# Patient Record
Sex: FEMALE | Race: WHITE | NOT HISPANIC OR LATINO | Employment: OTHER | URBAN - METROPOLITAN AREA
[De-identification: names, ages, dates, MRNs, and addresses within clinical notes are randomized per-mention and may not be internally consistent; named-entity substitution may affect disease eponyms.]

---

## 2023-02-07 PROBLEM — K21.9 CHRONIC GERD: Status: ACTIVE | Noted: 2023-02-07

## 2023-02-07 PROBLEM — R42 DIZZINESS: Status: ACTIVE | Noted: 2023-02-07

## 2023-02-07 PROBLEM — C80.1 SMALL CELL CARCINOMA (MULTI): Status: ACTIVE | Noted: 2023-02-07

## 2023-02-07 PROBLEM — E55.9 VITAMIN D DEFICIENCY: Status: ACTIVE | Noted: 2023-02-07

## 2023-02-07 PROBLEM — R73.01 IMPAIRED FASTING BLOOD SUGAR: Status: ACTIVE | Noted: 2023-02-07

## 2023-02-07 PROBLEM — R93.1 AGATSTON CAC SCORE, <100: Status: ACTIVE | Noted: 2023-02-07

## 2023-02-07 PROBLEM — F10.90 PROBLEM DRINKING: Status: ACTIVE | Noted: 2023-02-07

## 2023-02-07 PROBLEM — J44.9 COPD (CHRONIC OBSTRUCTIVE PULMONARY DISEASE) (MULTI): Status: ACTIVE | Noted: 2023-02-07

## 2023-02-07 PROBLEM — J30.2 SEASONAL ALLERGIC RHINITIS: Status: ACTIVE | Noted: 2023-02-07

## 2023-02-07 PROBLEM — U07.1 DISEASE DUE TO SEVERE ACUTE RESPIRATORY SYNDROME CORONAVIRUS 2 (SARS-COV-2): Status: ACTIVE | Noted: 2023-02-07

## 2023-02-07 PROBLEM — I49.3 FREQUENT UNIFOCAL PVCS: Status: ACTIVE | Noted: 2023-02-07

## 2023-02-07 PROBLEM — R94.31 ABNORMAL EKG: Status: ACTIVE | Noted: 2023-02-07

## 2023-02-07 PROBLEM — H69.92 DYSFUNCTION OF LEFT EUSTACHIAN TUBE: Status: ACTIVE | Noted: 2023-02-07

## 2023-02-07 PROBLEM — E78.5 HYPERLIPIDEMIA: Status: ACTIVE | Noted: 2023-02-07

## 2023-02-07 PROBLEM — N76.0 ACUTE VAGINITIS: Status: ACTIVE | Noted: 2023-02-07

## 2023-02-07 RX ORDER — MEMANTINE HYDROCHLORIDE 10 MG/1
0.5 TABLET ORAL DAILY
COMMUNITY
End: 2023-05-12 | Stop reason: ALTCHOICE

## 2023-02-07 RX ORDER — CHOLECALCIFEROL (VITAMIN D3) 25 MCG
TABLET ORAL
COMMUNITY

## 2023-02-07 RX ORDER — OMEPRAZOLE 20 MG/1
1 CAPSULE, DELAYED RELEASE ORAL DAILY
COMMUNITY
Start: 2020-01-16 | End: 2023-05-12 | Stop reason: SDUPTHER

## 2023-02-07 RX ORDER — ALBUTEROL SULFATE 90 UG/1
1-2 AEROSOL, METERED RESPIRATORY (INHALATION) EVERY 4 HOURS PRN
COMMUNITY
Start: 2021-09-21 | End: 2023-10-24 | Stop reason: ALTCHOICE

## 2023-02-21 LAB
ACTIVATED PARTIAL THROMBOPLASTIN TIME IN PPP BY COAGULATION ASSAY: 29 SEC (ref 26–39)
ALANINE AMINOTRANSFERASE (SGPT) (U/L) IN SER/PLAS: 9 U/L (ref 7–45)
ALBUMIN (G/DL) IN SER/PLAS: 3.5 G/DL (ref 3.4–5)
ALKALINE PHOSPHATASE (U/L) IN SER/PLAS: 39 U/L (ref 33–136)
AMYLASE (U/L) IN SER/PLAS: 17 U/L (ref 29–103)
ANION GAP IN SER/PLAS: 15 MMOL/L (ref 10–20)
APPEARANCE, URINE: ABNORMAL
ASPARTATE AMINOTRANSFERASE (SGOT) (U/L) IN SER/PLAS: 10 U/L (ref 9–39)
BASOPHILS (10*3/UL) IN BLOOD BY AUTOMATED COUNT: 0.02 X10E9/L (ref 0–0.1)
BASOPHILS/100 LEUKOCYTES IN BLOOD BY AUTOMATED COUNT: 0.3 % (ref 0–2)
BILIRUBIN DIRECT (MG/DL) IN SER/PLAS: 0.2 MG/DL (ref 0–0.3)
BILIRUBIN TOTAL (MG/DL) IN SER/PLAS: 1.1 MG/DL (ref 0–1.2)
BILIRUBIN, URINE: NEGATIVE
BLOOD, URINE: NEGATIVE
C REACTIVE PROTEIN (MG/L) IN SER/PLAS: 24.47 MG/DL
CALCIUM (MG/DL) IN SER/PLAS: 9 MG/DL (ref 8.6–10.6)
CARBON DIOXIDE, TOTAL (MMOL/L) IN SER/PLAS: 24 MMOL/L (ref 21–32)
CHLORIDE (MMOL/L) IN SER/PLAS: 101 MMOL/L (ref 98–107)
COLOR, URINE: ABNORMAL
CORTISOL (UG/DL) IN SERUM: 22.3 UG/DL (ref 2.5–20)
CREATINE KINASE (U/L) IN SER/PLAS: 38 U/L (ref 0–215)
CREATININE (MG/DL) IN SER/PLAS: 0.76 MG/DL (ref 0.5–1.05)
EOSINOPHILS (10*3/UL) IN BLOOD BY AUTOMATED COUNT: 0.11 X10E9/L (ref 0–0.7)
EOSINOPHILS/100 LEUKOCYTES IN BLOOD BY AUTOMATED COUNT: 1.7 % (ref 0–6)
ERYTHROCYTE DISTRIBUTION WIDTH (RATIO) BY AUTOMATED COUNT: 14.2 % (ref 11.5–14.5)
ERYTHROCYTE MEAN CORPUSCULAR HEMOGLOBIN CONCENTRATION (G/DL) BY AUTOMATED: 33.5 G/DL (ref 32–36)
ERYTHROCYTE MEAN CORPUSCULAR VOLUME (FL) BY AUTOMATED COUNT: 93 FL (ref 80–100)
ERYTHROCYTES (10*6/UL) IN BLOOD BY AUTOMATED COUNT: 3.37 X10E12/L (ref 4–5.2)
FERRITIN (UG/LL) IN SER/PLAS: 818 UG/L (ref 8–150)
FOLLITROPIN (IU/L) IN SER/PLAS: 0.9 IU/L
GFR FEMALE: 86 ML/MIN/1.73M2
GLUCOSE (MG/DL) IN SER/PLAS: 100 MG/DL (ref 74–99)
GLUCOSE, URINE: NEGATIVE MG/DL
HEMATOCRIT (%) IN BLOOD BY AUTOMATED COUNT: 31.3 % (ref 36–46)
HEMOGLOBIN (G/DL) IN BLOOD: 10.5 G/DL (ref 12–16)
HYALINE CASTS, URINE: ABNORMAL /LPF
IMMATURE GRANULOCYTES/100 LEUKOCYTES IN BLOOD BY AUTOMATED COUNT: 0.5 % (ref 0–0.9)
INR IN PPP BY COAGULATION ASSAY: 1.2 (ref 0.9–1.1)
KETONES, URINE: ABNORMAL MG/DL
LACTATE DEHYDROGENASE (U/L) IN SER/PLAS BY LAC->PYR RXN: 120 U/L (ref 84–246)
LEUKOCYTE ESTERASE, URINE: NEGATIVE
LEUKOCYTES (10*3/UL) IN BLOOD BY AUTOMATED COUNT: 6.4 X10E9/L (ref 4.4–11.3)
LIPASE (U/L) IN SER/PLAS: 21 U/L (ref 9–82)
LUTEINIZING HORMONE (IU/ML) IN SER/PLAS: <0.1 IU/L
LYMPHOCYTES (10*3/UL) IN BLOOD BY AUTOMATED COUNT: 0.54 X10E9/L (ref 1.2–4.8)
LYMPHOCYTES/100 LEUKOCYTES IN BLOOD BY AUTOMATED COUNT: 8.4 % (ref 13–44)
MAGNESIUM (MG/DL) IN SER/PLAS: 1.71 MG/DL (ref 1.6–2.4)
MONOCYTES (10*3/UL) IN BLOOD BY AUTOMATED COUNT: 0.72 X10E9/L (ref 0.1–1)
MONOCYTES/100 LEUKOCYTES IN BLOOD BY AUTOMATED COUNT: 11.3 % (ref 2–10)
MUCUS, URINE: ABNORMAL /LPF
NEUTROPHILS (10*3/UL) IN BLOOD BY AUTOMATED COUNT: 4.98 X10E9/L (ref 1.2–7.7)
NEUTROPHILS/100 LEUKOCYTES IN BLOOD BY AUTOMATED COUNT: 77.8 % (ref 40–80)
NITRITE, URINE: NEGATIVE
NRBC (PER 100 WBCS) BY AUTOMATED COUNT: 0 /100 WBC (ref 0–0)
PH, URINE: 5 (ref 5–8)
PHOSPHATE (MG/DL) IN SER/PLAS: 3.1 MG/DL (ref 2.5–4.9)
PLATELETS (10*3/UL) IN BLOOD AUTOMATED COUNT: 221 X10E9/L (ref 150–450)
POTASSIUM (MMOL/L) IN SER/PLAS: 3.4 MMOL/L (ref 3.5–5.3)
PROTEIN TOTAL: 5.8 G/DL (ref 6.4–8.2)
PROTEIN, URINE: ABNORMAL MG/DL
PROTHROMBIN TIME (PT) IN PPP BY COAGULATION ASSAY: 13.7 SEC (ref 9.8–13.4)
RBC, URINE: 3 /HPF (ref 0–5)
SODIUM (MMOL/L) IN SER/PLAS: 137 MMOL/L (ref 136–145)
SPECIFIC GRAVITY, URINE: 1.02 (ref 1–1.03)
SQUAMOUS EPITHELIAL CELLS, URINE: 6 /HPF
THYROTROPIN (MIU/L) IN SER/PLAS BY DETECTION LIMIT <= 0.05 MIU/L: 2.12 MIU/L (ref 0.44–3.98)
THYROXINE (T4) FREE (NG/DL) IN SER/PLAS: 0.98 NG/DL (ref 0.78–1.48)
URATE (MG/DL) IN SER/PLAS: 4.2 MG/DL (ref 2.3–6.7)
UREA NITROGEN (MG/DL) IN SER/PLAS: 21 MG/DL (ref 6–23)
UROBILINOGEN, URINE: 2 MG/DL (ref 0–1.9)
WBC, URINE: 15 /HPF (ref 0–5)

## 2023-02-24 LAB — ADRENOCORTICOTROPIC HORMONE: 5.6 PG/ML (ref 7.2–63.3)

## 2023-02-28 LAB
ACTIVATED PARTIAL THROMBOPLASTIN TIME IN PPP BY COAGULATION ASSAY: 29 SEC (ref 26–39)
ALANINE AMINOTRANSFERASE (SGPT) (U/L) IN SER/PLAS: 14 U/L (ref 7–45)
ALANINE AMINOTRANSFERASE (SGPT) (U/L) IN SER/PLAS: 20 U/L (ref 7–45)
ALBUMIN (G/DL) IN SER/PLAS: 3.3 G/DL (ref 3.4–5)
ALBUMIN (G/DL) IN SER/PLAS: 3.8 G/DL (ref 3.4–5)
ALKALINE PHOSPHATASE (U/L) IN SER/PLAS: 30 U/L (ref 33–136)
ALKALINE PHOSPHATASE (U/L) IN SER/PLAS: 36 U/L (ref 33–136)
AMYLASE (U/L) IN SER/PLAS: 35 U/L (ref 29–103)
ANION GAP IN SER/PLAS: 11 MMOL/L (ref 10–20)
ANION GAP IN SER/PLAS: 14 MMOL/L (ref 10–20)
APPEARANCE, URINE: ABNORMAL
ASPARTATE AMINOTRANSFERASE (SGOT) (U/L) IN SER/PLAS: 11 U/L (ref 9–39)
ASPARTATE AMINOTRANSFERASE (SGOT) (U/L) IN SER/PLAS: 14 U/L (ref 9–39)
BASOPHILS (10*3/UL) IN BLOOD BY AUTOMATED COUNT: 0.03 X10E9/L (ref 0–0.1)
BASOPHILS/100 LEUKOCYTES IN BLOOD BY AUTOMATED COUNT: 0.5 % (ref 0–2)
BILIRUBIN DIRECT (MG/DL) IN SER/PLAS: 0.1 MG/DL (ref 0–0.3)
BILIRUBIN TOTAL (MG/DL) IN SER/PLAS: 0.5 MG/DL (ref 0–1.2)
BILIRUBIN TOTAL (MG/DL) IN SER/PLAS: 0.7 MG/DL (ref 0–1.2)
BILIRUBIN, URINE: NEGATIVE
BLOOD, URINE: NEGATIVE
C REACTIVE PROTEIN (MG/L) IN SER/PLAS: 0.79 MG/DL
C REACTIVE PROTEIN (MG/L) IN SER/PLAS: 1.12 MG/DL
CALCIUM (MG/DL) IN SER/PLAS: 10.5 MG/DL (ref 8.6–10.6)
CALCIUM (MG/DL) IN SER/PLAS: 8.7 MG/DL (ref 8.6–10.6)
CARBON DIOXIDE, TOTAL (MMOL/L) IN SER/PLAS: 24 MMOL/L (ref 21–32)
CARBON DIOXIDE, TOTAL (MMOL/L) IN SER/PLAS: 29 MMOL/L (ref 21–32)
CHLORIDE (MMOL/L) IN SER/PLAS: 100 MMOL/L (ref 98–107)
CHLORIDE (MMOL/L) IN SER/PLAS: 106 MMOL/L (ref 98–107)
COLOR, URINE: YELLOW
CORTISOL (UG/DL) IN SERUM: 1.2 UG/DL (ref 2.5–20)
CREATINE KINASE (U/L) IN SER/PLAS: 39 U/L (ref 0–215)
CREATININE (MG/DL) IN SER/PLAS: 0.71 MG/DL (ref 0.5–1.05)
CREATININE (MG/DL) IN SER/PLAS: 0.9 MG/DL (ref 0.5–1.05)
EOSINOPHILS (10*3/UL) IN BLOOD BY AUTOMATED COUNT: 0.04 X10E9/L (ref 0–0.7)
EOSINOPHILS/100 LEUKOCYTES IN BLOOD BY AUTOMATED COUNT: 0.7 % (ref 0–6)
ERYTHROCYTE DISTRIBUTION WIDTH (RATIO) BY AUTOMATED COUNT: 14.2 % (ref 11.5–14.5)
ERYTHROCYTE MEAN CORPUSCULAR HEMOGLOBIN CONCENTRATION (G/DL) BY AUTOMATED: 32.5 G/DL (ref 32–36)
ERYTHROCYTE MEAN CORPUSCULAR VOLUME (FL) BY AUTOMATED COUNT: 94 FL (ref 80–100)
ERYTHROCYTES (10*6/UL) IN BLOOD BY AUTOMATED COUNT: 3.75 X10E12/L (ref 4–5.2)
FERRITIN (UG/LL) IN SER/PLAS: 436 UG/L (ref 8–150)
FERRITIN (UG/LL) IN SER/PLAS: 545 UG/L (ref 8–150)
FOLLITROPIN (IU/L) IN SER/PLAS: 1 IU/L
GFR FEMALE: 70 ML/MIN/1.73M2
GFR FEMALE: >90 ML/MIN/1.73M2
GLUCOSE (MG/DL) IN SER/PLAS: 105 MG/DL (ref 74–99)
GLUCOSE (MG/DL) IN SER/PLAS: 116 MG/DL (ref 74–99)
GLUCOSE, URINE: NEGATIVE MG/DL
HEMATOCRIT (%) IN BLOOD BY AUTOMATED COUNT: 35.4 % (ref 36–46)
HEMOGLOBIN (G/DL) IN BLOOD: 11.5 G/DL (ref 12–16)
IMMATURE GRANULOCYTES/100 LEUKOCYTES IN BLOOD BY AUTOMATED COUNT: 3.8 % (ref 0–0.9)
INR IN PPP BY COAGULATION ASSAY: 1 (ref 0.9–1.1)
KETONES, URINE: NEGATIVE MG/DL
LACTATE DEHYDROGENASE (U/L) IN SER/PLAS BY LAC->PYR RXN: 194 U/L (ref 84–246)
LEUKOCYTE ESTERASE, URINE: NEGATIVE
LEUKOCYTES (10*3/UL) IN BLOOD BY AUTOMATED COUNT: 5.6 X10E9/L (ref 4.4–11.3)
LIPASE (U/L) IN SER/PLAS: 65 U/L (ref 9–82)
LUTEINIZING HORMONE (IU/ML) IN SER/PLAS: <0.1 IU/L
LYMPHOCYTES (10*3/UL) IN BLOOD BY AUTOMATED COUNT: 1.14 X10E9/L (ref 1.2–4.8)
LYMPHOCYTES/100 LEUKOCYTES IN BLOOD BY AUTOMATED COUNT: 20.5 % (ref 13–44)
MAGNESIUM (MG/DL) IN SER/PLAS: 1.94 MG/DL (ref 1.6–2.4)
MONOCYTES (10*3/UL) IN BLOOD BY AUTOMATED COUNT: 0.61 X10E9/L (ref 0.1–1)
MONOCYTES/100 LEUKOCYTES IN BLOOD BY AUTOMATED COUNT: 11 % (ref 2–10)
NEUTROPHILS (10*3/UL) IN BLOOD BY AUTOMATED COUNT: 3.52 X10E9/L (ref 1.2–7.7)
NEUTROPHILS/100 LEUKOCYTES IN BLOOD BY AUTOMATED COUNT: 63.5 % (ref 40–80)
NITRITE, URINE: NEGATIVE
NRBC (PER 100 WBCS) BY AUTOMATED COUNT: 0 /100 WBC (ref 0–0)
PH, URINE: 7 (ref 5–8)
PHOSPHATE (MG/DL) IN SER/PLAS: 4.4 MG/DL (ref 2.5–4.9)
PLATELETS (10*3/UL) IN BLOOD AUTOMATED COUNT: 400 X10E9/L (ref 150–450)
POTASSIUM (MMOL/L) IN SER/PLAS: 4 MMOL/L (ref 3.5–5.3)
POTASSIUM (MMOL/L) IN SER/PLAS: 4.1 MMOL/L (ref 3.5–5.3)
PROTEIN TOTAL: 5.4 G/DL (ref 6.4–8.2)
PROTEIN TOTAL: 6.5 G/DL (ref 6.4–8.2)
PROTEIN, URINE: NEGATIVE MG/DL
PROTHROMBIN TIME (PT) IN PPP BY COAGULATION ASSAY: 12.1 SEC (ref 9.8–13.4)
SODIUM (MMOL/L) IN SER/PLAS: 137 MMOL/L (ref 136–145)
SODIUM (MMOL/L) IN SER/PLAS: 139 MMOL/L (ref 136–145)
SPECIFIC GRAVITY, URINE: 1.02 (ref 1–1.03)
THYROTROPIN (MIU/L) IN SER/PLAS BY DETECTION LIMIT <= 0.05 MIU/L: 3.06 MIU/L (ref 0.44–3.98)
THYROXINE (T4) FREE (NG/DL) IN SER/PLAS: 1.1 NG/DL (ref 0.78–1.48)
URATE (MG/DL) IN SER/PLAS: 2.9 MG/DL (ref 2.3–6.7)
UREA NITROGEN (MG/DL) IN SER/PLAS: 20 MG/DL (ref 6–23)
UREA NITROGEN (MG/DL) IN SER/PLAS: 24 MG/DL (ref 6–23)
UROBILINOGEN, URINE: 4 MG/DL (ref 0–1.9)

## 2023-03-01 LAB
ACTIVATED PARTIAL THROMBOPLASTIN TIME IN PPP BY COAGULATION ASSAY: 23 SEC (ref 26–39)
ALANINE AMINOTRANSFERASE (SGPT) (U/L) IN SER/PLAS: 18 U/L (ref 7–45)
ALBUMIN (G/DL) IN SER/PLAS: 3.6 G/DL (ref 3.4–5)
ALKALINE PHOSPHATASE (U/L) IN SER/PLAS: 35 U/L (ref 33–136)
ANION GAP IN SER/PLAS: 14 MMOL/L (ref 10–20)
ASPARTATE AMINOTRANSFERASE (SGOT) (U/L) IN SER/PLAS: 15 U/L (ref 9–39)
BASOPHILS (10*3/UL) IN BLOOD BY AUTOMATED COUNT: 0.03 X10E9/L (ref 0–0.1)
BASOPHILS/100 LEUKOCYTES IN BLOOD BY AUTOMATED COUNT: 0.5 % (ref 0–2)
BILIRUBIN TOTAL (MG/DL) IN SER/PLAS: 0.7 MG/DL (ref 0–1.2)
C REACTIVE PROTEIN (MG/L) IN SER/PLAS: 0.69 MG/DL
CALCIUM (MG/DL) IN SER/PLAS: 9.5 MG/DL (ref 8.6–10.6)
CARBON DIOXIDE, TOTAL (MMOL/L) IN SER/PLAS: 27 MMOL/L (ref 21–32)
CHLORIDE (MMOL/L) IN SER/PLAS: 102 MMOL/L (ref 98–107)
CREATININE (MG/DL) IN SER/PLAS: 0.78 MG/DL (ref 0.5–1.05)
EOSINOPHILS (10*3/UL) IN BLOOD BY AUTOMATED COUNT: 0.12 X10E9/L (ref 0–0.7)
EOSINOPHILS/100 LEUKOCYTES IN BLOOD BY AUTOMATED COUNT: 2.2 % (ref 0–6)
ERYTHROCYTE DISTRIBUTION WIDTH (RATIO) BY AUTOMATED COUNT: 14.4 % (ref 11.5–14.5)
ERYTHROCYTE MEAN CORPUSCULAR HEMOGLOBIN CONCENTRATION (G/DL) BY AUTOMATED: 32.7 G/DL (ref 32–36)
ERYTHROCYTE MEAN CORPUSCULAR VOLUME (FL) BY AUTOMATED COUNT: 95 FL (ref 80–100)
ERYTHROCYTES (10*6/UL) IN BLOOD BY AUTOMATED COUNT: 3.64 X10E12/L (ref 4–5.2)
FERRITIN (UG/LL) IN SER/PLAS: 482 UG/L (ref 8–150)
GFR FEMALE: 83 ML/MIN/1.73M2
GLUCOSE (MG/DL) IN SER/PLAS: 93 MG/DL (ref 74–99)
HEMATOCRIT (%) IN BLOOD BY AUTOMATED COUNT: 34.6 % (ref 36–46)
HEMOGLOBIN (G/DL) IN BLOOD: 11.3 G/DL (ref 12–16)
IMMATURE GRANULOCYTES/100 LEUKOCYTES IN BLOOD BY AUTOMATED COUNT: 2.9 % (ref 0–0.9)
INR IN PPP BY COAGULATION ASSAY: 1 (ref 0.9–1.1)
LACTATE DEHYDROGENASE (U/L) IN SER/PLAS BY LAC->PYR RXN: 146 U/L (ref 84–246)
LEUKOCYTES (10*3/UL) IN BLOOD BY AUTOMATED COUNT: 5.6 X10E9/L (ref 4.4–11.3)
LYMPHOCYTES (10*3/UL) IN BLOOD BY AUTOMATED COUNT: 0.9 X10E9/L (ref 1.2–4.8)
LYMPHOCYTES/100 LEUKOCYTES IN BLOOD BY AUTOMATED COUNT: 16.2 % (ref 13–44)
MAGNESIUM (MG/DL) IN SER/PLAS: 1.93 MG/DL (ref 1.6–2.4)
MONOCYTES (10*3/UL) IN BLOOD BY AUTOMATED COUNT: 0.41 X10E9/L (ref 0.1–1)
MONOCYTES/100 LEUKOCYTES IN BLOOD BY AUTOMATED COUNT: 7.4 % (ref 2–10)
NEUTROPHILS (10*3/UL) IN BLOOD BY AUTOMATED COUNT: 3.94 X10E9/L (ref 1.2–7.7)
NEUTROPHILS/100 LEUKOCYTES IN BLOOD BY AUTOMATED COUNT: 70.8 % (ref 40–80)
NRBC (PER 100 WBCS) BY AUTOMATED COUNT: 0 /100 WBC (ref 0–0)
PHOSPHATE (MG/DL) IN SER/PLAS: 3.3 MG/DL (ref 2.5–4.9)
PLATELETS (10*3/UL) IN BLOOD AUTOMATED COUNT: 370 X10E9/L (ref 150–450)
POTASSIUM (MMOL/L) IN SER/PLAS: 3.7 MMOL/L (ref 3.5–5.3)
PROTEIN TOTAL: 6.4 G/DL (ref 6.4–8.2)
PROTHROMBIN TIME (PT) IN PPP BY COAGULATION ASSAY: 12 SEC (ref 9.8–13.4)
SODIUM (MMOL/L) IN SER/PLAS: 139 MMOL/L (ref 136–145)
URATE (MG/DL) IN SER/PLAS: 2.8 MG/DL (ref 2.3–6.7)
UREA NITROGEN (MG/DL) IN SER/PLAS: 18 MG/DL (ref 6–23)

## 2023-03-02 LAB
ADRENOCORTICOTROPIC HORMONE: 10.7 PG/ML (ref 7.2–63.3)
BILIRUBIN DIRECT (MG/DL) IN SER/PLAS: 0.1 MG/DL (ref 0–0.3)
BILIRUBIN DIRECT (MG/DL) IN SER/PLAS: 0.1 MG/DL (ref 0–0.3)

## 2023-03-07 LAB
ACTIVATED PARTIAL THROMBOPLASTIN TIME IN PPP BY COAGULATION ASSAY: 26 SEC (ref 26–39)
ALANINE AMINOTRANSFERASE (SGPT) (U/L) IN SER/PLAS: 15 U/L (ref 7–45)
ALBUMIN (G/DL) IN SER/PLAS: 3.5 G/DL (ref 3.4–5)
ALKALINE PHOSPHATASE (U/L) IN SER/PLAS: 35 U/L (ref 33–136)
ANION GAP IN SER/PLAS: 12 MMOL/L (ref 10–20)
ASPARTATE AMINOTRANSFERASE (SGOT) (U/L) IN SER/PLAS: 13 U/L (ref 9–39)
BASOPHILS (10*3/UL) IN BLOOD BY AUTOMATED COUNT: 0.05 X10E9/L (ref 0–0.1)
BASOPHILS/100 LEUKOCYTES IN BLOOD BY AUTOMATED COUNT: 0.5 % (ref 0–2)
BILIRUBIN TOTAL (MG/DL) IN SER/PLAS: 0.9 MG/DL (ref 0–1.2)
C REACTIVE PROTEIN (MG/L) IN SER/PLAS: 0.93 MG/DL
CALCIUM (MG/DL) IN SER/PLAS: 9.2 MG/DL (ref 8.6–10.6)
CARBON DIOXIDE, TOTAL (MMOL/L) IN SER/PLAS: 27 MMOL/L (ref 21–32)
CHLORIDE (MMOL/L) IN SER/PLAS: 105 MMOL/L (ref 98–107)
CREATININE (MG/DL) IN SER/PLAS: 0.83 MG/DL (ref 0.5–1.05)
EOSINOPHILS (10*3/UL) IN BLOOD BY AUTOMATED COUNT: 0.11 X10E9/L (ref 0–0.7)
EOSINOPHILS/100 LEUKOCYTES IN BLOOD BY AUTOMATED COUNT: 1.2 % (ref 0–6)
ERYTHROCYTE DISTRIBUTION WIDTH (RATIO) BY AUTOMATED COUNT: 14.7 % (ref 11.5–14.5)
ERYTHROCYTE MEAN CORPUSCULAR HEMOGLOBIN CONCENTRATION (G/DL) BY AUTOMATED: 33.5 G/DL (ref 32–36)
ERYTHROCYTE MEAN CORPUSCULAR VOLUME (FL) BY AUTOMATED COUNT: 94 FL (ref 80–100)
ERYTHROCYTES (10*6/UL) IN BLOOD BY AUTOMATED COUNT: 3.38 X10E12/L (ref 4–5.2)
FERRITIN (UG/LL) IN SER/PLAS: 438 UG/L (ref 8–150)
GFR FEMALE: 77 ML/MIN/1.73M2
GLUCOSE (MG/DL) IN SER/PLAS: 100 MG/DL (ref 74–99)
HEMATOCRIT (%) IN BLOOD BY AUTOMATED COUNT: 31.9 % (ref 36–46)
HEMOGLOBIN (G/DL) IN BLOOD: 10.7 G/DL (ref 12–16)
IMMATURE GRANULOCYTES/100 LEUKOCYTES IN BLOOD BY AUTOMATED COUNT: 0.4 % (ref 0–0.9)
INR IN PPP BY COAGULATION ASSAY: 1.1 (ref 0.9–1.1)
LACTATE DEHYDROGENASE (U/L) IN SER/PLAS BY LAC->PYR RXN: 141 U/L (ref 84–246)
LEUKOCYTES (10*3/UL) IN BLOOD BY AUTOMATED COUNT: 9.5 X10E9/L (ref 4.4–11.3)
LYMPHOCYTES (10*3/UL) IN BLOOD BY AUTOMATED COUNT: 1.2 X10E9/L (ref 1.2–4.8)
LYMPHOCYTES/100 LEUKOCYTES IN BLOOD BY AUTOMATED COUNT: 12.7 % (ref 13–44)
MAGNESIUM (MG/DL) IN SER/PLAS: 1.98 MG/DL (ref 1.6–2.4)
MONOCYTES (10*3/UL) IN BLOOD BY AUTOMATED COUNT: 1.08 X10E9/L (ref 0.1–1)
MONOCYTES/100 LEUKOCYTES IN BLOOD BY AUTOMATED COUNT: 11.4 % (ref 2–10)
NEUTROPHILS (10*3/UL) IN BLOOD BY AUTOMATED COUNT: 6.98 X10E9/L (ref 1.2–7.7)
NEUTROPHILS/100 LEUKOCYTES IN BLOOD BY AUTOMATED COUNT: 73.8 % (ref 40–80)
NRBC (PER 100 WBCS) BY AUTOMATED COUNT: 0 /100 WBC (ref 0–0)
PHOSPHATE (MG/DL) IN SER/PLAS: 3.2 MG/DL (ref 2.5–4.9)
PLATELETS (10*3/UL) IN BLOOD AUTOMATED COUNT: 300 X10E9/L (ref 150–450)
POTASSIUM (MMOL/L) IN SER/PLAS: 3.7 MMOL/L (ref 3.5–5.3)
PROTEIN TOTAL: 5.7 G/DL (ref 6.4–8.2)
PROTHROMBIN TIME (PT) IN PPP BY COAGULATION ASSAY: 12.2 SEC (ref 9.8–13.4)
SODIUM (MMOL/L) IN SER/PLAS: 140 MMOL/L (ref 136–145)
URATE (MG/DL) IN SER/PLAS: 2.7 MG/DL (ref 2.3–6.7)
UREA NITROGEN (MG/DL) IN SER/PLAS: 25 MG/DL (ref 6–23)

## 2023-03-09 LAB — BILIRUBIN DIRECT (MG/DL) IN SER/PLAS: 0.2 MG/DL (ref 0–0.3)

## 2023-03-13 PROBLEM — N76.0 ACUTE VAGINITIS: Status: RESOLVED | Noted: 2023-02-07 | Resolved: 2023-03-13

## 2023-03-13 PROBLEM — U07.1 DISEASE DUE TO SEVERE ACUTE RESPIRATORY SYNDROME CORONAVIRUS 2 (SARS-COV-2): Status: RESOLVED | Noted: 2023-02-07 | Resolved: 2023-03-13

## 2023-03-13 PROBLEM — R42 DIZZINESS: Status: RESOLVED | Noted: 2023-02-07 | Resolved: 2023-03-13

## 2023-03-13 PROBLEM — H69.92 DYSFUNCTION OF LEFT EUSTACHIAN TUBE: Status: RESOLVED | Noted: 2023-02-07 | Resolved: 2023-03-13

## 2023-03-13 RX ORDER — CALCIUM PHOS/VIT D3/MAG OXIDE 600 MG-500
1 TABLET ORAL DAILY
COMMUNITY
End: 2023-09-08 | Stop reason: ALTCHOICE

## 2023-03-13 RX ORDER — MULTIVIT-MIN/IRON/FOLIC/HRB186 3.3 MG-25
1 TABLET ORAL DAILY
COMMUNITY
End: 2023-09-08 | Stop reason: ALTCHOICE

## 2023-03-13 RX ORDER — OMEPRAZOLE 20 MG/1
1 TABLET, DELAYED RELEASE ORAL DAILY
COMMUNITY
End: 2023-09-08 | Stop reason: SDUPTHER

## 2023-03-13 RX ORDER — CETIRIZINE HYDROCHLORIDE 10 MG/1
1 TABLET ORAL DAILY
COMMUNITY
End: 2023-05-01 | Stop reason: SDUPTHER

## 2023-03-14 LAB
ACTIVATED PARTIAL THROMBOPLASTIN TIME IN PPP BY COAGULATION ASSAY: 26 SEC (ref 26–39)
ALANINE AMINOTRANSFERASE (SGPT) (U/L) IN SER/PLAS: 10 U/L (ref 7–45)
ALANINE AMINOTRANSFERASE (SGPT) (U/L) IN SER/PLAS: 12 U/L (ref 7–45)
ALBUMIN (G/DL) IN SER/PLAS: 3 G/DL (ref 3.4–5)
ALBUMIN (G/DL) IN SER/PLAS: 3.4 G/DL (ref 3.4–5)
ALKALINE PHOSPHATASE (U/L) IN SER/PLAS: 33 U/L (ref 33–136)
ALKALINE PHOSPHATASE (U/L) IN SER/PLAS: 33 U/L (ref 33–136)
ANION GAP IN SER/PLAS: 10 MMOL/L (ref 10–20)
ANION GAP IN SER/PLAS: 11 MMOL/L (ref 10–20)
ASPARTATE AMINOTRANSFERASE (SGOT) (U/L) IN SER/PLAS: 10 U/L (ref 9–39)
ASPARTATE AMINOTRANSFERASE (SGOT) (U/L) IN SER/PLAS: 16 U/L (ref 9–39)
BASOPHILS (10*3/UL) IN BLOOD BY AUTOMATED COUNT: 0.01 X10E9/L (ref 0–0.1)
BASOPHILS/100 LEUKOCYTES IN BLOOD BY AUTOMATED COUNT: 0.1 % (ref 0–2)
BILIRUBIN DIRECT (MG/DL) IN SER/PLAS: 0.1 MG/DL (ref 0–0.3)
BILIRUBIN TOTAL (MG/DL) IN SER/PLAS: 0.6 MG/DL (ref 0–1.2)
BILIRUBIN TOTAL (MG/DL) IN SER/PLAS: 0.8 MG/DL (ref 0–1.2)
C REACTIVE PROTEIN (MG/L) IN SER/PLAS: 0.28 MG/DL
C REACTIVE PROTEIN (MG/L) IN SER/PLAS: 0.47 MG/DL
CALCIUM (MG/DL) IN SER/PLAS: 8 MG/DL (ref 8.6–10.6)
CALCIUM (MG/DL) IN SER/PLAS: 8.6 MG/DL (ref 8.6–10.6)
CARBON DIOXIDE, TOTAL (MMOL/L) IN SER/PLAS: 25 MMOL/L (ref 21–32)
CARBON DIOXIDE, TOTAL (MMOL/L) IN SER/PLAS: 27 MMOL/L (ref 21–32)
CHLORIDE (MMOL/L) IN SER/PLAS: 105 MMOL/L (ref 98–107)
CHLORIDE (MMOL/L) IN SER/PLAS: 108 MMOL/L (ref 98–107)
CREATINE KINASE (U/L) IN SER/PLAS: 43 U/L (ref 0–215)
CREATININE (MG/DL) IN SER/PLAS: 0.61 MG/DL (ref 0.5–1.05)
CREATININE (MG/DL) IN SER/PLAS: 0.79 MG/DL (ref 0.5–1.05)
EOSINOPHILS (10*3/UL) IN BLOOD BY AUTOMATED COUNT: 0.11 X10E9/L (ref 0–0.7)
EOSINOPHILS/100 LEUKOCYTES IN BLOOD BY AUTOMATED COUNT: 1.4 % (ref 0–6)
ERYTHROCYTE DISTRIBUTION WIDTH (RATIO) BY AUTOMATED COUNT: 15.7 % (ref 11.5–14.5)
ERYTHROCYTE MEAN CORPUSCULAR HEMOGLOBIN CONCENTRATION (G/DL) BY AUTOMATED: 32.2 G/DL (ref 32–36)
ERYTHROCYTE MEAN CORPUSCULAR VOLUME (FL) BY AUTOMATED COUNT: 97 FL (ref 80–100)
ERYTHROCYTES (10*6/UL) IN BLOOD BY AUTOMATED COUNT: 3.11 X10E12/L (ref 4–5.2)
FERRITIN (UG/LL) IN SER/PLAS: 273 UG/L (ref 8–150)
FERRITIN (UG/LL) IN SER/PLAS: 307 UG/L (ref 8–150)
FOLLITROPIN (IU/L) IN SER/PLAS: <0.9 IU/L
GFR FEMALE: 82 ML/MIN/1.73M2
GFR FEMALE: >90 ML/MIN/1.73M2
GLUCOSE (MG/DL) IN SER/PLAS: 103 MG/DL (ref 74–99)
GLUCOSE (MG/DL) IN SER/PLAS: 92 MG/DL (ref 74–99)
HEMATOCRIT (%) IN BLOOD BY AUTOMATED COUNT: 30.1 % (ref 36–46)
HEMOGLOBIN (G/DL) IN BLOOD: 9.7 G/DL (ref 12–16)
IMMATURE GRANULOCYTES/100 LEUKOCYTES IN BLOOD BY AUTOMATED COUNT: 0.8 % (ref 0–0.9)
INR IN PPP BY COAGULATION ASSAY: 1 (ref 0.9–1.1)
LACTATE DEHYDROGENASE (U/L) IN SER/PLAS BY LAC->PYR RXN: 213 U/L (ref 84–246)
LEUKOCYTES (10*3/UL) IN BLOOD BY AUTOMATED COUNT: 7.6 X10E9/L (ref 4.4–11.3)
LUTEINIZING HORMONE (IU/ML) IN SER/PLAS: <0.1 IU/L
LYMPHOCYTES (10*3/UL) IN BLOOD BY AUTOMATED COUNT: 0.88 X10E9/L (ref 1.2–4.8)
LYMPHOCYTES/100 LEUKOCYTES IN BLOOD BY AUTOMATED COUNT: 11.5 % (ref 13–44)
MAGNESIUM (MG/DL) IN SER/PLAS: 1.82 MG/DL (ref 1.6–2.4)
MONOCYTES (10*3/UL) IN BLOOD BY AUTOMATED COUNT: 0.61 X10E9/L (ref 0.1–1)
MONOCYTES/100 LEUKOCYTES IN BLOOD BY AUTOMATED COUNT: 8 % (ref 2–10)
NEUTROPHILS (10*3/UL) IN BLOOD BY AUTOMATED COUNT: 5.96 X10E9/L (ref 1.2–7.7)
NEUTROPHILS/100 LEUKOCYTES IN BLOOD BY AUTOMATED COUNT: 78.2 % (ref 40–80)
NRBC (PER 100 WBCS) BY AUTOMATED COUNT: 0 /100 WBC (ref 0–0)
PHOSPHATE (MG/DL) IN SER/PLAS: 3 MG/DL (ref 2.5–4.9)
PLATELETS (10*3/UL) IN BLOOD AUTOMATED COUNT: 220 X10E9/L (ref 150–450)
POTASSIUM (MMOL/L) IN SER/PLAS: 3.5 MMOL/L (ref 3.5–5.3)
POTASSIUM (MMOL/L) IN SER/PLAS: 3.6 MMOL/L (ref 3.5–5.3)
PROTEIN TOTAL: 4.9 G/DL (ref 6.4–8.2)
PROTEIN TOTAL: 5.5 G/DL (ref 6.4–8.2)
PROTHROMBIN TIME (PT) IN PPP BY COAGULATION ASSAY: 11.3 SEC (ref 9.8–13.4)
SODIUM (MMOL/L) IN SER/PLAS: 139 MMOL/L (ref 136–145)
SODIUM (MMOL/L) IN SER/PLAS: 139 MMOL/L (ref 136–145)
URATE (MG/DL) IN SER/PLAS: 2.5 MG/DL (ref 2.3–6.7)
UREA NITROGEN (MG/DL) IN SER/PLAS: 13 MG/DL (ref 6–23)
UREA NITROGEN (MG/DL) IN SER/PLAS: 20 MG/DL (ref 6–23)

## 2023-03-15 LAB
ALANINE AMINOTRANSFERASE (SGPT) (U/L) IN SER/PLAS: 12 U/L (ref 7–45)
ALBUMIN (G/DL) IN SER/PLAS: 3.2 G/DL (ref 3.4–5)
ALKALINE PHOSPHATASE (U/L) IN SER/PLAS: 31 U/L (ref 33–136)
ANION GAP IN SER/PLAS: 12 MMOL/L (ref 10–20)
ASPARTATE AMINOTRANSFERASE (SGOT) (U/L) IN SER/PLAS: 10 U/L (ref 9–39)
BASOPHILS (10*3/UL) IN BLOOD BY AUTOMATED COUNT: 0.01 X10E9/L (ref 0–0.1)
BASOPHILS/100 LEUKOCYTES IN BLOOD BY AUTOMATED COUNT: 0.1 % (ref 0–2)
BILIRUBIN DIRECT (MG/DL) IN SER/PLAS: 0.1 MG/DL (ref 0–0.3)
BILIRUBIN TOTAL (MG/DL) IN SER/PLAS: 0.7 MG/DL (ref 0–1.2)
C REACTIVE PROTEIN (MG/L) IN SER/PLAS: 0.51 MG/DL
CALCIUM (MG/DL) IN SER/PLAS: 8.6 MG/DL (ref 8.6–10.6)
CARBON DIOXIDE, TOTAL (MMOL/L) IN SER/PLAS: 25 MMOL/L (ref 21–32)
CHLORIDE (MMOL/L) IN SER/PLAS: 103 MMOL/L (ref 98–107)
CREATININE (MG/DL) IN SER/PLAS: 0.83 MG/DL (ref 0.5–1.05)
EOSINOPHILS (10*3/UL) IN BLOOD BY AUTOMATED COUNT: 0.03 X10E9/L (ref 0–0.7)
EOSINOPHILS/100 LEUKOCYTES IN BLOOD BY AUTOMATED COUNT: 0.4 % (ref 0–6)
ERYTHROCYTE DISTRIBUTION WIDTH (RATIO) BY AUTOMATED COUNT: 15.5 % (ref 11.5–14.5)
ERYTHROCYTE MEAN CORPUSCULAR HEMOGLOBIN CONCENTRATION (G/DL) BY AUTOMATED: 33 G/DL (ref 32–36)
ERYTHROCYTE MEAN CORPUSCULAR VOLUME (FL) BY AUTOMATED COUNT: 95 FL (ref 80–100)
ERYTHROCYTES (10*6/UL) IN BLOOD BY AUTOMATED COUNT: 3.26 X10E12/L (ref 4–5.2)
FERRITIN (UG/LL) IN SER/PLAS: 277 UG/L (ref 8–150)
GFR FEMALE: 77 ML/MIN/1.73M2
GLUCOSE (MG/DL) IN SER/PLAS: 125 MG/DL (ref 74–99)
HEMATOCRIT (%) IN BLOOD BY AUTOMATED COUNT: 30.9 % (ref 36–46)
HEMOGLOBIN (G/DL) IN BLOOD: 10.2 G/DL (ref 12–16)
IMMATURE GRANULOCYTES/100 LEUKOCYTES IN BLOOD BY AUTOMATED COUNT: 0.9 % (ref 0–0.9)
LACTATE DEHYDROGENASE (U/L) IN SER/PLAS BY LAC->PYR RXN: 146 U/L (ref 84–246)
LEUKOCYTES (10*3/UL) IN BLOOD BY AUTOMATED COUNT: 6.9 X10E9/L (ref 4.4–11.3)
LYMPHOCYTES (10*3/UL) IN BLOOD BY AUTOMATED COUNT: 0.61 X10E9/L (ref 1.2–4.8)
LYMPHOCYTES/100 LEUKOCYTES IN BLOOD BY AUTOMATED COUNT: 8.9 % (ref 13–44)
MAGNESIUM (MG/DL) IN SER/PLAS: 1.71 MG/DL (ref 1.6–2.4)
MONOCYTES (10*3/UL) IN BLOOD BY AUTOMATED COUNT: 0.33 X10E9/L (ref 0.1–1)
MONOCYTES/100 LEUKOCYTES IN BLOOD BY AUTOMATED COUNT: 4.8 % (ref 2–10)
NEUTROPHILS (10*3/UL) IN BLOOD BY AUTOMATED COUNT: 5.83 X10E9/L (ref 1.2–7.7)
NEUTROPHILS/100 LEUKOCYTES IN BLOOD BY AUTOMATED COUNT: 84.9 % (ref 40–80)
NRBC (PER 100 WBCS) BY AUTOMATED COUNT: 0 /100 WBC (ref 0–0)
PHOSPHATE (MG/DL) IN SER/PLAS: 2.9 MG/DL (ref 2.5–4.9)
PLATELETS (10*3/UL) IN BLOOD AUTOMATED COUNT: 222 X10E9/L (ref 150–450)
POTASSIUM (MMOL/L) IN SER/PLAS: 3.7 MMOL/L (ref 3.5–5.3)
PROTEIN TOTAL: 5.3 G/DL (ref 6.4–8.2)
SODIUM (MMOL/L) IN SER/PLAS: 136 MMOL/L (ref 136–145)
URATE (MG/DL) IN SER/PLAS: 2.1 MG/DL (ref 2.3–6.7)
UREA NITROGEN (MG/DL) IN SER/PLAS: 14 MG/DL (ref 6–23)

## 2023-03-16 LAB
ACTIVATED PARTIAL THROMBOPLASTIN TIME IN PPP BY COAGULATION ASSAY: 28 SEC (ref 26–39)
ALANINE AMINOTRANSFERASE (SGPT) (U/L) IN SER/PLAS: 12 U/L (ref 7–45)
ALBUMIN (G/DL) IN SER/PLAS: 3.5 G/DL (ref 3.4–5)
ALKALINE PHOSPHATASE (U/L) IN SER/PLAS: 35 U/L (ref 33–136)
ANION GAP IN SER/PLAS: 12 MMOL/L (ref 10–20)
ASPARTATE AMINOTRANSFERASE (SGOT) (U/L) IN SER/PLAS: 14 U/L (ref 9–39)
BASOPHILS (10*3/UL) IN BLOOD BY AUTOMATED COUNT: 0.02 X10E9/L (ref 0–0.1)
BASOPHILS/100 LEUKOCYTES IN BLOOD BY AUTOMATED COUNT: 0.3 % (ref 0–2)
BILIRUBIN DIRECT (MG/DL) IN SER/PLAS: 0.1 MG/DL (ref 0–0.3)
BILIRUBIN TOTAL (MG/DL) IN SER/PLAS: 0.7 MG/DL (ref 0–1.2)
C REACTIVE PROTEIN (MG/L) IN SER/PLAS: 0.49 MG/DL
CALCIUM (MG/DL) IN SER/PLAS: 8.9 MG/DL (ref 8.6–10.6)
CARBON DIOXIDE, TOTAL (MMOL/L) IN SER/PLAS: 25 MMOL/L (ref 21–32)
CHLORIDE (MMOL/L) IN SER/PLAS: 103 MMOL/L (ref 98–107)
CREATININE (MG/DL) IN SER/PLAS: 0.69 MG/DL (ref 0.5–1.05)
EOSINOPHILS (10*3/UL) IN BLOOD BY AUTOMATED COUNT: 0.04 X10E9/L (ref 0–0.7)
EOSINOPHILS/100 LEUKOCYTES IN BLOOD BY AUTOMATED COUNT: 0.6 % (ref 0–6)
ERYTHROCYTE DISTRIBUTION WIDTH (RATIO) BY AUTOMATED COUNT: 15.6 % (ref 11.5–14.5)
ERYTHROCYTE MEAN CORPUSCULAR HEMOGLOBIN CONCENTRATION (G/DL) BY AUTOMATED: 32.1 G/DL (ref 32–36)
ERYTHROCYTE MEAN CORPUSCULAR VOLUME (FL) BY AUTOMATED COUNT: 98 FL (ref 80–100)
ERYTHROCYTES (10*6/UL) IN BLOOD BY AUTOMATED COUNT: 3.33 X10E12/L (ref 4–5.2)
FERRITIN (UG/LL) IN SER/PLAS: 289 UG/L (ref 8–150)
GFR FEMALE: >90 ML/MIN/1.73M2
GLUCOSE (MG/DL) IN SER/PLAS: 96 MG/DL (ref 74–99)
HEMATOCRIT (%) IN BLOOD BY AUTOMATED COUNT: 32.7 % (ref 36–46)
HEMOGLOBIN (G/DL) IN BLOOD: 10.5 G/DL (ref 12–16)
IMMATURE GRANULOCYTES/100 LEUKOCYTES IN BLOOD BY AUTOMATED COUNT: 1.1 % (ref 0–0.9)
INR IN PPP BY COAGULATION ASSAY: 1 (ref 0.9–1.1)
LACTATE DEHYDROGENASE (U/L) IN SER/PLAS BY LAC->PYR RXN: 185 U/L (ref 84–246)
LEUKOCYTES (10*3/UL) IN BLOOD BY AUTOMATED COUNT: 6.7 X10E9/L (ref 4.4–11.3)
LYMPHOCYTES (10*3/UL) IN BLOOD BY AUTOMATED COUNT: 0.67 X10E9/L (ref 1.2–4.8)
LYMPHOCYTES/100 LEUKOCYTES IN BLOOD BY AUTOMATED COUNT: 10.1 % (ref 13–44)
MAGNESIUM (MG/DL) IN SER/PLAS: 1.85 MG/DL (ref 1.6–2.4)
MONOCYTES (10*3/UL) IN BLOOD BY AUTOMATED COUNT: 0.24 X10E9/L (ref 0.1–1)
MONOCYTES/100 LEUKOCYTES IN BLOOD BY AUTOMATED COUNT: 3.6 % (ref 2–10)
NEUTROPHILS (10*3/UL) IN BLOOD BY AUTOMATED COUNT: 5.62 X10E9/L (ref 1.2–7.7)
NEUTROPHILS/100 LEUKOCYTES IN BLOOD BY AUTOMATED COUNT: 84.3 % (ref 40–80)
NRBC (PER 100 WBCS) BY AUTOMATED COUNT: 0 /100 WBC (ref 0–0)
PHOSPHATE (MG/DL) IN SER/PLAS: 3.4 MG/DL (ref 2.5–4.9)
PLATELETS (10*3/UL) IN BLOOD AUTOMATED COUNT: 241 X10E9/L (ref 150–450)
POTASSIUM (MMOL/L) IN SER/PLAS: 4.2 MMOL/L (ref 3.5–5.3)
PROTEIN TOTAL: 5.8 G/DL (ref 6.4–8.2)
PROTHROMBIN TIME (PT) IN PPP BY COAGULATION ASSAY: 11.5 SEC (ref 9.8–13.4)
SODIUM (MMOL/L) IN SER/PLAS: 136 MMOL/L (ref 136–145)
URATE (MG/DL) IN SER/PLAS: 2 MG/DL (ref 2.3–6.7)
UREA NITROGEN (MG/DL) IN SER/PLAS: 17 MG/DL (ref 6–23)

## 2023-03-20 ENCOUNTER — APPOINTMENT (OUTPATIENT)
Dept: PRIMARY CARE | Facility: CLINIC | Age: 68
End: 2023-03-20
Payer: MEDICARE

## 2023-03-28 ENCOUNTER — HOSPITAL ENCOUNTER (OUTPATIENT)
Dept: DATA CONVERSION | Facility: HOSPITAL | Age: 68
End: 2023-03-28
Attending: STUDENT IN AN ORGANIZED HEALTH CARE EDUCATION/TRAINING PROGRAM | Admitting: STUDENT IN AN ORGANIZED HEALTH CARE EDUCATION/TRAINING PROGRAM
Payer: MEDICARE

## 2023-03-28 DIAGNOSIS — C34.90 MALIGNANT NEOPLASM OF UNSPECIFIED PART OF UNSPECIFIED BRONCHUS OR LUNG (MULTI): ICD-10-CM

## 2023-03-28 DIAGNOSIS — Z51.11 ENCOUNTER FOR ANTINEOPLASTIC CHEMOTHERAPY: ICD-10-CM

## 2023-03-28 DIAGNOSIS — Z00.6 ENCOUNTER FOR EXAMINATION FOR NORMAL COMPARISON AND CONTROL IN CLINICAL RESEARCH PROGRAM: ICD-10-CM

## 2023-03-28 LAB
ACTIVATED PARTIAL THROMBOPLASTIN TIME IN PPP BY COAGULATION ASSAY: 26 SEC (ref 26–39)
ALANINE AMINOTRANSFERASE (SGPT) (U/L) IN SER/PLAS: 14 U/L (ref 7–45)
ALBUMIN (G/DL) IN SER/PLAS: 3.7 G/DL (ref 3.4–5)
ALKALINE PHOSPHATASE (U/L) IN SER/PLAS: 39 U/L (ref 33–136)
AMYLASE (U/L) IN SER/PLAS: 39 U/L (ref 29–103)
ANION GAP IN SER/PLAS: 15 MMOL/L (ref 10–20)
ASPARTATE AMINOTRANSFERASE (SGOT) (U/L) IN SER/PLAS: 13 U/L (ref 9–39)
BASOPHILS (10*3/UL) IN BLOOD BY AUTOMATED COUNT: 0.03 X10E9/L (ref 0–0.1)
BASOPHILS/100 LEUKOCYTES IN BLOOD BY AUTOMATED COUNT: 0.4 % (ref 0–2)
BILIRUBIN DIRECT (MG/DL) IN SER/PLAS: 0.1 MG/DL (ref 0–0.3)
BILIRUBIN TOTAL (MG/DL) IN SER/PLAS: 0.7 MG/DL (ref 0–1.2)
C REACTIVE PROTEIN (MG/L) IN SER/PLAS: 1.33 MG/DL
CALCIUM (MG/DL) IN SER/PLAS: 9.1 MG/DL (ref 8.6–10.6)
CARBON DIOXIDE, TOTAL (MMOL/L) IN SER/PLAS: 26 MMOL/L (ref 21–32)
CHLORIDE (MMOL/L) IN SER/PLAS: 104 MMOL/L (ref 98–107)
CORTISOL (UG/DL) IN SERUM: 0.7 UG/DL (ref 2.5–20)
CREATINE KINASE (U/L) IN SER/PLAS: 38 U/L (ref 0–215)
CREATININE (MG/DL) IN SER/PLAS: 0.76 MG/DL (ref 0.5–1.05)
EOSINOPHILS (10*3/UL) IN BLOOD BY AUTOMATED COUNT: 0.08 X10E9/L (ref 0–0.7)
EOSINOPHILS/100 LEUKOCYTES IN BLOOD BY AUTOMATED COUNT: 1 % (ref 0–6)
ERYTHROCYTE DISTRIBUTION WIDTH (RATIO) BY AUTOMATED COUNT: 15.8 % (ref 11.5–14.5)
ERYTHROCYTE MEAN CORPUSCULAR HEMOGLOBIN CONCENTRATION (G/DL) BY AUTOMATED: 32.2 G/DL (ref 32–36)
ERYTHROCYTE MEAN CORPUSCULAR VOLUME (FL) BY AUTOMATED COUNT: 99 FL (ref 80–100)
ERYTHROCYTES (10*6/UL) IN BLOOD BY AUTOMATED COUNT: 3.47 X10E12/L (ref 4–5.2)
ESTRADIOL (PG/ML) IN SER/PLAS: <19 PG/ML
FERRITIN (UG/LL) IN SER/PLAS: 339 UG/L (ref 8–150)
FOLLITROPIN (IU/L) IN SER/PLAS: 15.5 IU/L
GFR FEMALE: 86 ML/MIN/1.73M2
GLUCOSE (MG/DL) IN SER/PLAS: 78 MG/DL (ref 74–99)
HEMATOCRIT (%) IN BLOOD BY AUTOMATED COUNT: 34.2 % (ref 36–46)
HEMOGLOBIN (G/DL) IN BLOOD: 11 G/DL (ref 12–16)
IMMATURE GRANULOCYTES/100 LEUKOCYTES IN BLOOD BY AUTOMATED COUNT: 1 % (ref 0–0.9)
INR IN PPP BY COAGULATION ASSAY: 0.9 (ref 0.9–1.1)
LEUKOCYTES (10*3/UL) IN BLOOD BY AUTOMATED COUNT: 8 X10E9/L (ref 4.4–11.3)
LIPASE (U/L) IN SER/PLAS: 50 U/L (ref 9–82)
LUTEINIZING HORMONE (IU/ML) IN SER/PLAS: 0.1 IU/L
LYMPHOCYTES (10*3/UL) IN BLOOD BY AUTOMATED COUNT: 0.96 X10E9/L (ref 1.2–4.8)
LYMPHOCYTES/100 LEUKOCYTES IN BLOOD BY AUTOMATED COUNT: 12 % (ref 13–44)
MAGNESIUM (MG/DL) IN SER/PLAS: 1.75 MG/DL (ref 1.6–2.4)
MONOCYTES (10*3/UL) IN BLOOD BY AUTOMATED COUNT: 0.6 X10E9/L (ref 0.1–1)
MONOCYTES/100 LEUKOCYTES IN BLOOD BY AUTOMATED COUNT: 7.5 % (ref 2–10)
NEUTROPHILS (10*3/UL) IN BLOOD BY AUTOMATED COUNT: 6.28 X10E9/L (ref 1.2–7.7)
NEUTROPHILS/100 LEUKOCYTES IN BLOOD BY AUTOMATED COUNT: 78.1 % (ref 40–80)
NRBC (PER 100 WBCS) BY AUTOMATED COUNT: 0 /100 WBC (ref 0–0)
PHOSPHATE (MG/DL) IN SER/PLAS: 3.6 MG/DL (ref 2.5–4.9)
PLATELETS (10*3/UL) IN BLOOD AUTOMATED COUNT: 270 X10E9/L (ref 150–450)
POTASSIUM (MMOL/L) IN SER/PLAS: 3.5 MMOL/L (ref 3.5–5.3)
PROTEIN TOTAL: 6.1 G/DL (ref 6.4–8.2)
PROTHROMBIN TIME (PT) IN PPP BY COAGULATION ASSAY: 10.5 SEC (ref 9.8–13.4)
SODIUM (MMOL/L) IN SER/PLAS: 141 MMOL/L (ref 136–145)
THYROTROPIN (MIU/L) IN SER/PLAS BY DETECTION LIMIT <= 0.05 MIU/L: 6.13 MIU/L (ref 0.44–3.98)
THYROXINE (T4) FREE (NG/DL) IN SER/PLAS: 0.81 NG/DL (ref 0.78–1.48)
UREA NITROGEN (MG/DL) IN SER/PLAS: 25 MG/DL (ref 6–23)

## 2023-03-29 ENCOUNTER — TELEPHONE (OUTPATIENT)
Dept: PRIMARY CARE | Facility: CLINIC | Age: 68
End: 2023-03-29

## 2023-03-29 DIAGNOSIS — J40 BRONCHITIS: Primary | ICD-10-CM

## 2023-03-29 RX ORDER — AZITHROMYCIN 250 MG/1
TABLET, FILM COATED ORAL
Qty: 1 TABLET | Refills: 0 | Status: SHIPPED | OUTPATIENT
Start: 2023-03-29 | End: 2023-05-12 | Stop reason: ALTCHOICE

## 2023-03-29 NOTE — TELEPHONE ENCOUNTER
Patient called in and stated that she has a productive cough and chest congestion and has been sick for a week now. The patient is asking for a z pack if possible.

## 2023-03-31 LAB — ADRENOCORTICOTROPIC HORMONE: 4.3 PG/ML (ref 7.2–63.3)

## 2023-04-01 NOTE — TELEPHONE ENCOUNTER
Patient called today because one more pill left to take tomorrow. She was given Zpak on 3/29 for productive cough. She is on day 4. She is still coughing up some green stuff. Wanted me to call in 500 mg Zithromax. Explained that would not be herring. She needs to finsh the Zpak tomorrow and see how she is doing. Explained that she could take mucinex to help loosen it up more but that the mucous has to come out as she gets better. She denies shortness of breath and is not feeling ill. Advised her to get seen so someone can listen to her lungs if she is not doing better when finishes the Zpak, or if getting worse.

## 2023-04-11 ENCOUNTER — HOSPITAL ENCOUNTER (OUTPATIENT)
Dept: DATA CONVERSION | Facility: HOSPITAL | Age: 68
End: 2023-04-11
Attending: STUDENT IN AN ORGANIZED HEALTH CARE EDUCATION/TRAINING PROGRAM | Admitting: STUDENT IN AN ORGANIZED HEALTH CARE EDUCATION/TRAINING PROGRAM
Payer: MEDICARE

## 2023-04-11 DIAGNOSIS — Z51.11 ENCOUNTER FOR ANTINEOPLASTIC CHEMOTHERAPY: ICD-10-CM

## 2023-04-11 DIAGNOSIS — Z00.6 ENCOUNTER FOR EXAMINATION FOR NORMAL COMPARISON AND CONTROL IN CLINICAL RESEARCH PROGRAM: ICD-10-CM

## 2023-04-11 DIAGNOSIS — C34.90 MALIGNANT NEOPLASM OF UNSPECIFIED PART OF UNSPECIFIED BRONCHUS OR LUNG (MULTI): ICD-10-CM

## 2023-04-11 LAB
ACTIVATED PARTIAL THROMBOPLASTIN TIME IN PPP BY COAGULATION ASSAY: 27 SEC (ref 26–39)
ALANINE AMINOTRANSFERASE (SGPT) (U/L) IN SER/PLAS: 14 U/L (ref 7–45)
ALBUMIN (G/DL) IN SER/PLAS: 3.6 G/DL (ref 3.4–5)
ALKALINE PHOSPHATASE (U/L) IN SER/PLAS: 36 U/L (ref 33–136)
ANION GAP IN SER/PLAS: 12 MMOL/L (ref 10–20)
ASPARTATE AMINOTRANSFERASE (SGOT) (U/L) IN SER/PLAS: 13 U/L (ref 9–39)
BASOPHILS (10*3/UL) IN BLOOD BY AUTOMATED COUNT: 0.05 X10E9/L (ref 0–0.1)
BASOPHILS/100 LEUKOCYTES IN BLOOD BY AUTOMATED COUNT: 0.6 % (ref 0–2)
BILIRUBIN DIRECT (MG/DL) IN SER/PLAS: 0.1 MG/DL (ref 0–0.3)
BILIRUBIN TOTAL (MG/DL) IN SER/PLAS: 0.5 MG/DL (ref 0–1.2)
C REACTIVE PROTEIN (MG/L) IN SER/PLAS: 0.55 MG/DL
CALCIUM (MG/DL) IN SER/PLAS: 8.5 MG/DL (ref 8.6–10.6)
CARBON DIOXIDE, TOTAL (MMOL/L) IN SER/PLAS: 28 MMOL/L (ref 21–32)
CHLORIDE (MMOL/L) IN SER/PLAS: 105 MMOL/L (ref 98–107)
CREATININE (MG/DL) IN SER/PLAS: 0.78 MG/DL (ref 0.5–1.05)
EOSINOPHILS (10*3/UL) IN BLOOD BY AUTOMATED COUNT: 0.1 X10E9/L (ref 0–0.7)
EOSINOPHILS/100 LEUKOCYTES IN BLOOD BY AUTOMATED COUNT: 1.3 % (ref 0–6)
ERYTHROCYTE DISTRIBUTION WIDTH (RATIO) BY AUTOMATED COUNT: 15 % (ref 11.5–14.5)
ERYTHROCYTE MEAN CORPUSCULAR HEMOGLOBIN CONCENTRATION (G/DL) BY AUTOMATED: 32.4 G/DL (ref 32–36)
ERYTHROCYTE MEAN CORPUSCULAR VOLUME (FL) BY AUTOMATED COUNT: 99 FL (ref 80–100)
ERYTHROCYTES (10*6/UL) IN BLOOD BY AUTOMATED COUNT: 3.55 X10E12/L (ref 4–5.2)
FERRITIN (UG/LL) IN SER/PLAS: 195 UG/L (ref 8–150)
GFR FEMALE: 83 ML/MIN/1.73M2
GLUCOSE (MG/DL) IN SER/PLAS: 69 MG/DL (ref 74–99)
HEMATOCRIT (%) IN BLOOD BY AUTOMATED COUNT: 35.2 % (ref 36–46)
HEMOGLOBIN (G/DL) IN BLOOD: 11.4 G/DL (ref 12–16)
IMMATURE GRANULOCYTES/100 LEUKOCYTES IN BLOOD BY AUTOMATED COUNT: 1.6 % (ref 0–0.9)
INR IN PPP BY COAGULATION ASSAY: 1 (ref 0.9–1.1)
LEUKOCYTES (10*3/UL) IN BLOOD BY AUTOMATED COUNT: 7.7 X10E9/L (ref 4.4–11.3)
LYMPHOCYTES (10*3/UL) IN BLOOD BY AUTOMATED COUNT: 1.96 X10E9/L (ref 1.2–4.8)
LYMPHOCYTES/100 LEUKOCYTES IN BLOOD BY AUTOMATED COUNT: 25.4 % (ref 13–44)
MAGNESIUM (MG/DL) IN SER/PLAS: 1.97 MG/DL (ref 1.6–2.4)
MONOCYTES (10*3/UL) IN BLOOD BY AUTOMATED COUNT: 0.65 X10E9/L (ref 0.1–1)
MONOCYTES/100 LEUKOCYTES IN BLOOD BY AUTOMATED COUNT: 8.4 % (ref 2–10)
NEUTROPHILS (10*3/UL) IN BLOOD BY AUTOMATED COUNT: 4.83 X10E9/L (ref 1.2–7.7)
NEUTROPHILS/100 LEUKOCYTES IN BLOOD BY AUTOMATED COUNT: 62.7 % (ref 40–80)
NRBC (PER 100 WBCS) BY AUTOMATED COUNT: 0 /100 WBC (ref 0–0)
PHOSPHATE (MG/DL) IN SER/PLAS: 2.6 MG/DL (ref 2.5–4.9)
PLATELETS (10*3/UL) IN BLOOD AUTOMATED COUNT: 271 X10E9/L (ref 150–450)
POTASSIUM (MMOL/L) IN SER/PLAS: 3.7 MMOL/L (ref 3.5–5.3)
PROTEIN TOTAL: 5.9 G/DL (ref 6.4–8.2)
PROTHROMBIN TIME (PT) IN PPP BY COAGULATION ASSAY: 11.1 SEC (ref 9.8–13.4)
SODIUM (MMOL/L) IN SER/PLAS: 141 MMOL/L (ref 136–145)
UREA NITROGEN (MG/DL) IN SER/PLAS: 24 MG/DL (ref 6–23)

## 2023-04-22 DIAGNOSIS — E78.2 MIXED HYPERLIPIDEMIA: Primary | ICD-10-CM

## 2023-04-22 DIAGNOSIS — E55.9 VITAMIN D DEFICIENCY: ICD-10-CM

## 2023-04-22 DIAGNOSIS — R73.01 IMPAIRED FASTING BLOOD SUGAR: ICD-10-CM

## 2023-04-24 ENCOUNTER — LAB (OUTPATIENT)
Dept: LAB | Facility: LAB | Age: 68
End: 2023-04-24
Payer: MEDICARE

## 2023-04-24 DIAGNOSIS — R73.01 IMPAIRED FASTING BLOOD SUGAR: ICD-10-CM

## 2023-04-24 DIAGNOSIS — I49.3 FREQUENT UNIFOCAL PVCS: ICD-10-CM

## 2023-04-24 DIAGNOSIS — E55.9 VITAMIN D DEFICIENCY: ICD-10-CM

## 2023-04-24 DIAGNOSIS — E78.2 MIXED HYPERLIPIDEMIA: ICD-10-CM

## 2023-04-24 LAB
ALANINE AMINOTRANSFERASE (SGPT) (U/L) IN SER/PLAS: 11 U/L (ref 7–45)
ALBUMIN (G/DL) IN SER/PLAS: 3.6 G/DL (ref 3.4–5)
ALKALINE PHOSPHATASE (U/L) IN SER/PLAS: 38 U/L (ref 33–136)
ANION GAP IN SER/PLAS: 10 MMOL/L (ref 10–20)
ASPARTATE AMINOTRANSFERASE (SGOT) (U/L) IN SER/PLAS: 13 U/L (ref 9–39)
BILIRUBIN TOTAL (MG/DL) IN SER/PLAS: 0.6 MG/DL (ref 0–1.2)
CALCIUM (MG/DL) IN SER/PLAS: 9 MG/DL (ref 8.6–10.3)
CARBON DIOXIDE, TOTAL (MMOL/L) IN SER/PLAS: 29 MMOL/L (ref 21–32)
CHLORIDE (MMOL/L) IN SER/PLAS: 103 MMOL/L (ref 98–107)
CHOLESTEROL (MG/DL) IN SER/PLAS: 250 MG/DL (ref 0–199)
CHOLESTEROL IN HDL (MG/DL) IN SER/PLAS: 72.3 MG/DL
CHOLESTEROL IN LDL (MG/DL) IN SER/PLAS BY DIRECT ASSAY: 169 MG/DL (ref 0–129)
CHOLESTEROL/HDL RATIO: 3.5
CREATININE (MG/DL) IN SER/PLAS: 0.88 MG/DL (ref 0.5–1.05)
ERYTHROCYTE DISTRIBUTION WIDTH (RATIO) BY AUTOMATED COUNT: 14.4 % (ref 11.5–14.5)
ERYTHROCYTE MEAN CORPUSCULAR HEMOGLOBIN CONCENTRATION (G/DL) BY AUTOMATED: 32.3 G/DL (ref 32–36)
ERYTHROCYTE MEAN CORPUSCULAR VOLUME (FL) BY AUTOMATED COUNT: 100 FL (ref 80–100)
ERYTHROCYTES (10*6/UL) IN BLOOD BY AUTOMATED COUNT: 3.51 X10E12/L (ref 4–5.2)
ESTIMATED AVERAGE GLUCOSE FOR HBA1C: 97 MG/DL
GFR FEMALE: 72 ML/MIN/1.73M2
GLUCOSE (MG/DL) IN SER/PLAS: 79 MG/DL (ref 74–99)
HEMATOCRIT (%) IN BLOOD BY AUTOMATED COUNT: 35 % (ref 36–46)
HEMOGLOBIN (G/DL) IN BLOOD: 11.3 G/DL (ref 12–16)
HEMOGLOBIN A1C/HEMOGLOBIN TOTAL IN BLOOD: 5 %
LDL: 149 MG/DL (ref 0–99)
LEUKOCYTES (10*3/UL) IN BLOOD BY AUTOMATED COUNT: 5.1 X10E9/L (ref 4.4–11.3)
MAGNESIUM (MG/DL) IN SER/PLAS: 1.93 MG/DL (ref 1.6–2.4)
PLATELETS (10*3/UL) IN BLOOD AUTOMATED COUNT: 224 X10E9/L (ref 150–450)
POTASSIUM (MMOL/L) IN SER/PLAS: 3.7 MMOL/L (ref 3.5–5.3)
PROTEIN TOTAL: 5.9 G/DL (ref 6.4–8.2)
SODIUM (MMOL/L) IN SER/PLAS: 138 MMOL/L (ref 136–145)
THYROTROPIN (MIU/L) IN SER/PLAS BY DETECTION LIMIT <= 0.05 MIU/L: 2.98 MIU/L (ref 0.44–3.98)
TRIGLYCERIDE (MG/DL) IN SER/PLAS: 143 MG/DL (ref 0–149)
UREA NITROGEN (MG/DL) IN SER/PLAS: 18 MG/DL (ref 6–23)
VLDL: 29 MG/DL (ref 0–40)

## 2023-04-24 PROCEDURE — 80061 LIPID PANEL: CPT

## 2023-04-24 PROCEDURE — 83036 HEMOGLOBIN GLYCOSYLATED A1C: CPT

## 2023-04-24 PROCEDURE — 85027 COMPLETE CBC AUTOMATED: CPT

## 2023-04-24 PROCEDURE — 83735 ASSAY OF MAGNESIUM: CPT

## 2023-04-24 PROCEDURE — 80053 COMPREHEN METABOLIC PANEL: CPT

## 2023-04-24 PROCEDURE — 83721 ASSAY OF BLOOD LIPOPROTEIN: CPT

## 2023-04-24 PROCEDURE — 84443 ASSAY THYROID STIM HORMONE: CPT

## 2023-04-24 PROCEDURE — 36415 COLL VENOUS BLD VENIPUNCTURE: CPT

## 2023-04-24 PROCEDURE — 82652 VIT D 1 25-DIHYDROXY: CPT

## 2023-04-25 ENCOUNTER — HOSPITAL ENCOUNTER (OUTPATIENT)
Dept: DATA CONVERSION | Facility: HOSPITAL | Age: 68
End: 2023-04-25
Attending: STUDENT IN AN ORGANIZED HEALTH CARE EDUCATION/TRAINING PROGRAM | Admitting: STUDENT IN AN ORGANIZED HEALTH CARE EDUCATION/TRAINING PROGRAM
Payer: MEDICARE

## 2023-04-25 DIAGNOSIS — C34.90 MALIGNANT NEOPLASM OF UNSPECIFIED PART OF UNSPECIFIED BRONCHUS OR LUNG (MULTI): ICD-10-CM

## 2023-04-25 DIAGNOSIS — Z51.11 ENCOUNTER FOR ANTINEOPLASTIC CHEMOTHERAPY: ICD-10-CM

## 2023-04-25 DIAGNOSIS — Z00.6 ENCOUNTER FOR EXAMINATION FOR NORMAL COMPARISON AND CONTROL IN CLINICAL RESEARCH PROGRAM: ICD-10-CM

## 2023-04-25 LAB
ACTIVATED PARTIAL THROMBOPLASTIN TIME IN PPP BY COAGULATION ASSAY: 29 SEC (ref 26–39)
ALANINE AMINOTRANSFERASE (SGPT) (U/L) IN SER/PLAS: 10 U/L (ref 7–45)
ALBUMIN (G/DL) IN SER/PLAS: 3.7 G/DL (ref 3.4–5)
ALKALINE PHOSPHATASE (U/L) IN SER/PLAS: 40 U/L (ref 33–136)
AMYLASE (U/L) IN SER/PLAS: 31 U/L (ref 29–103)
ANION GAP IN SER/PLAS: 13 MMOL/L (ref 10–20)
ASPARTATE AMINOTRANSFERASE (SGOT) (U/L) IN SER/PLAS: 14 U/L (ref 9–39)
BASOPHILS (10*3/UL) IN BLOOD BY AUTOMATED COUNT: 0.04 X10E9/L (ref 0–0.1)
BASOPHILS/100 LEUKOCYTES IN BLOOD BY AUTOMATED COUNT: 0.6 % (ref 0–2)
BILIRUBIN DIRECT (MG/DL) IN SER/PLAS: 0.1 MG/DL (ref 0–0.3)
BILIRUBIN TOTAL (MG/DL) IN SER/PLAS: 0.7 MG/DL (ref 0–1.2)
C REACTIVE PROTEIN (MG/L) IN SER/PLAS: 0.96 MG/DL
CALCIUM (MG/DL) IN SER/PLAS: 9.2 MG/DL (ref 8.6–10.6)
CARBON DIOXIDE, TOTAL (MMOL/L) IN SER/PLAS: 26 MMOL/L (ref 21–32)
CHLORIDE (MMOL/L) IN SER/PLAS: 105 MMOL/L (ref 98–107)
CORTISOL (UG/DL) IN SERUM: 12 UG/DL (ref 2.5–20)
CREATININE (MG/DL) IN SER/PLAS: 0.99 MG/DL (ref 0.5–1.05)
EOSINOPHILS (10*3/UL) IN BLOOD BY AUTOMATED COUNT: 0.16 X10E9/L (ref 0–0.7)
EOSINOPHILS/100 LEUKOCYTES IN BLOOD BY AUTOMATED COUNT: 2.6 % (ref 0–6)
ERYTHROCYTE DISTRIBUTION WIDTH (RATIO) BY AUTOMATED COUNT: 13.8 % (ref 11.5–14.5)
ERYTHROCYTE MEAN CORPUSCULAR HEMOGLOBIN CONCENTRATION (G/DL) BY AUTOMATED: 33.5 G/DL (ref 32–36)
ERYTHROCYTE MEAN CORPUSCULAR VOLUME (FL) BY AUTOMATED COUNT: 96 FL (ref 80–100)
ERYTHROCYTES (10*6/UL) IN BLOOD BY AUTOMATED COUNT: 3.53 X10E12/L (ref 4–5.2)
ESTRADIOL (PG/ML) IN SER/PLAS: <19 PG/ML
FERRITIN (UG/LL) IN SER/PLAS: 230 UG/L (ref 8–150)
FOLLITROPIN (IU/L) IN SER/PLAS: 2.5 IU/L
GFR FEMALE: 62 ML/MIN/1.73M2
GLUCOSE (MG/DL) IN SER/PLAS: 96 MG/DL (ref 74–99)
HEMATOCRIT (%) IN BLOOD BY AUTOMATED COUNT: 34 % (ref 36–46)
HEMOGLOBIN (G/DL) IN BLOOD: 11.4 G/DL (ref 12–16)
IMMATURE GRANULOCYTES/100 LEUKOCYTES IN BLOOD BY AUTOMATED COUNT: 0.6 % (ref 0–0.9)
INR IN PPP BY COAGULATION ASSAY: 0.9 (ref 0.9–1.1)
LEUKOCYTES (10*3/UL) IN BLOOD BY AUTOMATED COUNT: 6.2 X10E9/L (ref 4.4–11.3)
LIPASE (U/L) IN SER/PLAS: 34 U/L (ref 9–82)
LUTEINIZING HORMONE (IU/ML) IN SER/PLAS: <0.1 IU/L
LYMPHOCYTES (10*3/UL) IN BLOOD BY AUTOMATED COUNT: 1.48 X10E9/L (ref 1.2–4.8)
LYMPHOCYTES/100 LEUKOCYTES IN BLOOD BY AUTOMATED COUNT: 23.8 % (ref 13–44)
MAGNESIUM (MG/DL) IN SER/PLAS: 1.92 MG/DL (ref 1.6–2.4)
MONOCYTES (10*3/UL) IN BLOOD BY AUTOMATED COUNT: 0.67 X10E9/L (ref 0.1–1)
MONOCYTES/100 LEUKOCYTES IN BLOOD BY AUTOMATED COUNT: 10.8 % (ref 2–10)
NEUTROPHILS (10*3/UL) IN BLOOD BY AUTOMATED COUNT: 3.84 X10E9/L (ref 1.2–7.7)
NEUTROPHILS/100 LEUKOCYTES IN BLOOD BY AUTOMATED COUNT: 61.6 % (ref 40–80)
NRBC (PER 100 WBCS) BY AUTOMATED COUNT: 0 /100 WBC (ref 0–0)
PHOSPHATE (MG/DL) IN SER/PLAS: 3.8 MG/DL (ref 2.5–4.9)
PLATELETS (10*3/UL) IN BLOOD AUTOMATED COUNT: 230 X10E9/L (ref 150–450)
POTASSIUM (MMOL/L) IN SER/PLAS: 4 MMOL/L (ref 3.5–5.3)
PROTEIN TOTAL: 6 G/DL (ref 6.4–8.2)
PROTHROMBIN TIME (PT) IN PPP BY COAGULATION ASSAY: 10.7 SEC (ref 9.8–13.4)
SODIUM (MMOL/L) IN SER/PLAS: 140 MMOL/L (ref 136–145)
THYROTROPIN (MIU/L) IN SER/PLAS BY DETECTION LIMIT <= 0.05 MIU/L: 4.77 MIU/L (ref 0.44–3.98)
THYROXINE (T4) FREE (NG/DL) IN SER/PLAS: 1.03 NG/DL (ref 0.78–1.48)
UREA NITROGEN (MG/DL) IN SER/PLAS: 22 MG/DL (ref 6–23)

## 2023-04-27 LAB — VITAMIN D 1,25-DIHYDROXY: 44.7 PG/ML (ref 19.9–79.3)

## 2023-04-28 LAB — ADRENOCORTICOTROPIC HORMONE: 46.6 PG/ML (ref 7.2–63.3)

## 2023-05-01 ENCOUNTER — TELEPHONE (OUTPATIENT)
Dept: PRIMARY CARE | Facility: CLINIC | Age: 68
End: 2023-05-01
Payer: MEDICARE

## 2023-05-01 DIAGNOSIS — J30.2 SEASONAL ALLERGIC RHINITIS, UNSPECIFIED TRIGGER: ICD-10-CM

## 2023-05-01 NOTE — TELEPHONE ENCOUNTER
Rx Refill Request Telephone Encounter    Name:  Gladys Branch  :  [id number]  Medication Name:  Zyrtec  10mg  Route : oral  daily  30 +5 refill    Specific Pharmacy location:  [facility] [place]  Date of last appointment:  2022  Date of next appointment:  May 12  Best number to reach patient:  [phone]

## 2023-05-02 RX ORDER — CETIRIZINE HYDROCHLORIDE 10 MG/1
10 TABLET ORAL DAILY
Qty: 30 TABLET | Refills: 5 | Status: SHIPPED | OUTPATIENT
Start: 2023-05-02 | End: 2023-05-12 | Stop reason: ALTCHOICE

## 2023-05-08 NOTE — TELEPHONE ENCOUNTER
Rx Refill Request Telephone Encounter    Name:  Gladys Branch  :  [id number]  Medication Name:  Zyrtec D  ER  5-10 mg  oral  every 12 hours       Specific Pharmacy location:  [facility] [place]  Date of last appointment:  2022  Date of next appointment:  May 12  Best number to reach patient:  [phone]          Note: Regular Zyrtec doesnot work for her. She specified Zyrtec D

## 2023-05-09 ENCOUNTER — HOSPITAL ENCOUNTER (OUTPATIENT)
Dept: DATA CONVERSION | Facility: HOSPITAL | Age: 68
End: 2023-05-09
Attending: STUDENT IN AN ORGANIZED HEALTH CARE EDUCATION/TRAINING PROGRAM | Admitting: STUDENT IN AN ORGANIZED HEALTH CARE EDUCATION/TRAINING PROGRAM
Payer: MEDICARE

## 2023-05-09 DIAGNOSIS — Z51.11 ENCOUNTER FOR ANTINEOPLASTIC CHEMOTHERAPY: ICD-10-CM

## 2023-05-09 DIAGNOSIS — C34.90 MALIGNANT NEOPLASM OF UNSPECIFIED PART OF UNSPECIFIED BRONCHUS OR LUNG (MULTI): ICD-10-CM

## 2023-05-09 DIAGNOSIS — Z00.6 ENCOUNTER FOR EXAMINATION FOR NORMAL COMPARISON AND CONTROL IN CLINICAL RESEARCH PROGRAM: ICD-10-CM

## 2023-05-09 LAB
ACTIVATED PARTIAL THROMBOPLASTIN TIME IN PPP BY COAGULATION ASSAY: 30 SEC (ref 26–39)
ALANINE AMINOTRANSFERASE (SGPT) (U/L) IN SER/PLAS: 8 U/L (ref 7–45)
ALBUMIN (G/DL) IN SER/PLAS: 3.5 G/DL (ref 3.4–5)
ALKALINE PHOSPHATASE (U/L) IN SER/PLAS: 39 U/L (ref 33–136)
ANION GAP IN SER/PLAS: 11 MMOL/L (ref 10–20)
APPEARANCE, URINE: ABNORMAL
ASPARTATE AMINOTRANSFERASE (SGOT) (U/L) IN SER/PLAS: 14 U/L (ref 9–39)
BASOPHILS (10*3/UL) IN BLOOD BY AUTOMATED COUNT: 0.04 X10E9/L (ref 0–0.1)
BASOPHILS/100 LEUKOCYTES IN BLOOD BY AUTOMATED COUNT: 0.6 % (ref 0–2)
BILIRUBIN DIRECT (MG/DL) IN SER/PLAS: 0.1 MG/DL (ref 0–0.3)
BILIRUBIN TOTAL (MG/DL) IN SER/PLAS: 0.5 MG/DL (ref 0–1.2)
BILIRUBIN, URINE: NEGATIVE
BLOOD, URINE: NEGATIVE
BUDDING YEAST, URINE: PRESENT /HPF
C REACTIVE PROTEIN (MG/L) IN SER/PLAS: 0.27 MG/DL
CALCIUM (MG/DL) IN SER/PLAS: 8.7 MG/DL (ref 8.6–10.6)
CALCIUM OXALATE CRYSTALS, URINE: ABNORMAL /HPF
CARBON DIOXIDE, TOTAL (MMOL/L) IN SER/PLAS: 27 MMOL/L (ref 21–32)
CHLORIDE (MMOL/L) IN SER/PLAS: 106 MMOL/L (ref 98–107)
COLOR, URINE: YELLOW
CREATININE (MG/DL) IN SER/PLAS: 0.96 MG/DL (ref 0.5–1.05)
EOSINOPHILS (10*3/UL) IN BLOOD BY AUTOMATED COUNT: 0.14 X10E9/L (ref 0–0.7)
EOSINOPHILS/100 LEUKOCYTES IN BLOOD BY AUTOMATED COUNT: 2.2 % (ref 0–6)
ERYTHROCYTE DISTRIBUTION WIDTH (RATIO) BY AUTOMATED COUNT: 13.6 % (ref 11.5–14.5)
ERYTHROCYTE MEAN CORPUSCULAR HEMOGLOBIN CONCENTRATION (G/DL) BY AUTOMATED: 33.1 G/DL (ref 32–36)
ERYTHROCYTE MEAN CORPUSCULAR VOLUME (FL) BY AUTOMATED COUNT: 97 FL (ref 80–100)
ERYTHROCYTES (10*6/UL) IN BLOOD BY AUTOMATED COUNT: 3.53 X10E12/L (ref 4–5.2)
FERRITIN (UG/LL) IN SER/PLAS: 193 UG/L (ref 8–150)
GFR FEMALE: 65 ML/MIN/1.73M2
GLUCOSE (MG/DL) IN SER/PLAS: 93 MG/DL (ref 74–99)
GLUCOSE, URINE: NEGATIVE MG/DL
HEMATOCRIT (%) IN BLOOD BY AUTOMATED COUNT: 34.4 % (ref 36–46)
HEMOGLOBIN (G/DL) IN BLOOD: 11.4 G/DL (ref 12–16)
IMMATURE GRANULOCYTES/100 LEUKOCYTES IN BLOOD BY AUTOMATED COUNT: 0.5 % (ref 0–0.9)
INR IN PPP BY COAGULATION ASSAY: 1 (ref 0.9–1.1)
KETONES, URINE: ABNORMAL MG/DL
LEUKOCYTE ESTERASE, URINE: NEGATIVE
LEUKOCYTES (10*3/UL) IN BLOOD BY AUTOMATED COUNT: 6.4 X10E9/L (ref 4.4–11.3)
LYMPHOCYTES (10*3/UL) IN BLOOD BY AUTOMATED COUNT: 1.35 X10E9/L (ref 1.2–4.8)
LYMPHOCYTES/100 LEUKOCYTES IN BLOOD BY AUTOMATED COUNT: 21.2 % (ref 13–44)
MAGNESIUM (MG/DL) IN SER/PLAS: 1.96 MG/DL (ref 1.6–2.4)
MONOCYTES (10*3/UL) IN BLOOD BY AUTOMATED COUNT: 0.66 X10E9/L (ref 0.1–1)
MONOCYTES/100 LEUKOCYTES IN BLOOD BY AUTOMATED COUNT: 10.4 % (ref 2–10)
MUCUS, URINE: ABNORMAL /LPF
NEUTROPHILS (10*3/UL) IN BLOOD BY AUTOMATED COUNT: 4.14 X10E9/L (ref 1.2–7.7)
NEUTROPHILS/100 LEUKOCYTES IN BLOOD BY AUTOMATED COUNT: 65.1 % (ref 40–80)
NITRITE, URINE: NEGATIVE
NRBC (PER 100 WBCS) BY AUTOMATED COUNT: 0 /100 WBC (ref 0–0)
PH, URINE: 5 (ref 5–8)
PHOSPHATE (MG/DL) IN SER/PLAS: 3.8 MG/DL (ref 2.5–4.9)
PLATELETS (10*3/UL) IN BLOOD AUTOMATED COUNT: 270 X10E9/L (ref 150–450)
POTASSIUM (MMOL/L) IN SER/PLAS: 3.8 MMOL/L (ref 3.5–5.3)
PROTEIN TOTAL: 5.7 G/DL (ref 6.4–8.2)
PROTEIN, URINE: NEGATIVE MG/DL
PROTHROMBIN TIME (PT) IN PPP BY COAGULATION ASSAY: 11.6 SEC (ref 9.8–13.4)
RBC, URINE: 13 /HPF (ref 0–5)
SODIUM (MMOL/L) IN SER/PLAS: 140 MMOL/L (ref 136–145)
SPECIFIC GRAVITY, URINE: 1.02 (ref 1–1.03)
SQUAMOUS EPITHELIAL CELLS, URINE: 36 /HPF
UREA NITROGEN (MG/DL) IN SER/PLAS: 18 MG/DL (ref 6–23)
UROBILINOGEN, URINE: <2 MG/DL (ref 0–1.9)
WBC, URINE: 6 /HPF (ref 0–5)

## 2023-05-11 PROBLEM — C34.90 LUNG CANCER (MULTI): Status: ACTIVE | Noted: 2023-05-11

## 2023-05-11 RX ORDER — PANTOPRAZOLE SODIUM 40 MG/1
1 TABLET, DELAYED RELEASE ORAL 2 TIMES DAILY
COMMUNITY
Start: 2023-01-09 | End: 2023-05-12 | Stop reason: ALTCHOICE

## 2023-05-11 RX ORDER — ONDANSETRON HYDROCHLORIDE 8 MG/1
4 TABLET, FILM COATED ORAL EVERY 8 HOURS PRN
COMMUNITY
Start: 2023-05-01

## 2023-05-11 NOTE — TELEPHONE ENCOUNTER
Patient specified Zyrtec D  Regular Zyrtec does not work for her. Please cancel Zyrtec order and order Zyrtec REYNALDO.

## 2023-05-12 ENCOUNTER — OFFICE VISIT (OUTPATIENT)
Dept: PRIMARY CARE | Facility: CLINIC | Age: 68
End: 2023-05-12
Payer: MEDICARE

## 2023-05-12 ENCOUNTER — APPOINTMENT (OUTPATIENT)
Dept: PRIMARY CARE | Facility: CLINIC | Age: 68
End: 2023-05-12
Payer: MEDICARE

## 2023-05-12 VITALS
RESPIRATION RATE: 16 BRPM | SYSTOLIC BLOOD PRESSURE: 95 MMHG | HEIGHT: 60 IN | TEMPERATURE: 98 F | DIASTOLIC BLOOD PRESSURE: 65 MMHG | OXYGEN SATURATION: 96 % | WEIGHT: 112.6 LBS | BODY MASS INDEX: 22.1 KG/M2 | HEART RATE: 86 BPM

## 2023-05-12 DIAGNOSIS — R73.01 IMPAIRED FASTING BLOOD SUGAR: ICD-10-CM

## 2023-05-12 DIAGNOSIS — R93.1 AGATSTON CAC SCORE, <100: ICD-10-CM

## 2023-05-12 DIAGNOSIS — Z00.00 ROUTINE GENERAL MEDICAL EXAMINATION AT HEALTH CARE FACILITY: ICD-10-CM

## 2023-05-12 DIAGNOSIS — R79.89 ELEVATED FERRITIN LEVEL: ICD-10-CM

## 2023-05-12 DIAGNOSIS — E78.2 MODERATE MIXED HYPERLIPIDEMIA NOT REQUIRING STATIN THERAPY: ICD-10-CM

## 2023-05-12 DIAGNOSIS — J30.2 SEASONAL ALLERGIC RHINITIS, UNSPECIFIED TRIGGER: Primary | ICD-10-CM

## 2023-05-12 DIAGNOSIS — E55.9 VITAMIN D DEFICIENCY: ICD-10-CM

## 2023-05-12 PROCEDURE — G0439 PPPS, SUBSEQ VISIT: HCPCS | Performed by: NURSE PRACTITIONER

## 2023-05-12 PROCEDURE — 3008F BODY MASS INDEX DOCD: CPT | Performed by: NURSE PRACTITIONER

## 2023-05-12 PROCEDURE — 1159F MED LIST DOCD IN RCRD: CPT | Performed by: NURSE PRACTITIONER

## 2023-05-12 PROCEDURE — 1036F TOBACCO NON-USER: CPT | Performed by: NURSE PRACTITIONER

## 2023-05-12 PROCEDURE — 1170F FXNL STATUS ASSESSED: CPT | Performed by: NURSE PRACTITIONER

## 2023-05-12 RX ORDER — CETIRIZINE HYDROCHLORIDE, PSEUDOEPHEDRINE HYDROCHLORIDE 5; 120 MG/1; MG/1
1 TABLET, FILM COATED, EXTENDED RELEASE ORAL DAILY
Qty: 30 TABLET | Refills: 5 | Status: SHIPPED | OUTPATIENT
Start: 2023-05-12 | End: 2023-09-08 | Stop reason: SDUPTHER

## 2023-05-12 ASSESSMENT — PATIENT HEALTH QUESTIONNAIRE - PHQ9
1. LITTLE INTEREST OR PLEASURE IN DOING THINGS: NOT AT ALL
2. FEELING DOWN, DEPRESSED OR HOPELESS: NOT AT ALL
SUM OF ALL RESPONSES TO PHQ9 QUESTIONS 1 AND 2: 0

## 2023-05-12 ASSESSMENT — ENCOUNTER SYMPTOMS
SORE THROAT: 0
DEPRESSION: 0
CONSTITUTIONAL NEGATIVE: 1
SHORTNESS OF BREATH: 0
NAUSEA: 0
MYALGIAS: 0
COUGH: 0
DIZZINESS: 0
NERVOUS/ANXIOUS: 0
OCCASIONAL FEELINGS OF UNSTEADINESS: 0
PALPITATIONS: 0
SPEECH DIFFICULTY: 0
ARTHRALGIAS: 0
CHILLS: 0
CONFUSION: 0
WEAKNESS: 0
ACTIVITY CHANGE: 0
HEADACHES: 0
FEVER: 0
SLEEP DISTURBANCE: 0
ABDOMINAL PAIN: 0
APNEA: 0
VOMITING: 0
LOSS OF SENSATION IN FEET: 0

## 2023-05-12 ASSESSMENT — ACTIVITIES OF DAILY LIVING (ADL)
DRESSING: INDEPENDENT
GROCERY_SHOPPING: INDEPENDENT
DOING_HOUSEWORK: INDEPENDENT
TAKING_MEDICATION: INDEPENDENT
MANAGING_FINANCES: INDEPENDENT
BATHING: INDEPENDENT

## 2023-05-12 NOTE — PROGRESS NOTES
Subjective   Reason for Visit: Gladys Branch is an 67 y.o. female here for a Medicare Wellness visit.     Past Medical, Surgical, and Family History reviewed and updated in chart.    Reviewed all medications by prescribing practitioner or clinical pharmacist (such as prescriptions, OTCs, herbal therapies and supplements) and documented in the medical record.    HPI    Patient Care Team:  Nova Omer MD as PCP - General  [place] O[place] MD [place] P[place] [facility] [place] [place]     Review of Systems    Objective   Vitals:  BP 95/65   Pulse 86   Temp 36.7 °C (98 °F)   Resp 16   Ht 1.524 m (5')   Wt 51.1 kg (112 lb 9.6 oz)   SpO2 96%   BMI 21.99 kg/m²       Physical Exam    Assessment/Plan   Problem List Items Addressed This Visit        Other    Seasonal allergic rhinitis   Other Visit Diagnoses     Routine general medical examination at health care facility    -  Primary

## 2023-05-12 NOTE — PROGRESS NOTES
Subjective   Reason for Visit: Gladys Branch is an 67 y.o. female here for a Medicare Wellness visit.     Past Medical, Surgical, and Family History reviewed and updated in chart.    Reviewed all medications by prescribing practitioner or clinical pharmacist (such as prescriptions, OTCs, herbal therapies and supplements) and documented in the medical record.    Cough: Dry. Present for 2-3 weeks ;likely seasonal allergies -needs zyrtec refill  Denies fever, congestion , sore throat    Labs reviewed  All labs stable elevated ferritin level correlated with response rate of platinum based chemotherapy, however it is stable   CKD: stable at baseline   Hyperlipidemia : LDL up     .Lab Results       Component                Value               Date                       CHOL                     250 (H)             04/24/2023                 CHOL                     171                 01/17/2023                 CHOL                     200 (H)             03/17/2022            Lab Results       Component                Value               Date                       HDL                      72.3                04/24/2023                 HDL                      61.2                01/17/2023                 HDL                      51.0                03/17/2022            No results found for: LDLCALC  Lab Results       Component                Value               Date                       TRIG                     143                 04/24/2023                 TRIG                     93                  01/17/2023                 TRIG                     193 (H)             03/17/2022            No components found for: CHOLHDL             Patient Care Team:  Nova Omer MD as PCP - General  Nova Omer MD as PCP - [facility] Attributed Provider     Review of Systems   Constitutional: Negative.  Negative for activity change, chills and fever.   HENT:  Negative for congestion, postnasal drip, sneezing  and sore throat.    Respiratory:  Negative for apnea, cough and shortness of breath.    Cardiovascular:  Negative for chest pain and palpitations.   Gastrointestinal:  Negative for abdominal pain, nausea and vomiting.   Musculoskeletal:  Negative for arthralgias and myalgias.   Neurological:  Negative for dizziness, syncope, speech difficulty, weakness and headaches.   Psychiatric/Behavioral:  Negative for confusion and sleep disturbance. The patient is not nervous/anxious.        Objective   Vitals:  BP 95/65   Pulse 86   Temp 36.7 °C (98 °F)   Resp 16   Ht 1.524 m (5')   Wt 51.1 kg (112 lb 9.6 oz)   SpO2 96%   BMI 21.99 kg/m²       Physical Exam  Vitals reviewed.   Constitutional:       Appearance: Normal appearance.   HENT:      Head: Normocephalic.   Eyes:      Conjunctiva/sclera: Conjunctivae normal.   Cardiovascular:      Rate and Rhythm: Normal rate and regular rhythm.      Pulses: Normal pulses.      Heart sounds: Normal heart sounds.   Pulmonary:      Effort: Pulmonary effort is normal.      Breath sounds: Normal breath sounds. No wheezing.   Abdominal:      General: Bowel sounds are normal. There is no distension.   Skin:     General: Skin is warm and dry.   Neurological:      General: No focal deficit present.      Mental Status: She is alert and oriented to person, place, and time.   Psychiatric:         Mood and Affect: Mood normal.         Behavior: Behavior normal.         Assessment/Plan   Problem List Items Addressed This Visit          Other    Seasonal allergic rhinitis

## 2023-05-23 ENCOUNTER — HOSPITAL ENCOUNTER (OUTPATIENT)
Dept: DATA CONVERSION | Facility: HOSPITAL | Age: 68
End: 2023-05-23
Attending: STUDENT IN AN ORGANIZED HEALTH CARE EDUCATION/TRAINING PROGRAM | Admitting: STUDENT IN AN ORGANIZED HEALTH CARE EDUCATION/TRAINING PROGRAM
Payer: MEDICARE

## 2023-05-23 DIAGNOSIS — C34.90 MALIGNANT NEOPLASM OF UNSPECIFIED PART OF UNSPECIFIED BRONCHUS OR LUNG (MULTI): ICD-10-CM

## 2023-05-23 DIAGNOSIS — Z00.6 ENCOUNTER FOR EXAMINATION FOR NORMAL COMPARISON AND CONTROL IN CLINICAL RESEARCH PROGRAM: ICD-10-CM

## 2023-05-23 LAB
ACTIVATED PARTIAL THROMBOPLASTIN TIME IN PPP BY COAGULATION ASSAY: 30 SEC (ref 26–39)
ALANINE AMINOTRANSFERASE (SGPT) (U/L) IN SER/PLAS: 6 U/L (ref 7–45)
ALBUMIN (G/DL) IN SER/PLAS: 3.7 G/DL (ref 3.4–5)
ALKALINE PHOSPHATASE (U/L) IN SER/PLAS: 35 U/L (ref 33–136)
AMYLASE (U/L) IN SER/PLAS: 23 U/L (ref 29–103)
ANION GAP IN SER/PLAS: 10 MMOL/L (ref 10–20)
APPEARANCE, URINE: ABNORMAL
ASPARTATE AMINOTRANSFERASE (SGOT) (U/L) IN SER/PLAS: 13 U/L (ref 9–39)
BASOPHILS (10*3/UL) IN BLOOD BY AUTOMATED COUNT: 0.04 X10E9/L (ref 0–0.1)
BASOPHILS/100 LEUKOCYTES IN BLOOD BY AUTOMATED COUNT: 0.8 % (ref 0–2)
BILIRUBIN DIRECT (MG/DL) IN SER/PLAS: 0.1 MG/DL (ref 0–0.3)
BILIRUBIN TOTAL (MG/DL) IN SER/PLAS: 0.6 MG/DL (ref 0–1.2)
BILIRUBIN, URINE: NEGATIVE
BLOOD, URINE: NEGATIVE
C REACTIVE PROTEIN (MG/L) IN SER/PLAS: <0.1 MG/DL
CALCIUM (MG/DL) IN SER/PLAS: 8.2 MG/DL (ref 8.6–10.6)
CARBON DIOXIDE, TOTAL (MMOL/L) IN SER/PLAS: 28 MMOL/L (ref 21–32)
CHLORIDE (MMOL/L) IN SER/PLAS: 106 MMOL/L (ref 98–107)
COLOR, URINE: YELLOW
CORTISOL (UG/DL) IN SERUM: 5.7 UG/DL (ref 2.5–20)
CREATINE KINASE (U/L) IN SER/PLAS: 45 U/L (ref 0–215)
CREATININE (MG/DL) IN SER/PLAS: 0.82 MG/DL (ref 0.5–1.05)
EOSINOPHILS (10*3/UL) IN BLOOD BY AUTOMATED COUNT: 0.11 X10E9/L (ref 0–0.7)
EOSINOPHILS/100 LEUKOCYTES IN BLOOD BY AUTOMATED COUNT: 2.3 % (ref 0–6)
ERYTHROCYTE DISTRIBUTION WIDTH (RATIO) BY AUTOMATED COUNT: 13.1 % (ref 11.5–14.5)
ERYTHROCYTE MEAN CORPUSCULAR HEMOGLOBIN CONCENTRATION (G/DL) BY AUTOMATED: 32.8 G/DL (ref 32–36)
ERYTHROCYTE MEAN CORPUSCULAR VOLUME (FL) BY AUTOMATED COUNT: 96 FL (ref 80–100)
ERYTHROCYTES (10*6/UL) IN BLOOD BY AUTOMATED COUNT: 3.62 X10E12/L (ref 4–5.2)
ESTRADIOL (PG/ML) IN SER/PLAS: <19 PG/ML
FERRITIN (UG/LL) IN SER/PLAS: 160 UG/L (ref 8–150)
FOLLITROPIN (IU/L) IN SER/PLAS: <0.9 IU/L
GFR FEMALE: 78 ML/MIN/1.73M2
GLUCOSE (MG/DL) IN SER/PLAS: 88 MG/DL (ref 74–99)
GLUCOSE, URINE: NEGATIVE MG/DL
HEMATOCRIT (%) IN BLOOD BY AUTOMATED COUNT: 34.8 % (ref 36–46)
HEMOGLOBIN (G/DL) IN BLOOD: 11.4 G/DL (ref 12–16)
IMMATURE GRANULOCYTES/100 LEUKOCYTES IN BLOOD BY AUTOMATED COUNT: 0.4 % (ref 0–0.9)
INR IN PPP BY COAGULATION ASSAY: 1.1 (ref 0.9–1.1)
KETONES, URINE: ABNORMAL MG/DL
LACTATE DEHYDROGENASE (U/L) IN SER/PLAS BY LAC->PYR RXN: 141 U/L (ref 84–246)
LEUKOCYTE ESTERASE, URINE: NEGATIVE
LEUKOCYTES (10*3/UL) IN BLOOD BY AUTOMATED COUNT: 4.8 X10E9/L (ref 4.4–11.3)
LIPASE (U/L) IN SER/PLAS: 25 U/L (ref 9–82)
LYMPHOCYTES (10*3/UL) IN BLOOD BY AUTOMATED COUNT: 0.9 X10E9/L (ref 1.2–4.8)
LYMPHOCYTES/100 LEUKOCYTES IN BLOOD BY AUTOMATED COUNT: 18.9 % (ref 13–44)
MAGNESIUM (MG/DL) IN SER/PLAS: 1.91 MG/DL (ref 1.6–2.4)
MONOCYTES (10*3/UL) IN BLOOD BY AUTOMATED COUNT: 0.45 X10E9/L (ref 0.1–1)
MONOCYTES/100 LEUKOCYTES IN BLOOD BY AUTOMATED COUNT: 9.5 % (ref 2–10)
NEUTROPHILS (10*3/UL) IN BLOOD BY AUTOMATED COUNT: 3.24 X10E9/L (ref 1.2–7.7)
NEUTROPHILS/100 LEUKOCYTES IN BLOOD BY AUTOMATED COUNT: 68.1 % (ref 40–80)
NITRITE, URINE: NEGATIVE
NRBC (PER 100 WBCS) BY AUTOMATED COUNT: 0 /100 WBC (ref 0–0)
PH, URINE: 5 (ref 5–8)
PHOSPHATE (MG/DL) IN SER/PLAS: 3.9 MG/DL (ref 2.5–4.9)
PLATELETS (10*3/UL) IN BLOOD AUTOMATED COUNT: 213 X10E9/L (ref 150–450)
POTASSIUM (MMOL/L) IN SER/PLAS: 4 MMOL/L (ref 3.5–5.3)
PROTEIN TOTAL: 5.8 G/DL (ref 6.4–8.2)
PROTEIN, URINE: NEGATIVE MG/DL
PROTHROMBIN TIME (PT) IN PPP BY COAGULATION ASSAY: 12.4 SEC (ref 9.8–13.4)
SODIUM (MMOL/L) IN SER/PLAS: 140 MMOL/L (ref 136–145)
SPECIFIC GRAVITY, URINE: 1.02 (ref 1–1.03)
THYROTROPIN (MIU/L) IN SER/PLAS BY DETECTION LIMIT <= 0.05 MIU/L: 4.52 MIU/L (ref 0.44–3.98)
THYROXINE (T4) FREE (NG/DL) IN SER/PLAS: 0.88 NG/DL (ref 0.78–1.48)
UREA NITROGEN (MG/DL) IN SER/PLAS: 14 MG/DL (ref 6–23)
UROBILINOGEN, URINE: 2 MG/DL (ref 0–1.9)

## 2023-05-26 LAB — ADRENOCORTICOTROPIC HORMONE: 7.4 PG/ML (ref 7.2–63.3)

## 2023-06-06 ENCOUNTER — HOSPITAL ENCOUNTER (OUTPATIENT)
Dept: DATA CONVERSION | Facility: HOSPITAL | Age: 68
End: 2023-06-06
Attending: STUDENT IN AN ORGANIZED HEALTH CARE EDUCATION/TRAINING PROGRAM | Admitting: STUDENT IN AN ORGANIZED HEALTH CARE EDUCATION/TRAINING PROGRAM
Payer: MEDICARE

## 2023-06-06 DIAGNOSIS — C34.90 MALIGNANT NEOPLASM OF UNSPECIFIED PART OF UNSPECIFIED BRONCHUS OR LUNG (MULTI): ICD-10-CM

## 2023-06-06 DIAGNOSIS — Z51.11 ENCOUNTER FOR ANTINEOPLASTIC CHEMOTHERAPY: ICD-10-CM

## 2023-06-06 DIAGNOSIS — Z00.6 ENCOUNTER FOR EXAMINATION FOR NORMAL COMPARISON AND CONTROL IN CLINICAL RESEARCH PROGRAM: ICD-10-CM

## 2023-06-06 LAB
ACTIVATED PARTIAL THROMBOPLASTIN TIME IN PPP BY COAGULATION ASSAY: 26 SEC (ref 26–39)
ALANINE AMINOTRANSFERASE (SGPT) (U/L) IN SER/PLAS: 12 U/L (ref 7–45)
ALBUMIN (G/DL) IN SER/PLAS: 3.6 G/DL (ref 3.4–5)
ALKALINE PHOSPHATASE (U/L) IN SER/PLAS: 32 U/L (ref 33–136)
ANION GAP IN SER/PLAS: 10 MMOL/L (ref 10–20)
ASPARTATE AMINOTRANSFERASE (SGOT) (U/L) IN SER/PLAS: 15 U/L (ref 9–39)
BASOPHILS (10*3/UL) IN BLOOD BY AUTOMATED COUNT: 0.03 X10E9/L (ref 0–0.1)
BASOPHILS/100 LEUKOCYTES IN BLOOD BY AUTOMATED COUNT: 0.5 % (ref 0–2)
BILIRUBIN DIRECT (MG/DL) IN SER/PLAS: 0.1 MG/DL (ref 0–0.3)
BILIRUBIN TOTAL (MG/DL) IN SER/PLAS: 0.5 MG/DL (ref 0–1.2)
C REACTIVE PROTEIN (MG/L) IN SER/PLAS: <0.1 MG/DL
CALCIUM (MG/DL) IN SER/PLAS: 8.7 MG/DL (ref 8.6–10.6)
CARBON DIOXIDE, TOTAL (MMOL/L) IN SER/PLAS: 27 MMOL/L (ref 21–32)
CHLORIDE (MMOL/L) IN SER/PLAS: 106 MMOL/L (ref 98–107)
CREATINE KINASE (U/L) IN SER/PLAS: 38 U/L (ref 0–215)
CREATININE (MG/DL) IN SER/PLAS: 0.76 MG/DL (ref 0.5–1.05)
EOSINOPHILS (10*3/UL) IN BLOOD BY AUTOMATED COUNT: 0.12 X10E9/L (ref 0–0.7)
EOSINOPHILS/100 LEUKOCYTES IN BLOOD BY AUTOMATED COUNT: 1.8 % (ref 0–6)
ERYTHROCYTE DISTRIBUTION WIDTH (RATIO) BY AUTOMATED COUNT: 13.3 % (ref 11.5–14.5)
ERYTHROCYTE MEAN CORPUSCULAR HEMOGLOBIN CONCENTRATION (G/DL) BY AUTOMATED: 32.3 G/DL (ref 32–36)
ERYTHROCYTE MEAN CORPUSCULAR VOLUME (FL) BY AUTOMATED COUNT: 96 FL (ref 80–100)
ERYTHROCYTES (10*6/UL) IN BLOOD BY AUTOMATED COUNT: 3.61 X10E12/L (ref 4–5.2)
FERRITIN (UG/LL) IN SER/PLAS: 124 UG/L (ref 8–150)
GFR FEMALE: 86 ML/MIN/1.73M2
GLUCOSE (MG/DL) IN SER/PLAS: 92 MG/DL (ref 74–99)
HEMATOCRIT (%) IN BLOOD BY AUTOMATED COUNT: 34.7 % (ref 36–46)
HEMOGLOBIN (G/DL) IN BLOOD: 11.2 G/DL (ref 12–16)
IMMATURE GRANULOCYTES/100 LEUKOCYTES IN BLOOD BY AUTOMATED COUNT: 0.5 % (ref 0–0.9)
INR IN PPP BY COAGULATION ASSAY: 0.9 (ref 0.9–1.1)
LEUKOCYTES (10*3/UL) IN BLOOD BY AUTOMATED COUNT: 6.6 X10E9/L (ref 4.4–11.3)
LYMPHOCYTES (10*3/UL) IN BLOOD BY AUTOMATED COUNT: 1.86 X10E9/L (ref 1.2–4.8)
LYMPHOCYTES/100 LEUKOCYTES IN BLOOD BY AUTOMATED COUNT: 28.1 % (ref 13–44)
MAGNESIUM (MG/DL) IN SER/PLAS: 2 MG/DL (ref 1.6–2.4)
MONOCYTES (10*3/UL) IN BLOOD BY AUTOMATED COUNT: 0.51 X10E9/L (ref 0.1–1)
MONOCYTES/100 LEUKOCYTES IN BLOOD BY AUTOMATED COUNT: 7.7 % (ref 2–10)
NEUTROPHILS (10*3/UL) IN BLOOD BY AUTOMATED COUNT: 4.07 X10E9/L (ref 1.2–7.7)
NEUTROPHILS/100 LEUKOCYTES IN BLOOD BY AUTOMATED COUNT: 61.4 % (ref 40–80)
NRBC (PER 100 WBCS) BY AUTOMATED COUNT: 0 /100 WBC (ref 0–0)
PHOSPHATE (MG/DL) IN SER/PLAS: 3.2 MG/DL (ref 2.5–4.9)
PLATELETS (10*3/UL) IN BLOOD AUTOMATED COUNT: 213 X10E9/L (ref 150–450)
POTASSIUM (MMOL/L) IN SER/PLAS: 3.5 MMOL/L (ref 3.5–5.3)
PROTEIN TOTAL: 5.8 G/DL (ref 6.4–8.2)
PROTHROMBIN TIME (PT) IN PPP BY COAGULATION ASSAY: 10.9 SEC (ref 9.8–13.4)
SODIUM (MMOL/L) IN SER/PLAS: 139 MMOL/L (ref 136–145)
UREA NITROGEN (MG/DL) IN SER/PLAS: 21 MG/DL (ref 6–23)

## 2023-06-20 ENCOUNTER — HOSPITAL ENCOUNTER (OUTPATIENT)
Dept: DATA CONVERSION | Facility: HOSPITAL | Age: 68
End: 2023-06-20
Attending: STUDENT IN AN ORGANIZED HEALTH CARE EDUCATION/TRAINING PROGRAM | Admitting: STUDENT IN AN ORGANIZED HEALTH CARE EDUCATION/TRAINING PROGRAM
Payer: MEDICARE

## 2023-06-20 DIAGNOSIS — Z00.6 ENCOUNTER FOR EXAMINATION FOR NORMAL COMPARISON AND CONTROL IN CLINICAL RESEARCH PROGRAM: ICD-10-CM

## 2023-06-20 DIAGNOSIS — C34.90 MALIGNANT NEOPLASM OF UNSPECIFIED PART OF UNSPECIFIED BRONCHUS OR LUNG (MULTI): ICD-10-CM

## 2023-06-20 LAB
ACTIVATED PARTIAL THROMBOPLASTIN TIME IN PPP BY COAGULATION ASSAY: 29 SEC (ref 27–38)
ALANINE AMINOTRANSFERASE (SGPT) (U/L) IN SER/PLAS: 10 U/L (ref 7–45)
ALBUMIN (G/DL) IN SER/PLAS: 3.4 G/DL (ref 3.4–5)
ALKALINE PHOSPHATASE (U/L) IN SER/PLAS: 38 U/L (ref 33–136)
AMYLASE (U/L) IN SER/PLAS: 29 U/L (ref 29–103)
ANION GAP IN SER/PLAS: 11 MMOL/L (ref 10–20)
APPEARANCE, URINE: CLEAR
ASPARTATE AMINOTRANSFERASE (SGOT) (U/L) IN SER/PLAS: 13 U/L (ref 9–39)
BASOPHILS (10*3/UL) IN BLOOD BY AUTOMATED COUNT: 0.02 X10E9/L (ref 0–0.1)
BASOPHILS/100 LEUKOCYTES IN BLOOD BY AUTOMATED COUNT: 0.3 % (ref 0–2)
BILIRUBIN DIRECT (MG/DL) IN SER/PLAS: 0.1 MG/DL (ref 0–0.3)
BILIRUBIN TOTAL (MG/DL) IN SER/PLAS: 0.6 MG/DL (ref 0–1.2)
BILIRUBIN, URINE: NEGATIVE
BLOOD, URINE: NEGATIVE
C REACTIVE PROTEIN (MG/L) IN SER/PLAS: 0.24 MG/DL
CALCIUM (MG/DL) IN SER/PLAS: 8.8 MG/DL (ref 8.6–10.6)
CARBON DIOXIDE, TOTAL (MMOL/L) IN SER/PLAS: 26 MMOL/L (ref 21–32)
CHLORIDE (MMOL/L) IN SER/PLAS: 104 MMOL/L (ref 98–107)
COLOR, URINE: YELLOW
CORTISOL (UG/DL) IN SERUM: 0.5 UG/DL (ref 2.5–20)
CREATINE KINASE (U/L) IN SER/PLAS: 35 U/L (ref 0–215)
CREATININE (MG/DL) IN SER/PLAS: 0.82 MG/DL (ref 0.5–1.05)
EOSINOPHILS (10*3/UL) IN BLOOD BY AUTOMATED COUNT: 0.14 X10E9/L (ref 0–0.7)
EOSINOPHILS/100 LEUKOCYTES IN BLOOD BY AUTOMATED COUNT: 2.3 % (ref 0–6)
ERYTHROCYTE DISTRIBUTION WIDTH (RATIO) BY AUTOMATED COUNT: 13.2 % (ref 11.5–14.5)
ERYTHROCYTE MEAN CORPUSCULAR HEMOGLOBIN CONCENTRATION (G/DL) BY AUTOMATED: 34 G/DL (ref 32–36)
ERYTHROCYTE MEAN CORPUSCULAR VOLUME (FL) BY AUTOMATED COUNT: 92 FL (ref 80–100)
ERYTHROCYTES (10*6/UL) IN BLOOD BY AUTOMATED COUNT: 3.57 X10E12/L (ref 4–5.2)
ESTRADIOL (PG/ML) IN SER/PLAS: <19 PG/ML
FERRITIN (UG/LL) IN SER/PLAS: 142 UG/L (ref 8–150)
FOLLITROPIN (IU/L) IN SER/PLAS: <0.9 IU/L
GFR FEMALE: 78 ML/MIN/1.73M2
GLUCOSE (MG/DL) IN SER/PLAS: 87 MG/DL (ref 74–99)
GLUCOSE, URINE: NEGATIVE MG/DL
HEMATOCRIT (%) IN BLOOD BY AUTOMATED COUNT: 32.9 % (ref 36–46)
HEMOGLOBIN (G/DL) IN BLOOD: 11.2 G/DL (ref 12–16)
IMMATURE GRANULOCYTES/100 LEUKOCYTES IN BLOOD BY AUTOMATED COUNT: 0.7 % (ref 0–0.9)
INR IN PPP BY COAGULATION ASSAY: 1 (ref 0.9–1.1)
KETONES, URINE: NEGATIVE MG/DL
LEUKOCYTE ESTERASE, URINE: NEGATIVE
LEUKOCYTES (10*3/UL) IN BLOOD BY AUTOMATED COUNT: 6 X10E9/L (ref 4.4–11.3)
LIPASE (U/L) IN SER/PLAS: 27 U/L (ref 9–82)
LUTEINIZING HORMONE (IU/ML) IN SER/PLAS: <0.1 IU/L
LYMPHOCYTES (10*3/UL) IN BLOOD BY AUTOMATED COUNT: 1.53 X10E9/L (ref 1.2–4.8)
LYMPHOCYTES/100 LEUKOCYTES IN BLOOD BY AUTOMATED COUNT: 25.4 % (ref 13–44)
MAGNESIUM (MG/DL) IN SER/PLAS: 1.89 MG/DL (ref 1.6–2.4)
MONOCYTES (10*3/UL) IN BLOOD BY AUTOMATED COUNT: 0.52 X10E9/L (ref 0.1–1)
MONOCYTES/100 LEUKOCYTES IN BLOOD BY AUTOMATED COUNT: 8.6 % (ref 2–10)
NEUTROPHILS (10*3/UL) IN BLOOD BY AUTOMATED COUNT: 3.77 X10E9/L (ref 1.2–7.7)
NEUTROPHILS/100 LEUKOCYTES IN BLOOD BY AUTOMATED COUNT: 62.7 % (ref 40–80)
NITRITE, URINE: NEGATIVE
NRBC (PER 100 WBCS) BY AUTOMATED COUNT: 0 /100 WBC (ref 0–0)
PH, URINE: 5 (ref 5–8)
PHOSPHATE (MG/DL) IN SER/PLAS: 3.9 MG/DL (ref 2.5–4.9)
PLATELETS (10*3/UL) IN BLOOD AUTOMATED COUNT: 222 X10E9/L (ref 150–450)
POTASSIUM (MMOL/L) IN SER/PLAS: 3.4 MMOL/L (ref 3.5–5.3)
PROTEIN TOTAL: 5.8 G/DL (ref 6.4–8.2)
PROTEIN, URINE: NEGATIVE MG/DL
PROTHROMBIN TIME (PT) IN PPP BY COAGULATION ASSAY: 11.5 SEC (ref 9.8–12.8)
SODIUM (MMOL/L) IN SER/PLAS: 138 MMOL/L (ref 136–145)
SPECIFIC GRAVITY, URINE: 1.01 (ref 1–1.03)
THYROTROPIN (MIU/L) IN SER/PLAS BY DETECTION LIMIT <= 0.05 MIU/L: 1.62 MIU/L (ref 0.44–3.98)
THYROXINE (T4) FREE (NG/DL) IN SER/PLAS: 0.8 NG/DL (ref 0.78–1.48)
UREA NITROGEN (MG/DL) IN SER/PLAS: 21 MG/DL (ref 6–23)
UROBILINOGEN, URINE: <2 MG/DL (ref 0–1.9)

## 2023-06-22 LAB — ADRENOCORTICOTROPIC HORMONE: 6.3 PG/ML (ref 7.2–63.3)

## 2023-07-05 ENCOUNTER — HOSPITAL ENCOUNTER (OUTPATIENT)
Dept: DATA CONVERSION | Facility: HOSPITAL | Age: 68
End: 2023-07-05
Attending: STUDENT IN AN ORGANIZED HEALTH CARE EDUCATION/TRAINING PROGRAM | Admitting: STUDENT IN AN ORGANIZED HEALTH CARE EDUCATION/TRAINING PROGRAM
Payer: MEDICARE

## 2023-07-05 DIAGNOSIS — C34.90 MALIGNANT NEOPLASM OF UNSPECIFIED PART OF UNSPECIFIED BRONCHUS OR LUNG (MULTI): ICD-10-CM

## 2023-07-05 DIAGNOSIS — Z00.6 ENCOUNTER FOR EXAMINATION FOR NORMAL COMPARISON AND CONTROL IN CLINICAL RESEARCH PROGRAM: ICD-10-CM

## 2023-07-05 LAB
ACTIVATED PARTIAL THROMBOPLASTIN TIME IN PPP BY COAGULATION ASSAY: 31 SEC (ref 27–38)
ALANINE AMINOTRANSFERASE (SGPT) (U/L) IN SER/PLAS: 8 U/L (ref 7–45)
ALBUMIN (G/DL) IN SER/PLAS: 3.4 G/DL (ref 3.4–5)
ALKALINE PHOSPHATASE (U/L) IN SER/PLAS: 41 U/L (ref 33–136)
ANION GAP IN SER/PLAS: 12 MMOL/L (ref 10–20)
ASPARTATE AMINOTRANSFERASE (SGOT) (U/L) IN SER/PLAS: 12 U/L (ref 9–39)
BASOPHILS (10*3/UL) IN BLOOD BY AUTOMATED COUNT: 0.03 X10E9/L (ref 0–0.1)
BASOPHILS/100 LEUKOCYTES IN BLOOD BY AUTOMATED COUNT: 0.5 % (ref 0–2)
BILIRUBIN DIRECT (MG/DL) IN SER/PLAS: 0.1 MG/DL (ref 0–0.3)
BILIRUBIN TOTAL (MG/DL) IN SER/PLAS: 0.9 MG/DL (ref 0–1.2)
C REACTIVE PROTEIN (MG/L) IN SER/PLAS: 2.57 MG/DL
CALCIUM (MG/DL) IN SER/PLAS: 8.9 MG/DL (ref 8.6–10.6)
CARBON DIOXIDE, TOTAL (MMOL/L) IN SER/PLAS: 26 MMOL/L (ref 21–32)
CHLORIDE (MMOL/L) IN SER/PLAS: 106 MMOL/L (ref 98–107)
CREATINE KINASE (U/L) IN SER/PLAS: 35 U/L (ref 0–215)
CREATININE (MG/DL) IN SER/PLAS: 0.82 MG/DL (ref 0.5–1.05)
EOSINOPHILS (10*3/UL) IN BLOOD BY AUTOMATED COUNT: 0.13 X10E9/L (ref 0–0.7)
EOSINOPHILS/100 LEUKOCYTES IN BLOOD BY AUTOMATED COUNT: 2 % (ref 0–6)
ERYTHROCYTE DISTRIBUTION WIDTH (RATIO) BY AUTOMATED COUNT: 13.2 % (ref 11.5–14.5)
ERYTHROCYTE MEAN CORPUSCULAR HEMOGLOBIN CONCENTRATION (G/DL) BY AUTOMATED: 32.8 G/DL (ref 32–36)
ERYTHROCYTE MEAN CORPUSCULAR VOLUME (FL) BY AUTOMATED COUNT: 94 FL (ref 80–100)
ERYTHROCYTES (10*6/UL) IN BLOOD BY AUTOMATED COUNT: 3.85 X10E12/L (ref 4–5.2)
FERRITIN (UG/LL) IN SER/PLAS: 191 UG/L (ref 8–150)
GFR FEMALE: 78 ML/MIN/1.73M2
GLUCOSE (MG/DL) IN SER/PLAS: 106 MG/DL (ref 74–99)
HEMATOCRIT (%) IN BLOOD BY AUTOMATED COUNT: 36.3 % (ref 36–46)
HEMOGLOBIN (G/DL) IN BLOOD: 11.9 G/DL (ref 12–16)
IMMATURE GRANULOCYTES/100 LEUKOCYTES IN BLOOD BY AUTOMATED COUNT: 0.5 % (ref 0–0.9)
INR IN PPP BY COAGULATION ASSAY: 1 (ref 0.9–1.1)
LEUKOCYTES (10*3/UL) IN BLOOD BY AUTOMATED COUNT: 6.6 X10E9/L (ref 4.4–11.3)
LYMPHOCYTES (10*3/UL) IN BLOOD BY AUTOMATED COUNT: 1.26 X10E9/L (ref 1.2–4.8)
LYMPHOCYTES/100 LEUKOCYTES IN BLOOD BY AUTOMATED COUNT: 19.1 % (ref 13–44)
MAGNESIUM (MG/DL) IN SER/PLAS: 1.89 MG/DL (ref 1.6–2.4)
MONOCYTES (10*3/UL) IN BLOOD BY AUTOMATED COUNT: 0.74 X10E9/L (ref 0.1–1)
MONOCYTES/100 LEUKOCYTES IN BLOOD BY AUTOMATED COUNT: 11.2 % (ref 2–10)
NEUTROPHILS (10*3/UL) IN BLOOD BY AUTOMATED COUNT: 4.41 X10E9/L (ref 1.2–7.7)
NEUTROPHILS/100 LEUKOCYTES IN BLOOD BY AUTOMATED COUNT: 66.7 % (ref 40–80)
NRBC (PER 100 WBCS) BY AUTOMATED COUNT: 0 /100 WBC (ref 0–0)
PHOSPHATE (MG/DL) IN SER/PLAS: 4 MG/DL (ref 2.5–4.9)
PLATELETS (10*3/UL) IN BLOOD AUTOMATED COUNT: 225 X10E9/L (ref 150–450)
POTASSIUM (MMOL/L) IN SER/PLAS: 3.6 MMOL/L (ref 3.5–5.3)
PROTEIN TOTAL: 5.9 G/DL (ref 6.4–8.2)
PROTHROMBIN TIME (PT) IN PPP BY COAGULATION ASSAY: 11.8 SEC (ref 9.8–12.8)
SODIUM (MMOL/L) IN SER/PLAS: 140 MMOL/L (ref 136–145)
UREA NITROGEN (MG/DL) IN SER/PLAS: 14 MG/DL (ref 6–23)

## 2023-07-18 ENCOUNTER — HOSPITAL ENCOUNTER (OUTPATIENT)
Dept: DATA CONVERSION | Facility: HOSPITAL | Age: 68
End: 2023-07-18
Attending: STUDENT IN AN ORGANIZED HEALTH CARE EDUCATION/TRAINING PROGRAM | Admitting: STUDENT IN AN ORGANIZED HEALTH CARE EDUCATION/TRAINING PROGRAM
Payer: MEDICARE

## 2023-07-18 DIAGNOSIS — C34.90 MALIGNANT NEOPLASM OF UNSPECIFIED PART OF UNSPECIFIED BRONCHUS OR LUNG (MULTI): ICD-10-CM

## 2023-07-18 DIAGNOSIS — Z00.6 ENCOUNTER FOR EXAMINATION FOR NORMAL COMPARISON AND CONTROL IN CLINICAL RESEARCH PROGRAM: ICD-10-CM

## 2023-07-18 LAB
ACTIVATED PARTIAL THROMBOPLASTIN TIME IN PPP BY COAGULATION ASSAY: 30 SEC (ref 27–38)
ALANINE AMINOTRANSFERASE (SGPT) (U/L) IN SER/PLAS: 11 U/L (ref 7–45)
ALBUMIN (G/DL) IN SER/PLAS: 3.6 G/DL (ref 3.4–5)
ALKALINE PHOSPHATASE (U/L) IN SER/PLAS: 35 U/L (ref 33–136)
AMYLASE (U/L) IN SER/PLAS: 34 U/L (ref 29–103)
ANION GAP IN SER/PLAS: 11 MMOL/L (ref 10–20)
ASPARTATE AMINOTRANSFERASE (SGOT) (U/L) IN SER/PLAS: 14 U/L (ref 9–39)
BASOPHILS (10*3/UL) IN BLOOD BY AUTOMATED COUNT: 0.04 X10E9/L (ref 0–0.1)
BASOPHILS/100 LEUKOCYTES IN BLOOD BY AUTOMATED COUNT: 0.4 % (ref 0–2)
BILIRUBIN DIRECT (MG/DL) IN SER/PLAS: 0.1 MG/DL (ref 0–0.3)
BILIRUBIN TOTAL (MG/DL) IN SER/PLAS: 0.6 MG/DL (ref 0–1.2)
C REACTIVE PROTEIN (MG/L) IN SER/PLAS: 0.19 MG/DL
CALCIUM (MG/DL) IN SER/PLAS: 8.8 MG/DL (ref 8.6–10.6)
CARBON DIOXIDE, TOTAL (MMOL/L) IN SER/PLAS: 27 MMOL/L (ref 21–32)
CHLORIDE (MMOL/L) IN SER/PLAS: 104 MMOL/L (ref 98–107)
CORTISOL (UG/DL) IN SERUM: 0.9 UG/DL (ref 2.5–20)
CREATINE KINASE (U/L) IN SER/PLAS: 34 U/L (ref 0–215)
CREATININE (MG/DL) IN SER/PLAS: 0.79 MG/DL (ref 0.5–1.05)
EOSINOPHILS (10*3/UL) IN BLOOD BY AUTOMATED COUNT: 0.09 X10E9/L (ref 0–0.7)
EOSINOPHILS/100 LEUKOCYTES IN BLOOD BY AUTOMATED COUNT: 0.8 % (ref 0–6)
ERYTHROCYTE DISTRIBUTION WIDTH (RATIO) BY AUTOMATED COUNT: 13.3 % (ref 11.5–14.5)
ERYTHROCYTE MEAN CORPUSCULAR HEMOGLOBIN CONCENTRATION (G/DL) BY AUTOMATED: 33.2 G/DL (ref 32–36)
ERYTHROCYTE MEAN CORPUSCULAR VOLUME (FL) BY AUTOMATED COUNT: 95 FL (ref 80–100)
ERYTHROCYTES (10*6/UL) IN BLOOD BY AUTOMATED COUNT: 4.11 X10E12/L (ref 4–5.2)
ESTRADIOL (PG/ML) IN SER/PLAS: <19 PG/ML
FERRITIN (UG/LL) IN SER/PLAS: 146 UG/L (ref 8–150)
FOLLITROPIN (IU/L) IN SER/PLAS: <0.9 IU/L
GFR FEMALE: 82 ML/MIN/1.73M2
GLUCOSE (MG/DL) IN SER/PLAS: 124 MG/DL (ref 74–99)
HEMATOCRIT (%) IN BLOOD BY AUTOMATED COUNT: 38.9 % (ref 36–46)
HEMOGLOBIN (G/DL) IN BLOOD: 12.9 G/DL (ref 12–16)
IMMATURE GRANULOCYTES/100 LEUKOCYTES IN BLOOD BY AUTOMATED COUNT: 0.8 % (ref 0–0.9)
INR IN PPP BY COAGULATION ASSAY: 1 (ref 0.9–1.1)
LEUKOCYTES (10*3/UL) IN BLOOD BY AUTOMATED COUNT: 10.6 X10E9/L (ref 4.4–11.3)
LIPASE (U/L) IN SER/PLAS: 36 U/L (ref 9–82)
LUTEINIZING HORMONE (IU/ML) IN SER/PLAS: <0.1 IU/L
LYMPHOCYTES (10*3/UL) IN BLOOD BY AUTOMATED COUNT: 0.92 X10E9/L (ref 1.2–4.8)
LYMPHOCYTES/100 LEUKOCYTES IN BLOOD BY AUTOMATED COUNT: 8.7 % (ref 13–44)
MAGNESIUM (MG/DL) IN SER/PLAS: 1.96 MG/DL (ref 1.6–2.4)
MONOCYTES (10*3/UL) IN BLOOD BY AUTOMATED COUNT: 0.51 X10E9/L (ref 0.1–1)
MONOCYTES/100 LEUKOCYTES IN BLOOD BY AUTOMATED COUNT: 4.8 % (ref 2–10)
NEUTROPHILS (10*3/UL) IN BLOOD BY AUTOMATED COUNT: 8.94 X10E9/L (ref 1.2–7.7)
NEUTROPHILS/100 LEUKOCYTES IN BLOOD BY AUTOMATED COUNT: 84.5 % (ref 40–80)
NRBC (PER 100 WBCS) BY AUTOMATED COUNT: 0 /100 WBC (ref 0–0)
PHOSPHATE (MG/DL) IN SER/PLAS: 3.5 MG/DL (ref 2.5–4.9)
PLATELETS (10*3/UL) IN BLOOD AUTOMATED COUNT: 286 X10E9/L (ref 150–450)
POTASSIUM (MMOL/L) IN SER/PLAS: 3.9 MMOL/L (ref 3.5–5.3)
PROTEIN TOTAL: 5.9 G/DL (ref 6.4–8.2)
PROTHROMBIN TIME (PT) IN PPP BY COAGULATION ASSAY: 11.4 SEC (ref 9.8–12.8)
SODIUM (MMOL/L) IN SER/PLAS: 138 MMOL/L (ref 136–145)
THYROTROPIN (MIU/L) IN SER/PLAS BY DETECTION LIMIT <= 0.05 MIU/L: 2.23 MIU/L (ref 0.44–3.98)
THYROXINE (T4) FREE (NG/DL) IN SER/PLAS: 0.81 NG/DL (ref 0.78–1.48)
UREA NITROGEN (MG/DL) IN SER/PLAS: 21 MG/DL (ref 6–23)

## 2023-07-20 LAB — ADRENOCORTICOTROPIC HORMONE: 4.4 PG/ML (ref 7.2–63.3)

## 2023-07-31 ENCOUNTER — APPOINTMENT (OUTPATIENT)
Dept: PRIMARY CARE | Facility: CLINIC | Age: 68
End: 2023-07-31
Payer: MEDICARE

## 2023-08-01 ENCOUNTER — HOSPITAL ENCOUNTER (OUTPATIENT)
Dept: DATA CONVERSION | Facility: HOSPITAL | Age: 68
End: 2023-08-01
Attending: STUDENT IN AN ORGANIZED HEALTH CARE EDUCATION/TRAINING PROGRAM | Admitting: STUDENT IN AN ORGANIZED HEALTH CARE EDUCATION/TRAINING PROGRAM
Payer: MEDICARE

## 2023-08-01 DIAGNOSIS — Z51.11 ENCOUNTER FOR ANTINEOPLASTIC CHEMOTHERAPY: ICD-10-CM

## 2023-08-01 DIAGNOSIS — C34.90 MALIGNANT NEOPLASM OF UNSPECIFIED PART OF UNSPECIFIED BRONCHUS OR LUNG (MULTI): ICD-10-CM

## 2023-08-01 DIAGNOSIS — Z00.6 ENCOUNTER FOR EXAMINATION FOR NORMAL COMPARISON AND CONTROL IN CLINICAL RESEARCH PROGRAM: ICD-10-CM

## 2023-08-01 LAB
ACTIVATED PARTIAL THROMBOPLASTIN TIME IN PPP BY COAGULATION ASSAY: 28 SEC (ref 27–38)
ALANINE AMINOTRANSFERASE (SGPT) (U/L) IN SER/PLAS: 10 U/L (ref 7–45)
ALBUMIN (G/DL) IN SER/PLAS: 3.4 G/DL (ref 3.4–5)
ALKALINE PHOSPHATASE (U/L) IN SER/PLAS: 38 U/L (ref 33–136)
ANION GAP IN SER/PLAS: 11 MMOL/L (ref 10–20)
ASPARTATE AMINOTRANSFERASE (SGOT) (U/L) IN SER/PLAS: 18 U/L (ref 9–39)
BASOPHILS (10*3/UL) IN BLOOD BY AUTOMATED COUNT: 0.04 X10E9/L (ref 0–0.1)
BASOPHILS/100 LEUKOCYTES IN BLOOD BY AUTOMATED COUNT: 0.5 % (ref 0–2)
BILIRUBIN DIRECT (MG/DL) IN SER/PLAS: 0.1 MG/DL (ref 0–0.3)
BILIRUBIN TOTAL (MG/DL) IN SER/PLAS: 0.5 MG/DL (ref 0–1.2)
C REACTIVE PROTEIN (MG/L) IN SER/PLAS: 1.54 MG/DL
CALCIUM (MG/DL) IN SER/PLAS: 8.5 MG/DL (ref 8.6–10.6)
CARBON DIOXIDE, TOTAL (MMOL/L) IN SER/PLAS: 27 MMOL/L (ref 21–32)
CHLORIDE (MMOL/L) IN SER/PLAS: 107 MMOL/L (ref 98–107)
CREATINE KINASE (U/L) IN SER/PLAS: 40 U/L (ref 0–215)
CREATININE (MG/DL) IN SER/PLAS: 0.78 MG/DL (ref 0.5–1.05)
EOSINOPHILS (10*3/UL) IN BLOOD BY AUTOMATED COUNT: 0.25 X10E9/L (ref 0–0.7)
EOSINOPHILS/100 LEUKOCYTES IN BLOOD BY AUTOMATED COUNT: 3.3 % (ref 0–6)
ERYTHROCYTE DISTRIBUTION WIDTH (RATIO) BY AUTOMATED COUNT: 13.7 % (ref 11.5–14.5)
ERYTHROCYTE MEAN CORPUSCULAR HEMOGLOBIN CONCENTRATION (G/DL) BY AUTOMATED: 32.2 G/DL (ref 32–36)
ERYTHROCYTE MEAN CORPUSCULAR VOLUME (FL) BY AUTOMATED COUNT: 96 FL (ref 80–100)
ERYTHROCYTES (10*6/UL) IN BLOOD BY AUTOMATED COUNT: 3.45 X10E12/L (ref 4–5.2)
FERRITIN (UG/LL) IN SER/PLAS: 180 UG/L (ref 8–150)
GFR FEMALE: 83 ML/MIN/1.73M2
GLUCOSE (MG/DL) IN SER/PLAS: 78 MG/DL (ref 74–99)
HEMATOCRIT (%) IN BLOOD BY AUTOMATED COUNT: 33.2 % (ref 36–46)
HEMOGLOBIN (G/DL) IN BLOOD: 10.7 G/DL (ref 12–16)
IMMATURE GRANULOCYTES/100 LEUKOCYTES IN BLOOD BY AUTOMATED COUNT: 0.9 % (ref 0–0.9)
INR IN PPP BY COAGULATION ASSAY: 1 (ref 0.9–1.1)
LEUKOCYTES (10*3/UL) IN BLOOD BY AUTOMATED COUNT: 7.6 X10E9/L (ref 4.4–11.3)
LYMPHOCYTES (10*3/UL) IN BLOOD BY AUTOMATED COUNT: 1.14 X10E9/L (ref 1.2–4.8)
LYMPHOCYTES/100 LEUKOCYTES IN BLOOD BY AUTOMATED COUNT: 15 % (ref 13–44)
MAGNESIUM (MG/DL) IN SER/PLAS: 1.99 MG/DL (ref 1.6–2.4)
MONOCYTES (10*3/UL) IN BLOOD BY AUTOMATED COUNT: 0.63 X10E9/L (ref 0.1–1)
MONOCYTES/100 LEUKOCYTES IN BLOOD BY AUTOMATED COUNT: 8.3 % (ref 2–10)
NEUTROPHILS (10*3/UL) IN BLOOD BY AUTOMATED COUNT: 5.46 X10E9/L (ref 1.2–7.7)
NEUTROPHILS/100 LEUKOCYTES IN BLOOD BY AUTOMATED COUNT: 72 % (ref 40–80)
NRBC (PER 100 WBCS) BY AUTOMATED COUNT: 0 /100 WBC (ref 0–0)
PHOSPHATE (MG/DL) IN SER/PLAS: 3.7 MG/DL (ref 2.5–4.9)
PLATELETS (10*3/UL) IN BLOOD AUTOMATED COUNT: 223 X10E9/L (ref 150–450)
POTASSIUM (MMOL/L) IN SER/PLAS: 4.2 MMOL/L (ref 3.5–5.3)
PROTEIN TOTAL: 5.9 G/DL (ref 6.4–8.2)
PROTHROMBIN TIME (PT) IN PPP BY COAGULATION ASSAY: 11.2 SEC (ref 9.8–12.8)
SODIUM (MMOL/L) IN SER/PLAS: 141 MMOL/L (ref 136–145)
UREA NITROGEN (MG/DL) IN SER/PLAS: 15 MG/DL (ref 6–23)

## 2023-08-11 ENCOUNTER — PATIENT OUTREACH (OUTPATIENT)
Dept: CARE COORDINATION | Facility: CLINIC | Age: 68
End: 2023-08-11
Payer: MEDICARE

## 2023-08-11 RX ORDER — AMOXICILLIN AND CLAVULANATE POTASSIUM 500; 125 MG/1; MG/1
500 TABLET, FILM COATED ORAL 3 TIMES DAILY
COMMUNITY
End: 2023-09-05 | Stop reason: ALTCHOICE

## 2023-08-12 NOTE — PROGRESS NOTES
Discharge Facility: [facility]  Discharge Diagnosis: acute diverticulitis, nontraumatic perforation of intestine  Admission Date: 8/7/23  Discharge Date: 8/9/23    PCP Appointment Date: no appts, message to clerical pool  Specialist Appointment Date: Dr. Liu surgery 9/22/23  Hospital Encounter and Summary: not available at this time  See discharge assessment below for further details    Engagement  Call Start Time: 1128 (8/11/2023 11:28 AM)    Medications  Medications reviewed with patient/caregiver?: Yes (8/11/2023 11:28 AM)  Is the patient having any side effects they believe may be caused by any medication additions or changes?: No (8/11/2023 11:28 AM)  Does the patient have all medications ordered at discharge?: Yes (8/11/2023 11:28 AM)  Is the patient taking all medications as directed (includes completed medication regime)?: Yes (8/11/2023 11:28 AM)  Medication Comments: new/changed medications reviewed only. amoxicillin-clavulanate 500 mg-125 mg oral tablet - 1 tab(s) orally 3 times a day (8/11/2023 11:28 AM)    Appointments  Does the patient have a primary care provider?: Yes (8/11/2023 11:28 AM)  Care Management Interventions: Advised patient to make appointment (8/11/2023 11:28 AM)  Care Management Interventions: Advised patient to keep appointment (8/11/2023 11:28 AM)    Self Management  What is the home health agency?: n/a (8/11/2023 11:28 AM)  What Durable Medical Equipment (DME) was ordered?: n/a (8/11/2023 11:28 AM)    Patient Teaching  Care Management Interventions: Reviewed instructions with patient (8/11/2023 11:28 AM)  What is the patient's perception of their health status since discharge?: Improving (8/11/2023 11:28 AM)  Is the patient/caregiver able to teach back the hierarchy of who to call/visit for symptoms/problems? PCP, Specialist, Home Health nurse, Urgent Care, ED, 911: Yes (8/11/2023 11:28 AM)

## 2023-08-15 ENCOUNTER — HOSPITAL ENCOUNTER (OUTPATIENT)
Dept: DATA CONVERSION | Facility: HOSPITAL | Age: 68
End: 2023-08-15
Attending: STUDENT IN AN ORGANIZED HEALTH CARE EDUCATION/TRAINING PROGRAM

## 2023-08-15 DIAGNOSIS — Z00.6 ENCOUNTER FOR EXAMINATION FOR NORMAL COMPARISON AND CONTROL IN CLINICAL RESEARCH PROGRAM: ICD-10-CM

## 2023-08-15 DIAGNOSIS — C34.90 MALIGNANT NEOPLASM OF UNSPECIFIED PART OF UNSPECIFIED BRONCHUS OR LUNG (MULTI): ICD-10-CM

## 2023-08-18 DIAGNOSIS — J44.9 CHRONIC OBSTRUCTIVE PULMONARY DISEASE, UNSPECIFIED COPD TYPE (MULTI): Primary | ICD-10-CM

## 2023-08-29 ENCOUNTER — HOSPITAL ENCOUNTER (OUTPATIENT)
Dept: DATA CONVERSION | Facility: HOSPITAL | Age: 68
End: 2023-08-29
Attending: STUDENT IN AN ORGANIZED HEALTH CARE EDUCATION/TRAINING PROGRAM | Admitting: STUDENT IN AN ORGANIZED HEALTH CARE EDUCATION/TRAINING PROGRAM
Payer: MEDICARE

## 2023-08-29 DIAGNOSIS — C34.90 MALIGNANT NEOPLASM OF UNSPECIFIED PART OF UNSPECIFIED BRONCHUS OR LUNG (MULTI): ICD-10-CM

## 2023-08-29 DIAGNOSIS — Z00.6 ENCOUNTER FOR EXAMINATION FOR NORMAL COMPARISON AND CONTROL IN CLINICAL RESEARCH PROGRAM: ICD-10-CM

## 2023-08-29 LAB
ACTIVATED PARTIAL THROMBOPLASTIN TIME IN PPP BY COAGULATION ASSAY: 31 SEC (ref 27–38)
ALANINE AMINOTRANSFERASE (SGPT) (U/L) IN SER/PLAS: 7 U/L (ref 7–45)
ALBUMIN (G/DL) IN SER/PLAS: 3.7 G/DL (ref 3.4–5)
ALKALINE PHOSPHATASE (U/L) IN SER/PLAS: 44 U/L (ref 33–136)
AMYLASE (U/L) IN SER/PLAS: 32 U/L (ref 29–103)
ANION GAP IN SER/PLAS: 13 MMOL/L (ref 10–20)
APPEARANCE, URINE: NORMAL
ASPARTATE AMINOTRANSFERASE (SGOT) (U/L) IN SER/PLAS: 15 U/L (ref 9–39)
BASOPHILS (10*3/UL) IN BLOOD BY AUTOMATED COUNT: 0.03 X10E9/L (ref 0–0.1)
BASOPHILS/100 LEUKOCYTES IN BLOOD BY AUTOMATED COUNT: 0.5 % (ref 0–2)
BILIRUBIN DIRECT (MG/DL) IN SER/PLAS: 0.1 MG/DL (ref 0–0.3)
BILIRUBIN TOTAL (MG/DL) IN SER/PLAS: 0.6 MG/DL (ref 0–1.2)
BILIRUBIN, URINE: NEGATIVE
BLOOD, URINE: NEGATIVE
C REACTIVE PROTEIN (MG/L) IN SER/PLAS: 0.84 MG/DL
CALCIUM (MG/DL) IN SER/PLAS: 9.1 MG/DL (ref 8.6–10.6)
CARBON DIOXIDE, TOTAL (MMOL/L) IN SER/PLAS: 29 MMOL/L (ref 21–32)
CHLORIDE (MMOL/L) IN SER/PLAS: 104 MMOL/L (ref 98–107)
COLOR, URINE: NORMAL
CORTISOL (UG/DL) IN SERUM: 12.1 UG/DL (ref 2.5–20)
CREATINE KINASE (U/L) IN SER/PLAS: 44 U/L (ref 0–215)
CREATININE (MG/DL) IN SER/PLAS: 0.77 MG/DL (ref 0.5–1.05)
EOSINOPHILS (10*3/UL) IN BLOOD BY AUTOMATED COUNT: 0.18 X10E9/L (ref 0–0.7)
EOSINOPHILS/100 LEUKOCYTES IN BLOOD BY AUTOMATED COUNT: 2.7 % (ref 0–6)
ERYTHROCYTE DISTRIBUTION WIDTH (RATIO) BY AUTOMATED COUNT: 13.3 % (ref 11.5–14.5)
ERYTHROCYTE MEAN CORPUSCULAR HEMOGLOBIN CONCENTRATION (G/DL) BY AUTOMATED: 32.6 G/DL (ref 32–36)
ERYTHROCYTE MEAN CORPUSCULAR VOLUME (FL) BY AUTOMATED COUNT: 94 FL (ref 80–100)
ERYTHROCYTES (10*6/UL) IN BLOOD BY AUTOMATED COUNT: 3.74 X10E12/L (ref 4–5.2)
ESTRADIOL (PG/ML) IN SER/PLAS: <19 PG/ML
FERRITIN (UG/LL) IN SER/PLAS: 183 UG/L (ref 8–150)
FOLLITROPIN (IU/L) IN SER/PLAS: <0.9 IU/L
GFR FEMALE: 84 ML/MIN/1.73M2
GLUCOSE (MG/DL) IN SER/PLAS: 81 MG/DL (ref 74–99)
GLUCOSE, URINE: NEGATIVE MG/DL
HEMATOCRIT (%) IN BLOOD BY AUTOMATED COUNT: 35 % (ref 36–46)
HEMOGLOBIN (G/DL) IN BLOOD: 11.4 G/DL (ref 12–16)
IMMATURE GRANULOCYTES/100 LEUKOCYTES IN BLOOD BY AUTOMATED COUNT: 1.2 % (ref 0–0.9)
INR IN PPP BY COAGULATION ASSAY: 1 (ref 0.9–1.1)
KETONES, URINE: NEGATIVE MG/DL
LACTATE DEHYDROGENASE (U/L) IN SER/PLAS BY LAC->PYR RXN: 186 U/L (ref 84–246)
LEUKOCYTE ESTERASE, URINE: NEGATIVE
LEUKOCYTES (10*3/UL) IN BLOOD BY AUTOMATED COUNT: 6.6 X10E9/L (ref 4.4–11.3)
LIPASE (U/L) IN SER/PLAS: 31 U/L (ref 9–82)
LUTEINIZING HORMONE (IU/ML) IN SER/PLAS: 0.1 IU/L
LYMPHOCYTES (10*3/UL) IN BLOOD BY AUTOMATED COUNT: 1.24 X10E9/L (ref 1.2–4.8)
LYMPHOCYTES/100 LEUKOCYTES IN BLOOD BY AUTOMATED COUNT: 18.7 % (ref 13–44)
MAGNESIUM (MG/DL) IN SER/PLAS: 2.16 MG/DL (ref 1.6–2.4)
MONOCYTES (10*3/UL) IN BLOOD BY AUTOMATED COUNT: 0.49 X10E9/L (ref 0.1–1)
MONOCYTES/100 LEUKOCYTES IN BLOOD BY AUTOMATED COUNT: 7.4 % (ref 2–10)
NEUTROPHILS (10*3/UL) IN BLOOD BY AUTOMATED COUNT: 4.6 X10E9/L (ref 1.2–7.7)
NEUTROPHILS/100 LEUKOCYTES IN BLOOD BY AUTOMATED COUNT: 69.5 % (ref 40–80)
NITRITE, URINE: NEGATIVE
NRBC (PER 100 WBCS) BY AUTOMATED COUNT: 0 /100 WBC (ref 0–0)
PH, URINE: 5 (ref 5–8)
PHOSPHATE (MG/DL) IN SER/PLAS: 4 MG/DL (ref 2.5–4.9)
PLATELETS (10*3/UL) IN BLOOD AUTOMATED COUNT: 249 X10E9/L (ref 150–450)
POTASSIUM (MMOL/L) IN SER/PLAS: 3.8 MMOL/L (ref 3.5–5.3)
PROTEIN TOTAL: 6.2 G/DL (ref 6.4–8.2)
PROTEIN, URINE: NEGATIVE MG/DL
PROTHROMBIN TIME (PT) IN PPP BY COAGULATION ASSAY: 11.8 SEC (ref 9.8–12.8)
SODIUM (MMOL/L) IN SER/PLAS: 142 MMOL/L (ref 136–145)
SPECIFIC GRAVITY, URINE: 1.03 (ref 1–1.03)
THYROTROPIN (MIU/L) IN SER/PLAS BY DETECTION LIMIT <= 0.05 MIU/L: 2.89 MIU/L (ref 0.44–3.98)
THYROXINE (T4) FREE (NG/DL) IN SER/PLAS: 0.89 NG/DL (ref 0.78–1.48)
UREA NITROGEN (MG/DL) IN SER/PLAS: 20 MG/DL (ref 6–23)
UROBILINOGEN, URINE: <2 MG/DL (ref 0–1.9)

## 2023-08-30 PROBLEM — G62.0 PERIPHERAL NEUROPATHY DUE TO AND NOT CONCURRENT WITH CHEMOTHERAPY (MULTI): Status: ACTIVE | Noted: 2023-08-30

## 2023-08-30 PROBLEM — R91.1 LUNG NODULE: Status: ACTIVE | Noted: 2021-12-22

## 2023-08-30 PROBLEM — T45.1X5S PERIPHERAL NEUROPATHY DUE TO AND NOT CONCURRENT WITH CHEMOTHERAPY (MULTI): Status: ACTIVE | Noted: 2023-08-30

## 2023-08-30 LAB — ADRENOCORTICOTROPIC HORMONE: 25.7 PG/ML (ref 7.2–63.3)

## 2023-08-30 RX ORDER — OMEPRAZOLE 20 MG/1
20 CAPSULE, DELAYED RELEASE ORAL DAILY
COMMUNITY
End: 2023-12-06 | Stop reason: SDUPTHER

## 2023-08-30 RX ORDER — PROCHLORPERAZINE MALEATE 10 MG
5 TABLET ORAL EVERY 6 HOURS PRN
COMMUNITY
Start: 2023-07-10

## 2023-08-30 RX ORDER — DRONABINOL 2.5 MG/1
CAPSULE ORAL DAILY PRN
COMMUNITY
Start: 2023-03-08

## 2023-08-31 ENCOUNTER — PATIENT OUTREACH (OUTPATIENT)
Dept: CARE COORDINATION | Facility: CLINIC | Age: 68
End: 2023-08-31
Payer: MEDICARE

## 2023-08-31 NOTE — PROGRESS NOTES
Follow up call after hospitalization. Patient did not see PCP within 14 days of hospital discharge.   At time of outreach call the patient feels as if their condition has remained the same since last visit.  Addressed any questions or concerns.  Patient has completed antibiotics. Patient still has some abdominal pain that she states is constant, denies any improving or aggravating factors, states it is improved from initial hospital stay. Follow up appt with Dr. Liu is 9/22/23. Encouraged to call office if symptoms persist/worsen.

## 2023-09-02 PROBLEM — Z91.89 FRAMINGHAM CARDIAC RISK <10% IN NEXT 10 YEARS: Status: ACTIVE | Noted: 2023-09-02

## 2023-09-02 PROBLEM — K57.32 DIVERTICULITIS, COLON: Status: ACTIVE | Noted: 2023-09-02

## 2023-09-02 PROBLEM — Z86.16 HISTORY OF COVID-19: Status: ACTIVE | Noted: 2023-09-02

## 2023-09-02 RX ORDER — MEMANTINE HYDROCHLORIDE 10 MG/1
5 TABLET ORAL DAILY
COMMUNITY
End: 2023-09-08 | Stop reason: ALTCHOICE

## 2023-09-05 ENCOUNTER — LAB (OUTPATIENT)
Dept: LAB | Facility: LAB | Age: 68
End: 2023-09-05
Payer: MEDICARE

## 2023-09-05 DIAGNOSIS — E78.2 MIXED HYPERLIPIDEMIA: ICD-10-CM

## 2023-09-05 DIAGNOSIS — E55.9 VITAMIN D DEFICIENCY: ICD-10-CM

## 2023-09-05 DIAGNOSIS — R73.01 IMPAIRED FASTING BLOOD SUGAR: ICD-10-CM

## 2023-09-05 PROBLEM — Z86.16 HISTORY OF COVID-19: Status: RESOLVED | Noted: 2023-09-02 | Resolved: 2023-09-05

## 2023-09-05 PROBLEM — D63.8 ANEMIA, CHRONIC DISEASE: Status: ACTIVE | Noted: 2023-09-05

## 2023-09-05 PROBLEM — R91.1 LUNG NODULE: Status: RESOLVED | Noted: 2021-12-22 | Resolved: 2023-09-05

## 2023-09-05 PROBLEM — K57.32 DIVERTICULITIS, COLON: Status: RESOLVED | Noted: 2023-09-02 | Resolved: 2023-09-05

## 2023-09-05 LAB
ALANINE AMINOTRANSFERASE (SGPT) (U/L) IN SER/PLAS: 6 U/L (ref 7–45)
ALBUMIN (G/DL) IN SER/PLAS: 3.6 G/DL (ref 3.4–5)
ALKALINE PHOSPHATASE (U/L) IN SER/PLAS: 32 U/L (ref 33–136)
ANION GAP IN SER/PLAS: 11 MMOL/L (ref 10–20)
ASPARTATE AMINOTRANSFERASE (SGOT) (U/L) IN SER/PLAS: 12 U/L (ref 9–39)
BILIRUBIN TOTAL (MG/DL) IN SER/PLAS: 0.6 MG/DL (ref 0–1.2)
CALCIUM (MG/DL) IN SER/PLAS: 9 MG/DL (ref 8.6–10.3)
CARBON DIOXIDE, TOTAL (MMOL/L) IN SER/PLAS: 27 MMOL/L (ref 21–32)
CHLORIDE (MMOL/L) IN SER/PLAS: 106 MMOL/L (ref 98–107)
CHOLESTEROL (MG/DL) IN SER/PLAS: 237 MG/DL (ref 0–199)
CHOLESTEROL IN HDL (MG/DL) IN SER/PLAS: 52.9 MG/DL
CHOLESTEROL IN LDL (MG/DL) IN SER/PLAS BY DIRECT ASSAY: 157 MG/DL (ref 0–129)
CHOLESTEROL/HDL RATIO: 4.5
CREATININE (MG/DL) IN SER/PLAS: 0.86 MG/DL (ref 0.5–1.05)
ERYTHROCYTE DISTRIBUTION WIDTH (RATIO) BY AUTOMATED COUNT: 13.3 % (ref 11.5–14.5)
ERYTHROCYTE MEAN CORPUSCULAR HEMOGLOBIN CONCENTRATION (G/DL) BY AUTOMATED: 33.5 G/DL (ref 32–36)
ERYTHROCYTE MEAN CORPUSCULAR VOLUME (FL) BY AUTOMATED COUNT: 93 FL (ref 80–100)
ERYTHROCYTES (10*6/UL) IN BLOOD BY AUTOMATED COUNT: 3.72 X10E12/L (ref 4–5.2)
ESTIMATED AVERAGE GLUCOSE FOR HBA1C: 108 MG/DL
GFR FEMALE: 74 ML/MIN/1.73M2
GLUCOSE (MG/DL) IN SER/PLAS: 90 MG/DL (ref 74–99)
HEMATOCRIT (%) IN BLOOD BY AUTOMATED COUNT: 34.6 % (ref 36–46)
HEMOGLOBIN (G/DL) IN BLOOD: 11.6 G/DL (ref 12–16)
HEMOGLOBIN A1C/HEMOGLOBIN TOTAL IN BLOOD: 5.4 %
LDL: 158 MG/DL (ref 0–99)
LEUKOCYTES (10*3/UL) IN BLOOD BY AUTOMATED COUNT: 4.3 X10E9/L (ref 4.4–11.3)
NRBC (PER 100 WBCS) BY AUTOMATED COUNT: 0 /100 WBC (ref 0–0)
PLATELETS (10*3/UL) IN BLOOD AUTOMATED COUNT: 233 X10E9/L (ref 150–450)
POTASSIUM (MMOL/L) IN SER/PLAS: 3.9 MMOL/L (ref 3.5–5.3)
PROTEIN TOTAL: 5.9 G/DL (ref 6.4–8.2)
SODIUM (MMOL/L) IN SER/PLAS: 140 MMOL/L (ref 136–145)
THYROTROPIN (MIU/L) IN SER/PLAS BY DETECTION LIMIT <= 0.05 MIU/L: 2.78 MIU/L (ref 0.44–3.98)
TRIGLYCERIDE (MG/DL) IN SER/PLAS: 129 MG/DL (ref 0–149)
UREA NITROGEN (MG/DL) IN SER/PLAS: 15 MG/DL (ref 6–23)
VLDL: 26 MG/DL (ref 0–40)

## 2023-09-05 PROCEDURE — 36415 COLL VENOUS BLD VENIPUNCTURE: CPT

## 2023-09-05 PROCEDURE — 83036 HEMOGLOBIN GLYCOSYLATED A1C: CPT

## 2023-09-05 PROCEDURE — 80061 LIPID PANEL: CPT

## 2023-09-05 PROCEDURE — 82652 VIT D 1 25-DIHYDROXY: CPT

## 2023-09-05 PROCEDURE — 84443 ASSAY THYROID STIM HORMONE: CPT

## 2023-09-05 PROCEDURE — 80053 COMPREHEN METABOLIC PANEL: CPT

## 2023-09-05 PROCEDURE — 83721 ASSAY OF BLOOD LIPOPROTEIN: CPT

## 2023-09-05 PROCEDURE — 85027 COMPLETE CBC AUTOMATED: CPT

## 2023-09-05 NOTE — ASSESSMENT & PLAN NOTE
Medical Wellness exam done.  Mammogram ordered for 10/23  Shingrix series complete.  Prevnar 13 5/21  Pneumovax 23 7/22  colonoscopy 1/17  DEXA 4/22  Already got flu shot.   Current nonsmoker

## 2023-09-05 NOTE — ASSESSMENT & PLAN NOTE
Diagnosed per bronch 1/22  Doing well per patient  S/P radiation  On antibody treatment - s/p chemo  Has follow up oncology next week

## 2023-09-05 NOTE — PROGRESS NOTES
Subjective :  Chief Complaint: Gladys Branch is an 67 y.o. female here for an annual Medicare Wellness visit.    Recent admission for diverticulitis - resolved with antibiotics.  Would like information on diet for prevention.    On antibody treatment for lung cancer - doing well per patient. Has follow up oncology next week.    Patient otherwise feels well. No other complaints or concerns.    I have reviewed and reconciled the medication list with the patient today.    Current Outpatient Medications:     albuterol 90 mcg/actuation inhaler, Inhale 1-2 puffs every 4 hours if needed for wheezing or shortness of breath., Disp: , Rfl:     cetirizine-pseudoephedrine (ZyrTEC-D) 5-120 mg 12 hr tablet, Take 1 tablet by mouth once daily., Disp: 30 tablet, Rfl: 5    cholecalciferol (Vitamin D-3) 25 MCG (1000 UT) tablet, Take by mouth., Disp: , Rfl:     dexAMETHasone (Decadron) 2 mg tablet, Take 1 tablet (2 mg) by mouth., Disp: , Rfl:     dronabinol (Marinol) 2.5 mg capsule, take 1 capsule by mouth once daily 1 hour before dinner, Disp: , Rfl:     estrogens, conjugated, (Premarin) 0.3 mg tablet, Take 0.5 tablets (0.15 mg) by mouth once daily., Disp: , Rfl:     omeprazole (PriLOSEC) 20 mg DR capsule, Take by mouth., Disp: , Rfl:     ondansetron (Zofran) 8 mg tablet, Take 1 tablet (8 mg) by mouth every 8 hours if needed., Disp: , Rfl:     prochlorperazine (Compazine) 10 mg tablet, Take by mouth every 6 hours if needed., Disp: , Rfl:     vit A/vit C/vit E/zinc/copper (PRESERVISION AREDS ORAL), Take by mouth., Disp: , Rfl:     The patient's relevant past medical, surgical, family and social history was reviewed in [facility].  All pertinent lab work and results for this visit were reviewed with patient.    Recent Results (from the past 336 hour(s))   Comprehensive Metabolic Panel    Collection Time: 08/29/23  9:56 AM   Result Value Ref Range    Glucose 81 74 - 99 mg/dL    Sodium 142 136 - 145 mmol/L    Potassium 3.8 3.5 - 5.3 mmol/L     Chloride 104 98 - 107 mmol/L    Bicarbonate 29 21 - 32 mmol/L    Anion Gap 13 10 - 20 mmol/L    Urea Nitrogen 20 6 - 23 mg/dL    Creatinine 0.77 0.50 - 1.05 mg/dL    GFR Female 84 >90 mL/min/1.73m2    Calcium 9.1 8.6 - 10.6 mg/dL    Albumin 3.7 3.4 - 5.0 g/dL    Alkaline Phosphatase 44 33 - 136 U/L    Total Protein 6.2 (L) 6.4 - 8.2 g/dL    AST 15 9 - 39 U/L    Total Bilirubin 0.6 0.0 - 1.2 mg/dL    ALT (SGPT) 7 7 - 45 U/L   CBC and Auto Differential    Collection Time: 08/29/23  9:56 AM   Result Value Ref Range    WBC 6.6 4.4 - 11.3 x10E9/L    nRBC 0.0 0.0 - 0.0 /100 WBC    RBC 3.74 (L) 4.00 - 5.20 x10E12/L    Hemoglobin 11.4 (L) 12.0 - 16.0 g/dL    Hematocrit 35.0 (L) 36.0 - 46.0 %    MCV 94 80 - 100 fL    MCHC 32.6 32.0 - 36.0 g/dL    Platelets 249 150 - 450 x10E9/L    RDW 13.3 11.5 - 14.5 %    Neutrophils % 69.5 40.0 - 80.0 %    Immature Granulocytes %, Automated 1.2 (H) 0.0 - 0.9 %    Lymphocytes % 18.7 13.0 - 44.0 %    Monocytes % 7.4 2.0 - 10.0 %    Eosinophils % 2.7 0.0 - 6.0 %    Basophils % 0.5 0.0 - 2.0 %    Neutrophils Absolute 4.60 1.20 - 7.70 x10E9/L    Lymphocytes Absolute 1.24 1.20 - 4.80 x10E9/L    Monocytes Absolute 0.49 0.10 - 1.00 x10E9/L    Eosinophils Absolute 0.18 0.00 - 0.70 x10E9/L    Basophils Absolute 0.03 0.00 - 0.10 x10E9/L   Lactate Dehydrogenase    Collection Time: 08/29/23  9:56 AM   Result Value Ref Range     84 - 246 U/L   Creatine Kinase    Collection Time: 08/29/23  9:56 AM   Result Value Ref Range    Total CK 44 0 - 215 U/L   Magnesium    Collection Time: 08/29/23  9:56 AM   Result Value Ref Range    Magnesium 2.16 1.60 - 2.40 mg/dL   Phosphorus    Collection Time: 08/29/23  9:56 AM   Result Value Ref Range    Phosphorus 4.0 2.5 - 4.9 mg/dL   Lipase    Collection Time: 08/29/23  9:56 AM   Result Value Ref Range    Lipase 31 9 - 82 U/L   aPTT    Collection Time: 08/29/23  9:56 AM   Result Value Ref Range    aPTT 31 27 - 38 sec   Amylase    Collection Time: 08/29/23  9:56  AM   Result Value Ref Range    Amylase 32 29 - 103 U/L   Protime-INR    Collection Time: 08/29/23  9:56 AM   Result Value Ref Range    Protime 11.8 9.8 - 12.8 sec    INR 1.0 0.9 - 1.1   Urinalysis with Reflex Microscopic    Collection Time: 08/29/23  9:56 AM   Result Value Ref Range    Color, Urine ROSE MARY STRAW,YELLOW    Appearance, Urine HAZY CLEAR    Specific Gravity, Urine 1.026 1.005 - 1.035    pH, Urine 5.0 5.0 - 8.0    Protein, Urine NEGATIVE NEGATIVE mg/dL    Glucose, Urine NEGATIVE NEGATIVE mg/dL    Blood, Urine NEGATIVE NEGATIVE    Ketones, Urine NEGATIVE NEGATIVE mg/dL    Bilirubin, Urine NEGATIVE NEGATIVE    Urobilinogen, Urine <2.0 0.0 - 1.9 mg/dL    Nitrite, Urine NEGATIVE NEGATIVE    Leukocyte Esterase, Urine NEGATIVE NEGATIVE   Thyroid Stimulating Hormone    Collection Time: 08/29/23  9:56 AM   Result Value Ref Range    TSH 2.89 0.44 - 3.98 mIU/L   Cortisol    Collection Time: 08/29/23  9:56 AM   Result Value Ref Range    Cortisol 12.1 2.5 - 20.0 ug/dL   C-Reactive Protein    Collection Time: 08/29/23  9:56 AM   Result Value Ref Range    CRP 0.84 mg/dL   Thyroxine, Free    Collection Time: 08/29/23  9:56 AM   Result Value Ref Range    Free T4 0.89 0.78 - 1.48 ng/dL   Ferritin    Collection Time: 08/29/23  9:56 AM   Result Value Ref Range    Ferritin 183 (H) 8 - 150 ug/L   Follicle Stimulating Hormone    Collection Time: 08/29/23  9:56 AM   Result Value Ref Range    Follicle Stimulating Hormone <0.9 IU/L   Luteinizing Hormone    Collection Time: 08/29/23  9:56 AM   Result Value Ref Range    Luteinizing Hormone 0.1 IU/L   Estradiol    Collection Time: 08/29/23  9:56 AM   Result Value Ref Range    Estradiol <19 pg/mL   Bilirubin, Direct    Collection Time: 08/29/23  9:56 AM   Result Value Ref Range    Bilirubin, Direct 0.1 0.0 - 0.3 mg/dL   Adrenocorticotropic Hormone (ACTH)    Collection Time: 08/29/23  9:56 AM   Result Value Ref Range    Adrenocorticotropic Hormone (ACTH) 25.7 7.2 - 63.3 pg/mL   TSH  with reflex to Free T4 if abnormal    Collection Time: 09/05/23  7:25 AM   Result Value Ref Range    TSH 2.78 0.44 - 3.98 mIU/L   CBC    Collection Time: 09/05/23  7:25 AM   Result Value Ref Range    WBC 4.3 (L) 4.4 - 11.3 x10E9/L    nRBC 0.0 0.0 - 0.0 /100 WBC    RBC 3.72 (L) 4.00 - 5.20 x10E12/L    Hemoglobin 11.6 (L) 12.0 - 16.0 g/dL    Hematocrit 34.6 (L) 36.0 - 46.0 %    MCV 93 80 - 100 fL    MCHC 33.5 32.0 - 36.0 g/dL    Platelets 233 150 - 450 x10E9/L    RDW 13.3 11.5 - 14.5 %   Lipid Panel    Collection Time: 09/05/23  7:25 AM   Result Value Ref Range    Cholesterol 237 (H) 0 - 199 mg/dL    HDL 52.9 mg/dL    Cholesterol/HDL Ratio 4.5      (H) 0 - 99 mg/dL    VLDL 26 0 - 40 mg/dL    Triglycerides 129 0 - 149 mg/dL   Cholesterol, LDL Direct    Collection Time: 09/05/23  7:25 AM   Result Value Ref Range    LDL Direct 157 (H) 0 - 129 mg/dL   Comprehensive Metabolic Panel    Collection Time: 09/05/23  7:25 AM   Result Value Ref Range    Glucose 90 74 - 99 mg/dL    Sodium 140 136 - 145 mmol/L    Potassium 3.9 3.5 - 5.3 mmol/L    Chloride 106 98 - 107 mmol/L    Bicarbonate 27 21 - 32 mmol/L    Anion Gap 11 10 - 20 mmol/L    Urea Nitrogen 15 6 - 23 mg/dL    Creatinine 0.86 0.50 - 1.05 mg/dL    GFR Female 74 >90 mL/min/1.73m2    Calcium 9.0 8.6 - 10.3 mg/dL    Albumin 3.6 3.4 - 5.0 g/dL    Alkaline Phosphatase 32 (L) 33 - 136 U/L    Total Protein 5.9 (L) 6.4 - 8.2 g/dL    AST 12 9 - 39 U/L    Total Bilirubin 0.6 0.0 - 1.2 mg/dL    ALT (SGPT) 6 (L) 7 - 45 U/L   Hemoglobin A1C    Collection Time: 09/05/23  7:25 AM   Result Value Ref Range    Hemoglobin A1C 5.4 %    Estimated Average Glucose 108 MG/DL   Vitamin D 1,25 Dihydroxy (for eval of hypercalcemia)    Collection Time: 09/05/23  7:25 AM   Result Value Ref Range    Vit D, 1,25-Dihydroxy 36.3 19.9 - 79.3 pg/mL         Review of Systems   A complete review of systems was performed and all systems were normal except what is noted in the HPI.      List of  "current healthcare providers:  Patient Care Team:  Nova Omer MD as PCP - General  Heidy Guevara RN as Care Manager (Case Management)  Francy Archer MD as Consulting Physician (Hematology and Oncology)  CASEY Leo-CNP as Nurse Practitioner (Colon and Rectal Surgery)        Over the past 2 weeks, how often have you been bothered by any of the following problems?  Little interest or pleasure in doing things: Not at all  Feeling down, depressed, or hopeless: Not at all  Patient Health Questionnaire-2 Score: 0             Advance Care Planning:    Living Will: Yes  POA: Yes    Objective :  /70   Pulse 72   Temp 36.4 °C (97.6 °F)   Ht 1.549 m (5' 1\")   Wt 49 kg (108 lb)   SpO2 95%   BMI 20.41 kg/m²    No results found.  Physical Exam  Constitutional:       Appearance: Normal appearance.   HENT:      Head: Normocephalic and atraumatic.   Neck:      Vascular: No carotid bruit.   Cardiovascular:      Rate and Rhythm: Normal rate and regular rhythm. Extrasystoles are present.     Heart sounds: Normal heart sounds.   Pulmonary:      Effort: Pulmonary effort is normal.      Breath sounds: Normal breath sounds. No wheezing, rhonchi or rales.   Abdominal:      General: Abdomen is flat. Bowel sounds are normal.      Palpations: Abdomen is soft.      Tenderness: There is no abdominal tenderness. There is no guarding.   Musculoskeletal:         General: Normal range of motion.      Right lower leg: No edema.      Left lower leg: No edema.   Skin:     General: Skin is dry.   Neurological:      General: No focal deficit present.      Mental Status: She is alert and oriented to person, place, and time.   Psychiatric:         Mood and Affect: Mood normal.         Behavior: Behavior normal.         Thought Content: Thought content normal.         Assessment/Plan :  Problem List Items Addressed This Visit       COPD (chronic obstructive pulmonary disease) (CMS/[facility])     Stable Continue current " medication  Reevaluate in 6 months.           Hyperlipidemia     Work on diet reviewed with patient.   Reevaluate in 6 months.  .         Relevant Orders    TSH with reflex to Free T4 if abnormal (Completed)    Lipid Panel (Completed)    Cholesterol, LDL Direct (Completed)    Comprehensive Metabolic Panel (Completed)    Cholesterol, LDL Direct    Lipid Panel    Impaired fasting blood sugar     Work on diet reviewed with patient.   Reevaluate in 6 months.           Relevant Orders    CBC (Completed)    Hemoglobin A1C (Completed)    Comprehensive Metabolic Panel    Hemoglobin A1C    Seasonal allergic rhinitis - Primary    Relevant Medications    cetirizine-pseudoephedrine (ZyrTEC-D) 5-120 mg 12 hr tablet    Small cell carcinoma (CMS/HCC)     Diagnosed per bronch 1/22  Doing well per patient  S/P radiation  On antibody treatment - s/p chemo  Has follow up oncology next week         Vitamin D deficiency     Well controlled continue current medication. Reevaluate in 6 months.            Relevant Orders    Vitamin D 1,25 Dihydroxy (for eval of hypercalcemia) (Completed)    Vitamin D 1,25 Dihydroxy (for eval of hypercalcemia)    Medicare annual wellness visit, subsequent     Medical Wellness exam done.  Mammogram ordered for 10/23  Shingrix series complete.  Prevnar 13 5/21  Pneumovax 23 7/22  colonoscopy 1/17  DEXA 4/22  Already got flu shot.   Current nonsmoker         Peripheral neuropathy due to and not concurrent with chemotherapy (CMS/HCC)    Anemia, chronic disease     Stable  Monitored by oncology         Relevant Orders    Hemoglobin and hematocrit, blood    Ferritin     Other Visit Diagnoses       Breast screening        Relevant Orders    BI mammo bilateral screening tomosynthesis    Encounter for screening mammogram for malignant neoplasm of breast        Relevant Orders    BI mammo bilateral screening tomosynthesis               The following health maintenance schedule was reviewed with the patient and  provided in printed form in the after visit summary:  Health Maintenance   Topic Date Due    Derm Melanoma Skin Check  Never done    COVID-19 Vaccine (6 - Moderna series) 08/02/2023    Mammogram  10/17/2023    Medicare Annual Wellness Visit (AWV)  05/13/2024    Colorectal Cancer Screening  01/23/2027    Lipid Panel  09/05/2028    DTaP/Tdap/Td Vaccines (2 - Td or Tdap) 02/18/2030    Influenza Vaccine  Completed    Pneumococcal Vaccine: 65+ Years  Completed    Zoster Vaccines  Completed    Hepatitis C Screening  Completed    Bone Density Scan  Completed    HIB Vaccines  Aged Out    Hepatitis B Vaccines  Aged Out    IPV Vaccines  Aged Out    Hepatitis A Vaccines  Aged Out    Meningococcal Vaccine  Aged Out    Rotavirus Vaccines  Aged Out    HPV Vaccines  Aged Out    Irritable Bowel Syndrome  Discontinued           Patient understands and agrees with treatment plan.          Nova Omer MD

## 2023-09-05 NOTE — PATIENT INSTRUCTIONS
Recommend the new RSV vaccine (Arexvy) - you can get this at the pharmacy.    I would like you to follow up in 6 months  Please have all labs that were ordered done at least 1 week prior to your visit.

## 2023-09-07 VITALS
HEART RATE: 51 BPM | RESPIRATION RATE: 18 BRPM | OXYGEN SATURATION: 98 % | TEMPERATURE: 97 F | BODY MASS INDEX: 21.74 KG/M2 | WEIGHT: 111.33 LBS | SYSTOLIC BLOOD PRESSURE: 117 MMHG | DIASTOLIC BLOOD PRESSURE: 79 MMHG

## 2023-09-07 VITALS
OXYGEN SATURATION: 99 % | SYSTOLIC BLOOD PRESSURE: 97 MMHG | DIASTOLIC BLOOD PRESSURE: 73 MMHG | RESPIRATION RATE: 17 BRPM | WEIGHT: 111.55 LBS | TEMPERATURE: 97.3 F | HEART RATE: 71 BPM | BODY MASS INDEX: 21.79 KG/M2

## 2023-09-07 VITALS
TEMPERATURE: 98.2 F | WEIGHT: 113.1 LBS | DIASTOLIC BLOOD PRESSURE: 85 MMHG | RESPIRATION RATE: 18 BRPM | BODY MASS INDEX: 21.63 KG/M2 | OXYGEN SATURATION: 100 % | SYSTOLIC BLOOD PRESSURE: 108 MMHG | HEART RATE: 72 BPM

## 2023-09-07 VITALS — WEIGHT: 113.98 LBS | BODY MASS INDEX: 21.8 KG/M2

## 2023-09-07 VITALS
RESPIRATION RATE: 14 BRPM | TEMPERATURE: 97.3 F | DIASTOLIC BLOOD PRESSURE: 75 MMHG | OXYGEN SATURATION: 97 % | HEART RATE: 69 BPM | WEIGHT: 110.45 LBS | BODY MASS INDEX: 21.57 KG/M2 | SYSTOLIC BLOOD PRESSURE: 109 MMHG

## 2023-09-08 ENCOUNTER — OFFICE VISIT (OUTPATIENT)
Dept: PRIMARY CARE | Facility: CLINIC | Age: 68
End: 2023-09-08
Payer: MEDICARE

## 2023-09-08 VITALS
HEART RATE: 72 BPM | BODY MASS INDEX: 20.39 KG/M2 | HEIGHT: 61 IN | DIASTOLIC BLOOD PRESSURE: 70 MMHG | TEMPERATURE: 97.6 F | WEIGHT: 108 LBS | SYSTOLIC BLOOD PRESSURE: 103 MMHG | OXYGEN SATURATION: 95 %

## 2023-09-08 DIAGNOSIS — J30.2 SEASONAL ALLERGIC RHINITIS, UNSPECIFIED TRIGGER: Primary | ICD-10-CM

## 2023-09-08 DIAGNOSIS — R73.01 IMPAIRED FASTING BLOOD SUGAR: ICD-10-CM

## 2023-09-08 DIAGNOSIS — D63.8 ANEMIA, CHRONIC DISEASE: ICD-10-CM

## 2023-09-08 DIAGNOSIS — Z12.39 BREAST SCREENING: ICD-10-CM

## 2023-09-08 DIAGNOSIS — G62.0 PERIPHERAL NEUROPATHY DUE TO AND NOT CONCURRENT WITH CHEMOTHERAPY (MULTI): ICD-10-CM

## 2023-09-08 DIAGNOSIS — Z12.31 ENCOUNTER FOR SCREENING MAMMOGRAM FOR MALIGNANT NEOPLASM OF BREAST: ICD-10-CM

## 2023-09-08 DIAGNOSIS — Z00.00 MEDICARE ANNUAL WELLNESS VISIT, SUBSEQUENT: ICD-10-CM

## 2023-09-08 DIAGNOSIS — J44.9 CHRONIC OBSTRUCTIVE PULMONARY DISEASE, UNSPECIFIED COPD TYPE (MULTI): ICD-10-CM

## 2023-09-08 DIAGNOSIS — E78.2 MIXED HYPERLIPIDEMIA: ICD-10-CM

## 2023-09-08 DIAGNOSIS — E55.9 VITAMIN D DEFICIENCY: ICD-10-CM

## 2023-09-08 DIAGNOSIS — C80.1 SMALL CELL CARCINOMA (MULTI): ICD-10-CM

## 2023-09-08 DIAGNOSIS — T45.1X5S PERIPHERAL NEUROPATHY DUE TO AND NOT CONCURRENT WITH CHEMOTHERAPY (MULTI): ICD-10-CM

## 2023-09-08 PROBLEM — Z87.19 HISTORY OF DIVERTICULITIS: Status: ACTIVE | Noted: 2023-09-08

## 2023-09-08 LAB — VITAMIN D 1,25-DIHYDROXY: 36.3 PG/ML (ref 19.9–79.3)

## 2023-09-08 PROCEDURE — 1160F RVW MEDS BY RX/DR IN RCRD: CPT | Performed by: FAMILY MEDICINE

## 2023-09-08 PROCEDURE — 1159F MED LIST DOCD IN RCRD: CPT | Performed by: FAMILY MEDICINE

## 2023-09-08 PROCEDURE — 99214 OFFICE O/P EST MOD 30 MIN: CPT | Performed by: FAMILY MEDICINE

## 2023-09-08 PROCEDURE — 3008F BODY MASS INDEX DOCD: CPT | Performed by: FAMILY MEDICINE

## 2023-09-08 PROCEDURE — 1036F TOBACCO NON-USER: CPT | Performed by: FAMILY MEDICINE

## 2023-09-08 PROCEDURE — 1170F FXNL STATUS ASSESSED: CPT | Performed by: FAMILY MEDICINE

## 2023-09-08 PROCEDURE — 1125F AMNT PAIN NOTED PAIN PRSNT: CPT | Performed by: FAMILY MEDICINE

## 2023-09-08 PROCEDURE — G0439 PPPS, SUBSEQ VISIT: HCPCS | Performed by: FAMILY MEDICINE

## 2023-09-08 RX ORDER — CETIRIZINE HYDROCHLORIDE, PSEUDOEPHEDRINE HYDROCHLORIDE 5; 120 MG/1; MG/1
1 TABLET, FILM COATED, EXTENDED RELEASE ORAL DAILY
Qty: 30 TABLET | Refills: 5 | Status: SHIPPED | OUTPATIENT
Start: 2023-09-08 | End: 2023-10-25 | Stop reason: ALTCHOICE

## 2023-09-08 RX ORDER — DEXAMETHASONE 2 MG/1
1 TABLET ORAL DAILY
COMMUNITY
Start: 2023-09-05 | End: 2024-02-13 | Stop reason: ALTCHOICE

## 2023-09-08 ASSESSMENT — ACTIVITIES OF DAILY LIVING (ADL)
BATHING: INDEPENDENT
TAKING_MEDICATION: INDEPENDENT
MANAGING_FINANCES: INDEPENDENT
DOING_HOUSEWORK: INDEPENDENT
GROCERY_SHOPPING: INDEPENDENT
DRESSING: INDEPENDENT

## 2023-09-08 ASSESSMENT — ENCOUNTER SYMPTOMS
OCCASIONAL FEELINGS OF UNSTEADINESS: 0
DEPRESSION: 0
LOSS OF SENSATION IN FEET: 0

## 2023-09-08 ASSESSMENT — PATIENT HEALTH QUESTIONNAIRE - PHQ9
SUM OF ALL RESPONSES TO PHQ9 QUESTIONS 1 AND 2: 0
1. LITTLE INTEREST OR PLEASURE IN DOING THINGS: NOT AT ALL
2. FEELING DOWN, DEPRESSED OR HOPELESS: NOT AT ALL

## 2023-09-12 ENCOUNTER — HOSPITAL ENCOUNTER (OUTPATIENT)
Dept: DATA CONVERSION | Facility: HOSPITAL | Age: 68
End: 2023-09-12
Attending: STUDENT IN AN ORGANIZED HEALTH CARE EDUCATION/TRAINING PROGRAM | Admitting: STUDENT IN AN ORGANIZED HEALTH CARE EDUCATION/TRAINING PROGRAM
Payer: MEDICARE

## 2023-09-12 DIAGNOSIS — C34.90 MALIGNANT NEOPLASM OF UNSPECIFIED PART OF UNSPECIFIED BRONCHUS OR LUNG (MULTI): ICD-10-CM

## 2023-09-12 DIAGNOSIS — Z00.6 ENCOUNTER FOR EXAMINATION FOR NORMAL COMPARISON AND CONTROL IN CLINICAL RESEARCH PROGRAM: ICD-10-CM

## 2023-09-12 DIAGNOSIS — Z51.11 ENCOUNTER FOR ANTINEOPLASTIC CHEMOTHERAPY: ICD-10-CM

## 2023-09-12 LAB
ACTIVATED PARTIAL THROMBOPLASTIN TIME IN PPP BY COAGULATION ASSAY: 28 SEC (ref 27–38)
ALANINE AMINOTRANSFERASE (SGPT) (U/L) IN SER/PLAS: 6 U/L (ref 7–45)
ALBUMIN (G/DL) IN SER/PLAS: 3.6 G/DL (ref 3.4–5)
ALKALINE PHOSPHATASE (U/L) IN SER/PLAS: 36 U/L (ref 33–136)
ANION GAP IN SER/PLAS: 15 MMOL/L (ref 10–20)
ASPARTATE AMINOTRANSFERASE (SGOT) (U/L) IN SER/PLAS: 12 U/L (ref 9–39)
BASOPHILS (10*3/UL) IN BLOOD BY AUTOMATED COUNT: 0.02 X10E9/L (ref 0–0.1)
BASOPHILS/100 LEUKOCYTES IN BLOOD BY AUTOMATED COUNT: 0.3 % (ref 0–2)
BILIRUBIN DIRECT (MG/DL) IN SER/PLAS: 0.1 MG/DL (ref 0–0.3)
BILIRUBIN TOTAL (MG/DL) IN SER/PLAS: 0.7 MG/DL (ref 0–1.2)
C REACTIVE PROTEIN (MG/L) IN SER/PLAS: 0.26 MG/DL
CALCIUM (MG/DL) IN SER/PLAS: 9.1 MG/DL (ref 8.6–10.6)
CARBON DIOXIDE, TOTAL (MMOL/L) IN SER/PLAS: 22 MMOL/L (ref 21–32)
CHLORIDE (MMOL/L) IN SER/PLAS: 106 MMOL/L (ref 98–107)
CREATINE KINASE (U/L) IN SER/PLAS: 47 U/L (ref 0–215)
CREATININE (MG/DL) IN SER/PLAS: 0.72 MG/DL (ref 0.5–1.05)
EOSINOPHILS (10*3/UL) IN BLOOD BY AUTOMATED COUNT: 0.13 X10E9/L (ref 0–0.7)
EOSINOPHILS/100 LEUKOCYTES IN BLOOD BY AUTOMATED COUNT: 1.8 % (ref 0–6)
ERYTHROCYTE DISTRIBUTION WIDTH (RATIO) BY AUTOMATED COUNT: 13.8 % (ref 11.5–14.5)
ERYTHROCYTE MEAN CORPUSCULAR HEMOGLOBIN CONCENTRATION (G/DL) BY AUTOMATED: 33.3 G/DL (ref 32–36)
ERYTHROCYTE MEAN CORPUSCULAR VOLUME (FL) BY AUTOMATED COUNT: 93 FL (ref 80–100)
ERYTHROCYTES (10*6/UL) IN BLOOD BY AUTOMATED COUNT: 3.5 X10E12/L (ref 4–5.2)
FERRITIN (UG/LL) IN SER/PLAS: 124 UG/L (ref 8–150)
GFR FEMALE: >90 ML/MIN/1.73M2
GLUCOSE (MG/DL) IN SER/PLAS: 80 MG/DL (ref 74–99)
HEMATOCRIT (%) IN BLOOD BY AUTOMATED COUNT: 32.7 % (ref 36–46)
HEMOGLOBIN (G/DL) IN BLOOD: 10.9 G/DL (ref 12–16)
IMMATURE GRANULOCYTES/100 LEUKOCYTES IN BLOOD BY AUTOMATED COUNT: 1.1 % (ref 0–0.9)
INR IN PPP BY COAGULATION ASSAY: 1.1 (ref 0.9–1.1)
LEUKOCYTES (10*3/UL) IN BLOOD BY AUTOMATED COUNT: 7.2 X10E9/L (ref 4.4–11.3)
LYMPHOCYTES (10*3/UL) IN BLOOD BY AUTOMATED COUNT: 1.26 X10E9/L (ref 1.2–4.8)
LYMPHOCYTES/100 LEUKOCYTES IN BLOOD BY AUTOMATED COUNT: 17.6 % (ref 13–44)
MAGNESIUM (MG/DL) IN SER/PLAS: 1.86 MG/DL (ref 1.6–2.4)
MONOCYTES (10*3/UL) IN BLOOD BY AUTOMATED COUNT: 0.79 X10E9/L (ref 0.1–1)
MONOCYTES/100 LEUKOCYTES IN BLOOD BY AUTOMATED COUNT: 11 % (ref 2–10)
NEUTROPHILS (10*3/UL) IN BLOOD BY AUTOMATED COUNT: 4.87 X10E9/L (ref 1.2–7.7)
NEUTROPHILS/100 LEUKOCYTES IN BLOOD BY AUTOMATED COUNT: 68.2 % (ref 40–80)
NRBC (PER 100 WBCS) BY AUTOMATED COUNT: 0 /100 WBC (ref 0–0)
PHOSPHATE (MG/DL) IN SER/PLAS: 4 MG/DL (ref 2.5–4.9)
PLATELETS (10*3/UL) IN BLOOD AUTOMATED COUNT: 229 X10E9/L (ref 150–450)
POTASSIUM (MMOL/L) IN SER/PLAS: 4 MMOL/L (ref 3.5–5.3)
PROTEIN TOTAL: 5.9 G/DL (ref 6.4–8.2)
PROTHROMBIN TIME (PT) IN PPP BY COAGULATION ASSAY: 11.9 SEC (ref 9.8–12.8)
SODIUM (MMOL/L) IN SER/PLAS: 139 MMOL/L (ref 136–145)
UREA NITROGEN (MG/DL) IN SER/PLAS: 19 MG/DL (ref 6–23)

## 2023-09-26 ENCOUNTER — HOSPITAL ENCOUNTER (OUTPATIENT)
Dept: DATA CONVERSION | Facility: HOSPITAL | Age: 68
End: 2023-09-26
Attending: STUDENT IN AN ORGANIZED HEALTH CARE EDUCATION/TRAINING PROGRAM | Admitting: STUDENT IN AN ORGANIZED HEALTH CARE EDUCATION/TRAINING PROGRAM
Payer: MEDICARE

## 2023-09-26 DIAGNOSIS — C34.90 MALIGNANT NEOPLASM OF UNSPECIFIED PART OF UNSPECIFIED BRONCHUS OR LUNG (MULTI): ICD-10-CM

## 2023-09-26 DIAGNOSIS — Z00.6 ENCOUNTER FOR EXAMINATION FOR NORMAL COMPARISON AND CONTROL IN CLINICAL RESEARCH PROGRAM: ICD-10-CM

## 2023-09-26 LAB
ACTIVATED PARTIAL THROMBOPLASTIN TIME IN PPP BY COAGULATION ASSAY: 31 SEC (ref 27–38)
ALANINE AMINOTRANSFERASE (SGPT) (U/L) IN SER/PLAS: 10 U/L (ref 7–45)
ALBUMIN (G/DL) IN SER/PLAS: 3.9 G/DL (ref 3.4–5)
ALKALINE PHOSPHATASE (U/L) IN SER/PLAS: 40 U/L (ref 33–136)
AMYLASE (U/L) IN SER/PLAS: 44 U/L (ref 29–103)
ANION GAP IN SER/PLAS: 11 MMOL/L (ref 10–20)
ASPARTATE AMINOTRANSFERASE (SGOT) (U/L) IN SER/PLAS: 14 U/L (ref 9–39)
BASOPHILS (10*3/UL) IN BLOOD BY AUTOMATED COUNT: 0.02 X10E9/L (ref 0–0.1)
BASOPHILS/100 LEUKOCYTES IN BLOOD BY AUTOMATED COUNT: 0.3 % (ref 0–2)
BILIRUBIN DIRECT (MG/DL) IN SER/PLAS: 0.1 MG/DL (ref 0–0.3)
BILIRUBIN TOTAL (MG/DL) IN SER/PLAS: 0.7 MG/DL (ref 0–1.2)
C REACTIVE PROTEIN (MG/L) IN SER/PLAS: <0.1 MG/DL
CALCIUM (MG/DL) IN SER/PLAS: 9.1 MG/DL (ref 8.6–10.6)
CARBON DIOXIDE, TOTAL (MMOL/L) IN SER/PLAS: 28 MMOL/L (ref 21–32)
CHLORIDE (MMOL/L) IN SER/PLAS: 104 MMOL/L (ref 98–107)
CORTISOL (UG/DL) IN SERUM: 8.3 UG/DL (ref 2.5–20)
CREATINE KINASE (U/L) IN SER/PLAS: 36 U/L (ref 0–215)
CREATININE (MG/DL) IN SER/PLAS: 0.82 MG/DL (ref 0.5–1.05)
EOSINOPHILS (10*3/UL) IN BLOOD BY AUTOMATED COUNT: 0.32 X10E9/L (ref 0–0.7)
EOSINOPHILS/100 LEUKOCYTES IN BLOOD BY AUTOMATED COUNT: 5.3 % (ref 0–6)
ERYTHROCYTE DISTRIBUTION WIDTH (RATIO) BY AUTOMATED COUNT: 13.9 % (ref 11.5–14.5)
ERYTHROCYTE MEAN CORPUSCULAR HEMOGLOBIN CONCENTRATION (G/DL) BY AUTOMATED: 32.1 G/DL (ref 32–36)
ERYTHROCYTE MEAN CORPUSCULAR VOLUME (FL) BY AUTOMATED COUNT: 96 FL (ref 80–100)
ERYTHROCYTES (10*6/UL) IN BLOOD BY AUTOMATED COUNT: 3.93 X10E12/L (ref 4–5.2)
ESTRADIOL (PG/ML) IN SER/PLAS: <19 PG/ML
FERRITIN (UG/LL) IN SER/PLAS: 131 UG/L (ref 8–150)
FOLLITROPIN (IU/L) IN SER/PLAS: 1.8 IU/L
GFR FEMALE: 78 ML/MIN/1.73M2
GLUCOSE (MG/DL) IN SER/PLAS: 78 MG/DL (ref 74–99)
HEMATOCRIT (%) IN BLOOD BY AUTOMATED COUNT: 37.7 % (ref 36–46)
HEMOGLOBIN (G/DL) IN BLOOD: 12.1 G/DL (ref 12–16)
IMMATURE GRANULOCYTES/100 LEUKOCYTES IN BLOOD BY AUTOMATED COUNT: 0.8 % (ref 0–0.9)
INR IN PPP BY COAGULATION ASSAY: 1 (ref 0.9–1.1)
LACTATE DEHYDROGENASE (U/L) IN SER/PLAS BY LAC->PYR RXN: 152 U/L (ref 84–246)
LEUKOCYTES (10*3/UL) IN BLOOD BY AUTOMATED COUNT: 6.1 X10E9/L (ref 4.4–11.3)
LIPASE (U/L) IN SER/PLAS: 46 U/L (ref 9–82)
LUTEINIZING HORMONE (IU/ML) IN SER/PLAS: <0.1 IU/L
LYMPHOCYTES (10*3/UL) IN BLOOD BY AUTOMATED COUNT: 1.54 X10E9/L (ref 1.2–4.8)
LYMPHOCYTES/100 LEUKOCYTES IN BLOOD BY AUTOMATED COUNT: 25.3 % (ref 13–44)
MAGNESIUM (MG/DL) IN SER/PLAS: 2.1 MG/DL (ref 1.6–2.4)
MONOCYTES (10*3/UL) IN BLOOD BY AUTOMATED COUNT: 0.55 X10E9/L (ref 0.1–1)
MONOCYTES/100 LEUKOCYTES IN BLOOD BY AUTOMATED COUNT: 9 % (ref 2–10)
NEUTROPHILS (10*3/UL) IN BLOOD BY AUTOMATED COUNT: 3.61 X10E9/L (ref 1.2–7.7)
NEUTROPHILS/100 LEUKOCYTES IN BLOOD BY AUTOMATED COUNT: 59.3 % (ref 40–80)
NRBC (PER 100 WBCS) BY AUTOMATED COUNT: 0 /100 WBC (ref 0–0)
PHOSPHATE (MG/DL) IN SER/PLAS: 4.3 MG/DL (ref 2.5–4.9)
PLATELETS (10*3/UL) IN BLOOD AUTOMATED COUNT: 213 X10E9/L (ref 150–450)
POTASSIUM (MMOL/L) IN SER/PLAS: 4.2 MMOL/L (ref 3.5–5.3)
PROTEIN TOTAL: 6.2 G/DL (ref 6.4–8.2)
PROTHROMBIN TIME (PT) IN PPP BY COAGULATION ASSAY: 11.1 SEC (ref 9.8–12.8)
SODIUM (MMOL/L) IN SER/PLAS: 139 MMOL/L (ref 136–145)
THYROTROPIN (MIU/L) IN SER/PLAS BY DETECTION LIMIT <= 0.05 MIU/L: 6.09 MIU/L (ref 0.44–3.98)
THYROXINE (T4) FREE (NG/DL) IN SER/PLAS: 0.93 NG/DL (ref 0.78–1.48)
TRIIODOTHYRONINE (T3) (NG/DL) IN SER/PLAS: 125 NG/DL (ref 60–200)
URATE (MG/DL) IN SER/PLAS: 3 MG/DL (ref 2.3–6.7)
UREA NITROGEN (MG/DL) IN SER/PLAS: 20 MG/DL (ref 6–23)

## 2023-09-27 VITALS — WEIGHT: 105.82 LBS | HEIGHT: 61 IN | BODY MASS INDEX: 19.98 KG/M2

## 2023-09-27 DIAGNOSIS — C34.90 SMALL CELL LUNG CANCER (MULTI): Primary | ICD-10-CM

## 2023-09-27 RX ORDER — METHYLPREDNISOLONE SODIUM SUCCINATE 40 MG/ML
40 INJECTION INTRAMUSCULAR; INTRAVENOUS AS NEEDED
Status: CANCELLED | OUTPATIENT
Start: 2023-10-10

## 2023-09-27 RX ORDER — HEPARIN SODIUM,PORCINE/PF 10 UNIT/ML
50 SYRINGE (ML) INTRAVENOUS AS NEEDED
Status: CANCELLED | OUTPATIENT
Start: 2023-09-30

## 2023-09-27 RX ORDER — FAMOTIDINE 10 MG/ML
20 INJECTION INTRAVENOUS ONCE AS NEEDED
Status: CANCELLED | OUTPATIENT
Start: 2023-10-10

## 2023-09-27 RX ORDER — EPINEPHRINE 0.3 MG/.3ML
0.3 INJECTION SUBCUTANEOUS EVERY 5 MIN PRN
Status: CANCELLED | OUTPATIENT
Start: 2023-10-10

## 2023-09-27 RX ORDER — ALBUTEROL SULFATE 0.83 MG/ML
3 SOLUTION RESPIRATORY (INHALATION) AS NEEDED
Status: CANCELLED | OUTPATIENT
Start: 2023-10-10

## 2023-09-27 RX ORDER — DIPHENHYDRAMINE HYDROCHLORIDE 50 MG/ML
50 INJECTION INTRAMUSCULAR; INTRAVENOUS AS NEEDED
Status: CANCELLED | OUTPATIENT
Start: 2023-10-10

## 2023-09-27 RX ORDER — HEPARIN 100 UNIT/ML
500 SYRINGE INTRAVENOUS AS NEEDED
Status: CANCELLED | OUTPATIENT
Start: 2023-09-30

## 2023-09-29 VITALS
RESPIRATION RATE: 16 BRPM | DIASTOLIC BLOOD PRESSURE: 69 MMHG | OXYGEN SATURATION: 96 % | WEIGHT: 110.67 LBS | HEART RATE: 78 BPM | TEMPERATURE: 96.6 F | SYSTOLIC BLOOD PRESSURE: 99 MMHG | BODY MASS INDEX: 21.61 KG/M2

## 2023-09-29 VITALS
SYSTOLIC BLOOD PRESSURE: 99 MMHG | RESPIRATION RATE: 16 BRPM | WEIGHT: 107.81 LBS | HEART RATE: 68 BPM | TEMPERATURE: 97.7 F | BODY MASS INDEX: 21.05 KG/M2 | OXYGEN SATURATION: 97 % | DIASTOLIC BLOOD PRESSURE: 62 MMHG

## 2023-09-29 VITALS
RESPIRATION RATE: 16 BRPM | TEMPERATURE: 97.9 F | WEIGHT: 111.11 LBS | OXYGEN SATURATION: 98 % | SYSTOLIC BLOOD PRESSURE: 94 MMHG | HEART RATE: 65 BPM | DIASTOLIC BLOOD PRESSURE: 68 MMHG | BODY MASS INDEX: 21.7 KG/M2

## 2023-09-29 VITALS
DIASTOLIC BLOOD PRESSURE: 70 MMHG | TEMPERATURE: 96.4 F | SYSTOLIC BLOOD PRESSURE: 108 MMHG | HEART RATE: 56 BPM | OXYGEN SATURATION: 97 % | BODY MASS INDEX: 21.61 KG/M2 | WEIGHT: 110.67 LBS | RESPIRATION RATE: 16 BRPM

## 2023-09-29 LAB — ADRENOCORTICOTROPIC HORMONE: 57 PG/ML (ref 7.2–63.3)

## 2023-09-30 NOTE — PROGRESS NOTES
Patient Visit Information:   Visit Type: Follow Up Visit     Cancer History:   Treatment Synopsis:    DIAGNOSIS  SCLC    STAGING  [id number], limited stage  recurrence    CURRENT SITES OF DISEASE  RLL, supraclavicular    MOLECULAR GENOMICS      PRIOR THERAPY  Concurrent chemo rads with Cisplatin/Etoposide every 3 weeks; C1D1 2/15/22, reaction to cisplatin C2D1 and did not receive D2 or D3 etoposide  Carbo/etoposide 4/5/2022 1 cycle c/b rash  PCI 5/21-6/13/22    CURRENT THERAPY  [id number] 1/24/23 - present    CURRENT ONCOLOGICAL PROBLEMS        HISTORY OF PRESENT ILLNESS  Mrs. Branch is a 65 yo with PMH GERD and COPD who originally was round to have a RLL nodule measuring 11 mm in 4/28/2021 found as part of CT calcium score scan. An ensuing CT chest w/o contrast was done on 5/19/21 which revealed subsolid pulmonary nodules  measuring 14 mm in the RLL. She had CT chest w/contrast on 11/29/21 which confirmed stable 14 mm GGO of the RLL and decreased RLL subpleural nodule. She met thoracic surgeon Dr. Farfan, who ordered PET/CT scan done on 12/8/21 which did not reveal any FDG-avid  nodules within the lung parenchyma but R hilar nodes with max SUV 8.0. He referred her for bronch, which was done on 1/21/22 by Dr. Mcdaniels. 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 Brain MRI was negative.    She started concurrent chemoradiation with BID radiation with cis/etop 2/15/22, and unfortunately had a reaction to cisplatin on C2D1 with chest pain and hypotension. She was hospitalized  with sepsis felt to be due to pneumonia. She trialed carboplatin/etoposide on 4/5/22 with a resulting rash and opted to forego further chemotherapy as she had completed radiation. Restaging scan 11/3/22 unfortunately with progression with  new R hilar lymph node, paratracheal nodes, and increase in size of R supraclavicular mass. She enrolled in [id number] and started C1D1 1/24/23. C1 complicated by anorexia and dysgeusia, and  delayed C2 by a week. She has further struggled with fatigue and  confusion, which may be related to mirtazapine. Dose reduced for C2 and mirtazapine stopped with improvement in symptoms.       PAST MEDICAL HISTORY  GERD  COPD    SOCIAL HISTORY  Retired - lighting . Lives at home with  and a kitten.  Tob: quit smoking 1999. 1 ppd x 25 yrs.  EtOH: wine 1 glass/day  Illicits: none    CURRENT MEDS  see below    ALLERGIES  see below    FAMILY HISTORY  paternal aunt - breast cancer dx 80s        History of Present Illness:     ID Statement:    MICHAEL FRANCES is a 67 year old Female      Chief Complaint: presented for [id number] C4D15   Interval History:    Today 5.9.2023. Patient in clinic with  for C4D15 on [id number].   She reports tolerating treatment well and continues to have a have good energy levels. She reports over the past week her appetite has deteriorated a bid but with no weight loss. She reports  symptoms of heartburns which resolve with Tums She reports numbness in both her feet as stable. She has ongoing symptoms of constipation which is well managed. She reports some discomfort during urination. Patient denies any pain, dizziness, lightheadedness,  headaches, rash/itching, chills or fever, SOB, cough, sputum, chest pain or chest tightness, painful inflammation/ulceration oral mucous membranes, nausea/vomiting, diarrhea, hematemesis /hemoptysis, hematuria/rectal bleeding, swelling extremities, weakness/fatigue.  ROS as above. Remainder of 10 point review of systems elicited and otherwise unremarkable.       Review of Systems:   Review of Systems:    System Review-All other systems including Neurologic, Cardiac, Gastrointestinal, Endocrine, Dermatologic, ENT, Respiratory, Infectious, Urologic, Musculoskeletal  have been reviewed and are negative  for complaint outside of events noted below and within the assessment.        ·  System Review All Systems:  Negative           Allergies and Intolerances:       Allergies:   codeine: Drug, Other, Active   sulfa drugs: Drug Category, Rash, Active    Outpatient Medication Profile:  * Patient Currently Takes Medications as of 01-May-2023 15:07 documented in Structured Notes   ondansetron 8 mg oral tablet : 1 tab(s) orally every 8 hours, As Needed , Start Date: 01-May-2023   droNABinol 2.5 mg oral capsule: 1 cap(s) orally once a day one hour before  dinner, Start Date: 07-Mar-2023   dexamethasone 2 mg oral tablet: 1 tab(s) orally once a day with breakfast,  Start Date: 23-Feb-2023   nystatin 100,000 units/mL oral suspension: 4 milliliter(s) orally once  a day (at bedtime) - swish and swallow, do not rinse, Start Date: 27-Jan-2023   cetirizine-pseudoephedrine 5 mg-120 mg oral tablet, extended release:  1 tab(s) orally 2 times a day, As Needed   omeprazole 20 mg oral delayed release tablet: 1 tab(s) orally once a  day   cholecalciferol 100 mcg (4000 intl units) oral tablet: 1 tab(s) orally  once a day   calcium (as carbonate)-vitamin D 600 mg-3.125 mcg (125 intl units) oral tablet : 1 tab(s) orally once a day   ondansetron 8 mg oral tablet: 1 tab(s) orally every 6 hours, As Needed  -for nausea     Therapeutic Multiple Vitamins with Minerals oral capsule: 1 cap(s) orally  once a day   conjugated estrogens 0.3 mg oral tablet: 0.5 tab(s) orally once a day           Medical History:   Encounter for antineoplastic immunotherapy: ICD-10: Z51.12,  Status: Active   Hyperlipidemia: ICD-10: E78.5, Status: Active   GERD (gastroesophageal reflux disease): ICD-10: K21.9, Status:  Active   COPD (chronic obstructive pulmonary disease): ICD-10: J44.9,  Status: Active   Small cell lung cancer: ICD-10: C34.90, Status: Active    Social History:   Social Substance History:  ·  Smoking Status former smoker    ·  Additional History    Retired  1PPD x 25 years quit 1999  ETOH 1 glass wine per day          Performance:   ECOG Performance Status: 1- Restricted  from physically  stenuous work        Physical Exam:     Constitutional: awake/alert/oriented x3, no distress,  alert and cooperative   Eyes: PERRL, EOMI, clear sclera   ENMT: mucous membranes moist   Head/Neck: Neck supple, atraumatic head   Respiratory/Thorax: Patent airways, CTAB, normal  breath sounds with good chest expansion, thorax symmetric   Cardiovascular: Regular, rate and rhythm, no murmurs,  2+ equal pulses of the extremities, normal S 1and S 2   Gastrointestinal: Nondistended, soft, non-tender   Musculoskeletal: ROM intact, no joint swelling, normal  strength   Extremities: normal extremities, no cyanosis edema,  contusions or wounds, no clubbing   Neurological: alert and oriented x3, pleasant, more  alert than previously   Psychological: Appropriate mood and behavior   Skin: no visible rash       Lab Results:    ·  Results        I have reviewed these laboratory results:    Urinalysis + Microscopic Examination  09-May-2023 08:42:00      Result Value    Color, Urine  YELLOW  Reference Range: STRAW,YELLOW    Appearance, Urine  HAZY    Specific Gravity, Urine  1.024    pH, Urine  5.0    Protein, Urine  NEGATIVE    Glucose, Urine  NEGATIVE    Blood, Urine  NEGATIVE    Ketones, Urine  5 (TRACE)   A   Bilirubin, Urine  NEGATIVE    Urobilinogen, Urine  <2.0    Nitrite, Urine  NEGATIVE    Leukocyte Esterase, Urine  NEGATIVE    White Cells  6   A   Red Blood Cells  13   A   Epithelial Cells, Squamous  36    Budding Yeast  PRESENT   A   Mucous  4+    Calcium Oxalate Crystals  3+   A     Complete Blood Count + Differential  Trending View      Result 09-May-2023 07:50:00  25-Apr-2023 08:25:00    White Blood Cell Count 6.4   6.2    Nucleated Erythrocyte Count 0.0   0.0    Red Blood Cell Count 3.53   L   3.53   L    HGB 11.4   L   11.4   L    HCT 34.4   L   34.0   L    MCV 97   96    MCHC 33.1   33.5       230    RDW-CV 13.6   13.8    Neutrophil % 65.1   61.6    Immature Granulocytes % 0.5   0.6     Lymphocyte % 21.2   23.8    Monocyte % 10.4   10.8    Eosinophil % 2.2   2.6    Basophil % 0.6   0.6    Neutrophil Count 4.14   3.84    Lymphocyte Count 1.35   1.48    Monocyte Count 0.66   0.67    Eosinophil Count 0.14   0.16    Basophil Count 0.04   0.04        Comprehensive Metabolic Panel  Trending View      Result 09-May-2023 07:50:00  25-Apr-2023 08:25:00    Glucose, Serum 93   96       140    K 3.8   4.0       105    Bicarbonate, Serum 27   26    Anion Gap, Serum 11   13    BUN 18   22    CREAT 0.96   0.99    GFR Female 65   62    Calcium, Serum 8.7   9.2    ALB 3.5   3.7    ALKP 39   40    T Pro 5.7   L   6.0   L    T Bili 0.5   0.7    Alanine Aminotransferase, Serum 8   10    Aspartate Transaminase, Serum 14   14        PT + INR, Plasma  Trending View      Result 09-May-2023 07:50:00  25-Apr-2023 08:25:00    Prothrombin Time, Plasma 11.6   10.7    International Normalized Ratio, Plasma 1.0   0.9        C Reactive Protein, Serum  Trending View      Result 09-May-2023 07:50:00  25-Apr-2023 08:25:00    C Reactive Protein, Serum 0.27   0.96        Phosphorus, Serum  Trending View      Result 09-May-2023 07:50:00  25-Apr-2023 08:25:00    Phosphorus, Serum 3.8   3.8        Activated Partial Thromboplastin Time  Trending View      Result 09-May-2023 07:50:00  25-Apr-2023 08:25:00    Activated Partial Thromboplastin Time 30   29        Ferritin, Serum  Trending View      Result 09-May-2023 07:50:00  25-Apr-2023 08:25:00    Ferritin, Serum 193   H   230   H        Bilirubin, Serum Direct - Conjugated  Trending View      Result 09-May-2023 07:50:00  25-Apr-2023 08:25:00    Bilirubin, Serum Direct - Conjugated 0.1   0.1        Magnesium, Serum  Trending View      Result 09-May-2023 07:50:00  25-Apr-2023 08:25:00    Magnesium, Serum 1.96   1.92        Estradiol, Serum  25-Apr-2023 08:25:00      Result Value    Estradiol, Serum  <19      Amylase, Serum  25-Apr-2023 08:25:00      Result Value    Amylase,  Serum  31      Luteinizing Hormone, Serum  25-Apr-2023 08:25:00      Result Value    Luteinizing Hormone, Serum  <0.1      Follicle Stimulating Hormone, Serum  25-Apr-2023 08:25:00      Result Value    Follicle Stimulating Hormone, Serum  2.5      Lipase, Serum  25-Apr-2023 08:25:00      Result Value    Lipase, Serum  34      Thyroid Stimulating Hormone, Serum  25-Apr-2023 08:25:00      Result Value    Thyroid Stimulating Hormone, Serum  4.77   H     Cortisol, Unspecified  25-Apr-2023 08:25:00      Result Value    Cortisol, Unspecified  12.0      Adrenocorticotropic Hormone, Plasma  25-Apr-2023 08:25:00      Result Value    Adrenocorticotropic Hormone, Plasma  46.6      Free Thyroxine, Serum  25-Apr-2023 08:25:00      Result Value    Free Thyroxine, Serum  1.03        Assessment and Plan:     Assessment and Plan:   Assessment:    Mrs. Branch is a 65 yo with COPD and GERD who presented with newly diagnosed SCLC completed BID radiation planned concurrently with cis/etoposide but had a reaction  C2D1 to cisplatin. Completed 1 cycle carbo/etop D1 4/5/22 also complicated by facial flushing and erythematous rash and stopped further treatment. Now s/p PCI in 6/2022. Most recent CT concerning for disease progression. Referred for clinical trial AM  1518, C1D1 1/24/23. Treatment has been complicated by orthostatic hypotension, worsening short-term memory, increased lethargy, weight loss, and poor appetite. Delayed C2 by 1 week and dose-reduced. Confusion has resolved during C3 after stopping mirtazapine  and dose reduction.        Plan:    # SCLC  - limited stage, brain MRI negative  - previously discussed concurrent chemoradiation, with cisplatin/etoposide with side effects including but not limited to allergic reaction, fatigue, risk of infection, tinnitus, neuropathy, injury to liver/kidney, risk of anemia/thrombocytopenia and  was consented  - she was also interested in scalp cooling, which unfortunately she is not a  candidate for d/t SCLC  - started concurrent chemoradiation BID with Q3week cis/etop on 2/15 at [place]  -unfortunately had reaction to cisplatin on C2D1 resulting in hospitalization and also felt to be septic due to pneumonia  - discussed that we typically do 3-4 cycles chemotherapy, and alternative regimens include carboplatin/etoposide vs CAV. Given reaction to cisplatin, concern for possible reaction to carboplatin as well, although unknown rates of cross reaction. Alternatively,  they also asked about discontinuation of chemotherapy, since she completed radiation. She ultimately decided to trial carbo/etoposide. She is aware of the heightened risk of allergic reaction, as well as other side effects including but not limited to  fatigue, risk of infection, neuropathy, injury to liver/kidney, risk of anemia/thrombocytopenia. consented 3/28/22  -s/p 1 cycle Carbo/Etop D1 4/5/22, developed facial flushing and erythematous rash on arms and chest s/p treatment, resolved with benadryl  -opted to forego further chemotherapy and will continue on maintenance  - s/p PCI 6/2022  - CT C/A/P and brain MRI 5/2022 and most recently 8/2022 with good response and no disease  -  MRI brain 11/7 without evidence of metastatic disease  - CT C/A/P 11/3 with multiple new enlarged LN concerning for disease progression - discussed with Dr. Valdivia not able to repeat radiation.  - referred to phase 1 clinical trial and consideration for [id number] or [id number]  - 1.5.2023 : Patient signed consent to take part on phase 1 study [id number]. Look clinically good to take part in study. Will start on study ASAP if eligible.   - 1.24.2023: Seen today and ready to start trial on [id number].  - 1.31.2023: Seen today, ready for C1D8 AMGN 1518   - 2.7.2023: Seen today for C1D15 [id number] - continue with trial   - 2.14.2023: Seen in follow-up on [id number] with overall worsening of general health  - 2.21.2023: Seen today for C2D1 AMGN 1518 with continued  weight loss although perhaps starting to plateau, more energy since being off treatment, still poor appetite. will delay by 1 week, started dexamethasone 2 mg daily, and consider dose reduction.  - 2.28.2023: Seen today for C2D1 AMGN 1518 after delaying for 1 week. Energy level and appetite are improved, although still losing a little weight. Confusion is worse today, possibly due to mirtazapine vs study-related. Will dose reduce today.  - 3.7.2023: Seen today C2D8 AMGN 1518 after dose-reduction last week. Confusion is mildly improved, energy level and appetite are stable. Will add marinol for appetite.   - 3.14.2023: Seen today C2D15 AMGN 1518. Overall improved: confusion continues to improve, energy level and appetite are improved. Weight is improved. Will continue dexamethasone and marinol.  - 3.15.2023: C2D16 No CRS symptoms observed after last treatment Overall Symptoms are improving and she is tolerating treatment.  -3.16.2023: C2D17 No CRS symptoms observed after last treatment Overall Symptoms are improving and she is tolerating treatment.  - 3.28.2023 : Most recent imaging suggest patient benefiting from current treatment. Ongoing symptom  possible common cold/season allergies. Since the patient looks clinically good we will proceed with C3D1 today. Educated patient to call the office ASAP  if symptoms worsen.   - 4.11.2023: C3D15 Feeling well and overall improved with fatigue, confusion, anorexia. Proceed at current dosing and weaning steroids as below  -4.25.2023: C4D1 Pt is feeling well, no complaints of fatigue, confusion, memory loss. Has gained weight. Energy levels are good. Proceed with the current dosing. Pt is no longer on steroids.   -5.9.2023: C4D15. Pt is tolerating treatment well with no new complains. Continues to have good energy level endorses poor appetite with out any weight loss. Suggested to start taking the dronabinol.  Will proceed with treatment today.    # Confusion - resolved  -  significantly worsened after taking double dose of mirtazapine accidentally, although has been generally down-trending since starting study (which is also when she started taking mirtazapine) and now resolved  - brain MRI without progression of cancer  - will continue off mirtazapine    # Unintentional weight loss -Improving  # Anorexia- Improving  # Dysgeusia- improving  - all improved on steroids. unclear if this is from cancer or side effect of study drug  - stopped mirtazapine due to concerns for confusion   - start weaning dexamethasone 2 mg daily: trial 1 mg for a week then off, knows to contact us if any issues  - never started marinol, will not start    # Fatigue - Resolved  - back to normal pretty much, weaning steroids as above    # frequent PVCs - asymptomatic  - saw onco-cardiology and completed 24-hr Holter monitor  - recommended decreasing caffeine and sudafed intake  - continue to monitor    #hyponatremia - improving  - improved recently, will monitor    # Forgetfulness - improved - however noted decline on AMGN study- unclear etiology.    - started after PCI, on memantine daily and has since stopped  - offered neurocognitive evaluation previously and she will think about this and readdress next visit  -  notes some decline in last 3 weeks- especially short term memory loss.    - Improved    # Neuropathy - resolved  - mild peripheral neuropathy with numbness of the toes - likely due to cisplatin  - will continue to monitor closely  - not endorsing any neuropathy at this visit     # Rash - resolved  Waxing and waning rash throughout her body. ?improving. Unclear etiology, patient and  feel timing coincided with starting BMX.   - she will hold off on further BMX  - thought to be due to sulfa allergy, possibly due to platinum agents   - continue to monitor    #odynophagia - resolved  -rad onc aware  -prescribed BMX solution  -will continue to monitor    #constipation-improving  -occasional.  "Prescribed Senna S  -will monitor        [place] [facility] Referal:   Ref to [facility]: virtual exercise            Note Recipients: Nova Omer MD - [phone]  Francy Archer MD   Select \"Yes\" when ready to send to Provider(s) Listed Above:  Note sent to providers named above        Electronic Signatures:  Keanu Lewis ([facility]-CNP)  (Signed 10-May-2023 07:25)   Authored: Patient Visit Information, Cancer History,  History of Present Illness, Review of Systems, Allergies and Outpatient Medication Profile, Problem List, Social History, Performance Assessments, Vitals and Measurements, Physical Exam, Results, Assessment and Plan, To Send Document via Auto Fax      Last Updated: 10-May-2023 07:25 by Keanu Lewis (APRN-CNP)   "

## 2023-09-30 NOTE — PROGRESS NOTES
Patient Visit Information:   Visit Type: Follow Up Visit     Cancer History:   Treatment Synopsis:    DIAGNOSIS  SCLC    STAGING  [id number], limited stage  recurrence    CURRENT SITES OF DISEASE  RLL, supraclavicular    MOLECULAR GENOMICS      PRIOR THERAPY  Concurrent chemo rads with Cisplatin/Etoposide every 3 weeks; C1D1 2/15/22, reaction to cisplatin C2D1 and did not receive D2 or D3 etoposide  Carbo/etoposide 4/5/2022 1 cycle c/b rash  PCI 5/21-6/13/22    CURRENT THERAPY  [id number] 1/24/23 - present    CURRENT ONCOLOGICAL PROBLEMS      HISTORY OF PRESENT ILLNESS  Mrs. Branch is a 65 yo with PMH GERD and COPD who originally was round to have a RLL nodule measuring 11 mm in 4/28/2021 found as part of CT calcium score scan. An ensuing CT chest w/o contrast was done on 5/19/21 which revealed subsolid pulmonary nodules  measuring 14 mm in the RLL. She had CT chest w/contrast on 11/29/21 which confirmed stable 14 mm GGO of the RLL and decreased RLL subpleural nodule. She met thoracic surgeon Dr. Farfan, who ordered PET/CT scan done on 12/8/21 which did not reveal any FDG-avid  nodules within the lung parenchyma but R hilar nodes with max SUV 8.0. He referred her for bronch, which was done on 1/21/22 by Dr. Mcdaniels. 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 Brain MRI was negative.    She started concurrent chemoradiation with BID radiation with cis/etop 2/15/22, and unfortunately had a reaction to cisplatin on C2D1 with chest pain and hypotension. She was hospitalized  with sepsis felt to be due to pneumonia. She trialed carboplatin/etoposide on 4/5/22 with a resulting rash and opted to forego further chemotherapy as she had completed radiation. Restaging scan 11/3/22 unfortunately with progression with  new R hilar lymph node, paratracheal nodes, and increase in size of R supraclavicular mass. She enrolled in [id number] and started C1D1 1/24/23. C1 complicated by anorexia and dysgeusia, and delayed  C2 by a week. She has further struggled with fatigue and  confusion, which may be related to mirtazapine. Dose reduced for C2 and mirtazapine stopped with improvement in symptoms.       PAST MEDICAL HISTORY  GERD  COPD    SOCIAL HISTORY  Retired - lighting . Lives at home with  and a kitten.  Tob: quit smoking 1999. 1 ppd x 25 yrs.  EtOH: wine 1 glass/day  Illicits: none    CURRENT MEDS  see below    ALLERGIES  see below    FAMILY HISTORY  paternal aunt - breast cancer dx 80s      History of Present Illness:     ID Statement:    MICHAEL FRANCES is a 67 year old Female      Chief Complaint: Phase 1 study: [id number]   Interval History:    Today 6.6.2023. Patient in clinic with  for C6D15 on [id number].     Patient reports she continues to tolerate treatment well and has decent energy levels. She reports having a poor appetite and getting full  faster. No recent change in weight. She has a history of allergies and mentioned she recently had a sinus headache with itching throat  and dry cough. She continues to have symptoms of heart burns and constipation which is well managed.  reports she is unsteady on her feet on few occasion. Patient denies any pain, lightheadedness, rash/itching, chills or fever, SOB, sputum, chest  pain or chest tightness, painful inflammation/ulceration oral mucous membranes, nausea/vomiting, diarrhea, hematemesis /hemoptysis, hematuria/rectal bleeding, urinary symptoms, swelling extremities, weakness/fatigue, or peripheral neuropathy. ROS as  above. Remainder of 10 point review of systems elicited and otherwise unremarkable.      Review of Systems:   Review of Systems:    System Review-All other systems including Neurologic, Cardiac, Gastrointestinal, Endocrine, Dermatologic, ENT, Respiratory, Infectious, Urologic, Musculoskeletal  have been reviewed and are negative  for complaint outside of events noted below and within the assessment.        Allergies and  Intolerances:       Allergies:   codeine: Drug, Other, Active   sulfa drugs: Drug Category, Rash, Active    Outpatient Medication Profile:   * Patient Currently Takes Medications as of 05-Jul-2023 11:43 documented in Structured Notes   dexAMETHasone 2 mg oral tablet : Last Dose Taken:  , 1 tab(s) orally once a day before breakfast , Start Date: 07-Jun-2023   ondansetron 8 mg oral tablet: Last Dose Taken:  , 1 tab(s) orally every  8 hours, As Needed , Start Date: 01-May-2023   droNABinol 2.5 mg oral capsule: Last Dose Taken:  , 1 cap(s) orally once  a day one hour before dinner, Start Date: 07-Mar-2023   cetirizine-pseudoephedrine 5 mg-120 mg oral tablet, extended release:  Last Dose Taken:  , 1 tab(s) orally 2 times a day, As Needed   omeprazole 20 mg oral delayed release tablet: Last Dose Taken:  , 1 tab(s)  orally once a day   cholecalciferol 100 mcg (4000 intl units) oral tablet: Last Dose Taken:   , 1 tab(s) orally once a day   calcium (as carbonate)-vitamin D 600 mg-3.125 mcg (125 intl units) oral tablet : Last Dose Taken:  , 1 tab(s) orally once a day   Therapeutic Multiple Vitamins with Minerals oral capsule: Last Dose Taken:   , 1 cap(s) orally once a day   conjugated estrogens 0.3 mg oral tablet: Last Dose Taken:  , 0.5 tab(s)  orally once a day           Medical History:   Encounter for antineoplastic immunotherapy: ICD-10: Z51.12,  Status: Active   Hyperlipidemia: ICD-10: E78.5, Status: Active   GERD (gastroesophageal reflux disease): ICD-10: K21.9, Status:  Active   COPD (chronic obstructive pulmonary disease): ICD-10: J44.9,  Status: Active   Small cell lung cancer: ICD-10: C34.90, Status: Active    Social History:   Social Substance History:  ·  Smoking Status former smoker   ·  Additional History    Retired  1PPD x 25 years quit 1999  ETOH 1 glass wine per day        Performance:   ECOG Performance Status: 1- Restricted from physically  stenuous work        Physical Exam:     Constitutional:  awake/alert/oriented x3, no distress,  alert and cooperative   Eyes: PERRL, EOMI, clear sclera   ENMT: mucous membranes moist   Head/Neck: Neck supple, atraumatic head   Respiratory/Thorax: Patent airways, CTAB, normal  breath sounds with good chest expansion, thorax symmetric   Cardiovascular: Regular, rate and rhythm, no murmurs,  2+ equal pulses of the extremities, normal S 1and S 2   Gastrointestinal: Nondistended, soft, non-tender   Musculoskeletal: ROM intact, no joint swelling, normal  strength   Extremities: normal extremities, no cyanosis edema,  contusions or wounds, no clubbing   Neurological: alert and oriented x3, pleasant, more  alert than previously   Psychological: Appropriate mood and behavior   Skin: no visible rash       Lab Results:    ·  Results        I have reviewed these laboratory results:    Comprehensive Metabolic Panel  Trending View      Result 05-Jul-2023 08:13:00  20-Jun-2023 08:37:00    Glucose, Serum 106   H   87       138    K 3.6   3.4   L       104    Bicarbonate, Serum 26   26    Anion Gap, Serum 12   11    BUN 14   21    CREAT 0.82   0.82    GFR Female 78   78    Calcium, Serum 8.9   8.8    ALB 3.4   3.4    ALKP 41   38    T Pro 5.9   L   5.8   L    T Bili 0.9   0.6    Alanine Aminotransferase, Serum 8   10    Aspartate Transaminase, Serum 12   13        Complete Blood Count + Differential  Trending View      Result 05-Jul-2023 08:13:00  20-Jun-2023 08:43:00    White Blood Cell Count 6.6   6.0    Nucleated Erythrocyte Count 0.0   0.0    Red Blood Cell Count 3.85   L   3.57   L    HGB 11.9   L   11.2   L    HCT 36.3   32.9   L    MCV 94   92    MCHC 32.8   34.0       222    RDW-CV 13.2   13.2    Neutrophil % 66.7   62.7    Immature Granulocytes % 0.5   0.7    Lymphocyte % 19.1   25.4    Monocyte % 11.2   8.6    Eosinophil % 2.0   2.3    Basophil % 0.5   0.3    Neutrophil Count 4.41   3.77    Lymphocyte Count 1.26   1.53    Monocyte Count 0.74   0.52     Eosinophil Count 0.13   0.14    Basophil Count 0.03   0.02        Coagulation Screen  05-Jul-2023 08:13:00      Result Value    Prothrombin Time, Plasma  11.8    International Normalized Ratio, Plasma  1.0    Activated Partial Thromboplastin Time  31      Magnesium, Serum  Trending View      Result 05-Jul-2023 08:13:00  20-Jun-2023 08:37:00    Magnesium, Serum 1.89   1.89        Ferritin, Serum  Trending View      Result 05-Jul-2023 08:13:00  20-Jun-2023 08:37:00    Ferritin, Serum 191   H   142        Phosphorus, Serum  Trending View      Result 05-Jul-2023 08:13:00  20-Jun-2023 08:37:00    Phosphorus, Serum 4.0   3.9        Bilirubin, Serum Direct - Conjugated  Trending View      Result 05-Jul-2023 08:13:00  20-Jun-2023 08:37:00    Bilirubin, Serum Direct - Conjugated 0.1   0.1        Creatine Kinase, Level  Trending View      Result 05-Jul-2023 08:13:00  20-Jun-2023 08:37:00    Creatine Kinase, Level 35   35        C Reactive Protein, Serum  Trending View      Result 05-Jul-2023 08:13:00  20-Jun-2023 08:37:00    C Reactive Protein, Serum 2.57   A   0.24        PT + INR, Plasma  20-Jun-2023 08:37:00      Result Value    Prothrombin Time, Plasma  11.5    International Normalized Ratio, Plasma  1.0      Urinalysis  20-Jun-2023 08:37:00      Result Value    Color, Urine  YELLOW  Reference Range: STRAW,YELLOW    Appearance, Urine  CLEAR    Specific Gravity, Urine  1.015    pH, Urine  5.0    Protein, Urine  NEGATIVE    Glucose, Urine  NEGATIVE    Blood, Urine  NEGATIVE    Ketones, Urine  NEGATIVE    Bilirubin, Urine  NEGATIVE    Urobilinogen, Urine  <2.0    Nitrite, Urine  NEGATIVE    Leukocyte Esterase, Urine  NEGATIVE      Cortisol, Unspecified  20-Jun-2023 08:37:00      Result Value    Cortisol, Unspecified  0.5   L     Follicle Stimulating Hormone, Serum  20-Jun-2023 08:37:00      Result Value    Follicle Stimulating Hormone, Serum  <0.9      Adrenocorticotropic Hormone, Plasma  20-Jun-2023 08:37:00      Result  Value    Adrenocorticotropic Hormone, Plasma  6.3   L     Activated Partial Thromboplastin Time  20-Jun-2023 08:37:00      Result Value    Activated Partial Thromboplastin Time  29      Free Thyroxine, Serum  20-Jun-2023 08:37:00      Result Value    Free Thyroxine, Serum  0.80      Amylase, Serum  20-Jun-2023 08:37:00      Result Value    Amylase, Serum  29      Thyroid Stimulating Hormone, Serum  20-Jun-2023 08:37:00      Result Value    Thyroid Stimulating Hormone, Serum  1.62      Estradiol, Serum  20-Jun-2023 08:37:00      Result Value    Estradiol, Serum  <19      Lipase, Serum  20-Jun-2023 08:37:00      Result Value    Lipase, Serum  27      Luteinizing Hormone, Serum  20-Jun-2023 08:37:00      Result Value    Luteinizing Hormone, Serum  <0.1        Assessment and Plan:     Assessment and Plan:   Assessment:    Mrs. Branch is a 65 yo with COPD and GERD who presented with newly diagnosed SCLC completed BID radiation planned concurrently with cis/etoposide but had a reaction  C2D1 to cisplatin. Completed 1 cycle carbo/etop D1 4/5/22 also complicated by facial flushing and erythematous rash and stopped further treatment. Now s/p PCI in 6/2022. Most recent CT concerning for disease progression. Referred for clinical trial AM  1518, C1D1 1/24/23. Treatment has been complicated by orthostatic hypotension, worsening short-term memory, increased lethargy, weight loss, and poor appetite. Delayed C2 by 1 week and dose-reduced. Confusion has resolved during C3 after stopping mirtazapine  and dose reduction.      Plan:    # SCLC  - limited stage, brain MRI negative  - previously discussed concurrent chemoradiation, with cisplatin/etoposide with side effects including but not limited to allergic reaction, fatigue, risk of infection, tinnitus, neuropathy, injury to liver/kidney, risk of anemia/thrombocytopenia and  was consented  - she was also interested in scalp cooling, which unfortunately she is not a candidate for  d/t SCLC  - started concurrent chemoradiation BID with Q3week cis/etop on 2/15 at [facility]  -unfortunately had reaction to cisplatin on C2D1 resulting in hospitalization and also felt to be septic due to pneumonia  - discussed that we typically do 3-4 cycles chemotherapy, and alternative regimens include carboplatin/etoposide vs CAV. Given reaction to cisplatin, concern for possible reaction to carboplatin as well, although unknown rates of cross reaction. Alternatively,  they also asked about discontinuation of chemotherapy, since she completed radiation. She ultimately decided to trial carbo/etoposide. She is aware of the heightened risk of allergic reaction, as well as other side effects including but not limited to  fatigue, risk of infection, neuropathy, injury to liver/kidney, risk of anemia/thrombocytopenia. consented 3/28/22  -s/p 1 cycle Carbo/Etop D1 4/5/22, developed facial flushing and erythematous rash on arms and chest s/p treatment, resolved with benadryl  -opted to forego further chemotherapy and will continue on maintenance  - s/p PCI 6/2022  - CT C/A/P and brain MRI 5/2022 and most recently 8/2022 with good response and no disease  -  MRI brain 11/7 without evidence of metastatic disease  - CT C/A/P 11/3 with multiple new enlarged LN concerning for disease progression - discussed with Dr. Valdivia not able to repeat radiation.  - referred to phase 1 clinical trial and consideration for [id number] or [id number]  - 1.5.2023 : Patient signed consent to take part on phase 1 study [id number]. Look clinically good to take part in study. Will start on study ASAP if eligible.   - 1.24.2023: Seen today and ready to start trial on [id number].  - 1.31.2023: Seen today, ready for C1D8 AMGN 1518   - 2.7.2023: Seen today for C1D15 [id number] - continue with trial   - 2.14.2023: Seen in follow-up on [id number] with overall worsening of general health  - 2.21.2023: Seen today for C2D1 AMGN 1518 with continued weight loss  although perhaps starting to plateau, more energy since being off treatment, still poor appetite. will delay by 1 week, started dexamethasone 2 mg daily, and consider dose reduction.  - 2.28.2023: Seen today for C2D1 AMGN 1518 after delaying for 1 week. Energy level and appetite are improved, although still losing a little weight. Confusion is worse today, possibly due to mirtazapine vs study-related. Will dose reduce today.  - 3.7.2023: Seen today C2D8 AMGN 1518 after dose-reduction last week. Confusion is mildly improved, energy level and appetite are stable. Will add marinol for appetite.   - 3.14.2023: Seen today C2D15 AMGN 1518. Overall improved: confusion continues to improve, energy level and appetite are improved. Weight is improved. Will continue dexamethasone and marinol.  - 3.15.2023: C2D16 No CRS symptoms observed after last treatment Overall Symptoms are improving and she is tolerating treatment.  -3.16.2023: C2D17 No CRS symptoms observed after last treatment Overall Symptoms are improving and she is tolerating treatment.  - 3.28.2023 : Most recent imaging suggest patient benefiting from current treatment. Ongoing symptom  possible common cold/season allergies. Since the patient looks clinically good we will proceed with C3D1 today. Educated patient to call the office ASAP  if symptoms worsen.   - 4.11.2023: C3D15 Feeling well and overall improved with fatigue, confusion, anorexia. Proceed at current dosing and weaning steroids as below  -4.25.2023: C4D1 Pt is feeling well, no complaints of fatigue, confusion, memory loss. Has gained weight. Energy levels are good. Proceed with the current dosing. Pt is no longer on steroids.   -5.9.2023: C4D15. Pt is tolerating treatment well with no new complains. Continues to have good energy level endorses poor appetite with out any weight loss. Suggested to start taking the dronabinol.  Will proceed with treatment today.  -5.23.2023: C5D1. Pt is tolerating  treatment well. Energy levels are good, is having some weight loss and constipation. Started back on dex for appetite. Pt will let us know if she remains constipated over the next several days.  Personally reviewed scans  with stable disease. Will proceed with treatment today.   -6.6.2023: C5D15. Continues to tolerate treatment well. Since we restarted her on Dexamethasone she reports her energy levels and appetite has improved. Will continue on low dose Dex. With no new symptoms, we will proceed with treatment today.   -6.20.2023: C6D1. Doing well on dexamethasone 1 mg daily. Will proceed with treatment.  -7.5.2023: C6D15. We will proceed with treatment since the patient is tolerating treatment with no significant AE's and also give 1/2 liter of IV fluids.     # Confusion - resolved  - significantly worsened after taking double dose of mirtazapine accidentally, although has been generally down-trending since starting study (which is also when she started taking mirtazapine) and now resolved  - brain MRI without progression of cancer  - will continue off mirtazapine    # Unintentional weight loss - stable  # Anorexia- Improving  # Dysgeusia- improving  - all improved on steroids. unclear if this is from cancer or side effect of study drug  - stopped mirtazapine due to concerns for confusion   - weaned off dexamethasone and started marinol, but kept forgetting to take it  - dexamethasone trial started again on 5.23.2023. Will continue for now on 1 mg daily.    # Fatigue - Resolved  - back to normal pretty much, weaning steroids as above    # frequent PVCs - asymptomatic  - saw onco-cardiology and completed 24-hr Holter monitor  - recommended decreasing caffeine and sudafed intake  - continue to monitor    #hyponatremia - improving  - improved recently, will monitor    # Forgetfulness - improved - however noted decline on AMGN study- unclear etiology.    - started after PCI, on memantine daily and has since stopped  -  "offered neurocognitive evaluation previously and she will think about this and readdress next visit  -  notes some decline in last 3 weeks- especially short term memory loss.    - Improved    # Neuropathy - resolved  - mild peripheral neuropathy with numbness of the toes - likely due to cisplatin  - will continue to monitor closely  - not endorsing any neuropathy at this visit     # Rash - resolved  Waxing and waning rash throughout her body. ?improving. Unclear etiology, patient and  feel timing coincided with starting BMX.   - she will hold off on further BMX  - thought to be due to sulfa allergy, possibly due to platinum agents   - continue to monitor    #odynophagia - resolved  -rad onc aware  -prescribed BMX solution  -will continue to monitor    #constipation-improving  -occasional. Prescribed Senna S  -will monitor      [facility] Referal:   Ref to [facility]: virtual exercise           Note Recipients: Nova Omer MD - [id number]  ArcherFrancy bronsno MD   Select \"Yes\" when ready to send to Provider(s) Listed Above:  Note sent to providers named above        Electronic Signatures:  Keanu Lewis (APRN-CNP)  (Signed 05-Jul-2023 11:50)   Authored: Patient Visit Information, Cancer History,  History of Present Illness, Review of Systems, Allergies and Outpatient Medication Profile, Problem List, Social History, Performance Assessments, Vitals and Measurements, Physical Exam, Results, Assessment and Plan, To Send Document via Auto Fax      Last Updated: 05-Jul-2023 11:50 by Keanu Lewis (APRN-CNP)   "

## 2023-09-30 NOTE — PROGRESS NOTES
Patient Visit Information:   Visit Type: Follow Up Visit     Cancer History:   Treatment Synopsis:    DIAGNOSIS  SCLC    STAGING  [id number], limited stage  recurrence    CURRENT SITES OF DISEASE  RLL, supraclavicular    MOLECULAR GENOMICS      PRIOR THERAPY  Concurrent chemo rads with Cisplatin/Etoposide every 3 weeks; C1D1 2/15/22, reaction to cisplatin C2D1 and did not receive D2 or D3 etoposide  Carbo/etoposide 4/5/2022 1 cycle c/b rash  PCI 5/21-6/13/22    CURRENT THERAPY  [id number] 1/24/23 - present    CURRENT ONCOLOGICAL PROBLEMS        HISTORY OF PRESENT ILLNESS  Mrs. Branch is a 65 yo with PMH GERD and COPD who originally was round to have a RLL nodule measuring 11 mm in 4/28/2021 found as part of CT calcium score scan. An ensuing CT chest w/o contrast was done on 5/19/21 which revealed subsolid pulmonary nodules  measuring 14 mm in the RLL. She had CT chest w/contrast on 11/29/21 which confirmed stable 14 mm GGO of the RLL and decreased RLL subpleural nodule. She met thoracic surgeon Dr. Farfan, who ordered PET/CT scan done on 12/8/21 which did not reveal any FDG-avid  nodules within the lung parenchyma but R hilar nodes with max SUV 8.0. He referred her for bronch, which was done on 1/21/22 by Dr. Mcdaniels. 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 Brain MRI was negative.    She started concurrent chemoradiation with BID radiation with cis/etop 2/15/22, and unfortunately had a reaction to cisplatin on C2D1 with chest pain and hypotension. She was hospitalized  with sepsis felt to be due to pneumonia. She trialed carboplatin/etoposide on 4/5/22 with a resulting rash and opted to forego further chemotherapy as she had completed radiation. Restaging scan 11/3/22 unfortunately with progression with  new R hilar lymph node, paratracheal nodes, and increase in size of R supraclavicular mass. She enrolled in [id number] and started C1D1 1/24/23. C1 complicated by anorexia and dysgeusia, and  delayed C2 by a week. She has further struggled with fatigue and  confusion, which may be related to mirtazapine.      PAST MEDICAL HISTORY  GERD  COPD    SOCIAL HISTORY  Retired - lighting . Lives at home with  and a kitten.  Tob: quit smoking 1999. 1 ppd x 25 yrs.  EtOH: wine 1 glass/day  Illicits: none    CURRENT MEDS  see below    ALLERGIES  see below    FAMILY HISTORY  paternal aunt - breast cancer dx 80s      History of Present Illness:     ID Statement:    MICHAEL FRANCES is a 67 year old Female      Chief Complaint: C3D1 [id number]   Interval History:    Today 3.28.2023. Patient in clinic with daughter for C3D1 [id number].   Patient reports she continues to tolerate treatment well. She reports she feels much better. Her appetite and energy levels have improved. She reports over the past few days she has a nasal  drainage with a cough yellowish color sputum. Denies any fever/chills/SOB. She also has a history of season allergies. Patient denies any pain, dizziness, lightheadedness, headaches, rash/itching, chills or fever, SOB, chest pain or chest tightness, painful  inflammation/ulceration oral mucous membranes, nausea/vomiting, diarrhea/constipation, hematemesis /hemoptysis, hematuria/rectal bleeding, urinary symptoms, swelling extremities, weakness/fatigue, or peripheral neuropathy. ROS as above. Remainder of  10 point review of systems elicited and otherwise unremarkable.      Review of Systems:   Review of Systems:    System Review-All other systems including Neurologic, Cardiac, Gastrointestinal, Endocrine, Dermatologic, ENT, Respiratory, Infectious, Urologic, Musculoskeletal  have been reviewed and are negative  for complaint outside of events noted below and within the assessment.        Allergies and Intolerances:       Allergies:   codeine: Drug, Other, Active   sulfa drugs: Drug Category, Rash, Active    Outpatient Medication Profile:  * Patient Currently Takes Medications as of  31-Mar-2023 11:40 documented in Structured Notes   ondansetron 8 mg oral tablet : Last Dose Taken:  , 1 tab(s) orally every 8 hours, As Needed , Start Date: 21-Mar-2023   droNABinol 2.5 mg oral capsule: Last Dose Taken:  , 1 cap(s) orally once  a day one hour before dinner, Start Date: 07-Mar-2023   dexamethasone 2 mg oral tablet: Last Dose Taken:  , 1 tab(s) orally once  a day with breakfast, Start Date: 23-Feb-2023   nystatin 100,000 units/mL oral suspension: Last Dose Taken:  , 4 milliliter(s)  orally once a day (at bedtime) - swish and swallow, do not rinse, Start Date: 27-Jan-2023   cetirizine-pseudoephedrine 5 mg-120 mg oral tablet, extended release:  Last Dose Taken:  , 1 tab(s) orally 2 times a day, As Needed   omeprazole 20 mg oral delayed release tablet: Last Dose Taken:  , 1 tab(s)  orally once a day   cholecalciferol 100 mcg (4000 intl units) oral tablet: Last Dose Taken:   , 1 tab(s) orally once a day   calcium (as carbonate)-vitamin D 600 mg-3.125 mcg (125 intl units) oral tablet : Last Dose Taken:  , 1 tab(s) orally once a day   ondansetron 8 mg oral tablet: Last Dose Taken:  , 1 tab(s) orally every  6 hours, As Needed -for nausea     Therapeutic Multiple Vitamins with Minerals oral capsule: Last Dose Taken:   , 1 cap(s) orally once a day   conjugated estrogens 0.3 mg oral tablet: Last Dose Taken:  , 0.5 tab(s)  orally once a day           Medical History:   Hyperlipidemia: ICD-10: E78.5, Status: Active   GERD (gastroesophageal reflux disease): ICD-10: K21.9, Status:  Active   COPD (chronic obstructive pulmonary disease): ICD-10: J44.9,  Status: Active   Small cell lung cancer: ICD-10: C34.90, Status: Active    Family History: No Family History items are recorded  in the problem list.      Social History:   Social Substance History:  ·  Smoking Status former smoker   ·  Additional History    Retired  1PPD x 25 years quit 1999  ETOH 1 glass wine per day        Performance:   ECOG Performance Status: 1-  Restricted from physically  stenuous work       Physical Exam:     Constitutional: awake/alert/oriented x3, no distress   Eyes: PERRL, clear sclera   ENMT: mucous membranes moist, no apparent injury,  no lesions seen   Head/Neck: Neck supple, no apparent injury   Respiratory/Thorax: Patent airways, nonlabored, CTAB   Cardiovascular: RRR, no m/r/g   Gastrointestinal: Nondistended, soft, somewhat tender  to palpation, quiet bowel sounds   Musculoskeletal: ROM intact, no joint swelling, normal  strength   Extremities: normal extremities, no cyanosis edema,  contusions or wounds, no clubbing   Neurological: alert and oriented x3, pleasant, more  alert than previously   Psychological: Appropriate mood and behavior   Skin: no visible rash       Lab Results:    ·  Results        I have reviewed these laboratory results:    Complete Blood Count + Differential  Trending View      Result 28-Mar-2023 08:36:00  16-Mar-2023 14:10:00    White Blood Cell Count 8.0   6.7    Nucleated Erythrocyte Count 0.0   0.0    Red Blood Cell Count 3.47   L   3.33   L    HGB 11.0   L   10.5   L    HCT 34.2   L   32.7   L    MCV 99   98    MCHC 32.2   32.1       241    RDW-CV 15.8   H   15.6   H    Neutrophil % 78.1   84.3    Immature Granulocytes % 1.0   H   1.1   H    Lymphocyte % 12.0   10.1    Monocyte % 7.5   3.6    Eosinophil % 1.0   0.6    Basophil % 0.4   0.3    Neutrophil Count 6.28   5.62    Lymphocyte Count 0.96   L   0.67   L    Monocyte Count 0.60   0.24    Eosinophil Count 0.08   0.04    Basophil Count 0.03   0.02        PT + INR, Plasma  Trending View      Result 28-Mar-2023 08:36:00  16-Mar-2023 14:10:00    Prothrombin Time, Plasma 10.5   11.5    International Normalized Ratio, Plasma 0.9   1.0        Comprehensive Metabolic Panel  Trending View      Result 28-Mar-2023 08:36:00  16-Mar-2023 14:10:00    Glucose, Serum 78   96       136    K 3.5   4.2       103    Bicarbonate, Serum 26   25    Anion Gap, Serum 15    12    BUN 25   H   17    CREAT 0.76   0.69    GFR Female 86   >90    Calcium, Serum 9.1   8.9    ALB 3.7   3.5    ALKP 39   35    T Pro 6.1   L   5.8   L    T Bili 0.7   0.7    Alanine Aminotransferase, Serum 14   12    Aspartate Transaminase, Serum 13   14        Thyroid Stimulating Hormone, Serum  28-Mar-2023 08:36:00      Result Value    Thyroid Stimulating Hormone, Serum  6.13   H     Adrenocorticotropic Hormone, Plasma  28-Mar-2023 08:36:00      Result Value    Adrenocorticotropic Hormone, Plasma  4.3   L     Luteinizing Hormone, Serum  28-Mar-2023 08:36:00      Result Value    Luteinizing Hormone, Serum  0.1      Bilirubin, Serum Direct - Conjugated  Trending View      Result 28-Mar-2023 08:36:00  16-Mar-2023 14:10:00    Bilirubin, Serum Direct - Conjugated 0.1   0.1        Lipase, Serum  28-Mar-2023 08:36:00      Result Value    Lipase, Serum  50      Creatine Kinase, Level  28-Mar-2023 08:36:00      Result Value    Creatine Kinase, Level  38      Phosphorus, Serum  Trending View      Result 28-Mar-2023 08:36:00  16-Mar-2023 14:10:00    Phosphorus, Serum 3.6   3.4        Amylase, Serum  28-Mar-2023 08:36:00      Result Value    Amylase, Serum  39      Ferritin, Serum  Trending View      Result 28-Mar-2023 08:36:00  16-Mar-2023 14:10:00    Ferritin, Serum 339   H   289   H        Follicle Stimulating Hormone, Serum  28-Mar-2023 08:36:00      Result Value    Follicle Stimulating Hormone, Serum  15.5      Cortisol, Unspecified  28-Mar-2023 08:36:00      Result Value    Cortisol, Unspecified  0.7   L     Estradiol, Serum  28-Mar-2023 08:36:00      Result Value    Estradiol, Serum  <19      Free Thyroxine, Serum  28-Mar-2023 08:36:00      Result Value    Free Thyroxine, Serum  0.81      Activated Partial Thromboplastin Time  Trending View      Result 28-Mar-2023 08:36:00  16-Mar-2023 14:10:00    Activated Partial Thromboplastin Time 26   28        C Reactive Protein, Serum  Trending View      Result 28-Mar-2023  08:36:00  16-Mar-2023 14:10:00    C Reactive Protein, Serum 1.33   A   0.49        Magnesium, Serum  Trending View      Result 28-Mar-2023 08:36:00  16-Mar-2023 14:10:00    Magnesium, Serum 1.75   1.85        Uric Acid, Serum  16-Mar-2023 14:10:00      Result Value    Uric Acid, Serum  2.0   L     Lactate Dehydrogenase, Serum  16-Mar-2023 14:10:00      Result Value    LDH  185        Radiology Result:    ·  Results        Impression:    Stable ground-glass nodules in the right lung measuring up to 1.2 cm  in the right lower lobe. Interval development of a few nodules in the  right lung, measuring 4 mm or less in size, which maybe infectious  or inflammatory nature but will require follow-up.  Interval resolution of the previously described right supraclavicular  lymphadenopathy.     CT Chest Abdomen Pelvis with IV Contrast [Mar 22 2023  7:55PM]      Impression:    1. No evidence of acute infarct, intracranial mass effect or midline  shift. No abnormal parenchymal enhancement suggestive of brain  metastasis.  2. Minimal scattered punctate T2/FLAIR hyperintense foci in the  supratentorial white matter likely represent chronic small-vessel  ischemic changes.  3. Diffuse parenchymal volume loss.      MRI Brain w/wo Contrast [Mar  1 2023 11:19AM]      Assessment and Plan:     Assessment and Plan:   Assessment:    Mrs. Branch is a 65 yo with COPD and GERD who presented with newly diagnosed SCLC completed BID radiation planned concurrently with cis/etoposide but had a reaction  C2D1 to cisplatin. Completed 1 cycle carbo/etop D1 4/5/22 also complicated by facial flushing and erythematous rash and stopped further treatment. Now s/p PCI in 6/2022. Most recent CT concerning for disease progression. Referred for clinical trial AM  1518, C1D1 1/24/23. Treatment has been complicated by orthostatic hypotension, worsening short-term memory, increased lethargy, weight loss, and poor appetite. Delayed C2 by 1 week and  dose-reduced.       Plan:    # SCLC  - limited stage, brain MRI negative  - previously discussed concurrent chemoradiation, with cisplatin/etoposide with side effects including but not limited to allergic reaction, fatigue, risk of infection, tinnitus, neuropathy, injury to liver/kidney, risk of anemia/thrombocytopenia and  was consented  - she was also interested in scalp cooling, which unfortunately she is not a candidate for d/t SCLC  - started concurrent chemoradiation BID with Q3week cis/etop on 2/15 at [place]  -unfortunately had reaction to cisplatin on C2D1 resulting in hospitalization and also felt to be septic due to pneumonia  - discussed that we typically do 3-4 cycles chemotherapy, and alternative regimens include carboplatin/etoposide vs CAV. Given reaction to cisplatin, concern for possible reaction to carboplatin as well, although unknown rates of cross reaction. Alternatively,  they also asked about discontinuation of chemotherapy, since she completed radiation. She ultimately decided to trial carbo/etoposide. She is aware of the heightened risk of allergic reaction, as well as other side effects including but not limited to  fatigue, risk of infection, neuropathy, injury to liver/kidney, risk of anemia/thrombocytopenia. consented 3/28/22  -s/p 1 cycle Carbo/Etop D1 4/5/22, developed facial flushing and erythematous rash on arms and chest s/p treatment, resolved with benadryl  -opted to forego further chemotherapy and will continue on maintenance  - s/p PCI 6/2022  - CT C/A/P and brain MRI 5/2022 and most recently 8/2022 with good response and no disease  -  MRI brain 11/7 without evidence of metastatic disease  - CT C/A/P 11/3 with multiple new enlarged LN concerning for disease progression - discussed with Dr. Valdivia not able to repeat radiation.  - referred to phase 1 clinical trial and consideration for [id number] or [id number]  - 1.5.2023 : Patient signed consent to take part on phase 1 study  [id number]. Look clinically good to take part in study. Will start on study ASAP if eligible.   - 1.24.2023: Seen today and ready to start trial on [id number].  - 1.31.2023: Seen today, ready for C1D8 AMGN 1518   - 2.7.2023: Seen today for C1D15 [id number] - continue with trial   - 2.14.2023: Seen in follow-up on [id number] with overall worsening of general health  - 2.21.2023: Seen today for C2D1 AMGN 1518 with continued weight loss although perhaps starting to plateau, more energy since being off treatment, still poor appetite. will delay by 1 week, started dexamethasone 2 mg daily, and consider dose reduction.  - 2.28.2023: Seen today for C2D1 AMGN 1518 after delaying for 1 week. Energy level and appetite are improved, although still losing a little weight. Confusion is worse today, possibly due to mirtazapine vs study-related. Will dose reduce today.  - 3.7.2023: Seen today C2D8 AMGN 1518 after dose-reduction last week. Confusion is mildly improved, energy level and appetite are stable. Will add marinol for appetite.   - 3.14.2023: Seen today C2D15 AMGN 1518. Overall improved: confusion continues to improve, energy level and appetite are improved. Weight is improved. Will continue dexamethasone and marinol.  - 3.15.2023: C2D16 No CRS symptoms observed after last treatment Overall Symptoms are improving and she is tolerating treatment.  -3.16.2023: C2D17 No CRS symptoms observed after last treatment Overall Symptoms are improving and she is tolerating treatment.  - 3.28.2023 : Most recent imaging suggest patient benefiting from current treatment. Ongoing symptom  possible common cold/season allergies. Since the patient looks clinically good we will proceed with C3D1 today. Educated patient to call the office ASAP  if his symptoms worsen.     # Confusion - improved  - G1 confusion today - significantly worsened after taking double dose of mirtazapine accidentally, although has been generally down-trending since starting  study (which is also when she started taking mirtazapine) and now mildly improved  - brain MRI without progression of cancer  - will continue off mirtazapine    # Unintentional weight loss  # Anorexia  # Dysgeusia  - unclear if this is from cancer or side effect of study drug  - stopped mirtazapine due to concerns for confusion   - continue dexamethasone 2 mg daily  - continue marinol, once daily to start  - Weight, Anorexia & Dysgeusia : Improving    # Fatigue  - previous G3 fatigue improved to G2 with adding dexamethasone 2 mg daily - will continue  - Improving    # frequent PVCs - asymptomatic  - saw onco-cardiology and completed 24-hr Holter monitor  - recommended decreasing caffeine and sudafed intake  - continue to monitor    #hyponatremia - improving  - improved recently, will monitor    # Forgetfulness - improved - however noted decline on AMGN study- unclear etiology.    - started after PCI, on memantine daily and has since stopped  - offered neurocognitive evaluation previusly and she will think about this and readdress next visit  -  notes some decline in last 3 weeks- especially short term memory loss.    -Imporved    # Neuropathy - resolved  - mild peripheral neuropathy with numbness of the toes - likely due to cisplatin  - will continue to monitor closely  - not endorsing any neuropathy at this visit     # Rash - resolved  Waxing and waning rash throughout her body. ?improving. Unclear etiology, patient and  feel timing coincided with starting BMX.   - she will hold off on further BMX  - thought to be due to sulfa allergy, possibly due to platinum agents   - continue to monitor    #odynophagia - resolved  -rad onc aware  -prescribed BMX solution  -will continue to monitor    #constipation  -occasional. Prescribed Senna S  -will monitor      [place] [facility] Referal:   Ref to [place]: virtual exercise           Note Recipients: Nova Omer MD - [id number]  Francy Archer MD  "  Select \"Yes\" when ready to send to Provider(s) Listed Above:  Note sent to providers named above        Electronic Signatures:  Keanu Lewis ([facility]-CNP)  (Signed 31-Mar-2023 12:28)   Authored: Patient Visit Information, Cancer History,  History of Present Illness, Review of Systems, Allergies and Outpatient Medication Profile, Problem List, Social History, Performance Assessments, Vitals and Measurements, Physical Exam, Results, Assessment and Plan, To Send Document via Auto Fax      Last Updated: 31-Mar-2023 12:28 by Keanu Lewis (APRN-CNP)   "

## 2023-09-30 NOTE — PROGRESS NOTES
Patient Visit Information:   Visit Type: Follow Up Visit     Cancer History:   Treatment Synopsis:    DIAGNOSIS  SCLC    STAGING  [id number], limited stage  recurrence    CURRENT SITES OF DISEASE  RLL, supraclavicular    MOLECULAR GENOMICS      PRIOR THERAPY  Concurrent chemo rads with Cisplatin/Etoposide every 3 weeks; C1D1 2/15/22, reaction to cisplatin C2D1 and did not receive D2 or D3 etoposide  Carbo/etoposide 4/5/2022 1 cycle c/b rash  PCI 5/21-6/13/22    CURRENT THERAPY  [id number] 1/24/23 - present    CURRENT ONCOLOGICAL PROBLEMS      HISTORY OF PRESENT ILLNESS  Mrs. Branch is a 67 yo with PMH GERD and COPD who originally was round to have a RLL nodule measuring 11 mm in 4/28/2021 found as part of CT calcium score scan. An ensuing CT chest w/o contrast was done on 5/19/21 which revealed subsolid pulmonary nodules  measuring 14 mm in the RLL. She had CT chest w/contrast on 11/29/21 which confirmed stable 14 mm GGO of the RLL and decreased RLL subpleural nodule. She met thoracic surgeon Dr. Farfan, who ordered PET/CT scan done on 12/8/21 which did not reveal any FDG-avid  nodules within the lung parenchyma but R hilar nodes with max SUV 8.0. He referred her for bronch, which was done on 1/21/22 by Dr. Mcdaniels. 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 Brain MRI was negative.    She started concurrent chemoradiation with BID radiation with cis/etop 2/15/22, and unfortunately had a reaction to cisplatin on C2D1 with chest pain and hypotension. She was hospitalized  with sepsis felt to be due to pneumonia. She trialed carboplatin/etoposide on 4/5/22 with a resulting rash and opted to forego further chemotherapy as she had completed radiation. Restaging scan 11/3/22 unfortunately with progression with  new R hilar lymph node, paratracheal nodes, and increase in size of R supraclavicular mass. She enrolled in [id number] and started C1D1 1/24/23. C1 complicated by anorexia and dysgeusia, and delayed  C2 by a week. She has further struggled with fatigue and  confusion, which may be related to mirtazapine. Dose reduced for C2 and mirtazapine stopped with improvement in symptoms.       PAST MEDICAL HISTORY  GERD  COPD    SOCIAL HISTORY  Retired - lighting . Lives at home with  and a kitten.  Tob: quit smoking 1999. 1 ppd x 25 yrs.  EtOH: wine 1 glass/day  Illicits: none    CURRENT MEDS  see below    ALLERGIES  see below    FAMILY HISTORY  paternal aunt - breast cancer dx 80s      History of Present Illness:     ID Statement:    MICHAEL FRANCES is a 67 year old Female      Chief Complaint: Phase 1 study: [id number]   Interval History:    Today 8.1.2023. Patient in clinic with  for C7D15 on [id number].   Patient reports she continues to tolerate treatment well. She denies any new symptoms. She reports she has a good appetite  and actually gained some weight. She does mentioned some numbness in her little toe on the left foot which she reports started prior to  her starting on study. She reports her symptoms of acid reflux and constipation are well controlled. She has tooth partials and just got an implants placed with a plan to place partials later. She reports she is lightheaded on occasion with sudden change  in position. She reports being pretty active at home and is also actively participating in yoga. Patient denies any pain, dizziness, lightheadedness,  headaches, rash/itching, chills or fever, SOB, cough, sputum, chest pain or chest tightness, painful inflammation/ulceration oral mucous membranes, nausea/vomiting, diarrhea, hematemesis /hemoptysis, hematuria/rectal bleeding, urinary symptoms, swelling  extremities, weakness/fatigue, or peripheral neuropathy. ROS as above. Remainder of 10 point review of systems elicited and otherwise unremarkable.        Review of Systems:   Review of Systems:    System Review-All other systems including Neurologic, Cardiac, Gastrointestinal,  Endocrine, Dermatologic, ENT, Respiratory, Infectious, Urologic, Musculoskeletal  have been reviewed and are negative  for complaint outside of events noted below and within the assessment.        Allergies and Intolerances:       Allergies:   codeine: Drug, Other, Active   sulfa drugs: Drug Category, Rash, Active    Outpatient Medication Profile:  * Patient Currently Takes Medications as of 10-Jul-2023 08:04 documented in Structured Notes   prochlorperazine 10 mg oral tablet : 1 tab(s) orally every 6 hours , Start Date: 10-Jul-2023   dexAMETHasone 2 mg oral tablet: 1 tab(s) orally once a day before breakfast  , Start Date: 07-Jun-2023   ondansetron 8 mg oral tablet: 1 tab(s) orally every 8 hours, As Needed  , Start Date: 01-May-2023   droNABinol 2.5 mg oral capsule: 1 cap(s) orally once a day one hour before  dinner, Start Date: 07-Mar-2023   cetirizine-pseudoephedrine 5 mg-120 mg oral tablet, extended release:  1 tab(s) orally 2 times a day, As Needed   omeprazole 20 mg oral delayed release tablet: 1 tab(s) orally once a  day   cholecalciferol 100 mcg (4000 intl units) oral tablet: 1 tab(s) orally  once a day   calcium (as carbonate)-vitamin D 600 mg-3.125 mcg (125 intl units) oral tablet : 1 tab(s) orally once a day   Therapeutic Multiple Vitamins with Minerals oral capsule: 1 cap(s) orally  once a day   conjugated estrogens 0.3 mg oral tablet: 0.5 tab(s) orally once a day           Medical History:   Encounter for antineoplastic immunotherapy: ICD-10: Z51.12,  Status: Active   Hyperlipidemia: ICD-10: E78.5, Status: Active   GERD (gastroesophageal reflux disease): ICD-10: K21.9, Status:  Active   COPD (chronic obstructive pulmonary disease): ICD-10: J44.9,  Status: Active   Small cell lung cancer: ICD-10: C34.90, Status: Active    Family History: No Family History items are recorded  in the problem list.      Social History:   Social Substance History:  ·  Smoking Status former smoker   ·  Additional History     Retired  1PPD x 25 years quit 1999  ETOH 1 glass wine per day        Performance:   ECOG Performance Status: 1- Restricted from physically  stenuous work       Physical Exam:     Constitutional: awake/alert/oriented x3, no distress,  alert and cooperative   Eyes: PERRL, EOMI, clear sclera   ENMT: mucous membranes moist   Head/Neck: Neck supple, atraumatic head   Respiratory/Thorax: Patent airways, CTAB, normal  breath sounds with good chest expansion, thorax symmetric   Cardiovascular: Regular, rate and rhythm, no murmurs,  2+ equal pulses of the extremities, normal S 1and S 2   Gastrointestinal: Nondistended, soft, non-tender   Musculoskeletal: ROM intact, no joint swelling, normal  strength   Extremities: normal extremities, no cyanosis edema,  contusions or wounds, no clubbing   Neurological: alert and oriented x3, pleasant, more  alert than previously   Psychological: Appropriate mood and behavior   Skin: no visible rash       Lab Results:    ·  Results        I have reviewed these laboratory results:    Comprehensive Metabolic Panel  Trending View      Result 01-Aug-2023 07:55:00  18-Jul-2023 08:06:00    Glucose, Serum 78   124   H       138    K 4.2   3.9       104    Bicarbonate, Serum 27   27    Anion Gap, Serum 11   11    BUN 15   21    CREAT 0.78   0.79    GFR Female 83   82    Calcium, Serum 8.5   L   8.8    ALB 3.4   3.6    ALKP 38   35    T Pro 5.9   L   5.9   L    T Bili 0.5   0.6    Alanine Aminotransferase, Serum 10   11    Aspartate Transaminase, Serum 18   14        Complete Blood Count + Differential  Trending View      Result 01-Aug-2023 07:55:00  18-Jul-2023 08:06:00    White Blood Cell Count 7.6   10.6    Nucleated Erythrocyte Count 0.0   0.0    Red Blood Cell Count 3.45   L   4.11    HGB 10.7   L   12.9    HCT 33.2   L   38.9    MCV 96   95    MCHC 32.2   33.2       286    RDW-CV 13.7   13.3    Neutrophil % 72.0   84.5    Immature Granulocytes % 0.9   0.8    Lymphocyte %  15.0   8.7    Monocyte % 8.3   4.8    Eosinophil % 3.3   0.8    Basophil % 0.5   0.4    Neutrophil Count 5.46   8.94   H    Lymphocyte Count 1.14   L   0.92   L    Monocyte Count 0.63   0.51    Eosinophil Count 0.25   0.09    Basophil Count 0.04   0.04        PT + INR, Plasma  Trending View      Result 01-Aug-2023 07:55:00  18-Jul-2023 08:06:00    Prothrombin Time, Plasma 11.2   11.4    International Normalized Ratio, Plasma 1.0   1.0        Creatine Kinase, Level  Trending View      Result 01-Aug-2023 07:55:00  18-Jul-2023 08:06:00    Creatine Kinase, Level 40   34        Phosphorus, Serum  Trending View      Result 01-Aug-2023 07:55:00  18-Jul-2023 08:06:00    Phosphorus, Serum 3.7   3.5        Activated Partial Thromboplastin Time  Trending View      Result 01-Aug-2023 07:55:00  18-Jul-2023 08:06:00    Activated Partial Thromboplastin Time 28   30        Bilirubin, Serum Direct - Conjugated  Trending View      Result 01-Aug-2023 07:55:00  18-Jul-2023 08:06:00    Bilirubin, Serum Direct - Conjugated 0.1   0.1        Magnesium, Serum  Trending View      Result 01-Aug-2023 07:55:00  18-Jul-2023 08:06:00    Magnesium, Serum 1.99   1.96        C Reactive Protein, Serum  Trending View      Result 01-Aug-2023 07:55:00  18-Jul-2023 08:06:00    C Reactive Protein, Serum 1.54   A   0.19        Ferritin, Serum  Trending View      Result 01-Aug-2023 07:55:00  18-Jul-2023 08:06:00    Ferritin, Serum 180   H   146        Thyroid Stimulating Hormone, Serum  18-Jul-2023 08:06:00      Result Value    Thyroid Stimulating Hormone, Serum  2.23      Amylase, Serum  18-Jul-2023 08:06:00      Result Value    Amylase, Serum  34      Follicle Stimulating Hormone, Serum  18-Jul-2023 08:06:00      Result Value    Follicle Stimulating Hormone, Serum  <0.9      Lipase, Serum  18-Jul-2023 08:06:00      Result Value    Lipase, Serum  36      Cortisol, Unspecified  18-Jul-2023 08:06:00      Result Value    Cortisol, Unspecified  0.9   L      Estradiol, Serum  18-Jul-2023 08:06:00      Result Value    Estradiol, Serum  <19      Free Thyroxine, Serum  18-Jul-2023 08:06:00      Result Value    Free Thyroxine, Serum  0.81      Luteinizing Hormone, Serum  18-Jul-2023 08:06:00      Result Value    Luteinizing Hormone, Serum  <0.1      Adrenocorticotropic Hormone, Plasma  18-Jul-2023 08:06:00      Result Value    Adrenocorticotropic Hormone, Plasma  4.4   L       Radiology Result:    ·  Results        Impression:    1. Stable 1.3 cm ground-glass nodule within the right lower lobe and  5 mm subpleural ground-glass nodule within the right lobe.  2. Interval development of a new nonspecific elongated 3 mm solid  noncalcified nodule within the right upper lobe, recommend attention  on follow-up imaging.  3. No chest or abdominopelvic lymphadenopathy.  4. Moderate-to-severe pulmonary emphysema, similar to prior.      CT Chest Abdomen Pelvis with IV Contrast [Jul 14 2023 11:39PM]      Assessment and Plan:     Assessment and Plan:   Assessment:    Mrs. Branch is a 65 yo with COPD and GERD who presented with newly diagnosed SCLC completed BID radiation planned concurrently with cis/etoposide but had a reaction  C2D1 to cisplatin. Completed 1 cycle carbo/etop D1 4/5/22 also complicated by facial flushing and erythematous rash and stopped further treatment. Now s/p PCI in 6/2022. Most recent CT concerning for disease progression. Referred for clinical trial [facility]  1518, C1D1 1/24/23. Treatment has been complicated by orthostatic hypotension, worsening short-term memory, increased lethargy, weight loss, and poor appetite. Delayed C2 by 1 week and dose-reduced. Confusion has resolved during C3 after stopping mirtazapine  and dose reduction.       Plan:    # SCLC  - limited stage, brain MRI negative  - previously discussed concurrent chemoradiation, with cisplatin/etoposide with side effects including but not limited to allergic reaction, fatigue, risk of infection,  tinnitus, neuropathy, injury to liver/kidney, risk of anemia/thrombocytopenia and  was consented  - she was also interested in scalp cooling, which unfortunately she is not a candidate for d/t SCLC  - started concurrent chemoradiation BID with Q3week cis/etop on 2/15 at [facility]  -unfortunately had reaction to cisplatin on C2D1 resulting in hospitalization and also felt to be septic due to pneumonia  - discussed that we typically do 3-4 cycles chemotherapy, and alternative regimens include carboplatin/etoposide vs CAV. Given reaction to cisplatin, concern for possible reaction to carboplatin as well, although unknown rates of cross reaction. Alternatively,  they also asked about discontinuation of chemotherapy, since she completed radiation. She ultimately decided to trial carbo/etoposide. She is aware of the heightened risk of allergic reaction, as well as other side effects including but not limited to  fatigue, risk of infection, neuropathy, injury to liver/kidney, risk of anemia/thrombocytopenia. consented 3/28/22  -s/p 1 cycle Carbo/Etop D1 4/5/22, developed facial flushing and erythematous rash on arms and chest s/p treatment, resolved with benadryl  -opted to forego further chemotherapy and will continue on maintenance  - s/p PCI 6/2022  - CT C/A/P and brain MRI 5/2022 and most recently 8/2022 with good response and no disease  -  MRI brain 11/7 without evidence of metastatic disease  - CT C/A/P 11/3 with multiple new enlarged LN concerning for disease progression - discussed with Dr. Valdivia not able to repeat radiation.  - referred to phase 1 clinical trial and consideration for [id number] or [id number]  - 1.5.2023 : Patient signed consent to take part on phase 1 study [id number]. Look clinically good to take part in study. Will start on study ASAP if eligible.   - 1.24.2023: Seen today and ready to start trial on [id number].  - 1.31.2023: Seen today, ready for C1D8 AMGN 1518   - 2.7.2023: Seen today for C1D15  [id number] - continue with trial   - 2.14.2023: Seen in follow-up on [id number] with overall worsening of general health  - 2.21.2023: Seen today for C2D1 [facility] 1518 with continued weight loss although perhaps starting to plateau, more energy since being off treatment, still poor appetite. will delay by 1 week, started dexamethasone 2 mg daily, and consider dose reduction.  - 2.28.2023: Seen today for C2D1 [facility] 1518 after delaying for 1 week. Energy level and appetite are improved, although still losing a little weight. Confusion is worse today, possibly due to mirtazapine vs study-related. Will dose reduce today.  - 3.7.2023: Seen today C2D8 [facility] 1518 after dose-reduction last week. Confusion is mildly improved, energy level and appetite are stable. Will add marinol for appetite.   - 3.14.2023: Seen today C2D15 AMGN 1518. Overall improved: confusion continues to improve, energy level and appetite are improved. Weight is improved. Will continue dexamethasone and marinol.  - 3.15.2023: C2D16 No CRS symptoms observed after last treatment Overall Symptoms are improving and she is tolerating treatment.  -3.16.2023: C2D17 No CRS symptoms observed after last treatment Overall Symptoms are improving and she is tolerating treatment.  - 3.28.2023 : Most recent imaging suggest patient benefiting from current treatment. Ongoing symptom  possible common cold/season allergies. Since the patient looks clinically good we will proceed with C3D1 today. Educated patient to call the office ASAP  if symptoms worsen.   - 4.11.2023: C3D15 Feeling well and overall improved with fatigue, confusion, anorexia. Proceed at current dosing and weaning steroids as below  -4.25.2023: C4D1 Pt is feeling well, no complaints of fatigue, confusion, memory loss. Has gained weight. Energy levels are good. Proceed with the current dosing. Pt is no longer on steroids.   -5.9.2023: C4D15. Pt is tolerating treatment well with no new complains. Continues to have  good energy level endorses poor appetite with out any weight loss. Suggested to start taking the dronabinol.  Will proceed with treatment today.  -5.23.2023: C5D1. Pt is tolerating treatment well. Energy levels are good, is having some weight loss and constipation. Started back on dex for appetite. Pt will let us know if she remains constipated over the next several days.  Personally reviewed scans  with stable disease. Will proceed with treatment today.   -6.6.2023: C5D15. Continues to tolerate treatment well. Since we restarted her on Dexamethasone she reports her energy levels and appetite has improved. Will continue on low dose Dex. With no new symptoms, we will proceed with treatment today.   -6.20.2023: C6D1. Doing well on dexamethasone 1 mg daily. Will proceed with treatment.  -7.5.2023: C6D15. We will proceed with treatment since the patient is tolerating treatment with no significant AE's and also give 1/2 liter of IV fluids.   - 7.18.2023: C7D1. Doing well, will proceed with treatment. She is out of dexamethasone, and will monitor off steroids.   -8.1.2023: C7D15. Continues to tolerate treatment. Will proceed with C7D15 today. RTC per protocol.    # Confusion - resolved  - significantly worsened after taking double dose of mirtazapine accidentally, although has been generally down-trending since starting study (which is also when she started taking mirtazapine) and now resolved  - brain MRI without progression of cancer  - will continue off mirtazapine    # Unintentional weight loss - stable  # Anorexia- Improving  # Dysgeusia- improving  - all improved on steroids. unclear if this is from cancer or side effect of study drug  - stopped mirtazapine due to concerns for confusion   - weaned off dexamethasone and started marinol, but kept forgetting to take it  - dexamethasone trial started again on 5.23.2023. Continued on 1 mg daily - will trial off after 7.18.23.     # Fatigue - Resolved  - back to normal  "pretty much, weaning steroids as above    # frequent PVCs - asymptomatic  - saw onco-cardiology and completed 24-hr Holter monitor  - recommended decreasing caffeine and sudafed intake  - continue to monitor    #hyponatremia - improving  - improved recently, will monitor    # Forgetfulness - improved - however noted decline on AMGN study- unclear etiology.    - started after PCI, on memantine daily and has since stopped  - offered neurocognitive evaluation previously and she will think about this and readdress next visit  -  notes some decline in last 3 weeks- especially short term memory loss.    - Improved    # Neuropathy - resolved  - mild peripheral neuropathy with numbness of the toes - likely due to cisplatin  - will continue to monitor closely  - not endorsing any neuropathy at this visit     # Rash - resolved  Waxing and waning rash throughout her body. ?improving. Unclear etiology, patient and  feel timing coincided with starting BMX.   - she will hold off on further BMX  - thought to be due to sulfa allergy, possibly due to platinum agents   - continue to monitor    #odynophagia - resolved  -rad onc aware  -prescribed BMX solution  -will continue to monitor    #constipation-improving  -occasional. Prescribed Senna S  -will monitor      [place] Referal:   Ref to [facility]: virtual exercise           Note Recipients: Nova Omer MD - [id number]  Francy Archer MD   Select \"Yes\" when ready to send to Provider(s) Listed Above:  Note sent to providers named above        Electronic Signatures:  Keanu Lewis (APRN-CNP)  (Signed 04-Aug-2023 14:12)   Authored: Patient Visit Information, Cancer History,  History of Present Illness, Review of Systems, Allergies and Outpatient Medication Profile, Problem List, Social History, Performance Assessments, Vitals and Measurements, Physical Exam, Results, Assessment and Plan, To Send Document via Auto Fax      Last Updated: 04-Aug-2023 " 14:12 by Keanu Lewis (APRN-CNP)

## 2023-09-30 NOTE — PROGRESS NOTES
Patient Visit Information:   Visit Type: Follow Up Visit     Cancer History:   Treatment Synopsis:    DIAGNOSIS  SCLC    STAGING  [id number], limited stage  recurrence    CURRENT SITES OF DISEASE  RLL, supraclavicular    MOLECULAR GENOMICS      PRIOR THERAPY  Concurrent chemo rads with Cisplatin/Etoposide every 3 weeks; C1D1 2/15/22, reaction to cisplatin C2D1 and did not receive D2 or D3 etoposide  Carbo/etoposide 4/5/2022 1 cycle c/b rash  PCI 5/21-6/13/22    CURRENT THERAPY  [id number] 1/24/23 - present    CURRENT ONCOLOGICAL PROBLEMS      HISTORY OF PRESENT ILLNESS  Mrs. Branch is a 65 yo with PMH GERD and COPD who originally was round to have a RLL nodule measuring 11 mm in 4/28/2021 found as part of CT calcium score scan. An ensuing CT chest w/o contrast was done on 5/19/21 which revealed subsolid pulmonary nodules  measuring 14 mm in the RLL. She had CT chest w/contrast on 11/29/21 which confirmed stable 14 mm GGO of the RLL and decreased RLL subpleural nodule. She met thoracic surgeon Dr. Farfan, who ordered PET/CT scan done on 12/8/21 which did not reveal any FDG-avid  nodules within the lung parenchyma but R hilar nodes with max SUV 8.0. He referred her for bronch, which was done on 1/21/22 by Dr. Mcdaniels. 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 Brain MRI was negative.    She started concurrent chemoradiation with BID radiation with cis/etop 2/15/22, and unfortunately had a reaction to cisplatin on C2D1 with chest pain and hypotension. She was hospitalized  with sepsis felt to be due to pneumonia. She trialed carboplatin/etoposide on 4/5/22 with a resulting rash and opted to forego further chemotherapy as she had completed radiation. Restaging scan 11/3/22 unfortunately with progression with  new R hilar lymph node, paratracheal nodes, and increase in size of R supraclavicular mass. She enrolled in [id number] and started C1D1 1/24/23. C1 complicated by anorexia and dysgeusia, and delayed  C2 by a week. She has further struggled with fatigue and  confusion, which may be related to mirtazapine. Dose reduced for C2 and mirtazapine stopped with improvement in symptoms.       PAST MEDICAL HISTORY  GERD  COPD    SOCIAL HISTORY  Retired - lighting . Lives at home with  and a kitten.  Tob: quit smoking 1999. 1 ppd x 25 yrs.  EtOH: wine 1 glass/day  Illicits: none    CURRENT MEDS  see below    ALLERGIES  see below    FAMILY HISTORY  paternal aunt - breast cancer dx 80s        History of Present Illness:     ID Statement:    MICHAEL FRANCES is a 67 year old Female      Interval History:    Today 6.6.2023. Patient in clinic with  for C5D15 on [id number].     Patient reports she continues to tolerate treatment well. She reports since her past treatment her appetite and energy levels have improved. Her  mentioned she is not confused any more. She has symptoms of heart burns and constipation which is  well managed. She also reports symptoms of numbness in her toes. Patient denies any pain, dizziness, lightheadedness, headaches, rash/itching,  chills or fever, SOB, cough, sputum, chest pain or chest tightness, painful inflammation/ulceration oral mucous membranes, nausea/vomiting, diarrhea, hematemesis /hemoptysis, hematuria/rectal bleeding, urinary symptoms, swelling extremities, weakness/fatigue.  ROS as above. Remainder of 10 point review of systems elicited and otherwise unremarkable.       Review of Systems:   Review of Systems:    System Review-All other systems including Neurologic, Cardiac, Gastrointestinal, Endocrine, Dermatologic, ENT, Respiratory, Infectious, Urologic, Musculoskeletal  have been reviewed and are negative  for complaint outside of events noted below and within the assessment.          Allergies and Intolerances:       Allergies:   codeine: Drug, Other, Active   sulfa drugs: Drug Category, Rash, Active    Outpatient Medication Profile:   * Patient  Currently Takes Medications as of 07-Jun-2023 13:24 documented in Structured Notes   ondansetron 8 mg oral tablet : Last Dose Taken:  , 1 tab(s) orally every 8 hours, As Needed , Start Date: 01-May-2023   droNABinol 2.5 mg oral capsule: Last Dose Taken:  , 1 cap(s) orally once  a day one hour before dinner, Start Date: 07-Mar-2023   cetirizine-pseudoephedrine 5 mg-120 mg oral tablet, extended release:  Last Dose Taken:  , 1 tab(s) orally 2 times a day, As Needed   omeprazole 20 mg oral delayed release tablet: Last Dose Taken:  , 1 tab(s)  orally once a day   cholecalciferol 100 mcg (4000 intl units) oral tablet: Last Dose Taken:   , 1 tab(s) orally once a day   calcium (as carbonate)-vitamin D 600 mg-3.125 mcg (125 intl units) oral tablet : Last Dose Taken:  , 1 tab(s) orally once a day   Therapeutic Multiple Vitamins with Minerals oral capsule: Last Dose Taken:   , 1 cap(s) orally once a day   conjugated estrogens 0.3 mg oral tablet: Last Dose Taken:  , 0.5 tab(s)  orally once a day           Medical History:   Encounter for antineoplastic immunotherapy: ICD-10: Z51.12,  Status: Active   Hyperlipidemia: ICD-10: E78.5, Status: Active   GERD (gastroesophageal reflux disease): ICD-10: K21.9, Status:  Active   COPD (chronic obstructive pulmonary disease): ICD-10: J44.9,  Status: Active   Small cell lung cancer: ICD-10: C34.90, Status: Active    Social History:   Social Substance History:  ·  Smoking Status former smoker    ·  Additional History    Retired  1PPD x 25 years quit 1999  ETOH 1 glass wine per day          Performance:   ECOG Performance Status: 1- Restricted from physically  stenuous work        Physical Exam:     Constitutional: awake/alert/oriented x3, no distress,  alert and cooperative   Eyes: PERRL, EOMI, clear sclera   ENMT: mucous membranes moist   Head/Neck: Neck supple, atraumatic head   Respiratory/Thorax: Patent airways, CTAB, normal  breath sounds with good chest expansion, thorax symmetric    Cardiovascular: Regular, rate and rhythm, no murmurs,  2+ equal pulses of the extremities, normal S 1and S 2   Gastrointestinal: Nondistended, soft, non-tender   Musculoskeletal: ROM intact, no joint swelling, normal  strength   Extremities: normal extremities, no cyanosis edema,  contusions or wounds, no clubbing   Neurological: alert and oriented x3, pleasant, more  alert than previously   Psychological: Appropriate mood and behavior   Skin: no visible rash       Lab Results:    ·  Results        I have reviewed these laboratory results:    Complete Blood Count + Differential  Trending View      Result 06-Jun-2023 08:09:00  23-May-2023 08:45:00    White Blood Cell Count 6.6   4.8    Nucleated Erythrocyte Count 0.0   0.0    Red Blood Cell Count 3.61   L   3.62   L    HGB 11.2   L   11.4   L    HCT 34.7   L   34.8   L    MCV 96   96    MCHC 32.3   32.8       213    RDW-CV 13.3   13.1    Neutrophil % 61.4   68.1    Immature Granulocytes % 0.5   0.4    Lymphocyte % 28.1   18.9    Monocyte % 7.7   9.5    Eosinophil % 1.8   2.3    Basophil % 0.5   0.8    Neutrophil Count 4.07   3.24    Lymphocyte Count 1.86   0.90   L    Monocyte Count 0.51   0.45    Eosinophil Count 0.12   0.11    Basophil Count 0.03   0.04        PT + INR, Plasma  Trending View      Result 06-Jun-2023 08:09:00  23-May-2023 08:45:00    Prothrombin Time, Plasma 10.9   12.4    International Normalized Ratio, Plasma 0.9   1.1        Comprehensive Metabolic Panel  Trending View      Result 06-Jun-2023 08:09:00  23-May-2023 08:45:00    Glucose, Serum 92   88       140    K 3.5   4.0       106    Bicarbonate, Serum 27   28    Anion Gap, Serum 10   10    BUN 21   14    CREAT 0.76   0.82    GFR Female 86   78    Calcium, Serum 8.7   8.2   L    ALB 3.6   3.7    ALKP 32   L   35    T Pro 5.8   L   5.8   L    T Bili 0.5   0.6    Alanine Aminotransferase, Serum 12   6   L    Aspartate Transaminase, Serum 15   13        Creatine Kinase, Level   Trending View      Result 06-Jun-2023 08:09:00  23-May-2023 08:45:00    Creatine Kinase, Level 38   45        Activated Partial Thromboplastin Time  Trending View      Result 06-Jun-2023 08:09:00  23-May-2023 08:45:00    Activated Partial Thromboplastin Time 26   30        Ferritin, Serum  Trending View      Result 06-Jun-2023 08:09:00  23-May-2023 08:45:00    Ferritin, Serum 124   160   H        Bilirubin, Serum Direct - Conjugated  Trending View      Result 06-Jun-2023 08:09:00  23-May-2023 08:45:00    Bilirubin, Serum Direct - Conjugated 0.1   0.1        Phosphorus, Serum  Trending View      Result 06-Jun-2023 08:09:00  23-May-2023 08:45:00    Phosphorus, Serum 3.2   3.9        Magnesium, Serum  Trending View      Result 06-Jun-2023 08:09:00  23-May-2023 08:45:00    Magnesium, Serum 2.00   1.91        C Reactive Protein, Serum  Trending View      Result 06-Jun-2023 08:09:00  23-May-2023 08:45:00    C Reactive Protein, Serum <0.10   <0.10        Urinalysis  23-May-2023 09:40:00      Result Value    Color, Urine  YELLOW  Reference Range: STRAW,YELLOW    Appearance, Urine  HAZY    Specific Gravity, Urine  1.023    pH, Urine  5.0    Protein, Urine  NEGATIVE    Glucose, Urine  NEGATIVE    Blood, Urine  NEGATIVE    Ketones, Urine  20 (1+)   A   Bilirubin, Urine  NEGATIVE    Urobilinogen, Urine  2.0   H   Nitrite, Urine  NEGATIVE    Leukocyte Esterase, Urine  NEGATIVE      Follicle Stimulating Hormone, Serum  23-May-2023 08:45:00      Result Value    Follicle Stimulating Hormone, Serum  <0.9      Adrenocorticotropic Hormone, Plasma  23-May-2023 08:45:00      Result Value    Adrenocorticotropic Hormone, Plasma  7.4      Amylase, Serum  23-May-2023 08:45:00      Result Value    Amylase, Serum  23   L     Cortisol, Unspecified  23-May-2023 08:45:00      Result Value    Cortisol, Unspecified  5.7      Free Thyroxine, Serum  23-May-2023 08:45:00      Result Value    Free Thyroxine, Serum  0.88      Thyroid Stimulating  Hormone, Serum  23-May-2023 08:45:00      Result Value    Thyroid Stimulating Hormone, Serum  4.52   H     Lipase, Serum  23-May-2023 08:45:00      Result Value    Lipase, Serum  25      Estradiol, Serum  23-May-2023 08:45:00      Result Value    Estradiol, Serum  <19      Lactate Dehydrogenase, Serum  23-May-2023 08:45:00      Result Value    LDH  141        Assessment and Plan:     Assessment and Plan:   Assessment:    Mrs. Branch is a 67 yo with COPD and GERD who presented with newly diagnosed SCLC completed BID radiation planned concurrently with cis/etoposide but had a reaction  C2D1 to cisplatin. Completed 1 cycle carbo/etop D1 4/5/22 also complicated by facial flushing and erythematous rash and stopped further treatment. Now s/p PCI in 6/2022. Most recent CT concerning for disease progression. Referred for clinical trial [facility]  1518, C1D1 1/24/23. Treatment has been complicated by orthostatic hypotension, worsening short-term memory, increased lethargy, weight loss, and poor appetite. Delayed C2 by 1 week and dose-reduced. Confusion has resolved during C3 after stopping mirtazapine  and dose reduction.        Plan:    # SCLC  - limited stage, brain MRI negative  - previously discussed concurrent chemoradiation, with cisplatin/etoposide with side effects including but not limited to allergic reaction, fatigue, risk of infection, tinnitus, neuropathy, injury to liver/kidney, risk of anemia/thrombocytopenia and  was consented  - she was also interested in scalp cooling, which unfortunately she is not a candidate for d/t SCLC  - started concurrent chemoradiation BID with Q3week cis/etop on 2/15 at [facility]  -unfortunately had reaction to cisplatin on C2D1 resulting in hospitalization and also felt to be septic due to pneumonia  - discussed that we typically do 3-4 cycles chemotherapy, and alternative regimens include carboplatin/etoposide vs CAV. Given reaction to cisplatin, concern for possible reaction to  carboplatin as well, although unknown rates of cross reaction. Alternatively,  they also asked about discontinuation of chemotherapy, since she completed radiation. She ultimately decided to trial carbo/etoposide. She is aware of the heightened risk of allergic reaction, as well as other side effects including but not limited to  fatigue, risk of infection, neuropathy, injury to liver/kidney, risk of anemia/thrombocytopenia. consented 3/28/22  -s/p 1 cycle Carbo/Etop D1 4/5/22, developed facial flushing and erythematous rash on arms and chest s/p treatment, resolved with benadryl  -opted to forego further chemotherapy and will continue on maintenance  - s/p PCI 6/2022  - CT C/A/P and brain MRI 5/2022 and most recently 8/2022 with good response and no disease  -  MRI brain 11/7 without evidence of metastatic disease  - CT C/A/P 11/3 with multiple new enlarged LN concerning for disease progression - discussed with Dr. Valdivia not able to repeat radiation.  - referred to phase 1 clinical trial and consideration for [id number] or [id number]  - 1.5.2023 : Patient signed consent to take part on phase 1 study [id number]. Look clinically good to take part in study. Will start on study ASAP if eligible.   - 1.24.2023: Seen today and ready to start trial on [id number].  - 1.31.2023: Seen today, ready for C1D8 AMGN 1518   - 2.7.2023: Seen today for C1D15 [id number] - continue with trial   - 2.14.2023: Seen in follow-up on [id number] with overall worsening of general health  - 2.21.2023: Seen today for C2D1 AMGN 1518 with continued weight loss although perhaps starting to plateau, more energy since being off treatment, still poor appetite. will delay by 1 week, started dexamethasone 2 mg daily, and consider dose reduction.  - 2.28.2023: Seen today for C2D1 AMGN 1518 after delaying for 1 week. Energy level and appetite are improved, although still losing a little weight. Confusion is worse today, possibly due to mirtazapine vs study-related.  Will dose reduce today.  - 3.7.2023: Seen today C2D8 AMGN 1518 after dose-reduction last week. Confusion is mildly improved, energy level and appetite are stable. Will add marinol for appetite.   - 3.14.2023: Seen today C2D15 AMGN 1518. Overall improved: confusion continues to improve, energy level and appetite are improved. Weight is improved. Will continue dexamethasone and marinol.  - 3.15.2023: C2D16 No CRS symptoms observed after last treatment Overall Symptoms are improving and she is tolerating treatment.  -3.16.2023: C2D17 No CRS symptoms observed after last treatment Overall Symptoms are improving and she is tolerating treatment.  - 3.28.2023 : Most recent imaging suggest patient benefiting from current treatment. Ongoing symptom  possible common cold/season allergies. Since the patient looks clinically good we will proceed with C3D1 today. Educated patient to call the office ASAP  if symptoms worsen.   - 4.11.2023: C3D15 Feeling well and overall improved with fatigue, confusion, anorexia. Proceed at current dosing and weaning steroids as below  -4.25.2023: C4D1 Pt is feeling well, no complaints of fatigue, confusion, memory loss. Has gained weight. Energy levels are good. Proceed with the current dosing. Pt is no longer on steroids.   -5.9.2023: C4D15. Pt is tolerating treatment well with no new complains. Continues to have good energy level endorses poor appetite with out any weight loss. Suggested to start taking the dronabinol.  Will proceed with treatment today.  -5.23.2023: C5D1. Pt is tolerating treatment well. Energy levels are good, is having some weight loss and constipation. Started back on dex for appetite. Pt will let us know if she remains constipated over the next several days.  Personally reviewed scans  with stable disease. Will proceed with treatment today.   -6.6.2023: C5D15. Continues to tolerate treatment well. Since we restarted her on Dexamethasone she reports her energy levels and  appetite has improved. Will continue on low dose Dex. With no new symptoms, we will proceed with treatment today.     # Confusion - resolved  - significantly worsened after taking double dose of mirtazapine accidentally, although has been generally down-trending since starting study (which is also when she started taking mirtazapine) and now resolved  - brain MRI without progression of cancer  - will continue off mirtazapine    # Unintentional weight loss - recurred  # Anorexia- Improving  # Dysgeusia- improving  - all improved on steroids. unclear if this is from cancer or side effect of study drug  - stopped mirtazapine due to concerns for confusion   - weaned off dexamethasone and started marinol, but kept forgetting to take it  - dexamethasone trial started again on 5.23.2023. Will continue and evaluate at C6.     # Fatigue - Resolved  - back to normal pretty much, weaning steroids as above    # frequent PVCs - asymptomatic  - saw onco-cardiology and completed 24-hr Holter monitor  - recommended decreasing caffeine and sudafed intake  - continue to monitor    #hyponatremia - improving  - improved recently, will monitor    # Forgetfulness - improved - however noted decline on AMGN study- unclear etiology.    - started after PCI, on memantine daily and has since stopped  - offered neurocognitive evaluation previously and she will think about this and readdress next visit  -  notes some decline in last 3 weeks- especially short term memory loss.    - Improved    # Neuropathy - resolved  - mild peripheral neuropathy with numbness of the toes - likely due to cisplatin  - will continue to monitor closely  - not endorsing any neuropathy at this visit     # Rash - resolved  Waxing and waning rash throughout her body. ?improving. Unclear etiology, patient and  feel timing coincided with starting BMX.   - she will hold off on further BMX  - thought to be due to sulfa allergy, possibly due to platinum  "agents   - continue to monitor    #odynophagia - resolved  -rad onc aware  -prescribed BMX solution  -will continue to monitor    #constipation-improving  -occasional. Prescribed Senna S  -will monitor        [place] Referal:   Ref to [place]: virtual exercise            Note Recipients: Nova Omer MD - [phone]  [facility] MD   Select \"Yes\" when ready to send to Provider(s) Listed Above:  Note sent to providers named above        Electronic Signatures:  Keanu Lewis (APRN-CNP)  (Signed 08-Jun-2023 12:04)   Authored: Patient Visit Information, Cancer History,  History of Present Illness, Review of Systems, Allergies and Outpatient Medication Profile, Problem List, Social History, Performance Assessments, Vitals and Measurements, Physical Exam, Results, Assessment and Plan, To Send Document via Auto Fax      Last Updated: 08-Jun-2023 12:04 by Keanu Lewis (APRN-CNP)   "

## 2023-09-30 NOTE — PROGRESS NOTES
Patient Visit Information:   Visit Type: Follow Up Visit     Cancer History:   Treatment Synopsis:    DIAGNOSIS  SCLC    STAGING  [id number], limited stage  recurrence    CURRENT SITES OF DISEASE  RLL, supraclavicular    MOLECULAR GENOMICS      PRIOR THERAPY  Concurrent chemo rads with Cisplatin/Etoposide every 3 weeks; C1D1 2/15/22, reaction to cisplatin C2D1 and did not receive D2 or D3 etoposide  Carbo/etoposide 4/5/2022 1 cycle c/b rash  PCI 5/21-6/13/22    CURRENT THERAPY  [id number] 1/24/23 - present    CURRENT ONCOLOGICAL PROBLEMS      HISTORY OF PRESENT ILLNESS  Mrs. Branch is a 67 yo with PMH GERD and COPD who originally was round to have a RLL nodule measuring 11 mm in 4/28/2021 found as part of CT calcium score scan. An ensuing CT chest w/o contrast was done on 5/19/21 which revealed subsolid pulmonary nodules  measuring 14 mm in the RLL. She had CT chest w/contrast on 11/29/21 which confirmed stable 14 mm GGO of the RLL and decreased RLL subpleural nodule. She met thoracic surgeon Dr. Farfan, who ordered PET/CT scan done on 12/8/21 which did not reveal any FDG-avid  nodules within the lung parenchyma but R hilar nodes with max SUV 8.0. He referred her for bronch, which was done on 1/21/22 by Dr. Mcdaniels. 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 Brain MRI was negative.    She started concurrent chemoradiation with BID radiation with cis/etop 2/15/22, and unfortunately had a reaction to cisplatin on C2D1 with chest pain and hypotension. She was hospitalized  with sepsis felt to be due to pneumonia. She trialed carboplatin/etoposide on 4/5/22 with a resulting rash and opted to forego further chemotherapy as she had completed radiation. Restaging scan 11/3/22 unfortunately with progression with  new R hilar lymph node, paratracheal nodes, and increase in size of R supraclavicular mass. She enrolled in [id number] and started C1D1 1/24/23. C1 complicated by anorexia and dysgeusia, and  delayed C2 by a week. She has further struggled with fatigue and  confusion, which may be related to mirtazapine. Dose reduced for C2 and mirtazapine stopped with improvement in symptoms.       PAST MEDICAL HISTORY  GERD  COPD    SOCIAL HISTORY  Retired - lighting . Lives at home with  and a kitten.  Tob: quit smoking 1999. 1 ppd x 25 yrs.  EtOH: wine 1 glass/day  Illicits: none    CURRENT MEDS  see below    ALLERGIES  see below    FAMILY HISTORY  paternal aunt - breast cancer dx 80s      History of Present Illness:     ID Statement:    MICHAEL FRANCES is a 67 year old Female      Chief Complaint: C8D15 [id number]   Interval History:      Today 9.12.2023. Patient in clinic with her  for C8D15 for [id number].      Patient reports she continues to feel good. She reports she is currently on a regular diet. She reports being active at home completing house chores.   also does mentions she is active at home. She reports she started practicing yoga again at home. She reports on some occasion she does experience some lightheadedness. She does continues to have intermittent episodes of epigastric pain. She does experience  SOB with moderate exertion. She reports she continues to have symptoms of constipation which is well controlled. Patient denies any headaches, rash/itching, chills or fever, cough, sputum, chest pain or chest tightness, painful inflammation/ulceration oral  mucous membranes, nausea/vomiting, diarrhea, hematemesis /hemoptysis, hematuria/rectal bleeding, urinary symptoms, swelling extremities, weakness/fatigue, or peripheral neuropathy. ROS as above. Remainder of 10 point review of systems elicited and otherwise  unremarkable.     Review of Systems:   Review of Systems:    System Review-All other systems including Neurologic, Cardiac, Gastrointestinal, Endocrine, Dermatologic, ENT, Respiratory, Infectious, Urologic, Musculoskeletal  have been reviewed and are negative  for  complaint outside of events noted below and within the assessment.        Allergies and Intolerances:       Allergies:   codeine: Drug, Other, Active   sulfa drugs: Drug Category, Rash, Active    Outpatient Medication Profile:   * Patient Currently Takes Medications as of 14-Sep-2023 10:52 documented in Structured Notes   ondansetron 8 mg oral tablet : Last Dose Taken:  , 1 tab(s) orally every 8 hours, As Needed -for nausea and vomiting , Start Date: 05-Sep-2023   prochlorperazine 10 mg oral tablet: Last Dose Taken:  , 1 tab(s) orally  every 6 hours, As Needed -for nausea and vomiting , Start Date: 05-Sep-2023   dexAMETHasone 2 mg oral tablet: Last Dose Taken:  , 0.5  tab orally once  a day before breakfast , Start Date: 05-Sep-2023   amoxicillin-clavulanate 500 mg-125 mg oral tablet: Last Dose Taken:   , 1 tab(s) orally 3 times a day , Start Date: 09-Aug-2023   prochlorperazine 10 mg oral tablet: Last Dose Taken:  , 1 tab(s) orally  every 6 hours, As Needed, Start Date: 10-Jul-2023   dexAMETHasone 2 mg oral tablet: Last Dose Taken:  , 0.5 tab(s) orally  once a day before breakfast, Start Date: 07-Jun-2023   ondansetron 8 mg oral tablet: Last Dose Taken:  , 1 tab(s) orally every  8 hours, As Needed, Start Date: 01-May-2023   conjugated estrogens 0.3 mg oral tablet: Last Dose Taken:  , 0.5 tab(s)  orally once a day   cetirizine-pseudoephedrine 5 mg-120 mg oral tablet, extended release:  Last Dose Taken:  , 1 tab(s) orally once a day   omeprazole 20 mg oral delayed release capsule: Last Dose Taken:  , 1  cap(s) orally once a day           Medical History:   Encounter for antineoplastic immunotherapy: ICD-10: Z51.12,  Status: Active   Hyperlipidemia: ICD-10: E78.5, Status: Active   GERD (gastroesophageal reflux disease): ICD-10: K21.9, Status:  Active   COPD (chronic obstructive pulmonary disease): ICD-10: J44.9,  Status: Active   Small cell lung cancer: ICD-10: C34.90, Status: Active    Social History:   Social  Substance History:  ·  Smoking Status former smoker   ·  Additional History    Retired  1PPD x 25 years quit 1999  ETOH 1 glass wine per day        Performance:   ECOG Performance Status: 1- Restricted from physically  stenuous work        Physical Exam:     Constitutional: awake/alert/oriented x3, no distress,  alert and cooperative   Eyes: PERRL, EOMI, clear sclera   ENMT: mucous membranes moist   Head/Neck: Neck supple, atraumatic head   Respiratory/Thorax: Patent airways, CTAB, normal  breath sounds with good chest expansion, thorax symmetric   Cardiovascular: Regular, rate and rhythm, no murmurs,  2+ equal pulses of the extremities, normal S 1and S 2   Gastrointestinal: Nondistended, soft.    LLQ tenderness   Musculoskeletal: ROM intact, no joint swelling, normal  strength   Extremities: normal extremities, no cyanosis edema,  contusions or wounds, no clubbing   Neurological: alert and oriented x3, pleasant   Psychological: Appropriate mood and behavior   Skin: no visible rash       Lab Results:    ·  Results        I have reviewed these laboratory results:    PT + INR, Plasma  Trending View      Result 12-Sep-2023 08:26:00  29-Aug-2023 09:56:00    Prothrombin Time, Plasma 11.9   11.8    International Normalized Ratio, Plasma 1.1   1.0        Complete Blood Count + Differential  Trending View      Result 12-Sep-2023 08:26:00  29-Aug-2023 09:56:00    White Blood Cell Count 7.2   6.6    Nucleated Erythrocyte Count 0.0   0.0    Red Blood Cell Count 3.50   L   3.74   L    HGB 10.9   L   11.4   L    HCT 32.7   L   35.0   L    MCV 93   94    MCHC 33.3   32.6       249    RDW-CV 13.8   13.3    Neutrophil % 68.2   69.5    Immature Granulocytes % 1.1   H   1.2   H    Lymphocyte % 17.6   18.7    Monocyte % 11.0   7.4    Eosinophil % 1.8   2.7    Basophil % 0.3   0.5    Neutrophil Count 4.87   4.60    Lymphocyte Count 1.26   1.24    Monocyte Count 0.79   0.49    Eosinophil Count 0.13   0.18    Basophil Count 0.02    0.03        Comprehensive Metabolic Panel  Trending View      Result 12-Sep-2023 08:26:00  29-Aug-2023 09:56:00    Glucose, Serum 80   81       142    K 4.0   3.8       104    Bicarbonate, Serum 22   29    Anion Gap, Serum 15   13    BUN 19   20    CREAT 0.72   0.77    GFR Female >90   84    Calcium, Serum 9.1   9.1    ALB 3.6   3.7    ALKP 36   44    T Pro 5.9   L   6.2   L    T Bili 0.7   0.6    Alanine Aminotransferase, Serum 6   L   7    Aspartate Transaminase, Serum 12   15        Bilirubin, Serum Direct - Conjugated  Trending View      Result 12-Sep-2023 08:26:00  29-Aug-2023 09:56:00    Bilirubin, Serum Direct - Conjugated 0.1   0.1        Ferritin, Serum  Trending View      Result 12-Sep-2023 08:26:00  29-Aug-2023 09:56:00    Ferritin, Serum 124   183   H        Phosphorus, Serum  Trending View      Result 12-Sep-2023 08:26:00  29-Aug-2023 09:56:00    Phosphorus, Serum 4.0   4.0        Creatine Kinase, Level  Trending View      Result 12-Sep-2023 08:26:00  29-Aug-2023 09:56:00    Creatine Kinase, Level 47   44        Magnesium, Serum  Trending View      Result 12-Sep-2023 08:26:00  29-Aug-2023 09:56:00    Magnesium, Serum 1.86   2.16        C Reactive Protein, Serum  Trending View      Result 12-Sep-2023 08:26:00  29-Aug-2023 09:56:00    C Reactive Protein, Serum 0.26   0.84        Activated Partial Thromboplastin Time  Trending View      Result 12-Sep-2023 08:26:00  29-Aug-2023 09:56:00    Activated Partial Thromboplastin Time 28   31        Urinalysis  29-Aug-2023 09:56:00      Result Value    Color, Urine  ROSE MARY  Reference Range: STRAW,YELLOW    Appearance, Urine  HAZY    Specific Gravity, Urine  1.026    pH, Urine  5.0    Protein, Urine  NEGATIVE    Glucose, Urine  NEGATIVE    Blood, Urine  NEGATIVE    Ketones, Urine  NEGATIVE    Bilirubin, Urine  NEGATIVE    Urobilinogen, Urine  <2.0    Nitrite, Urine  NEGATIVE    Leukocyte Esterase, Urine  NEGATIVE      Estradiol, Serum  29-Aug-2023  09:56:00      Result Value    Estradiol, Serum  <19      Lactate Dehydrogenase, Serum  29-Aug-2023 09:56:00      Result Value    LDH  186      Cortisol, Unspecified  29-Aug-2023 09:56:00      Result Value    Cortisol, Unspecified  12.1      Thyroid Stimulating Hormone, Serum  29-Aug-2023 09:56:00      Result Value    Thyroid Stimulating Hormone, Serum  2.89      Amylase, Serum  29-Aug-2023 09:56:00      Result Value    Amylase, Serum  32      Follicle Stimulating Hormone, Serum  29-Aug-2023 09:56:00      Result Value    Follicle Stimulating Hormone, Serum  <0.9      Lipase, Serum  29-Aug-2023 09:56:00      Result Value    Lipase, Serum  31      Free Thyroxine, Serum  29-Aug-2023 09:56:00      Result Value    Free Thyroxine, Serum  0.89      Adrenocorticotropic Hormone, Plasma  29-Aug-2023 09:56:00      Result Value    Adrenocorticotropic Hormone, Plasma  25.7      Luteinizing Hormone, Serum  29-Aug-2023 09:56:00      Result Value    Luteinizing Hormone, Serum  0.1        Radiology Result:    ·  Results    CT Chest Abdomen Pelvis with IV Contrast [Sep 6 2023 11:05AM]  FINAL REPORT    Interpreted by: ALBIN RICE PHILLIP, MD and FLORENCE RUBIN DO   09/06/23 11:03   Facility: [facility]     MRN: [id number]   Patient Name: MICHAEL FRANCES     STUDY:   CT CHEST ABDOMEN PELVIS W IV CONTRAST;  9/5/2023 9:56 am     INDICATION:   SCLC restaging C34.90: Small cell lung cancer. Per EMR: History of   small cell lung cancer of the right lower lobe diagnosed in 2021   status post chemoradiation. Patient had disease progression in  a new   right hilar lymph node,paratracheal nodes and right supraclavicular   node seen on CT from November 2022. Patient is currently on a   clinical trial status post 8 cycles.     COMPARISON:   CT abdomen/pelvis dated 08/07/2023.  CT chest/abdomen/pelvis dated   07/13/2023 and 05/16/2023.     ACCESSION NUMBER(S):   [id number]     ORDERING CLINICIAN:   ESEQUIEL REYES     TECHNIQUE:    CT of the chest, abdomen, and pelvis was performed. Contiguous axial    images were obtained at 3 mm slice thickness through the chest,   abdomen and pelvis. Coronal and sagittal reconstructions at 3 mm   slice thickness were performed.   65 ml of contrast Omnipaque 350 were administered intravenously   without  immediate complication.     FINDINGS:   CHEST:     LUNG/PLEURA/LARGE AIRWAYS:   Stable size of a 1.3 x 0.9 cm right lower lobe ground-glass nodule   (series 3, image 144). Unchanged 0.6 cm ground-glass nodule within   the  right lower lobe more superiorly (image 98). The previously   visualized new linear solid nodule within the right upper lobe seen   onexam dated 07/13/2020 is not visualized on current exam and likely   reflected focal atelectasis. No new  or enlarging suspicious pulmonary   nodules.     No focal consolidation, pleural effusion, or pneumothorax. Background   of mild upper lobe predominant centrilobular and paraseptal   emphysematous changes again noted.     VESSELS:    Aorta and pulmonary arteries are normal caliber. Mild   atherosclerotic changes of the aorta are identified. No coronary   artery calcifications are present.     HEART:   The heart is normal in size. There is no pericardial effusion.      MEDIASTINUM AND MERISSA:   No mediastinal, hilar or axillary lymphadenopathy is present. The   esophagus is normal.     CHEST WALL AND LOWER NECK:   The soft tissues of the chest wall demonstrate no gross abnormality.   The visualized  thyroid gland appears within normal limits.     ABDOMEN:     LIVER:   The liver is normal in size and attenuation. Unchanged appearance of   subcentimeter hypodense lesions within the hepatic parenchyma which   are too small  to characterize but favored to represent simple cysts.   No new suspicious hepatic lesions.     BILE DUCTS:   The intrahepatic and extrahepatic ducts are not dilated.     GALLBLADDER:   The gallbladder is nondistended and without   evidence of radiopaque   stones.     PANCREAS:   The pancreas appears unremarkable without evidence of ductal   dilatation or masses.     SPLEEN:   The spleen is normal in size without focal lesions.     ADRENAL GLANDS:    Bilateral adrenal glands appear normal.     KIDNEYS AND URETERS:   The kidneys are normal in size and enhance symmetrically. Unchanged   appearance of subcentimeter hypodense lesions within the bilateral   renal parenchyma which are  too small to characterize but   statistically favored to represent simple cysts. No   hydroureteronephrosis or nephroureterolithiasis is identified.     PELVIS:     BLADDER:   The urinary bladder is decompressed, limited for  evaluation.     REPRODUCTIVE ORGANS:   The uterus is surgically absent. No pelvic mass.     BOWEL:   The stomach is unremarkable. Unchanged appearance of a duodenal   diverticulum. Interval improvement of previously visualized  segmental   colitis associated with diverticulosis affecting the sigmoid colon   with minimal residual pericolonic fat stranding and hyperemia of the   sigmoid colon on current exam. The remainder of the small and large   bowel are normal  in caliber without evidence of inflammatory wall   thickening. Normal appendix.     VESSELS:   There is no aneurysmal dilatation of the abdominal aorta. The IVC   appears normal. Mild atherosclerotic calcifications of the abdominal    aorta and its branching vessels are again noted.     PERITONEUM/RETROPERITONEUM/LYMPH NODES:   There is no free or loculated fluid collection, no free   intraperitoneal air. The retroperitoneum appears normal. No   abdominopelvic lymphadenopathy  by CT size criteria.     BONE AND SOFT TISSUE:   No acute osseous abnormality or suspicious osseous lesions. Stable   small fat containing umbilical hernia. The abdominal wall soft   tissues are otherwise unremarkable in appearance.      IMPRESSION:   Small cell lung cancer, restaging scan:   1. Stable size of 1.3  cm and 0.6 cm right lower lobe pulmonary   nodules as described above. No new or enlarging suspiciouspulmonary   nodules.   2. No new sites of metastatic  disease within the chest, abdomen or   pelvis.   3. Interval improvement of previously visualized segmental colitis   associated with diverticulosis affecting the sigmoid colon with   minimal residual pericolonic fat stranding and hyperemia  of the   sigmoid colon on current exam. No intra-abdominal fluid collections   or pneumoperitoneum. Findings compatible with a resolving   inflammatory process without evidence of new or worsening   complications.   4.Remaining chronic  and incidental findings are unchanged as   described above.     I personally reviewed the images/study and I agree with the resident   findings as stated. This study was interpreted at [facility],  [place], [place].     Transcribed By: Interface, User   Electronically Signed By: ALBIN RICE PHILLIP 09/06/23 11:03       Assessment and Plan:     Assessment and Plan:   Assessment:    Mrs. Branch is a 67 yo with COPD and GERD who presented with newly diagnosed SCLC completed BID radiation planned concurrently with cis/etoposide but had a reaction  C2D1 to cisplatin. Completed 1 cycle carbo/etop D1 4/5/22 also complicated by facial flushing and erythematous rash and stopped further treatment. Now s/p PCI in 6/2022. Most recent CT concerning for disease progression. Referred for clinical trial AM  1518, C1D1 1/24/23. Treatment has been complicated by orthostatic hypotension, worsening short-term memory, increased lethargy, weight loss, and poor appetite. Delayed C2 by 1 week and dose-reduced. Confusion has resolved during C3 after stopping mirtazapine  and dose reduction.       Plan:    # SCLC  - limited stage, brain MRI negative  - previously discussed concurrent chemoradiation, with cisplatin/etoposide with side effects including but not limited to  allergic reaction, fatigue, risk of infection, tinnitus, neuropathy, injury to liver/kidney, risk of anemia/thrombocytopenia and  was consented  - she was also interested in scalp cooling, which unfortunately she is not a candidate for d/t SCLC  - started concurrent chemoradiation BID with Q3week cis/etop on 2/15 at [facility]  -unfortunately had reaction to cisplatin on C2D1 resulting in hospitalization and also felt to be septic due to pneumonia  - discussed that we typically do 3-4 cycles chemotherapy, and alternative regimens include carboplatin/etoposide vs CAV. Given reaction to cisplatin, concern for possible reaction to carboplatin as well, although unknown rates of cross reaction. Alternatively,  they also asked about discontinuation of chemotherapy, since she completed radiation. She ultimately decided to trial carbo/etoposide. She is aware of the heightened risk of allergic reaction, as well as other side effects including but not limited to  fatigue, risk of infection, neuropathy, injury to liver/kidney, risk of anemia/thrombocytopenia. consented 3/28/22  -s/p 1 cycle Carbo/Etop D1 4/5/22, developed facial flushing and erythematous rash on arms and chest s/p treatment, resolved with benadryl  -opted to forego further chemotherapy and will continue on maintenance  - s/p PCI 6/2022  - CT C/A/P and brain MRI 5/2022 and most recently 8/2022 with good response and no disease  -  MRI brain 11/7 without evidence of metastatic disease  - CT C/A/P 11/3 with multiple new enlarged LN concerning for disease progression - discussed with Dr. Valdivia not able to repeat radiation.  - referred to phase 1 clinical trial and consideration for [id number] or [id number]  - 1.5.2023 : Patient signed consent to take part on phase 1 study [id number]. Look clinically good to take part in study. Will start on study ASAP if eligible.   - 1.24.2023: Seen today and ready to start trial on [id number].  - 1.31.2023: Seen today, ready for C1D8 AMGN  1518   - 2.7.2023: Seen today for C1D15 [id number] - continue with trial   - 2.14.2023: Seen in follow-up on [id number] with overall worsening of general health  - 2.21.2023: Seen today for C2D1 AMGN 1518 with continued weight loss although perhaps starting to plateau, more energy since being off treatment, still poor appetite. will delay by 1 week, started dexamethasone 2 mg daily, and consider dose reduction.  - 2.28.2023: Seen today for C2D1 AMGN 1518 after delaying for 1 week. Energy level and appetite are improved, although still losing a little weight. Confusion is worse today, possibly due to mirtazapine vs study-related. Will dose reduce today.  - 3.7.2023: Seen today C2D8 AMGN 1518 after dose-reduction last week. Confusion is mildly improved, energy level and appetite are stable. Will add marinol for appetite.   - 3.14.2023: Seen today C2D15 AMGN 1518. Overall improved: confusion continues to improve, energy level and appetite are improved. Weight is improved. Will continue dexamethasone and marinol.  - 3.15.2023: C2D16 No CRS symptoms observed after last treatment Overall Symptoms are improving and she is tolerating treatment.  -3.16.2023: C2D17 No CRS symptoms observed after last treatment Overall Symptoms are improving and she is tolerating treatment.  - 3.28.2023 : Most recent imaging suggest patient benefiting from current treatment. Ongoing symptom  possible common cold/season allergies. Since the patient looks clinically good we will proceed with C3D1 today. Educated patient to call the office ASAP  if symptoms worsen.   - 4.11.2023: C3D15 Feeling well and overall improved with fatigue, confusion, anorexia. Proceed at current dosing and weaning steroids as below  -4.25.2023: C4D1 Pt is feeling well, no complaints of fatigue, confusion, memory loss. Has gained weight. Energy levels are good. Proceed with the current dosing. Pt is no longer on steroids.   -5.9.2023: C4D15. Pt is tolerating treatment well  with no new complains. Continues to have good energy level endorses poor appetite with out any weight loss. Suggested to start taking the dronabinol.  Will proceed with treatment today.  -5.23.2023: C5D1. Pt is tolerating treatment well. Energy levels are good, is having some weight loss and constipation. Started back on dex for appetite. Pt will let us know if she remains constipated over the next several days.  Personally reviewed scans  with stable disease. Will proceed with treatment today.   -6.6.2023: C5D15. Continues to tolerate treatment well. Since we restarted her on Dexamethasone she reports her energy levels and appetite has improved. Will continue on low dose Dex. With no new symptoms, we will proceed with treatment today.   -6.20.2023: C6D1. Doing well on dexamethasone 1 mg daily. Will proceed with treatment.  -7.5.2023: C6D15. We will proceed with treatment since the patient is tolerating treatment with no significant AE's and also give 1/2 liter of IV fluids.   - 7.18.2023: C7D1. Doing well, will proceed with treatment. She is out of dexamethasone, and will monitor off steroids.   -8.1.2023: C7D15. Continues to tolerate treatment. Will proceed with C7D15 today. RTC per protocol.  - 8.29.2023: C8D1. C8 delayed due to hospitalization for diverticulitis. Has completed antibiotics and feeling well for treatment.  - 9.12.2023: C8D15. Most recent imgaing suggest the patient continues to benefit from current treatment. The imaging also suggest improvement of previously visualized segmental colitis associated with diverticulosis affecting the sigmoid colon with minimal  residual pericolonic fat standing and hyperemia of the sigmoid colon. Imaging also suggest resolving inflammatory process without evidence of new or worsening complications. We will proceed with C8D15 today since the patient is also clinically feeling  good. RTC per protocol.    # Confusion - resolved  - significantly worsened after taking  double dose of mirtazapine accidentally, although has been generally down-trending since starting study (which is also when she started taking mirtazapine) and now resolved  - brain MRI without progression of cancer  - will continue off mirtazapine    # Unintentional weight loss - stable  # Anorexia- Improving  # Dysgeusia- improving  - all improved on steroids. unclear if this is from cancer or side effect of study drug  - stopped mirtazapine due to concerns for confusion   - weaned off dexamethasone and started marinol, but kept forgetting to take it  - dexamethasone trial started again on 5.23.2023. Continued on 1 mg daily - will trial off after 7.18.23.     # Fatigue - Resolved  - back to normal pretty much, weaning steroids as above    # frequent PVCs - asymptomatic  - saw onco-cardiology and completed 24-hr Holter monitor  - recommended decreasing caffeine and sudafed intake  - continue to monitor    #hyponatremia - improving  - improved recently, will monitor    # Forgetfulness - improved - however noted decline on AMGN study- unclear etiology.    - started after PCI, on memantine daily and has since stopped  - offered neurocognitive evaluation previously and she will think about this and readdress next visit  -  notes some decline in last 3 weeks- especially short term memory loss.    - Improved    # Neuropathy - resolved  - mild peripheral neuropathy with numbness of the toes - likely due to cisplatin  - will continue to monitor closely  - not endorsing any neuropathy at this visit     # Rash - resolved  Waxing and waning rash throughout her body. ?improving. Unclear etiology, patient and  feel timing coincided with starting BMX.   - she will hold off on further BMX  - thought to be due to sulfa allergy, possibly due to platinum agents   - continue to monitor    #odynophagia - resolved  -rad onc aware  -prescribed BMX solution  -will continue to monitor    #constipation-improving  -occasional.  "Prescribed Senna S  -will monitor      [place] [facility]:   Ref to [place]: virtual exercise           Note Recipients: Nova Omer MD - [phone]  Francy Archer MD   Select \"Yes\" when ready to send to Provider(s) Listed Above:  Note sent to providers named above        Electronic Signatures:  Keanu Lewis ([facility]-CNP)  (Signed 14-Sep-2023 11:03)   Authored: Patient Visit Information, Cancer History,  History of Present Illness, Review of Systems, Allergies and Outpatient Medication Profile, Problem List, Social History, Performance Assessments, Vitals and Measurements, Physical Exam, Results, Assessment and Plan, To Send Document via Auto Fax      Last Updated: 14-Sep-2023 11:03 by Keanu Lewis ([facility]-CNP)   "

## 2023-10-01 ENCOUNTER — TELEPHONE (OUTPATIENT)
Dept: HEMATOLOGY/ONCOLOGY | Facility: HOSPITAL | Age: 68
End: 2023-10-01
Payer: MEDICARE

## 2023-10-04 ENCOUNTER — PATIENT OUTREACH (OUTPATIENT)
Dept: CARE COORDINATION | Facility: CLINIC | Age: 68
End: 2023-10-04
Payer: MEDICARE

## 2023-10-04 DIAGNOSIS — Z00.6 CLINICAL TRIAL EXAM: ICD-10-CM

## 2023-10-04 DIAGNOSIS — C34.90 SCLC (SMALL CELL LUNG CARCINOMA) (MULTI): ICD-10-CM

## 2023-10-04 DIAGNOSIS — C34.90: Primary | ICD-10-CM

## 2023-10-04 NOTE — PROGRESS NOTES
Unable to reach patient for one month post discharge follow up call.   LVM with call back number for patient to call if needed.

## 2023-10-10 ENCOUNTER — EDUCATION (OUTPATIENT)
Dept: HEMATOLOGY/ONCOLOGY | Facility: HOSPITAL | Age: 68
End: 2023-10-10
Payer: MEDICARE

## 2023-10-10 ENCOUNTER — DOCUMENTATION (OUTPATIENT)
Dept: HEMATOLOGY/ONCOLOGY | Facility: CLINIC | Age: 68
End: 2023-10-10
Payer: MEDICARE

## 2023-10-10 ENCOUNTER — HOSPITAL ENCOUNTER (OUTPATIENT)
Dept: RESEARCH | Facility: HOSPITAL | Age: 68
Discharge: HOME | End: 2023-10-10
Payer: MEDICARE

## 2023-10-10 VITALS
OXYGEN SATURATION: 97 % | RESPIRATION RATE: 16 BRPM | SYSTOLIC BLOOD PRESSURE: 104 MMHG | HEART RATE: 81 BPM | BODY MASS INDEX: 20.75 KG/M2 | DIASTOLIC BLOOD PRESSURE: 70 MMHG | WEIGHT: 108.47 LBS | TEMPERATURE: 98.6 F

## 2023-10-10 DIAGNOSIS — C34.90 SMALL CELL LUNG CANCER (MULTI): ICD-10-CM

## 2023-10-10 LAB
ALBUMIN SERPL BCP-MCNC: 3.5 G/DL (ref 3.4–5)
ALP SERPL-CCNC: 39 U/L (ref 33–136)
ALT SERPL W P-5'-P-CCNC: 10 U/L (ref 7–45)
ANION GAP SERPL CALC-SCNC: 13 MMOL/L (ref 10–20)
APTT PPP: 29 SECONDS (ref 27–38)
AST SERPL W P-5'-P-CCNC: 15 U/L (ref 9–39)
BASOPHILS # BLD AUTO: 0.02 X10*3/UL (ref 0–0.1)
BASOPHILS NFR BLD AUTO: 0.3 %
BILIRUB DIRECT SERPL-MCNC: 0.1 MG/DL (ref 0–0.3)
BILIRUB SERPL-MCNC: 0.7 MG/DL (ref 0–1.2)
BUN SERPL-MCNC: 22 MG/DL (ref 6–23)
CALCIUM SERPL-MCNC: 9 MG/DL (ref 8.6–10.6)
CHLORIDE SERPL-SCNC: 105 MMOL/L (ref 98–107)
CK SERPL-CCNC: 31 U/L (ref 0–215)
CO2 SERPL-SCNC: 27 MMOL/L (ref 21–32)
CREAT SERPL-MCNC: 0.78 MG/DL (ref 0.5–1.05)
CRP SERPL-MCNC: 1.93 MG/DL
EOSINOPHIL # BLD AUTO: 0.11 X10*3/UL (ref 0–0.7)
EOSINOPHIL NFR BLD AUTO: 1.4 %
ERYTHROCYTE [DISTWIDTH] IN BLOOD BY AUTOMATED COUNT: 14.1 % (ref 11.5–14.5)
FERRITIN SERPL-MCNC: 128 NG/ML (ref 8–150)
GFR SERPL CREATININE-BSD FRML MDRD: 83 ML/MIN/1.73M*2
GLUCOSE SERPL-MCNC: 91 MG/DL (ref 74–99)
HCT VFR BLD AUTO: 33.5 % (ref 36–46)
HGB BLD-MCNC: 10.6 G/DL (ref 12–16)
IMM GRANULOCYTES # BLD AUTO: 0.03 X10*3/UL (ref 0–0.7)
IMM GRANULOCYTES NFR BLD AUTO: 0.4 % (ref 0–0.9)
INR PPP: 1 (ref 0.9–1.1)
LYMPHOCYTES # BLD AUTO: 1.41 X10*3/UL (ref 1.2–4.8)
LYMPHOCYTES NFR BLD AUTO: 18.1 %
MAGNESIUM SERPL-MCNC: 2.06 MG/DL (ref 1.6–2.4)
MCH RBC QN AUTO: 30.2 PG (ref 26–34)
MCHC RBC AUTO-ENTMCNC: 31.6 G/DL (ref 32–36)
MCV RBC AUTO: 95 FL (ref 80–100)
MONOCYTES # BLD AUTO: 0.68 X10*3/UL (ref 0.1–1)
MONOCYTES NFR BLD AUTO: 8.7 %
NEUTROPHILS # BLD AUTO: 5.53 X10*3/UL (ref 1.2–7.7)
NEUTROPHILS NFR BLD AUTO: 71.1 %
NRBC BLD-RTO: 0 /100 WBCS (ref 0–0)
PHOSPHATE SERPL-MCNC: 4.9 MG/DL (ref 2.5–4.9)
PLATELET # BLD AUTO: 206 X10*3/UL (ref 150–450)
PMV BLD AUTO: 9.8 FL (ref 7.5–11.5)
POTASSIUM SERPL-SCNC: 3.7 MMOL/L (ref 3.5–5.3)
PROT SERPL-MCNC: 5.6 G/DL (ref 6.4–8.2)
PROTHROMBIN TIME: 11.5 SECONDS (ref 9.8–12.8)
RBC # BLD AUTO: 3.51 X10*6/UL (ref 4–5.2)
SODIUM SERPL-SCNC: 141 MMOL/L (ref 136–145)
TSH SERPL-ACNC: 3.39 MIU/L (ref 0.44–3.98)
WBC # BLD AUTO: 7.8 X10*3/UL (ref 4.4–11.3)

## 2023-10-10 PROCEDURE — 85025 COMPLETE CBC W/AUTO DIFF WBC: CPT

## 2023-10-10 PROCEDURE — 80053 COMPREHEN METABOLIC PANEL: CPT

## 2023-10-10 PROCEDURE — 2500000005 HC RX 250 GENERAL PHARMACY W/O HCPCS: Performed by: STUDENT IN AN ORGANIZED HEALTH CARE EDUCATION/TRAINING PROGRAM

## 2023-10-10 PROCEDURE — 85610 PROTHROMBIN TIME: CPT

## 2023-10-10 PROCEDURE — 96413 CHEMO IV INFUSION 1 HR: CPT

## 2023-10-10 PROCEDURE — 36415 COLL VENOUS BLD VENIPUNCTURE: CPT

## 2023-10-10 PROCEDURE — 84100 ASSAY OF PHOSPHORUS: CPT

## 2023-10-10 PROCEDURE — 82248 BILIRUBIN DIRECT: CPT

## 2023-10-10 PROCEDURE — 85730 THROMBOPLASTIN TIME PARTIAL: CPT

## 2023-10-10 PROCEDURE — 82550 ASSAY OF CK (CPK): CPT

## 2023-10-10 PROCEDURE — 84443 ASSAY THYROID STIM HORMONE: CPT

## 2023-10-10 PROCEDURE — 83735 ASSAY OF MAGNESIUM: CPT

## 2023-10-10 PROCEDURE — 82728 ASSAY OF FERRITIN: CPT

## 2023-10-10 PROCEDURE — 99214 OFFICE O/P EST MOD 30 MIN: CPT

## 2023-10-10 PROCEDURE — 86140 C-REACTIVE PROTEIN: CPT

## 2023-10-10 RX ADMIN — Medication 3 MG: at 13:23

## 2023-10-10 NOTE — RESEARCH NOTES
Naming Convention:  [facility]<[id number]>    DCRU NURSING VISIT NOTE  Study Name: ALEXANDRU 1518  IRB#: [id number]  DCRU#: [id number]  Protocol Version Dated: 03.22.23  PI: Roshan Kumar MD.    Time point: Cycle 5 and beyond - Day 15    Encounter Date: 10/10/2023  Encounter Time:  9:00 AM EDT  Encounter Department: [facility] HEMATOLOGY AND ONCOLOGY     #1     Phone Pager   Carmen Rios -[phone]    #2 Phone Pager        Other Phone Pager          Study Regimen and Dosing   Refer to [place] Treatment Plan for orders  Part A1 Dose Exploration & A2 Dose Expansion - Small Cell Lung Cancer Relapsed or Refractory patients who progressed or recurred following platinum-based chemotherapy will receive AMG-757 to evaluate safety, tolerability and determine the maximum tolerated dose (MTD) or recommended phase 2 dose (RP2D).  Cycle = 28 days  TARLATAMAB () administered IV Day 1 and Day 15.     Dietary Guidelines   Regular diet:     Admission and Prior to Starting Study Activities   Notify  when patient arrives to unit.  Complete DCRU/[facility] intake form in EMR.  Obtain vital signs including Pulse Oximetry after seated or semi-fowlers x 5 mins. Record on flow sheet & in EMR.  Obtain weight in kilograms - with shoes off & heavy items removed.  Insert one peripheral IV line for sample collection procedures (flush line with normal saline following each blood draw). Access mediport (if available) otherwise insert second peripheral line in opposite arm for Investigative drug administration (if peripheral line, flush line with normal saline before & after infusion)  Physical Exam.     Criteria to Treat   DCRU RN reviewed and meets eligibility to proceed with treatment plan   Time team notified: [id number]   DCRU RN notifies study team to review eligibility and approval before dosing procedures  Time team approves: [id number]      Subjective    Gladys Branch is a 67 y.o. female and is here for a Research clinical visit.    Visit Provider: [facility] ROOM 9 [facility] [place] [facility] 6509-1     Allergies:   Allergies   Allergen Reactions    Cisplatin Other     CHEMO INDUCED (Moderate); Dyspepsia (Moderate); Headaches (Moderate); Hypotension (Moderate); Numbness (Moderate); Resp Distress (Moderate)    Codeine Nausea Only and Other     nausea    Sulfa (Sulfonamide Antibiotics) Rash       Objective     Vital Signs:    Vitals:    10/10/23 0858 10/10/23 1318 10/10/23 1423   BP: 111/67 102/71 104/70   Pulse: 72 68 81   Resp: 16 16 16   Temp: 36.3 °C (97.3 °F) 36.5 °C (97.7 °F) 37 °C (98.6 °F)   TempSrc: Oral Oral Oral   SpO2: 96% 99% 97%   Weight: 49.2 kg (108 lb 7.5 oz)         Physical Exam     ASSESSMENT and PLAN:  Problem List Items Addressed This Visit          Hematology and Neoplasia    Small cell lung cancer (CMS/HCC)    Relevant Medications    Study [id number] Tarlatamab (AMG-757) 3 mg in sodium chloride 0.9% 250 mL IV (Completed)    Other Relevant Orders    Infusion Appointment Request (Completed)    CBC and Auto Differential (Completed)    Comprehensive metabolic panel (Completed)    APTT (Completed)    Bilirubin, Direct (Completed)    CK (Completed)    C-Reactive Protein (Completed)    Ferritin (Completed)    Magnesium (Completed)    Phosphorus (Completed)    Protime-INR (Completed)        Medications as of the completion of today's visit:  Current Outpatient Medications   Medication Sig Dispense Refill    albuterol 90 mcg/actuation inhaler Inhale 1-2 puffs every 4 hours if needed for wheezing or shortness of breath.      cetirizine-pseudoephedrine (ZyrTEC-D) 5-120 mg 12 hr tablet Take 1 tablet by mouth once daily. 30 tablet 5    cholecalciferol (Vitamin D-3) 25 MCG (1000 UT) tablet Take by mouth.      dexAMETHasone (Decadron) 2 mg tablet Take 1 tablet (2 mg) by mouth.      dronabinol (Marinol) 2.5 mg capsule take 1 capsule by mouth once daily 1 hour before dinner       estrogens, conjugated, (Premarin) 0.3 mg tablet Take 0.5 tablets (0.15 mg) by mouth once daily.      omeprazole (PriLOSEC) 20 mg DR capsule Take by mouth.      ondansetron (Zofran) 8 mg tablet Take 1 tablet (8 mg) by mouth every 8 hours if needed.      prochlorperazine (Compazine) 10 mg tablet Take by mouth every 6 hours if needed.      vit A/vit C/vit E/zinc/copper (PRESERVISION AREDS ORAL) Take by mouth.       No current facility-administered medications for this encounter.       Administrations This Visit       Study [id number] Tarlatamab (AMG-757) 3 mg in sodium chloride 0.9% 250 mL IV       Admin Date  10/10/2023 Action  New Bag Dose  3 mg Route  intravenous Administered By  Betsy Hadley RN                    Orders placed during today's visit:  Orders Placed This Encounter   Procedures    CBC and Auto Differential     Standing Status:   Standing     Number of Occurrences:   1     Order Specific Question:   Release result to MyChart     Answer:   Immediate    Comprehensive metabolic panel     Standing Status:   Standing     Number of Occurrences:   1     Order Specific Question:   Release result to MyChart     Answer:   Immediate    APTT     Standing Status:   Standing     Number of Occurrences:   1     Order Specific Question:   Release result to MyChart     Answer:   Immediate [1]    Bilirubin, Direct     Standing Status:   Standing     Number of Occurrences:   1     Order Specific Question:   Release result to MyChart     Answer:   Immediate [1]    CK     Standing Status:   Standing     Number of Occurrences:   1     Order Specific Question:   Release result to MyChart     Answer:   Immediate [1]    C-Reactive Protein     Standing Status:   Standing     Number of Occurrences:   1     Order Specific Question:   Release result to MyChart     Answer:   Immediate [1]    Ferritin     Standing Status:   Standing     Number of Occurrences:   1     Order Specific Question:   Release result to MyChart      Answer:   Immediate [1]    Magnesium     Standing Status:   Standing     Number of Occurrences:   1     Order Specific Question:   Release result to MyChart     Answer:   Immediate [1]    Phosphorus     Standing Status:   Standing     Number of Occurrences:   1     Order Specific Question:   Release result to MyChart     Answer:   Immediate [1]    Protime-INR     Standing Status:   Standing     Number of Occurrences:   1     Order Specific Question:   Release result to MyChart     Answer:   Immediate [1]    Adult diet     Order Specific Question:   Diet type     Answer:   Regular        No study found     Study Specific Instructions and Documentation   LABS  PREMEDS  VITALS  STUDY DRUG  VITALS  DISCHARGE     PRE-DOSE Safety Labs   Refer to [place] Treatment Plan for orders - 0922     Premedication   Refer to [place] Treatment Plan for orders  Within 1 hour prior to TARLATAMAB - N/a     Day 15 Pre-dose Vital Signs    Temp, HR, Resp, BP, Pulse Oximetry: Seated or Semi-Fowlers x 5 minutes before obtaining  Position and temperature location: should be the same that is used throughout the study                                     Completed at 1318       Research Drug Administration Tarlatamab ()   Refer to [place] Treatment Plan for orders   Administer dose over 60 minutes (+/- 10 mins) followed by a 3 - 5 minute Normal saline flush. (This will be the end time after the flush). Document start time & end of study medication; document start time and end time of the flush.  Participant to remain on Unit for 2 hour post dose observation.   Observation started: 1423  Observation Ended: 1623     Infusion-Related Reactions    [facility] [place]  Grading and Management of Cytokine Release Syndrome   CRS Grade Description of Severity Minimum Expected Intervention Instructions for Dose Modifications of TARLATAMAB         1 Symptoms are not life threatening and require symptomatic treatment only, eg, fever, nausea, fatigue, headache,  myalgias, malaise Administer symptomatic treatment (eg, paracetamol/ acetaminophen for fever). Monitor for CRS symptoms including vital signs and pulse oximetry at least Q2 hours for 12 hours or until resolution,  Whichever is earlier. N/A                             2 Symptoms require and respond to moderate intervention  Oxygen requirement <40%, OR  Hypotension responsive to fluids or low dose of one vasopressor, OR  Grade 2 organ toxicity or grade 3 transaminitis per CTCAE criteria Administer:  Symptomatic treatment (eg, paracetamol/ acetaminophen for fever)  Supplemental oxygen when oxygen saturation is <90% on room air  Intravenous fluids or low dose vasopressor for hypotension when systolic blood pressure is  <85 mmHg. Persistent tachycardia (eg >120 bpm) may also indicate the need for intervention for hypotension.  Monitor for CRS symptoms including vital signs and pulse oximetry at least Q2 hours for 12 hours or until resolution to CRS grade <= 1, whichever is Earlier.  For subjects with extensive co-morbidities or poor performance status, manage  per grade 3 CRS guidance below If CRS occurs during TARLATAMAB treatment, immediately interrupt the infusion and delay the next TARLATAMAB dose until the event resolves to CRS  grade <= 1 for no less than 72 hours.    Permanently discontinue TARLATAMAB if there is no improvement to CRS  <= grade 1 within 7 days                     3 Symptoms require and respond to aggressive intervention  Oxygen requirement >=40%, OR    Hypotension requiring high dose or multiple vasopressors, OR  Grade 3 organ toxicity or grade 4 transaminitis per CTCAE criteria Admit to intensive care unit for close clinical and vital sign monitoring per institutional Guidelines.  Administer dexamethasone (or equivalent) IV at a dose maximum of 3 doses of 8 mg (24 mg/day). The dose should Then be reduced step-wise.  Investigators should consider use of tocilizumab 8 mg/kg over 1 hour (not to  exceed 800mg). Repeat tocilizumab every 8 hours as needed if not responsive to IV fluids or  Increasing supplemental oxygen. Maximum of 3 doses in a 24 hour period. Maximum total of 4 doses If CRS occurs during TARLATAMAB treatment, immediately interrupt TARLATAMAB and delay the next dose until event resolves to CRS grade <= 1 for no less than 72 hours.  Permanently discontinue TARLATAMAB if there is no improvement to CRS  <= grade 2 within 5 days or CRS <= grade 1 within 7 days.  Permanently discontinue TARLATAMAB if CRS grade 3 occurs at the initial run in dose (i.e., at MTD1) (Applicable only after  MTD1 has been defined).                       4 Life-threatening symptoms  Requirement for ventilator support OR  Grade 4 organ toxicity  (excluding transaminitis) per CTCAE criteria Admit to intensive care unit for close clinical and vital sign monitoring per institutional Guidelines.  Administer dexamethasone (or equivalent) IV at a dose maximum of 3 doses of 8 mg (24 mg/day). Further steroid use should be discussed with [email or web address] medical monitor.  Investigators should consider use of tocilizumab 8 mg/kg IV Over 1 hour (not to exceed 800mg). Repeat tocilizumab every 8 hours as needed if not responsive to IV fluids or increasing supplemental Oxygen. Maximum of 3 doses  In a 24 hour period. Maximum Total of 4 doses. If CRS occurs during TARLATAMAB treatment, Immediately stop the infusion.  Permanently discontinue TARLATAMAB therapy.        Post-Dose Vital Signs   Temp, HR, Resp, BP, Pulse Oximetry: Seated or Semi-Fowlers x 5 minutes before obtaining  Position and temperature location: should be the same that is used throughout the study  End of Infusion    Vitals:    10/10/23 1423   BP: 104/70   Pulse: 81   Resp: 16   Temp: 37 °C (98.6 °F)   SpO2: 97%         Day 15 Discharge Instructions   Patient may be discharged after 2 hour observation.     Betsy Hadley RN  10/10/23

## 2023-10-10 NOTE — PROGRESS NOTES
Patient ID: Gladys Branch is a 67 y.o. female.    DIAGNOSIS    Small Cell Lung Cancer    By immunostaining, the tumor cells are positive for CAM 5.2, INSM1, and TTF-1, and negative for p40 and LCA. The proliferation index by Ki-67 immunostaining is approximately 90%.  Synaptophysin, Chromogranin and INSM-1 are positive.  AE1/AE3 is negative. NEOGENOMICS, RB protein expression is lost in the tumor cells    STAGING    [id number], limited stage  Recurrence    CURRENT SITES OF DISEASE    RLL, supraclavicular    MOLECULAR GENOMICS      PRIOR THERAPY    Concurrent chemoradiation with Cisplatin/Etoposide every 3 weeks; C1D1 2/15/22, reaction to cisplatin C2D1 and did not receive D2 or D3 etoposide  Carbo/etoposide 4/5/2022 1 cycle c/b rash  PCI 5/21-6/13/22    CURRENT THERAPY    Phase 1 study [id number]  with study drug Taralatamab 1/24/23 - present.    CURRENT ONCOLOGICAL PROBLEMS      HISTORY OF PRESENT ILLNESS    Mrs. Branch is a 67 yo with PMH GERD and COPD who originally was round to have a RLL nodule measuring 11 mm in 4/28/2021 found as part of CT calcium score scan. An ensuing CT chest w/o contrast was done on 5/19/21 which revealed subsolid pulmonary nodules measuring 14 mm in the RLL. She had CT chest w/contrast on 11/29/21 which confirmed stable 14 mm GGO of the RLL and decreased RLL subpleural nodule. She met thoracic surgeon Dr. Farfan, who ordered PET/CT scan done on 12/8/21 which did not reveal any FDG-avid nodules within the lung parenchyma but R hilar nodes with max SUV 8.0. He referred her for bronch, which was done on 1/21/22 by Dr. Mcdaniels. 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 Brain MRI was negative.    She started concurrent chemoradiation with BID radiation with cis/etop 2/15/22, and unfortunately had a reaction to cisplatin on C2D1 with chest pain and hypotension. She was hospitalized with sepsis felt to be due to pneumonia. She trialed carboplatin/etoposide on 4/5/22 with a  resulting rash and opted to forego further chemotherapy as she had completed radiation. Restaging scan 11/3/22 unfortunately with progression with new R hilar lymph node, paratracheal nodes, and increase in size of R supraclavicular mass. She enrolled in [id number] and started C1D1 1/24/23. C1 complicated by anorexia and dysgeusia, and delayed C2 by a week. She has further struggled with fatigue and confusion, which may be related to mirtazapine. Dose reduced for C2 and mirtazapine stopped with improvement in symptoms.     PAST MEDICAL HISTORY    GERD  COPD    SOCIAL HISTORY    Retired - lighting . Lives at home with  and a kitten.  Tobacco: quit smoking 1999. 1 ppd x 25 yrs.  EtOH: wine 1 glass/day  Illicits: none    CURRENT MEDS    Please see med list    ALLERGIES    Codeine, Sulfa drugs, Cisplatin    FAMILY HISTORY    Paternal aunt - breast cancer dx 80s    Subjective      Today 10.10.2023. Patient in clinic with her  for C9D15 for [id number].      Patient reports she continues to tolerate treatment without any major issue. She reports this past week she received her COVID booster shot. She reports with the exception of soreness at the site of injection which has resolved she did not have any other symptoms. She reports her taste alteration has improved. She reports having a good appetite and actually gained some weight. She continues to experience symptoms of SOB with moderate exertion, constipation and intermittent episodes of epigastric pain (acid reflux) which are stable and well managed. She reports the past couple of week she has been actively working out (yoga, stationary bike, walking and lifting light weights). Today she denies any headaches, rash/itching, chills or fever, cough, sputum, chest pain or chest tightness, painful inflammation/ulceration oral mucous membranes, nausea/vomiting, diarrhea, hematemesis /hemoptysis, hematuria/rectal bleeding, urinary symptoms, swelling  extremities, weakness/fatigue, or peripheral neuropathy. ROS as above. Remainder of 10 point review of systems elicited and otherwise unremarkable.      Objective    BSA: 1.45 meters squared  /67 (BP Location: Right arm, Patient Position: Sitting)   Pulse 72   Temp 36.3 °C (97.3 °F) (Oral)   Resp 16   Wt 49.2 kg (108 lb 7.5 oz)   SpO2 96%   BMI 20.75 kg/m²      Physical Exam  Constitutional:       General: She is awake.      Appearance: Normal appearance. She is normal weight.   HENT:      Head: Normocephalic.      Mouth/Throat:      Mouth: Mucous membranes are moist.   Eyes:      Extraocular Movements: Extraocular movements intact.      Conjunctiva/sclera: Conjunctivae normal.      Pupils: Pupils are equal, round, and reactive to light.   Cardiovascular:      Rate and Rhythm: Normal rate and regular rhythm.      Heart sounds: Normal heart sounds, S1 normal and S2 normal.   Pulmonary:      Effort: Pulmonary effort is normal.      Breath sounds: Normal breath sounds.   Abdominal:      General: Abdomen is flat. Bowel sounds are normal.      Palpations: Abdomen is soft.   Musculoskeletal:      Cervical back: Normal range of motion and neck supple.   Skin:     Comments: No skin rash observed   Neurological:      Mental Status: She is alert.      Cranial Nerves: Cranial nerves 2-12 are intact.      Sensory: Sensation is intact.      Motor: Motor function is intact.      Coordination: Coordination is intact.   Psychiatric:         Attention and Perception: Attention normal.         Mood and Affect: Mood normal.         Speech: Speech normal.         Behavior: Behavior normal. Behavior is cooperative.         Cognition and Memory: Cognition normal.     Labs     Latest Reference Range & Units 09/12/23 08:26 09/26/23 08:06 10/10/23 09:22   GLUCOSE 74 - 99 mg/dL 80 78 91   SODIUM 136 - 145 mmol/L 139 139 141   POTASSIUM 3.5 - 5.3 mmol/L 4.0 4.2 3.7   CHLORIDE 98 - 107 mmol/L 106 104 105   Bicarbonate 21 - 32  mmol/L 22 28 27   Anion Gap 10 - 20 mmol/L 15 11 13   Blood Urea Nitrogen 6 - 23 mg/dL 19 20 22   Creatinine 0.50 - 1.05 mg/dL 0.72 0.82 0.78   EGFR >60 mL/min/1.73m*2   83   Calcium 8.6 - 10.6 mg/dL 9.1 9.1 9.0   PHOSPHORUS 2.5 - 4.9 mg/dL 4.0 4.3 4.9   Albumin 3.4 - 5.0 g/dL 3.6 3.9 3.5   Alkaline Phosphatase 33 - 136 U/L 36 40 39   ALT 7 - 45 U/L 6 (L) 10 10   AST 9 - 39 U/L 12 14 15   Bilirubin Total 0.0 - 1.2 mg/dL 0.7 0.7 0.7   Bilirubin, Direct 0.0 - 0.3 mg/dL 0.1 0.1 0.1   Creatine Kinase 0 - 215 U/L 47 36 31   AMYLASE 29 - 103 U/L  44    FERRITIN 8 - 150 ng/mL 124 131 128   Total Protein 6.4 - 8.2 g/dL 5.9 (L) 6.2 (L) 5.6 (L)   MAGNESIUM 1.60 - 2.40 mg/dL 1.86 2.10 2.06      Latest Reference Range & Units 09/12/23 08:26 09/26/23 08:06 10/10/23 09:22   LDH 84 - 246 U/L  152    C-Reactive Protein <1.00 mg/dL 0.26 <0.10 1.93 (H)   LIPASE 9 - 82 U/L  46    URIC ACID 2.3 - 6.7 mg/dL  3.0    CORTISOL 2.5 - 20.0 ug/dL  8.3    FOLLICLE STIMULATING HORMONE IU/L  1.8    Thyroxine, Free 0.78 - 1.48 ng/dL  0.93    Triiodothyronine 60 - 200 ng/dL  125    LH IU/L  <0.1    Thyroid Stimulating Hormone 0.44 - 3.98 mIU/L  6.09 (H)    Estradiol pg/mL  <19    GLUCOSE 74 - 99 mg/dL 80 78 91   Adrenocorticotropic Hormone (ACTH) 7.2 - 63.3 pg/mL  57.0       Latest Reference Range & Units 09/12/23 08:26 09/26/23 08:06 10/10/23 09:22   INR 0.9 - 1.1  1.1 1.0 1.0   Protime 9.8 - 12.8 seconds 11.9 11.1 11.5   aPTT 27 - 38 seconds 28 31 29      Latest Reference Range & Units 09/12/23 08:26 09/26/23 08:06 10/10/23 09:22   WBC 4.4 - 11.3 x10*3/uL 7.2 6.1 7.8   nRBC 0.0 - 0.0 /100 WBCs 0.0 0.0 0.0   RBC 4.00 - 5.20 x10*6/uL 3.50 (L) 3.93 (L) 3.51 (L)   HEMOGLOBIN 12.0 - 16.0 g/dL 10.9 (L) 12.1 10.6 (L)   HEMATOCRIT 36.0 - 46.0 % 32.7 (L) 37.7 33.5 (L)   MCV 80 - 100 fL 93 96 95   MCH 26.0 - 34.0 pg   30.2   MCHC 32.0 - 36.0 g/dL 33.3 32.1 31.6 (L)   RED CELL DISTRIBUTION WIDTH 11.5 - 14.5 % 13.8 13.9 14.1   Platelets 150 - 450 x10*3/uL  229 213 206   MEAN PLATELET VOLUME 7.5 - 11.5 fL   9.8   Neutrophils % 40.0 - 80.0 % 68.2 59.3 71.1   Immature Granulocytes %, Automated 0.0 - 0.9 % 1.1 (H) 0.8 0.4   Lymphocytes % 13.0 - 44.0 % 17.6 25.3 18.1   Monocytes % 2.0 - 10.0 % 11.0 9.0 8.7   Eosinophils % 0.0 - 6.0 % 1.8 5.3 1.4   Basophils % 0.0 - 2.0 % 0.3 0.3 0.3   Neutrophils Absolute 1.20 - 7.70 x10*3/uL 4.87 3.61 5.53   Immature Granulocytes Absolute, Automated 0.00 - 0.70 x10*3/uL   0.03   Lymphocytes Absolute 1.20 - 4.80 x10*3/uL 1.26 1.54 1.41   Monocytes Absolute 0.10 - 1.00 x10*3/uL 0.79 0.55 0.68   Eosinophils Absolute 0.00 - 0.70 x10*3/uL 0.13 0.32 0.11   Basophils Absolute 0.00 - 0.10 x10*3/uL 0.02 0.02 0.02     (L): Data is abnormally low  (H): Data is abnormally high    CT CHEST ABDOMEN PELVIS W IV CONTRAST;  IMPRESSION:    Small cell lung cancer, restaging scan:  1. Stable size of 1.3 cm and 0.6 cm right lower lobe pulmonary  nodules as described above. No new or enlarging suspicious pulmonary  nodules.  2. No new sites of metastatic disease within the chest, abdomen or  pelvis.  3. Interval improvement of previously visualized segmental colitis  associated with diverticulosis affecting the sigmoid colon with  minimal residual pericolonic fat stranding and hyperemia of the  sigmoid colon on current exam. No intra-abdominal fluid collections  or pneumoperitoneum. Findings compatible with a resolving  inflammatory process without evidence of new or worsening  complications.  4. Remaining chronic and incidental findings are unchanged as  described above.      Performance Status:    ECOG 1: Restricted in physically strenuous activity but ambulatory and able to carry out work of a light or sedentary nature, e.g., light house work, office work.     Assessment/Plan      Mrs. Branch is a 65 yo with COPD and GERD who presented with newly diagnosed SCLC completed BID radiation planned concurrently with cis/etoposide but had a reaction C2D1 to  cisplatin. Completed 1 cycle carbo/etop D1 4/5/22 also complicated by facial flushing and erythematous rash and stopped further treatment. Now s/p PCI in 6/2022. Most recent CT concerning for disease progression. Referred for clinical trial AMGN 1518, C1D1 1/24/23. Treatment has been complicated by orthostatic hypotension, worsening short-term memory, increased lethargy, weight loss, and poor appetite. Delayed C2 by 1 week and dose-reduced. Confusion has resolved during C3 after stopping mirtazapine and dose reduction.     # SCLC  - limited stage, brain MRI negative  - previously discussed concurrent chemoradiation, with cisplatin/etoposide with side effects including but not limited to allergic reaction, fatigue, risk of infection, tinnitus, neuropathy, injury to liver/kidney, risk of anemia/thrombocytopenia and was consented  - she was also interested in scalp cooling, which unfortunately she is not a candidate for d/t SCLC  - started concurrent chemoradiation BID with Q3week cis/etop on 2/15 at [facility]  -unfortunately had reaction to cisplatin on C2D1 resulting in hospitalization and also felt to be septic due to pneumonia  - discussed that we typically do 3-4 cycles chemotherapy, and alternative regimens include carboplatin/etoposide vs CAV. Given reaction to cisplatin, concern for possible reaction to carboplatin as well, although unknown rates of cross reaction. Alternatively, they also asked about discontinuation of chemotherapy, since she completed radiation. She ultimately decided to trial carbo/etoposide. She is aware of the heightened risk of allergic reaction, as well as other side effects including but not limited to fatigue, risk of infection, neuropathy, injury to liver/kidney, risk of anemia/thrombocytopenia. consented 3/28/22  -s/p 1 cycle Carbo/Etop D1 4/5/22, developed facial flushing and erythematous rash on arms and chest s/p treatment, resolved with benadryl  -opted to forego further  chemotherapy and will continue on maintenance  - s/p PCI 6/2022  - CT C/A/P and brain MRI 5/2022 and most recently 8/2022 with good response and no disease  -  MRI brain 11/7 without evidence of metastatic disease  - CT C/A/P 11/3 with multiple new enlarged LN concerning for disease progression - discussed with Dr. Valdivia not able to repeat radiation.  - referred to phase 1 clinical trial and consideration for [id number] or [id number]  - 1.5.2023 : Patient signed consent to take part on phase 1 study [id number]. Look clinically good to take part in study. Will start on study ASAP if eligible.   - 1.24.2023: Seen today and ready to start trial on [id number].  - 1.31.2023: Seen today, ready for C1D8 AMGN 1518   - 2.7.2023: Seen today for C1D15 [id number] - continue with trial   - 2.14.2023: Seen in follow-up on [id number] with overall worsening of general health  - 2.21.2023: Seen today for C2D1 AMGN 1518 with continued weight loss although perhaps starting to plateau, more energy since being off treatment, still poor appetite. will delay by 1 week, started dexamethasone 2 mg daily, and consider dose reduction.  - 2.28.2023: Seen today for C2D1 AMGN 1518 after delaying for 1 week. Energy level and appetite are improved, although still losing a little weight. Confusion is worse today, possibly due to mirtazapine vs study-related. Will dose reduce today.  - 3.7.2023: Seen today C2D8 AMGN 1518 after dose-reduction last week. Confusion is mildly improved, energy level and appetite are stable. Will add marinol for appetite.   - 3.14.2023: Seen today C2D15 AMGN 1518. Overall improved: confusion continues to improve, energy level and appetite are improved. Weight is improved. Will continue dexamethasone and marinol.  - 3.15.2023: C2D16 No CRS symptoms observed after last treatment Overall Symptoms are improving and she is tolerating treatment.  -3.16.2023: C2D17 No CRS symptoms observed after last treatment Overall Symptoms are improving  and she is tolerating treatment.  - 3.28.2023 : Most recent imaging suggest patient benefiting from current treatment. Ongoing symptom  possible common cold/season allergies. Since the patient looks clinically good we will proceed with C3D1 today. Educated patient to call the office ASAP if symptoms worsen.   - 4.11.2023: C3D15 Feeling well and overall improved with fatigue, confusion, anorexia. Proceed at current dosing and weaning steroids as below  -4.25.2023: C4D1 Pt is feeling well, no complaints of fatigue, confusion, memory loss. Has gained weight. Energy levels are good. Proceed with the current dosing. Pt is no longer on steroids.   -5.9.2023: C4D15. Pt is tolerating treatment well with no new complains. Continues to have good energy level endorses poor appetite with out any weight loss. Suggested to start taking the dronabinol.  Will proceed with treatment today.  -5.23.2023: C5D1. Pt is tolerating treatment well. Energy levels are good, is having some weight loss and constipation. Started back on dex for appetite. Pt will let us know if she remains constipated over the next several days.  Personally reviewed scans with stable disease. Will proceed with treatment today.   -6.6.2023: C5D15. Continues to tolerate treatment well. Since we restarted her on Dexamethasone she reports her energy levels and appetite has improved. Will continue on low dose Dex. With no new symptoms, we will proceed with treatment today.   -6.20.2023: C6D1. Doing well on dexamethasone 1 mg daily. Will proceed with treatment.  -7.5.2023: C6D15. We will proceed with treatment since the patient is tolerating treatment with no significant AE's and also give 1/2 liter of IV fluids.   - 7.18.2023: C7D1. Doing well, will proceed with treatment. She is out of dexamethasone, and will monitor off steroids.   -8.1.2023: C7D15. Continues to tolerate treatment. Will proceed with C7D15 today. RTC per protocol.  - 8.29.2023: C8D1. C8 delayed due  to hospitalization for diverticulitis. Has completed antibiotics and feeling well for treatment.  - 9.12.2023: C8D15. Most recent imgaing suggest the patient continues to benefit from current treatment. The imaging also suggest improvement of previously visualized segmental colitis associated with diverticulosis affecting the sigmoid colon with minimal residual pericolonic fat standing and hyperemia of the sigmoid colon. Imaging also suggest resolving inflammatory process without evidence of new or worsening complications. We will proceed with C8D15 today since the patient is also clinically feeling good. RTC per protocol.  -9.26.2023: C9D1. Patient looks clinically good. With no JACQUELYN's we will proceed with treatment C9D1 today.    # Confusion - resolved  - significantly worsened after taking double dose of mirtazapine accidentally, although has been generally down-trending since starting study (which is also when she started taking mirtazapine) and now resolved  - brain MRI without progression of cancer  - will continue off mirtazapine    # Unintentional weight loss - stable  # Anorexia- Improving  # Dysgeusia- improving  - all improved on steroids. unclear if this is from cancer or side effect of study drug  - stopped mirtazapine due to concerns for confusion   - weaned off dexamethasone and started marinol, but kept forgetting to take it  - dexamethasone trial started again on 5.23.2023. Continued on 1 mg daily - will trial off after 7.18.23.     # Fatigue - Resolved  - back to normal pretty much, weaning steroids as above    # frequent PVCs - asymptomatic  - saw onco-cardiology and completed 24-hr Holter monitor  - recommended decreasing caffeine and sudafed intake  - continue to monitor    #hyponatremia - improving  - improved recently, will monitor    # Forgetfulness - improved - however noted decline on AMGN study- unclear etiology.    - started after PCI, on memantine daily and has since stopped  - offered  neurocognitive evaluation previously and she will think about this and readdress next visit  -  notes some decline in last 3 weeks- especially short term memory loss.    - Improved    # Neuropathy - resolved  - mild peripheral neuropathy with numbness of the toes - likely due to cisplatin  - will continue to monitor closely  - not endorsing any neuropathy at this visit     # Rash - resolved  Waxing and waning rash throughout her body. ?improving. Unclear etiology, patient and  feel timing coincided with starting BMX.   - she will hold off on further BMX  - thought to be due to sulfa allergy, possibly due to platinum agents   - continue to monitor    #odynophagia - resolved  -rad onc aware  -prescribed BMX solution  -will continue to monitor    #constipation-improving  -occasional. Prescribed Senna S  -will monitor       [facility] ROOM [address] [place] [id number]

## 2023-10-10 NOTE — SIGNIFICANT EVENT
10/10/23 1535   Prechemo Checklist   Has the patient been in the hospital, ED, or urgent care since last date of service Yes   Chemo/Immuno Consent Signed Yes   Protocol/Indications Verified Yes   Confirmed to previous date/time of medication Yes   Compared to previous dose Yes   All medications are dated accurately Yes   Pregnancy Test Negative Not applicable   Parameters Met Yes   BSA/Weight-Height Verified Yes   Dose Calculations Verified Yes

## 2023-10-10 NOTE — RESEARCH NOTES
Research Note Treatment Day    Gladys Branch is here today for treatment on [id number]. Today is C9D15. Procedures completed per protocol. AE's and con-meds reviewed with patient. Patient is aware of treatment plan.    [x]   Received treatment as planned   OR  []    Treatment delayed; patient calendar updated as required   Treatment delayed because:    []   AE    []   Physician Discretion    []   Clinical Deterioration or Progression     []   Other    Education Documentation  Monitoring Weight, taught by Ashwin Epps RN at 10/10/2023  4:19 PM.  Learner: Patient  Readiness: Eager  Method: Explanation  Response: Verbalizes Understanding    Nutrition/Diet, taught by Ashwin Epps RN at 10/10/2023  4:19 PM.  Learner: Patient  Readiness: Eager  Method: Explanation  Response: Verbalizes Understanding    Nutrition, taught by Ashwin Epps RN at 10/10/2023  4:19 PM.  Learner: Patient  Readiness: Eager  Method: Explanation  Response: Verbalizes Understanding    Diagnostic Studies, taught by Ashwin Epps RN at 10/10/2023  4:19 PM.  Learner: Patient  Readiness: Eager  Method: Explanation  Response: Verbalizes Understanding    Comprehensive Metabolic Panel (CMP), taught by Ashwin Epps RN at 10/10/2023  4:19 PM.  Learner: Patient  Readiness: Eager  Method: Explanation  Response: Verbalizes Understanding    Complete Blood Count with Differential (CBC w/ Diff), taught by Ashwin Epps RN at 10/10/2023  4:19 PM.  Learner: Patient  Readiness: Eager  Method: Explanation  Response: Verbalizes Understanding    Education Comments  No comments found.

## 2023-10-18 ENCOUNTER — HOSPITAL ENCOUNTER (OUTPATIENT)
Dept: RADIOLOGY | Facility: HOSPITAL | Age: 68
Discharge: HOME | End: 2023-10-18
Payer: MEDICARE

## 2023-10-18 ENCOUNTER — APPOINTMENT (OUTPATIENT)
Dept: RADIOLOGY | Facility: HOSPITAL | Age: 68
End: 2023-10-18
Payer: MEDICARE

## 2023-10-18 VITALS — BODY MASS INDEX: 19.83 KG/M2 | WEIGHT: 105 LBS | HEIGHT: 61 IN

## 2023-10-18 DIAGNOSIS — Z12.31 ENCOUNTER FOR SCREENING MAMMOGRAM FOR MALIGNANT NEOPLASM OF BREAST: ICD-10-CM

## 2023-10-18 DIAGNOSIS — Z12.39 ENCOUNTER FOR OTHER SCREENING FOR MALIGNANT NEOPLASM OF BREAST: ICD-10-CM

## 2023-10-18 DIAGNOSIS — C34.90: ICD-10-CM

## 2023-10-18 DIAGNOSIS — Z00.6 CLINICAL TRIAL EXAM: ICD-10-CM

## 2023-10-18 PROCEDURE — 77063 BREAST TOMOSYNTHESIS BI: CPT | Mod: 50

## 2023-10-18 PROCEDURE — 77067 SCR MAMMO BI INCL CAD: CPT

## 2023-10-18 PROCEDURE — 77063 BREAST TOMOSYNTHESIS BI: CPT | Mod: BILATERAL PROCEDURE | Performed by: RADIOLOGY

## 2023-10-18 PROCEDURE — 77067 SCR MAMMO BI INCL CAD: CPT | Mod: BILATERAL PROCEDURE | Performed by: RADIOLOGY

## 2023-10-18 PROCEDURE — 74177 CT ABD & PELVIS W/CONTRAST: CPT | Performed by: RADIOLOGY

## 2023-10-18 PROCEDURE — 2550000001 HC RX 255 CONTRASTS: Performed by: INTERNAL MEDICINE

## 2023-10-18 PROCEDURE — 71260 CT THORAX DX C+: CPT | Performed by: RADIOLOGY

## 2023-10-18 PROCEDURE — 71260 CT THORAX DX C+: CPT | Mod: ME

## 2023-10-18 RX ADMIN — IOHEXOL 75 ML: 350 INJECTION, SOLUTION INTRAVENOUS at 12:17

## 2023-10-19 ENCOUNTER — APPOINTMENT (OUTPATIENT)
Dept: RADIOLOGY | Facility: CLINIC | Age: 68
End: 2023-10-19
Payer: MEDICARE

## 2023-10-23 DIAGNOSIS — C34.90 SMALL CELL LUNG CANCER (MULTI): Primary | ICD-10-CM

## 2023-10-23 RX ORDER — ALBUTEROL SULFATE 0.83 MG/ML
3 SOLUTION RESPIRATORY (INHALATION) AS NEEDED
Status: CANCELLED | OUTPATIENT
Start: 2023-11-07

## 2023-10-23 RX ORDER — EPINEPHRINE 0.3 MG/.3ML
0.3 INJECTION SUBCUTANEOUS EVERY 5 MIN PRN
Status: CANCELLED | OUTPATIENT
Start: 2023-11-07

## 2023-10-23 RX ORDER — FAMOTIDINE 10 MG/ML
20 INJECTION INTRAVENOUS ONCE AS NEEDED
Status: CANCELLED | OUTPATIENT
Start: 2023-11-07

## 2023-10-23 RX ORDER — DIPHENHYDRAMINE HYDROCHLORIDE 50 MG/ML
50 INJECTION INTRAMUSCULAR; INTRAVENOUS AS NEEDED
Status: CANCELLED | OUTPATIENT
Start: 2023-10-24

## 2023-10-23 RX ORDER — ALBUTEROL SULFATE 0.83 MG/ML
3 SOLUTION RESPIRATORY (INHALATION) AS NEEDED
Status: CANCELLED | OUTPATIENT
Start: 2023-10-24

## 2023-10-23 RX ORDER — EPINEPHRINE 0.3 MG/.3ML
0.3 INJECTION SUBCUTANEOUS EVERY 5 MIN PRN
Status: CANCELLED | OUTPATIENT
Start: 2023-10-24

## 2023-10-23 RX ORDER — DIPHENHYDRAMINE HYDROCHLORIDE 50 MG/ML
50 INJECTION INTRAMUSCULAR; INTRAVENOUS AS NEEDED
Status: CANCELLED | OUTPATIENT
Start: 2023-11-07

## 2023-10-23 RX ORDER — FAMOTIDINE 10 MG/ML
20 INJECTION INTRAVENOUS ONCE AS NEEDED
Status: CANCELLED | OUTPATIENT
Start: 2023-10-24

## 2023-10-24 ENCOUNTER — APPOINTMENT (OUTPATIENT)
Dept: HEMATOLOGY/ONCOLOGY | Facility: HOSPITAL | Age: 68
End: 2023-10-24
Payer: MEDICARE

## 2023-10-24 ENCOUNTER — HOSPITAL ENCOUNTER (OUTPATIENT)
Dept: RESEARCH | Facility: HOSPITAL | Age: 68
Discharge: HOME | End: 2023-10-24
Payer: MEDICARE

## 2023-10-24 ENCOUNTER — DOCUMENTATION (OUTPATIENT)
Dept: HEMATOLOGY/ONCOLOGY | Facility: HOSPITAL | Age: 68
End: 2023-10-24

## 2023-10-24 ENCOUNTER — OFFICE VISIT (OUTPATIENT)
Dept: HEMATOLOGY/ONCOLOGY | Facility: HOSPITAL | Age: 68
End: 2023-10-24
Payer: MEDICARE

## 2023-10-24 VITALS
SYSTOLIC BLOOD PRESSURE: 119 MMHG | DIASTOLIC BLOOD PRESSURE: 74 MMHG | BODY MASS INDEX: 20.31 KG/M2 | RESPIRATION RATE: 16 BRPM | HEART RATE: 64 BPM | WEIGHT: 107.5 LBS | TEMPERATURE: 96.1 F | OXYGEN SATURATION: 94 %

## 2023-10-24 VITALS
RESPIRATION RATE: 16 BRPM | TEMPERATURE: 97.7 F | DIASTOLIC BLOOD PRESSURE: 74 MMHG | BODY MASS INDEX: 20.33 KG/M2 | HEART RATE: 73 BPM | SYSTOLIC BLOOD PRESSURE: 112 MMHG | OXYGEN SATURATION: 98 % | WEIGHT: 107.58 LBS

## 2023-10-24 DIAGNOSIS — C34.90 SMALL CELL LUNG CANCER (MULTI): ICD-10-CM

## 2023-10-24 LAB
ALBUMIN SERPL BCP-MCNC: 3.6 G/DL (ref 3.4–5)
ALP SERPL-CCNC: 46 U/L (ref 33–136)
ALT SERPL W P-5'-P-CCNC: 8 U/L (ref 7–45)
AMYLASE SERPL-CCNC: 31 U/L (ref 29–103)
ANION GAP SERPL CALC-SCNC: 14 MMOL/L (ref 10–20)
APPEARANCE UR: CLEAR
APTT PPP: 31 SECONDS (ref 27–38)
AST SERPL W P-5'-P-CCNC: 11 U/L (ref 9–39)
BASOPHILS # BLD AUTO: 0.04 X10*3/UL (ref 0–0.1)
BASOPHILS NFR BLD AUTO: 0.6 %
BILIRUB DIRECT SERPL-MCNC: 0.1 MG/DL (ref 0–0.3)
BILIRUB SERPL-MCNC: 0.5 MG/DL (ref 0–1.2)
BILIRUB UR STRIP.AUTO-MCNC: NEGATIVE MG/DL
BUN SERPL-MCNC: 18 MG/DL (ref 6–23)
CALCIUM SERPL-MCNC: 9.1 MG/DL (ref 8.6–10.6)
CHLORIDE SERPL-SCNC: 106 MMOL/L (ref 98–107)
CK SERPL-CCNC: 39 U/L (ref 0–215)
CO2 SERPL-SCNC: 24 MMOL/L (ref 21–32)
COLOR UR: NORMAL
CORTIS SERPL-MCNC: 0.8 UG/DL (ref 2.5–20)
CREAT SERPL-MCNC: 0.85 MG/DL (ref 0.5–1.05)
CRP SERPL-MCNC: 0.97 MG/DL
EOSINOPHIL # BLD AUTO: 0.08 X10*3/UL (ref 0–0.7)
EOSINOPHIL NFR BLD AUTO: 1.1 %
ERYTHROCYTE [DISTWIDTH] IN BLOOD BY AUTOMATED COUNT: 13.2 % (ref 11.5–14.5)
ESTRADIOL SERPL-MCNC: <19 PG/ML
FERRITIN SERPL-MCNC: 130 NG/ML (ref 8–150)
FSH SERPL-ACNC: <0.9 IU/L
GFR SERPL CREATININE-BSD FRML MDRD: 75 ML/MIN/1.73M*2
GLUCOSE SERPL-MCNC: 104 MG/DL (ref 74–99)
GLUCOSE UR STRIP.AUTO-MCNC: NEGATIVE MG/DL
HCT VFR BLD AUTO: 33.4 % (ref 36–46)
HGB BLD-MCNC: 10.8 G/DL (ref 12–16)
IMM GRANULOCYTES # BLD AUTO: 0.06 X10*3/UL (ref 0–0.7)
IMM GRANULOCYTES NFR BLD AUTO: 0.8 % (ref 0–0.9)
INR PPP: 1 (ref 0.9–1.1)
KETONES UR STRIP.AUTO-MCNC: NEGATIVE MG/DL
LEUKOCYTE ESTERASE UR QL STRIP.AUTO: NEGATIVE
LH SERPL-ACNC: <0.1 IU/L
LIPASE SERPL-CCNC: 24 U/L (ref 9–82)
LYMPHOCYTES # BLD AUTO: 1.15 X10*3/UL (ref 1.2–4.8)
LYMPHOCYTES NFR BLD AUTO: 16 %
MAGNESIUM SERPL-MCNC: 2.06 MG/DL (ref 1.6–2.4)
MCH RBC QN AUTO: 31 PG (ref 26–34)
MCHC RBC AUTO-ENTMCNC: 32.3 G/DL (ref 32–36)
MCV RBC AUTO: 96 FL (ref 80–100)
MONOCYTES # BLD AUTO: 0.63 X10*3/UL (ref 0.1–1)
MONOCYTES NFR BLD AUTO: 8.7 %
NEUTROPHILS # BLD AUTO: 5.25 X10*3/UL (ref 1.2–7.7)
NEUTROPHILS NFR BLD AUTO: 72.8 %
NITRITE UR QL STRIP.AUTO: NEGATIVE
NRBC BLD-RTO: 0 /100 WBCS (ref 0–0)
PH UR STRIP.AUTO: 6 [PH]
PHOSPHATE SERPL-MCNC: 3.6 MG/DL (ref 2.5–4.9)
PLATELET # BLD AUTO: 253 X10*3/UL (ref 150–450)
PMV BLD AUTO: 9.2 FL (ref 7.5–11.5)
POTASSIUM SERPL-SCNC: 3.7 MMOL/L (ref 3.5–5.3)
PROT SERPL-MCNC: 6 G/DL (ref 6.4–8.2)
PROT UR STRIP.AUTO-MCNC: NEGATIVE MG/DL
PROTHROMBIN TIME: 11.6 SECONDS (ref 9.8–12.8)
RBC # BLD AUTO: 3.48 X10*6/UL (ref 4–5.2)
RBC # UR STRIP.AUTO: NEGATIVE /UL
SODIUM SERPL-SCNC: 140 MMOL/L (ref 136–145)
SP GR UR STRIP.AUTO: 1.01
T4 FREE SERPL-MCNC: 0.8 NG/DL (ref 0.78–1.48)
TSH SERPL-ACNC: 4.01 MIU/L (ref 0.44–3.98)
UROBILINOGEN UR STRIP.AUTO-MCNC: <2 MG/DL
WBC # BLD AUTO: 7.2 X10*3/UL (ref 4.4–11.3)

## 2023-10-24 PROCEDURE — 1036F TOBACCO NON-USER: CPT | Performed by: STUDENT IN AN ORGANIZED HEALTH CARE EDUCATION/TRAINING PROGRAM

## 2023-10-24 PROCEDURE — 84100 ASSAY OF PHOSPHORUS: CPT | Mod: CMCLAB | Performed by: STUDENT IN AN ORGANIZED HEALTH CARE EDUCATION/TRAINING PROGRAM

## 2023-10-24 PROCEDURE — 99214 OFFICE O/P EST MOD 30 MIN: CPT | Mod: 25 | Performed by: STUDENT IN AN ORGANIZED HEALTH CARE EDUCATION/TRAINING PROGRAM

## 2023-10-24 PROCEDURE — 82248 BILIRUBIN DIRECT: CPT | Performed by: STUDENT IN AN ORGANIZED HEALTH CARE EDUCATION/TRAINING PROGRAM

## 2023-10-24 PROCEDURE — 80053 COMPREHEN METABOLIC PANEL: CPT | Mod: CMCLAB | Performed by: STUDENT IN AN ORGANIZED HEALTH CARE EDUCATION/TRAINING PROGRAM

## 2023-10-24 PROCEDURE — 83690 ASSAY OF LIPASE: CPT | Performed by: STUDENT IN AN ORGANIZED HEALTH CARE EDUCATION/TRAINING PROGRAM

## 2023-10-24 PROCEDURE — 85025 COMPLETE CBC W/AUTO DIFF WBC: CPT | Performed by: STUDENT IN AN ORGANIZED HEALTH CARE EDUCATION/TRAINING PROGRAM

## 2023-10-24 PROCEDURE — 96413 CHEMO IV INFUSION 1 HR: CPT

## 2023-10-24 PROCEDURE — 82150 ASSAY OF AMYLASE: CPT | Mod: CMCLAB | Performed by: STUDENT IN AN ORGANIZED HEALTH CARE EDUCATION/TRAINING PROGRAM

## 2023-10-24 PROCEDURE — 85730 THROMBOPLASTIN TIME PARTIAL: CPT | Performed by: STUDENT IN AN ORGANIZED HEALTH CARE EDUCATION/TRAINING PROGRAM

## 2023-10-24 PROCEDURE — 82533 TOTAL CORTISOL: CPT | Mod: CMCLAB | Performed by: STUDENT IN AN ORGANIZED HEALTH CARE EDUCATION/TRAINING PROGRAM

## 2023-10-24 PROCEDURE — 82670 ASSAY OF TOTAL ESTRADIOL: CPT | Mod: CMCLAB | Performed by: STUDENT IN AN ORGANIZED HEALTH CARE EDUCATION/TRAINING PROGRAM

## 2023-10-24 PROCEDURE — 2500000005 HC RX 250 GENERAL PHARMACY W/O HCPCS: Performed by: STUDENT IN AN ORGANIZED HEALTH CARE EDUCATION/TRAINING PROGRAM

## 2023-10-24 PROCEDURE — 84443 ASSAY THYROID STIM HORMONE: CPT | Mod: CMCLAB | Performed by: STUDENT IN AN ORGANIZED HEALTH CARE EDUCATION/TRAINING PROGRAM

## 2023-10-24 PROCEDURE — 3008F BODY MASS INDEX DOCD: CPT | Performed by: STUDENT IN AN ORGANIZED HEALTH CARE EDUCATION/TRAINING PROGRAM

## 2023-10-24 PROCEDURE — 99214 OFFICE O/P EST MOD 30 MIN: CPT | Performed by: STUDENT IN AN ORGANIZED HEALTH CARE EDUCATION/TRAINING PROGRAM

## 2023-10-24 PROCEDURE — 83735 ASSAY OF MAGNESIUM: CPT | Mod: CMCLAB | Performed by: STUDENT IN AN ORGANIZED HEALTH CARE EDUCATION/TRAINING PROGRAM

## 2023-10-24 PROCEDURE — 1160F RVW MEDS BY RX/DR IN RCRD: CPT | Performed by: STUDENT IN AN ORGANIZED HEALTH CARE EDUCATION/TRAINING PROGRAM

## 2023-10-24 PROCEDURE — 36415 COLL VENOUS BLD VENIPUNCTURE: CPT | Performed by: STUDENT IN AN ORGANIZED HEALTH CARE EDUCATION/TRAINING PROGRAM

## 2023-10-24 PROCEDURE — 85610 PROTHROMBIN TIME: CPT | Performed by: STUDENT IN AN ORGANIZED HEALTH CARE EDUCATION/TRAINING PROGRAM

## 2023-10-24 PROCEDURE — 83001 ASSAY OF GONADOTROPIN (FSH): CPT | Mod: CMCLAB | Performed by: STUDENT IN AN ORGANIZED HEALTH CARE EDUCATION/TRAINING PROGRAM

## 2023-10-24 PROCEDURE — 82550 ASSAY OF CK (CPK): CPT | Performed by: STUDENT IN AN ORGANIZED HEALTH CARE EDUCATION/TRAINING PROGRAM

## 2023-10-24 PROCEDURE — 1159F MED LIST DOCD IN RCRD: CPT | Performed by: STUDENT IN AN ORGANIZED HEALTH CARE EDUCATION/TRAINING PROGRAM

## 2023-10-24 PROCEDURE — 82024 ASSAY OF ACTH: CPT | Mod: CMCLAB | Performed by: STUDENT IN AN ORGANIZED HEALTH CARE EDUCATION/TRAINING PROGRAM

## 2023-10-24 PROCEDURE — 81003 URINALYSIS AUTO W/O SCOPE: CPT | Performed by: STUDENT IN AN ORGANIZED HEALTH CARE EDUCATION/TRAINING PROGRAM

## 2023-10-24 PROCEDURE — 86140 C-REACTIVE PROTEIN: CPT | Mod: CMCLAB | Performed by: STUDENT IN AN ORGANIZED HEALTH CARE EDUCATION/TRAINING PROGRAM

## 2023-10-24 PROCEDURE — 1126F AMNT PAIN NOTED NONE PRSNT: CPT | Performed by: STUDENT IN AN ORGANIZED HEALTH CARE EDUCATION/TRAINING PROGRAM

## 2023-10-24 PROCEDURE — 82728 ASSAY OF FERRITIN: CPT | Mod: CMCLAB | Performed by: STUDENT IN AN ORGANIZED HEALTH CARE EDUCATION/TRAINING PROGRAM

## 2023-10-24 PROCEDURE — 84439 ASSAY OF FREE THYROXINE: CPT | Performed by: STUDENT IN AN ORGANIZED HEALTH CARE EDUCATION/TRAINING PROGRAM

## 2023-10-24 RX ORDER — HEPARIN 100 UNIT/ML
500 SYRINGE INTRAVENOUS AS NEEDED
Status: CANCELLED | OUTPATIENT
Start: 2023-10-24

## 2023-10-24 RX ORDER — HEPARIN SODIUM,PORCINE/PF 10 UNIT/ML
50 SYRINGE (ML) INTRAVENOUS AS NEEDED
Status: CANCELLED | OUTPATIENT
Start: 2023-10-24

## 2023-10-24 RX ADMIN — Medication 3 MG: at 11:45

## 2023-10-24 ASSESSMENT — PAIN SCALES - GENERAL: PAINLEVEL: 0-NO PAIN

## 2023-10-24 NOTE — RESEARCH NOTES
Naming Convention:  [facility]<[id number]>    DCRU NURSING VISIT NOTE  Study Name: ALEXANDRU 1518  IRB#: [id number]  [id number]  Protocol Version Dated: 03.22.23  PI: Roshan Kumar MD.    Time point: Cycle 5 and beyond - Day 1    Encounter Date: 10/24/2023  Encounter Time:  9:00 AM EDT  Encounter Department: [facility] HEMATOLOGY AND ONCOLOGY     #1     Phone Pager   Carmen Rios [phone]    #2 Phone Pager        Other Phone Pager          Study Regimen and Dosing   Refer to [facility] Treatment Plan for orders  Part A1 Dose Exploration & A2 Dose Expansion - Small Cell Lung Cancer Relapsed or Refractory patients who progressed or recurred following platinum-based chemotherapy will receive [id number] to evaluate safety, tolerability and determine the maximum tolerated dose (MTD) or recommended phase 2 dose (RP2D).  Cycle = 28 days  TARLATAMAB () administered IV Day 1 and Day 15.     Dietary Guidelines   Regular diet:     Admission and Prior to Starting Study Activities   Notify  when patient arrives to unit.  Complete DCRU/[facility] intake form in EMR.  Obtain vital signs including Pulse Oximetry after seated or semi-fowlers x 5 mins. Record on flow sheet & in EMR.  Obtain weight in kilograms - with shoes off & heavy items removed.  Insert one peripheral IV line for sample collection procedures (flush line with normal saline following each blood draw). Access mediport (if available) otherwise insert second peripheral line in opposite arm for Investigative drug administration (if peripheral line, flush line with normal saline before & after infusion)  Physical Exam.     Criteria to Treat   DCRU RN reviewed and meets eligibility to proceed with treatment plan   Time team notified: 0948 am  DCRU RN notifies study team to review eligibility and approval before dosing procedures  Time team approves: 0948 am     Subjective    Gladys Branch is a 67 y.o. female and is here for a Research clinical visit.    Visit Provider: [id number], [facility] ROOM 3 [facility]     Allergies:   Allergies   Allergen Reactions    Cisplatin Other     CHEMO INDUCED (Moderate); Dyspepsia (Moderate); Headaches (Moderate); Hypotension (Moderate); Numbness (Moderate); Resp Distress (Moderate)    Codeine Nausea Only and Other     nausea    Sulfa (Sulfonamide Antibiotics) Rash       Objective     Vital Signs:    There were no vitals filed for this visit.    Physical Exam     ASSESSMENT and PLAN:  Problem List Items Addressed This Visit    None       Medications as of the completion of today's visit:  Current Outpatient Medications   Medication Sig Dispense Refill    albuterol 90 mcg/actuation inhaler Inhale 1-2 puffs every 4 hours if needed for wheezing or shortness of breath.      cetirizine-pseudoephedrine (ZyrTEC-D) 5-120 mg 12 hr tablet Take 1 tablet by mouth once daily. 30 tablet 5    cholecalciferol (Vitamin D-3) 25 MCG (1000 UT) tablet Take by mouth.      dexAMETHasone (Decadron) 2 mg tablet Take 1 tablet (2 mg) by mouth.      dronabinol (Marinol) 2.5 mg capsule take 1 capsule by mouth once daily 1 hour before dinner      estrogens, conjugated, (Premarin) 0.3 mg tablet Take 0.5 tablets (0.15 mg) by mouth once daily.      omeprazole (PriLOSEC) 20 mg DR capsule Take by mouth.      ondansetron (Zofran) 8 mg tablet Take 1 tablet (8 mg) by mouth every 8 hours if needed.      prochlorperazine (Compazine) 10 mg tablet Take by mouth every 6 hours if needed.      vit A/vit C/vit E/zinc/copper (PRESERVISION AREDS ORAL) Take by mouth.       No current facility-administered medications for this encounter.           Orders placed during today's visit:  No orders of the defined types were placed in this encounter.       No study found     Study Specific Instructions and Documentation   LABS  PREMEDS  VITALS  CORREATIVES  STUDY DRUG  VITALS  DISCHARGE     PRE-DOSE Safety Labs    Refer to [facility] Treatment Plan for orders     Premedication   Refer to [facility] Treatment Plan for orders  Within 1 hour prior to TARLATAMAB      Pre-dose Vital Signs   Temp, HR, Resp, BP, Pulse Oximetry: Seated or Semi-Fowlers x 5 minutes before obtaining  Position and temperature location: should be the same that is used throughout the study     Pre-dose Research Correlatives  [facility]   Refer to [facility] Treatment Plan for orders   Time point Specimen Test Volume Tube Handling Draw Time   Pre-dose (within 15 mins of  dosing) Anti- Antibody 2.5 mL SST Ambient, Invert 5X 1140    TARLATAMAB PK  (through cycle 12) 2.5 mL SST Ambient, Invert 5X 1140    Serum Markers 2.5 mL SST Ambient, Invert 5X 1140        Research Drug Administration Tarlatamab ()   Refer to [facility] Treatment Plan for orders   Administer dose over 60 minutes (+/- 10 mins) followed by a 3 - 5 minute Normal saline flush. (This will be the end time after the flush). Document start time & end of study medication; document start time and end time of the flush.  Participant to remain on Unit for 2 hour post dose observation.      Infusion-Related Reactions    [facility] SOC  Grading and Management of Cytokine Release Syndrome   CRS Grade Description of Severity Minimum Expected Intervention Instructions for Dose Modifications of TARLATAMAB         1 Symptoms are not life threatening and require symptomatic treatment only, eg, fever, nausea, fatigue, headache, myalgias, malaise Administer symptomatic treatment (eg, paracetamol/ acetaminophen for fever). Monitor for CRS symptoms including vital signs and pulse oximetry at least Q2 hours for 12 hours or until resolution,  Whichever is earlier. N/A                             2 Symptoms require and respond to moderate intervention  Oxygen requirement <40%, OR  Hypotension responsive to fluids or low dose of one vasopressor, OR  Grade 2 organ toxicity or grade 3 transaminitis  per CTCAE criteria Administer:  Symptomatic treatment (eg, paracetamol/ acetaminophen for fever)  Supplemental oxygen when oxygen saturation is <90% on room air  Intravenous fluids or low dose vasopressor for hypotension when systolic blood pressure is  <85 mmHg. Persistent tachycardia (eg >120 bpm) may also indicate the need for intervention for hypotension.  Monitor for CRS symptoms including vital signs and pulse oximetry at least Q2 hours for 12 hours or until resolution to CRS grade <= 1, whichever is Earlier.  For subjects with extensive co-morbidities or poor performance status, manage  per grade 3 CRS guidance below If CRS occurs during TARLATAMAB treatment, immediately interrupt the infusion and delay the next TARLATAMAB dose until the event resolves to CRS  grade <= 1 for no less than 72 hours.    Permanently discontinue TARLATAMAB if there is no improvement to CRS  <= grade 1 within 7 days                     3 Symptoms require and respond to aggressive intervention  Oxygen requirement >=40%, OR    Hypotension requiring high dose or multiple vasopressors, OR  Grade 3 organ toxicity or grade 4 transaminitis per CTCAE criteria Admit to intensive care unit for close clinical and vital sign monitoring per institutional Guidelines.  Administer dexamethasone (or equivalent) IV at a dose maximum of 3 doses of 8 mg (24 mg/day). The dose should Then be reduced step-wise.  Investigators should consider use of tocilizumab 8 mg/kg over 1 hour (not to exceed 800mg). Repeat tocilizumab every 8 hours as needed if not responsive to IV fluids or  Increasing supplemental oxygen. Maximum of 3 doses in a 24 hour period. Maximum total of 4 doses If CRS occurs during TARLATAMAB treatment, immediately interrupt TARLATAMAB and delay the next dose until event resolves to CRS grade <= 1 for no less than 72 hours.  Permanently discontinue TARLATAMAB if there is no improvement to CRS  <= grade 2 within 5 days or CRS <= grade 1  within 7 days.  Permanently discontinue TARLATAMAB if CRS grade 3 occurs at the initial run in dose (i.e., at MTD1) (Applicable only after  MTD1 has been defined).                       4 Life-threatening symptoms  Requirement for ventilator support OR  Grade 4 organ toxicity  (excluding transaminitis) per CTCAE criteria Admit to intensive care unit for close clinical and vital sign monitoring per institutional Guidelines.  Administer dexamethasone (or equivalent) IV at a dose maximum of 3 doses of 8 mg (24 mg/day). Further steroid use should be discussed with [employer] medical monitor.  Investigators should consider use of tocilizumab 8 mg/kg IV Over 1 hour (not to exceed 800mg). Repeat tocilizumab every 8 hours as needed if not responsive to IV fluids or increasing supplemental Oxygen. Maximum of 3 doses  In a 24 hour period. Maximum Total of 4 doses. If CRS occurs during TARLATAMAB treatment, Immediately stop the infusion.  Permanently discontinue TARLATAMAB therapy.        Day 1 Post-Dose Vital Signs   Temp, HR, Resp, BP, Pulse Oximetry: Seated or Semi-Fowlers x 5 minutes before obtaining  Position and temperature location: should be the same that is used throughout the study  End of Infusion     Discharge Instructions   Patient may be discharged to home after 2 hour observation.  Remind patient to return: Day 15 for treatment & labs     SELVIN FERNANDES RN  10/24/23

## 2023-10-24 NOTE — RESEARCH NOTES
Research Note Treatment Day    Gladys Branch is here today for treatment on [id number]. Today is C10D1. Procedures completed per protocol. AE's and con-meds reviewed with patient. Patient is aware of treatment plan.    [x]   Received treatment as planned   OR  []    Treatment delayed; patient calendar updated as required   Treatment delayed because:    []   AE    []   Physician Discretion    []   Clinical Deterioration or Progression     []   Other    Pt seen by Dr Archer. Pleased with the results of her CT scan. Continuing treatment as planned.  Education Documentation  Changes in Appetite, taught by Vladimir Fernandez RN at 10/24/2023 12:10 PM.  Learner: Significant Other, Patient  Readiness: Eager  Method: Explanation, Demonstration  Response: Verbalizes Understanding    Memory Problems, taught by Vladimir Fernandez RN at 10/24/2023 12:10 PM.  Learner: Significant Other, Patient  Readiness: Eager  Method: Explanation, Demonstration  Response: Verbalizes Understanding    Fatigue, taught by Vladimir Fernandez RN at 10/24/2023 12:10 PM.  Learner: Significant Other, Patient  Readiness: Eager  Method: Explanation, Demonstration  Response: Verbalizes Understanding    Treatment Plan and Schedule, taught by Vladimir Fernandez RN at 10/24/2023 12:10 PM.  Learner: Significant Other, Patient  Readiness: Eager  Method: Explanation, Demonstration  Response: Verbalizes Understanding    Supportive Medications, taught by Vladimir Fernandez RN at 10/24/2023 12:10 PM.  Learner: Significant Other, Patient  Readiness: Eager  Method: Explanation, Demonstration  Response: Verbalizes Understanding    Monitoring Weight, taught by Vladimir Fernandez RN at 10/24/2023 12:10 PM.  Learner: Significant Other, Patient  Readiness: Eager  Method: Explanation, Demonstration  Response: Verbalizes Understanding    Nutrition/Diet, taught by Vladimir Fernandez RN at 10/24/2023 12:10 PM.  Learner: Significant Other, Patient  Readiness: Eager  Method: Explanation,  Demonstration  Response: Verbalizes Understanding    Diagnostic Studies, taught by Vladimir Fernandez RN at 10/24/2023 12:10 PM.  Learner: Significant Other, Patient  Readiness: Eager  Method: Explanation, Demonstration  Response: Verbalizes Understanding    Education Comments  No comments found.

## 2023-10-24 NOTE — PROGRESS NOTES
Patient ID: Gladys Branch is a 67 y.o. female.    DIAGNOSIS    Small Cell Lung Cancer    By immunostaining, the tumor cells are positive for CAM 5.2, INSM1, and TTF-1, and negative for p40 and LCA. The proliferation index by Ki-67 immunostaining is approximately 90%.  Synaptophysin, Chromogranin and INSM-1 are positive.  AE1/AE3 is negative. NEOGENOMICS, RB protein expression is lost in the tumor cells    STAGING    [id number], limited stage  Recurrence    CURRENT SITES OF DISEASE    RLL, supraclavicular    MOLECULAR GENOMICS      PRIOR THERAPY    Concurrent chemoradiation with Cisplatin/Etoposide every 3 weeks; C1D1 2/15/22, reaction to cisplatin C2D1 and did not receive D2 or D3 etoposide  Carbo/etoposide 4/5/2022 1 cycle c/b rash  PCI 5/21-6/13/22    CURRENT THERAPY    Phase 1 study [id number]  with study drug Taralatamab 1/24/23 - present.    CURRENT ONCOLOGICAL PROBLEMS      HISTORY OF PRESENT ILLNESS    Mrs. Branch is a 65 yo with PMH GERD and COPD who originally was round to have a RLL nodule measuring 11 mm in 4/28/2021 found as part of CT calcium score scan. An ensuing CT chest w/o contrast was done on 5/19/21 which revealed subsolid pulmonary nodules measuring 14 mm in the RLL. She had CT chest w/contrast on 11/29/21 which confirmed stable 14 mm GGO of the RLL and decreased RLL subpleural nodule. She met thoracic surgeon Dr. Farfan, who ordered PET/CT scan done on 12/8/21 which did not reveal any FDG-avid nodules within the lung parenchyma but R hilar nodes with max SUV 8.0. He referred her for bronch, which was done on 1/21/22 by Dr. Mcdaniels. 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 Brain MRI was negative.    She started concurrent chemoradiation with BID radiation with cis/etop 2/15/22, and unfortunately had a reaction to cisplatin on C2D1 with chest pain and hypotension. She was hospitalized with sepsis felt to be due to pneumonia. She trialed carboplatin/etoposide on 4/5/22 with a  "resulting rash and opted to forego further chemotherapy as she had completed radiation. Restaging scan 11/3/22 unfortunately with progression with new R hilar lymph node, paratracheal nodes, and increase in size of R supraclavicular mass. She enrolled in [id number] and started C1D1 1/24/23. C1 complicated by anorexia and dysgeusia, and delayed C2 by a week. She has further struggled with fatigue and confusion, which may be related to mirtazapine. Dose reduced for C2 and mirtazapine stopped with improvement in symptoms.     PAST MEDICAL HISTORY    GERD  COPD    SOCIAL HISTORY    Retired - lighting . Lives at home with  and a kitten.  Tobacco: quit smoking 1999. 1 ppd x 25 yrs.  EtOH: wine 1 glass/day  Illicits: none    CURRENT MEDS    Please see med list    ALLERGIES    Codeine, Sulfa drugs, Cisplatin    FAMILY HISTORY    Paternal aunt - breast cancer dx 80s    Subjective      Today 10.24.2023. Patient in clinic with her  for C10D1 for [id number].      She reports overall feeling well, has about a day or two of fatigue and \"brain fog\" for the day after treatment but afterwards recovers and able to be fully active without limitations. She does continue to have a dry cough for which she uses cetirizine with improvement, also has reflux which is stable on a PPI, no fevers or chills, no chest pain or SOB at rest, no n/v/d/c, no rashes or edema, no neuropathy . Did have previous cycle delayed d/t diverticulitis, now improved and recovered without further abd pain or changes to bowels. Remainder of 10 point review of systems elicited and otherwise unremarkable.      Objective    BSA: 1.45 meters squared  /74 (BP Location: Left arm, Patient Position: Sitting)   Pulse 64   Temp 35.6 °C (96.1 °F) (Core)   Resp 16   Wt 48.8 kg (107 lb 8 oz)   LMP  (LMP Unknown)   SpO2 94%   BMI 20.31 kg/m²      Physical Exam  Constitutional:       General: She is awake.      Appearance: Normal appearance. " She is normal weight.   HENT:      Head: Normocephalic.      Mouth/Throat:      Mouth: Mucous membranes are moist.   Eyes:      Extraocular Movements: Extraocular movements intact.      Conjunctiva/sclera: Conjunctivae normal.      Pupils: Pupils are equal, round, and reactive to light.   Cardiovascular:      Rate and Rhythm: Normal rate and regular rhythm.      Heart sounds: Normal heart sounds, S1 normal and S2 normal.   Pulmonary:      Effort: Pulmonary effort is normal.      Breath sounds: Normal breath sounds.   Abdominal:      General: Abdomen is flat. Bowel sounds are normal.      Palpations: Abdomen is soft.   Musculoskeletal:      Cervical back: Normal range of motion and neck supple.   Skin:     Comments: No skin rash observed   Neurological:      Mental Status: She is alert.      Cranial Nerves: Cranial nerves 2-12 are intact.      Sensory: Sensation is intact.      Motor: Motor function is intact.      Coordination: Coordination is intact.   Psychiatric:         Attention and Perception: Attention normal.         Mood and Affect: Mood normal.         Speech: Speech normal.         Behavior: Behavior normal. Behavior is cooperative.         Cognition and Memory: Cognition normal.     Labs      Latest Reference Range & Units 10/24/23 08:09   GLUCOSE 74 - 99 mg/dL 104 (H)   SODIUM 136 - 145 mmol/L 140   POTASSIUM 3.5 - 5.3 mmol/L 3.7   CHLORIDE 98 - 107 mmol/L 106   Bicarbonate 21 - 32 mmol/L 24   Anion Gap 10 - 20 mmol/L 14   Blood Urea Nitrogen 6 - 23 mg/dL 18   Creatinine 0.50 - 1.05 mg/dL 0.85   EGFR >60 mL/min/1.73m*2 75   Calcium 8.6 - 10.6 mg/dL 9.1   PHOSPHORUS 2.5 - 4.9 mg/dL 3.6   Albumin 3.4 - 5.0 g/dL 3.6   Alkaline Phosphatase 33 - 136 U/L 46   ALT 7 - 45 U/L 8   AST 9 - 39 U/L 11   Bilirubin Total 0.0 - 1.2 mg/dL 0.5   Bilirubin, Direct 0.0 - 0.3 mg/dL 0.1   Creatine Kinase 0 - 215 U/L 39   AMYLASE 29 - 103 U/L 31   FERRITIN 8 - 150 ng/mL 130   Total Protein 6.4 - 8.2 g/dL 6.0 (L)   MAGNESIUM  1.60 - 2.40 mg/dL 2.06   C-Reactive Protein <1.00 mg/dL 0.97   LIPASE 9 - 82 U/L 24   CORTISOL 2.5 - 20.0 ug/dL 0.8 (L)   FOLLICLE STIMULATING HORMONE IU/L <0.9   Thyroxine, Free 0.78 - 1.48 ng/dL 0.80   LH IU/L <0.1   Thyroid Stimulating Hormone 0.44 - 3.98 mIU/L 4.01 (H)   Estradiol pg/mL <19   INR 0.9 - 1.1  1.0   Protime 9.8 - 12.8 seconds 11.6   aPTT 27 - 38 seconds 31   WBC 4.4 - 11.3 x10*3/uL 7.2   nRBC 0.0 - 0.0 /100 WBCs 0.0   RBC 4.00 - 5.20 x10*6/uL 3.48 (L)   HEMOGLOBIN 12.0 - 16.0 g/dL 10.8 (L)   HEMATOCRIT 36.0 - 46.0 % 33.4 (L)   MCV 80 - 100 fL 96   MCH 26.0 - 34.0 pg 31.0   MCHC 32.0 - 36.0 g/dL 32.3   RED CELL DISTRIBUTION WIDTH 11.5 - 14.5 % 13.2   Platelets 150 - 450 x10*3/uL 253   MEAN PLATELET VOLUME 7.5 - 11.5 fL 9.2   Neutrophils % 40.0 - 80.0 % 72.8   Immature Granulocytes %, Automated 0.0 - 0.9 % 0.8   Lymphocytes % 13.0 - 44.0 % 16.0   Monocytes % 2.0 - 10.0 % 8.7   Eosinophils % 0.0 - 6.0 % 1.1   Basophils % 0.0 - 2.0 % 0.6   Neutrophils Absolute 1.20 - 7.70 x10*3/uL 5.25   Immature Granulocytes Absolute, Automated 0.00 - 0.70 x10*3/uL 0.06   Lymphocytes Absolute 1.20 - 4.80 x10*3/uL 1.15 (L)   Monocytes Absolute 0.10 - 1.00 x10*3/uL 0.63   Eosinophils Absolute 0.00 - 0.70 x10*3/uL 0.08   Basophils Absolute 0.00 - 0.10 x10*3/uL 0.04   (H): Data is abnormally high  (L): Data is abnormally low     Latest Reference Range & Units 10/24/23 08:59   Color, Urine Straw, Yellow  Straw   Appearance, Urine Clear  Clear   Specific Gravity, Urine 1.005 - 1.035  1.009   pH, Urine 5.0, 5.5, 6.0, 6.5, 7.0, 7.5, 8.0  6.0   Protein, Urine NEGATIVE mg/dL NEGATIVE   Glucose, Urine NEGATIVE mg/dL NEGATIVE   Blood, Urine NEGATIVE  NEGATIVE   Ketones, Urine NEGATIVE mg/dL NEGATIVE   Bilirubin, Urine NEGATIVE  NEGATIVE   Urobilinogen, Urine <2.0 mg/dL <2.0   Nitrite, Urine NEGATIVE  NEGATIVE   Leukocyte Esterase, Urine NEGATIVE  NEGATIVE       CT CHEST ABDOMEN PELVIS W IV CONTRAST 10/5/23  1. Robust  diverticulosis. Minimal inflammatory change seen in the  sigmoid probably some residual inflammation from diverticulitis.  Correlation with any known colonoscopy results. No abscess.      2. Moderate vascular calcification.      3. Emphysema. Stable right lower lobe ground-glass nodule with a  smaller adjacent ground-glass nodule. Anterior right upper lobe  parenchymal changes presumably related to treatment related change.  Pneumonitis including sequela of radiation can have this appearance.  Attention on follow-up.      Performance Status:    ECOG 1: Restricted in physically strenuous activity but ambulatory and able to carry out work of a light or sedentary nature, e.g., light house work, office work.     Assessment/Plan      Mrs. Branch is a 68 yo with COPD and GERD who presented with newly diagnosed SCLC completed BID radiation planned concurrently with cis/etoposide but had a reaction C2D1 to cisplatin. Completed 1 cycle carbo/etop D1 4/5/22 also complicated by facial flushing and erythematous rash and stopped further treatment. Now s/p PCI in 6/2022. Most recent CT concerning for disease progression. Referred for clinical trial AMGN [id number], C1D1 1/24/23. Treatment has been complicated by orthostatic hypotension, worsening short-term memory, increased lethargy, weight loss, and poor appetite. Delayed C2 by 1 week and dose-reduced. Confusion has resolved during C3 after stopping mirtazapine and dose reduction.     # SCLC  - limited stage, brain MRI negative  - previously discussed concurrent chemoradiation, with cisplatin/etoposide with side effects including but not limited to allergic reaction, fatigue, risk of infection, tinnitus, neuropathy, injury to liver/kidney, risk of anemia/thrombocytopenia and was consented  - she was also interested in scalp cooling, which unfortunately she is not a candidate for d/t SCLC  - started concurrent chemoradiation BID with Q3week cis/etop on 2/15 at [facility]  [place]  -unfortunately had reaction to cisplatin on C2D1 resulting in hospitalization and also felt to be septic due to pneumonia  - discussed that we typically do 3-4 cycles chemotherapy, and alternative regimens include carboplatin/etoposide vs CAV. Given reaction to cisplatin, concern for possible reaction to carboplatin as well, although unknown rates of cross reaction. Alternatively, they also asked about discontinuation of chemotherapy, since she completed radiation. She ultimately decided to trial carbo/etoposide. She is aware of the heightened risk of allergic reaction, as well as other side effects including but not limited to fatigue, risk of infection, neuropathy, injury to liver/kidney, risk of anemia/thrombocytopenia. consented 3/28/22  -s/p 1 cycle Carbo/Etop D1 4/5/22, developed facial flushing and erythematous rash on arms and chest s/p treatment, resolved with benadryl  -opted to forego further chemotherapy and will continue on maintenance  - s/p PCI 6/2022  - CT C/A/P and brain MRI 5/2022 and most recently 8/2022 with good response and no disease  -  MRI brain 11/7 without evidence of metastatic disease  - CT C/A/P 11/3 with multiple new enlarged LN concerning for disease progression - discussed with Dr. Valdivia not able to repeat radiation.  - referred to phase 1 clinical trial and consideration for [id number] or [id number]  - 1.5.2023 : Patient signed consent to take part on phase 1 study [id number]. Look clinically good to take part in study. Will start on study ASAP if eligible.   - 1.24.2023: Seen today and ready to start trial on [id number].  - 1.31.2023: Seen today, ready for C1D8 AMGN 1518   - 2.7.2023: Seen today for C1D15 [id number] - continue with trial   - 2.14.2023: Seen in follow-up on [id number] with overall worsening of general health  - 2.21.2023: Seen today for C2D1 AMGN 1518 with continued weight loss although perhaps starting to plateau, more energy since being off treatment, still poor  appetite. will delay by 1 week, started dexamethasone 2 mg daily, and consider dose reduction.  - 2.28.2023: Seen today for C2D1 AMGN 1518 after delaying for 1 week. Energy level and appetite are improved, although still losing a little weight. Confusion is worse today, possibly due to mirtazapine vs study-related. Will dose reduce today.  - 3.7.2023: Seen today C2D8 AMGN 1518 after dose-reduction last week. Confusion is mildly improved, energy level and appetite are stable. Will add marinol for appetite.   - 3.14.2023: Seen today C2D15 AMGN 1518. Overall improved: confusion continues to improve, energy level and appetite are improved. Weight is improved. Will continue dexamethasone and marinol.  - 3.15.2023: C2D16 No CRS symptoms observed after last treatment Overall Symptoms are improving and she is tolerating treatment.  -3.16.2023: C2D17 No CRS symptoms observed after last treatment Overall Symptoms are improving and she is tolerating treatment.  - 3.28.2023 : Most recent imaging suggest patient benefiting from current treatment. Ongoing symptom  possible common cold/season allergies. Since the patient looks clinically good we will proceed with C3D1 today. Educated patient to call the office ASAP if symptoms worsen.   - 4.11.2023: C3D15 Feeling well and overall improved with fatigue, confusion, anorexia. Proceed at current dosing and weaning steroids as below  -4.25.2023: C4D1 Pt is feeling well, no complaints of fatigue, confusion, memory loss. Has gained weight. Energy levels are good. Proceed with the current dosing. Pt is no longer on steroids.   -5.9.2023: C4D15. Pt is tolerating treatment well with no new complains. Continues to have good energy level endorses poor appetite with out any weight loss. Suggested to start taking the dronabinol.  Will proceed with treatment today.  -5.23.2023: C5D1. Pt is tolerating treatment well. Energy levels are good, is having some weight loss and constipation. Started  back on dex for appetite. Pt will let us know if she remains constipated over the next several days.  Personally reviewed scans with stable disease. Will proceed with treatment today.   -6.6.2023: C5D15. Continues to tolerate treatment well. Since we restarted her on Dexamethasone she reports her energy levels and appetite has improved. Will continue on low dose Dex. With no new symptoms, we will proceed with treatment today.   -6.20.2023: C6D1. Doing well on dexamethasone 1 mg daily. Will proceed with treatment.  -7.5.2023: C6D15. We will proceed with treatment since the patient is tolerating treatment with no significant AE's and also give 1/2 liter of IV fluids.   - 7.18.2023: C7D1. Doing well, will proceed with treatment. She is out of dexamethasone, and will monitor off steroids.   -8.1.2023: C7D15. Continues to tolerate treatment. Will proceed with C7D15 today. RTC per protocol.  - 8.29.2023: C8D1. C8 delayed due to hospitalization for diverticulitis. Has completed antibiotics and feeling well for treatment.  - 9.12.2023: C8D15. Most recent imgaing suggest the patient continues to benefit from current treatment. The imaging also suggest improvement of previously visualized segmental colitis associated with diverticulosis affecting the sigmoid colon with minimal residual pericolonic fat standing and hyperemia of the sigmoid colon. Imaging also suggest resolving inflammatory process without evidence of new or worsening complications. We will proceed with C8D15 today since the patient is also clinically feeling good. RTC per protocol.  -9.26.2023: C9D1. Patient looks clinically good. With no JACQUELYN's we will proceed with treatment C9D1 today.  -10.24.23: C10D1. Pt feels well, no acute events, no TRAEs, continue treatment today as scheduled.    # Confusion - resolved  - significantly worsened after taking double dose of mirtazapine accidentally, although has been generally down-trending since starting study (which  is also when she started taking mirtazapine) and now resolved  - brain MRI without progression of cancer  - will continue off mirtazapine    # Unintentional weight loss - stable  # Anorexia- Improving  # Dysgeusia- improving  - all improved on steroids. unclear if this is from cancer or side effect of study drug  - stopped mirtazapine due to concerns for confusion   - weaned off dexamethasone and started marinol, but kept forgetting to take it  - dexamethasone trial started again on 5.23.2023. Continued on 1 mg daily - will trial off after 7.18.23.     # Fatigue - Resolved  - back to normal pretty much, weaning steroids as above    # frequent PVCs - asymptomatic  - saw onco-cardiology and completed 24-hr Holter monitor  - recommended decreasing caffeine and sudafed intake  - continue to monitor    #hyponatremia - improving  - improved recently, will monitor    # Forgetfulness - improved - however noted decline on AMGN study- unclear etiology.    - started after PCI, on memantine daily and has since stopped  - offered neurocognitive evaluation previously and she will think about this and readdress next visit  -  notes some decline in last 3 weeks- especially short term memory loss.    - Improved    # Neuropathy - resolved  - mild peripheral neuropathy with numbness of the toes - likely due to cisplatin  - will continue to monitor closely  - not endorsing any neuropathy at this visit     # Rash - resolved  Waxing and waning rash throughout her body. ?improving. Unclear etiology, patient and  feel timing coincided with starting BMX.   - she will hold off on further BMX  - thought to be due to sulfa allergy, possibly due to platinum agents   - continue to monitor    #odynophagia - resolved  -rad onc aware  -prescribed BMX solution  -will continue to monitor    #constipation-improving  -occasional. Prescribed Senna S  -will monitor    Pt seen and discussed with Dr. Francy Archer.    Vladimir Hector,  DO  Hematology- Oncology Fellow, PGY-6

## 2023-10-25 ENCOUNTER — DOCUMENTATION (OUTPATIENT)
Dept: HEMATOLOGY/ONCOLOGY | Facility: HOSPITAL | Age: 68
End: 2023-10-25
Payer: MEDICARE

## 2023-10-25 DIAGNOSIS — Z00.6 EXAMINATION OF PARTICIPANT IN CLINICAL TRIAL: ICD-10-CM

## 2023-10-25 DIAGNOSIS — C34.90 SMALL CELL LUNG CANCER (MULTI): ICD-10-CM

## 2023-10-25 DIAGNOSIS — R05.3 CHRONIC COUGH: Primary | ICD-10-CM

## 2023-10-25 DIAGNOSIS — C34.90 SMALL CELL LUNG CANCER (MULTI): Primary | ICD-10-CM

## 2023-10-25 DIAGNOSIS — C34.90 MALIGNANT NEOPLASM OF LUNG, UNSPECIFIED LATERALITY, UNSPECIFIED PART OF LUNG (MULTI): Primary | ICD-10-CM

## 2023-10-25 RX ORDER — DEXTROMETHORPHAN POLISTIREX 30 MG/5ML
30 SUSPENSION ORAL DAILY PRN
COMMUNITY
Start: 2023-10-26

## 2023-10-25 RX ORDER — CETIRIZINE HYDROCHLORIDE 10 MG/1
10 TABLET ORAL DAILY
Qty: 30 TABLET | Refills: 11 | Status: SHIPPED | OUTPATIENT
Start: 2023-10-25 | End: 2023-10-26 | Stop reason: SDUPTHER

## 2023-10-26 DIAGNOSIS — R05.3 CHRONIC COUGH: ICD-10-CM

## 2023-10-26 LAB — ACTH PLAS-MCNC: 4.7 PG/ML (ref 7.2–63.3)

## 2023-10-26 RX ORDER — CETIRIZINE HYDROCHLORIDE 10 MG/1
10 TABLET ORAL DAILY
Qty: 30 TABLET | Refills: 11 | Status: SHIPPED | OUTPATIENT
Start: 2023-10-26 | End: 2024-04-04 | Stop reason: ALTCHOICE

## 2023-11-01 LAB
HOLD SPECIMEN: NORMAL
HOLD SPECIMEN: NORMAL

## 2023-11-07 ENCOUNTER — EDUCATION (OUTPATIENT)
Dept: HEMATOLOGY/ONCOLOGY | Facility: HOSPITAL | Age: 68
End: 2023-11-07

## 2023-11-07 ENCOUNTER — PATIENT OUTREACH (OUTPATIENT)
Dept: CARE COORDINATION | Facility: CLINIC | Age: 68
End: 2023-11-07

## 2023-11-07 ENCOUNTER — OFFICE VISIT (OUTPATIENT)
Dept: HEMATOLOGY/ONCOLOGY | Facility: HOSPITAL | Age: 68
End: 2023-11-07
Payer: MEDICARE

## 2023-11-07 ENCOUNTER — HOSPITAL ENCOUNTER (OUTPATIENT)
Dept: RESEARCH | Facility: HOSPITAL | Age: 68
Discharge: HOME | End: 2023-11-07
Payer: MEDICARE

## 2023-11-07 VITALS
SYSTOLIC BLOOD PRESSURE: 115 MMHG | OXYGEN SATURATION: 97 % | DIASTOLIC BLOOD PRESSURE: 74 MMHG | TEMPERATURE: 98.2 F | RESPIRATION RATE: 18 BRPM | HEART RATE: 79 BPM

## 2023-11-07 DIAGNOSIS — Z00.6 CLINICAL TRIAL PARTICIPANT: ICD-10-CM

## 2023-11-07 DIAGNOSIS — C34.90 SMALL CELL LUNG CANCER (MULTI): ICD-10-CM

## 2023-11-07 DIAGNOSIS — R73.01 IMPAIRED FASTING BLOOD SUGAR: ICD-10-CM

## 2023-11-07 DIAGNOSIS — C34.90 SMALL CELL LUNG CANCER (MULTI): Primary | ICD-10-CM

## 2023-11-07 LAB
ALBUMIN SERPL BCP-MCNC: 3.6 G/DL (ref 3.4–5)
ALP SERPL-CCNC: 44 U/L (ref 33–136)
ALT SERPL W P-5'-P-CCNC: 11 U/L (ref 7–45)
ANION GAP SERPL CALC-SCNC: 11 MMOL/L (ref 10–20)
APTT PPP: 29 SECONDS (ref 27–38)
AST SERPL W P-5'-P-CCNC: 13 U/L (ref 9–39)
BASOPHILS # BLD AUTO: 0.03 X10*3/UL (ref 0–0.1)
BASOPHILS NFR BLD AUTO: 0.4 %
BILIRUB DIRECT SERPL-MCNC: 0.1 MG/DL (ref 0–0.3)
BILIRUB SERPL-MCNC: 0.5 MG/DL (ref 0–1.2)
BUN SERPL-MCNC: 30 MG/DL (ref 6–23)
CALCIUM SERPL-MCNC: 8.9 MG/DL (ref 8.6–10.6)
CHLORIDE SERPL-SCNC: 105 MMOL/L (ref 98–107)
CK SERPL-CCNC: 36 U/L (ref 0–215)
CO2 SERPL-SCNC: 27 MMOL/L (ref 21–32)
CREAT SERPL-MCNC: 0.71 MG/DL (ref 0.5–1.05)
CRP SERPL-MCNC: 0.83 MG/DL
EOSINOPHIL # BLD AUTO: 0.15 X10*3/UL (ref 0–0.7)
EOSINOPHIL NFR BLD AUTO: 1.8 %
ERYTHROCYTE [DISTWIDTH] IN BLOOD BY AUTOMATED COUNT: 14.2 % (ref 11.5–14.5)
FERRITIN SERPL-MCNC: 81 NG/ML (ref 8–150)
GFR SERPL CREATININE-BSD FRML MDRD: >90 ML/MIN/1.73M*2
GLUCOSE SERPL-MCNC: 99 MG/DL (ref 74–99)
HCT VFR BLD AUTO: 36.5 % (ref 36–46)
HGB BLD-MCNC: 11.8 G/DL (ref 12–16)
HOLD SPECIMEN: NORMAL
IMM GRANULOCYTES # BLD AUTO: 0.05 X10*3/UL (ref 0–0.7)
IMM GRANULOCYTES NFR BLD AUTO: 0.6 % (ref 0–0.9)
INR PPP: 1 (ref 0.9–1.1)
LYMPHOCYTES # BLD AUTO: 1.56 X10*3/UL (ref 1.2–4.8)
LYMPHOCYTES NFR BLD AUTO: 18.9 %
MAGNESIUM SERPL-MCNC: 1.91 MG/DL (ref 1.6–2.4)
MCH RBC QN AUTO: 31.6 PG (ref 26–34)
MCHC RBC AUTO-ENTMCNC: 32.3 G/DL (ref 32–36)
MCV RBC AUTO: 98 FL (ref 80–100)
MONOCYTES # BLD AUTO: 0.63 X10*3/UL (ref 0.1–1)
MONOCYTES NFR BLD AUTO: 7.6 %
NEUTROPHILS # BLD AUTO: 5.83 X10*3/UL (ref 1.2–7.7)
NEUTROPHILS NFR BLD AUTO: 70.7 %
NRBC BLD-RTO: 0 /100 WBCS (ref 0–0)
PHOSPHATE SERPL-MCNC: 3.2 MG/DL (ref 2.5–4.9)
PLATELET # BLD AUTO: 238 X10*3/UL (ref 150–450)
POTASSIUM SERPL-SCNC: 4 MMOL/L (ref 3.5–5.3)
PROT SERPL-MCNC: 6.2 G/DL (ref 6.4–8.2)
PROTHROMBIN TIME: 11 SECONDS (ref 9.8–12.8)
RBC # BLD AUTO: 3.74 X10*6/UL (ref 4–5.2)
SODIUM SERPL-SCNC: 139 MMOL/L (ref 136–145)
WBC # BLD AUTO: 8.3 X10*3/UL (ref 4.4–11.3)

## 2023-11-07 PROCEDURE — 36415 COLL VENOUS BLD VENIPUNCTURE: CPT | Performed by: STUDENT IN AN ORGANIZED HEALTH CARE EDUCATION/TRAINING PROGRAM

## 2023-11-07 PROCEDURE — 82728 ASSAY OF FERRITIN: CPT | Performed by: STUDENT IN AN ORGANIZED HEALTH CARE EDUCATION/TRAINING PROGRAM

## 2023-11-07 PROCEDURE — 83036 HEMOGLOBIN GLYCOSYLATED A1C: CPT | Performed by: FAMILY MEDICINE

## 2023-11-07 PROCEDURE — 2500000005 HC RX 250 GENERAL PHARMACY W/O HCPCS: Performed by: STUDENT IN AN ORGANIZED HEALTH CARE EDUCATION/TRAINING PROGRAM

## 2023-11-07 PROCEDURE — 96413 CHEMO IV INFUSION 1 HR: CPT

## 2023-11-07 PROCEDURE — 99214 OFFICE O/P EST MOD 30 MIN: CPT | Mod: 25 | Performed by: STUDENT IN AN ORGANIZED HEALTH CARE EDUCATION/TRAINING PROGRAM

## 2023-11-07 PROCEDURE — 1126F AMNT PAIN NOTED NONE PRSNT: CPT | Performed by: STUDENT IN AN ORGANIZED HEALTH CARE EDUCATION/TRAINING PROGRAM

## 2023-11-07 PROCEDURE — 84075 ASSAY ALKALINE PHOSPHATASE: CPT | Performed by: STUDENT IN AN ORGANIZED HEALTH CARE EDUCATION/TRAINING PROGRAM

## 2023-11-07 PROCEDURE — 85025 COMPLETE CBC W/AUTO DIFF WBC: CPT | Performed by: STUDENT IN AN ORGANIZED HEALTH CARE EDUCATION/TRAINING PROGRAM

## 2023-11-07 PROCEDURE — 85610 PROTHROMBIN TIME: CPT | Performed by: STUDENT IN AN ORGANIZED HEALTH CARE EDUCATION/TRAINING PROGRAM

## 2023-11-07 PROCEDURE — 82248 BILIRUBIN DIRECT: CPT | Performed by: STUDENT IN AN ORGANIZED HEALTH CARE EDUCATION/TRAINING PROGRAM

## 2023-11-07 PROCEDURE — 86140 C-REACTIVE PROTEIN: CPT | Performed by: STUDENT IN AN ORGANIZED HEALTH CARE EDUCATION/TRAINING PROGRAM

## 2023-11-07 PROCEDURE — 82550 ASSAY OF CK (CPK): CPT | Performed by: STUDENT IN AN ORGANIZED HEALTH CARE EDUCATION/TRAINING PROGRAM

## 2023-11-07 PROCEDURE — 1159F MED LIST DOCD IN RCRD: CPT | Performed by: STUDENT IN AN ORGANIZED HEALTH CARE EDUCATION/TRAINING PROGRAM

## 2023-11-07 PROCEDURE — 85730 THROMBOPLASTIN TIME PARTIAL: CPT | Performed by: STUDENT IN AN ORGANIZED HEALTH CARE EDUCATION/TRAINING PROGRAM

## 2023-11-07 PROCEDURE — 3008F BODY MASS INDEX DOCD: CPT | Performed by: STUDENT IN AN ORGANIZED HEALTH CARE EDUCATION/TRAINING PROGRAM

## 2023-11-07 PROCEDURE — 1160F RVW MEDS BY RX/DR IN RCRD: CPT | Performed by: STUDENT IN AN ORGANIZED HEALTH CARE EDUCATION/TRAINING PROGRAM

## 2023-11-07 PROCEDURE — 1036F TOBACCO NON-USER: CPT | Performed by: STUDENT IN AN ORGANIZED HEALTH CARE EDUCATION/TRAINING PROGRAM

## 2023-11-07 PROCEDURE — 83735 ASSAY OF MAGNESIUM: CPT | Performed by: STUDENT IN AN ORGANIZED HEALTH CARE EDUCATION/TRAINING PROGRAM

## 2023-11-07 PROCEDURE — 99214 OFFICE O/P EST MOD 30 MIN: CPT | Performed by: STUDENT IN AN ORGANIZED HEALTH CARE EDUCATION/TRAINING PROGRAM

## 2023-11-07 PROCEDURE — 84100 ASSAY OF PHOSPHORUS: CPT | Performed by: STUDENT IN AN ORGANIZED HEALTH CARE EDUCATION/TRAINING PROGRAM

## 2023-11-07 RX ORDER — HEPARIN 100 UNIT/ML
500 SYRINGE INTRAVENOUS AS NEEDED
Status: CANCELLED | OUTPATIENT
Start: 2023-11-07

## 2023-11-07 RX ORDER — HEPARIN 100 UNIT/ML
500 SYRINGE INTRAVENOUS AS NEEDED
Status: DISCONTINUED | OUTPATIENT
Start: 2023-11-07 | End: 2023-11-08 | Stop reason: HOSPADM

## 2023-11-07 RX ORDER — HEPARIN SODIUM,PORCINE/PF 10 UNIT/ML
50 SYRINGE (ML) INTRAVENOUS AS NEEDED
Status: CANCELLED | OUTPATIENT
Start: 2023-11-07

## 2023-11-07 RX ORDER — HEPARIN SODIUM,PORCINE/PF 10 UNIT/ML
50 SYRINGE (ML) INTRAVENOUS AS NEEDED
Status: DISCONTINUED | OUTPATIENT
Start: 2023-11-07 | End: 2023-11-08 | Stop reason: HOSPADM

## 2023-11-07 RX ADMIN — Medication 3 MG: at 09:47

## 2023-11-07 NOTE — PROGRESS NOTES
Patient ID: Gladys Branch is a 67 y.o. female.    DIAGNOSIS    Small Cell Lung Cancer    By immunostaining, the tumor cells are positive for CAM 5.2, INSM1, and TTF-1, and negative for p40 and LCA. The proliferation index by Ki-67 immunostaining is approximately 90%.  Synaptophysin, Chromogranin and INSM-1 are positive.  AE1/AE3 is negative. NEOGENOMICS, RB protein expression is lost in the tumor cells    STAGING    [id number], limited stage  Recurrence    CURRENT SITES OF DISEASE    RLL, supraclavicular    MOLECULAR GENOMICS      PRIOR THERAPY    Concurrent chemoradiation with Cisplatin/Etoposide every 3 weeks; C1D1 2/15/22, reaction to cisplatin C2D1 and did not receive D2 or D3 etoposide  Carbo/etoposide 4/5/2022 1 cycle c/b rash  PCI 5/21-6/13/22    CURRENT THERAPY    Phase 1 study [id number]  with study drug Taralatamab 1/24/23 - present.    CURRENT ONCOLOGICAL PROBLEMS      HISTORY OF PRESENT ILLNESS    Mrs. Branch is a 67 yo with PMH GERD and COPD who originally was round to have a RLL nodule measuring 11 mm in 4/28/2021 found as part of CT calcium score scan. An ensuing CT chest w/o contrast was done on 5/19/21 which revealed subsolid pulmonary nodules measuring 14 mm in the RLL. She had CT chest w/contrast on 11/29/21 which confirmed stable 14 mm GGO of the RLL and decreased RLL subpleural nodule. She met thoracic surgeon Dr. Farfan, who ordered PET/CT scan done on 12/8/21 which did not reveal any FDG-avid nodules within the lung parenchyma but R hilar nodes with max SUV 8.0. He referred her for bronch, which was done on 1/21/22 by Dr. Mcdaniels. 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 Brain MRI was negative.    She started concurrent chemoradiation with BID radiation with cis/etop 2/15/22, and unfortunately had a reaction to cisplatin on C2D1 with chest pain and hypotension. She was hospitalized with sepsis felt to be due to pneumonia. She trialed carboplatin/etoposide on 4/5/22 with a  resulting rash and opted to forego further chemotherapy as she had completed radiation. Restaging scan 11/3/22 unfortunately with progression with new R hilar lymph node, paratracheal nodes, and increase in size of R supraclavicular mass. She enrolled in [id number] and started C1D1 1/24/23. C1 complicated by anorexia and dysgeusia, and delayed C2 by a week. She has further struggled with fatigue and confusion, which may be related to mirtazapine. Dose reduced for C2 and mirtazapine stopped with improvement in symptoms.     PAST MEDICAL HISTORY    GERD  COPD    SOCIAL HISTORY    Retired - lighting . Lives at home with  and a kitten.  Tobacco: quit smoking 1999. 1 ppd x 25 yrs.  EtOH: wine 1 glass/day  Illicits: none    CURRENT MEDS    Please see med list    ALLERGIES    Codeine, Sulfa drugs, Cisplatin    FAMILY HISTORY    Paternal aunt - breast cancer dx 80s    Subjective      Today 11.7.2023. Patient in clinic with her  for C10D15 for [id number].      She is here with her . She has been feeling well overall. She is gaining a little weight. Her appetite is getting better. Energy continues to be good. Good bowel movements. No concerns or complaints.     Objective    BSA: There is no height or weight on file to calculate BSA.  LMP  (LMP Unknown)      Physical Exam  Constitutional:       General: She is awake.      Appearance: Normal appearance. She is normal weight.   HENT:      Head: Normocephalic.      Mouth/Throat:      Mouth: Mucous membranes are moist.   Eyes:      Extraocular Movements: Extraocular movements intact.      Conjunctiva/sclera: Conjunctivae normal.      Pupils: Pupils are equal, round, and reactive to light.   Cardiovascular:      Rate and Rhythm: Normal rate and regular rhythm.      Heart sounds: Normal heart sounds, S1 normal and S2 normal.   Pulmonary:      Effort: Pulmonary effort is normal.      Breath sounds: Normal breath sounds.   Abdominal:      General:  Abdomen is flat. Bowel sounds are normal.      Palpations: Abdomen is soft.   Musculoskeletal:      Cervical back: Normal range of motion and neck supple.   Skin:     Comments: No skin rash observed   Neurological:      Mental Status: She is alert.      Cranial Nerves: Cranial nerves 2-12 are intact.      Sensory: Sensation is intact.      Motor: Motor function is intact.      Coordination: Coordination is intact.   Psychiatric:         Attention and Perception: Attention normal.         Mood and Affect: Mood normal.         Speech: Speech normal.         Behavior: Behavior normal. Behavior is cooperative.         Cognition and Memory: Cognition normal.   Labs     Lab Results   Component Value Date    WBC 8.3 11/07/2023    HGB 11.8 (L) 11/07/2023    MCV 98 11/07/2023     11/07/2023   ]  Lab Results   Component Value Date    NEUTROABS 5.83 11/07/2023     Lab Results   Component Value Date    GLUCOSE 99 11/07/2023    CALCIUM 8.9 11/07/2023     11/07/2023    K 4.0 11/07/2023    CO2 27 11/07/2023     11/07/2023    BUN 30 (H) 11/07/2023    CREATININE 0.71 11/07/2023    MG 1.91 11/07/2023     Lab Results   Component Value Date    ALT 11 11/07/2023    AST 13 11/07/2023    ALKPHOS 44 11/07/2023    BILITOT 0.5 11/07/2023    BILIDIR 0.1 11/07/2023     Lab Results   Component Value Date    ACTH 4.7 (L) 10/24/2023    CORTISOL 0.8 (L) 10/24/2023    TSH 4.01 (H) 10/24/2023    FREET4 0.80 10/24/2023     Lab Results   Component Value Date     09/26/2023         Performance Status:    ECOG 1: Restricted in physically strenuous activity but ambulatory and able to carry out work of a light or sedentary nature, e.g., light house work, office work.     Assessment/Plan      Mrs. Branch is a 66 yo with COPD and GERD who presented with newly diagnosed SCLC completed BID radiation planned concurrently with cis/etoposide but had a reaction C2D1 to cisplatin. Completed 1 cycle carbo/etop D1 4/5/22 also complicated  by facial flushing and erythematous rash and stopped further treatment. Now s/p PCI in 6/2022. Most recent CT concerning for disease progression. Referred for clinical trial AMGN 1518, C1D1 1/24/23. Treatment has been complicated by orthostatic hypotension, worsening short-term memory, increased lethargy, weight loss, and poor appetite. Delayed C2 by 1 week and dose-reduced. Confusion has resolved during C3 after stopping mirtazapine and dose reduction.     # SCLC  - limited stage, brain MRI negative  - previously discussed concurrent chemoradiation, with cisplatin/etoposide with side effects including but not limited to allergic reaction, fatigue, risk of infection, tinnitus, neuropathy, injury to liver/kidney, risk of anemia/thrombocytopenia and was consented  - she was also interested in scalp cooling, which unfortunately she is not a candidate for d/t SCLC  - started concurrent chemoradiation BID with Q3week cis/etop on 2/15 at [facility]  -unfortunately had reaction to cisplatin on C2D1 resulting in hospitalization and also felt to be septic due to pneumonia  - discussed that we typically do 3-4 cycles chemotherapy, and alternative regimens include carboplatin/etoposide vs CAV. Given reaction to cisplatin, concern for possible reaction to carboplatin as well, although unknown rates of cross reaction. Alternatively, they also asked about discontinuation of chemotherapy, since she completed radiation. She ultimately decided to trial carbo/etoposide. She is aware of the heightened risk of allergic reaction, as well as other side effects including but not limited to fatigue, risk of infection, neuropathy, injury to liver/kidney, risk of anemia/thrombocytopenia. consented 3/28/22  -s/p 1 cycle Carbo/Etop D1 4/5/22, developed facial flushing and erythematous rash on arms and chest s/p treatment, resolved with benadryl  -opted to forego further chemotherapy and will continue on maintenance  - s/p PCI 6/2022  - CT C/A/P  and brain MRI 5/2022 and most recently 8/2022 with good response and no disease  -  MRI brain 11/7 without evidence of metastatic disease  - CT C/A/P 11/3 with multiple new enlarged LN concerning for disease progression - discussed with Dr. Valdivia not able to repeat radiation.  - referred to phase 1 clinical trial and consideration for [id number] or [id number]  - 1.5.2023 : Patient signed consent to take part on phase 1 study [id number]. Look clinically good to take part in study. Will start on study ASAP if eligible.   - 1.24.2023: Seen today and ready to start trial on [id number].  - 1.31.2023: Seen today, ready for C1D8 AMGN 1518   - 2.7.2023: Seen today for C1D15 [id number] - continue with trial   - 2.14.2023: Seen in follow-up on [id number] with overall worsening of general health  - 2.21.2023: Seen today for C2D1 AMGN 1518 with continued weight loss although perhaps starting to plateau, more energy since being off treatment, still poor appetite. will delay by 1 week, started dexamethasone 2 mg daily, and consider dose reduction.  - 2.28.2023: Seen today for C2D1 AMGN 1518 after delaying for 1 week. Energy level and appetite are improved, although still losing a little weight. Confusion is worse today, possibly due to mirtazapine vs study-related. Will dose reduce today.  - 3.7.2023: Seen today C2D8 AMGN 1518 after dose-reduction last week. Confusion is mildly improved, energy level and appetite are stable. Will add marinol for appetite.   - 3.14.2023: Seen today C2D15 AMGN 1518. Overall improved: confusion continues to improve, energy level and appetite are improved. Weight is improved. Will continue dexamethasone and marinol.  - 3.15.2023: C2D16 No CRS symptoms observed after last treatment Overall Symptoms are improving and she is tolerating treatment.  -3.16.2023: C2D17 No CRS symptoms observed after last treatment Overall Symptoms are improving and she is tolerating treatment.  - 3.28.2023 : Most recent imaging suggest  patient benefiting from current treatment. Ongoing symptom  possible common cold/season allergies. Since the patient looks clinically good we will proceed with C3D1 today. Educated patient to call the office ASAP if symptoms worsen.   - 4.11.2023: C3D15 Feeling well and overall improved with fatigue, confusion, anorexia. Proceed at current dosing and weaning steroids as below  -4.25.2023: C4D1 Pt is feeling well, no complaints of fatigue, confusion, memory loss. Has gained weight. Energy levels are good. Proceed with the current dosing. Pt is no longer on steroids.   -5.9.2023: C4D15. Pt is tolerating treatment well with no new complains. Continues to have good energy level endorses poor appetite with out any weight loss. Suggested to start taking the dronabinol.  Will proceed with treatment today.  -5.23.2023: C5D1. Pt is tolerating treatment well. Energy levels are good, is having some weight loss and constipation. Started back on dex for appetite. Pt will let us know if she remains constipated over the next several days.  Personally reviewed scans with stable disease. Will proceed with treatment today.   -6.6.2023: C5D15. Continues to tolerate treatment well. Since we restarted her on Dexamethasone she reports her energy levels and appetite has improved. Will continue on low dose Dex. With no new symptoms, we will proceed with treatment today.   -6.20.2023: C6D1. Doing well on dexamethasone 1 mg daily. Will proceed with treatment.  -7.5.2023: C6D15. We will proceed with treatment since the patient is tolerating treatment with no significant AE's and also give 1/2 liter of IV fluids.   - 7.18.2023: C7D1. Doing well, will proceed with treatment. She is out of dexamethasone, and will monitor off steroids.   -8.1.2023: C7D15. Continues to tolerate treatment. Will proceed with C7D15 today. RTC per protocol.  - 8.29.2023: C8D1. C8 delayed due to hospitalization for diverticulitis. Has completed antibiotics and feeling  well for treatment.  - 9.12.2023: C8D15. Most recent imgaing suggest the patient continues to benefit from current treatment. The imaging also suggest improvement of previously visualized segmental colitis associated with diverticulosis affecting the sigmoid colon with minimal residual pericolonic fat standing and hyperemia of the sigmoid colon. Imaging also suggest resolving inflammatory process without evidence of new or worsening complications. We will proceed with C8D15 today since the patient is also clinically feeling good. RTC per protocol.  -9.26.2023: C9D1. Patient looks clinically good. With no JACQUELYN's we will proceed with treatment C9D1 today.  -10.24.23: C10D1. Pt feels well, no acute events, no TRAEs, continue treatment today as scheduled.  -11.7.23: C10D15. Pt feels well, no TRAEs, continue treatment as scheduled.    # Confusion - resolved  - significantly worsened after taking double dose of mirtazapine accidentally, although has been generally down-trending since starting study (which is also when she started taking mirtazapine) and now resolved  - brain MRI without progression of cancer  - will continue off mirtazapine    # Unintentional weight loss - stable  # Anorexia- Improving  # Dysgeusia- improving  - all improved on steroids. unclear if this is from cancer or side effect of study drug  - stopped mirtazapine due to concerns for confusion   - weaned off dexamethasone and started marinol, but kept forgetting to take it  - dexamethasone trial started again on 5.23.2023. Continued on 1 mg daily - will trial off after 7.18.23.     # Fatigue - Resolved  - back to normal pretty much, weaning steroids as above    # frequent PVCs - asymptomatic  - saw onco-cardiology and completed 24-hr Holter monitor  - recommended decreasing caffeine and sudafed intake  - continue to monitor    #hyponatremia - improving  - improved recently, will monitor    # Forgetfulness - improved - however noted decline on AMGN  study- unclear etiology.    - started after PCI, on memantine daily and has since stopped  - offered neurocognitive evaluation previously and she will think about this and readdress next visit  -  notes some decline in last 3 weeks- especially short term memory loss.    - Improved    # Neuropathy - resolved  - mild peripheral neuropathy with numbness of the toes - likely due to cisplatin  - will continue to monitor closely  - not endorsing any neuropathy at this visit     # Rash - resolved  Waxing and waning rash throughout her body. ?improving. Unclear etiology, patient and  feel timing coincided with starting BMX.   - she will hold off on further BMX  - thought to be due to sulfa allergy, possibly due to platinum agents   - continue to monitor    #odynophagia - resolved  -rad onc aware  -prescribed BMX solution  -will continue to monitor    #constipation-improving  -occasional. Prescribed Senna S  -will monitor    Francy Archer MD

## 2023-11-07 NOTE — PROGRESS NOTES
Unable to reach patient for 90 day post discharge follow up call.   LVM with call back number for patient to call if needed.  Patient has met target of no readmission for (90) days post (hospital) discharge and is graduated from Transitional Care Management program at this time.

## 2023-11-07 NOTE — SIGNIFICANT EVENT
11/07/23 0930   Prechemo Checklist   Has the patient been in the hospital, ED, or urgent care since last date of service No   Chemo/Immuno Consent Signed Yes   Protocol/Indications Verified Yes   Confirmed to previous date/time of medication Yes   Compared to previous dose Yes   All medications are dated accurately Yes   Pregnancy Test Negative Not applicable   Parameters Met Yes   BSA/Weight-Height Verified   (N/A assigned dose)   Dose Calculations Verified   (N/A assigned dose)

## 2023-11-07 NOTE — PROGRESS NOTES
Research Note Treatment Day    Gladys Branch is here today for treatment on [id number]. Today is C10D15. Procedures completed per protocol. AE's and con-meds reviewed with patient. Patient is aware of treatment plan.    [x]   Received treatment as planned   OR  []    Treatment delayed; patient calendar updated as required   Treatment delayed because:    []   AE    []   Physician Discretion    []   Clinical Deterioration or Progression     []   Other  Pt doing well, no AE's or change in meds. Pt was reconsented per protocol. Pt was discharged in good condition.    Education Documentation  Fatigue, taught by Vladimir Fernandez RN at 11/7/2023  1:55 PM.  Learner: Significant Other, Patient  Readiness: Eager  Method: Explanation  Response: Verbalizes Understanding    General Medication Information, taught by Vladimir Fernandez RN at 11/7/2023  1:55 PM.  Learner: Significant Other, Patient  Readiness: Eager  Method: Explanation  Response: Verbalizes Understanding    Treatment Plan and Schedule, taught by Vladimir Fernandez RN at 11/7/2023  1:55 PM.  Learner: Significant Other, Patient  Readiness: Eager  Method: Explanation  Response: Verbalizes Understanding    Supportive Medications, taught by Vladimir Fernandez RN at 11/7/2023  1:55 PM.  Learner: Significant Other, Patient  Readiness: Eager  Method: Explanation  Response: Verbalizes Understanding    Nutrition, taught by Vladimir Fernandez RN at 11/7/2023  1:55 PM.  Learner: Significant Other, Patient  Readiness: Eager  Method: Explanation  Response: Verbalizes Understanding    Education Comments  No comments found.

## 2023-11-07 NOTE — RESEARCH NOTES
[facility]<[id number]><[id number]><[id number]>    DCRU NURSING VISIT NOTE  Study Name: ALEXANDRU 1518  IRB#: [id number]  DCRU#: [id number]  Protocol Version Dated: 03.22.23  PI: Roshan Kumar MD.    Time point: Cycle 5 and beyond - Day 15  (CYCLE 10 )    Encounter Date: 11/07/2023  Encounter Time:  9:00 AM EST  Encounter Department: [facility] HEMATOLOGY AND ONCOLOGY     #1     Phone Pager   Carmen Rios [phone]    #2 Phone Pager        Other Phone Pager          Study Regimen and Dosing   Refer to [place] Treatment Plan for orders  Part A1 Dose Exploration & A2 Dose Expansion - Small Cell Lung Cancer Relapsed or Refractory patients who progressed or recurred following platinum-based chemotherapy will receive AMG-757 to evaluate safety, tolerability and determine the maximum tolerated dose (MTD) or recommended phase 2 dose (RP2D).  Cycle = 28 days  TARLATAMAB () administered IV Day 1 and Day 15.     Dietary Guidelines   Regular diet:     Admission and Prior to Starting Study Activities   Notify  when patient arrives to unit.  Complete DCRU/Reading intake form in EMR.  Obtain vital signs including Pulse Oximetry after seated or semi-fowlers x 5 mins. Record on flow sheet & in EMR.  Obtain weight in kilograms - with shoes off & heavy items removed.  Insert one peripheral IV line for sample collection procedures (flush line with normal saline following each blood draw). Access mediport (if available) otherwise insert second peripheral line in opposite arm for Investigative drug administration (if peripheral line, flush line with normal saline before & after infusion)  Physical Exam.     Criteria to Treat   DCRU RN reviewed and meets eligibility to proceed with treatment plan   Time team notified: 0841 am  DCRU RN notifies study team to review eligibility and approval before dosing procedures  Time team approves: 0846 am     Maddison CH  Sarina is a 67 y.o. female and is here for a Research clinical visit.    Visit Provider: [place], [facility] ROOM 1 [facility]     Allergies:   Allergies   Allergen Reactions    Cisplatin Other     CHEMO INDUCED (Moderate); Dyspepsia (Moderate); Headaches (Moderate); Hypotension (Moderate); Numbness (Moderate); Resp Distress (Moderate)    Codeine Nausea Only and Other     nausea    Sulfa (Sulfonamide Antibiotics) Rash       Objective     Vital Signs:    Vitals:    11/07/23 0714 11/07/23 0931 11/07/23 1051   BP: 99/69 101/64 115/74   Pulse: 63 74 79   Resp: 16 18 18   Temp: 36.8 °C (98.2 °F) 36.9 °C (98.4 °F) 36.8 °C (98.2 °F)   TempSrc: Temporal Temporal Temporal   SpO2: 97% 96% 97%       Physical Exam     ASSESSMENT and PLAN:  Problem List Items Addressed This Visit          Hematology and Neoplasia    Small cell lung cancer (CMS/HCC)    Relevant Medications    heparin flush 10 unit/mL syringe 50 Units    heparin flush 100 unit/mL injection 500 Units    alteplase (Cathflo Activase) injection 2 mg    Study [id number] Tarlatamab (AMG-757) 3 mg in sodium chloride 0.9% 250 mL IV (Completed)    Other Relevant Orders    Infusion Appointment Request (Completed)    CBC and Auto Differential (Completed)    Comprehensive metabolic panel (Completed)    Treatment Conditions (Completed)    Provider Communication - [id number] (Completed)    Research Triplicate ECG (Completed)    Research Triplicate ECG (Completed)    Protime-INR (Completed)    Phosphorus (Completed)    Magnesium (Completed)    Ferritin (Completed)    C-Reactive Protein (Completed)    CK (Completed)    Bilirubin, Direct (Completed)    APTT (Completed)    Nursing Communication - Vascular Access (Completed)    Venous Access, CVAD    CENTRAL VENOUS LINE DRESSING CHANGE - ADULT    Insert peripheral IV    Convert IV to saline lock    Research collection: 2.5mL Red SST - Research Collect     Other Visit Diagnoses       Clinical trial participant        Relevant Orders    Research  collection: 2.5mL Red [facility] - Research Collect             Medications as of the completion of today's visit:  Current Outpatient Medications   Medication Sig Dispense Refill    cetirizine (ZyrTEC) 10 mg tablet Take 1 tablet (10 mg) by mouth once daily. 30 tablet 11    cholecalciferol (Vitamin D-3) 25 MCG (1000 UT) tablet Take by mouth.      dexAMETHasone (Decadron) 2 mg tablet Take 0.5 tablets (1 mg) by mouth once daily.      dextromethorphan (Delsym) 30 mg/5 mL liquid Take 5 mL (30 mg) by mouth 2 times a day.      dronabinol (Marinol) 2.5 mg capsule once daily as needed. One hour before dinner      estrogens, conjugated, (Premarin) 0.3 mg tablet Take 0.5 tablets (0.15 mg) by mouth once daily.      omeprazole (PriLOSEC) 20 mg DR capsule Take 1 capsule (20 mg) by mouth once daily.      ondansetron (Zofran) 8 mg tablet Take 0.5 tablets (4 mg) by mouth every 8 hours if needed.      prochlorperazine (Compazine) 10 mg tablet Take 0.5 tablets (5 mg) by mouth every 6 hours if needed.      vit A/vit C/vit E/zinc/copper (PRESERVISION AREDS ORAL) Take by mouth once daily as needed.       Current Facility-Administered Medications   Medication Dose Route Frequency Provider Last Rate Last Admin    alteplase (Cathflo Activase) injection 2 mg  2 mg intra-catheter PRN RUPINDER Stone        heparin flush 10 unit/mL syringe 50 Units  50 Units intra-catheter PRN RUPINDER Stone        heparin flush 100 unit/mL injection 500 Units  500 Units intra-catheter PRN RUPINDER Stone           Administrations This Visit       Study [id number] Tarlatamab (AMG-757) 3 mg in sodium chloride 0.9% 250 mL IV       Admin Date  11/07/2023 Action  New Bag Dose  3 mg Route  intravenous Administered By  Tom Don, RN                    Orders placed during today's visit:  Orders Placed This Encounter   Procedures    CBC and Auto Differential     Standing Status:   Standing     Number of Occurrences:   1     Order  Specific Question:   Release result to MyChart     Answer:   Immediate    Comprehensive metabolic panel     Standing Status:   Standing     Number of Occurrences:   1     Order Specific Question:   Release result to MyChart     Answer:   Immediate    Protime-INR     Standing Status:   Standing     Number of Occurrences:   1     Order Specific Question:   Release result to MyChart     Answer:   Immediate [1]    Phosphorus     Standing Status:   Standing     Number of Occurrences:   1     Order Specific Question:   Release result to MyChart     Answer:   Immediate [1]    Magnesium     Standing Status:   Standing     Number of Occurrences:   1     Order Specific Question:   Release result to MyChart     Answer:   Immediate [1]    Ferritin     Standing Status:   Standing     Number of Occurrences:   1     Order Specific Question:   Release result to MyChart     Answer:   Immediate [1]    C-Reactive Protein     Standing Status:   Standing     Number of Occurrences:   1     Order Specific Question:   Release result to MyChart     Answer:   Immediate [1]    CK     Standing Status:   Standing     Number of Occurrences:   1     Order Specific Question:   Release result to MyChart     Answer:   Immediate [1]    Bilirubin, Direct     Standing Status:   Standing     Number of Occurrences:   1     Order Specific Question:   Release result to MyChart     Answer:   Immediate [1]    APTT     Standing Status:   Standing     Number of Occurrences:   1     Order Specific Question:   Release result to MyChart     Answer:   Immediate [1]    Research collection: 2.5mL Red SST - Research Collect     Standing Status:   Standing     Number of Occurrences:   1     Order Specific Question:   Test     Answer:   Anti-Tarlatamab Antibody     Order Specific Question:   Temperature     Answer:   Ambient     Order Specific Question:   Invert     Answer:   5x    Adult diet     Order Specific Question:   Diet type     Answer:   Regular    Treatment  Conditions     Hold treatment and notify provider if:   Adverse Event GREATER THAN OR EQUAL TO Grade 3     Standing Status:   Standing     Number of Occurrences:   1    Provider Communication - [id number]      Dose Level 4              Tarlatamab 0.1 mg   Dose Level 5              Tarlatamab 0.3 mg   Dose Level 6              Tarlatamab 1 mg   Dose Level 7              Tarlatamab 3 mg   Dose Level 8              Tarlatamab 10 mg   Dose Level 9              Tarlatamab 30 mg   Dose Level 10              Tarlatamab 100 mg     Standing Status:   Standing     Number of Occurrences:   1    Research Triplicate ECG     Refer to study DNA13rase for instructions and documentation of the research triplicate ECG.     Standing Status:   Standing     Number of Occurrences:   1    Research Triplicate ECG     Refer to study SmartPhWePlanne for instructions and documentation of the research triplicate ECG.     Standing Status:   Standing     Number of Occurrences:   1    Nursing Communication - Vascular Access     Refer to the vascular access device resource page and the following policies for additional information on line insertion, maintenance and removal.    CP-148 - Central Vascular Access Device Insertion, Maintenance, and Removal, Adult  NP-28 - Midline Cathter Maintenance & Removal, Adult  NP-34 - High-flow Catheters, (e.g. Hemodialysis, Apheresis, and Continuous Renal Replacement Therapy [CRRT]), Care and Utilization, Adult  NP-39 - Peripheral Intravenous Catheter (PIV) Insertion, Maintenance, Blood Collection & Removal - Adult     Standing Status:   Standing     Number of Occurrences:   1    CENTRAL VENOUS LINE DRESSING CHANGE - ADULT     Standing Status:   Standing     Number of Occurrences:   1    Venous Access, CVAD     Standing Status:   Standing     Number of Occurrences:   1    Insert peripheral IV     Obtain venous access for treatment via PIV if no CVAD access or if unable to obtain blood return from CVAD.     Standing  Status:   Standing     Number of Occurrences:   1    Convert IV to saline lock     Only if venous access is required over consecutive days. Peripheral catheter can remain in place until completion of last consecutive day infusion, unless IV-related complications are encountered.     Standing Status:   Standing     Number of Occurrences:   1        No study found     Study Specific Instructions and Documentation   LABS  PREMEDS  VITALS  STUDY DRUG  VITALS  DISCHARGE     PRE-DOSE Safety Labs (drawn at 0730)   Refer to Midnight Treatment Plan for orders     Premedication (N/A)   Refer to Midnight Treatment Plan for orders  Within 1 hour prior to TARLATAMAB      Day 15 Pre-dose Vital Signs    Temp, HR, Resp, BP, Pulse Oximetry: Seated or Semi-Fowlers x 5 minutes before obtaining  Position and temperature location: should be the same that is used throughout the study   @ 0931: see vitals above     Pre-Dose Research Correlatives   Tarlatamab () PK:  Drawn @ 0945     Research Drug Administration Tarlatamab ()   Refer to Midnight Treatment Plan for orders   Administer dose over 60 minutes (+/- 10 mins) followed by a 3 - 5 minute Normal saline flush. (This will be the end time after the flush). Document start time & end of study medication; document start time and end time of the flush.  Participant to remain on Unit for 2 hour post dose observation.    Start of dose: 0947  End of dose: 1047  Flush start: 1047   Flush end (End of Infusion): 1051  Infusion-Related Reactions    [facility] SOC  Grading and Management of Cytokine Release Syndrome   CRS Grade Description of Severity Minimum Expected Intervention Instructions for Dose Modifications of TARLATAMAB         1 Symptoms are not life threatening and require symptomatic treatment only, eg, fever, nausea, fatigue, headache, myalgias, malaise Administer symptomatic treatment (eg, paracetamol/ acetaminophen for fever). Monitor for CRS symptoms including vital signs and pulse  oximetry at least Q2 hours for 12 hours or until resolution,  Whichever is earlier. N/A                             2 Symptoms require and respond to moderate intervention  Oxygen requirement <40%, OR  Hypotension responsive to fluids or low dose of one vasopressor, OR  Grade 2 organ toxicity or grade 3 transaminitis per CTCAE criteria Administer:  Symptomatic treatment (eg, paracetamol/ acetaminophen for fever)  Supplemental oxygen when oxygen saturation is <90% on room air  Intravenous fluids or low dose vasopressor for hypotension when systolic blood pressure is  <85 mmHg. Persistent tachycardia (eg >120 bpm) may also indicate the need for intervention for hypotension.  Monitor for CRS symptoms including vital signs and pulse oximetry at least Q2 hours for 12 hours or until resolution to CRS grade <= 1, whichever is Earlier.  For subjects with extensive co-morbidities or poor performance status, manage  per grade 3 CRS guidance below If CRS occurs during TARLATAMAB treatment, immediately interrupt the infusion and delay the next TARLATAMAB dose until the event resolves to CRS  grade <= 1 for no less than 72 hours.    Permanently discontinue TARLATAMAB if there is no improvement to CRS  <= grade 1 within 7 days                     3 Symptoms require and respond to aggressive intervention  Oxygen requirement >=40%, OR    Hypotension requiring high dose or multiple vasopressors, OR  Grade 3 organ toxicity or grade 4 transaminitis per CTCAE criteria Admit to intensive care unit for close clinical and vital sign monitoring per institutional Guidelines.  Administer dexamethasone (or equivalent) IV at a dose maximum of 3 doses of 8 mg (24 mg/day). The dose should Then be reduced step-wise.  Investigators should consider use of tocilizumab 8 mg/kg over 1 hour (not to exceed 800mg). Repeat tocilizumab every 8 hours as needed if not responsive to IV fluids or  Increasing supplemental oxygen. Maximum of 3 doses in a 24 hour  period. Maximum total of 4 doses If CRS occurs during TARLATAMAB treatment, immediately interrupt TARLATAMAB and delay the next dose until event resolves to CRS grade <= 1 for no less than 72 hours.  Permanently discontinue TARLATAMAB if there is no improvement to CRS  <= grade 2 within 5 days or CRS <= grade 1 within 7 days.  Permanently discontinue TARLATAMAB if CRS grade 3 occurs at the initial run in dose (i.e., at MTD1) (Applicable only after  MTD1 has been defined).                       4 Life-threatening symptoms  Requirement for ventilator support OR  Grade 4 organ toxicity  (excluding transaminitis) per CTCAE criteria Admit to intensive care unit for close clinical and vital sign monitoring per institutional Guidelines.  Administer dexamethasone (or equivalent) IV at a dose maximum of 3 doses of 8 mg (24 mg/day). Further steroid use should be discussed with [employer] medical monitor.  Investigators should consider use of tocilizumab 8 mg/kg IV Over 1 hour (not to exceed 800mg). Repeat tocilizumab every 8 hours as needed if not responsive to IV fluids or increasing supplemental Oxygen. Maximum of 3 doses  In a 24 hour period. Maximum Total of 4 doses. If CRS occurs during TARLATAMAB treatment, Immediately stop the infusion.  Permanently discontinue TARLATAMAB therapy.        Post-Dose Vital Signs   Temp, HR, Resp, BP, Pulse Oximetry: Seated or Semi-Fowlers x 5 minutes before obtaining  Position and temperature location: should be the same that is used throughout the study  End of Infusion  @ 1051: see vitals above      Day 15 Discharge Instructions   Patient may be discharged after 2 hour observation.     Tom Don RN  11/07/23

## 2023-11-09 DIAGNOSIS — E78.2 MIXED HYPERLIPIDEMIA: ICD-10-CM

## 2023-11-09 DIAGNOSIS — R73.01 IMPAIRED FASTING BLOOD SUGAR: ICD-10-CM

## 2023-11-09 DIAGNOSIS — E55.9 VITAMIN D DEFICIENCY: ICD-10-CM

## 2023-11-09 DIAGNOSIS — D63.8 ANEMIA, CHRONIC DISEASE: Primary | ICD-10-CM

## 2023-11-09 LAB
EST. AVERAGE GLUCOSE BLD GHB EST-MCNC: 103 MG/DL
HBA1C MFR BLD: 5.2 %

## 2023-11-13 ENCOUNTER — OFFICE VISIT (OUTPATIENT)
Dept: PRIMARY CARE | Facility: CLINIC | Age: 68
End: 2023-11-13
Payer: MEDICARE

## 2023-11-13 VITALS
HEART RATE: 75 BPM | SYSTOLIC BLOOD PRESSURE: 99 MMHG | OXYGEN SATURATION: 95 % | TEMPERATURE: 97.3 F | WEIGHT: 110.4 LBS | BODY MASS INDEX: 20.84 KG/M2 | DIASTOLIC BLOOD PRESSURE: 67 MMHG | HEIGHT: 61 IN

## 2023-11-13 DIAGNOSIS — J44.9 CHRONIC OBSTRUCTIVE PULMONARY DISEASE, UNSPECIFIED COPD TYPE (MULTI): ICD-10-CM

## 2023-11-13 DIAGNOSIS — R73.01 IMPAIRED FASTING BLOOD SUGAR: Primary | ICD-10-CM

## 2023-11-13 DIAGNOSIS — Z87.19 HISTORY OF DIVERTICULITIS: ICD-10-CM

## 2023-11-13 DIAGNOSIS — T45.1X5S PERIPHERAL NEUROPATHY DUE TO AND NOT CONCURRENT WITH CHEMOTHERAPY (MULTI): ICD-10-CM

## 2023-11-13 DIAGNOSIS — C34.90 SMALL CELL LUNG CANCER (MULTI): ICD-10-CM

## 2023-11-13 DIAGNOSIS — E55.9 VITAMIN D DEFICIENCY: ICD-10-CM

## 2023-11-13 DIAGNOSIS — E78.2 MIXED HYPERLIPIDEMIA: ICD-10-CM

## 2023-11-13 DIAGNOSIS — K59.09 CHRONIC CONSTIPATION: ICD-10-CM

## 2023-11-13 DIAGNOSIS — D63.8 ANEMIA, CHRONIC DISEASE: ICD-10-CM

## 2023-11-13 DIAGNOSIS — G62.0 PERIPHERAL NEUROPATHY DUE TO AND NOT CONCURRENT WITH CHEMOTHERAPY (MULTI): ICD-10-CM

## 2023-11-13 PROCEDURE — 1126F AMNT PAIN NOTED NONE PRSNT: CPT | Performed by: FAMILY MEDICINE

## 2023-11-13 PROCEDURE — 99214 OFFICE O/P EST MOD 30 MIN: CPT | Performed by: FAMILY MEDICINE

## 2023-11-13 PROCEDURE — 1160F RVW MEDS BY RX/DR IN RCRD: CPT | Performed by: FAMILY MEDICINE

## 2023-11-13 PROCEDURE — 3008F BODY MASS INDEX DOCD: CPT | Performed by: FAMILY MEDICINE

## 2023-11-13 PROCEDURE — 1036F TOBACCO NON-USER: CPT | Performed by: FAMILY MEDICINE

## 2023-11-13 PROCEDURE — 1159F MED LIST DOCD IN RCRD: CPT | Performed by: FAMILY MEDICINE

## 2023-11-13 ASSESSMENT — PATIENT HEALTH QUESTIONNAIRE - PHQ9
SUM OF ALL RESPONSES TO PHQ9 QUESTIONS 1 AND 2: 0
2. FEELING DOWN, DEPRESSED OR HOPELESS: NOT AT ALL
1. LITTLE INTEREST OR PLEASURE IN DOING THINGS: NOT AT ALL

## 2023-11-13 NOTE — PATIENT INSTRUCTIONS
I recommend RSV vaccine at the pharmacy.    I would like you to follow up in 6 months  Please have all labs that were ordered done at least 1 week prior to your visit.

## 2023-11-13 NOTE — PROGRESS NOTES
Subjective   Patient ID: Gladys Branch is a 67 y.o. female who presents for Follow-up.    HPI  Patient presents today for follow up labs and chronic conditions.  Doing well on chemotherapy - per patient some decrease tumor size last scan. Has follow up oncology a week from tomorrow.  Has history of diverticulitis, now having chronic constipation. Would like referral to GI to evaluate, asking about possible colonoscopy.   Patient otherwise feels well. No other complaints or concerns.    The patient's relevant past medical, surgical, family, and social history was reviewed in [facility].  All pertinent lab work and results for this visit were reviewed with patient.    Hospital Outpatient Visit on 11/07/2023   Component Date Value Ref Range Status    WBC 11/07/2023 8.3  4.4 - 11.3 x10*3/uL Final    nRBC 11/07/2023 0.0  0.0 - 0.0 /100 WBCs Final    RBC 11/07/2023 3.74 (L)  4.00 - 5.20 x10*6/uL Final    Hemoglobin 11/07/2023 11.8 (L)  12.0 - 16.0 g/dL Final    Hematocrit 11/07/2023 36.5  36.0 - 46.0 % Final    MCV 11/07/2023 98  80 - 100 fL Final    MCH 11/07/2023 31.6  26.0 - 34.0 pg Final    MCHC 11/07/2023 32.3  32.0 - 36.0 g/dL Final    RDW 11/07/2023 14.2  11.5 - 14.5 % Final    Platelets 11/07/2023 238  150 - 450 x10*3/uL Final    Neutrophils % 11/07/2023 70.7  40.0 - 80.0 % Final    Immature Granulocytes %, Automated 11/07/2023 0.6  0.0 - 0.9 % Final    Immature Granulocyte Count (IG) includes promyelocytes, myelocytes and metamyelocytes but does not include bands. Percent differential counts (%) should be interpreted in the context of the absolute cell counts (cells/UL).    Lymphocytes % 11/07/2023 18.9  13.0 - 44.0 % Final    Monocytes % 11/07/2023 7.6  2.0 - 10.0 % Final    Eosinophils % 11/07/2023 1.8  0.0 - 6.0 % Final    Basophils % 11/07/2023 0.4  0.0 - 2.0 % Final    Neutrophils Absolute 11/07/2023 5.83  1.20 - 7.70 x10*3/uL Final    Percent differential counts (%) should be interpreted in the context of the  absolute cell counts (cells/uL).    Immature Granulocytes Absolute, Au* 11/07/2023 0.05  0.00 - 0.70 x10*3/uL Final    Lymphocytes Absolute 11/07/2023 1.56  1.20 - 4.80 x10*3/uL Final    Monocytes Absolute 11/07/2023 0.63  0.10 - 1.00 x10*3/uL Final    Eosinophils Absolute 11/07/2023 0.15  0.00 - 0.70 x10*3/uL Final    Basophils Absolute 11/07/2023 0.03  0.00 - 0.10 x10*3/uL Final    Glucose 11/07/2023 99  74 - 99 mg/dL Final    Sodium 11/07/2023 139  136 - 145 mmol/L Final    Potassium 11/07/2023 4.0  3.5 - 5.3 mmol/L Final    Chloride 11/07/2023 105  98 - 107 mmol/L Final    Bicarbonate 11/07/2023 27  21 - 32 mmol/L Final    Anion Gap 11/07/2023 11  10 - 20 mmol/L Final    Urea Nitrogen 11/07/2023 30 (H)  6 - 23 mg/dL Final    Creatinine 11/07/2023 0.71  0.50 - 1.05 mg/dL Final    eGFR 11/07/2023 >90  >60 mL/min/1.73m*2 Final    Calculations of estimated GFR are performed using the 2021 CKD-EPI Study Refit equation without the race variable for the IDMS-Traceable creatinine methods.  https://jasn.asnjournals.org/content/early/2021/09/22/ASN.3182709752    Calcium 11/07/2023 8.9  8.6 - 10.6 mg/dL Final    Albumin 11/07/2023 3.6  3.4 - 5.0 g/dL Final    Alkaline Phosphatase 11/07/2023 44  33 - 136 U/L Final    Total Protein 11/07/2023 6.2 (L)  6.4 - 8.2 g/dL Final    AST 11/07/2023 13  9 - 39 U/L Final    Bilirubin, Total 11/07/2023 0.5  0.0 - 1.2 mg/dL Final    ALT 11/07/2023 11  7 - 45 U/L Final    Patients treated with Sulfasalazine may generate falsely decreased results for ALT.    Protime 11/07/2023 11.0  9.8 - 12.8 seconds Final    INR 11/07/2023 1.0  0.9 - 1.1 Final    Phosphorus 11/07/2023 3.2  2.5 - 4.9 mg/dL Final    The performance characteristics of phosphorus testing in heparinized plasma have been validated by the individual  laboratory site where testing is performed. Testing on heparinized plasma is not approved by the FDA; however, such approval is not necessary.    Magnesium 11/07/2023 1.91  1.60  - 2.40 mg/dL Final    Ferritin 11/07/2023 81  8 - 150 ng/mL Final    C-Reactive Protein 11/07/2023 0.83  <1.00 mg/dL Final    Creatine Kinase 11/07/2023 36  0 - 215 U/L Final    Bilirubin, Direct 11/07/2023 0.1  0.0 - 0.3 mg/dL Final    aPTT 11/07/2023 29  27 - 38 seconds Final    Extra Tube 11/07/2023 Hold for add-ons.   Final    Auto resulted.    Hemoglobin A1C 11/07/2023 5.2  see below % Final    Estimated Average Glucose 11/07/2023 103  Not Established mg/dL Final   Hospital Outpatient Visit on 10/24/2023   Component Date Value Ref Range Status    WBC 10/24/2023 7.2  4.4 - 11.3 x10*3/uL Final    nRBC 10/24/2023 0.0  0.0 - 0.0 /100 WBCs Final    RBC 10/24/2023 3.48 (L)  4.00 - 5.20 x10*6/uL Final    Hemoglobin 10/24/2023 10.8 (L)  12.0 - 16.0 g/dL Final    Hematocrit 10/24/2023 33.4 (L)  36.0 - 46.0 % Final    MCV 10/24/2023 96  80 - 100 fL Final    MCH 10/24/2023 31.0  26.0 - 34.0 pg Final    MCHC 10/24/2023 32.3  32.0 - 36.0 g/dL Final    RDW 10/24/2023 13.2  11.5 - 14.5 % Final    Platelets 10/24/2023 253  150 - 450 x10*3/uL Final    MPV 10/24/2023 9.2  7.5 - 11.5 fL Final    Neutrophils % 10/24/2023 72.8  40.0 - 80.0 % Final    Immature Granulocytes %, Automated 10/24/2023 0.8  0.0 - 0.9 % Final    Immature Granulocyte Count (IG) includes promyelocytes, myelocytes and metamyelocytes but does not include bands. Percent differential counts (%) should be interpreted in the context of the absolute cell counts (cells/UL).    Lymphocytes % 10/24/2023 16.0  13.0 - 44.0 % Final    Monocytes % 10/24/2023 8.7  2.0 - 10.0 % Final    Eosinophils % 10/24/2023 1.1  0.0 - 6.0 % Final    Basophils % 10/24/2023 0.6  0.0 - 2.0 % Final    Neutrophils Absolute 10/24/2023 5.25  1.20 - 7.70 x10*3/uL Final    Percent differential counts (%) should be interpreted in the context of the absolute cell counts (cells/uL).    Immature Granulocytes Absolute, Au* 10/24/2023 0.06  0.00 - 0.70 x10*3/uL Final    Lymphocytes Absolute 10/24/2023  1.15 (L)  1.20 - 4.80 x10*3/uL Final    Monocytes Absolute 10/24/2023 0.63  0.10 - 1.00 x10*3/uL Final    Eosinophils Absolute 10/24/2023 0.08  0.00 - 0.70 x10*3/uL Final    Basophils Absolute 10/24/2023 0.04  0.00 - 0.10 x10*3/uL Final    Glucose 10/24/2023 104 (H)  74 - 99 mg/dL Final    Sodium 10/24/2023 140  136 - 145 mmol/L Final    Potassium 10/24/2023 3.7  3.5 - 5.3 mmol/L Final    Chloride 10/24/2023 106  98 - 107 mmol/L Final    Bicarbonate 10/24/2023 24  21 - 32 mmol/L Final    Anion Gap 10/24/2023 14  10 - 20 mmol/L Final    Urea Nitrogen 10/24/2023 18  6 - 23 mg/dL Final    Creatinine 10/24/2023 0.85  0.50 - 1.05 mg/dL Final    eGFR 10/24/2023 75  >60 mL/min/1.73m*2 Final    Calculations of estimated GFR are performed using the 2021 CKD-EPI Study Refit equation without the race variable for the IDMS-Traceable creatinine methods.  https://jasn.asnjournals.org/content/early/2021/09/22/ASN.1465510680    Calcium 10/24/2023 9.1  8.6 - 10.6 mg/dL Final    Albumin 10/24/2023 3.6  3.4 - 5.0 g/dL Final    Alkaline Phosphatase 10/24/2023 46  33 - 136 U/L Final    Total Protein 10/24/2023 6.0 (L)  6.4 - 8.2 g/dL Final    AST 10/24/2023 11  9 - 39 U/L Final    Bilirubin, Total 10/24/2023 0.5  0.0 - 1.2 mg/dL Final    ALT 10/24/2023 8  7 - 45 U/L Final    Patients treated with Sulfasalazine may generate falsely decreased results for ALT.    Creatine Kinase 10/24/2023 39  0 - 215 U/L Final    Bilirubin, Direct 10/24/2023 0.1  0.0 - 0.3 mg/dL Final    Magnesium 10/24/2023 2.06  1.60 - 2.40 mg/dL Final    Phosphorus 10/24/2023 3.6  2.5 - 4.9 mg/dL Final    The performance characteristics of phosphorus testing in heparinized plasma have been validated by the individual  laboratory site where testing is performed. Testing on heparinized plasma is not approved by the FDA; however, such approval is not necessary.    Lipase 10/24/2023 24  9 - 82 U/L Final    Amylase 10/24/2023 31  29 - 103 U/L Final    C-Reactive Protein  10/24/2023 0.97  <1.00 mg/dL Final    Ferritin 10/24/2023 130  8 - 150 ng/mL Final    aPTT 10/24/2023 31  27 - 38 seconds Final    Protime 10/24/2023 11.6  9.8 - 12.8 seconds Final    INR 10/24/2023 1.0  0.9 - 1.1 Final    Cortisol 10/24/2023 0.8 (L)  2.5 - 20.0 ug/dL Final    Adrenocorticotropic Hormone (ACTH) 10/24/2023 4.7 (L)  7.2 - 63.3 pg/mL Final    INTERPRETIVE INFORMATION: Adrenocorticotropic Hormone    Reference interval based on samples collected between 7 a.m. and   10 a.m.  No reference intervals established for p.m. collections.    Pediatric reference values are the same as adults (Acta Paediatr   Scand 1981;70:341-345).  This assay measures intact ACTH 1-39;   some types of synthetic ACTH and ACTH fragments are not detected   by this assay.  Performed By: [facility]  [address] [place] [address]  : Darian Marrero MD, PhD  CLIA Number: [id number]    Thyroid Stimulating Hormone 10/24/2023 4.01 (H)  0.44 - 3.98 mIU/L Final    Thyroxine, Free 10/24/2023 0.80  0.78 - 1.48 ng/dL Final    Color, Urine 10/24/2023 Straw  Straw, Yellow Final    Appearance, Urine 10/24/2023 Clear  Clear Final    Specific Gravity, Urine 10/24/2023 1.009  1.005 - 1.035 Final    pH, Urine 10/24/2023 6.0  5.0, 5.5, 6.0, 6.5, 7.0, 7.5, 8.0 Final    Protein, Urine 10/24/2023 NEGATIVE  NEGATIVE mg/dL Final    Glucose, Urine 10/24/2023 NEGATIVE  NEGATIVE mg/dL Final    Blood, Urine 10/24/2023 NEGATIVE  NEGATIVE Final    Ketones, Urine 10/24/2023 NEGATIVE  NEGATIVE mg/dL Final    Bilirubin, Urine 10/24/2023 NEGATIVE  NEGATIVE Final    Urobilinogen, Urine 10/24/2023 <2.0  <2.0 mg/dL Final    Nitrite, Urine 10/24/2023 NEGATIVE  NEGATIVE Final    Leukocyte Esterase, Urine 10/24/2023 NEGATIVE  NEGATIVE Final    Follicle Stimulating Hormone 10/24/2023 <0.9  IU/L Final    FSH Ref Values  Follicular   2.0-12.0  IU/L  Mid-Cycle        12.0-25.0  IU/L  Luteal Phase      2.0-12.0  IU/L  Menopause        30.0-150.0  IU/L  Pre-puberty     50% Adult IU/L  Adult Male        2.0-10.0  IU/L   Infants          0.0-1.0  IU/L    Luteinizing Hormone 10/24/2023 <0.1  IU/L Final    LH Reference Values  Follicular Phase          1.9-12.5 IU/L  Mid-Cycle                 8.7-76.3 IU/L  Luteal Phase              0.5-16.9 IU/L  Post Menopause            5.0-55.2 IU/L  Children                    0- 6.0 IU/L  Adult Male 18-70 years    1.5- 9.3 IU/L  Adult Male >70 years      3.1-34.6 IU/L    Estradiol 10/24/2023 <19  pg/mL Final    Extra Tube 10/24/2023 Hold for add-ons.   Final    Auto resulted.    Extra Tube 10/24/2023 Hold for add-ons.   Final    Auto resulted.   Hospital Outpatient Visit on 10/10/2023   Component Date Value Ref Range Status    WBC 10/10/2023 7.8  4.4 - 11.3 x10*3/uL Final    nRBC 10/10/2023 0.0  0.0 - 0.0 /100 WBCs Final    RBC 10/10/2023 3.51 (L)  4.00 - 5.20 x10*6/uL Final    Hemoglobin 10/10/2023 10.6 (L)  12.0 - 16.0 g/dL Final    Hematocrit 10/10/2023 33.5 (L)  36.0 - 46.0 % Final    MCV 10/10/2023 95  80 - 100 fL Final    MCH 10/10/2023 30.2  26.0 - 34.0 pg Final    MCHC 10/10/2023 31.6 (L)  32.0 - 36.0 g/dL Final    RDW 10/10/2023 14.1  11.5 - 14.5 % Final    Platelets 10/10/2023 206  150 - 450 x10*3/uL Final    MPV 10/10/2023 9.8  7.5 - 11.5 fL Final    Neutrophils % 10/10/2023 71.1  40.0 - 80.0 % Final    Immature Granulocytes %, Automated 10/10/2023 0.4  0.0 - 0.9 % Final    Immature Granulocyte Count (IG) includes promyelocytes, myelocytes and metamyelocytes but does not include bands. Percent differential counts (%) should be interpreted in the context of the absolute cell counts (cells/UL).    Lymphocytes % 10/10/2023 18.1  13.0 - 44.0 % Final    Monocytes % 10/10/2023 8.7  2.0 - 10.0 % Final    Eosinophils % 10/10/2023 1.4  0.0 - 6.0 % Final    Basophils % 10/10/2023 0.3  0.0 - 2.0 % Final    Neutrophils Absolute 10/10/2023 5.53  1.20 - 7.70 x10*3/uL Final    Percent differential counts (%) should  be interpreted in the context of the absolute cell counts (cells/uL).    Immature Granulocytes Absolute, Au* 10/10/2023 0.03  0.00 - 0.70 x10*3/uL Final    Lymphocytes Absolute 10/10/2023 1.41  1.20 - 4.80 x10*3/uL Final    Monocytes Absolute 10/10/2023 0.68  0.10 - 1.00 x10*3/uL Final    Eosinophils Absolute 10/10/2023 0.11  0.00 - 0.70 x10*3/uL Final    Basophils Absolute 10/10/2023 0.02  0.00 - 0.10 x10*3/uL Final    Glucose 10/10/2023 91  74 - 99 mg/dL Final    Sodium 10/10/2023 141  136 - 145 mmol/L Final    Potassium 10/10/2023 3.7  3.5 - 5.3 mmol/L Final    Chloride 10/10/2023 105  98 - 107 mmol/L Final    Bicarbonate 10/10/2023 27  21 - 32 mmol/L Final    Anion Gap 10/10/2023 13  10 - 20 mmol/L Final    Urea Nitrogen 10/10/2023 22  6 - 23 mg/dL Final    Creatinine 10/10/2023 0.78  0.50 - 1.05 mg/dL Final    eGFR 10/10/2023 83  >60 mL/min/1.73m*2 Final    Calculations of estimated GFR are performed using the 2021 CKD-EPI Study Refit equation without the race variable for the IDMS-Traceable creatinine methods.  https://jasn.asnjournals.org/content/early/2021/09/22/ASN.5181580152    Calcium 10/10/2023 9.0  8.6 - 10.6 mg/dL Final    Albumin 10/10/2023 3.5  3.4 - 5.0 g/dL Final    Alkaline Phosphatase 10/10/2023 39  33 - 136 U/L Final    Total Protein 10/10/2023 5.6 (L)  6.4 - 8.2 g/dL Final    AST 10/10/2023 15  9 - 39 U/L Final    Bilirubin, Total 10/10/2023 0.7  0.0 - 1.2 mg/dL Final    ALT 10/10/2023 10  7 - 45 U/L Final    Patients treated with Sulfasalazine may generate falsely decreased results for ALT.    aPTT 10/10/2023 29  27 - 38 seconds Final    Bilirubin, Direct 10/10/2023 0.1  0.0 - 0.3 mg/dL Final    Creatine Kinase 10/10/2023 31  0 - 215 U/L Final    C-Reactive Protein 10/10/2023 1.93 (H)  <1.00 mg/dL Final    Ferritin 10/10/2023 128  8 - 150 ng/mL Final    Magnesium 10/10/2023 2.06  1.60 - 2.40 mg/dL Final    Phosphorus 10/10/2023 4.9  2.5 - 4.9 mg/dL Final    The performance characteristics of  "phosphorus testing in heparinized plasma have been validated by the individual  laboratory site where testing is performed. Testing on heparinized plasma is not approved by the FDA; however, such approval is not necessary.    Protime 10/10/2023 11.5  9.8 - 12.8 seconds Final    INR 10/10/2023 1.0  0.9 - 1.1 Final    Thyroid Stimulating Hormone 10/10/2023 3.39  0.44 - 3.98 mIU/L Final           Review of Systems   A complete review of systems was performed and all systems were normal except what is noted in the HPI.        Objective   BP 99/67   Pulse 75   Temp 36.3 °C (97.3 °F)   Ht 1.549 m (5' 1\")   Wt 50.1 kg (110 lb 6.4 oz)   LMP  (LMP Unknown)   SpO2 95%   BMI 20.86 kg/m²    Physical Exam  Constitutional:       Appearance: Normal appearance.   HENT:      Head: Normocephalic and atraumatic.   Neck:      Vascular: No carotid bruit.   Cardiovascular:      Rate and Rhythm: Normal rate and regular rhythm.      Heart sounds: Normal heart sounds.   Pulmonary:      Effort: Pulmonary effort is normal.      Breath sounds: Normal breath sounds. No wheezing, rhonchi or rales.   Abdominal:      General: Abdomen is flat. Bowel sounds are normal.      Palpations: Abdomen is soft.      Tenderness: There is no abdominal tenderness. There is no guarding.   Musculoskeletal:         General: Normal range of motion.      Right lower leg: No edema.      Left lower leg: No edema.   Skin:     General: Skin is dry.   Neurological:      General: No focal deficit present.      Mental Status: She is alert and oriented to person, place, and time.   Psychiatric:         Mood and Affect: Mood normal.         Behavior: Behavior normal.         Thought Content: Thought content normal.         Health Maintenance Due   Topic Date Due    Vitamin B-12  Never done    TB Test  Never done    Derm Melanoma Skin Check  Never done        Assessment/Plan   Problem List Items Addressed This Visit       COPD (chronic obstructive pulmonary disease) " (CMS/HCC)     Stable Continue current medication  Reevaluate in 6 months.           Hyperlipidemia     Labs pending  Work on diet reviewed with patient.   Reevaluate in 6 months.  .         Relevant Orders    TSH with reflex to Free T4 if abnormal    Lipid Panel    Cholesterol, LDL Direct    Comprehensive Metabolic Panel    Impaired fasting blood sugar - Primary     well controlled   Work on diet reviewed with patient.   Reevaluate in 6 months.           Relevant Orders    TSH with reflex to Free T4 if abnormal    Comprehensive Metabolic Panel    Hemoglobin A1C    Small cell lung cancer (CMS/HCC)     Diagnosed per bronch 1/22  S/P radiation  On chemo per oncology - doing well per patient  Has follow up oncology later this month         Vitamin D deficiency     Level pending         Relevant Orders    Vitamin D 25-Hydroxy,Total (for eval of Vitamin D levels)    Peripheral neuropathy due to and not concurrent with chemotherapy (CMS/HCC)     Mild, stable   Recheck 6 months          Anemia, chronic disease     Stable  Monitored by oncology         Relevant Orders    CBC    Vitamin B12    Hemoglobin and Hematocrit, Blood    History of diverticulitis    Relevant Orders    Referral to Gastroenterology    Chronic constipation    Relevant Orders    Referral to Gastroenterology         Patient understands and agrees with care plan.           Nova Omer MD

## 2023-11-13 NOTE — ASSESSMENT & PLAN NOTE
Diagnosed per bronch 1/22  S/P radiation  On chemo per oncology - doing well per patient  Has follow up oncology later this month

## 2023-11-16 ENCOUNTER — TELEPHONE (OUTPATIENT)
Dept: PRIMARY CARE | Facility: CLINIC | Age: 68
End: 2023-11-16
Payer: MEDICARE

## 2023-11-17 ENCOUNTER — TELEPHONE (OUTPATIENT)
Dept: PRIMARY CARE | Facility: CLINIC | Age: 68
End: 2023-11-17
Payer: MEDICARE

## 2023-11-17 DIAGNOSIS — J30.2 SEASONAL ALLERGIC RHINITIS, UNSPECIFIED TRIGGER: ICD-10-CM

## 2023-11-17 RX ORDER — CETIRIZINE HYDROCHLORIDE, PSEUDOEPHEDRINE HYDROCHLORIDE 5; 120 MG/1; MG/1
1 TABLET, FILM COATED, EXTENDED RELEASE ORAL 2 TIMES DAILY
Qty: 60 TABLET | Refills: 11 | Status: SHIPPED | OUTPATIENT
Start: 2023-11-17 | End: 2024-11-16

## 2023-11-17 NOTE — TELEPHONE ENCOUNTER
Patient was prescribed Zyrtec on Oct 26. She states it in not helping the runny nose and is requesting that Zyrtec D be ordered instead.    Wants this sent ot [facility] in [place]. Pharmacy in entered as preferred pharmacy      This request was a voice message.

## 2023-11-20 RX ORDER — FAMOTIDINE 10 MG/ML
20 INJECTION INTRAVENOUS ONCE AS NEEDED
Status: CANCELLED | OUTPATIENT
Start: 2023-11-21

## 2023-11-20 RX ORDER — FAMOTIDINE 10 MG/ML
20 INJECTION INTRAVENOUS ONCE AS NEEDED
Status: CANCELLED | OUTPATIENT
Start: 2023-12-05

## 2023-11-20 RX ORDER — EPINEPHRINE 0.3 MG/.3ML
0.3 INJECTION SUBCUTANEOUS EVERY 5 MIN PRN
Status: CANCELLED | OUTPATIENT
Start: 2023-12-05

## 2023-11-20 RX ORDER — EPINEPHRINE 0.3 MG/.3ML
0.3 INJECTION SUBCUTANEOUS EVERY 5 MIN PRN
Status: CANCELLED | OUTPATIENT
Start: 2023-11-21

## 2023-11-20 RX ORDER — ALBUTEROL SULFATE 0.83 MG/ML
3 SOLUTION RESPIRATORY (INHALATION) AS NEEDED
Status: CANCELLED | OUTPATIENT
Start: 2023-12-05

## 2023-11-20 RX ORDER — DIPHENHYDRAMINE HYDROCHLORIDE 50 MG/ML
50 INJECTION INTRAMUSCULAR; INTRAVENOUS AS NEEDED
Status: CANCELLED | OUTPATIENT
Start: 2023-12-05

## 2023-11-20 RX ORDER — ALBUTEROL SULFATE 0.83 MG/ML
3 SOLUTION RESPIRATORY (INHALATION) AS NEEDED
Status: CANCELLED | OUTPATIENT
Start: 2023-11-21

## 2023-11-20 RX ORDER — DIPHENHYDRAMINE HYDROCHLORIDE 50 MG/ML
50 INJECTION INTRAMUSCULAR; INTRAVENOUS AS NEEDED
Status: CANCELLED | OUTPATIENT
Start: 2023-11-21

## 2023-11-21 ENCOUNTER — EDUCATION (OUTPATIENT)
Dept: HEMATOLOGY/ONCOLOGY | Facility: HOSPITAL | Age: 68
End: 2023-11-21
Payer: MEDICARE

## 2023-11-21 ENCOUNTER — OFFICE VISIT (OUTPATIENT)
Dept: HEMATOLOGY/ONCOLOGY | Facility: HOSPITAL | Age: 68
End: 2023-11-21
Payer: MEDICARE

## 2023-11-21 ENCOUNTER — HOSPITAL ENCOUNTER (OUTPATIENT)
Dept: RESEARCH | Facility: HOSPITAL | Age: 68
Discharge: HOME | End: 2023-11-21
Payer: MEDICARE

## 2023-11-21 VITALS
OXYGEN SATURATION: 99 % | TEMPERATURE: 98.4 F | HEART RATE: 81 BPM | SYSTOLIC BLOOD PRESSURE: 100 MMHG | DIASTOLIC BLOOD PRESSURE: 69 MMHG | BODY MASS INDEX: 20.49 KG/M2 | RESPIRATION RATE: 16 BRPM | WEIGHT: 108.47 LBS

## 2023-11-21 DIAGNOSIS — C34.90 SMALL CELL LUNG CANCER (MULTI): Primary | ICD-10-CM

## 2023-11-21 DIAGNOSIS — C34.90 SMALL CELL LUNG CANCER (MULTI): ICD-10-CM

## 2023-11-21 DIAGNOSIS — R73.01 IMPAIRED FASTING BLOOD SUGAR: ICD-10-CM

## 2023-11-21 LAB
ALBUMIN SERPL BCP-MCNC: 3.5 G/DL (ref 3.4–5)
ALP SERPL-CCNC: 41 U/L (ref 33–136)
ALT SERPL W P-5'-P-CCNC: 10 U/L (ref 7–45)
AMYLASE SERPL-CCNC: 31 U/L (ref 29–103)
ANION GAP SERPL CALC-SCNC: 13 MMOL/L (ref 10–20)
APTT PPP: 31 SECONDS (ref 27–38)
AST SERPL W P-5'-P-CCNC: 13 U/L (ref 9–39)
BASOPHILS # BLD AUTO: 0.03 X10*3/UL (ref 0–0.1)
BASOPHILS NFR BLD AUTO: 0.4 %
BILIRUB DIRECT SERPL-MCNC: 0.1 MG/DL (ref 0–0.3)
BILIRUB SERPL-MCNC: 0.5 MG/DL (ref 0–1.2)
BUN SERPL-MCNC: 20 MG/DL (ref 6–23)
CALCIUM SERPL-MCNC: 8.6 MG/DL (ref 8.6–10.6)
CHLORIDE SERPL-SCNC: 107 MMOL/L (ref 98–107)
CK SERPL-CCNC: 41 U/L (ref 0–215)
CO2 SERPL-SCNC: 25 MMOL/L (ref 21–32)
CORTIS SERPL-MCNC: 8.1 UG/DL (ref 2.5–20)
CREAT SERPL-MCNC: 0.68 MG/DL (ref 0.5–1.05)
CRP SERPL-MCNC: 1.09 MG/DL
EOSINOPHIL # BLD AUTO: 0.18 X10*3/UL (ref 0–0.7)
EOSINOPHIL NFR BLD AUTO: 2.5 %
ERYTHROCYTE [DISTWIDTH] IN BLOOD BY AUTOMATED COUNT: 13.9 % (ref 11.5–14.5)
EST. AVERAGE GLUCOSE BLD GHB EST-MCNC: 108 MG/DL
ESTRADIOL SERPL-MCNC: <19 PG/ML
FERRITIN SERPL-MCNC: 116 NG/ML (ref 8–150)
FSH SERPL-ACNC: 5 IU/L
GFR SERPL CREATININE-BSD FRML MDRD: >90 ML/MIN/1.73M*2
GLUCOSE SERPL-MCNC: 103 MG/DL (ref 74–99)
HBA1C MFR BLD: 5.4 %
HCT VFR BLD AUTO: 37.3 % (ref 36–46)
HGB BLD-MCNC: 12.3 G/DL (ref 12–16)
HOLD SPECIMEN: NORMAL
IMM GRANULOCYTES # BLD AUTO: 0.03 X10*3/UL (ref 0–0.7)
IMM GRANULOCYTES NFR BLD AUTO: 0.4 % (ref 0–0.9)
INR PPP: 1 (ref 0.9–1.1)
LH SERPL-ACNC: 0.1 IU/L
LIPASE SERPL-CCNC: 36 U/L (ref 9–82)
LYMPHOCYTES # BLD AUTO: 1.34 X10*3/UL (ref 1.2–4.8)
LYMPHOCYTES NFR BLD AUTO: 18.3 %
MAGNESIUM SERPL-MCNC: 1.98 MG/DL (ref 1.6–2.4)
MCH RBC QN AUTO: 31.4 PG (ref 26–34)
MCHC RBC AUTO-ENTMCNC: 33 G/DL (ref 32–36)
MCV RBC AUTO: 95 FL (ref 80–100)
MONOCYTES # BLD AUTO: 0.54 X10*3/UL (ref 0.1–1)
MONOCYTES NFR BLD AUTO: 7.4 %
NEUTROPHILS # BLD AUTO: 5.21 X10*3/UL (ref 1.2–7.7)
NEUTROPHILS NFR BLD AUTO: 71 %
NRBC BLD-RTO: 0 /100 WBCS (ref 0–0)
PHOSPHATE SERPL-MCNC: 4 MG/DL (ref 2.5–4.9)
PLATELET # BLD AUTO: 221 X10*3/UL (ref 150–450)
POTASSIUM SERPL-SCNC: 3.8 MMOL/L (ref 3.5–5.3)
PROT SERPL-MCNC: 5.7 G/DL (ref 6.4–8.2)
PROTHROMBIN TIME: 11.6 SECONDS (ref 9.8–12.8)
RBC # BLD AUTO: 3.92 X10*6/UL (ref 4–5.2)
SODIUM SERPL-SCNC: 141 MMOL/L (ref 136–145)
T4 FREE SERPL-MCNC: 0.85 NG/DL (ref 0.78–1.48)
TSH SERPL-ACNC: 3.93 MIU/L (ref 0.44–3.98)
WBC # BLD AUTO: 7.3 X10*3/UL (ref 4.4–11.3)

## 2023-11-21 PROCEDURE — 96367 TX/PROPH/DG ADDL SEQ IV INF: CPT

## 2023-11-21 PROCEDURE — 82670 ASSAY OF TOTAL ESTRADIOL: CPT | Performed by: STUDENT IN AN ORGANIZED HEALTH CARE EDUCATION/TRAINING PROGRAM

## 2023-11-21 PROCEDURE — 82533 TOTAL CORTISOL: CPT | Performed by: STUDENT IN AN ORGANIZED HEALTH CARE EDUCATION/TRAINING PROGRAM

## 2023-11-21 PROCEDURE — 85025 COMPLETE CBC W/AUTO DIFF WBC: CPT | Performed by: STUDENT IN AN ORGANIZED HEALTH CARE EDUCATION/TRAINING PROGRAM

## 2023-11-21 PROCEDURE — 1159F MED LIST DOCD IN RCRD: CPT | Performed by: STUDENT IN AN ORGANIZED HEALTH CARE EDUCATION/TRAINING PROGRAM

## 2023-11-21 PROCEDURE — 83002 ASSAY OF GONADOTROPIN (LH): CPT | Performed by: STUDENT IN AN ORGANIZED HEALTH CARE EDUCATION/TRAINING PROGRAM

## 2023-11-21 PROCEDURE — 83735 ASSAY OF MAGNESIUM: CPT | Performed by: STUDENT IN AN ORGANIZED HEALTH CARE EDUCATION/TRAINING PROGRAM

## 2023-11-21 PROCEDURE — 1160F RVW MEDS BY RX/DR IN RCRD: CPT | Performed by: STUDENT IN AN ORGANIZED HEALTH CARE EDUCATION/TRAINING PROGRAM

## 2023-11-21 PROCEDURE — 85610 PROTHROMBIN TIME: CPT | Performed by: STUDENT IN AN ORGANIZED HEALTH CARE EDUCATION/TRAINING PROGRAM

## 2023-11-21 PROCEDURE — 36415 COLL VENOUS BLD VENIPUNCTURE: CPT | Performed by: STUDENT IN AN ORGANIZED HEALTH CARE EDUCATION/TRAINING PROGRAM

## 2023-11-21 PROCEDURE — 82550 ASSAY OF CK (CPK): CPT | Performed by: STUDENT IN AN ORGANIZED HEALTH CARE EDUCATION/TRAINING PROGRAM

## 2023-11-21 PROCEDURE — 82728 ASSAY OF FERRITIN: CPT | Performed by: STUDENT IN AN ORGANIZED HEALTH CARE EDUCATION/TRAINING PROGRAM

## 2023-11-21 PROCEDURE — 99214 OFFICE O/P EST MOD 30 MIN: CPT | Performed by: STUDENT IN AN ORGANIZED HEALTH CARE EDUCATION/TRAINING PROGRAM

## 2023-11-21 PROCEDURE — 84443 ASSAY THYROID STIM HORMONE: CPT | Performed by: STUDENT IN AN ORGANIZED HEALTH CARE EDUCATION/TRAINING PROGRAM

## 2023-11-21 PROCEDURE — 3008F BODY MASS INDEX DOCD: CPT | Performed by: STUDENT IN AN ORGANIZED HEALTH CARE EDUCATION/TRAINING PROGRAM

## 2023-11-21 PROCEDURE — 82024 ASSAY OF ACTH: CPT | Performed by: STUDENT IN AN ORGANIZED HEALTH CARE EDUCATION/TRAINING PROGRAM

## 2023-11-21 PROCEDURE — 80053 COMPREHEN METABOLIC PANEL: CPT | Performed by: STUDENT IN AN ORGANIZED HEALTH CARE EDUCATION/TRAINING PROGRAM

## 2023-11-21 PROCEDURE — 84100 ASSAY OF PHOSPHORUS: CPT | Performed by: STUDENT IN AN ORGANIZED HEALTH CARE EDUCATION/TRAINING PROGRAM

## 2023-11-21 PROCEDURE — 82248 BILIRUBIN DIRECT: CPT | Performed by: STUDENT IN AN ORGANIZED HEALTH CARE EDUCATION/TRAINING PROGRAM

## 2023-11-21 PROCEDURE — 84439 ASSAY OF FREE THYROXINE: CPT | Performed by: STUDENT IN AN ORGANIZED HEALTH CARE EDUCATION/TRAINING PROGRAM

## 2023-11-21 PROCEDURE — 86140 C-REACTIVE PROTEIN: CPT | Performed by: STUDENT IN AN ORGANIZED HEALTH CARE EDUCATION/TRAINING PROGRAM

## 2023-11-21 PROCEDURE — 1036F TOBACCO NON-USER: CPT | Performed by: STUDENT IN AN ORGANIZED HEALTH CARE EDUCATION/TRAINING PROGRAM

## 2023-11-21 PROCEDURE — 83690 ASSAY OF LIPASE: CPT | Performed by: STUDENT IN AN ORGANIZED HEALTH CARE EDUCATION/TRAINING PROGRAM

## 2023-11-21 PROCEDURE — 85730 THROMBOPLASTIN TIME PARTIAL: CPT | Performed by: STUDENT IN AN ORGANIZED HEALTH CARE EDUCATION/TRAINING PROGRAM

## 2023-11-21 PROCEDURE — 1126F AMNT PAIN NOTED NONE PRSNT: CPT | Performed by: STUDENT IN AN ORGANIZED HEALTH CARE EDUCATION/TRAINING PROGRAM

## 2023-11-21 PROCEDURE — 2500000005 HC RX 250 GENERAL PHARMACY W/O HCPCS: Performed by: STUDENT IN AN ORGANIZED HEALTH CARE EDUCATION/TRAINING PROGRAM

## 2023-11-21 PROCEDURE — 82150 ASSAY OF AMYLASE: CPT | Performed by: STUDENT IN AN ORGANIZED HEALTH CARE EDUCATION/TRAINING PROGRAM

## 2023-11-21 PROCEDURE — 96413 CHEMO IV INFUSION 1 HR: CPT

## 2023-11-21 PROCEDURE — 83036 HEMOGLOBIN GLYCOSYLATED A1C: CPT | Performed by: FAMILY MEDICINE

## 2023-11-21 RX ADMIN — Medication 3 MG: at 11:10

## 2023-11-21 NOTE — RESEARCH NOTES
Research Note Treatment Day    Gladys Branch is here today for treatment on [id number]. Today is C11D1. Procedures completed per protocol. AE's and con-meds reviewed with patient. Patient is aware of treatment plan.    [x]   Received treatment as planned   OR  []    Treatment delayed; patient calendar updated as required   Treatment delayed because:    []   AE    []   Physician Discretion    []   Clinical Deterioration or Progression     []   Other  Pt seen by Dr Archer and discussed ongoing burping/hiccuping after eating and ongoing light, intermittent dry cough. Meds prescribed so far have helped little. Pt is scheduling a colonoscopy with no link to the clinical trial, and all parties agreed that performing an endoscopy may be helpful for these symptoms and can be performed at the same time. Pt has a history of radiation to this area.    Education Documentation  General Medication Information, taught by Vladimir Fernandez RN at 11/21/2023  1:21 PM.  Learner: Significant Other, Patient  Readiness: Eager  Method: Explanation  Response: Verbalizes Understanding    Treatment Plan and Schedule, taught by Vladimir Fernandez RN at 11/21/2023  1:21 PM.  Learner: Significant Other, Patient  Readiness: Eager  Method: Explanation  Response: Verbalizes Understanding    Supportive Medications, taught by Vladimir Fernandez RN at 11/21/2023  1:21 PM.  Learner: Significant Other, Patient  Readiness: Eager  Method: Explanation  Response: Verbalizes Understanding    Education Comments  No comments found.

## 2023-11-21 NOTE — RESEARCH NOTES
[facility], [place]<[id number]><[id number]><[id number]>    DCRU NURSING VISIT NOTE  Study Name: ALEXANDRU Foote8  IRB#: [id number]  [id number]  Protocol Version Dated: 03.22.23  PI: Roshan Kumar MD.    Time point: Cycle 5 and beyond - Day 1  CYCLE 11     Encounter Date: 11/21/2023  Encounter Time:  8:30 AM EST  Encounter Department: [facility] HEMATOLOGY AND ONCOLOGY     #1     Phone Pager   Carmen Rios [phone]    #2 Phone Pager        Other Phone Pager          Study Regimen and Dosing   Refer to [place] Treatment Plan for orders  Part A1 Dose Exploration & A2 Dose Expansion - Small Cell Lung Cancer Relapsed or Refractory patients who progressed or recurred following platinum-based chemotherapy will receive [id number] to evaluate safety, tolerability and determine the maximum tolerated dose (MTD) or recommended phase 2 dose (RP2D).  Cycle = 28 days  TARLATAMAB () administered IV Day 1 and Day 15.     Dietary Guidelines   Regular diet:     Admission and Prior to Starting Study Activities   Notify  when patient arrives to unit.  Complete DCRU/[facility] intake form in EMR.  Obtain vital signs including Pulse Oximetry after seated or semi-fowlers x 5 mins. Record on flow sheet & in EMR.  Obtain weight in kilograms - with shoes off & heavy items removed.  Insert one peripheral IV line for sample collection procedures (flush line with normal saline following each blood draw). Access mediport (if available) otherwise insert second peripheral line in opposite arm for Investigative drug administration (if peripheral line, flush line with normal saline before & after infusion)  Physical Exam.     Criteria to Treat   DCRU RN reviewed and meets eligibility to proceed with treatment plan   Time team notified: [id number]   DCRU RN notifies study team to review eligibility and approval before dosing procedures  Time team approves: 0911     Subjective   Gladys Branch is  a 67 y.o. female and is here for a Research clinical visit.    Visit Provider: Laurie, [facility] ROOM 7 [facility]     Allergies:   Allergies   Allergen Reactions    Cisplatin Other     CHEMO INDUCED (Moderate); Dyspepsia (Moderate); Headaches (Moderate); Hypotension (Moderate); Numbness (Moderate); Resp Distress (Moderate)    Codeine Nausea Only and Other     nausea    Sulfa (Sulfonamide Antibiotics) Rash       Objective     Vital Signs:    There were no vitals filed for this visit.    Physical Exam     ASSESSMENT and PLAN:  Problem List Items Addressed This Visit    None       Medications as of the completion of today's visit:  Current Outpatient Medications   Medication Sig Dispense Refill    cetirizine (ZyrTEC) 10 mg tablet Take 1 tablet (10 mg) by mouth once daily. 30 tablet 11    cetirizine-pseudoephedrine (ZyrTEC-D) 5-120 mg 12 hr tablet Take 1 tablet by mouth 2 times a day. 60 tablet 11    cholecalciferol (Vitamin D-3) 25 MCG (1000 UT) tablet Take by mouth.      dexAMETHasone (Decadron) 2 mg tablet Take 0.5 tablets (1 mg) by mouth once daily.      dextromethorphan (Delsym) 30 mg/5 mL liquid Take 5 mL (30 mg) by mouth 2 times a day.      dronabinol (Marinol) 2.5 mg capsule once daily as needed. One hour before dinner      estrogens, conjugated, (Premarin) 0.3 mg tablet Take 0.5 tablets (0.15 mg) by mouth once daily.      omeprazole (PriLOSEC) 20 mg DR capsule Take 1 capsule (20 mg) by mouth once daily.      ondansetron (Zofran) 8 mg tablet Take 0.5 tablets (4 mg) by mouth every 8 hours if needed.      prochlorperazine (Compazine) 10 mg tablet Take 0.5 tablets (5 mg) by mouth every 6 hours if needed.      vit A/vit C/vit E/zinc/copper (PRESERVISION AREDS ORAL) Take by mouth once daily as needed.       No current facility-administered medications for this encounter.           Orders placed during today's visit:  No orders of the defined types were placed in this encounter.       No study found     Study Specific  Instructions and Documentation   LABS  PREMEDS  VITALS  CORREATIVES  STUDY DRUG  VITALS  DISCHARGE     PRE-DOSE Safety Labs   Refer to [place] Treatment Plan for orders     Premedication   Refer to [place] Treatment Plan for orders  Within 1 hour prior to TARLATAMAB      Pre-dose Vital Signs   Temp, HR, Resp, BP, Pulse Oximetry: Seated or Semi-Fowlers x 5 minutes before obtaining  Position and temperature location: should be the same that is used throughout the study    Vitals:    11/21/23 1054   BP: 109/72   Pulse: 78   Resp: 18   Temp: 36.9 °C (98.4 °F)   SpO2: 98%        Pre-dose Research Correlatives  Deliver to DCRU/[facility] lab for processing   Refer to [place] Treatment Plan for orders   Time point Specimen Test Volume Tube Handling Draw Time   Pre-dose (within 15 mins of  dosing) Anti- Antibody 2.5 mL SST Ambient, Invert 5X 1059    TARLATAMAB PK  (through cycle 12) 2.5 mL SST Ambient, Invert 5X 1059    Serum Markers 2.5 mL SST Ambient, Invert 5X 1059        Research Drug Administration Tarlatamab ()   Refer to [place] Treatment Plan for orders   Administer dose over 60 minutes (+/- 10 mins) followed by a 3 - 5 minute Normal saline flush. (This will be the end time after the flush). Document start time & end of study medication; document start time and end time of the flush.  Participant to remain on Unit for 2 hour post dose observation.      Infusion-Related Reactions    [facility] SOC  Grading and Management of Cytokine Release Syndrome   CRS Grade Description of Severity Minimum Expected Intervention Instructions for Dose Modifications of TARLATAMAB         1 Symptoms are not life threatening and require symptomatic treatment only, eg, fever, nausea, fatigue, headache, myalgias, malaise Administer symptomatic treatment (eg, paracetamol/ acetaminophen for fever). Monitor for CRS symptoms including vital signs and pulse oximetry at least Q2 hours for 12 hours or until resolution,  Whichever is earlier. N/A                              2 Symptoms require and respond to moderate intervention  Oxygen requirement <40%, OR  Hypotension responsive to fluids or low dose of one vasopressor, OR  Grade 2 organ toxicity or grade 3 transaminitis per CTCAE criteria Administer:  Symptomatic treatment (eg, paracetamol/ acetaminophen for fever)  Supplemental oxygen when oxygen saturation is <90% on room air  Intravenous fluids or low dose vasopressor for hypotension when systolic blood pressure is  <85 mmHg. Persistent tachycardia (eg >120 bpm) may also indicate the need for intervention for hypotension.  Monitor for CRS symptoms including vital signs and pulse oximetry at least Q2 hours for 12 hours or until resolution to CRS grade <= 1, whichever is Earlier.  For subjects with extensive co-morbidities or poor performance status, manage  per grade 3 CRS guidance below If CRS occurs during TARLATAMAB treatment, immediately interrupt the infusion and delay the next TARLATAMAB dose until the event resolves to CRS  grade <= 1 for no less than 72 hours.    Permanently discontinue TARLATAMAB if there is no improvement to CRS  <= grade 1 within 7 days                     3 Symptoms require and respond to aggressive intervention  Oxygen requirement >=40%, OR    Hypotension requiring high dose or multiple vasopressors, OR  Grade 3 organ toxicity or grade 4 transaminitis per CTCAE criteria Admit to intensive care unit for close clinical and vital sign monitoring per institutional Guidelines.  Administer dexamethasone (or equivalent) IV at a dose maximum of 3 doses of 8 mg (24 mg/day). The dose should Then be reduced step-wise.  Investigators should consider use of tocilizumab 8 mg/kg over 1 hour (not to exceed 800mg). Repeat tocilizumab every 8 hours as needed if not responsive to IV fluids or  Increasing supplemental oxygen. Maximum of 3 doses in a 24 hour period. Maximum total of 4 doses If CRS occurs during TARLATAMAB treatment, immediately  interrupt TARLATAMAB and delay the next dose until event resolves to CRS grade <= 1 for no less than 72 hours.  Permanently discontinue TARLATAMAB if there is no improvement to CRS  <= grade 2 within 5 days or CRS <= grade 1 within 7 days.  Permanently discontinue TARLATAMAB if CRS grade 3 occurs at the initial run in dose (i.e., at MTD1) (Applicable only after  MTD1 has been defined).                       4 Life-threatening symptoms  Requirement for ventilator support OR  Grade 4 organ toxicity  (excluding transaminitis) per CTCAE criteria Admit to intensive care unit for close clinical and vital sign monitoring per institutional Guidelines.  Administer dexamethasone (or equivalent) IV at a dose maximum of 3 doses of 8 mg (24 mg/day). Further steroid use should be discussed with [employer] medical monitor.  Investigators should consider use of tocilizumab 8 mg/kg IV Over 1 hour (not to exceed 800mg). Repeat tocilizumab every 8 hours as needed if not responsive to IV fluids or increasing supplemental Oxygen. Maximum of 3 doses  In a 24 hour period. Maximum Total of 4 doses. If CRS occurs during TARLATAMAB treatment, Immediately stop the infusion.  Permanently discontinue TARLATAMAB therapy.        Day 1 Post-Dose Vital Signs   Temp, HR, Resp, BP, Pulse Oximetry: Seated or Semi-Fowlers x 5 minutes before obtaining  Position and temperature location: should be the same that is used throughout the study  End of Infusion    Vitals:    11/21/23 1215   BP: 100/69   Pulse: 81   Resp: 16   Temp: 36.9 °C (98.4 °F)   SpO2: 99%        Discharge Instructions   Patient may be discharged to home after 2 hour observation.  Remind patient to return: Day 15 for treatment & labs     Davey Melchor RN  11/21/23

## 2023-11-21 NOTE — PROGRESS NOTES
Patient ID: Gladys Branch is a 67 y.o. female.    DIAGNOSIS    Small Cell Lung Cancer    By immunostaining, the tumor cells are positive for CAM 5.2, INSM1, and TTF-1, and negative for p40 and LCA. The proliferation index by Ki-67 immunostaining is approximately 90%.  Synaptophysin, Chromogranin and INSM-1 are positive.  AE1/AE3 is negative. NEOGENOMICS, RB protein expression is lost in the tumor cells    STAGING    [id number], limited stage  Recurrence    CURRENT SITES OF DISEASE    RLL, supraclavicular    MOLECULAR GENOMICS      PRIOR THERAPY    Concurrent chemoradiation with Cisplatin/Etoposide every 3 weeks; C1D1 2/15/22, reaction to cisplatin C2D1 and did not receive D2 or D3 etoposide  Carbo/etoposide 4/5/2022 1 cycle c/b rash  PCI 5/21-6/13/22    CURRENT THERAPY    Phase 1 study [id number]  with study drug Taralatamab 1/24/23 - present.    CURRENT ONCOLOGICAL PROBLEMS      HISTORY OF PRESENT ILLNESS    Mrs. Branch is a 67 yo with PMH GERD and COPD who originally was round to have a RLL nodule measuring 11 mm in 4/28/2021 found as part of CT calcium score scan. An ensuing CT chest w/o contrast was done on 5/19/21 which revealed subsolid pulmonary nodules measuring 14 mm in the RLL. She had CT chest w/contrast on 11/29/21 which confirmed stable 14 mm GGO of the RLL and decreased RLL subpleural nodule. She met thoracic surgeon Dr. Farfan, who ordered PET/CT scan done on 12/8/21 which did not reveal any FDG-avid nodules within the lung parenchyma but R hilar nodes with max SUV 8.0. He referred her for bronch, which was done on 1/21/22 by Dr. Mcdaniels. 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 Brain MRI was negative.    She started concurrent chemoradiation with BID radiation with cis/etop 2/15/22, and unfortunately had a reaction to cisplatin on C2D1 with chest pain and hypotension. She was hospitalized with sepsis felt to be due to pneumonia. She trialed carboplatin/etoposide on 4/5/22 with a  resulting rash and opted to forego further chemotherapy as she had completed radiation. Restaging scan 11/3/22 unfortunately with progression with new R hilar lymph node, paratracheal nodes, and increase in size of R supraclavicular mass. She enrolled in [id number] and started C1D1 1/24/23. C1 complicated by anorexia and dysgeusia, and delayed C2 by a week. She has further struggled with fatigue and confusion, which may be related to mirtazapine. Dose reduced for C2 and mirtazapine stopped with improvement in symptoms.     PAST MEDICAL HISTORY    GERD  COPD    SOCIAL HISTORY    Retired - lighting . Lives at home with  and a kitten.  Tobacco: quit smoking 1999. 1 ppd x 25 yrs.  EtOH: wine 1 glass/day  Illicits: none    CURRENT MEDS    Please see med list    ALLERGIES    Codeine, Sulfa drugs, Cisplatin    FAMILY HISTORY    Paternal aunt - breast cancer dx 80s    Subjective      Today 11.21.2023. Patient in clinic with her  for C11D1 for [id number].      She is here with her . She continues to feel well overall. She has the same dry intermittent cough and hiccups. She is gaining a little bit of weight and eating better. No diarrhea/constipation but sometimes uses the miralax.     Objective    BSA: There is no height or weight on file to calculate BSA.  LMP  (LMP Unknown)      Physical Exam  Constitutional:       General: She is awake.      Appearance: Normal appearance. She is normal weight.   HENT:      Head: Normocephalic.      Mouth/Throat:      Mouth: Mucous membranes are moist.   Eyes:      Extraocular Movements: Extraocular movements intact.      Conjunctiva/sclera: Conjunctivae normal.      Pupils: Pupils are equal, round, and reactive to light.   Cardiovascular:      Rate and Rhythm: Normal rate and regular rhythm.      Heart sounds: Normal heart sounds, S1 normal and S2 normal.   Pulmonary:      Effort: Pulmonary effort is normal.      Breath sounds: Normal breath sounds.    Abdominal:      General: Abdomen is flat. Bowel sounds are normal.      Palpations: Abdomen is soft.   Musculoskeletal:      Cervical back: Normal range of motion and neck supple.   Skin:     Comments: No skin rash observed   Neurological:      Mental Status: She is alert.      Cranial Nerves: Cranial nerves 2-12 are intact.      Sensory: Sensation is intact.      Motor: Motor function is intact.      Coordination: Coordination is intact.   Psychiatric:         Attention and Perception: Attention normal.         Mood and Affect: Mood normal.         Speech: Speech normal.         Behavior: Behavior normal. Behavior is cooperative.         Cognition and Memory: Cognition normal.     Labs     Lab Results   Component Value Date    WBC 7.3 11/21/2023    HGB 12.3 11/21/2023    MCV 95 11/21/2023     11/21/2023   ]  Lab Results   Component Value Date    NEUTROABS 5.21 11/21/2023     Lab Results   Component Value Date    GLUCOSE 103 (H) 11/21/2023    CALCIUM 8.6 11/21/2023     11/21/2023    K 3.8 11/21/2023    CO2 25 11/21/2023     11/21/2023    BUN 20 11/21/2023    CREATININE 0.68 11/21/2023    MG 1.98 11/21/2023     Lab Results   Component Value Date    ALT 10 11/21/2023    AST 13 11/21/2023    ALKPHOS 41 11/21/2023    BILITOT 0.5 11/21/2023    BILIDIR 0.1 11/21/2023     Lab Results   Component Value Date    ACTH 4.7 (L) 10/24/2023    CORTISOL 8.1 11/21/2023    TSH 3.93 11/21/2023    FREET4 0.85 11/21/2023     Lab Results   Component Value Date     09/26/2023         Performance Status:    ECOG 1: Restricted in physically strenuous activity but ambulatory and able to carry out work of a light or sedentary nature, e.g., light house work, office work.     Assessment/Plan      Mrs. Branch is a 68 yo with COPD and GERD who presented with newly diagnosed SCLC completed BID radiation planned concurrently with cis/etoposide but had a reaction C2D1 to cisplatin. Completed 1 cycle carbo/etop D1 4/5/22  also complicated by facial flushing and erythematous rash and stopped further treatment. Now s/p PCI in 6/2022. Most recent CT concerning for disease progression. Referred for clinical trial AMGN 1518, C1D1 1/24/23. Treatment has been complicated by orthostatic hypotension, worsening short-term memory, increased lethargy, weight loss, and poor appetite. Delayed C2 by 1 week and dose-reduced. Confusion has resolved during C3 after stopping mirtazapine and dose reduction.     # SCLC  - limited stage, brain MRI negative  - previously discussed concurrent chemoradiation, with cisplatin/etoposide with side effects including but not limited to allergic reaction, fatigue, risk of infection, tinnitus, neuropathy, injury to liver/kidney, risk of anemia/thrombocytopenia and was consented  - she was also interested in scalp cooling, which unfortunately she is not a candidate for d/t SCLC  - started concurrent chemoradiation BID with Q3week cis/etop on 2/15 at [facility]  -unfortunately had reaction to cisplatin on C2D1 resulting in hospitalization and also felt to be septic due to pneumonia  - discussed that we typically do 3-4 cycles chemotherapy, and alternative regimens include carboplatin/etoposide vs CAV. Given reaction to cisplatin, concern for possible reaction to carboplatin as well, although unknown rates of cross reaction. Alternatively, they also asked about discontinuation of chemotherapy, since she completed radiation. She ultimately decided to trial carbo/etoposide. She is aware of the heightened risk of allergic reaction, as well as other side effects including but not limited to fatigue, risk of infection, neuropathy, injury to liver/kidney, risk of anemia/thrombocytopenia. consented 3/28/22  -s/p 1 cycle Carbo/Etop D1 4/5/22, developed facial flushing and erythematous rash on arms and chest s/p treatment, resolved with benadryl  -opted to forego further chemotherapy and will continue on maintenance  - s/p PCI  6/2022  - CT C/A/P and brain MRI 5/2022 and most recently 8/2022 with good response and no disease  -  MRI brain 11/7 without evidence of metastatic disease  - CT C/A/P 11/3 with multiple new enlarged LN concerning for disease progression - discussed with Dr. Valdivia not able to repeat radiation.  - referred to phase 1 clinical trial and consideration for [id number] or [id number]  - 1.5.2023 : Patient signed consent to take part on phase 1 study [id number]. Look clinically good to take part in study. Will start on study ASAP if eligible.   - 1.24.2023: Seen today and ready to start trial on [id number].  - 1.31.2023: Seen today, ready for C1D8 AMGN 1518   - 2.7.2023: Seen today for C1D15 [id number] - continue with trial   - 2.14.2023: Seen in follow-up on [id number] with overall worsening of general health  - 2.21.2023: Seen today for C2D1 AMGN 1518 with continued weight loss although perhaps starting to plateau, more energy since being off treatment, still poor appetite. will delay by 1 week, started dexamethasone 2 mg daily, and consider dose reduction.  - 2.28.2023: Seen today for C2D1 AMGN 1518 after delaying for 1 week. Energy level and appetite are improved, although still losing a little weight. Confusion is worse today, possibly due to mirtazapine vs study-related. Will dose reduce today.  - 3.7.2023: Seen today C2D8 AMGN 1518 after dose-reduction last week. Confusion is mildly improved, energy level and appetite are stable. Will add marinol for appetite.   - 3.14.2023: Seen today C2D15 AMGN 1518. Overall improved: confusion continues to improve, energy level and appetite are improved. Weight is improved. Will continue dexamethasone and marinol.  - 3.15.2023: C2D16 No CRS symptoms observed after last treatment Overall Symptoms are improving and she is tolerating treatment.  -3.16.2023: C2D17 No CRS symptoms observed after last treatment Overall Symptoms are improving and she is tolerating treatment.  - 3.28.2023 : Most  recent imaging suggest patient benefiting from current treatment. Ongoing symptom  possible common cold/season allergies. Since the patient looks clinically good we will proceed with C3D1 today. Educated patient to call the office ASAP if symptoms worsen.   - 4.11.2023: C3D15 Feeling well and overall improved with fatigue, confusion, anorexia. Proceed at current dosing and weaning steroids as below  -4.25.2023: C4D1 Pt is feeling well, no complaints of fatigue, confusion, memory loss. Has gained weight. Energy levels are good. Proceed with the current dosing. Pt is no longer on steroids.   -5.9.2023: C4D15. Pt is tolerating treatment well with no new complains. Continues to have good energy level endorses poor appetite with out any weight loss. Suggested to start taking the dronabinol.  Will proceed with treatment today.  -5.23.2023: C5D1. Pt is tolerating treatment well. Energy levels are good, is having some weight loss and constipation. Started back on dex for appetite. Pt will let us know if she remains constipated over the next several days.  Personally reviewed scans with stable disease. Will proceed with treatment today.   -6.6.2023: C5D15. Continues to tolerate treatment well. Since we restarted her on Dexamethasone she reports her energy levels and appetite has improved. Will continue on low dose Dex. With no new symptoms, we will proceed with treatment today.   -6.20.2023: C6D1. Doing well on dexamethasone 1 mg daily. Will proceed with treatment.  -7.5.2023: C6D15. We will proceed with treatment since the patient is tolerating treatment with no significant AE's and also give 1/2 liter of IV fluids.   - 7.18.2023: C7D1. Doing well, will proceed with treatment. She is out of dexamethasone, and will monitor off steroids.   -8.1.2023: C7D15. Continues to tolerate treatment. Will proceed with C7D15 today. RTC per protocol.  - 8.29.2023: C8D1. C8 delayed due to hospitalization for diverticulitis. Has completed  antibiotics and feeling well for treatment.  - 9.12.2023: C8D15. Most recent imgaing suggest the patient continues to benefit from current treatment. The imaging also suggest improvement of previously visualized segmental colitis associated with diverticulosis affecting the sigmoid colon with minimal residual pericolonic fat standing and hyperemia of the sigmoid colon. Imaging also suggest resolving inflammatory process without evidence of new or worsening complications. We will proceed with C8D15 today since the patient is also clinically feeling good. RTC per protocol.  -9.26.2023: C9D1. Patient looks clinically good. With no JACQUELYN's we will proceed with treatment C9D1 today.  -10.24.23: C10D1. Pt feels well, no acute events, no TRAEs, continue treatment today as scheduled.  -11.7.23: C10D15. Pt feels well, no TRAEs, continue treatment as scheduled.  - 11.21.23: C11D1. Continues to feel well, will continue treatment today.    # Confusion - resolved  - significantly worsened after taking double dose of mirtazapine accidentally, although has been generally down-trending since starting study (which is also when she started taking mirtazapine) and now resolved  - brain MRI without progression of cancer  - will continue off mirtazapine    # Unintentional weight loss - stable  # Anorexia- Improving  # Dysgeusia- improving  - all improved on steroids. unclear if this is from cancer or side effect of study drug  - stopped mirtazapine due to concerns for confusion   - weaned off dexamethasone and started marinol, but kept forgetting to take it  - dexamethasone trial started again on 5.23.2023. Continued on 1 mg daily - will trial off after 7.18.23.     # Fatigue - Resolved  - back to normal pretty much, weaning steroids as above    # frequent PVCs - asymptomatic  - saw onco-cardiology and completed 24-hr Holter monitor  - recommended decreasing caffeine and sudafed intake  - continue to monitor    #hyponatremia -  improving  - improved recently, will monitor    # Forgetfulness - improved - however noted decline on AMGN study- unclear etiology.    - started after PCI, on memantine daily and has since stopped  - offered neurocognitive evaluation previously and she will think about this and readdress next visit  -  notes some decline in last 3 weeks- especially short term memory loss.    - Improved    # Neuropathy - resolved  - mild peripheral neuropathy with numbness of the toes - likely due to cisplatin  - will continue to monitor closely  - not endorsing any neuropathy at this visit     # Rash - resolved  Waxing and waning rash throughout her body. ?improving. Unclear etiology, patient and  feel timing coincided with starting BMX.   - she will hold off on further BMX  - thought to be due to sulfa allergy, possibly due to platinum agents   - continue to monitor    #odynophagia - resolved  -rad onc aware  -prescribed BMX solution  -will continue to monitor    #constipation-improving  -occasional. Prescribed Senna S  -will monitor    Francy Archer MD

## 2023-11-23 LAB — ACTH PLAS-MCNC: 34.4 PG/ML (ref 7.2–63.3)

## 2023-11-24 NOTE — ADDENDUM NOTE
Encounter addended by: Betsy Hadley RN on: 11/24/2023 9:46 AM   Actions taken: Charge Capture section accepted

## 2023-11-29 ENCOUNTER — TELEPHONE (OUTPATIENT)
Dept: PRIMARY CARE | Facility: CLINIC | Age: 68
End: 2023-11-29
Payer: MEDICARE

## 2023-11-29 NOTE — TELEPHONE ENCOUNTER
Patient would like to know the name of the physician that did her last Colonoscopy.  I did try looking through her chart but I did not find anything. Can someone assist me?

## 2023-12-05 ENCOUNTER — HOSPITAL ENCOUNTER (OUTPATIENT)
Dept: RESEARCH | Facility: HOSPITAL | Age: 68
Discharge: HOME | End: 2023-12-05
Payer: MEDICARE

## 2023-12-05 ENCOUNTER — EDUCATION (OUTPATIENT)
Dept: HEMATOLOGY/ONCOLOGY | Facility: HOSPITAL | Age: 68
End: 2023-12-05

## 2023-12-05 VITALS
SYSTOLIC BLOOD PRESSURE: 115 MMHG | TEMPERATURE: 98.1 F | BODY MASS INDEX: 20.79 KG/M2 | RESPIRATION RATE: 16 BRPM | OXYGEN SATURATION: 97 % | WEIGHT: 110.01 LBS | HEART RATE: 59 BPM | DIASTOLIC BLOOD PRESSURE: 75 MMHG

## 2023-12-05 DIAGNOSIS — C34.90 SMALL CELL LUNG CANCER (MULTI): ICD-10-CM

## 2023-12-05 LAB
ALBUMIN SERPL BCP-MCNC: 3.5 G/DL (ref 3.4–5)
ALP SERPL-CCNC: 44 U/L (ref 33–136)
ALT SERPL W P-5'-P-CCNC: 14 U/L (ref 7–45)
ANION GAP SERPL CALC-SCNC: 13 MMOL/L (ref 10–20)
APTT PPP: 30 SECONDS (ref 27–38)
AST SERPL W P-5'-P-CCNC: 17 U/L (ref 9–39)
BASOPHILS # BLD AUTO: 0.03 X10*3/UL (ref 0–0.1)
BASOPHILS NFR BLD AUTO: 0.4 %
BILIRUB DIRECT SERPL-MCNC: 0.1 MG/DL (ref 0–0.3)
BILIRUB SERPL-MCNC: 0.5 MG/DL (ref 0–1.2)
BUN SERPL-MCNC: 19 MG/DL (ref 6–23)
CALCIUM SERPL-MCNC: 9 MG/DL (ref 8.6–10.6)
CHLORIDE SERPL-SCNC: 107 MMOL/L (ref 98–107)
CK SERPL-CCNC: 57 U/L (ref 0–215)
CO2 SERPL-SCNC: 24 MMOL/L (ref 21–32)
CREAT SERPL-MCNC: 0.82 MG/DL (ref 0.5–1.05)
CRP SERPL-MCNC: 0.91 MG/DL
EOSINOPHIL # BLD AUTO: 0.08 X10*3/UL (ref 0–0.7)
EOSINOPHIL NFR BLD AUTO: 1 %
ERYTHROCYTE [DISTWIDTH] IN BLOOD BY AUTOMATED COUNT: 13.5 % (ref 11.5–14.5)
FERRITIN SERPL-MCNC: 98 NG/ML (ref 8–150)
GFR SERPL CREATININE-BSD FRML MDRD: 79 ML/MIN/1.73M*2
GLUCOSE SERPL-MCNC: 139 MG/DL (ref 74–99)
HCT VFR BLD AUTO: 34.2 % (ref 36–46)
HGB BLD-MCNC: 11.6 G/DL (ref 12–16)
IMM GRANULOCYTES # BLD AUTO: 0.07 X10*3/UL (ref 0–0.7)
IMM GRANULOCYTES NFR BLD AUTO: 0.9 % (ref 0–0.9)
INR PPP: 1 (ref 0.9–1.1)
LYMPHOCYTES # BLD AUTO: 1.52 X10*3/UL (ref 1.2–4.8)
LYMPHOCYTES NFR BLD AUTO: 19.3 %
MAGNESIUM SERPL-MCNC: 1.98 MG/DL (ref 1.6–2.4)
MCH RBC QN AUTO: 31.6 PG (ref 26–34)
MCHC RBC AUTO-ENTMCNC: 33.9 G/DL (ref 32–36)
MCV RBC AUTO: 93 FL (ref 80–100)
MONOCYTES # BLD AUTO: 0.66 X10*3/UL (ref 0.1–1)
MONOCYTES NFR BLD AUTO: 8.4 %
NEUTROPHILS # BLD AUTO: 5.51 X10*3/UL (ref 1.2–7.7)
NEUTROPHILS NFR BLD AUTO: 70 %
NRBC BLD-RTO: 0 /100 WBCS (ref 0–0)
PHOSPHATE SERPL-MCNC: 3.9 MG/DL (ref 2.5–4.9)
PLATELET # BLD AUTO: 236 X10*3/UL (ref 150–450)
POTASSIUM SERPL-SCNC: 3.8 MMOL/L (ref 3.5–5.3)
PROT SERPL-MCNC: 6.2 G/DL (ref 6.4–8.2)
PROTHROMBIN TIME: 11.7 SECONDS (ref 9.8–12.8)
RBC # BLD AUTO: 3.67 X10*6/UL (ref 4–5.2)
SODIUM SERPL-SCNC: 140 MMOL/L (ref 136–145)
WBC # BLD AUTO: 7.9 X10*3/UL (ref 4.4–11.3)

## 2023-12-05 PROCEDURE — 85610 PROTHROMBIN TIME: CPT | Performed by: STUDENT IN AN ORGANIZED HEALTH CARE EDUCATION/TRAINING PROGRAM

## 2023-12-05 PROCEDURE — 82248 BILIRUBIN DIRECT: CPT | Performed by: STUDENT IN AN ORGANIZED HEALTH CARE EDUCATION/TRAINING PROGRAM

## 2023-12-05 PROCEDURE — 82728 ASSAY OF FERRITIN: CPT | Performed by: STUDENT IN AN ORGANIZED HEALTH CARE EDUCATION/TRAINING PROGRAM

## 2023-12-05 PROCEDURE — 36415 COLL VENOUS BLD VENIPUNCTURE: CPT | Performed by: STUDENT IN AN ORGANIZED HEALTH CARE EDUCATION/TRAINING PROGRAM

## 2023-12-05 PROCEDURE — 2500000005 HC RX 250 GENERAL PHARMACY W/O HCPCS: Performed by: STUDENT IN AN ORGANIZED HEALTH CARE EDUCATION/TRAINING PROGRAM

## 2023-12-05 PROCEDURE — 84100 ASSAY OF PHOSPHORUS: CPT | Performed by: STUDENT IN AN ORGANIZED HEALTH CARE EDUCATION/TRAINING PROGRAM

## 2023-12-05 PROCEDURE — 96413 CHEMO IV INFUSION 1 HR: CPT

## 2023-12-05 PROCEDURE — 82550 ASSAY OF CK (CPK): CPT | Performed by: STUDENT IN AN ORGANIZED HEALTH CARE EDUCATION/TRAINING PROGRAM

## 2023-12-05 PROCEDURE — 80053 COMPREHEN METABOLIC PANEL: CPT | Performed by: STUDENT IN AN ORGANIZED HEALTH CARE EDUCATION/TRAINING PROGRAM

## 2023-12-05 PROCEDURE — 85730 THROMBOPLASTIN TIME PARTIAL: CPT | Performed by: STUDENT IN AN ORGANIZED HEALTH CARE EDUCATION/TRAINING PROGRAM

## 2023-12-05 PROCEDURE — 86140 C-REACTIVE PROTEIN: CPT | Performed by: STUDENT IN AN ORGANIZED HEALTH CARE EDUCATION/TRAINING PROGRAM

## 2023-12-05 PROCEDURE — 83735 ASSAY OF MAGNESIUM: CPT | Performed by: STUDENT IN AN ORGANIZED HEALTH CARE EDUCATION/TRAINING PROGRAM

## 2023-12-05 PROCEDURE — 99213 OFFICE O/P EST LOW 20 MIN: CPT

## 2023-12-05 PROCEDURE — 85025 COMPLETE CBC W/AUTO DIFF WBC: CPT | Performed by: STUDENT IN AN ORGANIZED HEALTH CARE EDUCATION/TRAINING PROGRAM

## 2023-12-05 RX ADMIN — Medication 3 MG: at 11:15

## 2023-12-05 NOTE — RESEARCH NOTES
Research Note Treatment Day    Gladys Branch is here today for treatment on [id number]. Today is C11D15. Procedures completed per protocol. AE's and con-meds reviewed with patient. Patient is aware of treatment plan.    [x]   Received treatment as planned   OR  []    Treatment delayed; patient calendar updated as required   Treatment delayed because:    []   AE    []   Physician Discretion    []   Clinical Deterioration or Progression     []   Other    Education Documentation  Constipation, taught by Vladimir Fernandez RN at 12/5/2023  2:33 PM.  Learner: Significant Other, Patient  Readiness: Eager  Method: Explanation  Response: Verbalizes Understanding    Memory Problems, taught by Vladimir Fernandez RN at 12/5/2023  2:33 PM.  Learner: Significant Other, Patient  Readiness: Eager  Method: Explanation  Response: Verbalizes Understanding    General Medication Information, taught by Vladimir Fernandez RN at 12/5/2023  2:33 PM.  Learner: Significant Other, Patient  Readiness: Eager  Method: Explanation  Response: Verbalizes Understanding    Treatment Plan and Schedule, taught by Vladimir Fernandez RN at 12/5/2023  2:33 PM.  Learner: Significant Other, Patient  Readiness: Eager  Method: Explanation  Response: Verbalizes Understanding    Supportive Medications, taught by Vladimir Fernandez RN at 12/5/2023  2:33 PM.  Learner: Significant Other, Patient  Readiness: Eager  Method: Explanation  Response: Verbalizes Understanding    Education Comments  No comments found.

## 2023-12-05 NOTE — PROGRESS NOTES
Patient ID: Gladys Branch is a 67 y.o. female.    DIAGNOSIS    Small Cell Lung Cancer    By immunostaining, the tumor cells are positive for CAM 5.2, INSM1, and TTF-1, and negative for p40 and LCA. The proliferation index by Ki-67 immunostaining is approximately 90%.  Synaptophysin, Chromogranin and INSM-1 are positive.  AE1/AE3 is negative. NEOGENOMICS, RB protein expression is lost in the tumor cells    STAGING    [id number], limited stage  Recurrence    CURRENT SITES OF DISEASE    RLL, supraclavicular    MOLECULAR GENOMICS      PRIOR THERAPY    Concurrent chemoradiation with Cisplatin/Etoposide every 3 weeks; C1D1 2/15/22, reaction to cisplatin C2D1 and did not receive D2 or D3 etoposide  Carbo/etoposide 4/5/2022 1 cycle c/b rash  PCI 5/21-6/13/22    CURRENT THERAPY    Phase 1 study [id number]  with study drug Taralatamab 1/24/23 - present.    CURRENT ONCOLOGICAL PROBLEMS      HISTORY OF PRESENT ILLNESS    Mrs. Branch is a 67 yo with PMH GERD and COPD who originally was round to have a RLL nodule measuring 11 mm in 4/28/2021 found as part of CT calcium score scan. An ensuing CT chest w/o contrast was done on 5/19/21 which revealed subsolid pulmonary nodules measuring 14 mm in the RLL. She had CT chest w/contrast on 11/29/21 which confirmed stable 14 mm GGO of the RLL and decreased RLL subpleural nodule. She met thoracic surgeon Dr. Farfan, who ordered PET/CT scan done on 12/8/21 which did not reveal any FDG-avid nodules within the lung parenchyma but R hilar nodes with max SUV 8.0. He referred her for bronch, which was done on 1/21/22 by Dr. Mcdaniels. 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 Brain MRI was negative.    She started concurrent chemoradiation with BID radiation with cis/etop 2/15/22, and unfortunately had a reaction to cisplatin on C2D1 with chest pain and hypotension. She was hospitalized with sepsis felt to be due to pneumonia. She trialed carboplatin/etoposide on 4/5/22 with a  resulting rash and opted to forego further chemotherapy as she had completed radiation. Restaging scan 11/3/22 unfortunately with progression with new R hilar lymph node, paratracheal nodes, and increase in size of R supraclavicular mass. She enrolled in [id number] and started C1D1 1/24/23. C1 complicated by anorexia and dysgeusia, and delayed C2 by a week. She has further struggled with fatigue and confusion, which may be related to mirtazapine. Dose reduced for C2 and mirtazapine stopped with improvement in symptoms.     PAST MEDICAL HISTORY    GERD  COPD    SOCIAL HISTORY    Retired - lighting . Lives at home with  and a kitten.  Tobacco: quit smoking 1999. 1 ppd x 25 yrs.  EtOH: wine 1 glass/day  Illicits: none    CURRENT MEDS    Please see med list    ALLERGIES    Codeine, Sulfa drugs, Cisplatin    FAMILY HISTORY    Paternal aunt - breast cancer dx 80s    Subjective    Today 11.21.2023. Patient in clinic with her  for C11D15 for [id number].    She reports she continues to feel good and is tolerating treatment well without any new adverse events. She continues to experience symptoms of intermittent cough and headache. She reports experiencing constipation which resolves with miralax. She continues to also have problems with taste, smell and acid reflux.  She reports she continues to have problems with memory on occasion but it is stable and not worsen. She denies any pain, dizziness, lightheadedness,  rash/itching, chills or fever, SOB, sputum, chest pain or chest tightness, painful inflammation/ulceration oral mucous membranes, nausea/vomiting, diarrhea, hematemesis /hemoptysis, hematuria/rectal bleeding, urinary symptoms, swelling extremities, weakness/fatigue, or peripheral neuropathy. ROS as above. Remainder of 10 point review of systems elicited and otherwise unremarkable.     Objective    BSA: 1.47 meters squared  /74 (BP Location: Right arm, Patient Position: Sitting)    "Pulse 63   Temp 36.8 °C (98.2 °F) (Oral)   Resp 16   Wt 49.9 kg (110 lb 0.2 oz)   LMP  (LMP Unknown)   SpO2 95%   BMI 20.79 kg/m²         11/13/2023     2:05 PM 11/21/2023     7:20 AM 11/21/2023     7:28 AM 11/21/2023    10:54 AM 11/21/2023    12:15 PM 12/5/2023     7:22 AM 12/5/2023     7:25 AM   Vitals   Systolic 99  104 109 100  107   Diastolic 67  83 72 69  74   Heart Rate 75  74 78 81  63   Temp 36.3 °C (97.3 °F)  35.9 °C (96.6 °F) 36.9 °C (98.4 °F) 36.9 °C (98.4 °F)  36.8 °C (98.2 °F)   Resp   18 18 16  16   Height (in) 1.549 m (5' 1\")         Weight (lb) 110.4 108.47    110.01    BMI 20.86 kg/m2 20.49 kg/m2    20.79 kg/m2    BSA (m2) 1.47 m2 1.46 m2    1.47 m2    Visit Report Report Report Report Report Report         Daily Weight  12/05/23 : 49.9 kg (110 lb 0.2 oz)  11/21/23 : 49.2 kg (108 lb 7.5 oz)  11/13/23 : 50.1 kg (110 lb 6.4 oz)  10/24/23 : 48.8 kg (107 lb 9.4 oz)  10/24/23 : 48.8 kg (107 lb 8 oz)      Physical Exam  Constitutional:       General: She is awake.      Appearance: Normal appearance. She is normal weight.   HENT:      Head: Normocephalic.      Mouth/Throat:      Mouth: Mucous membranes are moist.   Eyes:      Extraocular Movements: Extraocular movements intact.      Conjunctiva/sclera: Conjunctivae normal.      Pupils: Pupils are equal, round, and reactive to light.   Cardiovascular:      Rate and Rhythm: Normal rate and regular rhythm.      Heart sounds: Normal heart sounds, S1 normal and S2 normal.   Pulmonary:      Effort: Pulmonary effort is normal.      Breath sounds: Normal breath sounds.   Abdominal:      General: Abdomen is flat. Bowel sounds are normal.      Palpations: Abdomen is soft.   Musculoskeletal:      Cervical back: Normal range of motion and neck supple.   Skin:     Comments: No skin rash observed   Neurological:      Mental Status: She is alert.      Cranial Nerves: Cranial nerves 2-12 are intact.      Sensory: Sensation is intact.      Motor: Motor function is " intact.      Coordination: Coordination is intact.   Psychiatric:         Attention and Perception: Attention normal.         Mood and Affect: Mood normal.         Speech: Speech normal.         Behavior: Behavior normal. Behavior is cooperative.         Cognition and Memory: Cognition normal.     Labs    Hospital Outpatient Visit on 12/05/2023   Component Date Value Ref Range Status    WBC 12/05/2023 7.9  4.4 - 11.3 x10*3/uL Final    nRBC 12/05/2023 0.0  0.0 - 0.0 /100 WBCs Final    RBC 12/05/2023 3.67 (L)  4.00 - 5.20 x10*6/uL Final    Hemoglobin 12/05/2023 11.6 (L)  12.0 - 16.0 g/dL Final    Hematocrit 12/05/2023 34.2 (L)  36.0 - 46.0 % Final    MCV 12/05/2023 93  80 - 100 fL Final    MCH 12/05/2023 31.6  26.0 - 34.0 pg Final    MCHC 12/05/2023 33.9  32.0 - 36.0 g/dL Final    RDW 12/05/2023 13.5  11.5 - 14.5 % Final    Platelets 12/05/2023 236  150 - 450 x10*3/uL Final    Neutrophils % 12/05/2023 70.0  40.0 - 80.0 % Final    Immature Granulocytes %, Automated 12/05/2023 0.9  0.0 - 0.9 % Final    Lymphocytes % 12/05/2023 19.3  13.0 - 44.0 % Final    Monocytes % 12/05/2023 8.4  2.0 - 10.0 % Final    Eosinophils % 12/05/2023 1.0  0.0 - 6.0 % Final    Basophils % 12/05/2023 0.4  0.0 - 2.0 % Final    Neutrophils Absolute 12/05/2023 5.51  1.20 - 7.70 x10*3/uL Final    Immature Granulocytes Absolute, Au* 12/05/2023 0.07  0.00 - 0.70 x10*3/uL Final    Lymphocytes Absolute 12/05/2023 1.52  1.20 - 4.80 x10*3/uL Final    Monocytes Absolute 12/05/2023 0.66  0.10 - 1.00 x10*3/uL Final    Eosinophils Absolute 12/05/2023 0.08  0.00 - 0.70 x10*3/uL Final    Basophils Absolute 12/05/2023 0.03  0.00 - 0.10 x10*3/uL Final    Glucose 12/05/2023 139 (H)  74 - 99 mg/dL Final    Sodium 12/05/2023 140  136 - 145 mmol/L Final    Potassium 12/05/2023 3.8  3.5 - 5.3 mmol/L Final    Chloride 12/05/2023 107  98 - 107 mmol/L Final    Bicarbonate 12/05/2023 24  21 - 32 mmol/L Final    Anion Gap 12/05/2023 13  10 - 20 mmol/L Final    Urea  Nitrogen 12/05/2023 19  6 - 23 mg/dL Final    Creatinine 12/05/2023 0.82  0.50 - 1.05 mg/dL Final    eGFR 12/05/2023 79  >60 mL/min/1.73m*2 Final    Calcium 12/05/2023 9.0  8.6 - 10.6 mg/dL Final    Albumin 12/05/2023 3.5  3.4 - 5.0 g/dL Final    Alkaline Phosphatase 12/05/2023 44  33 - 136 U/L Final    Total Protein 12/05/2023 6.2 (L)  6.4 - 8.2 g/dL Final    AST 12/05/2023 17  9 - 39 U/L Final    Bilirubin, Total 12/05/2023 0.5  0.0 - 1.2 mg/dL Final    ALT 12/05/2023 14  7 - 45 U/L Final    aPTT 12/05/2023 30  27 - 38 seconds Final    Bilirubin, Direct 12/05/2023 0.1  0.0 - 0.3 mg/dL Final    Creatine Kinase 12/05/2023 57  0 - 215 U/L Final    C-Reactive Protein 12/05/2023 0.91  <1.00 mg/dL Final    Ferritin 12/05/2023 98  8 - 150 ng/mL Final    Magnesium 12/05/2023 1.98  1.60 - 2.40 mg/dL Final    Phosphorus 12/05/2023 3.9  2.5 - 4.9 mg/dL Final    Protime 12/05/2023 11.7  9.8 - 12.8 seconds Final    INR 12/05/2023 1.0  0.9 - 1.1 Final   Hospital Outpatient Visit on 11/21/2023   Component Date Value Ref Range Status    WBC 11/21/2023 7.3  4.4 - 11.3 x10*3/uL Final    nRBC 11/21/2023 0.0  0.0 - 0.0 /100 WBCs Final    RBC 11/21/2023 3.92 (L)  4.00 - 5.20 x10*6/uL Final    Hemoglobin 11/21/2023 12.3  12.0 - 16.0 g/dL Final    Hematocrit 11/21/2023 37.3  36.0 - 46.0 % Final    MCV 11/21/2023 95  80 - 100 fL Final    MCH 11/21/2023 31.4  26.0 - 34.0 pg Final    MCHC 11/21/2023 33.0  32.0 - 36.0 g/dL Final    RDW 11/21/2023 13.9  11.5 - 14.5 % Final    Platelets 11/21/2023 221  150 - 450 x10*3/uL Final    Neutrophils % 11/21/2023 71.0  40.0 - 80.0 % Final    Immature Granulocytes %, Automated 11/21/2023 0.4  0.0 - 0.9 % Final    Lymphocytes % 11/21/2023 18.3  13.0 - 44.0 % Final    Monocytes % 11/21/2023 7.4  2.0 - 10.0 % Final    Eosinophils % 11/21/2023 2.5  0.0 - 6.0 % Final    Basophils % 11/21/2023 0.4  0.0 - 2.0 % Final    Neutrophils Absolute 11/21/2023 5.21  1.20 - 7.70 x10*3/uL Final    Immature Granulocytes  Absolute, Au* 11/21/2023 0.03  0.00 - 0.70 x10*3/uL Final    Lymphocytes Absolute 11/21/2023 1.34  1.20 - 4.80 x10*3/uL Final    Monocytes Absolute 11/21/2023 0.54  0.10 - 1.00 x10*3/uL Final    Eosinophils Absolute 11/21/2023 0.18  0.00 - 0.70 x10*3/uL Final    Basophils Absolute 11/21/2023 0.03  0.00 - 0.10 x10*3/uL Final    Glucose 11/21/2023 103 (H)  74 - 99 mg/dL Final    Sodium 11/21/2023 141  136 - 145 mmol/L Final    Potassium 11/21/2023 3.8  3.5 - 5.3 mmol/L Final    Chloride 11/21/2023 107  98 - 107 mmol/L Final    Bicarbonate 11/21/2023 25  21 - 32 mmol/L Final    Anion Gap 11/21/2023 13  10 - 20 mmol/L Final    Urea Nitrogen 11/21/2023 20  6 - 23 mg/dL Final    Creatinine 11/21/2023 0.68  0.50 - 1.05 mg/dL Final    eGFR 11/21/2023 >90  >60 mL/min/1.73m*2 Final    Calcium 11/21/2023 8.6  8.6 - 10.6 mg/dL Final    Albumin 11/21/2023 3.5  3.4 - 5.0 g/dL Final    Alkaline Phosphatase 11/21/2023 41  33 - 136 U/L Final    Total Protein 11/21/2023 5.7 (L)  6.4 - 8.2 g/dL Final    AST 11/21/2023 13  9 - 39 U/L Final    Bilirubin, Total 11/21/2023 0.5  0.0 - 1.2 mg/dL Final    ALT 11/21/2023 10  7 - 45 U/L Final    Adrenocorticotropic Hormone (ACTH) 11/21/2023 34.4  7.2 - 63.3 pg/mL Final    Amylase 11/21/2023 31  29 - 103 U/L Final    aPTT 11/21/2023 31  27 - 38 seconds Final    Bilirubin, Direct 11/21/2023 0.1  0.0 - 0.3 mg/dL Final    Creatine Kinase 11/21/2023 41  0 - 215 U/L Final    Cortisol 11/21/2023 8.1  2.5 - 20.0 ug/dL Final    C-Reactive Protein 11/21/2023 1.09 (H)  <1.00 mg/dL Final    Estradiol 11/21/2023 <19  pg/mL Final    Ferritin 11/21/2023 116  8 - 150 ng/mL Final    Follicle Stimulating Hormone 11/21/2023 5.0  IU/L Final    Luteinizing Hormone 11/21/2023 0.1  IU/L Final    Lipase 11/21/2023 36  9 - 82 U/L Final    Magnesium 11/21/2023 1.98  1.60 - 2.40 mg/dL Final    Phosphorus 11/21/2023 4.0  2.5 - 4.9 mg/dL Final    Protime 11/21/2023 11.6  9.8 - 12.8 seconds Final    INR 11/21/2023 1.0  0.9  - 1.1 Final    Thyroid Stimulating Hormone 11/21/2023 3.93  0.44 - 3.98 mIU/L Final    Thyroxine, Free 11/21/2023 0.85  0.78 - 1.48 ng/dL Final    Hemoglobin A1C 11/21/2023 5.4  see below % Final    Estimated Average Glucose 11/21/2023 108  Not Established mg/dL Final    Extra Tube 11/21/2023 Hold for add-ons.   Final    Extra Tube 11/21/2023 Hold for add-ons.   Final    Extra Tube 11/21/2023 Hold for add-ons.   Final   Hospital Outpatient Visit on 11/07/2023   Component Date Value Ref Range Status    WBC 11/07/2023 8.3  4.4 - 11.3 x10*3/uL Final    nRBC 11/07/2023 0.0  0.0 - 0.0 /100 WBCs Final    RBC 11/07/2023 3.74 (L)  4.00 - 5.20 x10*6/uL Final    Hemoglobin 11/07/2023 11.8 (L)  12.0 - 16.0 g/dL Final    Hematocrit 11/07/2023 36.5  36.0 - 46.0 % Final    MCV 11/07/2023 98  80 - 100 fL Final    MCH 11/07/2023 31.6  26.0 - 34.0 pg Final    MCHC 11/07/2023 32.3  32.0 - 36.0 g/dL Final    RDW 11/07/2023 14.2  11.5 - 14.5 % Final    Platelets 11/07/2023 238  150 - 450 x10*3/uL Final    Neutrophils % 11/07/2023 70.7  40.0 - 80.0 % Final    Immature Granulocytes %, Automated 11/07/2023 0.6  0.0 - 0.9 % Final    Lymphocytes % 11/07/2023 18.9  13.0 - 44.0 % Final    Monocytes % 11/07/2023 7.6  2.0 - 10.0 % Final    Eosinophils % 11/07/2023 1.8  0.0 - 6.0 % Final    Basophils % 11/07/2023 0.4  0.0 - 2.0 % Final    Neutrophils Absolute 11/07/2023 5.83  1.20 - 7.70 x10*3/uL Final    Immature Granulocytes Absolute, Au* 11/07/2023 0.05  0.00 - 0.70 x10*3/uL Final    Lymphocytes Absolute 11/07/2023 1.56  1.20 - 4.80 x10*3/uL Final    Monocytes Absolute 11/07/2023 0.63  0.10 - 1.00 x10*3/uL Final    Eosinophils Absolute 11/07/2023 0.15  0.00 - 0.70 x10*3/uL Final    Basophils Absolute 11/07/2023 0.03  0.00 - 0.10 x10*3/uL Final    Glucose 11/07/2023 99  74 - 99 mg/dL Final    Sodium 11/07/2023 139  136 - 145 mmol/L Final    Potassium 11/07/2023 4.0  3.5 - 5.3 mmol/L Final    Chloride 11/07/2023 105  98 - 107 mmol/L Final     Bicarbonate 11/07/2023 27  21 - 32 mmol/L Final    Anion Gap 11/07/2023 11  10 - 20 mmol/L Final    Urea Nitrogen 11/07/2023 30 (H)  6 - 23 mg/dL Final    Creatinine 11/07/2023 0.71  0.50 - 1.05 mg/dL Final    eGFR 11/07/2023 >90  >60 mL/min/1.73m*2 Final    Calcium 11/07/2023 8.9  8.6 - 10.6 mg/dL Final    Albumin 11/07/2023 3.6  3.4 - 5.0 g/dL Final    Alkaline Phosphatase 11/07/2023 44  33 - 136 U/L Final    Total Protein 11/07/2023 6.2 (L)  6.4 - 8.2 g/dL Final    AST 11/07/2023 13  9 - 39 U/L Final    Bilirubin, Total 11/07/2023 0.5  0.0 - 1.2 mg/dL Final    ALT 11/07/2023 11  7 - 45 U/L Final    Protime 11/07/2023 11.0  9.8 - 12.8 seconds Final    INR 11/07/2023 1.0  0.9 - 1.1 Final    Phosphorus 11/07/2023 3.2  2.5 - 4.9 mg/dL Final    Magnesium 11/07/2023 1.91  1.60 - 2.40 mg/dL Final    Ferritin 11/07/2023 81  8 - 150 ng/mL Final    C-Reactive Protein 11/07/2023 0.83  <1.00 mg/dL Final    Creatine Kinase 11/07/2023 36  0 - 215 U/L Final    Bilirubin, Direct 11/07/2023 0.1  0.0 - 0.3 mg/dL Final    aPTT 11/07/2023 29  27 - 38 seconds Final    Extra Tube 11/07/2023 Hold for add-ons.   Final    Hemoglobin A1C 11/07/2023 5.2  see below % Final    Estimated Average Glucose 11/07/2023 103  Not Established mg/dL Final      Performance Status:    ECOG 1: Restricted in physically strenuous activity but ambulatory and able to carry out work of a light or sedentary nature, e.g., light house work, office work.     Assessment/Plan      Mrs. Branch is a 66 yo with COPD and GERD who presented with newly diagnosed SCLC completed BID radiation planned concurrently with cis/etoposide but had a reaction C2D1 to cisplatin. Completed 1 cycle carbo/etop D1 4/5/22 also complicated by facial flushing and erythematous rash and stopped further treatment. Now s/p PCI in 6/2022. Most recent CT concerning for disease progression. Referred for clinical trial AM 1518, C1D1 1/24/23. Treatment has been complicated by orthostatic  hypotension, worsening short-term memory, increased lethargy, weight loss, and poor appetite. Delayed C2 by 1 week and dose-reduced. Confusion has resolved during C3 after stopping mirtazapine and dose reduction.     # SCLC  - limited stage, brain MRI negative  - previously discussed concurrent chemoradiation, with cisplatin/etoposide with side effects including but not limited to allergic reaction, fatigue, risk of infection, tinnitus, neuropathy, injury to liver/kidney, risk of anemia/thrombocytopenia and was consented  - she was also interested in scalp cooling, which unfortunately she is not a candidate for d/t SCLC  - started concurrent chemoradiation BID with Q3week cis/etop on 2/15 at [place] [facility]  -unfortunately had reaction to cisplatin on C2D1 resulting in hospitalization and also felt to be septic due to pneumonia  - discussed that we typically do 3-4 cycles chemotherapy, and alternative regimens include carboplatin/etoposide vs CAV. Given reaction to cisplatin, concern for possible reaction to carboplatin as well, although unknown rates of cross reaction. Alternatively, they also asked about discontinuation of chemotherapy, since she completed radiation. She ultimately decided to trial carbo/etoposide. She is aware of the heightened risk of allergic reaction, as well as other side effects including but not limited to fatigue, risk of infection, neuropathy, injury to liver/kidney, risk of anemia/thrombocytopenia. consented 3/28/22  -s/p 1 cycle Carbo/Etop D1 4/5/22, developed facial flushing and erythematous rash on arms and chest s/p treatment, resolved with benadryl  -opted to forego further chemotherapy and will continue on maintenance  - s/p PCI 6/2022  - CT C/A/P and brain MRI 5/2022 and most recently 8/2022 with good response and no disease  -  MRI brain 11/7 without evidence of metastatic disease  - CT C/A/P 11/3 with multiple new enlarged LN concerning for disease progression - discussed with   Skip not able to repeat radiation.  - referred to phase 1 clinical trial and consideration for [id number] or [id number]  - 1.5.2023 : Patient signed consent to take part on phase 1 study [id number]. Look clinically good to take part in study. Will start on study ASAP if eligible.   - 1.24.2023: Seen today and ready to start trial on [id number].  - 1.31.2023: Seen today, ready for C1D8 AMGN 1518   - 2.7.2023: Seen today for C1D15 [id number] - continue with trial   - 2.14.2023: Seen in follow-up on [id number] with overall worsening of general health  - 2.21.2023: Seen today for C2D1 AMGN 1518 with continued weight loss although perhaps starting to plateau, more energy since being off treatment, still poor appetite. will delay by 1 week, started dexamethasone 2 mg daily, and consider dose reduction.  - 2.28.2023: Seen today for C2D1 AMGN 1518 after delaying for 1 week. Energy level and appetite are improved, although still losing a little weight. Confusion is worse today, possibly due to mirtazapine vs study-related. Will dose reduce today.  - 3.7.2023: Seen today C2D8 AMGN 1518 after dose-reduction last week. Confusion is mildly improved, energy level and appetite are stable. Will add marinol for appetite.   - 3.14.2023: Seen today C2D15 AMGN 1518. Overall improved: confusion continues to improve, energy level and appetite are improved. Weight is improved. Will continue dexamethasone and marinol.  - 3.15.2023: C2D16 No CRS symptoms observed after last treatment Overall Symptoms are improving and she is tolerating treatment.  -3.16.2023: C2D17 No CRS symptoms observed after last treatment Overall Symptoms are improving and she is tolerating treatment.  - 3.28.2023 : Most recent imaging suggest patient benefiting from current treatment. Ongoing symptom  possible common cold/season allergies. Since the patient looks clinically good we will proceed with C3D1 today. Educated patient to call the office ASAP if symptoms worsen.   -  4.11.2023: C3D15 Feeling well and overall improved with fatigue, confusion, anorexia. Proceed at current dosing and weaning steroids as below  -4.25.2023: C4D1 Pt is feeling well, no complaints of fatigue, confusion, memory loss. Has gained weight. Energy levels are good. Proceed with the current dosing. Pt is no longer on steroids.   -5.9.2023: C4D15. Pt is tolerating treatment well with no new complains. Continues to have good energy level endorses poor appetite with out any weight loss. Suggested to start taking the dronabinol.  Will proceed with treatment today.  -5.23.2023: C5D1. Pt is tolerating treatment well. Energy levels are good, is having some weight loss and constipation. Started back on dex for appetite. Pt will let us know if she remains constipated over the next several days.  Personally reviewed scans with stable disease. Will proceed with treatment today.   -6.6.2023: C5D15. Continues to tolerate treatment well. Since we restarted her on Dexamethasone she reports her energy levels and appetite has improved. Will continue on low dose Dex. With no new symptoms, we will proceed with treatment today.   -6.20.2023: C6D1. Doing well on dexamethasone 1 mg daily. Will proceed with treatment.  -7.5.2023: C6D15. We will proceed with treatment since the patient is tolerating treatment with no significant AE's and also give 1/2 liter of IV fluids.   - 7.18.2023: C7D1. Doing well, will proceed with treatment. She is out of dexamethasone, and will monitor off steroids.   -8.1.2023: C7D15. Continues to tolerate treatment. Will proceed with C7D15 today. RTC per protocol.  - 8.29.2023: C8D1. C8 delayed due to hospitalization for diverticulitis. Has completed antibiotics and feeling well for treatment.  - 9.12.2023: C8D15. Most recent imgaing suggest the patient continues to benefit from current treatment. The imaging also suggest improvement of previously visualized segmental colitis associated with diverticulosis  affecting the sigmoid colon with minimal residual pericolonic fat standing and hyperemia of the sigmoid colon. Imaging also suggest resolving inflammatory process without evidence of new or worsening complications. We will proceed with C8D15 today since the patient is also clinically feeling good. RTC per protocol.  -9.26.2023: C9D1. Patient looks clinically good. With no JACQUELYN's we will proceed with treatment C9D1 today.  -10.24.23: C10D1. Pt feels well, no acute events, no TRAEs, continue treatment today as scheduled.  -11.7.23: C10D15. Pt feels well, no TRAEs, continue treatment as scheduled.  - 11.21.23: C11D1. Continues to feel well, will continue treatment today.  - 12.05.23: C11D15. Continues to feel good and tolerating treatment without any new JACQUELYN's. We will proceed with treatment today. Has a follow up scan next week.     # Confusion - resolved  - significantly worsened after taking double dose of mirtazapine accidentally, although has been generally down-trending since starting study (which is also when she started taking mirtazapine) and now resolved  - brain MRI without progression of cancer  - will continue off mirtazapine    # Unintentional weight loss - stable  # Anorexia- Improving  # Dysgeusia- improving  - all improved on steroids. unclear if this is from cancer or side effect of study drug  - stopped mirtazapine due to concerns for confusion   - weaned off dexamethasone and started marinol, but kept forgetting to take it  - dexamethasone trial started again on 5.23.2023. Continued on 1 mg daily - will trial off after 7.18.23.     # Fatigue - Resolved  - back to normal pretty much, weaning steroids as above    # frequent PVCs - asymptomatic  - saw onco-cardiology and completed 24-hr Holter monitor  - recommended decreasing caffeine and sudafed intake  - continue to monitor    #hyponatremia - improving  - improved recently, will monitor    # Forgetfulness - improved - however noted decline on AMGN  study- unclear etiology.    - started after PCI, on memantine daily and has since stopped  - offered neurocognitive evaluation previously and she will think about this and readdress next visit  -  notes some decline in last 3 weeks- especially short term memory loss.    - Improved    # Neuropathy - resolved  - mild peripheral neuropathy with numbness of the toes - likely due to cisplatin  - will continue to monitor closely  - not endorsing any neuropathy at this visit     # Rash - resolved  Waxing and waning rash throughout her body. ?improving. Unclear etiology, patient and  feel timing coincided with starting BMX.   - she will hold off on further BMX  - thought to be due to sulfa allergy, possibly due to platinum agents   - continue to monitor    #odynophagia - resolved  -rad onc aware  -prescribed BMX solution  -will continue to monitor    #constipation-improving  -occasional. Prescribed Senna S  -will monitor    CASEY Stone-CNP

## 2023-12-05 NOTE — RESEARCH NOTES
RSH><[id number]><C5+D15><PARTSA1&A2>    DCRU NURSING VISIT NOTE  Study Name: ALEXANDRU Foote8  IRB#: [id number]  [id number]  Protocol Version Dated: 03.22.23  PI: Roshan Kumar MD.    Time point: Cycle 5 and beyond - Day 15  CYCLE 11     Encounter Date: 12/05/2023  Encounter Time:  8:30 AM EST  Encounter Department: [facility] HEMATOLOGY AND ONCOLOGY     #1     Phone Pager   Carmen Rios -[phone]    #2 Phone Pager        Other Phone Pager          Study Regimen and Dosing   Refer to [place] Treatment Plan for orders  Part A1 Dose Exploration & A2 Dose Expansion - Small Cell Lung Cancer Relapsed or Refractory patients who progressed or recurred following platinum-based chemotherapy will receive [id number] to evaluate safety, tolerability and determine the maximum tolerated dose (MTD) or recommended phase 2 dose (RP2D).  Cycle = 28 days  TARLATAMAB () administered IV Day 1 and Day 15.     Dietary Guidelines   Regular diet:     Admission and Prior to Starting Study Activities   Notify  when patient arrives to unit.  Complete DCRU/Clements intake form in EMR.  Obtain vital signs including Pulse Oximetry after seated or semi-fowlers x 5 mins. Record on flow sheet & in EMR.  Obtain weight in kilograms - with shoes off & heavy items removed.  Insert one peripheral IV line for sample collection procedures (flush line with normal saline following each blood draw). Access mediport (if available) otherwise insert second peripheral line in opposite arm for Investigative drug administration (if peripheral line, flush line with normal saline before & after infusion)  Physical Exam.     Criteria to Treat   DCRU RN reviewed and meets eligibility to proceed with treatment plan   Time team notified: [id number]   DCRU RN notifies study team to review eligibility and approval before dosing procedures  Time team approves: [id number]     Subjective   Gladys Branch  is a 67 y.o. female and is here for a Research clinical visit.    Visit Provider: Laurie, [facility] ROOM 7 [facility]     Allergies:   Allergies   Allergen Reactions   • Cisplatin Other     CHEMO INDUCED (Moderate); Dyspepsia (Moderate); Headaches (Moderate); Hypotension (Moderate); Numbness (Moderate); Resp Distress (Moderate)   • Codeine Nausea Only and Other     nausea   • Sulfa (Sulfonamide Antibiotics) Rash       Objective     Vital Signs:    Vitals:    12/05/23 0722 12/05/23 0725   BP:  107/74   Pulse:  63   Resp:  16   Temp:  36.8 °C (98.2 °F)   TempSrc:  Oral   SpO2:  95%   Weight: 49.9 kg (110 lb 0.2 oz)        Physical Exam     ASSESSMENT and PLAN:  Problem List Items Addressed This Visit       Small cell lung cancer (CMS/HCC)    Relevant Orders    Infusion Appointment Request (Completed)    CBC and Auto Differential (Completed)    Comprehensive metabolic panel    APTT (Completed)    Bilirubin, Direct    CK    C-Reactive Protein    Ferritin    Magnesium    Phosphorus    Protime-INR (Completed)        Medications as of the completion of today's visit:  Current Outpatient Medications   Medication Sig Dispense Refill   • cetirizine (ZyrTEC) 10 mg tablet Take 1 tablet (10 mg) by mouth once daily. (Patient taking differently: Take 1 tablet (10 mg) by mouth once daily. Alternating with Zyrtec D) 30 tablet 11   • cetirizine-pseudoephedrine (ZyrTEC-D) 5-120 mg 12 hr tablet Take 1 tablet by mouth 2 times a day. 60 tablet 11   • cholecalciferol (Vitamin D-3) 25 MCG (1000 UT) tablet Take by mouth.     • dexAMETHasone (Decadron) 2 mg tablet Take 0.5 tablets (1 mg) by mouth once daily.     • dextromethorphan (Delsym) 30 mg/5 mL liquid Take 5 mL (30 mg) by mouth once daily as needed.     • dronabinol (Marinol) 2.5 mg capsule once daily as needed. One hour before dinner     • estrogens, conjugated, (Premarin) 0.3 mg tablet Take 0.5 tablets (0.15 mg) by mouth once daily.     • omeprazole (PriLOSEC) 20 mg DR capsule Take 1 capsule  (20 mg) by mouth once daily.     • ondansetron (Zofran) 8 mg tablet Take 0.5 tablets (4 mg) by mouth every 8 hours if needed.     • prochlorperazine (Compazine) 10 mg tablet Take 0.5 tablets (5 mg) by mouth every 6 hours if needed.     • vit A/vit C/vit E/zinc/copper (PRESERVISION AREDS ORAL) Take by mouth once daily as needed.       No current facility-administered medications for this encounter.           Orders placed during today's visit:  Orders Placed This Encounter   Procedures   • CBC and Auto Differential     Standing Status:   Standing     Number of Occurrences:   1     Order Specific Question:   Release result to MyChart     Answer:   Immediate   • Comprehensive metabolic panel     Standing Status:   Standing     Number of Occurrences:   1     Order Specific Question:   Release result to MyChart     Answer:   Immediate   • APTT     Standing Status:   Standing     Number of Occurrences:   1     Order Specific Question:   Release result to MyChart     Answer:   Immediate [1]   • Bilirubin, Direct     Standing Status:   Standing     Number of Occurrences:   1     Order Specific Question:   Release result to MyChart     Answer:   Immediate [1]   • CK     Standing Status:   Standing     Number of Occurrences:   1     Order Specific Question:   Release result to MyChart     Answer:   Immediate [1]   • C-Reactive Protein     Standing Status:   Standing     Number of Occurrences:   1     Order Specific Question:   Release result to MyChart     Answer:   Immediate [1]   • Ferritin     Standing Status:   Standing     Number of Occurrences:   1     Order Specific Question:   Release result to MyChart     Answer:   Immediate [1]   • Magnesium     Standing Status:   Standing     Number of Occurrences:   1     Order Specific Question:   Release result to MyChart     Answer:   Immediate [1]   • Phosphorus     Standing Status:   Standing     Number of Occurrences:   1     Order Specific Question:   Release result to  IROCKEhart     Answer:   Immediate [1]   • Protime-INR     Standing Status:   Standing     Number of Occurrences:   1     Order Specific Question:   Release result to IROCKEharLOVEThESIGN     Answer:   Immediate [1]        No study found     Study Specific Instructions and Documentation   LABS  PREMEDS  VITALS  STUDY DRUG  VITALS  DISCHARGE     PRE-DOSE Safety Labs   Refer to [facility] Treatment Plan for orders     Day 15 Pre-dose Vital Signs    Temp, HR, Resp, BP, Pulse Oximetry: Seated or Semi-Fowlers x 5 minutes before obtaining  Position and temperature location: should be the same that is used throughout the study-record position    Vitals:    12/05/23 1106   BP: 106/69   Pulse: 61   Resp: 18   Temp: 36.7 °C (98.1 °F)   SpO2: 95%        Research Drug Administration Tarlatamab ()   Refer to [place] Treatment Plan for orders   Administer dose over 60 minutes (+/- 10 mins) followed by a 3 - 5 minute Normal saline flush. (This will be the end time after the flush). Document start time & end of study medication; document start time and end time of the flush.  Participant to remain on Unit for 2 hour post dose observation.      Infusion-Related Reactions    [facility]  Grading and Management of Cytokine Release Syndrome   CRS Grade Description of Severity Minimum Expected Intervention Instructions for Dose Modifications of TARLATAMAB         1 Symptoms are not life threatening and require symptomatic treatment only, eg, fever, nausea, fatigue, headache, myalgias, malaise Administer symptomatic treatment (eg, paracetamol/ acetaminophen for fever). Monitor for CRS symptoms including vital signs and pulse oximetry at least Q2 hours for 12 hours or until resolution,  Whichever is earlier. N/A                             2 Symptoms require and respond to moderate intervention  Oxygen requirement <40%, OR  Hypotension responsive to fluids or low dose of one vasopressor, OR  Grade 2 organ toxicity or grade 3 transaminitis per CTCAE criteria  Administer:  Symptomatic treatment (eg, paracetamol/ acetaminophen for fever)  Supplemental oxygen when oxygen saturation is <90% on room air  Intravenous fluids or low dose vasopressor for hypotension when systolic blood pressure is  <85 mmHg. Persistent tachycardia (eg >120 bpm) may also indicate the need for intervention for hypotension.  Monitor for CRS symptoms including vital signs and pulse oximetry at least Q2 hours for 12 hours or until resolution to CRS grade <= 1, whichever is Earlier.  For subjects with extensive co-morbidities or poor performance status, manage  per grade 3 CRS guidance below If CRS occurs during TARLATAMAB treatment, immediately interrupt the infusion and delay the next TARLATAMAB dose until the event resolves to CRS  grade <= 1 for no less than 72 hours.    Permanently discontinue TARLATAMAB if there is no improvement to CRS  <= grade 1 within 7 days                     3 Symptoms require and respond to aggressive intervention  Oxygen requirement >=40%, OR    Hypotension requiring high dose or multiple vasopressors, OR  Grade 3 organ toxicity or grade 4 transaminitis per CTCAE criteria Admit to intensive care unit for close clinical and vital sign monitoring per institutional Guidelines.  Administer dexamethasone (or equivalent) IV at a dose maximum of 3 doses of 8 mg (24 mg/day). The dose should Then be reduced step-wise.  Investigators should consider use of tocilizumab 8 mg/kg over 1 hour (not to exceed 800mg). Repeat tocilizumab every 8 hours as needed if not responsive to IV fluids or  Increasing supplemental oxygen. Maximum of 3 doses in a 24 hour period. Maximum total of 4 doses If CRS occurs during TARLATAMAB treatment, immediately interrupt TARLATAMAB and delay the next dose until event resolves to CRS grade <= 1 for no less than 72 hours.  Permanently discontinue TARLATAMAB if there is no improvement to CRS  <= grade 2 within 5 days or CRS <= grade 1 within 7  days.  Permanently discontinue TARLATAMAB if CRS grade 3 occurs at the initial run in dose (i.e., at MTD1) (Applicable only after  MTD1 has been defined).                       4 Life-threatening symptoms  Requirement for ventilator support OR  Grade 4 organ toxicity  (excluding transaminitis) per CTCAE criteria Admit to intensive care unit for close clinical and vital sign monitoring per institutional Guidelines.  Administer dexamethasone (or equivalent) IV at a dose maximum of 3 doses of 8 mg (24 mg/day). Further steroid use should be discussed with [employer] medical monitor.  Investigators should consider use of tocilizumab 8 mg/kg IV Over 1 hour (not to exceed 800mg). Repeat tocilizumab every 8 hours as needed if not responsive to IV fluids or increasing supplemental Oxygen. Maximum of 3 doses  In a 24 hour period. Maximum Total of 4 doses. If CRS occurs during TARLATAMAB treatment, Immediately stop the infusion.  Permanently discontinue TARLATAMAB therapy.        Post-Dose Vital Signs   Temp, HR, Resp, BP, Pulse Oximetry: Seated or Semi-Fowlers x 5 minutes before obtaining  Position and temperature location: should be the same that is used throughout the study  End of Infusion    Vitals:    12/05/23 1219   BP: 115/75   Pulse: 59   Resp: 16   Temp: 36.7 °C (98.1 °F)   SpO2: 97%        Day 15 Discharge Instructions   Patient may be discharged after 2 hour observation.     Davey Melchor RN  12/05/23

## 2023-12-06 ENCOUNTER — TELEPHONE (OUTPATIENT)
Dept: PRIMARY CARE | Facility: CLINIC | Age: 68
End: 2023-12-06

## 2023-12-06 ENCOUNTER — APPOINTMENT (OUTPATIENT)
Dept: GASTROENTEROLOGY | Facility: CLINIC | Age: 68
End: 2023-12-06
Payer: MEDICARE

## 2023-12-06 DIAGNOSIS — K21.9 CHRONIC GERD: ICD-10-CM

## 2023-12-06 RX ORDER — OMEPRAZOLE 20 MG/1
20 CAPSULE, DELAYED RELEASE ORAL DAILY
Qty: 30 CAPSULE | Refills: 0 | Status: SHIPPED | OUTPATIENT
Start: 2023-12-06 | End: 2023-12-20 | Stop reason: DRUGHIGH

## 2023-12-06 NOTE — TELEPHONE ENCOUNTER
Rx Refill Request Telephone Encounter    Name:  Gladys Branch  :  [id number]  Medication Name:      omeprazole (PriLOSEC) 20 mg DR capsule [[id number]]  1 cap mg DR capsule taken once daily                  Specific Pharmacy location:  [facility]   Date of last appointment:  2023  Date of next appointment:  2024  Best number to reach patient:  [phone]

## 2023-12-06 NOTE — ADDENDUM NOTE
Encounter addended by: Betsy Hadley RN on: 12/6/2023 9:06 AM   Actions taken: Charge Capture section accepted

## 2023-12-13 ENCOUNTER — HOSPITAL ENCOUNTER (OUTPATIENT)
Dept: RADIOLOGY | Facility: HOSPITAL | Age: 68
Discharge: HOME | End: 2023-12-13
Payer: MEDICARE

## 2023-12-13 DIAGNOSIS — C34.90 SMALL CELL LUNG CANCER (MULTI): ICD-10-CM

## 2023-12-13 PROCEDURE — 74177 CT ABD & PELVIS W/CONTRAST: CPT

## 2023-12-13 PROCEDURE — 2550000001 HC RX 255 CONTRASTS: Performed by: STUDENT IN AN ORGANIZED HEALTH CARE EDUCATION/TRAINING PROGRAM

## 2023-12-13 PROCEDURE — 74177 CT ABD & PELVIS W/CONTRAST: CPT | Performed by: RADIOLOGY

## 2023-12-13 PROCEDURE — 71260 CT THORAX DX C+: CPT | Performed by: RADIOLOGY

## 2023-12-13 RX ADMIN — IOHEXOL 75 ML: 350 INJECTION, SOLUTION INTRAVENOUS at 10:13

## 2023-12-15 DIAGNOSIS — C34.90 SMALL CELL LUNG CANCER (MULTI): Primary | ICD-10-CM

## 2023-12-15 RX ORDER — DIPHENHYDRAMINE HYDROCHLORIDE 50 MG/ML
50 INJECTION INTRAMUSCULAR; INTRAVENOUS AS NEEDED
Status: CANCELLED | OUTPATIENT
Start: 2024-01-02

## 2023-12-15 RX ORDER — ALBUTEROL SULFATE 0.83 MG/ML
3 SOLUTION RESPIRATORY (INHALATION) AS NEEDED
Status: CANCELLED | OUTPATIENT
Start: 2023-12-19

## 2023-12-15 RX ORDER — EPINEPHRINE 0.3 MG/.3ML
0.3 INJECTION SUBCUTANEOUS EVERY 5 MIN PRN
Status: CANCELLED | OUTPATIENT
Start: 2024-01-02

## 2023-12-15 RX ORDER — FAMOTIDINE 10 MG/ML
20 INJECTION INTRAVENOUS ONCE AS NEEDED
Status: CANCELLED | OUTPATIENT
Start: 2024-01-02

## 2023-12-15 RX ORDER — DIPHENHYDRAMINE HYDROCHLORIDE 50 MG/ML
50 INJECTION INTRAMUSCULAR; INTRAVENOUS AS NEEDED
Status: CANCELLED | OUTPATIENT
Start: 2023-12-19

## 2023-12-15 RX ORDER — EPINEPHRINE 0.3 MG/.3ML
0.3 INJECTION SUBCUTANEOUS EVERY 5 MIN PRN
Status: CANCELLED | OUTPATIENT
Start: 2023-12-19

## 2023-12-15 RX ORDER — ALBUTEROL SULFATE 0.83 MG/ML
3 SOLUTION RESPIRATORY (INHALATION) AS NEEDED
Status: CANCELLED | OUTPATIENT
Start: 2024-01-02

## 2023-12-15 RX ORDER — FAMOTIDINE 10 MG/ML
20 INJECTION INTRAVENOUS ONCE AS NEEDED
Status: CANCELLED | OUTPATIENT
Start: 2023-12-19

## 2023-12-19 ENCOUNTER — EDUCATION (OUTPATIENT)
Dept: HEMATOLOGY/ONCOLOGY | Facility: HOSPITAL | Age: 68
End: 2023-12-19
Payer: MEDICARE

## 2023-12-19 ENCOUNTER — OFFICE VISIT (OUTPATIENT)
Dept: HEMATOLOGY/ONCOLOGY | Facility: HOSPITAL | Age: 68
End: 2023-12-19
Payer: MEDICARE

## 2023-12-19 ENCOUNTER — HOSPITAL ENCOUNTER (OUTPATIENT)
Dept: RESEARCH | Facility: HOSPITAL | Age: 68
Discharge: HOME | End: 2023-12-19
Payer: MEDICARE

## 2023-12-19 VITALS
RESPIRATION RATE: 16 BRPM | HEART RATE: 75 BPM | WEIGHT: 110.45 LBS | TEMPERATURE: 98.1 F | SYSTOLIC BLOOD PRESSURE: 101 MMHG | DIASTOLIC BLOOD PRESSURE: 73 MMHG | BODY MASS INDEX: 20.87 KG/M2 | OXYGEN SATURATION: 95 %

## 2023-12-19 DIAGNOSIS — C34.90 SMALL CELL LUNG CANCER (MULTI): ICD-10-CM

## 2023-12-19 DIAGNOSIS — Z00.6 EXAMINATION OF PARTICIPANT IN CLINICAL TRIAL: ICD-10-CM

## 2023-12-19 DIAGNOSIS — C34.90 SMALL CELL LUNG CANCER (MULTI): Primary | ICD-10-CM

## 2023-12-19 DIAGNOSIS — E78.2 MIXED HYPERLIPIDEMIA: ICD-10-CM

## 2023-12-19 DIAGNOSIS — D63.8 ANEMIA, CHRONIC DISEASE: ICD-10-CM

## 2023-12-19 DIAGNOSIS — E55.9 VITAMIN D DEFICIENCY: ICD-10-CM

## 2023-12-19 LAB
ALBUMIN SERPL BCP-MCNC: 3.7 G/DL (ref 3.4–5)
ALP SERPL-CCNC: 49 U/L (ref 33–136)
ALT SERPL W P-5'-P-CCNC: 9 U/L (ref 7–45)
AMYLASE SERPL-CCNC: 30 U/L (ref 29–103)
ANION GAP SERPL CALC-SCNC: 14 MMOL/L (ref 10–20)
APPEARANCE UR: ABNORMAL
APTT PPP: 30 SECONDS (ref 27–38)
AST SERPL W P-5'-P-CCNC: 11 U/L (ref 9–39)
BASOPHILS # BLD AUTO: 0.04 X10*3/UL (ref 0–0.1)
BASOPHILS NFR BLD AUTO: 0.4 %
BILIRUB DIRECT SERPL-MCNC: 0.1 MG/DL (ref 0–0.3)
BILIRUB SERPL-MCNC: 0.7 MG/DL (ref 0–1.2)
BILIRUB UR STRIP.AUTO-MCNC: NEGATIVE MG/DL
BUN SERPL-MCNC: 22 MG/DL (ref 6–23)
CALCIUM SERPL-MCNC: 9.1 MG/DL (ref 8.6–10.6)
CAOX CRY #/AREA UR COMP ASSIST: NORMAL /HPF
CHLORIDE SERPL-SCNC: 106 MMOL/L (ref 98–107)
CK SERPL-CCNC: 27 U/L (ref 0–215)
CO2 SERPL-SCNC: 24 MMOL/L (ref 21–32)
COLOR UR: ABNORMAL
CORTIS SERPL-MCNC: 0.6 UG/DL (ref 2.5–20)
CREAT SERPL-MCNC: 0.77 MG/DL (ref 0.5–1.05)
CRP SERPL-MCNC: 8.22 MG/DL
EOSINOPHIL # BLD AUTO: 0.13 X10*3/UL (ref 0–0.7)
EOSINOPHIL NFR BLD AUTO: 1.2 %
ERYTHROCYTE [DISTWIDTH] IN BLOOD BY AUTOMATED COUNT: 13.6 % (ref 11.5–14.5)
ESTRADIOL SERPL-MCNC: <19 PG/ML
FERRITIN SERPL-MCNC: 163 NG/ML (ref 8–150)
FSH SERPL-ACNC: 1 IU/L
GFR SERPL CREATININE-BSD FRML MDRD: 84 ML/MIN/1.73M*2
GLUCOSE SERPL-MCNC: 112 MG/DL (ref 74–99)
GLUCOSE UR STRIP.AUTO-MCNC: NEGATIVE MG/DL
HCT VFR BLD AUTO: 35.8 % (ref 36–46)
HGB BLD-MCNC: 12.1 G/DL (ref 12–16)
HOLD SPECIMEN: NORMAL
IMM GRANULOCYTES # BLD AUTO: 0.09 X10*3/UL (ref 0–0.7)
IMM GRANULOCYTES NFR BLD AUTO: 0.8 % (ref 0–0.9)
INR PPP: 1.1 (ref 0.9–1.1)
KETONES UR STRIP.AUTO-MCNC: ABNORMAL MG/DL
LEUKOCYTE ESTERASE UR QL STRIP.AUTO: NEGATIVE
LH SERPL-ACNC: <0.1 IU/L
LIPASE SERPL-CCNC: 18 U/L (ref 9–82)
LYMPHOCYTES # BLD AUTO: 1.55 X10*3/UL (ref 1.2–4.8)
LYMPHOCYTES NFR BLD AUTO: 13.9 %
MAGNESIUM SERPL-MCNC: 1.99 MG/DL (ref 1.6–2.4)
MCH RBC QN AUTO: 31.6 PG (ref 26–34)
MCHC RBC AUTO-ENTMCNC: 33.8 G/DL (ref 32–36)
MCV RBC AUTO: 94 FL (ref 80–100)
MONOCYTES # BLD AUTO: 0.9 X10*3/UL (ref 0.1–1)
MONOCYTES NFR BLD AUTO: 8.1 %
MUCOUS THREADS #/AREA URNS AUTO: NORMAL /LPF
NEUTROPHILS # BLD AUTO: 8.45 X10*3/UL (ref 1.2–7.7)
NEUTROPHILS NFR BLD AUTO: 75.6 %
NITRITE UR QL STRIP.AUTO: NEGATIVE
NRBC BLD-RTO: 0 /100 WBCS (ref 0–0)
PH UR STRIP.AUTO: 5 [PH]
PHOSPHATE SERPL-MCNC: 3.8 MG/DL (ref 2.5–4.9)
PLATELET # BLD AUTO: 230 X10*3/UL (ref 150–450)
POTASSIUM SERPL-SCNC: 3.7 MMOL/L (ref 3.5–5.3)
PROT SERPL-MCNC: 6.3 G/DL (ref 6.4–8.2)
PROT UR STRIP.AUTO-MCNC: ABNORMAL MG/DL
PROTHROMBIN TIME: 11.9 SECONDS (ref 9.8–12.8)
RBC # BLD AUTO: 3.83 X10*6/UL (ref 4–5.2)
RBC # UR STRIP.AUTO: NEGATIVE /UL
RBC #/AREA URNS AUTO: NORMAL /HPF
SODIUM SERPL-SCNC: 140 MMOL/L (ref 136–145)
SP GR UR STRIP.AUTO: 1.03
SQUAMOUS #/AREA URNS AUTO: NORMAL /HPF
T4 FREE SERPL-MCNC: 0.76 NG/DL (ref 0.78–1.48)
TSH SERPL-ACNC: 2.65 MIU/L (ref 0.44–3.98)
UROBILINOGEN UR STRIP.AUTO-MCNC: 4 MG/DL
WBC # BLD AUTO: 11.2 X10*3/UL (ref 4.4–11.3)
WBC #/AREA URNS AUTO: NORMAL /HPF

## 2023-12-19 PROCEDURE — 82607 VITAMIN B-12: CPT | Performed by: FAMILY MEDICINE

## 2023-12-19 PROCEDURE — 83690 ASSAY OF LIPASE: CPT | Performed by: STUDENT IN AN ORGANIZED HEALTH CARE EDUCATION/TRAINING PROGRAM

## 2023-12-19 PROCEDURE — 3008F BODY MASS INDEX DOCD: CPT | Performed by: STUDENT IN AN ORGANIZED HEALTH CARE EDUCATION/TRAINING PROGRAM

## 2023-12-19 PROCEDURE — 81001 URINALYSIS AUTO W/SCOPE: CPT | Performed by: STUDENT IN AN ORGANIZED HEALTH CARE EDUCATION/TRAINING PROGRAM

## 2023-12-19 PROCEDURE — 82550 ASSAY OF CK (CPK): CPT | Performed by: STUDENT IN AN ORGANIZED HEALTH CARE EDUCATION/TRAINING PROGRAM

## 2023-12-19 PROCEDURE — 82248 BILIRUBIN DIRECT: CPT | Performed by: STUDENT IN AN ORGANIZED HEALTH CARE EDUCATION/TRAINING PROGRAM

## 2023-12-19 PROCEDURE — 86140 C-REACTIVE PROTEIN: CPT | Performed by: STUDENT IN AN ORGANIZED HEALTH CARE EDUCATION/TRAINING PROGRAM

## 2023-12-19 PROCEDURE — 85730 THROMBOPLASTIN TIME PARTIAL: CPT | Performed by: STUDENT IN AN ORGANIZED HEALTH CARE EDUCATION/TRAINING PROGRAM

## 2023-12-19 PROCEDURE — 1036F TOBACCO NON-USER: CPT | Performed by: STUDENT IN AN ORGANIZED HEALTH CARE EDUCATION/TRAINING PROGRAM

## 2023-12-19 PROCEDURE — 36415 COLL VENOUS BLD VENIPUNCTURE: CPT | Performed by: STUDENT IN AN ORGANIZED HEALTH CARE EDUCATION/TRAINING PROGRAM

## 2023-12-19 PROCEDURE — 1160F RVW MEDS BY RX/DR IN RCRD: CPT | Performed by: STUDENT IN AN ORGANIZED HEALTH CARE EDUCATION/TRAINING PROGRAM

## 2023-12-19 PROCEDURE — 80053 COMPREHEN METABOLIC PANEL: CPT | Performed by: STUDENT IN AN ORGANIZED HEALTH CARE EDUCATION/TRAINING PROGRAM

## 2023-12-19 PROCEDURE — 99214 OFFICE O/P EST MOD 30 MIN: CPT | Mod: 25 | Performed by: STUDENT IN AN ORGANIZED HEALTH CARE EDUCATION/TRAINING PROGRAM

## 2023-12-19 PROCEDURE — 82024 ASSAY OF ACTH: CPT | Performed by: STUDENT IN AN ORGANIZED HEALTH CARE EDUCATION/TRAINING PROGRAM

## 2023-12-19 PROCEDURE — 82533 TOTAL CORTISOL: CPT | Performed by: STUDENT IN AN ORGANIZED HEALTH CARE EDUCATION/TRAINING PROGRAM

## 2023-12-19 PROCEDURE — 85025 COMPLETE CBC W/AUTO DIFF WBC: CPT | Performed by: STUDENT IN AN ORGANIZED HEALTH CARE EDUCATION/TRAINING PROGRAM

## 2023-12-19 PROCEDURE — 96413 CHEMO IV INFUSION 1 HR: CPT

## 2023-12-19 PROCEDURE — 85610 PROTHROMBIN TIME: CPT | Performed by: STUDENT IN AN ORGANIZED HEALTH CARE EDUCATION/TRAINING PROGRAM

## 2023-12-19 PROCEDURE — 84100 ASSAY OF PHOSPHORUS: CPT | Performed by: STUDENT IN AN ORGANIZED HEALTH CARE EDUCATION/TRAINING PROGRAM

## 2023-12-19 PROCEDURE — 82150 ASSAY OF AMYLASE: CPT | Performed by: STUDENT IN AN ORGANIZED HEALTH CARE EDUCATION/TRAINING PROGRAM

## 2023-12-19 PROCEDURE — 1126F AMNT PAIN NOTED NONE PRSNT: CPT | Performed by: STUDENT IN AN ORGANIZED HEALTH CARE EDUCATION/TRAINING PROGRAM

## 2023-12-19 PROCEDURE — 83001 ASSAY OF GONADOTROPIN (FSH): CPT | Performed by: STUDENT IN AN ORGANIZED HEALTH CARE EDUCATION/TRAINING PROGRAM

## 2023-12-19 PROCEDURE — 84439 ASSAY OF FREE THYROXINE: CPT | Performed by: STUDENT IN AN ORGANIZED HEALTH CARE EDUCATION/TRAINING PROGRAM

## 2023-12-19 PROCEDURE — 1159F MED LIST DOCD IN RCRD: CPT | Performed by: STUDENT IN AN ORGANIZED HEALTH CARE EDUCATION/TRAINING PROGRAM

## 2023-12-19 PROCEDURE — 83718 ASSAY OF LIPOPROTEIN: CPT | Performed by: FAMILY MEDICINE

## 2023-12-19 PROCEDURE — 99214 OFFICE O/P EST MOD 30 MIN: CPT | Performed by: STUDENT IN AN ORGANIZED HEALTH CARE EDUCATION/TRAINING PROGRAM

## 2023-12-19 PROCEDURE — 82728 ASSAY OF FERRITIN: CPT | Performed by: STUDENT IN AN ORGANIZED HEALTH CARE EDUCATION/TRAINING PROGRAM

## 2023-12-19 PROCEDURE — 83735 ASSAY OF MAGNESIUM: CPT | Performed by: STUDENT IN AN ORGANIZED HEALTH CARE EDUCATION/TRAINING PROGRAM

## 2023-12-19 PROCEDURE — 2500000005 HC RX 250 GENERAL PHARMACY W/O HCPCS: Performed by: STUDENT IN AN ORGANIZED HEALTH CARE EDUCATION/TRAINING PROGRAM

## 2023-12-19 PROCEDURE — 82670 ASSAY OF TOTAL ESTRADIOL: CPT | Performed by: STUDENT IN AN ORGANIZED HEALTH CARE EDUCATION/TRAINING PROGRAM

## 2023-12-19 PROCEDURE — 82306 VITAMIN D 25 HYDROXY: CPT | Performed by: FAMILY MEDICINE

## 2023-12-19 PROCEDURE — 84443 ASSAY THYROID STIM HORMONE: CPT | Performed by: STUDENT IN AN ORGANIZED HEALTH CARE EDUCATION/TRAINING PROGRAM

## 2023-12-19 RX ORDER — DIPHENHYDRAMINE HYDROCHLORIDE 50 MG/ML
50 INJECTION INTRAMUSCULAR; INTRAVENOUS AS NEEDED
Status: DISCONTINUED | OUTPATIENT
Start: 2023-12-19 | End: 2023-12-20 | Stop reason: HOSPADM

## 2023-12-19 RX ORDER — HEPARIN 100 UNIT/ML
500 SYRINGE INTRAVENOUS AS NEEDED
Status: CANCELLED | OUTPATIENT
Start: 2023-12-19

## 2023-12-19 RX ORDER — ALBUTEROL SULFATE 0.83 MG/ML
3 SOLUTION RESPIRATORY (INHALATION) AS NEEDED
Status: DISCONTINUED | OUTPATIENT
Start: 2023-12-19 | End: 2023-12-20 | Stop reason: HOSPADM

## 2023-12-19 RX ORDER — HEPARIN SODIUM,PORCINE/PF 10 UNIT/ML
50 SYRINGE (ML) INTRAVENOUS AS NEEDED
Status: CANCELLED | OUTPATIENT
Start: 2023-12-19

## 2023-12-19 RX ORDER — HEPARIN 100 UNIT/ML
500 SYRINGE INTRAVENOUS AS NEEDED
Status: DISCONTINUED | OUTPATIENT
Start: 2023-12-19 | End: 2023-12-20 | Stop reason: HOSPADM

## 2023-12-19 RX ORDER — HEPARIN SODIUM,PORCINE/PF 10 UNIT/ML
50 SYRINGE (ML) INTRAVENOUS AS NEEDED
Status: DISCONTINUED | OUTPATIENT
Start: 2023-12-19 | End: 2023-12-20 | Stop reason: HOSPADM

## 2023-12-19 RX ORDER — FAMOTIDINE 10 MG/ML
20 INJECTION INTRAVENOUS ONCE AS NEEDED
Status: DISCONTINUED | OUTPATIENT
Start: 2023-12-19 | End: 2023-12-20 | Stop reason: HOSPADM

## 2023-12-19 RX ORDER — EPINEPHRINE 1 MG/ML
0.3 INJECTION INTRAMUSCULAR; INTRAVENOUS; SUBCUTANEOUS EVERY 5 MIN PRN
Status: DISCONTINUED | OUTPATIENT
Start: 2023-12-19 | End: 2023-12-20 | Stop reason: HOSPADM

## 2023-12-19 RX ADMIN — Medication 3 MG: at 09:55

## 2023-12-19 NOTE — RESEARCH NOTES
Research Note Treatment Day    Gladys Branch is here today for treatment on [id number]. Today is C12D1. Procedures completed per protocol. AE's and con-meds reviewed with patient. Patient is aware of treatment plan.    [x]   Received treatment as planned   OR  []    Treatment delayed; patient calendar updated as required   Treatment delayed because:    []   AE    []   Physician Discretion    []   Clinical Deterioration or Progression     []   Other    Education Documentation  Changes in Appetite, taught by Vladimir Fernandez RN at 12/19/2023 10:44 AM.  Learner: Significant Other, Patient  Readiness: Eager  Method: Explanation  Response: Verbalizes Understanding    General Medication Information, taught by Vladimir Fernandez RN at 12/19/2023 10:44 AM.  Learner: Significant Other, Patient  Readiness: Eager  Method: Explanation  Response: Verbalizes Understanding    Treatment Plan and Schedule, taught by Vladimir Fernandez RN at 12/19/2023 10:44 AM.  Learner: Significant Other, Patient  Readiness: Eager  Method: Explanation  Response: Verbalizes Understanding    Nutrition, taught by Vladimir Fernandez RN at 12/19/2023 10:44 AM.  Learner: Significant Other, Patient  Readiness: Eager  Method: Explanation  Response: Verbalizes Understanding    Diagnostic Studies, taught by Vladimir Fernandez RN at 12/19/2023 10:44 AM.  Learner: Significant Other, Patient  Readiness: Eager  Method: Explanation  Response: Verbalizes Understanding    Education Comments  No comments found.

## 2023-12-19 NOTE — SIGNIFICANT EVENT
12/19/23 0940   Prechemo Checklist   Has the patient been in the hospital, ED, or urgent care since last date of service Yes   Chemo/Immuno Consent Signed Yes   Confirmed to previous date/time of medication Yes   Compared to previous dose Yes   All medications are dated accurately Yes   Pregnancy Test Negative Not applicable   Parameters Met Yes   BSA/Weight-Height Verified Yes   Dose Calculations Verified Yes

## 2023-12-19 NOTE — RESEARCH NOTES
RSH><[id number]><C5+D1><PARTSA1&A2>    DCRU NURSING VISIT NOTE  Study Name: ALEXANDRU Foote8  IRB#: [id number]  DCRU#: [id number]  Protocol Version Dated: 03.22.23  PI: Roshan Kumar MD.    Time point: Cycle 5 and beyond - Day 1  CYCLE 12     Encounter Date: 12/19/2023  Encounter Time:  8:00 AM EST  Encounter Department: [facility]     #1     Phone Pager   Carmen Rios -[phone]    #2 Phone Pager        Other Phone Pager          Study Regimen and Dosing   Refer to [facility] Treatment Plan for orders  Part A1 Dose Exploration & A2 Dose Expansion - Small Cell Lung Cancer Relapsed or Refractory patients who progressed or recurred following platinum-based chemotherapy will receive AMG-757 to evaluate safety, tolerability and determine the maximum tolerated dose (MTD) or recommended phase 2 dose (RP2D).  Cycle = 28 days  TARLATAMAB () administered IV Day 1 and Day 15.     Dietary Guidelines   Regular diet:     Admission and Prior to Starting Study Activities   Notify  when patient arrives to unit.  Complete DCRU/[facility] intake form in EMR.  Obtain vital signs including Pulse Oximetry after seated or semi-fowlers x 5 mins. Record on flow sheet & in EMR. (Done @ 0728)  Obtain weight in kilograms - with shoes off & heavy items removed. (Done @ 0728)  Insert one peripheral IV line for sample collection procedures (flush line with normal saline following each blood draw). Access mediport (if available) otherwise insert second peripheral line in opposite arm for Investigative drug administration (if peripheral line, flush line with normal saline before & after infusion)  Physical Exam.     Criteria to Treat   DCRU RN reviewed and meets eligibility to proceed with treatment plan   Time team notified: 0858 am  DCRU RN notifies study team to review eligibility and approval before dosing procedures  Time team approves: 0858 am      Subjective   Gladys Branch is a 68 y.o. female and is here for a Research clinical visit.    Visit Provider: Sudha, [facility] ROOM 9 [facility]     Allergies:   Allergies   Allergen Reactions    Cisplatin Other     CHEMO INDUCED (Moderate); Dyspepsia (Moderate); Headaches (Moderate); Hypotension (Moderate); Numbness (Moderate); Resp Distress (Moderate)    Codeine Nausea Only and Other     nausea    Sulfa (Sulfonamide Antibiotics) Rash       Objective     Vital Signs:    Vitals:    12/19/23 0728 12/19/23 0949 12/19/23 1059   BP: 110/71 109/72 101/73   Pulse: 71 71 75   Resp: 18 16 16   Temp: 36.9 °C (98.5 °F) 36.6 °C (97.9 °F) 36.7 °C (98.1 °F)   TempSrc: Oral Oral Oral   SpO2: 95% 96% 95%   Weight: 50.1 kg (110 lb 7.2 oz)         Physical Exam     ASSESSMENT and PLAN:  Problem List Items Addressed This Visit          Hematology and Neoplasia    Small cell lung cancer (CMS/HCC)    Relevant Medications    heparin flush 10 unit/mL syringe 50 Units    heparin flush 100 unit/mL syringe 500 Units    alteplase (Cathflo Activase) injection 2 mg    Study [id number] Tarlatamab (AMG-757) 3 mg in sodium chloride 0.9% 250 mL IV (Completed)    sodium chloride 0.9 % bolus 500 mL    dextrose 5 % in water (D5W) bolus    diphenhydrAMINE (BENADryl) injection 50 mg    methylPREDNISolone sod succinate (PF) (SOLU-Medrol) 40 mg/mL injection 40 mg    famotidine PF (Pepcid) injection 20 mg    EPINEPHrine (Adrenalin) injection 0.3 mg    albuterol 2.5 mg /3 mL (0.083 %) nebulizer solution 3 mL    Other Relevant Orders    Nursing Communication - Vascular Access (Completed)    Venous Access, CVAD    CENTRAL VENOUS LINE DRESSING CHANGE - ADULT    Insert peripheral IV    Convert IV to saline lock    Treatment Conditions (Completed)    Provider Communication - [id number] (Completed)    CBC and Auto Differential (Completed)    Comprehensive metabolic panel (Completed)    Acth    Amylase (Completed)    APTT (Completed)    Bilirubin, Direct (Completed)     CK (Completed)    Cortisol (Completed)    C-Reactive Protein (Completed)    Estradiol (Completed)    Ferritin (Completed)    FSH & LH (Completed)    Lipase (Completed)    Magnesium (Completed)    Phosphorus (Completed)    Protime-INR (Completed)    TSH (Completed)    T4, free (Completed)    Urinalysis with Reflex Microscopic (Completed)    Microscopic Only, Urine (Completed)    Research Communication (Completed)    Research Triplicate ECG (Completed)    Research Triplicate ECG (Completed)    Nursing Communication - Hypersensitivity Management, Moderate (Completed)    Nursing Communication - Hypersensitivity Management, Severe (Completed)    Nursing Communication - Respiratory Management (Completed)    Pulse oximetry, continuous (Completed)    Research collection: 2.5mL Red SST - Research Collect (Completed)    Research collection: 2.5mL Red SST - Research Collect (Completed)    Research collection: 2.5mL Red SST - Research Collect (Completed)        Medications as of the completion of today's visit:  Current Outpatient Medications   Medication Sig Dispense Refill    cetirizine (ZyrTEC) 10 mg tablet Take 1 tablet (10 mg) by mouth once daily. (Patient taking differently: Take 1 tablet (10 mg) by mouth once daily. Alternating with Zyrtec D) 30 tablet 11    cetirizine-pseudoephedrine (ZyrTEC-D) 5-120 mg 12 hr tablet Take 1 tablet by mouth 2 times a day. 60 tablet 11    cholecalciferol (Vitamin D-3) 25 MCG (1000 UT) tablet Take by mouth.      dexAMETHasone (Decadron) 2 mg tablet Take 0.5 tablets (1 mg) by mouth once daily.      dextromethorphan (Delsym) 30 mg/5 mL liquid Take 5 mL (30 mg) by mouth once daily as needed.      dronabinol (Marinol) 2.5 mg capsule once daily as needed. One hour before dinner      estrogens, conjugated, (Premarin) 0.3 mg tablet Take 0.5 tablets (0.15 mg) by mouth once daily.      omeprazole (PriLOSEC) 20 mg DR capsule Take 1 capsule (20 mg) by mouth once daily. 30 capsule 0    ondansetron  (Zofran) 8 mg tablet Take 0.5 tablets (4 mg) by mouth every 8 hours if needed.      prochlorperazine (Compazine) 10 mg tablet Take 0.5 tablets (5 mg) by mouth every 6 hours if needed.      vit A/vit C/vit E/zinc/copper (PRESERVISION AREDS ORAL) Take by mouth once daily as needed.       Current Facility-Administered Medications   Medication Dose Route Frequency Provider Last Rate Last Admin    albuterol 2.5 mg /3 mL (0.083 %) nebulizer solution 3 mL  3 mL nebulization PRN Francy Archer MD        alteplase (Cathflo Activase) injection 2 mg  2 mg intra-catheter PRN RUPINDER Stone        dextrose 5 % in water (D5W) bolus  500 mL intravenous PRN Francy Archer MD        diphenhydrAMINE (BENADryl) injection 50 mg  50 mg intravenous PRN Francy Archer MD        EPINEPHrine (Adrenalin) injection 0.3 mg  0.3 mg intramuscular q5 min PRN Francy Archer MD        famotidine PF (Pepcid) injection 20 mg  20 mg intravenous Once PRN Francy Archer MD        heparin flush 10 unit/mL syringe 50 Units  50 Units intra-catheter PRN RUPINDER Stone        heparin flush 100 unit/mL syringe 500 Units  500 Units intra-catheter PRN RUPINDER Stone        methylPREDNISolone sod succinate (PF) (SOLU-Medrol) 40 mg/mL injection 40 mg  40 mg intravenous PRN Francy Archer MD        sodium chloride 0.9 % bolus 500 mL  500 mL intravenous PRN Francy Archer MD           Administrations This Visit       Study [id number] Tarlatamab (AMG-757) 3 mg in sodium chloride 0.9% 250 mL IV       Admin Date  12/19/2023 Action  New Bag Dose  3 mg Route  intravenous Administered By  Catie Hansen RN                    Orders placed during today's visit:  Orders Placed This Encounter   Procedures    CBC and Auto Differential     Standing Status:   Standing     Number of Occurrences:   1     Order Specific Question:   Release result to [facility]     Answer:   Immediate [1]    Comprehensive metabolic panel     Standing Status:    Standing     Number of Occurrences:   1     Order Specific Question:   Release result to MyChart     Answer:   Immediate [1]    Acth     Standing Status:   Standing     Number of Occurrences:   1     Order Specific Question:   Release result to MyChart     Answer:   Immediate [1]    Amylase     Standing Status:   Standing     Number of Occurrences:   1     Order Specific Question:   Release result to MyChart     Answer:   Immediate [1]    APTT     Standing Status:   Standing     Number of Occurrences:   1     Order Specific Question:   Release result to MyChart     Answer:   Immediate [1]    Bilirubin, Direct     Standing Status:   Standing     Number of Occurrences:   1     Order Specific Question:   Release result to MyChart     Answer:   Immediate [1]    CK     Standing Status:   Standing     Number of Occurrences:   1     Order Specific Question:   Release result to MyChart     Answer:   Immediate [1]    Cortisol     Standing Status:   Standing     Number of Occurrences:   1     Order Specific Question:   Release result to MyChart     Answer:   Immediate [1]    C-Reactive Protein     Standing Status:   Standing     Number of Occurrences:   1     Order Specific Question:   Release result to MyChart     Answer:   Immediate [1]    Estradiol     Standing Status:   Standing     Number of Occurrences:   1     Order Specific Question:   Release result to MyChart     Answer:   Immediate [1]    Ferritin     Standing Status:   Standing     Number of Occurrences:   1     Order Specific Question:   Release result to MyChart     Answer:   Immediate [1]    FSH & LH     Standing Status:   Standing     Number of Occurrences:   1     Order Specific Question:   Release result to MyChart     Answer:   Immediate [1]    Lipase     Standing Status:   Standing     Number of Occurrences:   1     Order Specific Question:   Release result to MyChart     Answer:   Immediate [1]    Magnesium     Standing Status:   Standing     Number of  Occurrences:   1     Order Specific Question:   Release result to MyChart     Answer:   Immediate [1]    Phosphorus     Standing Status:   Standing     Number of Occurrences:   1     Order Specific Question:   Release result to MyChart     Answer:   Immediate [1]    Protime-INR     Standing Status:   Standing     Number of Occurrences:   1     Order Specific Question:   Release result to MyChart     Answer:   Immediate [1]    TSH     Standing Status:   Standing     Number of Occurrences:   1     Order Specific Question:   Release result to MyChart     Answer:   Immediate [1]    T4, free     Standing Status:   Standing     Number of Occurrences:   1     Order Specific Question:   Release result to MyChart     Answer:   Immediate [1]    Urinalysis with Reflex Microscopic     Standing Status:   Standing     Number of Occurrences:   1     Order Specific Question:   Release result to MyChart     Answer:   Immediate [1]    Microscopic Only, Urine     Standing Status:   Standing     Number of Occurrences:   1     Order Specific Question:   Release result to MyChart     Answer:   Immediate [1]    Research collection: 2.5mL Red [employer] - Research Collect     Standing Status:   Standing     Number of Occurrences:   1     Order Specific Question:   Test     Answer:   Anti-Tarlatamab Antibody     Order Specific Question:   Timepoint     Answer:   Pre-Dose     Order Specific Question:   Pre-Dose     Answer:   Other     Comments:   Within 15 minutes     Order Specific Question:   Temperature     Answer:   Ambient     Order Specific Question:   Invert     Answer:   5x     Order Specific Question:   Optional?     Answer:   No    Research collection: 2.5mL Red SST - Research Collect     Standing Status:   Standing     Number of Occurrences:   1     Order Specific Question:   Test     Answer:   Tarlatamab PK     Order Specific Question:   Timepoint     Answer:   Pre-Dose     Order Specific Question:   Pre-Dose     Answer:   Other      Comments:   Within 15 minutes     Order Specific Question:   Temperature     Answer:   Ambient     Order Specific Question:   Invert     Answer:   5x     Order Specific Question:   Optional?     Answer:   No    Research collection: 2.5mL Red SST - Research Collect     Standing Status:   Standing     Number of Occurrences:   1     Order Specific Question:   Test     Answer:   Serum Markers     Order Specific Question:   Timepoint     Answer:   Pre-Dose     Order Specific Question:   Pre-Dose     Answer:   Other     Comments:   Within 15 minutes     Order Specific Question:   Temperature     Answer:   Ambient     Order Specific Question:   Invert     Answer:   5x     Order Specific Question:   Optional?     Answer:   No    Adult diet     Order Specific Question:   Diet type     Answer:   Regular    Nursing Communication - Vascular Access     Refer to the vascular access device resource page and the following policies for additional information on line insertion, maintenance and removal.    CP-148 - Central Vascular Access Device Insertion, Maintenance, and Removal, Adult  NP-28 - Midline Cathter Maintenance & Removal, Adult  NP-34 - High-flow Catheters, (e.g. Hemodialysis, Apheresis, and Continuous Renal Replacement Therapy [CRRT]), Care and Utilization, Adult  NP-39 - Peripheral Intravenous Catheter (PIV) Insertion, Maintenance, Blood Collection & Removal - Adult     Standing Status:   Standing     Number of Occurrences:   1    CENTRAL VENOUS LINE DRESSING CHANGE - ADULT     Standing Status:   Standing     Number of Occurrences:   1    Treatment Conditions     Hold treatment and notify provider if:   Adverse Event GREATER THAN OR EQUAL TO Grade 3     Standing Status:   Standing     Number of Occurrences:   1    Provider Communication - [id number]      Dose Level 4              Tarlatamab 0.1 mg   Dose Level 5              Tarlatamab 0.3 mg   Dose Level 6              Tarlatamab 1 mg   Dose Level 7               Tarlatamab 3 mg   Dose Level 8              Tarlatamab 10 mg   Dose Level 9              Tarlatamab 30 mg   Dose Level 10              Tarlatamab 100 mg     Standing Status:   Standing     Number of Occurrences:   1    Research Communication     Cohort: 8  Dose Level:  10 mg/IV     Standing Status:   Standing     Number of Occurrences:   1    Research Triplicate ECG     Refer to study SmartPhrase for instructions and documentation of the research triplicate ECG.     Standing Status:   Standing     Number of Occurrences:   1    Research Triplicate ECG     Refer to study SmartPhrase for instructions and documentation of the research triplicate ECG.     Standing Status:   Standing     Number of Occurrences:   1    Nursing Communication - Hypersensitivity Management, Moderate     Flushing, rash, pruritus, dyspnea, chest discomfort, back pain, angioedema, SBP LESS THAN 90 mmHg, and/or change in mental status above or below patient's baseline. In the event of moderate or severe hypersensitivity reaction to any medication:   Stop infusion.  Assess vital signs, pulse oximetry, nursing assessment, and patient complaints.  Administer medications per Hypersensitivity Reaction Medication Protocol.   Notify physician.     Standing Status:   Standing     Number of Occurrences:   1    Nursing Communication - Hypersensitivity Management, Severe     Worsening of moderate reaction symptoms, SBP LESS THAN 80 mmHg, and/or respiratory distress (respirations GREATER THAN 40 breaths per minute, wheezing, life-threatening symptoms). In the event of moderate or severe hypersensitivity reaction to any medication:   Stop infusion.  Assess vital signs, pulse oximetry, nursing assessment, and patient complaints.  Administer medications per Hypersensitivity Reaction Medication Protocol.   Notify physician.     Standing Status:   Standing     Number of Occurrences:   1    Nursing Communication - Respiratory Management     Oxygen via nasal cannula  at 4 L per minute for respiratory rate GREATER THAN OR EQUAL TO to 28 breaths/minute and/or wheezing. Pulse Oximetry continuous monitoring.     Standing Status:   Standing     Number of Occurrences:   1    Pulse oximetry, continuous     Continuous monitoring to maintain SPO2 GREATER THAN 90%     Standing Status:   Standing     Number of Occurrences:   1    Venous Access, CVAD     Standing Status:   Standing     Number of Occurrences:   1    Insert peripheral IV     Obtain venous access for treatment via PIV if no CVAD access or if unable to obtain blood return from CVAD.     Standing Status:   Standing     Number of Occurrences:   1    Convert IV to saline lock     Only if venous access is required over consecutive days. Peripheral catheter can remain in place until completion of last consecutive day infusion, unless IV-related complications are encountered.     Standing Status:   Standing     Number of Occurrences:   1        No study found     Study Specific Instructions and Documentation   LABS  PREMEDS  VITALS  CORREATIVES  STUDY DRUG  VITALS  DISCHARGE     PRE-DOSE Safety Labs   Refer to [facility] Treatment Plan for orders (drawn @ 0745)     Pre-dose Vital Signs   Temp, HR, Resp, BP, Pulse Oximetry: Seated or Semi-Fowlers x 5 minutes before obtaining  Position and temperature location: should be the same that is used throughout the study-record position (done @ 0949)     Pre-dose Research Correlatives  Deliver to DCRU/[facility] lab for processing   Access Type: 22 G PIV Location: rt. AC   Refer to [facility] Treatment Plan for orders   Time point Specimen Test Volume Tube Handling Draw Time   Pre-dose (within 15 mins of  dosing) Anti- Antibody 2.5 mL SST Ambient, Invert 5X 0950    TARLATAMAB PK  (through cycle 12) 2.5 mL SST Ambient, Invert 5X 0950    Serum Markers 2.5 mL SST Ambient, Invert 5X 0950        Research Drug Administration Tarlatamab ()   Refer to [facility] Treatment Plan for orders   Administer dose  over 60 minutes (+/- 10 mins) followed by a 3 - 5 minute Normal saline flush. (This will be the end time after the flush). Document start time & end of study medication; document start time and end time of the flush.  Participant to remain on Unit for 2 hour post dose observation.      Infusion-Related Reactions    [facility]  Grading and Management of Cytokine Release Syndrome   CRS Grade Description of Severity Minimum Expected Intervention Instructions for Dose Modifications of TARLATAMAB         1 Symptoms are not life threatening and require symptomatic treatment only, eg, fever, nausea, fatigue, headache, myalgias, malaise Administer symptomatic treatment (eg, paracetamol/ acetaminophen for fever). Monitor for CRS symptoms including vital signs and pulse oximetry at least Q2 hours for 12 hours or until resolution,  Whichever is earlier. N/A                             2 Symptoms require and respond to moderate intervention  Oxygen requirement <40%, OR  Hypotension responsive to fluids or low dose of one vasopressor, OR  Grade 2 organ toxicity or grade 3 transaminitis per CTCAE criteria Administer:  Symptomatic treatment (eg, paracetamol/ acetaminophen for fever)  Supplemental oxygen when oxygen saturation is <90% on room air  Intravenous fluids or low dose vasopressor for hypotension when systolic blood pressure is  <85 mmHg. Persistent tachycardia (eg >120 bpm) may also indicate the need for intervention for hypotension.  Monitor for CRS symptoms including vital signs and pulse oximetry at least Q2 hours for 12 hours or until resolution to CRS grade <= 1, whichever is Earlier.  For subjects with extensive co-morbidities or poor performance status, manage  per grade 3 CRS guidance below If CRS occurs during TARLATAMAB treatment, immediately interrupt the infusion and delay the next TARLATAMAB dose until the event resolves to CRS  grade <= 1 for no less than 72 hours.    Permanently discontinue TARLATAMAB if there  is no improvement to CRS  <= grade 1 within 7 days                     3 Symptoms require and respond to aggressive intervention  Oxygen requirement >=40%, OR    Hypotension requiring high dose or multiple vasopressors, OR  Grade 3 organ toxicity or grade 4 transaminitis per CTCAE criteria Admit to intensive care unit for close clinical and vital sign monitoring per institutional Guidelines.  Administer dexamethasone (or equivalent) IV at a dose maximum of 3 doses of 8 mg (24 mg/day). The dose should Then be reduced step-wise.  Investigators should consider use of tocilizumab 8 mg/kg over 1 hour (not to exceed 800mg). Repeat tocilizumab every 8 hours as needed if not responsive to IV fluids or  Increasing supplemental oxygen. Maximum of 3 doses in a 24 hour period. Maximum total of 4 doses If CRS occurs during TARLATAMAB treatment, immediately interrupt TARLATAMAB and delay the next dose until event resolves to CRS grade <= 1 for no less than 72 hours.  Permanently discontinue TARLATAMAB if there is no improvement to CRS  <= grade 2 within 5 days or CRS <= grade 1 within 7 days.  Permanently discontinue TARLATAMAB if CRS grade 3 occurs at the initial run in dose (i.e., at MTD1) (Applicable only after  MTD1 has been defined).                       4 Life-threatening symptoms  Requirement for ventilator support OR  Grade 4 organ toxicity  (excluding transaminitis) per CTCAE criteria Admit to intensive care unit for close clinical and vital sign monitoring per institutional Guidelines.  Administer dexamethasone (or equivalent) IV at a dose maximum of 3 doses of 8 mg (24 mg/day). Further steroid use should be discussed with [employer] medical monitor.  Investigators should consider use of tocilizumab 8 mg/kg IV Over 1 hour (not to exceed 800mg). Repeat tocilizumab every 8 hours as needed if not responsive to IV fluids or increasing supplemental Oxygen. Maximum of 3 doses  In a 24 hour period. Maximum Total of 4 doses. If  CRS occurs during TARLATAMAB treatment, Immediately stop the infusion.  Permanently discontinue TARLATAMAB therapy.        Day 1 Post-Dose Vital Signs   Temp, HR, Resp, BP, Pulse Oximetry: Seated or Semi-Fowlers x 5 minutes before obtaining  Position and temperature location: should be the same that is used throughout the study  End of Infusion (done @ 1059)     Discharge Instructions   Patient may be discharged to home after 2 hour observation.  Remind patient to return: Day 15 for treatment & labs     Catie Hansen RN  12/19/23

## 2023-12-19 NOTE — PROGRESS NOTES
Patient ID: Gladys Branch is a 68 y.o. female.    DIAGNOSIS    Small Cell Lung Cancer    By immunostaining, the tumor cells are positive for CAM 5.2, INSM1, and TTF-1, and negative for p40 and LCA. The proliferation index by Ki-67 immunostaining is approximately 90%.  Synaptophysin, Chromogranin and INSM-1 are positive.  AE1/AE3 is negative. NEOGENOMICS, RB protein expression is lost in the tumor cells    STAGING    [id number], limited stage  Recurrence    CURRENT SITES OF DISEASE    RLL, supraclavicular    MOLECULAR GENOMICS      PRIOR THERAPY    Concurrent chemoradiation with Cisplatin/Etoposide every 3 weeks; C1D1 2/15/22, reaction to cisplatin C2D1 and did not receive D2 or D3 etoposide  Carbo/etoposide 4/5/2022 1 cycle c/b rash  PCI 5/21-6/13/22    CURRENT THERAPY    Phase 1 study [id number]  with study drug Taralatamab 1/24/23 - present.    CURRENT ONCOLOGICAL PROBLEMS      HISTORY OF PRESENT ILLNESS    Mrs. Branch is a 65 yo with PMH GERD and COPD who originally was round to have a RLL nodule measuring 11 mm in 4/28/2021 found as part of CT calcium score scan. An ensuing CT chest w/o contrast was done on 5/19/21 which revealed subsolid pulmonary nodules measuring 14 mm in the RLL. She had CT chest w/contrast on 11/29/21 which confirmed stable 14 mm GGO of the RLL and decreased RLL subpleural nodule. She met thoracic surgeon Dr. Farfan, who ordered PET/CT scan done on 12/8/21 which did not reveal any FDG-avid nodules within the lung parenchyma but R hilar nodes with max SUV 8.0. He referred her for bronch, which was done on 1/21/22 by Dr. Mcdaniels. 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 Brain MRI was negative.    She started concurrent chemoradiation with BID radiation with cis/etop 2/15/22, and unfortunately had a reaction to cisplatin on C2D1 with chest pain and hypotension. She was hospitalized with sepsis felt to be due to pneumonia. She trialed carboplatin/etoposide on 4/5/22 with a  resulting rash and opted to forego further chemotherapy as she had completed radiation. Restaging scan 11/3/22 unfortunately with progression with new R hilar lymph node, paratracheal nodes, and increase in size of R supraclavicular mass. She enrolled in [id number] and started C1D1 1/24/23. C1 complicated by anorexia and dysgeusia, and delayed C2 by a week. She has further struggled with fatigue and confusion, which may be related to mirtazapine. Dose reduced for C2 and mirtazapine stopped with improvement in symptoms.     PAST MEDICAL HISTORY    GERD  COPD    SOCIAL HISTORY    Retired - lighting . Lives at home with  and a kitten.  Tobacco: quit smoking 1999. 1 ppd x 25 yrs.  EtOH: wine 1 glass/day  Illicits: none    CURRENT MEDS    Please see med list    ALLERGIES    Codeine, Sulfa drugs, Cisplatin    FAMILY HISTORY    Paternal aunt - breast cancer dx 80s    Subjective      Today 12.19.2023. Patient in clinic with her  for C12D1 for [id number].      She is here with her . She is eating better and she's getting her taste back. She hasn't gotten her sense of smell back though. She meets GI tomorrow. Her energy level is great. Her breathing is ok, but still with some of the hiccuping.   Objective    BSA: There is no height or weight on file to calculate BSA.  LMP  (LMP Unknown)      Physical Exam  Constitutional:       General: She is awake.      Appearance: Normal appearance. She is normal weight.   HENT:      Head: Normocephalic.      Mouth/Throat:      Mouth: Mucous membranes are moist.   Eyes:      Extraocular Movements: Extraocular movements intact.      Conjunctiva/sclera: Conjunctivae normal.      Pupils: Pupils are equal, round, and reactive to light.   Cardiovascular:      Rate and Rhythm: Normal rate and regular rhythm.      Heart sounds: Normal heart sounds, S1 normal and S2 normal.   Pulmonary:      Effort: Pulmonary effort is normal.      Breath sounds: Normal breath  sounds.   Abdominal:      General: Abdomen is flat. Bowel sounds are normal.      Palpations: Abdomen is soft.   Musculoskeletal:      Cervical back: Normal range of motion and neck supple.   Skin:     Comments: No skin rash observed   Neurological:      Mental Status: She is alert.      Cranial Nerves: Cranial nerves 2-12 are intact.      Sensory: Sensation is intact.      Motor: Motor function is intact.      Coordination: Coordination is intact.   Psychiatric:         Attention and Perception: Attention normal.         Mood and Affect: Mood normal.         Speech: Speech normal.         Behavior: Behavior normal. Behavior is cooperative.         Cognition and Memory: Cognition normal.   Labs     Lab Results   Component Value Date    WBC 11.2 12/19/2023    HGB 12.1 12/19/2023    MCV 94 12/19/2023     12/19/2023   ]  Lab Results   Component Value Date    NEUTROABS 8.45 (H) 12/19/2023     Lab Results   Component Value Date    GLUCOSE 112 (H) 12/19/2023    CALCIUM 9.1 12/19/2023     12/19/2023    K 3.7 12/19/2023    CO2 24 12/19/2023     12/19/2023    BUN 22 12/19/2023    CREATININE 0.77 12/19/2023    MG 1.99 12/19/2023     Lab Results   Component Value Date    ALT 9 12/19/2023    AST 11 12/19/2023    ALKPHOS 49 12/19/2023    BILITOT 0.7 12/19/2023    BILIDIR 0.1 12/19/2023     Lab Results   Component Value Date    ACTH 34.4 11/21/2023    CORTISOL 0.6 (L) 12/19/2023    TSH 2.65 12/19/2023    FREET4 0.76 (L) 12/19/2023     Lab Results   Component Value Date     09/26/2023         Performance Status:    ECOG 1: Restricted in physically strenuous activity but ambulatory and able to carry out work of a light or sedentary nature, e.g., light house work, office work.     CT chest abdomen pelvis w IV contrast    Result Date: 12/14/2023  Impression: CHEST: Stable findings including stable irregular areas of linear and nodular thickening in the right midlung anteriorly.   Stable ground-glass area of  nodularity in the right lower lobe.   ABDOMEN-PELVIS: Diverticulosis without definite diverticulitis. No definite metastatic disease to the abdomen or the pelvis.   MACRO: None   Signed by: Micaela Luna 12/14/2023 9:50 AM Dictation workstation:   [id number]       Assessment/Plan      Mrs. Branch is a 66 yo with COPD and GERD who presented with newly diagnosed SCLC completed BID radiation planned concurrently with cis/etoposide but had a reaction C2D1 to cisplatin. Completed 1 cycle carbo/etop D1 4/5/22 also complicated by facial flushing and erythematous rash and stopped further treatment. Now s/p PCI in 6/2022. Most recent CT concerning for disease progression. Referred for clinical trial AMGN 1518, C1D1 1/24/23. Treatment has been complicated by orthostatic hypotension, worsening short-term memory, increased lethargy, weight loss, and poor appetite. Delayed C2 by 1 week and dose-reduced. Confusion has resolved during C3 after stopping mirtazapine and dose reduction.     # SCLC  - limited stage, brain MRI negative  - previously discussed concurrent chemoradiation, with cisplatin/etoposide with side effects including but not limited to allergic reaction, fatigue, risk of infection, tinnitus, neuropathy, injury to liver/kidney, risk of anemia/thrombocytopenia and was consented  - she was also interested in scalp cooling, which unfortunately she is not a candidate for d/t SCLC  - started concurrent chemoradiation BID with Q3week cis/etop on 2/15 at [place]  -unfortunately had reaction to cisplatin on C2D1 resulting in hospitalization and also felt to be septic due to pneumonia  - discussed that we typically do 3-4 cycles chemotherapy, and alternative regimens include carboplatin/etoposide vs CAV. Given reaction to cisplatin, concern for possible reaction to carboplatin as well, although unknown rates of cross reaction. Alternatively, they also asked about discontinuation of chemotherapy, since she completed  radiation. She ultimately decided to trial carbo/etoposide. She is aware of the heightened risk of allergic reaction, as well as other side effects including but not limited to fatigue, risk of infection, neuropathy, injury to liver/kidney, risk of anemia/thrombocytopenia. consented 3/28/22  -s/p 1 cycle Carbo/Etop D1 4/5/22, developed facial flushing and erythematous rash on arms and chest s/p treatment, resolved with benadryl  -opted to forego further chemotherapy and will continue on maintenance  - s/p PCI 6/2022  - CT C/A/P and brain MRI 5/2022 and most recently 8/2022 with good response and no disease  -  MRI brain 11/7 without evidence of metastatic disease  - CT C/A/P 11/3 with multiple new enlarged LN concerning for disease progression - discussed with Dr. Valdivia not able to repeat radiation.  - referred to phase 1 clinical trial and consideration for [id number] or [id number]  - 1.5.2023 : Patient signed consent to take part on phase 1 study [id number]. Look clinically good to take part in study. Will start on study ASAP if eligible.   - 1.24.2023: Seen today and ready to start trial on [id number].  - 1.31.2023: Seen today, ready for C1D8 AMGN 1518   - 2.7.2023: Seen today for C1D15 [id number] - continue with trial   - 2.14.2023: Seen in follow-up on [id number] with overall worsening of general health  - 2.21.2023: Seen today for C2D1 AMGN 1518 with continued weight loss although perhaps starting to plateau, more energy since being off treatment, still poor appetite. will delay by 1 week, started dexamethasone 2 mg daily, and consider dose reduction.  - 2.28.2023: Seen today for C2D1 AMGN 1518 after delaying for 1 week. Energy level and appetite are improved, although still losing a little weight. Confusion is worse today, possibly due to mirtazapine vs study-related. Will dose reduce today.  - 3.7.2023: Seen today C2D8 AMGN 1518 after dose-reduction last week. Confusion is mildly improved, energy level and appetite are  stable. Will add marinol for appetite.   - 3.14.2023: Seen today C2D15 AMGN 1518. Overall improved: confusion continues to improve, energy level and appetite are improved. Weight is improved. Will continue dexamethasone and marinol.  - 3.15.2023: C2D16 No CRS symptoms observed after last treatment Overall Symptoms are improving and she is tolerating treatment.  -3.16.2023: C2D17 No CRS symptoms observed after last treatment Overall Symptoms are improving and she is tolerating treatment.  - 3.28.2023 : Most recent imaging suggest patient benefiting from current treatment. Ongoing symptom  possible common cold/season allergies. Since the patient looks clinically good we will proceed with C3D1 today. Educated patient to call the office ASAP if symptoms worsen.   - 4.11.2023: C3D15 Feeling well and overall improved with fatigue, confusion, anorexia. Proceed at current dosing and weaning steroids as below  -4.25.2023: C4D1 Pt is feeling well, no complaints of fatigue, confusion, memory loss. Has gained weight. Energy levels are good. Proceed with the current dosing. Pt is no longer on steroids.   -5.9.2023: C4D15. Pt is tolerating treatment well with no new complains. Continues to have good energy level endorses poor appetite with out any weight loss. Suggested to start taking the dronabinol.  Will proceed with treatment today.  -5.23.2023: C5D1. Pt is tolerating treatment well. Energy levels are good, is having some weight loss and constipation. Started back on dex for appetite. Pt will let us know if she remains constipated over the next several days.  Personally reviewed scans with stable disease. Will proceed with treatment today.   -6.6.2023: C5D15. Continues to tolerate treatment well. Since we restarted her on Dexamethasone she reports her energy levels and appetite has improved. Will continue on low dose Dex. With no new symptoms, we will proceed with treatment today.   -6.20.2023: C6D1. Doing well on  dexamethasone 1 mg daily. Will proceed with treatment.  -7.5.2023: C6D15. We will proceed with treatment since the patient is tolerating treatment with no significant AE's and also give 1/2 liter of IV fluids.   - 7.18.2023: C7D1. Doing well, will proceed with treatment. She is out of dexamethasone, and will monitor off steroids.   -8.1.2023: C7D15. Continues to tolerate treatment. Will proceed with C7D15 today. RTC per protocol.  - 8.29.2023: C8D1. C8 delayed due to hospitalization for diverticulitis. Has completed antibiotics and feeling well for treatment.  - 9.12.2023: C8D15. Most recent imgaing suggest the patient continues to benefit from current treatment. The imaging also suggest improvement of previously visualized segmental colitis associated with diverticulosis affecting the sigmoid colon with minimal residual pericolonic fat standing and hyperemia of the sigmoid colon. Imaging also suggest resolving inflammatory process without evidence of new or worsening complications. We will proceed with C8D15 today since the patient is also clinically feeling good. RTC per protocol.  -9.26.2023: C9D1. Patient looks clinically good. With no JACQUELYN's we will proceed with treatment C9D1 today.  -10.24.23: C10D1. Pt feels well, no acute events, no TRAEs, continue treatment today as scheduled.  -11.7.23: C10D15. Pt feels well, no TRAEs, continue treatment as scheduled.  - 11.21.23: C11D1. Continues to feel well, will continue treatment today.  - 12.19.23: C12D1. Personally reviewed most recent scan without evidence of progression. Continues to feel well and will continue with treatment today.    # Confusion - resolved  - significantly worsened after taking double dose of mirtazapine accidentally, although has been generally down-trending since starting study (which is also when she started taking mirtazapine) and now resolved  - brain MRI without progression of cancer  - will continue off mirtazapine    # Unintentional  weight loss - stable  # Anorexia- Improving  # Dysgeusia- improving  - all improved on steroids. unclear if this is from cancer or side effect of study drug  - stopped mirtazapine due to concerns for confusion   - weaned off dexamethasone and started marinol, but kept forgetting to take it  - dexamethasone trial started again on 5.23.2023. Continued on 1 mg daily - will trial off after 7.18.23.     # Fatigue - Resolved  - back to normal pretty much, weaning steroids as above    # frequent PVCs - asymptomatic  - saw onco-cardiology and completed 24-hr Holter monitor  - recommended decreasing caffeine and sudafed intake  - continue to monitor    #hyponatremia - improving  - improved recently, will monitor    # Forgetfulness - improved - however noted decline on AMGN study- unclear etiology.    - started after PCI, on memantine daily and has since stopped  - offered neurocognitive evaluation previously and she will think about this and readdress next visit  -  notes some decline in last 3 weeks- especially short term memory loss.    - Improved    # Neuropathy - resolved  - mild peripheral neuropathy with numbness of the toes - likely due to cisplatin  - will continue to monitor closely  - not endorsing any neuropathy at this visit     # Rash - resolved  Waxing and waning rash throughout her body. ?improving. Unclear etiology, patient and  feel timing coincided with starting BMX.   - she will hold off on further BMX  - thought to be due to sulfa allergy, possibly due to platinum agents   - continue to monitor    #odynophagia - resolved  -rad onc aware  -prescribed BMX solution  -will continue to monitor    #constipation-improving  -occasional. Prescribed Senna S  -will monitor    Francy Archer MD

## 2023-12-20 ENCOUNTER — OFFICE VISIT (OUTPATIENT)
Dept: GASTROENTEROLOGY | Facility: CLINIC | Age: 68
End: 2023-12-20
Payer: MEDICARE

## 2023-12-20 VITALS
DIASTOLIC BLOOD PRESSURE: 79 MMHG | BODY MASS INDEX: 20.96 KG/M2 | WEIGHT: 111 LBS | HEIGHT: 61 IN | HEART RATE: 74 BPM | SYSTOLIC BLOOD PRESSURE: 121 MMHG

## 2023-12-20 DIAGNOSIS — Z12.11 COLON CANCER SCREENING: ICD-10-CM

## 2023-12-20 DIAGNOSIS — K59.09 CHRONIC CONSTIPATION: ICD-10-CM

## 2023-12-20 DIAGNOSIS — R63.4 WEIGHT LOSS: ICD-10-CM

## 2023-12-20 DIAGNOSIS — K21.9 GASTROESOPHAGEAL REFLUX DISEASE WITHOUT ESOPHAGITIS: Primary | ICD-10-CM

## 2023-12-20 DIAGNOSIS — R06.6 HICCUPS: ICD-10-CM

## 2023-12-20 DIAGNOSIS — Z87.19 HISTORY OF DIVERTICULITIS: ICD-10-CM

## 2023-12-20 LAB
25(OH)D3 SERPL-MCNC: 27 NG/ML (ref 30–100)
CHOLEST SERPL-MCNC: 244 MG/DL (ref 0–199)
CHOLESTEROL/HDL RATIO: 3.1
HDLC SERPL-MCNC: 78.3 MG/DL
LDLC SERPL CALC-MCNC: 137 MG/DL
NON HDL CHOLESTEROL: 166 MG/DL (ref 0–149)
TRIGL SERPL-MCNC: 143 MG/DL (ref 0–149)
VIT B12 SERPL-MCNC: 359 PG/ML (ref 211–911)
VLDL: 29 MG/DL (ref 0–40)

## 2023-12-20 PROCEDURE — 99214 OFFICE O/P EST MOD 30 MIN: CPT | Performed by: NURSE PRACTITIONER

## 2023-12-20 PROCEDURE — 1036F TOBACCO NON-USER: CPT | Performed by: NURSE PRACTITIONER

## 2023-12-20 PROCEDURE — 1159F MED LIST DOCD IN RCRD: CPT | Performed by: NURSE PRACTITIONER

## 2023-12-20 PROCEDURE — 3008F BODY MASS INDEX DOCD: CPT | Performed by: NURSE PRACTITIONER

## 2023-12-20 PROCEDURE — 1160F RVW MEDS BY RX/DR IN RCRD: CPT | Performed by: NURSE PRACTITIONER

## 2023-12-20 PROCEDURE — 1126F AMNT PAIN NOTED NONE PRSNT: CPT | Performed by: NURSE PRACTITIONER

## 2023-12-20 RX ORDER — POLYETHYLENE GLYCOL 3350 17 G/17G
17 POWDER, FOR SOLUTION ORAL 2 TIMES DAILY
Qty: 60 PACKET | Refills: 2 | Status: SHIPPED | OUTPATIENT
Start: 2023-12-20 | End: 2024-12-19

## 2023-12-20 RX ORDER — PSYLLIUM HUSK 3.4 G/5.8G
1 POWDER ORAL DAILY
Qty: 425 G | Refills: 11 | Status: SHIPPED | OUTPATIENT
Start: 2023-12-20

## 2023-12-20 RX ORDER — POLYETHYLENE GLYCOL 3350, SODIUM SULFATE ANHYDROUS, SODIUM BICARBONATE, SODIUM CHLORIDE, POTASSIUM CHLORIDE 236; 22.74; 6.74; 5.86; 2.97 G/4L; G/4L; G/4L; G/4L; G/4L
4000 POWDER, FOR SOLUTION ORAL ONCE
Qty: 4000 ML | Refills: 0 | Status: SHIPPED | OUTPATIENT
Start: 2023-12-20 | End: 2023-12-20

## 2023-12-20 RX ORDER — OMEPRAZOLE 40 MG/1
40 CAPSULE, DELAYED RELEASE ORAL DAILY
Qty: 90 EACH | Refills: 3 | Status: SHIPPED | OUTPATIENT
Start: 2023-12-20 | End: 2024-12-19

## 2023-12-20 NOTE — PROGRESS NOTES
"Assessment/Plan   Gladys Branch is a 68 y.o. female with medical history significant for GERD, COPD and small cell lung CA (right lower lung and supraclavicular) currently on phase 1 study of [id number] with study drug Taralatamab since January 24, 2023.  Prior to that, patient was on chemotherapy every 3 weeks.      She reports poor appetite, weight loss (reports 25 pound weight loss since cancer diagnosis) and dysgeusia all improved on steroids. Unclear if symptoms are from cancer or side effect of study drug or steroids. Constipation improved with senna. Initially but still reporting infrequent bowel movements. BM hard and dry, 3x week.  Reports hiccups, burping and indigestion daily, not on PPI.  Odynophagia improved with BMX solution.     Constipation, chronic worse since chemo started  Weight loss 25 pounds  Decreased appetite  Hiccups, burping, indigestion   Odynophagia - resolved with BMX  Long term use of steroids  Small cell lung Ca s/p chemo and radiation and currently on trial drug \"helps immune sytem fight cancer\"    Recommend bowel clean out with MiraLAx  Then start bowel regimen with Fiber supplement 1-2 times daily and high fiber diet  MiraLax 1-2 doses daily, titrate or ramp up as needed  Colonoscopy to be done at the hospital   Prep per the endoscopist preference  Labs reviewed   Patient aware she will need a  the day of the procedure.  Start Omeprazole 40 mg PO once daily  Antireflux lifestyle modifications  Follow up with GI 2 weeks after colonoscopy to review results  Consider EGD if upper GI symptoms do not improve with PPI.       Chantell Puente, APRN-CNP       Subjective     History of Present Illness:   Gladys Branch is a 68 y.o. female with medical history significant for GERD, COPD and small cell lung CA (right lower lung and supraclavicular) currently on phase 1 study of [id number] with study drug Taralatamab since January 24, 2023.  Prior to that, patient was on chemotherapy " every 3 weeks.  She reports poor appetite, weight loss (reports 25 pound weight loss since cancer diagnosis) and dysgeusia all improved on steroids. Unclear if symptoms are from cancer or side effect of study drug. Constipation improved with senna. Odynophagia improved with BMX solution.     Reports infrequent bowel movements, BM 3x weeks. Dry hard stool. Senna helped at first, still with constipation. Denies rectal bleeding or abdominal pain. Also reports Hiccups, burping and indigestion. Taking Decadron daily, antihistamine daily.     Review of Systems  ROS Negative unless otherwise stated above.    Endoscopy History   Colonoscopy January, 2017 noted diverticulosis in sigmoid colon, otherwise normal.    Past Medical History   has a past medical history of Disease due to severe acute respiratory syndrome coronavirus 2 (SARS-CoV-2) (02/07/2023), Other specified abnormal findings of blood chemistry (07/26/2022), Other specified abnormal findings of blood chemistry (07/26/2022), Other specified abnormal findings of blood chemistry (07/26/2022), Other specified abnormal findings of blood chemistry (07/26/2022), Other specified abnormal findings of blood chemistry (07/26/2022), Other specified abnormal findings of blood chemistry (07/26/2022), Other specified abnormal findings of blood chemistry (09/15/2020), and Personal history of other diseases of the respiratory system.     Social History   reports that she quit smoking about 24 years ago. Her smoking use included cigarettes. She has a 25.00 pack-year smoking history. She has never used smokeless tobacco. She reports current alcohol use of about 1.0 - 2.0 standard drink of alcohol per week. She reports that she does not use drugs. Wine, one glass a day.    Family History  family history includes Alcohol abuse in her father; Breast cancer in her father's sister; Dementia in her mother; Depression in her mother; varicose veins of lower extremity in her mother.      Allergies  Allergies   Allergen Reactions    Cisplatin Other     CHEMO INDUCED (Moderate); Dyspepsia (Moderate); Headaches (Moderate); Hypotension (Moderate); Numbness (Moderate); Resp Distress (Moderate)    Codeine Nausea Only and Other     nausea    Sulfa (Sulfonamide Antibiotics) Rash       Medications  Current Outpatient Medications   Medication Instructions    cetirizine (ZYRTEC) 10 mg, oral, Daily    cetirizine-pseudoephedrine (ZyrTEC-D) 5-120 mg 12 hr tablet 1 tablet, oral, 2 times daily    cholecalciferol (Vitamin D-3) 25 MCG (1000 UT) tablet oral    dexAMETHasone (DECADRON) 1 mg, oral, Daily    dextromethorphan (DELSYM) 30 mg, oral, Daily PRN    dronabinol (Marinol) 2.5 mg capsule once daily as needed. One hour before dinner    estrogens, conjugated, (Premarin) 0.3 mg tablet 0.5 tablets, oral, Daily    omeprazole (PRILOSEC) 40 mg, oral, Daily, Do not crush or chew.    ondansetron (ZOFRAN) 4 mg, oral, Every 8 hours PRN    polyethylene glycol (GLYCOLAX, MIRALAX) 17 g, oral, 2 times daily    prochlorperazine (COMPAZINE) 5 mg, oral, Every 6 hours PRN    psyllium husk, aspartame, (Metamucil Sugar-Free, aspart,) 3.4 gram/5.8 gram powder 1 Dose, oral, Daily    vit A/vit C/vit E/zinc/copper (PRESERVISION AREDS ORAL) oral, Daily PRN        Objective   Visit Vitals  /79   Pulse 74      General: A&Ox3, NAD.  HEENT: AT/NC.   CV: RRR. No murmur.  Resp: CTA bilaterally. No wheezing, rhonchi or rales.   GI: Soft, NT/ND. BSx4.  Extrem: No edema. Pulses intact.  Skin: No Jaundice.   Neuro: No focal deficits.   Psych: Normal mood and affect.     Lab Results   Component Value Date    WBC 11.2 12/19/2023    WBC 7.9 12/05/2023    WBC 7.3 11/21/2023    HGB 12.1 12/19/2023    HGB 11.6 (L) 12/05/2023    HGB 12.3 11/21/2023    HCT 35.8 (L) 12/19/2023    HCT 34.2 (L) 12/05/2023    HCT 37.3 11/21/2023     12/19/2023     12/05/2023     11/21/2023     Lab Results   Component Value Date      12/19/2023    K 3.7 12/19/2023     12/19/2023    CO2 24 12/19/2023    BUN 22 12/19/2023    CREATININE 0.77 12/19/2023    GLUCOSE 112 (H) 12/19/2023    CALCIUM 9.1 12/19/2023       Lab Results   Component Value Date    ALKPHOS 49 12/19/2023    ALKPHOS 44 12/05/2023    ALKPHOS 41 11/21/2023    BILITOT 0.7 12/19/2023    BILITOT 0.5 12/05/2023    BILITOT 0.5 11/21/2023    BILIDIR 0.1 12/19/2023    BILIDIR 0.1 12/05/2023    BILIDIR 0.1 11/21/2023    PROT 6.3 (L) 12/19/2023    PROT 6.2 (L) 12/05/2023    PROT 5.7 (L) 11/21/2023    ALT 9 12/19/2023    ALT 14 12/05/2023    ALT 10 11/21/2023    AST 11 12/19/2023    AST 17 12/05/2023    AST 13 11/21/2023      Lab Results   Component Value Date    INR 1.1 12/19/2023           Results from last 7 days   Lab Units 12/19/23  0745   FERRITIN ng/mL 163*        === 03/08/22 ===    CT CHEST PULMONARY EMBOLISM W IV CONTRAST    - Impression -  There is component of artifact through the branch segmental vessels to  the upper lobes but there is no convincing evidence of pulmonary  embolism.  No evidence of pulmonary arterial hypertension or right  ventricular strain.    No aortic aneurysm or dissection.    Improvement in azygous adenopathy consistent with response to therapy.  Decrease in size of segmental bronchus based nodule in the right  upper lobe and decrease in size of a subpleural right lower lobe  nodule.  Decrease in size of additional right lower lobe nodule.  Tiny  right apical nodule seen previously are no longer demonstrated.  Findings indicate response to therapy.    Possible mild fluid overload/congestive heart failure.  Bibasilar  subsegmental atelectasis and/or pneumonia.  Subsegmental atelectasis  in the right middle lobe and lingula.    Small hiatal hernia.  Consider reflux esophagitis.      Signed by Moira Sprague MD

## 2024-01-02 ENCOUNTER — HOSPITAL ENCOUNTER (OUTPATIENT)
Dept: RESEARCH | Facility: HOSPITAL | Age: 69
Discharge: HOME | End: 2024-01-02
Payer: MEDICARE

## 2024-01-02 ENCOUNTER — EDUCATION (OUTPATIENT)
Dept: RESEARCH | Facility: HOSPITAL | Age: 69
End: 2024-01-02
Payer: MEDICARE

## 2024-01-02 VITALS
SYSTOLIC BLOOD PRESSURE: 116 MMHG | RESPIRATION RATE: 18 BRPM | HEART RATE: 77 BPM | WEIGHT: 111.55 LBS | TEMPERATURE: 98.1 F | BODY MASS INDEX: 21.08 KG/M2 | OXYGEN SATURATION: 95 % | DIASTOLIC BLOOD PRESSURE: 78 MMHG

## 2024-01-02 DIAGNOSIS — C34.90 SMALL CELL LUNG CANCER (MULTI): ICD-10-CM

## 2024-01-02 LAB
ALBUMIN SERPL BCP-MCNC: 3.6 G/DL (ref 3.4–5)
ALP SERPL-CCNC: 49 U/L (ref 33–136)
ALT SERPL W P-5'-P-CCNC: 10 U/L (ref 7–45)
ANION GAP SERPL CALC-SCNC: 12 MMOL/L (ref 10–20)
APTT PPP: 31 SECONDS (ref 27–38)
AST SERPL W P-5'-P-CCNC: 14 U/L (ref 9–39)
BASOPHILS # BLD AUTO: 0.03 X10*3/UL (ref 0–0.1)
BASOPHILS NFR BLD AUTO: 0.4 %
BILIRUB DIRECT SERPL-MCNC: 0.1 MG/DL (ref 0–0.3)
BILIRUB SERPL-MCNC: 0.7 MG/DL (ref 0–1.2)
BUN SERPL-MCNC: 22 MG/DL (ref 6–23)
CALCIUM SERPL-MCNC: 8.9 MG/DL (ref 8.6–10.6)
CHLORIDE SERPL-SCNC: 106 MMOL/L (ref 98–107)
CK SERPL-CCNC: 38 U/L (ref 0–215)
CO2 SERPL-SCNC: 26 MMOL/L (ref 21–32)
CREAT SERPL-MCNC: 0.78 MG/DL (ref 0.5–1.05)
CRP SERPL-MCNC: 0.97 MG/DL
EOSINOPHIL # BLD AUTO: 0.2 X10*3/UL (ref 0–0.7)
EOSINOPHIL NFR BLD AUTO: 2.4 %
ERYTHROCYTE [DISTWIDTH] IN BLOOD BY AUTOMATED COUNT: 13.3 % (ref 11.5–14.5)
FERRITIN SERPL-MCNC: 122 NG/ML (ref 8–150)
GFR SERPL CREATININE-BSD FRML MDRD: 83 ML/MIN/1.73M*2
GLUCOSE SERPL-MCNC: 83 MG/DL (ref 74–99)
HCT VFR BLD AUTO: 35.4 % (ref 36–46)
HGB BLD-MCNC: 11.7 G/DL (ref 12–16)
IMM GRANULOCYTES # BLD AUTO: 0.05 X10*3/UL (ref 0–0.7)
IMM GRANULOCYTES NFR BLD AUTO: 0.6 % (ref 0–0.9)
INR PPP: 1 (ref 0.9–1.1)
LYMPHOCYTES # BLD AUTO: 1.29 X10*3/UL (ref 1.2–4.8)
LYMPHOCYTES NFR BLD AUTO: 15.3 %
MAGNESIUM SERPL-MCNC: 1.84 MG/DL (ref 1.6–2.4)
MCH RBC QN AUTO: 30.9 PG (ref 26–34)
MCHC RBC AUTO-ENTMCNC: 33.1 G/DL (ref 32–36)
MCV RBC AUTO: 93 FL (ref 80–100)
MONOCYTES # BLD AUTO: 0.77 X10*3/UL (ref 0.1–1)
MONOCYTES NFR BLD AUTO: 9.1 %
NEUTROPHILS # BLD AUTO: 6.1 X10*3/UL (ref 1.2–7.7)
NEUTROPHILS NFR BLD AUTO: 72.2 %
NRBC BLD-RTO: 0 /100 WBCS (ref 0–0)
PHOSPHATE SERPL-MCNC: 3.7 MG/DL (ref 2.5–4.9)
PLATELET # BLD AUTO: 276 X10*3/UL (ref 150–450)
POTASSIUM SERPL-SCNC: 4.1 MMOL/L (ref 3.5–5.3)
PROT SERPL-MCNC: 6 G/DL (ref 6.4–8.2)
PROTHROMBIN TIME: 11.4 SECONDS (ref 9.8–12.8)
RBC # BLD AUTO: 3.79 X10*6/UL (ref 4–5.2)
SODIUM SERPL-SCNC: 140 MMOL/L (ref 136–145)
T4 FREE SERPL-MCNC: 0.99 NG/DL (ref 0.78–1.48)
TSH SERPL-ACNC: 7.08 MIU/L (ref 0.44–3.98)
WBC # BLD AUTO: 8.4 X10*3/UL (ref 4.4–11.3)

## 2024-01-02 PROCEDURE — 2500000005 HC RX 250 GENERAL PHARMACY W/O HCPCS: Performed by: STUDENT IN AN ORGANIZED HEALTH CARE EDUCATION/TRAINING PROGRAM

## 2024-01-02 PROCEDURE — 86140 C-REACTIVE PROTEIN: CPT | Performed by: STUDENT IN AN ORGANIZED HEALTH CARE EDUCATION/TRAINING PROGRAM

## 2024-01-02 PROCEDURE — 82550 ASSAY OF CK (CPK): CPT | Performed by: STUDENT IN AN ORGANIZED HEALTH CARE EDUCATION/TRAINING PROGRAM

## 2024-01-02 PROCEDURE — 80053 COMPREHEN METABOLIC PANEL: CPT | Performed by: STUDENT IN AN ORGANIZED HEALTH CARE EDUCATION/TRAINING PROGRAM

## 2024-01-02 PROCEDURE — 84439 ASSAY OF FREE THYROXINE: CPT

## 2024-01-02 PROCEDURE — 85610 PROTHROMBIN TIME: CPT | Performed by: STUDENT IN AN ORGANIZED HEALTH CARE EDUCATION/TRAINING PROGRAM

## 2024-01-02 PROCEDURE — 96413 CHEMO IV INFUSION 1 HR: CPT

## 2024-01-02 PROCEDURE — 84443 ASSAY THYROID STIM HORMONE: CPT

## 2024-01-02 PROCEDURE — 82248 BILIRUBIN DIRECT: CPT | Performed by: STUDENT IN AN ORGANIZED HEALTH CARE EDUCATION/TRAINING PROGRAM

## 2024-01-02 PROCEDURE — 36415 COLL VENOUS BLD VENIPUNCTURE: CPT | Performed by: STUDENT IN AN ORGANIZED HEALTH CARE EDUCATION/TRAINING PROGRAM

## 2024-01-02 PROCEDURE — 85730 THROMBOPLASTIN TIME PARTIAL: CPT | Performed by: STUDENT IN AN ORGANIZED HEALTH CARE EDUCATION/TRAINING PROGRAM

## 2024-01-02 PROCEDURE — 99213 OFFICE O/P EST LOW 20 MIN: CPT

## 2024-01-02 PROCEDURE — 85025 COMPLETE CBC W/AUTO DIFF WBC: CPT | Performed by: STUDENT IN AN ORGANIZED HEALTH CARE EDUCATION/TRAINING PROGRAM

## 2024-01-02 PROCEDURE — 83735 ASSAY OF MAGNESIUM: CPT | Performed by: STUDENT IN AN ORGANIZED HEALTH CARE EDUCATION/TRAINING PROGRAM

## 2024-01-02 PROCEDURE — 82728 ASSAY OF FERRITIN: CPT | Performed by: STUDENT IN AN ORGANIZED HEALTH CARE EDUCATION/TRAINING PROGRAM

## 2024-01-02 PROCEDURE — 84100 ASSAY OF PHOSPHORUS: CPT | Performed by: STUDENT IN AN ORGANIZED HEALTH CARE EDUCATION/TRAINING PROGRAM

## 2024-01-02 RX ORDER — DIPHENHYDRAMINE HYDROCHLORIDE 50 MG/ML
50 INJECTION INTRAMUSCULAR; INTRAVENOUS AS NEEDED
Status: DISCONTINUED | OUTPATIENT
Start: 2024-01-02 | End: 2024-01-03 | Stop reason: HOSPADM

## 2024-01-02 RX ORDER — HEPARIN 100 UNIT/ML
500 SYRINGE INTRAVENOUS AS NEEDED
Status: CANCELLED | OUTPATIENT
Start: 2024-01-02

## 2024-01-02 RX ORDER — ALBUTEROL SULFATE 0.83 MG/ML
3 SOLUTION RESPIRATORY (INHALATION) AS NEEDED
Status: DISCONTINUED | OUTPATIENT
Start: 2024-01-02 | End: 2024-01-03 | Stop reason: HOSPADM

## 2024-01-02 RX ORDER — HEPARIN SODIUM,PORCINE/PF 10 UNIT/ML
50 SYRINGE (ML) INTRAVENOUS AS NEEDED
Status: DISCONTINUED | OUTPATIENT
Start: 2024-01-02 | End: 2024-01-03 | Stop reason: HOSPADM

## 2024-01-02 RX ORDER — HEPARIN SODIUM,PORCINE/PF 10 UNIT/ML
50 SYRINGE (ML) INTRAVENOUS AS NEEDED
Status: CANCELLED | OUTPATIENT
Start: 2024-01-02

## 2024-01-02 RX ORDER — FAMOTIDINE 10 MG/ML
20 INJECTION INTRAVENOUS ONCE AS NEEDED
Status: DISCONTINUED | OUTPATIENT
Start: 2024-01-02 | End: 2024-01-03 | Stop reason: HOSPADM

## 2024-01-02 RX ORDER — HEPARIN 100 UNIT/ML
500 SYRINGE INTRAVENOUS AS NEEDED
Status: DISCONTINUED | OUTPATIENT
Start: 2024-01-02 | End: 2024-01-03 | Stop reason: HOSPADM

## 2024-01-02 RX ORDER — EPINEPHRINE 1 MG/ML
0.3 INJECTION INTRAMUSCULAR; INTRAVENOUS; SUBCUTANEOUS EVERY 5 MIN PRN
Status: DISCONTINUED | OUTPATIENT
Start: 2024-01-02 | End: 2024-01-03 | Stop reason: HOSPADM

## 2024-01-02 RX ADMIN — Medication 3 MG: at 10:31

## 2024-01-02 NOTE — PROGRESS NOTES
Research Note Treatment Day    Gladys Branch is here today for treatment on [id number]. Today is C3D8. Procedures completed per protocol. AE's and con-meds reviewed with patient. Patient is aware of treatment plan.    [x]   Received treatment as planned   OR  []    Treatment delayed; patient calendar updated as required   Treatment delayed because:    []   AE    []   Physician Discretion    []   Clinical Deterioration or Progression     []   Other    Education Documentation  Changes in Appetite, taught by Ashwin Epps RN at 1/2/2024 10:41 AM.  Learner: Patient  Readiness: Eager  Method: Explanation  Response: Verbalizes Understanding    General Medication Information, taught by Ashwin Epps RN at 1/2/2024 10:41 AM.  Learner: Patient  Readiness: Eager  Method: Explanation  Response: Verbalizes Understanding    Treatment Plan and Schedule, taught by Ashwin Epps RN at 1/2/2024 10:41 AM.  Learner: Patient  Readiness: Eager  Method: Explanation  Response: Verbalizes Understanding    Diagnostic Studies, taught by Ashwin Epps RN at 1/2/2024 10:41 AM.  Learner: Patient  Readiness: Eager  Method: Explanation  Response: Verbalizes Understanding    Education Comments  No comments found.

## 2024-01-02 NOTE — PROGRESS NOTES
Patient ID: Gladys Branch is a 68 y.o. female.    DIAGNOSIS    Small Cell Lung Cancer    By immunostaining, the tumor cells are positive for CAM 5.2, INSM1, and TTF-1, and negative for p40 and LCA. The proliferation index by Ki-67 immunostaining is approximately 90%.  Synaptophysin, Chromogranin and INSM-1 are positive.  AE1/AE3 is negative. NEOGENOMICS, RB protein expression is lost in the tumor cells    STAGING    [id number], limited stage  Recurrence    CURRENT SITES OF DISEASE    RLL, supraclavicular    MOLECULAR GENOMICS      PRIOR THERAPY    Concurrent chemoradiation with Cisplatin/Etoposide every 3 weeks; C1D1 2/15/22, reaction to cisplatin C2D1 and did not receive D2 or D3 etoposide  Carbo/etoposide 4/5/2022 1 cycle c/b rash  PCI 5/21-6/13/22    CURRENT THERAPY    Phase 1 study [id number]  with study drug Taralatamab 1/24/23 - present.    CURRENT ONCOLOGICAL PROBLEMS      HISTORY OF PRESENT ILLNESS    Mrs. Branch is a 65 yo with PMH GERD and COPD who originally was round to have a RLL nodule measuring 11 mm in 4/28/2021 found as part of CT calcium score scan. An ensuing CT chest w/o contrast was done on 5/19/21 which revealed subsolid pulmonary nodules measuring 14 mm in the RLL. She had CT chest w/contrast on 11/29/21 which confirmed stable 14 mm GGO of the RLL and decreased RLL subpleural nodule. She met thoracic surgeon Dr. Farfan, who ordered PET/CT scan done on 12/8/21 which did not reveal any FDG-avid nodules within the lung parenchyma but R hilar nodes with max SUV 8.0. He referred her for bronch, which was done on 1/21/22 by Dr. Mcdaniels. 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 Brain MRI was negative.    She started concurrent chemoradiation with BID radiation with cis/etop 2/15/22, and unfortunately had a reaction to cisplatin on C2D1 with chest pain and hypotension. She was hospitalized with sepsis felt to be due to pneumonia. She trialed carboplatin/etoposide on 4/5/22 with a  resulting rash and opted to forego further chemotherapy as she had completed radiation. Restaging scan 11/3/22 unfortunately with progression with new R hilar lymph node, paratracheal nodes, and increase in size of R supraclavicular mass. She enrolled in [id number] and started C1D1 1/24/23. C1 complicated by anorexia and dysgeusia, and delayed C2 by a week. She has further struggled with fatigue and confusion, which may be related to mirtazapine. Dose reduced for C2 and mirtazapine stopped with improvement in symptoms.     PAST MEDICAL HISTORY    GERD  COPD    SOCIAL HISTORY    Retired - lighting . Lives at home with  and a kitten.  Tobacco: quit smoking 1999. 1 ppd x 25 yrs.  EtOH: wine 1 glass/day  Illicits: none    CURRENT MEDS    Please see med list    ALLERGIES    Codeine, Sulfa drugs, Cisplatin    FAMILY HISTORY    Paternal aunt - breast cancer dx 80s    Subjective    Today 1.2.2024. Patient in clinic with her  for C12D15 for [id number].    Patient reports she continues to tolerate treatment well. She reports she has symptoms of left arm soreness. She continues to have symptoms of acid reflux , bloating and hiccups. She also reports symptoms of constipation. She reports she did meet with gastroenterology. She reports having plans for a endoscopy and colonoscopy. She reports having a decent appetite. She continues to have symptoms with taste and smell . She reports she continues to be active and does not need any help with her ADL's.  She continues to have mild symptoms of memory issue which is stable and not worsen. Patient denies any pain, dizziness, lightheadedness, headaches, rash/itching, chills or fever, SOB, cough, sputum, chest pain or chest tightness, painful inflammation/ulceration oral mucous membranes, nausea/vomiting, diarrhea, hematemesis /hemoptysis, hematuria/rectal bleeding, urinary symptoms, swelling extremities, weakness/fatigue, or peripheral neuropathy. ROS as  "above. Remainder of 10 point review of systems elicited and otherwise unremarkable.     Objective          12/19/2023     7:28 AM 12/19/2023     9:49 AM 12/19/2023    10:59 AM 12/20/2023    12:56 PM 1/2/2024     7:35 AM 1/2/2024    10:24 AM 1/2/2024    11:36 AM   Vitals   Systolic 110 109 101 121 105 115 116   Diastolic 71 72 73 79 66 82 78   Heart Rate 71 71 75 74 80 66 77   Temp 36.9 °C (98.5 °F) 36.6 °C (97.9 °F) 36.7 °C (98.1 °F)  36.6 °C (97.8 °F) 36.8 °C (98.2 °F) 36.7 °C (98.1 °F)   Resp 18 16 16  18 18 18   Height (in)    1.549 m (5' 1\")      Weight (lb) 110.45   111 111.55     BMI 20.87 kg/m2   20.97 kg/m2 21.08 kg/m2     BSA (m2) 1.47 m2   1.47 m2 1.48 m2     Visit Report Report Report Report Report              Physical Exam  Constitutional:       General: She is awake.      Appearance: Normal appearance. She is normal weight.   HENT:      Head: Normocephalic.      Mouth/Throat:      Mouth: Mucous membranes are moist.   Eyes:      Extraocular Movements: Extraocular movements intact.      Conjunctiva/sclera: Conjunctivae normal.      Pupils: Pupils are equal, round, and reactive to light.   Cardiovascular:      Rate and Rhythm: Normal rate and regular rhythm.      Heart sounds: Normal heart sounds, S1 normal and S2 normal.   Pulmonary:      Effort: Pulmonary effort is normal.      Breath sounds: Normal breath sounds.   Abdominal:      General: Abdomen is flat. Bowel sounds are normal.      Palpations: Abdomen is soft.   Musculoskeletal:      Cervical back: Normal range of motion and neck supple.   Skin:     Comments: No skin rash observed   Neurological:      Mental Status: She is alert.      Cranial Nerves: Cranial nerves 2-12 are intact.      Sensory: Sensation is intact.      Motor: Motor function is intact.      Coordination: Coordination is intact.   Psychiatric:         Attention and Perception: Attention normal.         Mood and Affect: Mood normal.         Speech: Speech normal.         Behavior: " Behavior normal. Behavior is cooperative.         Cognition and Memory: Cognition normal.     Labs    Hospital Outpatient Visit on 01/02/2024   Component Date Value Ref Range Status    WBC 01/02/2024 8.4  4.4 - 11.3 x10*3/uL Final    nRBC 01/02/2024 0.0  0.0 - 0.0 /100 WBCs Final    RBC 01/02/2024 3.79 (L)  4.00 - 5.20 x10*6/uL Final    Hemoglobin 01/02/2024 11.7 (L)  12.0 - 16.0 g/dL Final    Hematocrit 01/02/2024 35.4 (L)  36.0 - 46.0 % Final    MCV 01/02/2024 93  80 - 100 fL Final    MCH 01/02/2024 30.9  26.0 - 34.0 pg Final    MCHC 01/02/2024 33.1  32.0 - 36.0 g/dL Final    RDW 01/02/2024 13.3  11.5 - 14.5 % Final    Platelets 01/02/2024 276  150 - 450 x10*3/uL Final    Neutrophils % 01/02/2024 72.2  40.0 - 80.0 % Final    Immature Granulocytes %, Automated 01/02/2024 0.6  0.0 - 0.9 % Final    Lymphocytes % 01/02/2024 15.3  13.0 - 44.0 % Final    Monocytes % 01/02/2024 9.1  2.0 - 10.0 % Final    Eosinophils % 01/02/2024 2.4  0.0 - 6.0 % Final    Basophils % 01/02/2024 0.4  0.0 - 2.0 % Final    Neutrophils Absolute 01/02/2024 6.10  1.20 - 7.70 x10*3/uL Final    Immature Granulocytes Absolute, Au* 01/02/2024 0.05  0.00 - 0.70 x10*3/uL Final    Lymphocytes Absolute 01/02/2024 1.29  1.20 - 4.80 x10*3/uL Final    Monocytes Absolute 01/02/2024 0.77  0.10 - 1.00 x10*3/uL Final    Eosinophils Absolute 01/02/2024 0.20  0.00 - 0.70 x10*3/uL Final    Basophils Absolute 01/02/2024 0.03  0.00 - 0.10 x10*3/uL Final    Glucose 01/02/2024 83  74 - 99 mg/dL Final    Sodium 01/02/2024 140  136 - 145 mmol/L Final    Potassium 01/02/2024 4.1  3.5 - 5.3 mmol/L Final    Chloride 01/02/2024 106  98 - 107 mmol/L Final    Bicarbonate 01/02/2024 26  21 - 32 mmol/L Final    Anion Gap 01/02/2024 12  10 - 20 mmol/L Final    Urea Nitrogen 01/02/2024 22  6 - 23 mg/dL Final    Creatinine 01/02/2024 0.78  0.50 - 1.05 mg/dL Final    eGFR 01/02/2024 83  >60 mL/min/1.73m*2 Final    Calcium 01/02/2024 8.9  8.6 - 10.6 mg/dL Final    Albumin  01/02/2024 3.6  3.4 - 5.0 g/dL Final    Alkaline Phosphatase 01/02/2024 49  33 - 136 U/L Final    Total Protein 01/02/2024 6.0 (L)  6.4 - 8.2 g/dL Final    AST 01/02/2024 14  9 - 39 U/L Final    Bilirubin, Total 01/02/2024 0.7  0.0 - 1.2 mg/dL Final    ALT 01/02/2024 10  7 - 45 U/L Final    aPTT 01/02/2024 31  27 - 38 seconds Final    Bilirubin, Direct 01/02/2024 0.1  0.0 - 0.3 mg/dL Final    Creatine Kinase 01/02/2024 38  0 - 215 U/L Final    C-Reactive Protein 01/02/2024 0.97  <1.00 mg/dL Final    Ferritin 01/02/2024 122  8 - 150 ng/mL Final    Magnesium 01/02/2024 1.84  1.60 - 2.40 mg/dL Final    Phosphorus 01/02/2024 3.7  2.5 - 4.9 mg/dL Final    Protime 01/02/2024 11.4  9.8 - 12.8 seconds Final    INR 01/02/2024 1.0  0.9 - 1.1 Final   Hospital Outpatient Visit on 12/19/2023   Component Date Value Ref Range Status    WBC 12/19/2023 11.2  4.4 - 11.3 x10*3/uL Final    nRBC 12/19/2023 0.0  0.0 - 0.0 /100 WBCs Final    RBC 12/19/2023 3.83 (L)  4.00 - 5.20 x10*6/uL Final    Hemoglobin 12/19/2023 12.1  12.0 - 16.0 g/dL Final    Hematocrit 12/19/2023 35.8 (L)  36.0 - 46.0 % Final    MCV 12/19/2023 94  80 - 100 fL Final    MCH 12/19/2023 31.6  26.0 - 34.0 pg Final    MCHC 12/19/2023 33.8  32.0 - 36.0 g/dL Final    RDW 12/19/2023 13.6  11.5 - 14.5 % Final    Platelets 12/19/2023 230  150 - 450 x10*3/uL Final    Neutrophils % 12/19/2023 75.6  40.0 - 80.0 % Final    Immature Granulocytes %, Automated 12/19/2023 0.8  0.0 - 0.9 % Final    Lymphocytes % 12/19/2023 13.9  13.0 - 44.0 % Final    Monocytes % 12/19/2023 8.1  2.0 - 10.0 % Final    Eosinophils % 12/19/2023 1.2  0.0 - 6.0 % Final    Basophils % 12/19/2023 0.4  0.0 - 2.0 % Final    Neutrophils Absolute 12/19/2023 8.45 (H)  1.20 - 7.70 x10*3/uL Final    Immature Granulocytes Absolute, Au* 12/19/2023 0.09  0.00 - 0.70 x10*3/uL Final    Lymphocytes Absolute 12/19/2023 1.55  1.20 - 4.80 x10*3/uL Final    Monocytes Absolute 12/19/2023 0.90  0.10 - 1.00 x10*3/uL Final     Eosinophils Absolute 12/19/2023 0.13  0.00 - 0.70 x10*3/uL Final    Basophils Absolute 12/19/2023 0.04  0.00 - 0.10 x10*3/uL Final    Glucose 12/19/2023 112 (H)  74 - 99 mg/dL Final    Sodium 12/19/2023 140  136 - 145 mmol/L Final    Potassium 12/19/2023 3.7  3.5 - 5.3 mmol/L Final    Chloride 12/19/2023 106  98 - 107 mmol/L Final    Bicarbonate 12/19/2023 24  21 - 32 mmol/L Final    Anion Gap 12/19/2023 14  10 - 20 mmol/L Final    Urea Nitrogen 12/19/2023 22  6 - 23 mg/dL Final    Creatinine 12/19/2023 0.77  0.50 - 1.05 mg/dL Final    eGFR 12/19/2023 84  >60 mL/min/1.73m*2 Final    Calcium 12/19/2023 9.1  8.6 - 10.6 mg/dL Final    Albumin 12/19/2023 3.7  3.4 - 5.0 g/dL Final    Alkaline Phosphatase 12/19/2023 49  33 - 136 U/L Final    Total Protein 12/19/2023 6.3 (L)  6.4 - 8.2 g/dL Final    AST 12/19/2023 11  9 - 39 U/L Final    Bilirubin, Total 12/19/2023 0.7  0.0 - 1.2 mg/dL Final    ALT 12/19/2023 9  7 - 45 U/L Final    Amylase 12/19/2023 30  29 - 103 U/L Final    aPTT 12/19/2023 30  27 - 38 seconds Final    Bilirubin, Direct 12/19/2023 0.1  0.0 - 0.3 mg/dL Final    Creatine Kinase 12/19/2023 27  0 - 215 U/L Final    Cortisol 12/19/2023 0.6 (L)  2.5 - 20.0 ug/dL Final    C-Reactive Protein 12/19/2023 8.22 (H)  <1.00 mg/dL Final    Estradiol 12/19/2023 <19  pg/mL Final    Ferritin 12/19/2023 163 (H)  8 - 150 ng/mL Final    Follicle Stimulating Hormone 12/19/2023 1.0  IU/L Final    Luteinizing Hormone 12/19/2023 <0.1  IU/L Final    Lipase 12/19/2023 18  9 - 82 U/L Final    Magnesium 12/19/2023 1.99  1.60 - 2.40 mg/dL Final    Phosphorus 12/19/2023 3.8  2.5 - 4.9 mg/dL Final    Protime 12/19/2023 11.9  9.8 - 12.8 seconds Final    INR 12/19/2023 1.1  0.9 - 1.1 Final    Thyroid Stimulating Hormone 12/19/2023 2.65  0.44 - 3.98 mIU/L Final    Thyroxine, Free 12/19/2023 0.76 (L)  0.78 - 1.48 ng/dL Final    Color, Urine 12/19/2023 Scarlett (N)  Straw, Yellow Final    Appearance, Urine 12/19/2023 Hazy (N)  Clear Final     Specific Gravity, Urine 12/19/2023 1.030  1.005 - 1.035 Final    pH, Urine 12/19/2023 5.0  5.0, 5.5, 6.0, 6.5, 7.0, 7.5, 8.0 Final    Protein, Urine 12/19/2023 30 (1+) (N)  NEGATIVE mg/dL Final    Glucose, Urine 12/19/2023 NEGATIVE  NEGATIVE mg/dL Final    Blood, Urine 12/19/2023 NEGATIVE  NEGATIVE Final    Ketones, Urine 12/19/2023 5 (TRACE) (A)  NEGATIVE mg/dL Final    Bilirubin, Urine 12/19/2023 NEGATIVE  NEGATIVE Final    Urobilinogen, Urine 12/19/2023 4.0 (N)  <2.0 mg/dL Final    Nitrite, Urine 12/19/2023 NEGATIVE  NEGATIVE Final    Leukocyte Esterase, Urine 12/19/2023 NEGATIVE  NEGATIVE Final    WBC, Urine 12/19/2023 1-5  1-5, NONE /HPF Final    RBC, Urine 12/19/2023 3-5  NONE, 1-2, 3-5 /HPF Final    Squamous Epithelial Cells, Urine 12/19/2023 26-50 (1+)  Reference range not established. /HPF Final    Mucus, Urine 12/19/2023 4+  Reference range not established. /LPF Final    Calcium Oxalate Crystals, Urine 12/19/2023 1+  NONE, 1+ /HPF Final    Extra Tube 12/19/2023 Hold for add-ons.   Final    Extra Tube 12/19/2023 Hold for add-ons.   Final    Extra Tube 12/19/2023 Hold for add-ons.   Final    Vitamin D, 25-Hydroxy, Total 12/19/2023 27 (L)  30 - 100 ng/mL Final    Vitamin B12 12/19/2023 359  211 - 911 pg/mL Final    Cholesterol 12/19/2023 244 (H)  0 - 199 mg/dL Final    HDL-Cholesterol 12/19/2023 78.3  mg/dL Final    Cholesterol/HDL Ratio 12/19/2023 3.1   Final    LDL Calculated 12/19/2023 137 (H)  <=99 mg/dL Final    VLDL 12/19/2023 29  0 - 40 mg/dL Final    Triglycerides 12/19/2023 143  0 - 149 mg/dL Final    Non HDL Cholesterol 12/19/2023 166 (H)  0 - 149 mg/dL Final   Hospital Outpatient Visit on 12/05/2023   Component Date Value Ref Range Status    WBC 12/05/2023 7.9  4.4 - 11.3 x10*3/uL Final    nRBC 12/05/2023 0.0  0.0 - 0.0 /100 WBCs Final    RBC 12/05/2023 3.67 (L)  4.00 - 5.20 x10*6/uL Final    Hemoglobin 12/05/2023 11.6 (L)  12.0 - 16.0 g/dL Final    Hematocrit 12/05/2023 34.2 (L)  36.0 - 46.0 %  Final    MCV 12/05/2023 93  80 - 100 fL Final    MCH 12/05/2023 31.6  26.0 - 34.0 pg Final    MCHC 12/05/2023 33.9  32.0 - 36.0 g/dL Final    RDW 12/05/2023 13.5  11.5 - 14.5 % Final    Platelets 12/05/2023 236  150 - 450 x10*3/uL Final    Neutrophils % 12/05/2023 70.0  40.0 - 80.0 % Final    Immature Granulocytes %, Automated 12/05/2023 0.9  0.0 - 0.9 % Final    Lymphocytes % 12/05/2023 19.3  13.0 - 44.0 % Final    Monocytes % 12/05/2023 8.4  2.0 - 10.0 % Final    Eosinophils % 12/05/2023 1.0  0.0 - 6.0 % Final    Basophils % 12/05/2023 0.4  0.0 - 2.0 % Final    Neutrophils Absolute 12/05/2023 5.51  1.20 - 7.70 x10*3/uL Final    Immature Granulocytes Absolute, Au* 12/05/2023 0.07  0.00 - 0.70 x10*3/uL Final    Lymphocytes Absolute 12/05/2023 1.52  1.20 - 4.80 x10*3/uL Final    Monocytes Absolute 12/05/2023 0.66  0.10 - 1.00 x10*3/uL Final    Eosinophils Absolute 12/05/2023 0.08  0.00 - 0.70 x10*3/uL Final    Basophils Absolute 12/05/2023 0.03  0.00 - 0.10 x10*3/uL Final    Glucose 12/05/2023 139 (H)  74 - 99 mg/dL Final    Sodium 12/05/2023 140  136 - 145 mmol/L Final    Potassium 12/05/2023 3.8  3.5 - 5.3 mmol/L Final    Chloride 12/05/2023 107  98 - 107 mmol/L Final    Bicarbonate 12/05/2023 24  21 - 32 mmol/L Final    Anion Gap 12/05/2023 13  10 - 20 mmol/L Final    Urea Nitrogen 12/05/2023 19  6 - 23 mg/dL Final    Creatinine 12/05/2023 0.82  0.50 - 1.05 mg/dL Final    eGFR 12/05/2023 79  >60 mL/min/1.73m*2 Final    Calcium 12/05/2023 9.0  8.6 - 10.6 mg/dL Final    Albumin 12/05/2023 3.5  3.4 - 5.0 g/dL Final    Alkaline Phosphatase 12/05/2023 44  33 - 136 U/L Final    Total Protein 12/05/2023 6.2 (L)  6.4 - 8.2 g/dL Final    AST 12/05/2023 17  9 - 39 U/L Final    Bilirubin, Total 12/05/2023 0.5  0.0 - 1.2 mg/dL Final    ALT 12/05/2023 14  7 - 45 U/L Final    aPTT 12/05/2023 30  27 - 38 seconds Final    Bilirubin, Direct 12/05/2023 0.1  0.0 - 0.3 mg/dL Final    Creatine Kinase 12/05/2023 57  0 - 215 U/L Final     C-Reactive Protein 12/05/2023 0.91  <1.00 mg/dL Final    Ferritin 12/05/2023 98  8 - 150 ng/mL Final    Magnesium 12/05/2023 1.98  1.60 - 2.40 mg/dL Final    Phosphorus 12/05/2023 3.9  2.5 - 4.9 mg/dL Final    Protime 12/05/2023 11.7  9.8 - 12.8 seconds Final    INR 12/05/2023 1.0  0.9 - 1.1 Final            Performance Status:    ECOG 1: Restricted in physically strenuous activity but ambulatory and able to carry out work of a light or sedentary nature, e.g., light house work, office work.     CT chest abdomen pelvis w IV contrast    Result Date: 12/14/2023  Impression: CHEST: Stable findings including stable irregular areas of linear and nodular thickening in the right midlung anteriorly.   Stable ground-glass area of nodularity in the right lower lobe.   ABDOMEN-PELVIS: Diverticulosis without definite diverticulitis. No definite metastatic disease to the abdomen or the pelvis.   MACRO: None   Signed by: Micaela Luna 12/14/2023 9:50 AM Dictation workstation:   [id number]       Assessment/Plan      Mrs. Branch is a 68 yo with COPD and GERD who presented with newly diagnosed SCLC completed BID radiation planned concurrently with cis/etoposide but had a reaction C2D1 to cisplatin. Completed 1 cycle carbo/etop D1 4/5/22 also complicated by facial flushing and erythematous rash and stopped further treatment. Now s/p PCI in 6/2022. Most recent CT concerning for disease progression. Referred for clinical trial AMGN [id number], C1D1 1/24/23. Treatment has been complicated by orthostatic hypotension, worsening short-term memory, increased lethargy, weight loss, and poor appetite. Delayed C2 by 1 week and dose-reduced. Confusion has resolved during C3 after stopping mirtazapine and dose reduction.     # SCLC  - limited stage, brain MRI negative  - previously discussed concurrent chemoradiation, with cisplatin/etoposide with side effects including but not limited to allergic reaction, fatigue, risk of infection, tinnitus,  neuropathy, injury to liver/kidney, risk of anemia/thrombocytopenia and was consented  - she was also interested in scalp cooling, which unfortunately she is not a candidate for d/t SCLC  - started concurrent chemoradiation BID with Q3week cis/etop on 2/15 at [facility]  -unfortunately had reaction to cisplatin on C2D1 resulting in hospitalization and also felt to be septic due to pneumonia  - discussed that we typically do 3-4 cycles chemotherapy, and alternative regimens include carboplatin/etoposide vs CAV. Given reaction to cisplatin, concern for possible reaction to carboplatin as well, although unknown rates of cross reaction. Alternatively, they also asked about discontinuation of chemotherapy, since she completed radiation. She ultimately decided to trial carbo/etoposide. She is aware of the heightened risk of allergic reaction, as well as other side effects including but not limited to fatigue, risk of infection, neuropathy, injury to liver/kidney, risk of anemia/thrombocytopenia. consented 3/28/22  -s/p 1 cycle Carbo/Etop D1 4/5/22, developed facial flushing and erythematous rash on arms and chest s/p treatment, resolved with benadryl  -opted to forego further chemotherapy and will continue on maintenance  - s/p PCI 6/2022  - CT C/A/P and brain MRI 5/2022 and most recently 8/2022 with good response and no disease  -  MRI brain 11/7 without evidence of metastatic disease  - CT C/A/P 11/3 with multiple new enlarged LN concerning for disease progression - discussed with Dr. Valdivia not able to repeat radiation.  - referred to phase 1 clinical trial and consideration for [id number] or [id number]  - 1.5.2023 : Patient signed consent to take part on phase 1 study [id number]. Look clinically good to take part in study. Will start on study ASAP if eligible.   - 1.24.2023: Seen today and ready to start trial on [id number].  - 1.31.2023: Seen today, ready for C1D8 AMGN 1518   - 2.7.2023: Seen today for C1D15 [id number] - continue  with trial   - 2.14.2023: Seen in follow-up on [id number] with overall worsening of general health  - 2.21.2023: Seen today for C2D1 AMGN 1518 with continued weight loss although perhaps starting to plateau, more energy since being off treatment, still poor appetite. will delay by 1 week, started dexamethasone 2 mg daily, and consider dose reduction.  - 2.28.2023: Seen today for C2D1 AMGN 1518 after delaying for 1 week. Energy level and appetite are improved, although still losing a little weight. Confusion is worse today, possibly due to mirtazapine vs study-related. Will dose reduce today.  - 3.7.2023: Seen today C2D8 AMGN 1518 after dose-reduction last week. Confusion is mildly improved, energy level and appetite are stable. Will add marinol for appetite.   - 3.14.2023: Seen today C2D15 AMGN 1518. Overall improved: confusion continues to improve, energy level and appetite are improved. Weight is improved. Will continue dexamethasone and marinol.  - 3.15.2023: C2D16 No CRS symptoms observed after last treatment Overall Symptoms are improving and she is tolerating treatment.  -3.16.2023: C2D17 No CRS symptoms observed after last treatment Overall Symptoms are improving and she is tolerating treatment.  - 3.28.2023 : Most recent imaging suggest patient benefiting from current treatment. Ongoing symptom  possible common cold/season allergies. Since the patient looks clinically good we will proceed with C3D1 today. Educated patient to call the office ASAP if symptoms worsen.   - 4.11.2023: C3D15 Feeling well and overall improved with fatigue, confusion, anorexia. Proceed at current dosing and weaning steroids as below  -4.25.2023: C4D1 Pt is feeling well, no complaints of fatigue, confusion, memory loss. Has gained weight. Energy levels are good. Proceed with the current dosing. Pt is no longer on steroids.   -5.9.2023: C4D15. Pt is tolerating treatment well with no new complains. Continues to have good energy level  endorses poor appetite with out any weight loss. Suggested to start taking the dronabinol.  Will proceed with treatment today.  -5.23.2023: C5D1. Pt is tolerating treatment well. Energy levels are good, is having some weight loss and constipation. Started back on dex for appetite. Pt will let us know if she remains constipated over the next several days.  Personally reviewed scans with stable disease. Will proceed with treatment today.   -6.6.2023: C5D15. Continues to tolerate treatment well. Since we restarted her on Dexamethasone she reports her energy levels and appetite has improved. Will continue on low dose Dex. With no new symptoms, we will proceed with treatment today.   -6.20.2023: C6D1. Doing well on dexamethasone 1 mg daily. Will proceed with treatment.  -7.5.2023: C6D15. We will proceed with treatment since the patient is tolerating treatment with no significant AE's and also give 1/2 liter of IV fluids.   - 7.18.2023: C7D1. Doing well, will proceed with treatment. She is out of dexamethasone, and will monitor off steroids.   -8.1.2023: C7D15. Continues to tolerate treatment. Will proceed with C7D15 today. RTC per protocol.  - 8.29.2023: C8D1. C8 delayed due to hospitalization for diverticulitis. Has completed antibiotics and feeling well for treatment.  - 9.12.2023: C8D15. Most recent imgaing suggest the patient continues to benefit from current treatment. The imaging also suggest improvement of previously visualized segmental colitis associated with diverticulosis affecting the sigmoid colon with minimal residual pericolonic fat standing and hyperemia of the sigmoid colon. Imaging also suggest resolving inflammatory process without evidence of new or worsening complications. We will proceed with C8D15 today since the patient is also clinically feeling good. RTC per protocol.  -9.26.2023: C9D1. Patient looks clinically good. With no JACQUELYN's we will proceed with treatment C9D1 today.  -10.24.23: C10D1. Pt  feels well, no acute events, no TRAEs, continue treatment today as scheduled.  -11.7.23: C10D15. Pt feels well, no TRAEs, continue treatment as scheduled.  - 11.21.23: C11D1. Continues to feel well, will continue treatment today.  - 12.19.23: C12D1. Personally reviewed most recent scan without evidence of progression. Continues to feel well and will continue with treatment today.  - 01.02.24: C12D15. Continues to tolerate treatment well. No new JACQUELYN's will proceed with treatment. RTC per protocol.    # Confusion - resolved  - significantly worsened after taking double dose of mirtazapine accidentally, although has been generally down-trending since starting study (which is also when she started taking mirtazapine) and now resolved  - brain MRI without progression of cancer  - will continue off mirtazapine    # Unintentional weight loss - stable  # Anorexia- Improving  # Dysgeusia- improving  - all improved on steroids. unclear if this is from cancer or side effect of study drug  - stopped mirtazapine due to concerns for confusion   - weaned off dexamethasone and started marinol, but kept forgetting to take it  - dexamethasone trial started again on 5.23.2023. Continued on 1 mg daily - will trial off after 7.18.23.     # Fatigue - Resolved  - back to normal pretty much, weaning steroids as above    # frequent PVCs - asymptomatic  - saw onco-cardiology and completed 24-hr Holter monitor  - recommended decreasing caffeine and sudafed intake  - continue to monitor    #hyponatremia - improving  - improved recently, will monitor    # Forgetfulness - improved - however noted decline on AMGN study- unclear etiology.    - started after PCI, on memantine daily and has since stopped  - offered neurocognitive evaluation previously and she will think about this and readdress next visit  -  notes some decline in last 3 weeks- especially short term memory loss.    - Improved    # Neuropathy - resolved  - mild peripheral  neuropathy with numbness of the toes - likely due to cisplatin  - will continue to monitor closely  - not endorsing any neuropathy at this visit     # Rash - resolved  Waxing and waning rash throughout her body. ?improving. Unclear etiology, patient and  feel timing coincided with starting BMX.   - she will hold off on further BMX  - thought to be due to sulfa allergy, possibly due to platinum agents   - continue to monitor    #odynophagia - resolved  -rad onc aware  -prescribed BMX solution  -will continue to monitor    #constipation-improving  -occasional. Prescribed Senna S  -will monitor    CASEY Stone-CNP

## 2024-01-02 NOTE — RESEARCH NOTES
RSH><[id number]><C5+D15><[id number]>    DCRU NURSING VISIT NOTE  Study Name: ALEXANDRU Foote8  IRB#: [id number]  [id number]  Protocol Version Dated: 03.22.23  PI: Roshan Kumar MD.    Time point: Cycle 5 and beyond - Day 15  CYCLE 12     Encounter Date: 01/02/2024  Encounter Time:  8:30 AM EST  Encounter Department: [facility] HEMATOLOGY AND ONCOLOGY     #1     Phone Pager   Carmen Rios [phone]    #2 Phone Pager        Other Phone Pager          Study Regimen and Dosing   Refer to [facility] Treatment Plan for orders  Part A1 Dose Exploration & A2 Dose Expansion - Small Cell Lung Cancer Relapsed or Refractory patients who progressed or recurred following platinum-based chemotherapy will receive AMG-757 to evaluate safety, tolerability and determine the maximum tolerated dose (MTD) or recommended phase 2 dose (RP2D).  Cycle = 28 days  TARLATAMAB () administered IV Day 1 and Day 15.     Dietary Guidelines   Regular diet:     Admission and Prior to Starting Study Activities   Notify  when patient arrives to unit.  Complete DCRU/Mojica intake form in EMR.  Obtain vital signs including Pulse Oximetry after seated or semi-fowlers x 5 mins. Record on flow sheet & in EMR. (Done @ 0735)  Obtain weight in kilograms - with shoes off & heavy items removed. (Done @ 0735)  Insert one peripheral IV line for sample collection procedures (flush line with normal saline following each blood draw). Access mediport (if available) otherwise insert second peripheral line in opposite arm for Investigative drug administration (if peripheral line, flush line with normal saline before & after infusion)  Physical Exam.     Criteria to Treat   DCRU RN reviewed and meets eligibility to proceed with treatment plan   Time team notified: 0923 am  DCRU RN notifies study team to review eligibility and approval before dosing procedures  Time team approves: 0923 am      Subjective   Gladys Branch is a 68 y.o. female and is here for a Research clinical visit.    Visit Provider: Pam-1, [facility] ROOM 11 [facility]     Allergies:   Allergies   Allergen Reactions    Cisplatin Other     CHEMO INDUCED (Moderate); Dyspepsia (Moderate); Headaches (Moderate); Hypotension (Moderate); Numbness (Moderate); Resp Distress (Moderate)    Codeine Nausea Only and Other     nausea    Sulfa (Sulfonamide Antibiotics) Rash       Objective     Vital Signs:    Vitals:    01/02/24 0735 01/02/24 1024 01/02/24 1136   BP: 105/66 115/82 116/78   Pulse: 80 66 77   Resp: 18 18 18   Temp: 36.6 °C (97.8 °F) 36.8 °C (98.2 °F) 36.7 °C (98.1 °F)   TempSrc: Oral Oral Oral   SpO2: 95% 95% 95%   Weight: 50.6 kg (111 lb 8.8 oz)         Physical Exam     ASSESSMENT and PLAN:  Problem List Items Addressed This Visit          Hematology and Neoplasia    Small cell lung cancer (CMS/HCC)    Relevant Medications    Study [id number] Tarlatamab (AMG-757) 3 mg in sodium chloride 0.9% 250 mL IV (Completed)    sodium chloride 0.9 % bolus 500 mL    dextrose 5 % in water (D5W) bolus    diphenhydrAMINE (BENADryl) injection 50 mg    methylPREDNISolone sod succinate (PF) (SOLU-Medrol) 40 mg/mL injection 40 mg    famotidine PF (Pepcid) injection 20 mg    EPINEPHrine (Adrenalin) injection 0.3 mg    albuterol 2.5 mg /3 mL (0.083 %) nebulizer solution 3 mL    heparin flush 10 unit/mL syringe 50 Units    heparin flush 100 unit/mL syringe 500 Units    alteplase (Cathflo Activase) injection 2 mg    Other Relevant Orders    CBC and Auto Differential (Completed)    Comprehensive metabolic panel (Completed)    APTT (Completed)    Bilirubin, Direct (Completed)    CK (Completed)    C-Reactive Protein (Completed)    Ferritin (Completed)    Magnesium (Completed)    Phosphorus (Completed)    Protime-INR (Completed)    Treatment Conditions (Completed)    Provider Communication - [id number] (Completed)    Research Triplicate ECG (Completed)    Research  Triplicate ECG (Completed)    Nursing Communication - Hypersensitivity Management, Moderate (Completed)    Nursing Communication - Hypersensitivity Management, Severe (Completed)    Nursing Communication - Respiratory Management (Completed)    Pulse oximetry, continuous (Completed)    Nursing Communication - Vascular Access (Completed)    Venous Access, CVAD    CENTRAL VENOUS LINE DRESSING CHANGE - ADULT    Insert peripheral IV    Convert IV to saline lock        Medications as of the completion of today's visit:  Current Outpatient Medications   Medication Sig Dispense Refill    cetirizine (ZyrTEC) 10 mg tablet Take 1 tablet (10 mg) by mouth once daily. (Patient taking differently: Take 1 tablet (10 mg) by mouth once daily. Alternating with Zyrtec D) 30 tablet 11    cetirizine-pseudoephedrine (ZyrTEC-D) 5-120 mg 12 hr tablet Take 1 tablet by mouth 2 times a day. 60 tablet 11    cholecalciferol (Vitamin D-3) 25 MCG (1000 UT) tablet Take by mouth.      dexAMETHasone (Decadron) 2 mg tablet Take 0.5 tablets (1 mg) by mouth once daily.      dextromethorphan (Delsym) 30 mg/5 mL liquid Take 5 mL (30 mg) by mouth once daily as needed.      dronabinol (Marinol) 2.5 mg capsule once daily as needed. One hour before dinner      estrogens, conjugated, (Premarin) 0.3 mg tablet Take 0.5 tablets (0.15 mg) by mouth once daily.      omeprazole (PriLOSEC) 40 mg DR capsule Take 1 capsule (40 mg) by mouth once daily. Do not crush or chew. 90 each 3    ondansetron (Zofran) 8 mg tablet Take 0.5 tablets (4 mg) by mouth every 8 hours if needed.      polyethylene glycol (Glycolax, Miralax) 17 gram packet Take 17 g by mouth 2 times a day. 60 packet 2    prochlorperazine (Compazine) 10 mg tablet Take 0.5 tablets (5 mg) by mouth every 6 hours if needed.      psyllium husk, aspartame, (Metamucil Sugar-Free, aspart,) 3.4 gram/5.8 gram powder Take 1 Dose by mouth once daily. 425 g 11    vit A/vit C/vit E/zinc/copper (PRESERVISION AREDS ORAL) Take  by mouth once daily as needed.       Current Facility-Administered Medications   Medication Dose Route Frequency Provider Last Rate Last Admin    albuterol 2.5 mg /3 mL (0.083 %) nebulizer solution 3 mL  3 mL nebulization PRN Francy Archer MD        alteplase (Cathflo Activase) injection 2 mg  2 mg intra-catheter PRN CASEY Stone-CNP        dextrose 5 % in water (D5W) bolus  500 mL intravenous PRN Francy Archer MD        diphenhydrAMINE (BENADryl) injection 50 mg  50 mg intravenous PRN Francy Archer MD        EPINEPHrine (Adrenalin) injection 0.3 mg  0.3 mg intramuscular q5 min PRN Francy Archer MD        famotidine PF (Pepcid) injection 20 mg  20 mg intravenous Once PRN Frnacy Archer MD        heparin flush 10 unit/mL syringe 50 Units  50 Units intra-catheter PRN CASEY Stone-CNP        heparin flush 100 unit/mL syringe 500 Units  500 Units intra-catheter PRN CASEY Stone-CNP        methylPREDNISolone sod succinate (PF) (SOLU-Medrol) 40 mg/mL injection 40 mg  40 mg intravenous PRN Francy Archer MD        sodium chloride 0.9 % bolus 500 mL  500 mL intravenous PRN Francy Archer MD           Administrations This Visit       Study [id number] Tarlatamab (AMG-757) 3 mg in sodium chloride 0.9% 250 mL IV       Admin Date  01/02/2024 Action  New Bag Dose  3 mg Route  intravenous Administered By  Catie Hansen RN                    Orders placed during today's visit:  Orders Placed This Encounter   Procedures    CBC and Auto Differential     Standing Status:   Standing     Number of Occurrences:   1     Order Specific Question:   Release result to MyChart     Answer:   Immediate    Comprehensive metabolic panel     Standing Status:   Standing     Number of Occurrences:   1     Order Specific Question:   Release result to MyChart     Answer:   Immediate    APTT     Standing Status:   Standing     Number of Occurrences:   1     Order Specific Question:   Release result to MyChart      Answer:   Immediate [1]    Bilirubin, Direct     Standing Status:   Standing     Number of Occurrences:   1     Order Specific Question:   Release result to MyChart     Answer:   Immediate [1]    CK     Standing Status:   Standing     Number of Occurrences:   1     Order Specific Question:   Release result to MyChart     Answer:   Immediate [1]    C-Reactive Protein     Standing Status:   Standing     Number of Occurrences:   1     Order Specific Question:   Release result to MyChart     Answer:   Immediate [1]    Ferritin     Standing Status:   Standing     Number of Occurrences:   1     Order Specific Question:   Release result to MyChart     Answer:   Immediate [1]    Magnesium     Standing Status:   Standing     Number of Occurrences:   1     Order Specific Question:   Release result to MyChart     Answer:   Immediate [1]    Phosphorus     Standing Status:   Standing     Number of Occurrences:   1     Order Specific Question:   Release result to MyChart     Answer:   Immediate [1]    Protime-INR     Standing Status:   Standing     Number of Occurrences:   1     Order Specific Question:   Release result to MyChart     Answer:   Immediate [1]    Adult diet     Order Specific Question:   Diet type     Answer:   Regular    Treatment Conditions     Hold treatment and notify provider if:   Adverse Event GREATER THAN OR EQUAL TO Grade 3     Standing Status:   Standing     Number of Occurrences:   1    Provider Communication - [id number]      Dose Level 4              Tarlatamab 0.1 mg   Dose Level 5              Tarlatamab 0.3 mg   Dose Level 6              Tarlatamab 1 mg   Dose Level 7              Tarlatamab 3 mg   Dose Level 8              Tarlatamab 10 mg   Dose Level 9              Tarlatamab 30 mg   Dose Level 10              Tarlatamab 100 mg     Standing Status:   Standing     Number of Occurrences:   1    Research Triplicate ECG     Refer to study SmartPhrase for instructions and documentation of the research  triplicate ECG.     Standing Status:   Standing     Number of Occurrences:   1    Research Triplicate ECG     Refer to study Lucio for instructions and documentation of the research triplicate ECG.     Standing Status:   Standing     Number of Occurrences:   1    Nursing Communication - Hypersensitivity Management, Moderate     Flushing, rash, pruritus, dyspnea, chest discomfort, back pain, angioedema, SBP LESS THAN 90 mmHg, and/or change in mental status above or below patient's baseline. In the event of moderate or severe hypersensitivity reaction to any medication:   Stop infusion.  Assess vital signs, pulse oximetry, nursing assessment, and patient complaints.  Administer medications per Hypersensitivity Reaction Medication Protocol.   Notify physician.     Standing Status:   Standing     Number of Occurrences:   1    Nursing Communication - Hypersensitivity Management, Severe     Worsening of moderate reaction symptoms, SBP LESS THAN 80 mmHg, and/or respiratory distress (respirations GREATER THAN 40 breaths per minute, wheezing, life-threatening symptoms). In the event of moderate or severe hypersensitivity reaction to any medication:   Stop infusion.  Assess vital signs, pulse oximetry, nursing assessment, and patient complaints.  Administer medications per Hypersensitivity Reaction Medication Protocol.   Notify physician.     Standing Status:   Standing     Number of Occurrences:   1    Nursing Communication - Respiratory Management     Oxygen via nasal cannula at 4 L per minute for respiratory rate GREATER THAN OR EQUAL TO to 28 breaths/minute and/or wheezing. Pulse Oximetry continuous monitoring.     Standing Status:   Standing     Number of Occurrences:   1    Nursing Communication - Vascular Access     Refer to the vascular access device resource page and the following policies for additional information on line insertion, maintenance and removal.    CP-148 - Central Vascular Access Device  Insertion, Maintenance, and Removal, Adult  NP-28 - Midline Cathter Maintenance & Removal, Adult  NP-34 - High-flow Catheters, (e.g. Hemodialysis, Apheresis, and Continuous Renal Replacement Therapy [CRRT]), Care and Utilization, Adult  NP-39 - Peripheral Intravenous Catheter (PIV) Insertion, Maintenance, Blood Collection & Removal - Adult     Standing Status:   Standing     Number of Occurrences:   1    CENTRAL VENOUS LINE DRESSING CHANGE - ADULT     Standing Status:   Standing     Number of Occurrences:   1    Pulse oximetry, continuous     Continuous monitoring to maintain SPO2 GREATER THAN 90%     Standing Status:   Standing     Number of Occurrences:   1    Venous Access, CVAD     Standing Status:   Standing     Number of Occurrences:   1    Insert peripheral IV     Obtain venous access for treatment via PIV if no CVAD access or if unable to obtain blood return from CVAD.     Standing Status:   Standing     Number of Occurrences:   1    Convert IV to saline lock     Only if venous access is required over consecutive days. Peripheral catheter can remain in place until completion of last consecutive day infusion, unless IV-related complications are encountered.     Standing Status:   Standing     Number of Occurrences:   1        No study found     Study Specific Instructions and Documentation   LABS  PREMEDS  VITALS  STUDY DRUG  VITALS  DISCHARGE     PRE-DOSE Safety Labs   Refer to [facility] Treatment Plan for orders (Done @ 8528)     Day 15 Pre-dose Vital Signs    Temp, HR, Resp, BP, Pulse Oximetry: Seated or Semi-Fowlers x 5 minutes before obtaining  Position and temperature location: should be the same that is used throughout the study-record position (Done @ 1024)     EKGs listed in treatment plan, per Supriya MCFARLANE no EKGs need to be done per protocol    3.5ml PK tube to be drawn per Supriya MCFARLANE, PK drawn @ 1027    Research Drug Administration Tarlatamab ()   Refer to [facility] Treatment Plan for orders    Administer dose over 60 minutes (+/- 10 mins) followed by a 3 - 5 minute Normal saline flush. (This will be the end time after the flush). Document start time & end of study medication; document start time and end time of the flush.  Participant to remain on Unit for 2 hour post dose observation.      Infusion-Related Reactions     SOC  Grading and Management of Cytokine Release Syndrome   CRS Grade Description of Severity Minimum Expected Intervention Instructions for Dose Modifications of TARLATAMAB         1 Symptoms are not life threatening and require symptomatic treatment only, eg, fever, nausea, fatigue, headache, myalgias, malaise Administer symptomatic treatment (eg, paracetamol/ acetaminophen for fever). Monitor for CRS symptoms including vital signs and pulse oximetry at least Q2 hours for 12 hours or until resolution,  Whichever is earlier. N/A                             2 Symptoms require and respond to moderate intervention  Oxygen requirement <40%, OR  Hypotension responsive to fluids or low dose of one vasopressor, OR  Grade 2 organ toxicity or grade 3 transaminitis per CTCAE criteria Administer:  Symptomatic treatment (eg, paracetamol/ acetaminophen for fever)  Supplemental oxygen when oxygen saturation is <90% on room air  Intravenous fluids or low dose vasopressor for hypotension when systolic blood pressure is  <85 mmHg. Persistent tachycardia (eg >120 bpm) may also indicate the need for intervention for hypotension.  Monitor for CRS symptoms including vital signs and pulse oximetry at least Q2 hours for 12 hours or until resolution to CRS grade <= 1, whichever is Earlier.  For subjects with extensive co-morbidities or poor performance status, manage  per grade 3 CRS guidance below If CRS occurs during TARLATAMAB treatment, immediately interrupt the infusion and delay the next TARLATAMAB dose until the event resolves to CRS  grade <= 1 for no less than 72 hours.    Permanently discontinue  TARLATAMAB if there is no improvement to CRS  <= grade 1 within 7 days                     3 Symptoms require and respond to aggressive intervention  Oxygen requirement >=40%, OR    Hypotension requiring high dose or multiple vasopressors, OR  Grade 3 organ toxicity or grade 4 transaminitis per CTCAE criteria Admit to intensive care unit for close clinical and vital sign monitoring per institutional Guidelines.  Administer dexamethasone (or equivalent) IV at a dose maximum of 3 doses of 8 mg (24 mg/day). The dose should Then be reduced step-wise.  Investigators should consider use of tocilizumab 8 mg/kg over 1 hour (not to exceed 800mg). Repeat tocilizumab every 8 hours as needed if not responsive to IV fluids or  Increasing supplemental oxygen. Maximum of 3 doses in a 24 hour period. Maximum total of 4 doses If CRS occurs during TARLATAMAB treatment, immediately interrupt TARLATAMAB and delay the next dose until event resolves to CRS grade <= 1 for no less than 72 hours.  Permanently discontinue TARLATAMAB if there is no improvement to CRS  <= grade 2 within 5 days or CRS <= grade 1 within 7 days.  Permanently discontinue TARLATAMAB if CRS grade 3 occurs at the initial run in dose (i.e., at MTD1) (Applicable only after  MTD1 has been defined).                       4 Life-threatening symptoms  Requirement for ventilator support OR  Grade 4 organ toxicity  (excluding transaminitis) per CTCAE criteria Admit to intensive care unit for close clinical and vital sign monitoring per institutional Guidelines.  Administer dexamethasone (or equivalent) IV at a dose maximum of 3 doses of 8 mg (24 mg/day). Further steroid use should be discussed with [employer] medical monitor.  Investigators should consider use of tocilizumab 8 mg/kg IV Over 1 hour (not to exceed 800mg). Repeat tocilizumab every 8 hours as needed if not responsive to IV fluids or increasing supplemental Oxygen. Maximum of 3 doses  In a 24 hour period. Maximum  Total of 4 doses. If CRS occurs during TARLATAMAB treatment, Immediately stop the infusion.  Permanently discontinue TARLATAMAB therapy.        Post-Dose Vital Signs   Temp, HR, Resp, BP, Pulse Oximetry: Seated or Semi-Fowlers x 5 minutes before obtaining  Position and temperature location: should be the same that is used throughout the study  End of Infusion (Done @ 1136)     Day 15 Discharge Instructions   Patient may be discharged after 2 hour observation. (Ended @ 1336)     Catie Hansen RN  01/02/24

## 2024-01-03 ENCOUNTER — TELEPHONE (OUTPATIENT)
Dept: GASTROENTEROLOGY | Facility: CLINIC | Age: 69
End: 2024-01-03
Payer: MEDICARE

## 2024-01-03 NOTE — TELEPHONE ENCOUNTER
----- Message from RUPINDER Mcclellan sent at 12/29/2023 10:34 AM EST -----  Regarding: RE: follow up  ----- Message -----  From: Mounika Pemberton MA  Sent: 12/29/2023  10:30 AM EST  To: RUPINDER Mcclellan  Subject: RE: follow up                                    I called the patient to see how she was doing with her Upper GI symptoms, and to schedule a colonoscopy and possible Egd. Patient states that she is seeing Dr. Ashwin Geller on 1/29/24 in [place]. (She has seen him in the past for GI issues)  Told patient if she changes her mind and wishes to see one of our providers, to please call me back.     ----- Message -----  From: RUPINDER Mcclellan  Sent: 12/28/2023   5:34 PM EST  To: Mounika Pemberton MA  Subject: RE: follow up                                    According to my note, I wanted patient to have colonoscopy.  The order was placed December 20, 2023.  These call the patient and schedule colonoscopy.  If her upper GI symptoms or not better after starting PPI, we should add on a EGD as she reported significant weight loss.  I know she has lung cancer but may need GI workup for weight loss along with GI symptoms if she has questions, schedule a follow-up visit virtually can discuss with her further.  Thank you  ----- Message -----  From: Mounika Pemberton MA  Sent: 12/27/2023   8:48 AM EST  To: RUPINDER Mcclellan  Subject: RE: follow up                                    Hi, I do not have this patient scheduled for a colonoscopy. She is scheduled for a 4 week follow up with you.   ----- Message -----  From: RUPINDER Mcclellan  Sent: 12/26/2023   8:48 PM EST  To: Mounika Pemberton MA  Subject: follow up                                        Please call patient and see if her upper gi symptoms better since starting PPI, if not we should add on EGD with colonscopy

## 2024-01-03 NOTE — ADDENDUM NOTE
Encounter addended by: CASEY Stone-CNP on: 1/2/2024 8:32 PM   Actions taken: Order list changed, Diagnosis association updated, Clinical Note Signed

## 2024-01-11 ENCOUNTER — HOSPITAL ENCOUNTER (OUTPATIENT)
Dept: RADIOLOGY | Facility: HOSPITAL | Age: 69
Discharge: HOME | End: 2024-01-11
Payer: MEDICARE

## 2024-01-11 DIAGNOSIS — R10.84 GENERALIZED ABDOMINAL PAIN: ICD-10-CM

## 2024-01-11 DIAGNOSIS — K57.92 DIVERTICULITIS: ICD-10-CM

## 2024-01-11 PROCEDURE — 2550000001 HC RX 255 CONTRASTS: Performed by: SURGERY

## 2024-01-11 PROCEDURE — 74177 CT ABD & PELVIS W/CONTRAST: CPT | Performed by: STUDENT IN AN ORGANIZED HEALTH CARE EDUCATION/TRAINING PROGRAM

## 2024-01-11 PROCEDURE — 74177 CT ABD & PELVIS W/CONTRAST: CPT

## 2024-01-11 RX ADMIN — IOHEXOL 75 ML: 350 INJECTION, SOLUTION INTRAVENOUS at 09:22

## 2024-01-15 DIAGNOSIS — C34.90 SMALL CELL LUNG CANCER (MULTI): Primary | ICD-10-CM

## 2024-01-15 RX ORDER — ALBUTEROL SULFATE 0.83 MG/ML
3 SOLUTION RESPIRATORY (INHALATION) AS NEEDED
Status: CANCELLED | OUTPATIENT
Start: 2024-01-16

## 2024-01-15 RX ORDER — EPINEPHRINE 1 MG/ML
0.3 INJECTION INTRAMUSCULAR; INTRAVENOUS; SUBCUTANEOUS EVERY 5 MIN PRN
Status: CANCELLED | OUTPATIENT
Start: 2024-01-16

## 2024-01-15 RX ORDER — FAMOTIDINE 10 MG/ML
20 INJECTION INTRAVENOUS ONCE AS NEEDED
Status: CANCELLED | OUTPATIENT
Start: 2024-01-16

## 2024-01-15 RX ORDER — DIPHENHYDRAMINE HYDROCHLORIDE 50 MG/ML
50 INJECTION INTRAMUSCULAR; INTRAVENOUS AS NEEDED
Status: CANCELLED | OUTPATIENT
Start: 2024-01-16

## 2024-01-16 ENCOUNTER — EDUCATION (OUTPATIENT)
Dept: HEMATOLOGY/ONCOLOGY | Facility: HOSPITAL | Age: 69
End: 2024-01-16
Payer: MEDICARE

## 2024-01-16 ENCOUNTER — HOSPITAL ENCOUNTER (OUTPATIENT)
Dept: RESEARCH | Facility: HOSPITAL | Age: 69
Discharge: HOME | End: 2024-01-16
Payer: MEDICARE

## 2024-01-16 VITALS
OXYGEN SATURATION: 98 % | WEIGHT: 117.06 LBS | HEART RATE: 68 BPM | BODY MASS INDEX: 22.12 KG/M2 | SYSTOLIC BLOOD PRESSURE: 105 MMHG | DIASTOLIC BLOOD PRESSURE: 74 MMHG | RESPIRATION RATE: 16 BRPM | TEMPERATURE: 97 F

## 2024-01-16 DIAGNOSIS — C34.90 SMALL CELL LUNG CANCER (MULTI): ICD-10-CM

## 2024-01-16 LAB
ALBUMIN SERPL BCP-MCNC: 3.6 G/DL (ref 3.4–5)
ALP SERPL-CCNC: 43 U/L (ref 33–136)
ALT SERPL W P-5'-P-CCNC: 10 U/L (ref 7–45)
AMYLASE SERPL-CCNC: 26 U/L (ref 29–103)
ANION GAP SERPL CALC-SCNC: 12 MMOL/L (ref 10–20)
APPEARANCE UR: CLEAR
APTT PPP: 30 SECONDS (ref 27–38)
AST SERPL W P-5'-P-CCNC: 12 U/L (ref 9–39)
BASOPHILS # BLD AUTO: 0.05 X10*3/UL (ref 0–0.1)
BASOPHILS NFR BLD AUTO: 0.9 %
BILIRUB DIRECT SERPL-MCNC: 0.1 MG/DL (ref 0–0.3)
BILIRUB SERPL-MCNC: 0.5 MG/DL (ref 0–1.2)
BILIRUB UR STRIP.AUTO-MCNC: NEGATIVE MG/DL
BUN SERPL-MCNC: 21 MG/DL (ref 6–23)
CALCIUM SERPL-MCNC: 8.9 MG/DL (ref 8.6–10.6)
CHLORIDE SERPL-SCNC: 107 MMOL/L (ref 98–107)
CK SERPL-CCNC: 37 U/L (ref 0–215)
CO2 SERPL-SCNC: 26 MMOL/L (ref 21–32)
COLOR UR: YELLOW
CORTIS SERPL-MCNC: 7.6 UG/DL (ref 2.5–20)
CREAT SERPL-MCNC: 0.86 MG/DL (ref 0.5–1.05)
CRP SERPL-MCNC: <0.1 MG/DL
EGFRCR SERPLBLD CKD-EPI 2021: 74 ML/MIN/1.73M*2
EOSINOPHIL # BLD AUTO: 0.21 X10*3/UL (ref 0–0.7)
EOSINOPHIL NFR BLD AUTO: 3.7 %
ERYTHROCYTE [DISTWIDTH] IN BLOOD BY AUTOMATED COUNT: 13.1 % (ref 11.5–14.5)
ESTRADIOL SERPL-MCNC: <19 PG/ML
FERRITIN SERPL-MCNC: 110 NG/ML (ref 8–150)
FSH SERPL-ACNC: 44 IU/L
GLUCOSE SERPL-MCNC: 92 MG/DL (ref 74–99)
GLUCOSE UR STRIP.AUTO-MCNC: NEGATIVE MG/DL
HCT VFR BLD AUTO: 35.3 % (ref 36–46)
HGB BLD-MCNC: 11.7 G/DL (ref 12–16)
HOLD SPECIMEN: NORMAL
IMM GRANULOCYTES # BLD AUTO: 0.04 X10*3/UL (ref 0–0.7)
IMM GRANULOCYTES NFR BLD AUTO: 0.7 % (ref 0–0.9)
INR PPP: 1 (ref 0.9–1.1)
KETONES UR STRIP.AUTO-MCNC: ABNORMAL MG/DL
LEUKOCYTE ESTERASE UR QL STRIP.AUTO: NEGATIVE
LH SERPL-ACNC: 24.8 IU/L
LIPASE SERPL-CCNC: 34 U/L (ref 9–82)
LYMPHOCYTES # BLD AUTO: 1.44 X10*3/UL (ref 1.2–4.8)
LYMPHOCYTES NFR BLD AUTO: 25.6 %
MAGNESIUM SERPL-MCNC: 1.95 MG/DL (ref 1.6–2.4)
MCH RBC QN AUTO: 31.2 PG (ref 26–34)
MCHC RBC AUTO-ENTMCNC: 33.1 G/DL (ref 32–36)
MCV RBC AUTO: 94 FL (ref 80–100)
MONOCYTES # BLD AUTO: 0.6 X10*3/UL (ref 0.1–1)
MONOCYTES NFR BLD AUTO: 10.7 %
NEUTROPHILS # BLD AUTO: 3.29 X10*3/UL (ref 1.2–7.7)
NEUTROPHILS NFR BLD AUTO: 58.4 %
NITRITE UR QL STRIP.AUTO: NEGATIVE
NRBC BLD-RTO: 0 /100 WBCS (ref 0–0)
PH UR STRIP.AUTO: 5 [PH]
PHOSPHATE SERPL-MCNC: 4.9 MG/DL (ref 2.5–4.9)
PLATELET # BLD AUTO: 247 X10*3/UL (ref 150–450)
POTASSIUM SERPL-SCNC: 3.8 MMOL/L (ref 3.5–5.3)
PROT SERPL-MCNC: 5.8 G/DL (ref 6.4–8.2)
PROT UR STRIP.AUTO-MCNC: NEGATIVE MG/DL
PROTHROMBIN TIME: 11.4 SECONDS (ref 9.8–12.8)
RBC # BLD AUTO: 3.75 X10*6/UL (ref 4–5.2)
RBC # UR STRIP.AUTO: NEGATIVE /UL
SODIUM SERPL-SCNC: 141 MMOL/L (ref 136–145)
SP GR UR STRIP.AUTO: 1.02
T4 FREE SERPL-MCNC: 0.91 NG/DL (ref 0.78–1.48)
TSH SERPL-ACNC: 11.9 MIU/L (ref 0.44–3.98)
UROBILINOGEN UR STRIP.AUTO-MCNC: <2 MG/DL
WBC # BLD AUTO: 5.6 X10*3/UL (ref 4.4–11.3)

## 2024-01-16 PROCEDURE — 81003 URINALYSIS AUTO W/O SCOPE: CPT

## 2024-01-16 PROCEDURE — 82248 BILIRUBIN DIRECT: CPT

## 2024-01-16 PROCEDURE — 85610 PROTHROMBIN TIME: CPT

## 2024-01-16 PROCEDURE — 2500000005 HC RX 250 GENERAL PHARMACY W/O HCPCS

## 2024-01-16 PROCEDURE — 83735 ASSAY OF MAGNESIUM: CPT

## 2024-01-16 PROCEDURE — 96413 CHEMO IV INFUSION 1 HR: CPT

## 2024-01-16 PROCEDURE — 82024 ASSAY OF ACTH: CPT

## 2024-01-16 PROCEDURE — 82728 ASSAY OF FERRITIN: CPT

## 2024-01-16 PROCEDURE — 85730 THROMBOPLASTIN TIME PARTIAL: CPT

## 2024-01-16 PROCEDURE — 84439 ASSAY OF FREE THYROXINE: CPT

## 2024-01-16 PROCEDURE — 82670 ASSAY OF TOTAL ESTRADIOL: CPT

## 2024-01-16 PROCEDURE — 85025 COMPLETE CBC W/AUTO DIFF WBC: CPT

## 2024-01-16 PROCEDURE — 82550 ASSAY OF CK (CPK): CPT

## 2024-01-16 PROCEDURE — 82150 ASSAY OF AMYLASE: CPT

## 2024-01-16 PROCEDURE — 83690 ASSAY OF LIPASE: CPT

## 2024-01-16 PROCEDURE — 84443 ASSAY THYROID STIM HORMONE: CPT

## 2024-01-16 PROCEDURE — 86140 C-REACTIVE PROTEIN: CPT

## 2024-01-16 PROCEDURE — 84100 ASSAY OF PHOSPHORUS: CPT

## 2024-01-16 PROCEDURE — 82533 TOTAL CORTISOL: CPT

## 2024-01-16 PROCEDURE — 80053 COMPREHEN METABOLIC PANEL: CPT

## 2024-01-16 PROCEDURE — 99214 OFFICE O/P EST MOD 30 MIN: CPT

## 2024-01-16 PROCEDURE — 83001 ASSAY OF GONADOTROPIN (FSH): CPT

## 2024-01-16 RX ORDER — HEPARIN 100 UNIT/ML
500 SYRINGE INTRAVENOUS AS NEEDED
Status: CANCELLED | OUTPATIENT
Start: 2024-01-16

## 2024-01-16 RX ORDER — HEPARIN SODIUM,PORCINE/PF 10 UNIT/ML
50 SYRINGE (ML) INTRAVENOUS AS NEEDED
Status: CANCELLED | OUTPATIENT
Start: 2024-01-16

## 2024-01-16 RX ADMIN — Medication 3 MG: at 10:56

## 2024-01-16 ASSESSMENT — PAIN SCALES - GENERAL: PAINLEVEL: 0-NO PAIN

## 2024-01-16 NOTE — RESEARCH NOTES
Research Note Treatment Day    Gladys Branch is here today for treatment on [id number]. Today is C13D1. Procedures completed per protocol. AE's and con-meds reviewed with patient. Patient is aware of treatment plan.    [x]   Received treatment as planned   OR  []    Treatment delayed; patient calendar updated as required   Treatment delayed because:    []   AE    []   Physician Discretion    []   Clinical Deterioration or Progression     []   Other    Education Documentation  General Medication Information, taught by Vladimir Fernandez RN at 1/16/2024 10:28 AM.  Learner: Significant Other, Patient  Readiness: Eager  Method: Explanation, Handout  Response: Verbalizes Understanding    Treatment Plan and Schedule, taught by Vladimir Fenrandez RN at 1/16/2024 10:28 AM.  Learner: Significant Other, Patient  Readiness: Eager  Method: Explanation, Handout  Response: Verbalizes Understanding    Supportive Medications, taught by Vladimir Fernandez RN at 1/16/2024 10:28 AM.  Learner: Significant Other, Patient  Readiness: Eager  Method: Explanation, Handout  Response: Verbalizes Understanding    Diagnostic Studies, taught by Vladimir Fernandez RN at 1/16/2024 10:28 AM.  Learner: Significant Other, Patient  Readiness: Eager  Method: Explanation, Handout  Response: Verbalizes Understanding    Education Comments  No comments found.

## 2024-01-16 NOTE — PROGRESS NOTES
Patient ID: Gladys Branch is a 68 y.o. female.    DIAGNOSIS    Small Cell Lung Cancer    By immunostaining, the tumor cells are positive for CAM 5.2, INSM1, and TTF-1, and negative for p40 and LCA. The proliferation index by Ki-67 immunostaining is approximately 90%.  Synaptophysin, Chromogranin and INSM-1 are positive.  AE1/AE3 is negative. NEOGENOMICS, RB protein expression is lost in the tumor cells    STAGING    [id number], limited stage  Recurrence    CURRENT SITES OF DISEASE    RLL, supraclavicular    MOLECULAR GENOMICS      PRIOR THERAPY    Concurrent chemoradiation with Cisplatin/Etoposide every 3 weeks; C1D1 2/15/22, reaction to cisplatin C2D1 and did not receive D2 or D3 etoposide  Carbo/etoposide 4/5/2022 1 cycle c/b rash  PCI 5/21-6/13/22    CURRENT THERAPY    Phase 1 study [id number]  with study drug Taralatamab 1/24/23 - present.    CURRENT ONCOLOGICAL PROBLEMS      HISTORY OF PRESENT ILLNESS    Mrs. Branch is a 65 yo with PMH GERD and COPD who originally was round to have a RLL nodule measuring 11 mm in 4/28/2021 found as part of CT calcium score scan. An ensuing CT chest w/o contrast was done on 5/19/21 which revealed subsolid pulmonary nodules measuring 14 mm in the RLL. She had CT chest w/contrast on 11/29/21 which confirmed stable 14 mm GGO of the RLL and decreased RLL subpleural nodule. She met thoracic surgeon Dr. Farfan, who ordered PET/CT scan done on 12/8/21 which did not reveal any FDG-avid nodules within the lung parenchyma but R hilar nodes with max SUV 8.0. He referred her for bronch, which was done on 1/21/22 by Dr. Mcdaniels. 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 Brain MRI was negative.    She started concurrent chemoradiation with BID radiation with cis/etop 2/15/22, and unfortunately had a reaction to cisplatin on C2D1 with chest pain and hypotension. She was hospitalized with sepsis felt to be due to pneumonia. She trialed carboplatin/etoposide on 4/5/22 with a  resulting rash and opted to forego further chemotherapy as she had completed radiation. Restaging scan 11/3/22 unfortunately with progression with new R hilar lymph node, paratracheal nodes, and increase in size of R supraclavicular mass. She enrolled in [id number] and started C1D1 1/24/23. C1 complicated by anorexia and dysgeusia, and delayed C2 by a week. She has further struggled with fatigue and confusion, which may be related to mirtazapine. Dose reduced for C2 and mirtazapine stopped with improvement in symptoms.     PAST MEDICAL HISTORY    GERD  COPD    SOCIAL HISTORY    Retired - lighting . Lives at home with  and a kitten.  Tobacco: quit smoking 1999. 1 ppd x 25 yrs.  EtOH: wine 1 glass/day  Illicits: none    CURRENT MEDS    Please see med list    ALLERGIES    Codeine, Sulfa drugs, Cisplatin    FAMILY HISTORY    Paternal aunt - breast cancer dx 80s    Subjective    Today 1.16.2024. Patient in clinic with her  for C13D1 for [id number].    Patient reports she continues to tolerate treatment well. She reports this past week she had urinary symptoms of burning sensation, pain and unable to complete her bladder completely. She went to the urgent care and was started on antibiotics. She reports since she has been on the antibiotics her urinary symptoms have resolved. She continues to have her baseline symptoms of acid reflux, intermittent constipation and mild numbness in her feet. She reports she did meet with gastroenterology. She reports having plans for a endoscopy and colonoscopy. However her most recent imaging suggest unresolved diverticulitis. GI mentioned they will postponed the colonoscopy under her diverticulitis resolves and given she is on antibiotics for her UTI it could also help with her diverticulitis. She reports she continues to have good appetite. She reports having intermittent episodes of abdominal pain. She reports she continues to be active and does not need any  "help with her ADL's.  She continues to have mild symptoms of memory issue which is stable and not worsen. Patient denies any pain, dizziness, lightheadedness, headaches, rash/itching, chills or fever, SOB, cough, sputum, chest pain or chest tightness, painful inflammation/ulceration oral mucous membranes, nausea/vomiting, diarrhea, hematemesis /hemoptysis, hematuria/rectal bleeding, urinary symptoms, swelling extremities, weakness/fatigue. ROS as above. Remainder of 10 point review of systems elicited and otherwise unremarkable.     Objective          12/19/2023     9:49 AM 12/19/2023    10:59 AM 12/20/2023    12:56 PM 1/2/2024     7:35 AM 1/2/2024    10:24 AM 1/2/2024    11:36 AM 1/16/2024     7:22 AM   Vitals   Systolic 109 101 121 105 115 116 113   Diastolic 72 73 79 66 82 78 79   Heart Rate 71 75 74 80 66 77 82   Temp 36.6 °C (97.9 °F) 36.7 °C (98.1 °F)  36.6 °C (97.8 °F) 36.8 °C (98.2 °F) 36.7 °C (98.1 °F) 36.2 °C (97.2 °F)   Resp 16 16  18 18 18 16   Height (in)   1.549 m (5' 1\")       Weight (lb)   111 111.55   117.06   BMI   20.97 kg/m2 21.08 kg/m2   22.12 kg/m2   BSA (m2)   1.47 m2 1.48 m2   1.51 m2   Visit Report Report Report Report           Daily Weight  01/16/24 : 53.1 kg (117 lb 1 oz)  01/02/24 : 50.6 kg (111 lb 8.8 oz)  12/20/23 : 50.3 kg (111 lb)  12/19/23 : 50.1 kg (110 lb 7.2 oz)  12/05/23 : 49.9 kg (110 lb 0.2 oz)         Physical Exam  Constitutional:       General: She is awake.      Appearance: Normal appearance. She is normal weight.   HENT:      Head: Normocephalic.      Mouth/Throat:      Mouth: Mucous membranes are moist.   Eyes:      Extraocular Movements: Extraocular movements intact.      Conjunctiva/sclera: Conjunctivae normal.      Pupils: Pupils are equal, round, and reactive to light.   Cardiovascular:      Rate and Rhythm: Normal rate and regular rhythm.      Heart sounds: Normal heart sounds, S1 normal and S2 normal.   Pulmonary:      Effort: Pulmonary effort is normal.      " Breath sounds: Normal breath sounds.   Abdominal:      General: Abdomen is flat. Bowel sounds are normal.      Palpations: Abdomen is soft.   Musculoskeletal:      Cervical back: Normal range of motion and neck supple.   Skin:     Comments: No skin rash observed   Neurological:      Mental Status: She is alert.      Cranial Nerves: Cranial nerves 2-12 are intact.      Sensory: Sensation is intact.      Motor: Motor function is intact.      Coordination: Coordination is intact.   Psychiatric:         Attention and Perception: Attention normal.         Mood and Affect: Mood normal.         Speech: Speech normal.         Behavior: Behavior normal. Behavior is cooperative.         Cognition and Memory: Cognition normal.     Labs    Hospital Outpatient Visit on 01/16/2024   Component Date Value Ref Range Status    WBC 01/16/2024 5.6  4.4 - 11.3 x10*3/uL Final    nRBC 01/16/2024 0.0  0.0 - 0.0 /100 WBCs Final    RBC 01/16/2024 3.75 (L)  4.00 - 5.20 x10*6/uL Final    Hemoglobin 01/16/2024 11.7 (L)  12.0 - 16.0 g/dL Final    Hematocrit 01/16/2024 35.3 (L)  36.0 - 46.0 % Final    MCV 01/16/2024 94  80 - 100 fL Final    MCH 01/16/2024 31.2  26.0 - 34.0 pg Final    MCHC 01/16/2024 33.1  32.0 - 36.0 g/dL Final    RDW 01/16/2024 13.1  11.5 - 14.5 % Final    Platelets 01/16/2024 247  150 - 450 x10*3/uL Final    Neutrophils % 01/16/2024 58.4  40.0 - 80.0 % Final    Immature Granulocytes %, Automated 01/16/2024 0.7  0.0 - 0.9 % Final    Lymphocytes % 01/16/2024 25.6  13.0 - 44.0 % Final    Monocytes % 01/16/2024 10.7  2.0 - 10.0 % Final    Eosinophils % 01/16/2024 3.7  0.0 - 6.0 % Final    Basophils % 01/16/2024 0.9  0.0 - 2.0 % Final    Neutrophils Absolute 01/16/2024 3.29  1.20 - 7.70 x10*3/uL Final    Immature Granulocytes Absolute, Au* 01/16/2024 0.04  0.00 - 0.70 x10*3/uL Final    Lymphocytes Absolute 01/16/2024 1.44  1.20 - 4.80 x10*3/uL Final    Monocytes Absolute 01/16/2024 0.60  0.10 - 1.00 x10*3/uL Final    Eosinophils  Absolute 01/16/2024 0.21  0.00 - 0.70 x10*3/uL Final    Basophils Absolute 01/16/2024 0.05  0.00 - 0.10 x10*3/uL Final    Glucose 01/16/2024 92  74 - 99 mg/dL Final    Sodium 01/16/2024 141  136 - 145 mmol/L Final    Potassium 01/16/2024 3.8  3.5 - 5.3 mmol/L Final    Chloride 01/16/2024 107  98 - 107 mmol/L Final    Bicarbonate 01/16/2024 26  21 - 32 mmol/L Final    Anion Gap 01/16/2024 12  10 - 20 mmol/L Final    Urea Nitrogen 01/16/2024 21  6 - 23 mg/dL Final    Creatinine 01/16/2024 0.86  0.50 - 1.05 mg/dL Final    eGFR 01/16/2024 74  >60 mL/min/1.73m*2 Final    Calcium 01/16/2024 8.9  8.6 - 10.6 mg/dL Final    Albumin 01/16/2024 3.6  3.4 - 5.0 g/dL Final    Alkaline Phosphatase 01/16/2024 43  33 - 136 U/L Final    Total Protein 01/16/2024 5.8 (L)  6.4 - 8.2 g/dL Final    AST 01/16/2024 12  9 - 39 U/L Final    Bilirubin, Total 01/16/2024 0.5  0.0 - 1.2 mg/dL Final    ALT 01/16/2024 10  7 - 45 U/L Final    Amylase 01/16/2024 26 (L)  29 - 103 U/L Final    aPTT 01/16/2024 30  27 - 38 seconds Final    Bilirubin, Direct 01/16/2024 0.1  0.0 - 0.3 mg/dL Final    Creatine Kinase 01/16/2024 37  0 - 215 U/L Final    Cortisol 01/16/2024 7.6  2.5 - 20.0 ug/dL Final    C-Reactive Protein 01/16/2024 <0.10  <1.00 mg/dL Final    Estradiol 01/16/2024 <19  pg/mL Final    Ferritin 01/16/2024 110  8 - 150 ng/mL Final    Follicle Stimulating Hormone 01/16/2024 44.0  IU/L Final    Luteinizing Hormone 01/16/2024 24.8  IU/L Final    Lipase 01/16/2024 34  9 - 82 U/L Final    Magnesium 01/16/2024 1.95  1.60 - 2.40 mg/dL Final    Phosphorus 01/16/2024 4.9  2.5 - 4.9 mg/dL Final    Protime 01/16/2024 11.4  9.8 - 12.8 seconds Final    INR 01/16/2024 1.0  0.9 - 1.1 Final    Thyroid Stimulating Hormone 01/16/2024 11.90 (H)  0.44 - 3.98 mIU/L Final    Thyroxine, Free 01/16/2024 0.91  0.78 - 1.48 ng/dL Final    Color, Urine 01/16/2024 Yellow  Straw, Yellow Final    Appearance, Urine 01/16/2024 Clear  Clear Final    Specific Gravity, Urine  01/16/2024 1.021  1.005 - 1.035 Final    pH, Urine 01/16/2024 5.0  5.0, 5.5, 6.0, 6.5, 7.0, 7.5, 8.0 Final    Protein, Urine 01/16/2024 NEGATIVE  NEGATIVE mg/dL Final    Glucose, Urine 01/16/2024 NEGATIVE  NEGATIVE mg/dL Final    Blood, Urine 01/16/2024 NEGATIVE  NEGATIVE Final    Ketones, Urine 01/16/2024 5 (TRACE) (A)  NEGATIVE mg/dL Final    Bilirubin, Urine 01/16/2024 NEGATIVE  NEGATIVE Final    Urobilinogen, Urine 01/16/2024 <2.0  <2.0 mg/dL Final    Nitrite, Urine 01/16/2024 NEGATIVE  NEGATIVE Final    Leukocyte Esterase, Urine 01/16/2024 NEGATIVE  NEGATIVE Final   Hospital Outpatient Visit on 01/02/2024   Component Date Value Ref Range Status    WBC 01/02/2024 8.4  4.4 - 11.3 x10*3/uL Final    nRBC 01/02/2024 0.0  0.0 - 0.0 /100 WBCs Final    RBC 01/02/2024 3.79 (L)  4.00 - 5.20 x10*6/uL Final    Hemoglobin 01/02/2024 11.7 (L)  12.0 - 16.0 g/dL Final    Hematocrit 01/02/2024 35.4 (L)  36.0 - 46.0 % Final    MCV 01/02/2024 93  80 - 100 fL Final    MCH 01/02/2024 30.9  26.0 - 34.0 pg Final    MCHC 01/02/2024 33.1  32.0 - 36.0 g/dL Final    RDW 01/02/2024 13.3  11.5 - 14.5 % Final    Platelets 01/02/2024 276  150 - 450 x10*3/uL Final    Neutrophils % 01/02/2024 72.2  40.0 - 80.0 % Final    Immature Granulocytes %, Automated 01/02/2024 0.6  0.0 - 0.9 % Final    Lymphocytes % 01/02/2024 15.3  13.0 - 44.0 % Final    Monocytes % 01/02/2024 9.1  2.0 - 10.0 % Final    Eosinophils % 01/02/2024 2.4  0.0 - 6.0 % Final    Basophils % 01/02/2024 0.4  0.0 - 2.0 % Final    Neutrophils Absolute 01/02/2024 6.10  1.20 - 7.70 x10*3/uL Final    Immature Granulocytes Absolute, Au* 01/02/2024 0.05  0.00 - 0.70 x10*3/uL Final    Lymphocytes Absolute 01/02/2024 1.29  1.20 - 4.80 x10*3/uL Final    Monocytes Absolute 01/02/2024 0.77  0.10 - 1.00 x10*3/uL Final    Eosinophils Absolute 01/02/2024 0.20  0.00 - 0.70 x10*3/uL Final    Basophils Absolute 01/02/2024 0.03  0.00 - 0.10 x10*3/uL Final    Glucose 01/02/2024 83  74 - 99 mg/dL  Final    Sodium 01/02/2024 140  136 - 145 mmol/L Final    Potassium 01/02/2024 4.1  3.5 - 5.3 mmol/L Final    Chloride 01/02/2024 106  98 - 107 mmol/L Final    Bicarbonate 01/02/2024 26  21 - 32 mmol/L Final    Anion Gap 01/02/2024 12  10 - 20 mmol/L Final    Urea Nitrogen 01/02/2024 22  6 - 23 mg/dL Final    Creatinine 01/02/2024 0.78  0.50 - 1.05 mg/dL Final    eGFR 01/02/2024 83  >60 mL/min/1.73m*2 Final    Calcium 01/02/2024 8.9  8.6 - 10.6 mg/dL Final    Albumin 01/02/2024 3.6  3.4 - 5.0 g/dL Final    Alkaline Phosphatase 01/02/2024 49  33 - 136 U/L Final    Total Protein 01/02/2024 6.0 (L)  6.4 - 8.2 g/dL Final    AST 01/02/2024 14  9 - 39 U/L Final    Bilirubin, Total 01/02/2024 0.7  0.0 - 1.2 mg/dL Final    ALT 01/02/2024 10  7 - 45 U/L Final    aPTT 01/02/2024 31  27 - 38 seconds Final    Bilirubin, Direct 01/02/2024 0.1  0.0 - 0.3 mg/dL Final    Creatine Kinase 01/02/2024 38  0 - 215 U/L Final    C-Reactive Protein 01/02/2024 0.97  <1.00 mg/dL Final    Ferritin 01/02/2024 122  8 - 150 ng/mL Final    Magnesium 01/02/2024 1.84  1.60 - 2.40 mg/dL Final    Phosphorus 01/02/2024 3.7  2.5 - 4.9 mg/dL Final    Protime 01/02/2024 11.4  9.8 - 12.8 seconds Final    INR 01/02/2024 1.0  0.9 - 1.1 Final    Thyroxine, Free 01/02/2024 0.99  0.78 - 1.48 ng/dL Final    Thyroid Stimulating Hormone 01/02/2024 7.08 (H)  0.44 - 3.98 mIU/L Final   Hospital Outpatient Visit on 12/19/2023   Component Date Value Ref Range Status    WBC 12/19/2023 11.2  4.4 - 11.3 x10*3/uL Final    nRBC 12/19/2023 0.0  0.0 - 0.0 /100 WBCs Final    RBC 12/19/2023 3.83 (L)  4.00 - 5.20 x10*6/uL Final    Hemoglobin 12/19/2023 12.1  12.0 - 16.0 g/dL Final    Hematocrit 12/19/2023 35.8 (L)  36.0 - 46.0 % Final    MCV 12/19/2023 94  80 - 100 fL Final    MCH 12/19/2023 31.6  26.0 - 34.0 pg Final    MCHC 12/19/2023 33.8  32.0 - 36.0 g/dL Final    RDW 12/19/2023 13.6  11.5 - 14.5 % Final    Platelets 12/19/2023 230  150 - 450 x10*3/uL Final    Neutrophils %  12/19/2023 75.6  40.0 - 80.0 % Final    Immature Granulocytes %, Automated 12/19/2023 0.8  0.0 - 0.9 % Final    Lymphocytes % 12/19/2023 13.9  13.0 - 44.0 % Final    Monocytes % 12/19/2023 8.1  2.0 - 10.0 % Final    Eosinophils % 12/19/2023 1.2  0.0 - 6.0 % Final    Basophils % 12/19/2023 0.4  0.0 - 2.0 % Final    Neutrophils Absolute 12/19/2023 8.45 (H)  1.20 - 7.70 x10*3/uL Final    Immature Granulocytes Absolute, Au* 12/19/2023 0.09  0.00 - 0.70 x10*3/uL Final    Lymphocytes Absolute 12/19/2023 1.55  1.20 - 4.80 x10*3/uL Final    Monocytes Absolute 12/19/2023 0.90  0.10 - 1.00 x10*3/uL Final    Eosinophils Absolute 12/19/2023 0.13  0.00 - 0.70 x10*3/uL Final    Basophils Absolute 12/19/2023 0.04  0.00 - 0.10 x10*3/uL Final    Glucose 12/19/2023 112 (H)  74 - 99 mg/dL Final    Sodium 12/19/2023 140  136 - 145 mmol/L Final    Potassium 12/19/2023 3.7  3.5 - 5.3 mmol/L Final    Chloride 12/19/2023 106  98 - 107 mmol/L Final    Bicarbonate 12/19/2023 24  21 - 32 mmol/L Final    Anion Gap 12/19/2023 14  10 - 20 mmol/L Final    Urea Nitrogen 12/19/2023 22  6 - 23 mg/dL Final    Creatinine 12/19/2023 0.77  0.50 - 1.05 mg/dL Final    eGFR 12/19/2023 84  >60 mL/min/1.73m*2 Final    Calcium 12/19/2023 9.1  8.6 - 10.6 mg/dL Final    Albumin 12/19/2023 3.7  3.4 - 5.0 g/dL Final    Alkaline Phosphatase 12/19/2023 49  33 - 136 U/L Final    Total Protein 12/19/2023 6.3 (L)  6.4 - 8.2 g/dL Final    AST 12/19/2023 11  9 - 39 U/L Final    Bilirubin, Total 12/19/2023 0.7  0.0 - 1.2 mg/dL Final    ALT 12/19/2023 9  7 - 45 U/L Final    Amylase 12/19/2023 30  29 - 103 U/L Final    aPTT 12/19/2023 30  27 - 38 seconds Final    Bilirubin, Direct 12/19/2023 0.1  0.0 - 0.3 mg/dL Final    Creatine Kinase 12/19/2023 27  0 - 215 U/L Final    Cortisol 12/19/2023 0.6 (L)  2.5 - 20.0 ug/dL Final    C-Reactive Protein 12/19/2023 8.22 (H)  <1.00 mg/dL Final    Estradiol 12/19/2023 <19  pg/mL Final    Ferritin 12/19/2023 163 (H)  8 - 150 ng/mL  Final    Follicle Stimulating Hormone 12/19/2023 1.0  IU/L Final    Luteinizing Hormone 12/19/2023 <0.1  IU/L Final    Lipase 12/19/2023 18  9 - 82 U/L Final    Magnesium 12/19/2023 1.99  1.60 - 2.40 mg/dL Final    Phosphorus 12/19/2023 3.8  2.5 - 4.9 mg/dL Final    Protime 12/19/2023 11.9  9.8 - 12.8 seconds Final    INR 12/19/2023 1.1  0.9 - 1.1 Final    Thyroid Stimulating Hormone 12/19/2023 2.65  0.44 - 3.98 mIU/L Final    Thyroxine, Free 12/19/2023 0.76 (L)  0.78 - 1.48 ng/dL Final    Color, Urine 12/19/2023 Scarlett (N)  Straw, Yellow Final    Appearance, Urine 12/19/2023 Hazy (N)  Clear Final    Specific Gravity, Urine 12/19/2023 1.030  1.005 - 1.035 Final    pH, Urine 12/19/2023 5.0  5.0, 5.5, 6.0, 6.5, 7.0, 7.5, 8.0 Final    Protein, Urine 12/19/2023 30 (1+) (N)  NEGATIVE mg/dL Final    Glucose, Urine 12/19/2023 NEGATIVE  NEGATIVE mg/dL Final    Blood, Urine 12/19/2023 NEGATIVE  NEGATIVE Final    Ketones, Urine 12/19/2023 5 (TRACE) (A)  NEGATIVE mg/dL Final    Bilirubin, Urine 12/19/2023 NEGATIVE  NEGATIVE Final    Urobilinogen, Urine 12/19/2023 4.0 (N)  <2.0 mg/dL Final    Nitrite, Urine 12/19/2023 NEGATIVE  NEGATIVE Final    Leukocyte Esterase, Urine 12/19/2023 NEGATIVE  NEGATIVE Final    WBC, Urine 12/19/2023 1-5  1-5, NONE /HPF Final    RBC, Urine 12/19/2023 3-5  NONE, 1-2, 3-5 /HPF Final    Squamous Epithelial Cells, Urine 12/19/2023 26-50 (1+)  Reference range not established. /HPF Final    Mucus, Urine 12/19/2023 4+  Reference range not established. /LPF Final    Calcium Oxalate Crystals, Urine 12/19/2023 1+  NONE, 1+ /HPF Final    Extra Tube 12/19/2023 Hold for add-ons.   Final    Extra Tube 12/19/2023 Hold for add-ons.   Final    Extra Tube 12/19/2023 Hold for add-ons.   Final    Vitamin D, 25-Hydroxy, Total 12/19/2023 27 (L)  30 - 100 ng/mL Final    Vitamin B12 12/19/2023 359  211 - 911 pg/mL Final    Cholesterol 12/19/2023 244 (H)  0 - 199 mg/dL Final    HDL-Cholesterol 12/19/2023 78.3  mg/dL Final     Cholesterol/HDL Ratio 12/19/2023 3.1   Final    LDL Calculated 12/19/2023 137 (H)  <=99 mg/dL Final    VLDL 12/19/2023 29  0 - 40 mg/dL Final    Triglycerides 12/19/2023 143  0 - 149 mg/dL Final    Non HDL Cholesterol 12/19/2023 166 (H)  0 - 149 mg/dL Final             Performance Status:    ECOG 1: Restricted in physically strenuous activity but ambulatory and able to carry out work of a light or sedentary nature, e.g., light house work, office work.     CT chest abdomen pelvis w IV contrast    Result Date: 12/14/2023  Impression: CHEST: Stable findings including stable irregular areas of linear and nodular thickening in the right midlung anteriorly.   Stable ground-glass area of nodularity in the right lower lobe.   ABDOMEN-PELVIS: Diverticulosis without definite diverticulitis. No definite metastatic disease to the abdomen or the pelvis.   MACRO: None   Signed by: Micaela Luna 12/14/2023 9:50 AM Dictation workstation:   [id number]     CT abdomen pelvis w IV contrast : 1.11.2021    IMPRESSION:    Probable developing/resolving mild uncomplicated sigmoid  diverticulitis.      Mild growth of right lower lobe ground-glass nodule, 1.5 cm from 1.3  cm on 05/04/2022. Indolent neoplasm cannot be excluded. Consider  further evaluation with PET-CT and/or tissue biopsy.      Assessment/Plan      Mrs. Branch is a 68 yo with COPD and GERD who presented with newly diagnosed SCLC completed BID radiation planned concurrently with cis/etoposide but had a reaction C2D1 to cisplatin. Completed 1 cycle carbo/etop D1 4/5/22 also complicated by facial flushing and erythematous rash and stopped further treatment. Now s/p PCI in 6/2022. Most recent CT concerning for disease progression. Referred for clinical trial [id number], C1D1 1/24/23. Treatment has been complicated by orthostatic hypotension, worsening short-term memory, increased lethargy, weight loss, and poor appetite. Delayed C2 by 1 week and dose-reduced. Confusion has  resolved during C3 after stopping mirtazapine and dose reduction.     # SCLC  - limited stage, brain MRI negative  - previously discussed concurrent chemoradiation, with cisplatin/etoposide with side effects including but not limited to allergic reaction, fatigue, risk of infection, tinnitus, neuropathy, injury to liver/kidney, risk of anemia/thrombocytopenia and was consented  - she was also interested in scalp cooling, which unfortunately she is not a candidate for d/t SCLC  - started concurrent chemoradiation BID with Q3week cis/etop on 2/15 at [facility]  -unfortunately had reaction to cisplatin on C2D1 resulting in hospitalization and also felt to be septic due to pneumonia  - discussed that we typically do 3-4 cycles chemotherapy, and alternative regimens include carboplatin/etoposide vs CAV. Given reaction to cisplatin, concern for possible reaction to carboplatin as well, although unknown rates of cross reaction. Alternatively, they also asked about discontinuation of chemotherapy, since she completed radiation. She ultimately decided to trial carbo/etoposide. She is aware of the heightened risk of allergic reaction, as well as other side effects including but not limited to fatigue, risk of infection, neuropathy, injury to liver/kidney, risk of anemia/thrombocytopenia. consented 3/28/22  -s/p 1 cycle Carbo/Etop D1 4/5/22, developed facial flushing and erythematous rash on arms and chest s/p treatment, resolved with benadryl  -opted to forego further chemotherapy and will continue on maintenance  - s/p PCI 6/2022  - CT C/A/P and brain MRI 5/2022 and most recently 8/2022 with good response and no disease  -  MRI brain 11/7 without evidence of metastatic disease  - CT C/A/P 11/3 with multiple new enlarged LN concerning for disease progression - discussed with Dr. Valdivia not able to repeat radiation.  - referred to phase 1 clinical trial and consideration for [id number] or [id number]  - 1.5.2023 : Patient signed  consent to take part on phase 1 study [id number]. Look clinically good to take part in study. Will start on study ASAP if eligible.   - 1.24.2023: Seen today and ready to start trial on [id number].  - 1.31.2023: Seen today, ready for C1D8 AMGN 1518   - 2.7.2023: Seen today for C1D15 [id number] - continue with trial   - 2.14.2023: Seen in follow-up on [id number] with overall worsening of general health  - 2.21.2023: Seen today for C2D1 AMGN 1518 with continued weight loss although perhaps starting to plateau, more energy since being off treatment, still poor appetite. will delay by 1 week, started dexamethasone 2 mg daily, and consider dose reduction.  - 2.28.2023: Seen today for C2D1 AMGN 1518 after delaying for 1 week. Energy level and appetite are improved, although still losing a little weight. Confusion is worse today, possibly due to mirtazapine vs study-related. Will dose reduce today.  - 3.7.2023: Seen today C2D8 AMGN 1518 after dose-reduction last week. Confusion is mildly improved, energy level and appetite are stable. Will add marinol for appetite.   - 3.14.2023: Seen today C2D15 AMGN 1518. Overall improved: confusion continues to improve, energy level and appetite are improved. Weight is improved. Will continue dexamethasone and marinol.  - 3.15.2023: C2D16 No CRS symptoms observed after last treatment Overall Symptoms are improving and she is tolerating treatment.  -3.16.2023: C2D17 No CRS symptoms observed after last treatment Overall Symptoms are improving and she is tolerating treatment.  - 3.28.2023 : Most recent imaging suggest patient benefiting from current treatment. Ongoing symptom  possible common cold/season allergies. Since the patient looks clinically good we will proceed with C3D1 today. Educated patient to call the office ASAP if symptoms worsen.   - 4.11.2023: C3D15 Feeling well and overall improved with fatigue, confusion, anorexia. Proceed at current dosing and weaning steroids as  below  -4.25.2023: C4D1 Pt is feeling well, no complaints of fatigue, confusion, memory loss. Has gained weight. Energy levels are good. Proceed with the current dosing. Pt is no longer on steroids.   -5.9.2023: C4D15. Pt is tolerating treatment well with no new complains. Continues to have good energy level endorses poor appetite with out any weight loss. Suggested to start taking the dronabinol.  Will proceed with treatment today.  -5.23.2023: C5D1. Pt is tolerating treatment well. Energy levels are good, is having some weight loss and constipation. Started back on dex for appetite. Pt will let us know if she remains constipated over the next several days.  Personally reviewed scans with stable disease. Will proceed with treatment today.   -6.6.2023: C5D15. Continues to tolerate treatment well. Since we restarted her on Dexamethasone she reports her energy levels and appetite has improved. Will continue on low dose Dex. With no new symptoms, we will proceed with treatment today.   -6.20.2023: C6D1. Doing well on dexamethasone 1 mg daily. Will proceed with treatment.  -7.5.2023: C6D15. We will proceed with treatment since the patient is tolerating treatment with no significant AE's and also give 1/2 liter of IV fluids.   - 7.18.2023: C7D1. Doing well, will proceed with treatment. She is out of dexamethasone, and will monitor off steroids.   -8.1.2023: C7D15. Continues to tolerate treatment. Will proceed with C7D15 today. RTC per protocol.  - 8.29.2023: C8D1. C8 delayed due to hospitalization for diverticulitis. Has completed antibiotics and feeling well for treatment.  - 9.12.2023: C8D15. Most recent imgaing suggest the patient continues to benefit from current treatment. The imaging also suggest improvement of previously visualized segmental colitis associated with diverticulosis affecting the sigmoid colon with minimal residual pericolonic fat standing and hyperemia of the sigmoid colon. Imaging also suggest  resolving inflammatory process without evidence of new or worsening complications. We will proceed with C8D15 today since the patient is also clinically feeling good. RTC per protocol.  -9.26.2023: C9D1. Patient looks clinically good. With no JACQUELYN's we will proceed with treatment C9D1 today.  -10.24.23: C10D1. Pt feels well, no acute events, no TRAEs, continue treatment today as scheduled.  -11.7.23: C10D15. Pt feels well, no TRAEs, continue treatment as scheduled.  - 11.21.23: C11D1. Continues to feel well, will continue treatment today.  - 12.19.23: C12D1. Personally reviewed most recent scan without evidence of progression. Continues to feel well and will continue with treatment today.  - 01.02.24: C12D15. Continues to tolerate treatment well. No new JACQUELYN's will proceed with treatment.   - 01.16.24: C13D1. No new JACQUELYN's proceed with treatment. RTC per protocol.    # Confusion - resolved  - significantly worsened after taking double dose of mirtazapine accidentally, although has been generally down-trending since starting study (which is also when she started taking mirtazapine) and now resolved  - brain MRI without progression of cancer  - will continue off mirtazapine    # Unintentional weight loss - stable  # Anorexia- Improving  # Dysgeusia- improving  - all improved on steroids. unclear if this is from cancer or side effect of study drug  - stopped mirtazapine due to concerns for confusion   - weaned off dexamethasone and started marinol, but kept forgetting to take it  - dexamethasone trial started again on 5.23.2023. Continued on 1 mg daily - will trial off after 7.18.23.     # Fatigue - Resolved  - back to normal pretty much, weaning steroids as above    # frequent PVCs - asymptomatic  - saw onco-cardiology and completed 24-hr Holter monitor  - recommended decreasing caffeine and sudafed intake  - continue to monitor    #hyponatremia - improving  - improved recently, will monitor    # Forgetfulness -  improved - however noted decline on AMGN study- unclear etiology.    - started after PCI, on memantine daily and has since stopped  - offered neurocognitive evaluation previously and she will think about this and readdress next visit  -  notes some decline in last 3 weeks- especially short term memory loss.    - Improved    # Neuropathy - resolved  - mild peripheral neuropathy with numbness of the toes - likely due to cisplatin  - will continue to monitor closely  - not endorsing any neuropathy at this visit     # Rash - resolved  Waxing and waning rash throughout her body. ?improving. Unclear etiology, patient and  feel timing coincided with starting BMX.   - she will hold off on further BMX  - thought to be due to sulfa allergy, possibly due to platinum agents   - continue to monitor    #odynophagia - resolved  -rad onc aware  -prescribed BMX solution  -will continue to monitor    #constipation-improving  -occasional. Prescribed Senna S  -will monitor    CASEY Stone-CNP

## 2024-01-16 NOTE — RESEARCH NOTES
RSH><[id number]><C5+D1><PARTSA1&A2>    DCRU NURSING VISIT NOTE  Study Name: ALEXANDRU 1518  IRB#: [id number]  DCRU#: [id number]  Protocol Version Dated: 03.22.23  PI: Roshan Kumar MD.    Time point: Cycle 5 and beyond - Day 1  CYCLE 13     Encounter Date: 01/16/2024  Encounter Time: 0730  Encounter Department: [facility] HEMATOLOGY AND ONCOLOGY     #1     Phone Pager   Alex Fernandez [phone]    #2 Phone Pager        Other Phone Pager          Study Regimen and Dosing   Refer to [place] Treatment Plan for orders  Part A1 Dose Exploration & A2 Dose Expansion - Small Cell Lung Cancer Relapsed or Refractory patients who progressed or recurred following platinum-based chemotherapy will receive AMG-757 to evaluate safety, tolerability and determine the maximum tolerated dose (MTD) or recommended phase 2 dose (RP2D).  Cycle = 28 days  TARLATAMAB () administered IV Day 1 and Day 15.     Dietary Guidelines   Regular diet:     Admission and Prior to Starting Study Activities   Notify  when patient arrives to unit.  Complete DCRU/[facility] intake form in EMR.  Obtain vital signs including Pulse Oximetry after seated or semi-fowlers x 5 mins. Record on flow sheet & in EMR.  Obtain weight in kilograms - with shoes off & heavy items removed.  Insert one peripheral IV line for sample collection procedures (flush line with normal saline following each blood draw). Access mediport (if available) otherwise insert second peripheral line in opposite arm for Investigative drug administration (if peripheral line, flush line with normal saline before & after infusion)  Physical Exam.     Criteria to Treat   DCRU RN reviewed and meets eligibility to proceed with treatment plan   Time team notified: 0915 am  DCRU RN notifies study team to review eligibility and approval before dosing procedures  Time team approves: 0915     Subjective   Gladys Branch is a 68 y.o.  female and is here for a Research clinical visit.    Visit Provider: Gavi, [facility] ROOM 4 [facility]     Allergies:   Allergies   Allergen Reactions    Cisplatin Other     CHEMO INDUCED (Moderate); Dyspepsia (Moderate); Headaches (Moderate); Hypotension (Moderate); Numbness (Moderate); Resp Distress (Moderate)    Codeine Nausea Only and Other     nausea    Sulfa (Sulfonamide Antibiotics) Rash       Objective     Vital Signs:    There were no vitals filed for this visit.    Physical Exam     ASSESSMENT and PLAN:  Problem List Items Addressed This Visit    None       Medications as of the completion of today's visit:  Current Outpatient Medications   Medication Sig Dispense Refill    cetirizine (ZyrTEC) 10 mg tablet Take 1 tablet (10 mg) by mouth once daily. (Patient taking differently: Take 1 tablet (10 mg) by mouth once daily. Alternating with Zyrtec D) 30 tablet 11    cetirizine-pseudoephedrine (ZyrTEC-D) 5-120 mg 12 hr tablet Take 1 tablet by mouth 2 times a day. 60 tablet 11    cholecalciferol (Vitamin D-3) 25 MCG (1000 UT) tablet Take by mouth.      dexAMETHasone (Decadron) 2 mg tablet Take 0.5 tablets (1 mg) by mouth once daily.      dextromethorphan (Delsym) 30 mg/5 mL liquid Take 5 mL (30 mg) by mouth once daily as needed.      dronabinol (Marinol) 2.5 mg capsule once daily as needed. One hour before dinner      estrogens, conjugated, (Premarin) 0.3 mg tablet Take 0.5 tablets (0.15 mg) by mouth once daily.      omeprazole (PriLOSEC) 40 mg DR capsule Take 1 capsule (40 mg) by mouth once daily. Do not crush or chew. 90 each 3    ondansetron (Zofran) 8 mg tablet Take 0.5 tablets (4 mg) by mouth every 8 hours if needed.      polyethylene glycol (Glycolax, Miralax) 17 gram packet Take 17 g by mouth 2 times a day. 60 packet 2    prochlorperazine (Compazine) 10 mg tablet Take 0.5 tablets (5 mg) by mouth every 6 hours if needed.      psyllium husk, aspartame, (Metamucil Sugar-Free, aspart,) 3.4 gram/5.8 gram powder Take 1  Dose by mouth once daily. 425 g 11    vit A/vit C/vit E/zinc/copper (PRESERVISION AREDS ORAL) Take by mouth once daily as needed.       No current facility-administered medications for this visit.           Orders placed during today's visit:  No orders of the defined types were placed in this encounter.       No study found     Study Specific Instructions and Documentation   LABS  PREMEDS  VITALS  CORREATIVES  STUDY DRUG  VITALS  DISCHARGE     PRE-DOSE Safety Labs   Refer to [place] Treatment Plan for orders drawn at 0742 from 22g angiocath in right AC     Pre-dose Vital Signs   Temp, HR, Resp, BP, Pulse Oximetry: Seated or Semi-Fowlers x 5 minutes before obtaining  Position and temperature location: should be the same that is used throughout the study-record position 36.2, 73, 16,98/70 97% sitting , temporal thermometer, at 1035     Pre-dose Research Correlatives  Deliver to [facility]CR[facility] lab for processing   Access Type: 22g angiocath Location: right AC   Refer to [place] Treatment Plan for orders   Time point Specimen Test Volume Tube Handling Draw Time   Pre-dose (within 15 mins of  dosing) Anti- Antibody 2.5 mL SST Ambient, Invert 5X 1050    TARLATAMAB PK  (through cycle 12) 2.5 mL SST Ambient, Invert 5X 1050    Serum Markers 2.5 mL SST Ambient, Invert 5X 1050        Research Drug Administration Tarlatamab ()   Refer to [place] Treatment Plan for orders   Administer dose over 60 minutes (+/- 10 mins) followed by a 3 - 5 minute Normal saline flush. (This will be the end time after the flush). Document start time & end of study medication; document start time and end time of the flush.  Participant to remain on Unit for 2 hour post dose observation.      Infusion-Related Reactions    [facility] SOC  Grading and Management of Cytokine Release Syndrome   CRS Grade Description of Severity Minimum Expected Intervention Instructions for Dose Modifications of TARLATAMAB         1 Symptoms are not life threatening and  require symptomatic treatment only, eg, fever, nausea, fatigue, headache, myalgias, malaise Administer symptomatic treatment (eg, paracetamol/ acetaminophen for fever). Monitor for CRS symptoms including vital signs and pulse oximetry at least Q2 hours for 12 hours or until resolution,  Whichever is earlier. N/A                             2 Symptoms require and respond to moderate intervention  Oxygen requirement <40%, OR  Hypotension responsive to fluids or low dose of one vasopressor, OR  Grade 2 organ toxicity or grade 3 transaminitis per CTCAE criteria Administer:  Symptomatic treatment (eg, paracetamol/ acetaminophen for fever)  Supplemental oxygen when oxygen saturation is <90% on room air  Intravenous fluids or low dose vasopressor for hypotension when systolic blood pressure is  <85 mmHg. Persistent tachycardia (eg >120 bpm) may also indicate the need for intervention for hypotension.  Monitor for CRS symptoms including vital signs and pulse oximetry at least Q2 hours for 12 hours or until resolution to CRS grade <= 1, whichever is Earlier.  For subjects with extensive co-morbidities or poor performance status, manage  per grade 3 CRS guidance below If CRS occurs during TARLATAMAB treatment, immediately interrupt the infusion and delay the next TARLATAMAB dose until the event resolves to CRS  grade <= 1 for no less than 72 hours.    Permanently discontinue TARLATAMAB if there is no improvement to CRS  <= grade 1 within 7 days                     3 Symptoms require and respond to aggressive intervention  Oxygen requirement >=40%, OR    Hypotension requiring high dose or multiple vasopressors, OR  Grade 3 organ toxicity or grade 4 transaminitis per CTCAE criteria Admit to intensive care unit for close clinical and vital sign monitoring per institutional Guidelines.  Administer dexamethasone (or equivalent) IV at a dose maximum of 3 doses of 8 mg (24 mg/day). The dose should Then be reduced  step-wise.  Investigators should consider use of tocilizumab 8 mg/kg over 1 hour (not to exceed 800mg). Repeat tocilizumab every 8 hours as needed if not responsive to IV fluids or  Increasing supplemental oxygen. Maximum of 3 doses in a 24 hour period. Maximum total of 4 doses If CRS occurs during TARLATAMAB treatment, immediately interrupt TARLATAMAB and delay the next dose until event resolves to CRS grade <= 1 for no less than 72 hours.  Permanently discontinue TARLATAMAB if there is no improvement to CRS  <= grade 2 within 5 days or CRS <= grade 1 within 7 days.  Permanently discontinue TARLATAMAB if CRS grade 3 occurs at the initial run in dose (i.e., at MTD1) (Applicable only after  MTD1 has been defined).                       4 Life-threatening symptoms  Requirement for ventilator support OR  Grade 4 organ toxicity  (excluding transaminitis) per CTCAE criteria Admit to intensive care unit for close clinical and vital sign monitoring per institutional Guidelines.  Administer dexamethasone (or equivalent) IV at a dose maximum of 3 doses of 8 mg (24 mg/day). Further steroid use should be discussed with [email or web address] medical monitor.  Investigators should consider use of tocilizumab 8 mg/kg IV Over 1 hour (not to exceed 800mg). Repeat tocilizumab every 8 hours as needed if not responsive to IV fluids or increasing supplemental Oxygen. Maximum of 3 doses  In a 24 hour period. Maximum Total of 4 doses. If CRS occurs during TARLATAMAB treatment, Immediately stop the infusion.  Permanently discontinue TARLATAMAB therapy.        Day 1 Post-Dose Vital Signs   Temp, HR, Resp, BP, Pulse Oximetry: Seated or Semi-Fowlers x 5 minutes before obtaining  Position and temperature location: should be the same that is used throughout the study  End of Infusion 36.1, 68,16,105/74, 98% sitting with temporal thermometer at 1157     Discharge Instructions   Patient may be discharged to home after 2 hour observation.  Completed at  1356  Remind patient to return: Day 15 for treatment & labs     Aurora Chowdary RN  01/16/24

## 2024-01-18 LAB — ACTH PLAS-MCNC: 73.7 PG/ML (ref 7.2–63.3)

## 2024-01-22 ENCOUNTER — APPOINTMENT (OUTPATIENT)
Dept: GASTROENTEROLOGY | Facility: CLINIC | Age: 69
End: 2024-01-22
Payer: MEDICARE

## 2024-01-29 DIAGNOSIS — C34.90 MALIGNANT NEOPLASM OF LUNG, UNSPECIFIED LATERALITY, UNSPECIFIED PART OF LUNG (MULTI): ICD-10-CM

## 2024-01-29 DIAGNOSIS — Z00.6 CLINICAL TRIAL PARTICIPANT: Primary | ICD-10-CM

## 2024-01-29 RX ORDER — EPINEPHRINE 1 MG/ML
0.3 INJECTION INTRAMUSCULAR; INTRAVENOUS; SUBCUTANEOUS EVERY 5 MIN PRN
Status: CANCELLED | OUTPATIENT
Start: 2024-01-30

## 2024-01-29 RX ORDER — FAMOTIDINE 10 MG/ML
20 INJECTION INTRAVENOUS ONCE AS NEEDED
Status: CANCELLED | OUTPATIENT
Start: 2024-01-30

## 2024-01-29 RX ORDER — ALBUTEROL SULFATE 0.83 MG/ML
3 SOLUTION RESPIRATORY (INHALATION) AS NEEDED
Status: CANCELLED | OUTPATIENT
Start: 2024-01-30

## 2024-01-29 RX ORDER — DIPHENHYDRAMINE HYDROCHLORIDE 50 MG/ML
50 INJECTION INTRAMUSCULAR; INTRAVENOUS AS NEEDED
Status: CANCELLED | OUTPATIENT
Start: 2024-01-30

## 2024-01-30 ENCOUNTER — OFFICE VISIT (OUTPATIENT)
Dept: HEMATOLOGY/ONCOLOGY | Facility: HOSPITAL | Age: 69
End: 2024-01-30
Payer: MEDICARE

## 2024-01-30 ENCOUNTER — HOSPITAL ENCOUNTER (OUTPATIENT)
Dept: RESEARCH | Facility: HOSPITAL | Age: 69
Discharge: HOME | End: 2024-01-30
Payer: MEDICARE

## 2024-01-30 ENCOUNTER — EDUCATION (OUTPATIENT)
Dept: HEMATOLOGY/ONCOLOGY | Facility: HOSPITAL | Age: 69
End: 2024-01-30
Payer: MEDICARE

## 2024-01-30 VITALS
OXYGEN SATURATION: 97 % | SYSTOLIC BLOOD PRESSURE: 91 MMHG | WEIGHT: 110.67 LBS | RESPIRATION RATE: 16 BRPM | DIASTOLIC BLOOD PRESSURE: 58 MMHG | HEART RATE: 73 BPM | BODY MASS INDEX: 20.91 KG/M2 | TEMPERATURE: 98.2 F

## 2024-01-30 DIAGNOSIS — C34.90 SMALL CELL LUNG CANCER (MULTI): ICD-10-CM

## 2024-01-30 DIAGNOSIS — C34.90 SMALL CELL LUNG CANCER (MULTI): Primary | ICD-10-CM

## 2024-01-30 LAB
ALBUMIN SERPL BCP-MCNC: 3.5 G/DL (ref 3.4–5)
ALP SERPL-CCNC: 40 U/L (ref 33–136)
ALT SERPL W P-5'-P-CCNC: 10 U/L (ref 7–45)
ANION GAP SERPL CALC-SCNC: 12 MMOL/L (ref 10–20)
APTT PPP: 32 SECONDS (ref 27–38)
AST SERPL W P-5'-P-CCNC: 15 U/L (ref 9–39)
BASOPHILS # BLD AUTO: 0.03 X10*3/UL (ref 0–0.1)
BASOPHILS NFR BLD AUTO: 0.4 %
BILIRUB DIRECT SERPL-MCNC: 0.1 MG/DL (ref 0–0.3)
BILIRUB SERPL-MCNC: 0.7 MG/DL (ref 0–1.2)
BUN SERPL-MCNC: 19 MG/DL (ref 6–23)
CALCIUM SERPL-MCNC: 8.8 MG/DL (ref 8.6–10.6)
CHLORIDE SERPL-SCNC: 106 MMOL/L (ref 98–107)
CK SERPL-CCNC: 36 U/L (ref 0–215)
CO2 SERPL-SCNC: 25 MMOL/L (ref 21–32)
CREAT SERPL-MCNC: 0.76 MG/DL (ref 0.5–1.05)
CRP SERPL-MCNC: 0.49 MG/DL
EGFRCR SERPLBLD CKD-EPI 2021: 85 ML/MIN/1.73M*2
EOSINOPHIL # BLD AUTO: 0.21 X10*3/UL (ref 0–0.7)
EOSINOPHIL NFR BLD AUTO: 3.1 %
ERYTHROCYTE [DISTWIDTH] IN BLOOD BY AUTOMATED COUNT: 13.1 % (ref 11.5–14.5)
FERRITIN SERPL-MCNC: 117 NG/ML (ref 8–150)
GLUCOSE SERPL-MCNC: 90 MG/DL (ref 74–99)
HCT VFR BLD AUTO: 32.7 % (ref 36–46)
HGB BLD-MCNC: 11.1 G/DL (ref 12–16)
IMM GRANULOCYTES # BLD AUTO: 0.03 X10*3/UL (ref 0–0.7)
IMM GRANULOCYTES NFR BLD AUTO: 0.4 % (ref 0–0.9)
INR PPP: 1.1 (ref 0.9–1.1)
LYMPHOCYTES # BLD AUTO: 1.11 X10*3/UL (ref 1.2–4.8)
LYMPHOCYTES NFR BLD AUTO: 16.3 %
MAGNESIUM SERPL-MCNC: 1.76 MG/DL (ref 1.6–2.4)
MCH RBC QN AUTO: 31.7 PG (ref 26–34)
MCHC RBC AUTO-ENTMCNC: 33.9 G/DL (ref 32–36)
MCV RBC AUTO: 93 FL (ref 80–100)
MONOCYTES # BLD AUTO: 0.77 X10*3/UL (ref 0.1–1)
MONOCYTES NFR BLD AUTO: 11.3 %
NEUTROPHILS # BLD AUTO: 4.66 X10*3/UL (ref 1.2–7.7)
NEUTROPHILS NFR BLD AUTO: 68.5 %
NRBC BLD-RTO: 0 /100 WBCS (ref 0–0)
PHOSPHATE SERPL-MCNC: 4.3 MG/DL (ref 2.5–4.9)
PLATELET # BLD AUTO: 194 X10*3/UL (ref 150–450)
POTASSIUM SERPL-SCNC: 3.8 MMOL/L (ref 3.5–5.3)
PROT SERPL-MCNC: 5.6 G/DL (ref 6.4–8.2)
PROTHROMBIN TIME: 12 SECONDS (ref 9.8–12.8)
RBC # BLD AUTO: 3.5 X10*6/UL (ref 4–5.2)
SODIUM SERPL-SCNC: 139 MMOL/L (ref 136–145)
WBC # BLD AUTO: 6.8 X10*3/UL (ref 4.4–11.3)

## 2024-01-30 PROCEDURE — 83735 ASSAY OF MAGNESIUM: CPT | Performed by: STUDENT IN AN ORGANIZED HEALTH CARE EDUCATION/TRAINING PROGRAM

## 2024-01-30 PROCEDURE — 80053 COMPREHEN METABOLIC PANEL: CPT | Performed by: STUDENT IN AN ORGANIZED HEALTH CARE EDUCATION/TRAINING PROGRAM

## 2024-01-30 PROCEDURE — 1126F AMNT PAIN NOTED NONE PRSNT: CPT | Performed by: STUDENT IN AN ORGANIZED HEALTH CARE EDUCATION/TRAINING PROGRAM

## 2024-01-30 PROCEDURE — 82728 ASSAY OF FERRITIN: CPT | Performed by: STUDENT IN AN ORGANIZED HEALTH CARE EDUCATION/TRAINING PROGRAM

## 2024-01-30 PROCEDURE — 1159F MED LIST DOCD IN RCRD: CPT | Performed by: STUDENT IN AN ORGANIZED HEALTH CARE EDUCATION/TRAINING PROGRAM

## 2024-01-30 PROCEDURE — 84100 ASSAY OF PHOSPHORUS: CPT | Performed by: STUDENT IN AN ORGANIZED HEALTH CARE EDUCATION/TRAINING PROGRAM

## 2024-01-30 PROCEDURE — 99214 OFFICE O/P EST MOD 30 MIN: CPT | Mod: 27 | Performed by: STUDENT IN AN ORGANIZED HEALTH CARE EDUCATION/TRAINING PROGRAM

## 2024-01-30 PROCEDURE — 85025 COMPLETE CBC W/AUTO DIFF WBC: CPT | Performed by: STUDENT IN AN ORGANIZED HEALTH CARE EDUCATION/TRAINING PROGRAM

## 2024-01-30 PROCEDURE — 99214 OFFICE O/P EST MOD 30 MIN: CPT | Performed by: STUDENT IN AN ORGANIZED HEALTH CARE EDUCATION/TRAINING PROGRAM

## 2024-01-30 PROCEDURE — 82248 BILIRUBIN DIRECT: CPT | Performed by: STUDENT IN AN ORGANIZED HEALTH CARE EDUCATION/TRAINING PROGRAM

## 2024-01-30 PROCEDURE — 86140 C-REACTIVE PROTEIN: CPT | Performed by: STUDENT IN AN ORGANIZED HEALTH CARE EDUCATION/TRAINING PROGRAM

## 2024-01-30 PROCEDURE — 85610 PROTHROMBIN TIME: CPT | Performed by: STUDENT IN AN ORGANIZED HEALTH CARE EDUCATION/TRAINING PROGRAM

## 2024-01-30 PROCEDURE — 82550 ASSAY OF CK (CPK): CPT | Performed by: STUDENT IN AN ORGANIZED HEALTH CARE EDUCATION/TRAINING PROGRAM

## 2024-01-30 PROCEDURE — 1036F TOBACCO NON-USER: CPT | Performed by: STUDENT IN AN ORGANIZED HEALTH CARE EDUCATION/TRAINING PROGRAM

## 2024-01-30 PROCEDURE — 2500000005 HC RX 250 GENERAL PHARMACY W/O HCPCS: Performed by: STUDENT IN AN ORGANIZED HEALTH CARE EDUCATION/TRAINING PROGRAM

## 2024-01-30 PROCEDURE — 96413 CHEMO IV INFUSION 1 HR: CPT

## 2024-01-30 PROCEDURE — 85730 THROMBOPLASTIN TIME PARTIAL: CPT | Performed by: STUDENT IN AN ORGANIZED HEALTH CARE EDUCATION/TRAINING PROGRAM

## 2024-01-30 RX ORDER — EPINEPHRINE 1 MG/ML
0.3 INJECTION INTRAMUSCULAR; INTRAVENOUS; SUBCUTANEOUS EVERY 5 MIN PRN
Status: DISCONTINUED | OUTPATIENT
Start: 2024-01-30 | End: 2024-01-31 | Stop reason: HOSPADM

## 2024-01-30 RX ORDER — ALBUTEROL SULFATE 0.83 MG/ML
3 SOLUTION RESPIRATORY (INHALATION) AS NEEDED
Status: DISCONTINUED | OUTPATIENT
Start: 2024-01-30 | End: 2024-01-31 | Stop reason: HOSPADM

## 2024-01-30 RX ORDER — DIPHENHYDRAMINE HYDROCHLORIDE 50 MG/ML
50 INJECTION INTRAMUSCULAR; INTRAVENOUS AS NEEDED
Status: DISCONTINUED | OUTPATIENT
Start: 2024-01-30 | End: 2024-01-31 | Stop reason: HOSPADM

## 2024-01-30 RX ORDER — FAMOTIDINE 10 MG/ML
20 INJECTION INTRAVENOUS ONCE AS NEEDED
Status: DISCONTINUED | OUTPATIENT
Start: 2024-01-30 | End: 2024-01-31 | Stop reason: HOSPADM

## 2024-01-30 RX ADMIN — Medication 3 MG: at 10:55

## 2024-01-30 ASSESSMENT — COLUMBIA-SUICIDE SEVERITY RATING SCALE - C-SSRS
2. HAVE YOU ACTUALLY HAD ANY THOUGHTS OF KILLING YOURSELF?: NO
6. HAVE YOU EVER DONE ANYTHING, STARTED TO DO ANYTHING, OR PREPARED TO DO ANYTHING TO END YOUR LIFE?: NO
1. IN THE PAST MONTH, HAVE YOU WISHED YOU WERE DEAD OR WISHED YOU COULD GO TO SLEEP AND NOT WAKE UP?: NO

## 2024-01-30 ASSESSMENT — ENCOUNTER SYMPTOMS
OCCASIONAL FEELINGS OF UNSTEADINESS: 1
DEPRESSION: 0
LOSS OF SENSATION IN FEET: 0

## 2024-01-30 ASSESSMENT — PAIN SCALES - GENERAL: PAINLEVEL: 0-NO PAIN

## 2024-01-30 NOTE — RESEARCH NOTES
Research Note Treatment Day    Gladys Branch is here today for treatment on [id number]. Today is C13D15. Procedures completed per protocol. AE's and con-meds reviewed with patient. Patient is aware of treatment plan.    [x]   Received treatment as planned   OR  []    Treatment delayed; patient calendar updated as required   Treatment delayed because:    []   AE    []   Physician Discretion    []   Clinical Deterioration or Progression     []   Other    Education Documentation  General Medication Information, taught by Vladimir Fernandez RN at 1/30/2024  2:29 PM.  Learner: Significant Other, Patient  Readiness: Eager  Method: Explanation  Response: Verbalizes Understanding    Treatment Plan and Schedule, taught by Vladimir Fernandez RN at 1/30/2024  2:29 PM.  Learner: Significant Other, Patient  Readiness: Eager  Method: Explanation  Response: Verbalizes Understanding    Nutrition, taught by Vladimir Fernandez RN at 1/30/2024  2:29 PM.  Learner: Significant Other, Patient  Readiness: Eager  Method: Explanation  Response: Verbalizes Understanding    Diagnostic Studies, taught by Vladimir Fernandez RN at 1/30/2024  2:29 PM.  Learner: Significant Other, Patient  Readiness: Eager  Method: Explanation  Response: Verbalizes Understanding    Education Comments  No comments found.

## 2024-01-30 NOTE — RESEARCH NOTES
[facility]><[id number]><C5+D15><PART A>    DCRU NURSING VISIT NOTE  Study Name: ALEXANDRU Foote8  IRB#: [id number]  DCRU#: [id number]  Protocol Version Dated: A9 3.22.23  PI: Roshan Kumar MD.    Time point: Cycle 5 and beyond - Day 15  CYCLE 13     Encounter Date: 01/30/2024  Encounter Time:  8:30 AM EST  Encounter Department: [facility] HEMATOLOGY AND ONCOLOGY     #1     Phone Pager   Carmen Rios [phone]    #2 Phone Pager        Other Phone Pager          Study Regimen and Dosing   Part A Dose Exploration/Expansion- Small Cell Lung Cancer Relapsed or Refractory patients who progressed or recurred following at least 1 platinum-based regimen.   Cycle = 28 days    administered IV Day 1, Day 8, and Day 15 of Cycle 1. Cycle 2 and beyond     administered on Day 1 and Day 15.        Dietary Guidelines   Regular diet:     Admission and Prior to Starting Study Activities   Complete DCRU and [facility] Admission/Visit Note.  Notify [facility] of patient arrival after initial lab and vitals  Insert one peripheral IV line for sample collection procedures (flush line with normal saline following each blood draw). Access mediport (if available) otherwise insert second peripheral line in opposite arm for Investigative drug administration (if peripheral line, flush line with normal saline before & after infusion)     Criteria to Treat   DCRU RN reviewed and meets eligibility to proceed with treatment plan   Time team notified: 1004   DCRU RN notifies study team to review eligibility and approval before dosing procedures  Time team approves: 1004      Subjective   Gladys Branch is a 68 y.o. female and is here for a Research clinical visit.    Visit Provider: [id number] [facility] ROOM 7 [facility]     Allergies:   Allergies   Allergen Reactions    Cisplatin Other     CHEMO INDUCED (Moderate); Dyspepsia (Moderate); Headaches (Moderate); Hypotension (Moderate); Numbness (Moderate); Resp Distress  (Moderate)    Codeine Nausea Only and Other     nausea    Sulfa (Sulfonamide Antibiotics) Rash       Objective     Vital Signs:    Vitals:    01/30/24 0713 01/30/24 0718   BP:  109/69   Pulse:  79   Resp:  16   Temp:  36.6 °C (97.9 °F)   TempSrc:  Temporal   SpO2:  97%   Weight: 50.2 kg (110 lb 10.7 oz)        Physical Exam     ASSESSMENT and PLAN:  Problem List Items Addressed This Visit    None       Medications as of the completion of today's visit:  Current Outpatient Medications   Medication Sig Dispense Refill    cetirizine (ZyrTEC) 10 mg tablet Take 1 tablet (10 mg) by mouth once daily. (Patient taking differently: Take 1 tablet (10 mg) by mouth once daily. Alternating with Zyrtec D) 30 tablet 11    cetirizine-pseudoephedrine (ZyrTEC-D) 5-120 mg 12 hr tablet Take 1 tablet by mouth 2 times a day. 60 tablet 11    cholecalciferol (Vitamin D-3) 25 MCG (1000 UT) tablet Take by mouth.      dexAMETHasone (Decadron) 2 mg tablet Take 0.5 tablets (1 mg) by mouth once daily.      dextromethorphan (Delsym) 30 mg/5 mL liquid Take 5 mL (30 mg) by mouth once daily as needed.      dronabinol (Marinol) 2.5 mg capsule once daily as needed. One hour before dinner      estrogens, conjugated, (Premarin) 0.3 mg tablet Take 0.5 tablets (0.15 mg) by mouth once daily.      omeprazole (PriLOSEC) 40 mg DR capsule Take 1 capsule (40 mg) by mouth once daily. Do not crush or chew. 90 each 3    ondansetron (Zofran) 8 mg tablet Take 0.5 tablets (4 mg) by mouth every 8 hours if needed.      polyethylene glycol (Glycolax, Miralax) 17 gram packet Take 17 g by mouth 2 times a day. 60 packet 2    prochlorperazine (Compazine) 10 mg tablet Take 0.5 tablets (5 mg) by mouth every 6 hours if needed.      psyllium husk, aspartame, (Metamucil Sugar-Free, aspart,) 3.4 gram/5.8 gram powder Take 1 Dose by mouth once daily. 425 g 11    vit A/vit C/vit E/zinc/copper (PRESERVISION AREDS ORAL) Take by mouth once daily as needed.       No current  facility-administered medications for this encounter.           Orders placed during today's visit:  No orders of the defined types were placed in this encounter.       No study found     Study Specific Instructions and Documentation   WEIGHT  LABS  VITALS  STUDY DRUG  VITALS  DISCHARGE     PRE-DOSE Safety Labs   Refer to [place] Treatment Plan for orders     Day 15 Pre-dose Vital Signs    Temp, HR, Resp, BP, Pulse Oximetry: Seated or Semi-Fowlers x 5 minutes before obtaining  Position and temperature location: should be the same that is used throughout the study-record position  Vitals:    01/30/24 1042   BP: 101/63   Pulse: 69   Resp: 16   Temp: 36.9 °C (98.4 °F)   SpO2: 97%        Infusion-Related Reactions   [facility] Hypersensitivity Orders  Note: CRS table      Research Drug Administration [place] ()   Refer to [place] Treatment Plan for orders   Administer dose over 60 minutes (+/- 10 mins) followed by a 3 - 5 minute Normal saline flush. (This will be the end time after the flush). Document start time & end of study medication; document start time and end time of the flush.  Participant to remain on Unit for 2 hour post dose observation.      Post-Dose Vital Signs   Temp, HR, Resp, BP, Pulse Oximetry: Seated or Semi-Fowlers x 5 minutes before obtaining  Position and temperature location: should be the same that is used throughout the study  End of Infusion  Vitals:    01/30/24 1200   BP: 91/58   Pulse: 73   Resp: 16   Temp: 36.8 °C (98.2 °F)   SpO2: 97%        Day 15 Discharge Instructions   Patient may be discharged after 2 hour observation.  Discharge time 1404     Davey Melchor RN  01/30/24    Grading and Management of Cytokine Release Syndrome   CRS Grade Description of Severity Minimum Expected Intervention Instructions for Dose Modifications of    1 Symptoms are not life threatening and require symptomatic treatment only,  eg, fever, nausea, fatigue, headache, myalgia's, malaise Administer  symptomatic treatment (contact provider for medications/doses). Monitor for CRS symptoms including vital signs and pulse oximetry at least Q2 hours for12 hours or until resolution, Whichever is earlier. N/A     (continued)  Grading and Management of Cytokine Release Syndrome   CRS Grade Description of Severity Minimum Expected Intervention Instructions for Dose Modifications of    2 Symptoms require and respond to moderate intervention  Oxygen requirement <40%,                      OR  Hypotension responsive to fluids or low dose of one vasopressor, OR                                                                       Grade 2 organ toxicity or grade 3 transaminitis per CTCAE criteria Administer:  Symptomatic treatment   (contact provider for medications/doses)  Supplemental oxygen when oxygen saturation is <90% on room air  Intravenous fluids or low dose vasopressor for hypotension is recommended when systolic blood pressure is <85 mmHg. (contact provider for medications/doses) Persistent tachycardia (eg >120 bpm) may also indicate the need for intervention for hypotension.   Monitor for CRS symptoms including vital signs and pulse oximetry at least Q2 hours for12 hours or until resolution to CRS grade <= 1, whichever is earlier. For subjects with extensive co-morbidities or poor performance status, manage per grade 3 CRS guidance below If CRS occurs during  treatment, immediately interrupt the infusion and delay the next  dose until the event resolves to CRS grade <= 1 for no less than 72 hours. Permanently discontinue  if there is no improvement to CRS <= grade 1 within 7 days     3 Symptoms require and respond to aggressive intervention  Oxygen requirement >=40%, OR  Hypotension requiring high dose or multiple vasopressors, OR  Grade 3 organ toxicity or     Grade 4 transaminitis per  CTCAE criteria Admit to intensive care unit for close clinical and vital sign monitoring per  institutional guidelines.   Refer to provider/protocol for medications and doses.     If CRS occurs during  treatment, immediately interrupt   and delay the next dose until event resolves to CRS grade <= 1 for no less than 72 hours.    Permanently discontinue  if there is no improvement to CRS                 <= grade 2 within 5 days or CRS <= grade 1 within 7 days.  Permanently discontinue   if CRS grade  3 occurs at the initial run in dose (i.e., at MTD1)  (Applicable only after MTD1 has been defined).     (continued)  Grading and Management of Cytokine Release Syndrome   CRS Grade Description of Severity Minimum Expected Intervention Instructions for Dose Modifications of    4 Life-threatening symptoms  Requirement for ventilator support OR  Grade 4 organ toxicity (excluding transaminitis) per CTCAE criteria Admit to intensive care unit for close clinical and vital sign monitoring per institutional guidelines.   Refer to provider/protocol for medications and doses.   If CRS occurs during  treatment, Immediately stop the infusion.  Permanently discontinue   therapy.

## 2024-01-30 NOTE — PROGRESS NOTES
Patient ID: Gladys Branch is a 68 y.o. female.    DIAGNOSIS    Small Cell Lung Cancer    By immunostaining, the tumor cells are positive for CAM 5.2, INSM1, and TTF-1, and negative for p40 and LCA. The proliferation index by Ki-67 immunostaining is approximately 90%.  Synaptophysin, Chromogranin and INSM-1 are positive.  AE1/AE3 is negative. NEOGENOMICS, RB protein expression is lost in the tumor cells    STAGING    [id number], limited stage  Recurrence    CURRENT SITES OF DISEASE    RLL, supraclavicular    MOLECULAR GENOMICS      PRIOR THERAPY    Concurrent chemoradiation with Cisplatin/Etoposide every 3 weeks; C1D1 2/15/22, reaction to cisplatin C2D1 and did not receive D2 or D3 etoposide  Carbo/etoposide 4/5/2022 1 cycle c/b rash  PCI 5/21-6/13/22    CURRENT THERAPY    Phase 1 study [id number]  with study drug Taralatamab 1/24/23 - present.    CURRENT ONCOLOGICAL PROBLEMS      HISTORY OF PRESENT ILLNESS    Mrs. Branch is a 67 yo with PMH GERD and COPD who originally was round to have a RLL nodule measuring 11 mm in 4/28/2021 found as part of CT calcium score scan. An ensuing CT chest w/o contrast was done on 5/19/21 which revealed subsolid pulmonary nodules measuring 14 mm in the RLL. She had CT chest w/contrast on 11/29/21 which confirmed stable 14 mm GGO of the RLL and decreased RLL subpleural nodule. She met thoracic surgeon Dr. Farfan, who ordered PET/CT scan done on 12/8/21 which did not reveal any FDG-avid nodules within the lung parenchyma but R hilar nodes with max SUV 8.0. He referred her for bronch, which was done on 1/21/22 by Dr. Mcdaniels. 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 Brain MRI was negative.    She started concurrent chemoradiation with BID radiation with cis/etop 2/15/22, and unfortunately had a reaction to cisplatin on C2D1 with chest pain and hypotension. She was hospitalized with sepsis felt to be due to pneumonia. She trialed carboplatin/etoposide on 4/5/22 with a  resulting rash and opted to forego further chemotherapy as she had completed radiation. Restaging scan 11/3/22 unfortunately with progression with new R hilar lymph node, paratracheal nodes, and increase in size of R supraclavicular mass. She enrolled in [id number] and started C1D1 1/24/23. C1 complicated by anorexia and dysgeusia, and delayed C2 by a week. She has further struggled with fatigue and confusion, which may be related to mirtazapine. Dose reduced for C2 and mirtazapine stopped with improvement in symptoms.     PAST MEDICAL HISTORY    GERD  COPD    SOCIAL HISTORY    Retired - lighting . Lives at home with  and a kitten.  Tobacco: quit smoking 1999. 1 ppd x 25 yrs.  EtOH: wine 1 glass/day  Illicits: none    CURRENT MEDS    Please see med list    ALLERGIES    Codeine, Sulfa drugs, Cisplatin    FAMILY HISTORY    Paternal aunt - breast cancer dx 80s    Subjective    Today 1.30.2024. Patient in clinic with her  for C13D15 for [id number].    Her urinary symptoms completely disappeared. She is finished with antibiotics. She is eating well and her energy level is good. She remains very active. No shortness of breath, She does use delsym a couple times a week for cough, which takes care of it.   Objective          1/2/2024    10:24 AM 1/2/2024    11:36 AM 1/16/2024     7:22 AM 1/16/2024    10:35 AM 1/16/2024    11:57 AM 1/30/2024     7:13 AM 1/30/2024     7:18 AM   Vitals   Systolic 115 116 113 98 105  109   Diastolic 82 78 79 70 74  69   Heart Rate 66 77 82 73 68  79   Temp 36.8 °C (98.2 °F) 36.7 °C (98.1 °F) 36.2 °C (97.2 °F) 36.2 °C (97.2 °F) 36.1 °C (97 °F)  36.6 °C (97.9 °F)   Resp 18 18 16 16 16  16   Weight (lb)   117.06   110.67    BMI   22.12 kg/m2   20.91 kg/m2    BSA (m2)   1.51 m2   1.47 m2    Visit Report      Report Report           Physical Exam  Constitutional:       General: She is awake.      Appearance: Normal appearance. She is normal weight.   HENT:      Head:  Normocephalic.      Mouth/Throat:      Mouth: Mucous membranes are moist.   Eyes:      Extraocular Movements: Extraocular movements intact.      Conjunctiva/sclera: Conjunctivae normal.      Pupils: Pupils are equal, round, and reactive to light.   Cardiovascular:      Rate and Rhythm: Normal rate and regular rhythm.      Heart sounds: Normal heart sounds, S1 normal and S2 normal.   Pulmonary:      Effort: Pulmonary effort is normal.      Breath sounds: Normal breath sounds.   Abdominal:      General: Abdomen is flat. Bowel sounds are normal.      Palpations: Abdomen is soft.   Musculoskeletal:      Cervical back: Normal range of motion and neck supple.   Skin:     Comments: No skin rash observed   Neurological:      Mental Status: She is alert.      Cranial Nerves: Cranial nerves 2-12 are intact.      Sensory: Sensation is intact.      Motor: Motor function is intact.      Coordination: Coordination is intact.   Psychiatric:         Attention and Perception: Attention normal.         Mood and Affect: Mood normal.         Speech: Speech normal.         Behavior: Behavior normal. Behavior is cooperative.         Cognition and Memory: Cognition normal.     Labs    Hospital Outpatient Visit on 01/30/2024   Component Date Value Ref Range Status    WBC 01/30/2024 6.8  4.4 - 11.3 x10*3/uL Final    nRBC 01/30/2024 0.0  0.0 - 0.0 /100 WBCs Final    RBC 01/30/2024 3.50 (L)  4.00 - 5.20 x10*6/uL Final    Hemoglobin 01/30/2024 11.1 (L)  12.0 - 16.0 g/dL Final    Hematocrit 01/30/2024 32.7 (L)  36.0 - 46.0 % Final    MCV 01/30/2024 93  80 - 100 fL Final    MCH 01/30/2024 31.7  26.0 - 34.0 pg Final    MCHC 01/30/2024 33.9  32.0 - 36.0 g/dL Final    RDW 01/30/2024 13.1  11.5 - 14.5 % Final    Platelets 01/30/2024 194  150 - 450 x10*3/uL Final    Neutrophils % 01/30/2024 68.5  40.0 - 80.0 % Final    Immature Granulocytes %, Automated 01/30/2024 0.4  0.0 - 0.9 % Final    Lymphocytes % 01/30/2024 16.3  13.0 - 44.0 % Final     Monocytes % 01/30/2024 11.3  2.0 - 10.0 % Final    Eosinophils % 01/30/2024 3.1  0.0 - 6.0 % Final    Basophils % 01/30/2024 0.4  0.0 - 2.0 % Final    Neutrophils Absolute 01/30/2024 4.66  1.20 - 7.70 x10*3/uL Final    Immature Granulocytes Absolute, Au* 01/30/2024 0.03  0.00 - 0.70 x10*3/uL Final    Lymphocytes Absolute 01/30/2024 1.11 (L)  1.20 - 4.80 x10*3/uL Final    Monocytes Absolute 01/30/2024 0.77  0.10 - 1.00 x10*3/uL Final    Eosinophils Absolute 01/30/2024 0.21  0.00 - 0.70 x10*3/uL Final    Basophils Absolute 01/30/2024 0.03  0.00 - 0.10 x10*3/uL Final    Glucose 01/30/2024 90  74 - 99 mg/dL Final    Sodium 01/30/2024 139  136 - 145 mmol/L Final    Potassium 01/30/2024 3.8  3.5 - 5.3 mmol/L Final    Chloride 01/30/2024 106  98 - 107 mmol/L Final    Bicarbonate 01/30/2024 25  21 - 32 mmol/L Final    Anion Gap 01/30/2024 12  10 - 20 mmol/L Final    Urea Nitrogen 01/30/2024 19  6 - 23 mg/dL Final    Creatinine 01/30/2024 0.76  0.50 - 1.05 mg/dL Final    eGFR 01/30/2024 85  >60 mL/min/1.73m*2 Final    Calcium 01/30/2024 8.8  8.6 - 10.6 mg/dL Final    Albumin 01/30/2024 3.5  3.4 - 5.0 g/dL Final    Alkaline Phosphatase 01/30/2024 40  33 - 136 U/L Final    Total Protein 01/30/2024 5.6 (L)  6.4 - 8.2 g/dL Final    AST 01/30/2024 15  9 - 39 U/L Final    Bilirubin, Total 01/30/2024 0.7  0.0 - 1.2 mg/dL Final    ALT 01/30/2024 10  7 - 45 U/L Final    aPTT 01/30/2024 32  27 - 38 seconds Final    Bilirubin, Direct 01/30/2024 0.1  0.0 - 0.3 mg/dL Final    Creatine Kinase 01/30/2024 36  0 - 215 U/L Final    C-Reactive Protein 01/30/2024 0.49  <1.00 mg/dL Final    Ferritin 01/30/2024 117  8 - 150 ng/mL Final    Magnesium 01/30/2024 1.76  1.60 - 2.40 mg/dL Final    Phosphorus 01/30/2024 4.3  2.5 - 4.9 mg/dL Final    Protime 01/30/2024 12.0  9.8 - 12.8 seconds Final    INR 01/30/2024 1.1  0.9 - 1.1 Final   Hospital Outpatient Visit on 01/16/2024   Component Date Value Ref Range Status    WBC 01/16/2024 5.6  4.4 - 11.3  x10*3/uL Final    nRBC 01/16/2024 0.0  0.0 - 0.0 /100 WBCs Final    RBC 01/16/2024 3.75 (L)  4.00 - 5.20 x10*6/uL Final    Hemoglobin 01/16/2024 11.7 (L)  12.0 - 16.0 g/dL Final    Hematocrit 01/16/2024 35.3 (L)  36.0 - 46.0 % Final    MCV 01/16/2024 94  80 - 100 fL Final    MCH 01/16/2024 31.2  26.0 - 34.0 pg Final    MCHC 01/16/2024 33.1  32.0 - 36.0 g/dL Final    RDW 01/16/2024 13.1  11.5 - 14.5 % Final    Platelets 01/16/2024 247  150 - 450 x10*3/uL Final    Neutrophils % 01/16/2024 58.4  40.0 - 80.0 % Final    Immature Granulocytes %, Automated 01/16/2024 0.7  0.0 - 0.9 % Final    Lymphocytes % 01/16/2024 25.6  13.0 - 44.0 % Final    Monocytes % 01/16/2024 10.7  2.0 - 10.0 % Final    Eosinophils % 01/16/2024 3.7  0.0 - 6.0 % Final    Basophils % 01/16/2024 0.9  0.0 - 2.0 % Final    Neutrophils Absolute 01/16/2024 3.29  1.20 - 7.70 x10*3/uL Final    Immature Granulocytes Absolute, Au* 01/16/2024 0.04  0.00 - 0.70 x10*3/uL Final    Lymphocytes Absolute 01/16/2024 1.44  1.20 - 4.80 x10*3/uL Final    Monocytes Absolute 01/16/2024 0.60  0.10 - 1.00 x10*3/uL Final    Eosinophils Absolute 01/16/2024 0.21  0.00 - 0.70 x10*3/uL Final    Basophils Absolute 01/16/2024 0.05  0.00 - 0.10 x10*3/uL Final    Glucose 01/16/2024 92  74 - 99 mg/dL Final    Sodium 01/16/2024 141  136 - 145 mmol/L Final    Potassium 01/16/2024 3.8  3.5 - 5.3 mmol/L Final    Chloride 01/16/2024 107  98 - 107 mmol/L Final    Bicarbonate 01/16/2024 26  21 - 32 mmol/L Final    Anion Gap 01/16/2024 12  10 - 20 mmol/L Final    Urea Nitrogen 01/16/2024 21  6 - 23 mg/dL Final    Creatinine 01/16/2024 0.86  0.50 - 1.05 mg/dL Final    eGFR 01/16/2024 74  >60 mL/min/1.73m*2 Final    Calcium 01/16/2024 8.9  8.6 - 10.6 mg/dL Final    Albumin 01/16/2024 3.6  3.4 - 5.0 g/dL Final    Alkaline Phosphatase 01/16/2024 43  33 - 136 U/L Final    Total Protein 01/16/2024 5.8 (L)  6.4 - 8.2 g/dL Final    AST 01/16/2024 12  9 - 39 U/L Final    Bilirubin, Total 01/16/2024  0.5  0.0 - 1.2 mg/dL Final    ALT 01/16/2024 10  7 - 45 U/L Final    Adrenocorticotropic Hormone (ACTH) 01/16/2024 73.7 (H)  7.2 - 63.3 pg/mL Final    Amylase 01/16/2024 26 (L)  29 - 103 U/L Final    aPTT 01/16/2024 30  27 - 38 seconds Final    Bilirubin, Direct 01/16/2024 0.1  0.0 - 0.3 mg/dL Final    Creatine Kinase 01/16/2024 37  0 - 215 U/L Final    Cortisol 01/16/2024 7.6  2.5 - 20.0 ug/dL Final    C-Reactive Protein 01/16/2024 <0.10  <1.00 mg/dL Final    Estradiol 01/16/2024 <19  pg/mL Final    Ferritin 01/16/2024 110  8 - 150 ng/mL Final    Follicle Stimulating Hormone 01/16/2024 44.0  IU/L Final    Luteinizing Hormone 01/16/2024 24.8  IU/L Final    Lipase 01/16/2024 34  9 - 82 U/L Final    Magnesium 01/16/2024 1.95  1.60 - 2.40 mg/dL Final    Phosphorus 01/16/2024 4.9  2.5 - 4.9 mg/dL Final    Protime 01/16/2024 11.4  9.8 - 12.8 seconds Final    INR 01/16/2024 1.0  0.9 - 1.1 Final    Thyroid Stimulating Hormone 01/16/2024 11.90 (H)  0.44 - 3.98 mIU/L Final    Thyroxine, Free 01/16/2024 0.91  0.78 - 1.48 ng/dL Final    Color, Urine 01/16/2024 Yellow  Straw, Yellow Final    Appearance, Urine 01/16/2024 Clear  Clear Final    Specific Gravity, Urine 01/16/2024 1.021  1.005 - 1.035 Final    pH, Urine 01/16/2024 5.0  5.0, 5.5, 6.0, 6.5, 7.0, 7.5, 8.0 Final    Protein, Urine 01/16/2024 NEGATIVE  NEGATIVE mg/dL Final    Glucose, Urine 01/16/2024 NEGATIVE  NEGATIVE mg/dL Final    Blood, Urine 01/16/2024 NEGATIVE  NEGATIVE Final    Ketones, Urine 01/16/2024 5 (TRACE) (A)  NEGATIVE mg/dL Final    Bilirubin, Urine 01/16/2024 NEGATIVE  NEGATIVE Final    Urobilinogen, Urine 01/16/2024 <2.0  <2.0 mg/dL Final    Nitrite, Urine 01/16/2024 NEGATIVE  NEGATIVE Final    Leukocyte Esterase, Urine 01/16/2024 NEGATIVE  NEGATIVE Final    Extra Tube 01/16/2024 Hold for add-ons.   Final    Extra Tube 01/16/2024 Hold for add-ons.   Final    Extra Tube 01/16/2024 Hold for add-ons.   Final   Hospital Outpatient Visit on 01/02/2024    Component Date Value Ref Range Status    WBC 01/02/2024 8.4  4.4 - 11.3 x10*3/uL Final    nRBC 01/02/2024 0.0  0.0 - 0.0 /100 WBCs Final    RBC 01/02/2024 3.79 (L)  4.00 - 5.20 x10*6/uL Final    Hemoglobin 01/02/2024 11.7 (L)  12.0 - 16.0 g/dL Final    Hematocrit 01/02/2024 35.4 (L)  36.0 - 46.0 % Final    MCV 01/02/2024 93  80 - 100 fL Final    MCH 01/02/2024 30.9  26.0 - 34.0 pg Final    MCHC 01/02/2024 33.1  32.0 - 36.0 g/dL Final    RDW 01/02/2024 13.3  11.5 - 14.5 % Final    Platelets 01/02/2024 276  150 - 450 x10*3/uL Final    Neutrophils % 01/02/2024 72.2  40.0 - 80.0 % Final    Immature Granulocytes %, Automated 01/02/2024 0.6  0.0 - 0.9 % Final    Lymphocytes % 01/02/2024 15.3  13.0 - 44.0 % Final    Monocytes % 01/02/2024 9.1  2.0 - 10.0 % Final    Eosinophils % 01/02/2024 2.4  0.0 - 6.0 % Final    Basophils % 01/02/2024 0.4  0.0 - 2.0 % Final    Neutrophils Absolute 01/02/2024 6.10  1.20 - 7.70 x10*3/uL Final    Immature Granulocytes Absolute, Au* 01/02/2024 0.05  0.00 - 0.70 x10*3/uL Final    Lymphocytes Absolute 01/02/2024 1.29  1.20 - 4.80 x10*3/uL Final    Monocytes Absolute 01/02/2024 0.77  0.10 - 1.00 x10*3/uL Final    Eosinophils Absolute 01/02/2024 0.20  0.00 - 0.70 x10*3/uL Final    Basophils Absolute 01/02/2024 0.03  0.00 - 0.10 x10*3/uL Final    Glucose 01/02/2024 83  74 - 99 mg/dL Final    Sodium 01/02/2024 140  136 - 145 mmol/L Final    Potassium 01/02/2024 4.1  3.5 - 5.3 mmol/L Final    Chloride 01/02/2024 106  98 - 107 mmol/L Final    Bicarbonate 01/02/2024 26  21 - 32 mmol/L Final    Anion Gap 01/02/2024 12  10 - 20 mmol/L Final    Urea Nitrogen 01/02/2024 22  6 - 23 mg/dL Final    Creatinine 01/02/2024 0.78  0.50 - 1.05 mg/dL Final    eGFR 01/02/2024 83  >60 mL/min/1.73m*2 Final    Calcium 01/02/2024 8.9  8.6 - 10.6 mg/dL Final    Albumin 01/02/2024 3.6  3.4 - 5.0 g/dL Final    Alkaline Phosphatase 01/02/2024 49  33 - 136 U/L Final    Total Protein 01/02/2024 6.0 (L)  6.4 - 8.2 g/dL  Final    AST 01/02/2024 14  9 - 39 U/L Final    Bilirubin, Total 01/02/2024 0.7  0.0 - 1.2 mg/dL Final    ALT 01/02/2024 10  7 - 45 U/L Final    aPTT 01/02/2024 31  27 - 38 seconds Final    Bilirubin, Direct 01/02/2024 0.1  0.0 - 0.3 mg/dL Final    Creatine Kinase 01/02/2024 38  0 - 215 U/L Final    C-Reactive Protein 01/02/2024 0.97  <1.00 mg/dL Final    Ferritin 01/02/2024 122  8 - 150 ng/mL Final    Magnesium 01/02/2024 1.84  1.60 - 2.40 mg/dL Final    Phosphorus 01/02/2024 3.7  2.5 - 4.9 mg/dL Final    Protime 01/02/2024 11.4  9.8 - 12.8 seconds Final    INR 01/02/2024 1.0  0.9 - 1.1 Final    Thyroxine, Free 01/02/2024 0.99  0.78 - 1.48 ng/dL Final    Thyroid Stimulating Hormone 01/02/2024 7.08 (H)  0.44 - 3.98 mIU/L Final            Performance Status:    ECOG 1: Restricted in physically strenuous activity but ambulatory and able to carry out work of a light or sedentary nature, e.g., light house work, office work.     CT chest abdomen pelvis w IV contrast    Result Date: 12/14/2023  Impression: CHEST: Stable findings including stable irregular areas of linear and nodular thickening in the right midlung anteriorly.   Stable ground-glass area of nodularity in the right lower lobe.   ABDOMEN-PELVIS: Diverticulosis without definite diverticulitis. No definite metastatic disease to the abdomen or the pelvis.   MACRO: None   Signed by: Micaela Luna 12/14/2023 9:50 AM Dictation workstation:   [id number]       Assessment/Plan      Mrs. Branch is a 66 yo with COPD and GERD who presented with newly diagnosed SCLC completed BID radiation planned concurrently with cis/etoposide but had a reaction C2D1 to cisplatin. Completed 1 cycle carbo/etop D1 4/5/22 also complicated by facial flushing and erythematous rash and stopped further treatment. Now s/p PCI in 6/2022. Most recent CT concerning for disease progression. Referred for clinical trial AMGN [id number], C1D1 1/24/23. Treatment has been complicated by orthostatic  hypotension, worsening short-term memory, increased lethargy, weight loss, and poor appetite. Delayed C2 by 1 week and dose-reduced. Confusion has resolved during C3 after stopping mirtazapine and dose reduction.     # SCLC  - limited stage, brain MRI negative  - previously discussed concurrent chemoradiation, with cisplatin/etoposide with side effects including but not limited to allergic reaction, fatigue, risk of infection, tinnitus, neuropathy, injury to liver/kidney, risk of anemia/thrombocytopenia and was consented  - she was also interested in scalp cooling, which unfortunately she is not a candidate for d/t SCLC  - started concurrent chemoradiation BID with Q3week cis/etop on 2/15 at [facility]  -unfortunately had reaction to cisplatin on C2D1 resulting in hospitalization and also felt to be septic due to pneumonia  - discussed that we typically do 3-4 cycles chemotherapy, and alternative regimens include carboplatin/etoposide vs CAV. Given reaction to cisplatin, concern for possible reaction to carboplatin as well, although unknown rates of cross reaction. Alternatively, they also asked about discontinuation of chemotherapy, since she completed radiation. She ultimately decided to trial carbo/etoposide. She is aware of the heightened risk of allergic reaction, as well as other side effects including but not limited to fatigue, risk of infection, neuropathy, injury to liver/kidney, risk of anemia/thrombocytopenia. consented 3/28/22  -s/p 1 cycle Carbo/Etop D1 4/5/22, developed facial flushing and erythematous rash on arms and chest s/p treatment, resolved with benadryl  -opted to forego further chemotherapy and will continue on maintenance  - s/p PCI 6/2022  - CT C/A/P and brain MRI 5/2022 and most recently 8/2022 with good response and no disease  -  MRI brain 11/7 without evidence of metastatic disease  - CT C/A/P 11/3 with multiple new enlarged LN concerning for disease progression - discussed with   Skip not able to repeat radiation.  - referred to phase 1 clinical trial and consideration for [id number] or [id number]  - 1.5.2023 : Patient signed consent to take part on phase 1 study [id number]. Look clinically good to take part in study. Will start on study ASAP if eligible.   - 1.24.2023: Seen today and ready to start trial on [id number].  - 1.31.2023: Seen today, ready for C1D8 AMGN 1518   - 2.7.2023: Seen today for C1D15 [id number] - continue with trial   - 2.14.2023: Seen in follow-up on [id number] with overall worsening of general health  - 2.21.2023: Seen today for C2D1 AMGN 1518 with continued weight loss although perhaps starting to plateau, more energy since being off treatment, still poor appetite. will delay by 1 week, started dexamethasone 2 mg daily, and consider dose reduction.  - 2.28.2023: Seen today for C2D1 AMGN 1518 after delaying for 1 week. Energy level and appetite are improved, although still losing a little weight. Confusion is worse today, possibly due to mirtazapine vs study-related. Will dose reduce today.  - 3.7.2023: Seen today C2D8 AMGN 1518 after dose-reduction last week. Confusion is mildly improved, energy level and appetite are stable. Will add marinol for appetite.   - 3.14.2023: Seen today C2D15 AMGN 1518. Overall improved: confusion continues to improve, energy level and appetite are improved. Weight is improved. Will continue dexamethasone and marinol.  - 3.15.2023: C2D16 No CRS symptoms observed after last treatment Overall Symptoms are improving and she is tolerating treatment.  -3.16.2023: C2D17 No CRS symptoms observed after last treatment Overall Symptoms are improving and she is tolerating treatment.  - 3.28.2023 : Most recent imaging suggest patient benefiting from current treatment. Ongoing symptom  possible common cold/season allergies. Since the patient looks clinically good we will proceed with C3D1 today. Educated patient to call the office ASAP if symptoms worsen.   -  4.11.2023: C3D15 Feeling well and overall improved with fatigue, confusion, anorexia. Proceed at current dosing and weaning steroids as below  -4.25.2023: C4D1 Pt is feeling well, no complaints of fatigue, confusion, memory loss. Has gained weight. Energy levels are good. Proceed with the current dosing. Pt is no longer on steroids.   -5.9.2023: C4D15. Pt is tolerating treatment well with no new complains. Continues to have good energy level endorses poor appetite with out any weight loss. Suggested to start taking the dronabinol.  Will proceed with treatment today.  -5.23.2023: C5D1. Pt is tolerating treatment well. Energy levels are good, is having some weight loss and constipation. Started back on dex for appetite. Pt will let us know if she remains constipated over the next several days.  Personally reviewed scans with stable disease. Will proceed with treatment today.   -6.6.2023: C5D15. Continues to tolerate treatment well. Since we restarted her on Dexamethasone she reports her energy levels and appetite has improved. Will continue on low dose Dex. With no new symptoms, we will proceed with treatment today.   -6.20.2023: C6D1. Doing well on dexamethasone 1 mg daily. Will proceed with treatment.  -7.5.2023: C6D15. We will proceed with treatment since the patient is tolerating treatment with no significant AE's and also give 1/2 liter of IV fluids.   - 7.18.2023: C7D1. Doing well, will proceed with treatment. She is out of dexamethasone, and will monitor off steroids.   -8.1.2023: C7D15. Continues to tolerate treatment. Will proceed with C7D15 today. RTC per protocol.  - 8.29.2023: C8D1. C8 delayed due to hospitalization for diverticulitis. Has completed antibiotics and feeling well for treatment.  - 9.12.2023: C8D15. Most recent imgaing suggest the patient continues to benefit from current treatment. The imaging also suggest improvement of previously visualized segmental colitis associated with diverticulosis  affecting the sigmoid colon with minimal residual pericolonic fat standing and hyperemia of the sigmoid colon. Imaging also suggest resolving inflammatory process without evidence of new or worsening complications. We will proceed with C8D15 today since the patient is also clinically feeling good. RTC per protocol.  -9.26.2023: C9D1. Patient looks clinically good. With no JACQUELYN's we will proceed with treatment C9D1 today.  -10.24.23: C10D1. Pt feels well, no acute events, no TRAEs, continue treatment today as scheduled.  -11.7.23: C10D15. Pt feels well, no TRAEs, continue treatment as scheduled.  - 11.21.23: C11D1. Continues to feel well, will continue treatment today.  - 12.19.23: C12D1. Personally reviewed most recent scan without evidence of progression. Continues to feel well and will continue with treatment today.  - 01.02.24: C12D15. Continues to tolerate treatment well. No new JACQUELYN's will proceed with treatment. RTC per protocol.  - 01.16.24: C13D1. No new JACQUELYN's proceed with treatment. RTC per protocol.   - 01.30.24: C13D15. Continues to feel well. Proceed with treatment.    # Confusion - resolved  - significantly worsened after taking double dose of mirtazapine accidentally, although has been generally down-trending since starting study (which is also when she started taking mirtazapine) and now resolved  - brain MRI without progression of cancer  - will continue off mirtazapine    # Unintentional weight loss - stable  # Anorexia- Improving  # Dysgeusia- improving  - all improved on steroids. unclear if this is from cancer or side effect of study drug  - stopped mirtazapine due to concerns for confusion   - weaned off dexamethasone and started marinol, but kept forgetting to take it  - dexamethasone trial started again on 5.23.2023. Continued on 1 mg daily - will trial off after 7.18.23.     # Fatigue - Resolved  - back to normal pretty much, weaning steroids as above    # frequent PVCs - asymptomatic  - saw  onco-cardiology and completed 24-hr Holter monitor  - recommended decreasing caffeine and sudafed intake  - continue to monitor    # Forgetfulness - improved - however noted decline on AMGN study- unclear etiology.    - started after PCI, on memantine daily and has since stopped  - offered neurocognitive evaluation previously and she will think about this and readdress next visit  -  notes some decline in last 3 weeks- especially short term memory loss.    - Improved    # Neuropathy - resolved  - mild peripheral neuropathy with numbness of the toes - likely due to cisplatin  - will continue to monitor closely  - not endorsing any neuropathy at this visit     # Rash - resolved  Waxing and waning rash throughout her body. ?improving. Unclear etiology, patient and  feel timing coincided with starting BMX.   - she will hold off on further BMX  - thought to be due to sulfa allergy, possibly due to platinum agents   - continue to monitor    #odynophagia - resolved  -rad onc aware  -prescribed BMX solution  -will continue to monitor    #constipation-improving  -occasional. Prescribed Senna S  -will monitor    Francy Archer MD

## 2024-01-31 NOTE — ADDENDUM NOTE
Encounter addended by: Keanu Malik RN on: 1/31/2024 2:46 PM   Actions taken: Charge Capture section accepted

## 2024-02-07 ENCOUNTER — HOSPITAL ENCOUNTER (OUTPATIENT)
Dept: RADIOLOGY | Facility: HOSPITAL | Age: 69
Discharge: HOME | End: 2024-02-07
Payer: MEDICARE

## 2024-02-07 DIAGNOSIS — Z00.6 CLINICAL TRIAL PARTICIPANT: ICD-10-CM

## 2024-02-07 DIAGNOSIS — C34.90 MALIGNANT NEOPLASM OF LUNG, UNSPECIFIED LATERALITY, UNSPECIFIED PART OF LUNG (MULTI): ICD-10-CM

## 2024-02-07 PROCEDURE — 74177 CT ABD & PELVIS W/CONTRAST: CPT | Performed by: RADIOLOGY

## 2024-02-07 PROCEDURE — 71260 CT THORAX DX C+: CPT | Performed by: RADIOLOGY

## 2024-02-07 PROCEDURE — 74177 CT ABD & PELVIS W/CONTRAST: CPT

## 2024-02-07 PROCEDURE — 2550000001 HC RX 255 CONTRASTS: Performed by: STUDENT IN AN ORGANIZED HEALTH CARE EDUCATION/TRAINING PROGRAM

## 2024-02-07 RX ADMIN — IOHEXOL 70 ML: 350 INJECTION, SOLUTION INTRAVENOUS at 14:48

## 2024-02-13 ENCOUNTER — HOSPITAL ENCOUNTER (OUTPATIENT)
Dept: RESEARCH | Facility: HOSPITAL | Age: 69
Discharge: HOME | End: 2024-02-13
Payer: MEDICARE

## 2024-02-13 ENCOUNTER — OFFICE VISIT (OUTPATIENT)
Dept: HEMATOLOGY/ONCOLOGY | Facility: HOSPITAL | Age: 69
End: 2024-02-13
Payer: MEDICARE

## 2024-02-13 ENCOUNTER — EDUCATION (OUTPATIENT)
Dept: HEMATOLOGY/ONCOLOGY | Facility: HOSPITAL | Age: 69
End: 2024-02-13

## 2024-02-13 VITALS
RESPIRATION RATE: 17 BRPM | BODY MASS INDEX: 20.83 KG/M2 | OXYGEN SATURATION: 97 % | SYSTOLIC BLOOD PRESSURE: 105 MMHG | HEART RATE: 70 BPM | TEMPERATURE: 97.5 F | DIASTOLIC BLOOD PRESSURE: 74 MMHG | WEIGHT: 110.23 LBS

## 2024-02-13 DIAGNOSIS — C34.90 SMALL CELL LUNG CANCER (MULTI): Primary | ICD-10-CM

## 2024-02-13 DIAGNOSIS — C34.90 SMALL CELL LUNG CANCER (MULTI): ICD-10-CM

## 2024-02-13 LAB
ALBUMIN SERPL BCP-MCNC: 3.5 G/DL (ref 3.4–5)
ALP SERPL-CCNC: 47 U/L (ref 33–136)
ALT SERPL W P-5'-P-CCNC: 12 U/L (ref 7–45)
AMYLASE SERPL-CCNC: 29 U/L (ref 29–103)
ANION GAP SERPL CALC-SCNC: 14 MMOL/L (ref 10–20)
APPEARANCE UR: ABNORMAL
APTT PPP: 30 SECONDS (ref 27–38)
AST SERPL W P-5'-P-CCNC: 14 U/L (ref 9–39)
BASOPHILS # BLD AUTO: 0.03 X10*3/UL (ref 0–0.1)
BASOPHILS NFR BLD AUTO: 0.5 %
BILIRUB DIRECT SERPL-MCNC: 0.1 MG/DL (ref 0–0.3)
BILIRUB SERPL-MCNC: 0.7 MG/DL (ref 0–1.2)
BILIRUB UR STRIP.AUTO-MCNC: NEGATIVE MG/DL
BUN SERPL-MCNC: 24 MG/DL (ref 6–23)
CALCIUM SERPL-MCNC: 9.1 MG/DL (ref 8.6–10.6)
CAOX CRY #/AREA UR COMP ASSIST: NORMAL /HPF
CHLORIDE SERPL-SCNC: 104 MMOL/L (ref 98–107)
CK SERPL-CCNC: 38 U/L (ref 0–215)
CO2 SERPL-SCNC: 25 MMOL/L (ref 21–32)
COLOR UR: YELLOW
CORTIS SERPL-MCNC: 13.2 UG/DL (ref 2.5–20)
CREAT SERPL-MCNC: 0.71 MG/DL (ref 0.5–1.05)
CRP SERPL-MCNC: <0.1 MG/DL
EGFRCR SERPLBLD CKD-EPI 2021: >90 ML/MIN/1.73M*2
EOSINOPHIL # BLD AUTO: 0.25 X10*3/UL (ref 0–0.7)
EOSINOPHIL NFR BLD AUTO: 3.8 %
ERYTHROCYTE [DISTWIDTH] IN BLOOD BY AUTOMATED COUNT: 13 % (ref 11.5–14.5)
ESTRADIOL SERPL-MCNC: <19 PG/ML
FERRITIN SERPL-MCNC: 78 NG/ML (ref 8–150)
FSH SERPL-ACNC: 46.3 IU/L
GLUCOSE SERPL-MCNC: 129 MG/DL (ref 74–99)
GLUCOSE UR STRIP.AUTO-MCNC: NORMAL MG/DL
HCT VFR BLD AUTO: 34.4 % (ref 36–46)
HGB BLD-MCNC: 11.6 G/DL (ref 12–16)
IMM GRANULOCYTES # BLD AUTO: 0.04 X10*3/UL (ref 0–0.7)
IMM GRANULOCYTES NFR BLD AUTO: 0.6 % (ref 0–0.9)
INR PPP: 1.1 (ref 0.9–1.1)
KETONES UR STRIP.AUTO-MCNC: NEGATIVE MG/DL
LEUKOCYTE ESTERASE UR QL STRIP.AUTO: NEGATIVE
LH SERPL-ACNC: 15.9 IU/L
LIPASE SERPL-CCNC: 28 U/L (ref 9–82)
LYMPHOCYTES # BLD AUTO: 1.39 X10*3/UL (ref 1.2–4.8)
LYMPHOCYTES NFR BLD AUTO: 21.2 %
MAGNESIUM SERPL-MCNC: 1.73 MG/DL (ref 1.6–2.4)
MCH RBC QN AUTO: 31.1 PG (ref 26–34)
MCHC RBC AUTO-ENTMCNC: 33.7 G/DL (ref 32–36)
MCV RBC AUTO: 92 FL (ref 80–100)
MONOCYTES # BLD AUTO: 0.63 X10*3/UL (ref 0.1–1)
MONOCYTES NFR BLD AUTO: 9.6 %
MUCOUS THREADS #/AREA URNS AUTO: NORMAL /LPF
NEUTROPHILS # BLD AUTO: 4.23 X10*3/UL (ref 1.2–7.7)
NEUTROPHILS NFR BLD AUTO: 64.3 %
NITRITE UR QL STRIP.AUTO: NEGATIVE
NRBC BLD-RTO: 0 /100 WBCS (ref 0–0)
PH UR STRIP.AUTO: 5.5 [PH]
PHOSPHATE SERPL-MCNC: 4.6 MG/DL (ref 2.5–4.9)
PLATELET # BLD AUTO: 224 X10*3/UL (ref 150–450)
POTASSIUM SERPL-SCNC: 3.7 MMOL/L (ref 3.5–5.3)
PROT SERPL-MCNC: 5.8 G/DL (ref 6.4–8.2)
PROT UR STRIP.AUTO-MCNC: ABNORMAL MG/DL
PROTHROMBIN TIME: 11.9 SECONDS (ref 9.8–12.8)
RBC # BLD AUTO: 3.73 X10*6/UL (ref 4–5.2)
RBC # UR STRIP.AUTO: NEGATIVE /UL
RBC #/AREA URNS AUTO: NORMAL /HPF
SODIUM SERPL-SCNC: 139 MMOL/L (ref 136–145)
SP GR UR STRIP.AUTO: 1.03
SQUAMOUS #/AREA URNS AUTO: NORMAL /HPF
T4 FREE SERPL-MCNC: 0.88 NG/DL (ref 0.78–1.48)
TSH SERPL-ACNC: 6.39 MIU/L (ref 0.44–3.98)
UROBILINOGEN UR STRIP.AUTO-MCNC: NORMAL MG/DL
WBC # BLD AUTO: 6.6 X10*3/UL (ref 4.4–11.3)
WBC #/AREA URNS AUTO: NORMAL /HPF

## 2024-02-13 PROCEDURE — 82533 TOTAL CORTISOL: CPT | Performed by: STUDENT IN AN ORGANIZED HEALTH CARE EDUCATION/TRAINING PROGRAM

## 2024-02-13 PROCEDURE — 84100 ASSAY OF PHOSPHORUS: CPT | Performed by: STUDENT IN AN ORGANIZED HEALTH CARE EDUCATION/TRAINING PROGRAM

## 2024-02-13 PROCEDURE — 82728 ASSAY OF FERRITIN: CPT | Performed by: STUDENT IN AN ORGANIZED HEALTH CARE EDUCATION/TRAINING PROGRAM

## 2024-02-13 PROCEDURE — 83001 ASSAY OF GONADOTROPIN (FSH): CPT | Performed by: STUDENT IN AN ORGANIZED HEALTH CARE EDUCATION/TRAINING PROGRAM

## 2024-02-13 PROCEDURE — 82248 BILIRUBIN DIRECT: CPT | Performed by: STUDENT IN AN ORGANIZED HEALTH CARE EDUCATION/TRAINING PROGRAM

## 2024-02-13 PROCEDURE — 99214 OFFICE O/P EST MOD 30 MIN: CPT | Performed by: STUDENT IN AN ORGANIZED HEALTH CARE EDUCATION/TRAINING PROGRAM

## 2024-02-13 PROCEDURE — 82024 ASSAY OF ACTH: CPT | Performed by: STUDENT IN AN ORGANIZED HEALTH CARE EDUCATION/TRAINING PROGRAM

## 2024-02-13 PROCEDURE — 81001 URINALYSIS AUTO W/SCOPE: CPT | Performed by: STUDENT IN AN ORGANIZED HEALTH CARE EDUCATION/TRAINING PROGRAM

## 2024-02-13 PROCEDURE — 2500000005 HC RX 250 GENERAL PHARMACY W/O HCPCS: Performed by: STUDENT IN AN ORGANIZED HEALTH CARE EDUCATION/TRAINING PROGRAM

## 2024-02-13 PROCEDURE — 85610 PROTHROMBIN TIME: CPT | Performed by: STUDENT IN AN ORGANIZED HEALTH CARE EDUCATION/TRAINING PROGRAM

## 2024-02-13 PROCEDURE — 82550 ASSAY OF CK (CPK): CPT | Performed by: STUDENT IN AN ORGANIZED HEALTH CARE EDUCATION/TRAINING PROGRAM

## 2024-02-13 PROCEDURE — 1036F TOBACCO NON-USER: CPT | Performed by: STUDENT IN AN ORGANIZED HEALTH CARE EDUCATION/TRAINING PROGRAM

## 2024-02-13 PROCEDURE — 85025 COMPLETE CBC W/AUTO DIFF WBC: CPT | Performed by: STUDENT IN AN ORGANIZED HEALTH CARE EDUCATION/TRAINING PROGRAM

## 2024-02-13 PROCEDURE — 82150 ASSAY OF AMYLASE: CPT | Performed by: STUDENT IN AN ORGANIZED HEALTH CARE EDUCATION/TRAINING PROGRAM

## 2024-02-13 PROCEDURE — 84443 ASSAY THYROID STIM HORMONE: CPT | Performed by: STUDENT IN AN ORGANIZED HEALTH CARE EDUCATION/TRAINING PROGRAM

## 2024-02-13 PROCEDURE — 83690 ASSAY OF LIPASE: CPT | Performed by: STUDENT IN AN ORGANIZED HEALTH CARE EDUCATION/TRAINING PROGRAM

## 2024-02-13 PROCEDURE — 1159F MED LIST DOCD IN RCRD: CPT | Performed by: STUDENT IN AN ORGANIZED HEALTH CARE EDUCATION/TRAINING PROGRAM

## 2024-02-13 PROCEDURE — 85730 THROMBOPLASTIN TIME PARTIAL: CPT | Performed by: STUDENT IN AN ORGANIZED HEALTH CARE EDUCATION/TRAINING PROGRAM

## 2024-02-13 PROCEDURE — 82670 ASSAY OF TOTAL ESTRADIOL: CPT | Performed by: STUDENT IN AN ORGANIZED HEALTH CARE EDUCATION/TRAINING PROGRAM

## 2024-02-13 PROCEDURE — 83735 ASSAY OF MAGNESIUM: CPT | Performed by: STUDENT IN AN ORGANIZED HEALTH CARE EDUCATION/TRAINING PROGRAM

## 2024-02-13 PROCEDURE — 1126F AMNT PAIN NOTED NONE PRSNT: CPT | Performed by: STUDENT IN AN ORGANIZED HEALTH CARE EDUCATION/TRAINING PROGRAM

## 2024-02-13 PROCEDURE — 84439 ASSAY OF FREE THYROXINE: CPT | Performed by: STUDENT IN AN ORGANIZED HEALTH CARE EDUCATION/TRAINING PROGRAM

## 2024-02-13 PROCEDURE — 80053 COMPREHEN METABOLIC PANEL: CPT | Performed by: STUDENT IN AN ORGANIZED HEALTH CARE EDUCATION/TRAINING PROGRAM

## 2024-02-13 PROCEDURE — 86140 C-REACTIVE PROTEIN: CPT | Performed by: STUDENT IN AN ORGANIZED HEALTH CARE EDUCATION/TRAINING PROGRAM

## 2024-02-13 PROCEDURE — 96413 CHEMO IV INFUSION 1 HR: CPT

## 2024-02-13 RX ORDER — EPINEPHRINE 1 MG/ML
0.3 INJECTION INTRAMUSCULAR; INTRAVENOUS; SUBCUTANEOUS EVERY 5 MIN PRN
Status: CANCELLED | OUTPATIENT
Start: 2024-02-27

## 2024-02-13 RX ORDER — DEXAMETHASONE 2 MG/1
1 TABLET ORAL
Qty: 30 TABLET | Refills: 2 | Status: SHIPPED | OUTPATIENT
Start: 2024-02-13

## 2024-02-13 RX ORDER — ALBUTEROL SULFATE 0.83 MG/ML
3 SOLUTION RESPIRATORY (INHALATION) AS NEEDED
Status: CANCELLED | OUTPATIENT
Start: 2024-02-27

## 2024-02-13 RX ORDER — DIPHENHYDRAMINE HYDROCHLORIDE 50 MG/ML
50 INJECTION INTRAMUSCULAR; INTRAVENOUS AS NEEDED
Status: DISCONTINUED | OUTPATIENT
Start: 2024-02-13 | End: 2024-02-14 | Stop reason: HOSPADM

## 2024-02-13 RX ORDER — FAMOTIDINE 10 MG/ML
20 INJECTION INTRAVENOUS ONCE AS NEEDED
Status: CANCELLED | OUTPATIENT
Start: 2024-02-27

## 2024-02-13 RX ORDER — DIPHENHYDRAMINE HYDROCHLORIDE 50 MG/ML
50 INJECTION INTRAMUSCULAR; INTRAVENOUS AS NEEDED
Status: CANCELLED | OUTPATIENT
Start: 2024-02-13

## 2024-02-13 RX ORDER — EPINEPHRINE 1 MG/ML
0.3 INJECTION INTRAMUSCULAR; INTRAVENOUS; SUBCUTANEOUS EVERY 5 MIN PRN
Status: CANCELLED | OUTPATIENT
Start: 2024-02-13

## 2024-02-13 RX ORDER — ALBUTEROL SULFATE 0.83 MG/ML
3 SOLUTION RESPIRATORY (INHALATION) AS NEEDED
Status: DISCONTINUED | OUTPATIENT
Start: 2024-02-13 | End: 2024-02-14 | Stop reason: HOSPADM

## 2024-02-13 RX ORDER — ALBUTEROL SULFATE 0.83 MG/ML
3 SOLUTION RESPIRATORY (INHALATION) AS NEEDED
Status: CANCELLED | OUTPATIENT
Start: 2024-02-13

## 2024-02-13 RX ORDER — FAMOTIDINE 10 MG/ML
20 INJECTION INTRAVENOUS ONCE AS NEEDED
Status: DISCONTINUED | OUTPATIENT
Start: 2024-02-13 | End: 2024-02-14 | Stop reason: HOSPADM

## 2024-02-13 RX ORDER — DIPHENHYDRAMINE HYDROCHLORIDE 50 MG/ML
50 INJECTION INTRAMUSCULAR; INTRAVENOUS AS NEEDED
Status: CANCELLED | OUTPATIENT
Start: 2024-02-27

## 2024-02-13 RX ORDER — EPINEPHRINE 1 MG/ML
0.3 INJECTION INTRAMUSCULAR; INTRAVENOUS; SUBCUTANEOUS EVERY 5 MIN PRN
Status: DISCONTINUED | OUTPATIENT
Start: 2024-02-13 | End: 2024-02-14 | Stop reason: HOSPADM

## 2024-02-13 RX ORDER — FAMOTIDINE 10 MG/ML
20 INJECTION INTRAVENOUS ONCE AS NEEDED
Status: CANCELLED | OUTPATIENT
Start: 2024-02-13

## 2024-02-13 RX ADMIN — Medication 3 MG: at 11:15

## 2024-02-13 NOTE — RESEARCH NOTES
Research Note Treatment Day    Gladys Branch is here today for treatment on [id number]. Today is C14D1. Procedures completed per protocol. AE's and con-meds reviewed with patient. Patient is aware of treatment plan.    [x]   Received treatment as planned   OR  []    Treatment delayed; patient calendar updated as required   Treatment delayed because:    []   AE    []   Physician Discretion    []   Clinical Deterioration or Progression     []   Other    Education Documentation  General Medication Information, taught by Vladimir Fernandez RN at 2/13/2024  1:59 PM.  Learner: Significant Other, Patient  Readiness: Eager  Method: Explanation  Response: Verbalizes Understanding    Treatment Plan and Schedule, taught by Vladimir Fernandez RN at 2/13/2024  1:59 PM.  Learner: Significant Other, Patient  Readiness: Eager  Method: Explanation  Response: Verbalizes Understanding    Diagnostic Studies, taught by Vladimir Fernandez RN at 2/13/2024  1:59 PM.  Learner: Significant Other, Patient  Readiness: Eager  Method: Explanation  Response: Verbalizes Understanding    Education Comments  No comments found.

## 2024-02-13 NOTE — RESEARCH NOTES
[facility]><[id number]><C5+D1><PARTA>  DCRU NURSING VISIT NOTE  Study Name: ALEXANDRU Foote8  IRB#: [id number]  DCRU#: [id number]  Protocol Version Dated: A9 3.22.23  PI: Roshan Kumar MD.  Time point: Cycle 5 and beyond - Day 1  CYCLE 14   Encounter Date: 02/13/2024  Encounter Time:  8:30 AM EST  Encounter Department: [facility] HEMATOLOGY AND ONCOLOGY     #1     Phone Pager   Carmen Rios [phone]    #2 Phone Pager        Other Phone Pager          Study Regimen and Dosing   Part A Dose Exploration/Expansion- Small Cell Lung Cancer Relapsed or Refractory patients who progressed or recurred following at least 1 platinum-based regimen.   Cycle = 28 days    administered IV Day 1, Day 8, and Day 15 of Cycle 1. Cycle 2 and beyond     administered on Day 1 and Day 15.        Dietary Guidelines   Regular diet:     Admission and Prior to Starting Study Activities   Notify  when patient arrives to unit.  Complete DCRU/[facility] intake form in EMR.  Insert one peripheral IV line for sample collection procedures (flush line with normal saline following each blood draw). Access mediport (if available) otherwise insert second peripheral line in opposite arm for Investigative drug administration (if peripheral line, flush line with normal saline before & after infusion)     Criteria to Treat   DCRU RN reviewed and meets eligibility to proceed with treatment plan   Time team notified: 1000 am  DCRU RN notifies study team to review eligibility and approval before dosing procedures  Time team approves: 1000 am   Maddison Branch is a 68 y.o. female and is here for a Research clinical visit.  Visit Provider: [id number], [facility] ROOM 14 [facility]   Allergies:   Allergies   Allergen Reactions    Cisplatin Other     CHEMO INDUCED (Moderate); Dyspepsia (Moderate); Headaches (Moderate); Hypotension (Moderate); Numbness (Moderate); Resp Distress (Moderate)     Codeine Nausea Only and Other     nausea    Sulfa (Sulfonamide Antibiotics) Rash   Objective   Vital Signs:    Vitals:    02/13/24 0744 02/13/24 1100   BP: 104/72 109/76   Pulse: 76 80   Resp: 17 17   Temp: 36.5 °C (97.7 °F) 36.6 °C (97.9 °F)   TempSrc: Oral Oral   SpO2: 94% 96%   Weight: 50 kg (110 lb 3.7 oz)    Physical Exam   ASSESSMENT and PLAN:  Problem List Items Addressed This Visit          Hematology and Neoplasia    Small cell lung cancer (CMS/HCC)    Relevant Medications    sodium chloride 0.9 % bolus 500 mL    dextrose 5 % in water (D5W) bolus    diphenhydrAMINE (BENADryl) injection 50 mg    methylPREDNISolone sod succinate (PF) (SOLU-Medrol) 40 mg/mL injection 40 mg    famotidine PF (Pepcid) injection 20 mg    EPINEPHrine (Adrenalin) injection 0.3 mg    albuterol 2.5 mg /3 mL (0.083 %) nebulizer solution 3 mL    Study [id number] Tarlatamab (AMG-757) 3 mg in sodium chloride 0.9% 250 mL IV (Completed)    Other Relevant Orders    CBC and Auto Differential (Completed)    Comprehensive metabolic panel (Completed)    Acth    Amylase (Completed)    APTT (Completed)    Bilirubin, Direct (Completed)    CK (Completed)    Cortisol (Completed)    C-Reactive Protein (Completed)    Estradiol (Completed)    Ferritin (Completed)    FSH & LH (Completed)    Lipase (Completed)    Magnesium (Completed)    Phosphorus (Completed)    Protime-INR (Completed)    TSH (Completed)    T4, free (Completed)    Urinalysis with Reflex Microscopic (Completed)    Research Communication (Completed)    Treatment Conditions (Completed)    Provider Communication - [id number] (Completed)    Research Triplicate ECG (Completed)    Research Triplicate ECG (Completed)    Nursing Communication - Hypersensitivity Management, Moderate (Completed)    Nursing Communication - Hypersensitivity Management, Severe (Completed)    Nursing Communication - Respiratory Management (Completed)    Pulse oximetry, continuous    Microscopic Only, Urine (Completed)     Medications as of the completion of today's visit:  Current Outpatient Medications   Medication Sig Dispense Refill    cetirizine (ZyrTEC) 10 mg tablet Take 1 tablet (10 mg) by mouth once daily. (Patient taking differently: Take 1 tablet (10 mg) by mouth once daily. Alternating with Zyrtec D) 30 tablet 11    cetirizine-pseudoephedrine (ZyrTEC-D) 5-120 mg 12 hr tablet Take 1 tablet by mouth 2 times a day. 60 tablet 11    cholecalciferol (Vitamin D-3) 25 MCG (1000 UT) tablet Take by mouth.      dexAMETHasone (Decadron) 2 mg tablet Take 0.5 tablets (1 mg) by mouth once daily with breakfast. 30 tablet 2    dextromethorphan (Delsym) 30 mg/5 mL liquid Take 5 mL (30 mg) by mouth once daily as needed.      dronabinol (Marinol) 2.5 mg capsule once daily as needed. One hour before dinner      estrogens, conjugated, (Premarin) 0.3 mg tablet Take 0.5 tablets (0.15 mg) by mouth once daily.      omeprazole (PriLOSEC) 40 mg DR capsule Take 1 capsule (40 mg) by mouth once daily. Do not crush or chew. 90 each 3    ondansetron (Zofran) 8 mg tablet Take 0.5 tablets (4 mg) by mouth every 8 hours if needed.      polyethylene glycol (Glycolax, Miralax) 17 gram packet Take 17 g by mouth 2 times a day. 60 packet 2    prochlorperazine (Compazine) 10 mg tablet Take 0.5 tablets (5 mg) by mouth every 6 hours if needed.      psyllium husk, aspartame, (Metamucil Sugar-Free, aspart,) 3.4 gram/5.8 gram powder Take 1 Dose by mouth once daily. 425 g 11    vit A/vit C/vit E/zinc/copper (PRESERVISION AREDS ORAL) Take by mouth once daily as needed.       Current Facility-Administered Medications   Medication Dose Route Frequency Provider Last Rate Last Admin    albuterol 2.5 mg /3 mL (0.083 %) nebulizer solution 3 mL  3 mL nebulization PRN Francy Archer MD        dextrose 5 % in water (D5W) bolus  500 mL intravenous PRN Francy Archer MD        diphenhydrAMINE (BENADryl) injection 50 mg  50 mg intravenous PRN Francy Archer MD        EPINEPHrine  (Adrenalin) injection 0.3 mg  0.3 mg intramuscular q5 min PRN Francy Archer MD        famotidine PF (Pepcid) injection 20 mg  20 mg intravenous Once PRN Francy Archer MD        methylPREDNISolone sod succinate (PF) (SOLU-Medrol) 40 mg/mL injection 40 mg  40 mg intravenous PRN Francy Archer MD        sodium chloride 0.9 % bolus 500 mL  500 mL intravenous PRN Francy Archer MD           Administrations This Visit       Study [id number] Tarlatamab (AMG-757) 3 mg in sodium chloride 0.9% 250 mL IV       Admin Date  02/13/2024 Action  New Bag Dose  3 mg Route  intravenous Administered By  Marquise Nash RN                Orders placed during today's visit:  Orders Placed This Encounter   Procedures    CBC and Auto Differential     Standing Status:   Standing     Number of Occurrences:   1     Order Specific Question:   Release result to MyChart     Answer:   Immediate [1]    Comprehensive metabolic panel     Standing Status:   Standing     Number of Occurrences:   1     Order Specific Question:   Release result to MyChart     Answer:   Immediate [1]    Acth     Standing Status:   Standing     Number of Occurrences:   1     Order Specific Question:   Release result to MyChart     Answer:   Immediate [1]    Amylase     Standing Status:   Standing     Number of Occurrences:   1     Order Specific Question:   Release result to MyChart     Answer:   Immediate [1]    APTT     Standing Status:   Standing     Number of Occurrences:   1     Order Specific Question:   Release result to MyChart     Answer:   Immediate [1]    Bilirubin, Direct     Standing Status:   Standing     Number of Occurrences:   1     Order Specific Question:   Release result to MyChart     Answer:   Immediate [1]    CK     Standing Status:   Standing     Number of Occurrences:   1     Order Specific Question:   Release result to MyChart     Answer:   Immediate [1]    Cortisol     Standing Status:   Standing     Number of Occurrences:   1     Order  Specific Question:   Release result to MyChart     Answer:   Immediate [1]    C-Reactive Protein     Standing Status:   Standing     Number of Occurrences:   1     Order Specific Question:   Release result to MyChart     Answer:   Immediate [1]    Estradiol     Standing Status:   Standing     Number of Occurrences:   1     Order Specific Question:   Release result to MyChart     Answer:   Immediate [1]    Ferritin     Standing Status:   Standing     Number of Occurrences:   1     Order Specific Question:   Release result to MyChart     Answer:   Immediate [1]    FSH & LH     Standing Status:   Standing     Number of Occurrences:   1     Order Specific Question:   Release result to MyChart     Answer:   Immediate [1]    Lipase     Standing Status:   Standing     Number of Occurrences:   1     Order Specific Question:   Release result to MyChart     Answer:   Immediate [1]    Magnesium     Standing Status:   Standing     Number of Occurrences:   1     Order Specific Question:   Release result to MyChart     Answer:   Immediate [1]    Phosphorus     Standing Status:   Standing     Number of Occurrences:   1     Order Specific Question:   Release result to MyChart     Answer:   Immediate [1]    Protime-INR     Standing Status:   Standing     Number of Occurrences:   1     Order Specific Question:   Release result to MyChart     Answer:   Immediate [1]    TSH     Standing Status:   Standing     Number of Occurrences:   1     Order Specific Question:   Release result to MyChart     Answer:   Immediate [1]    T4, free     Standing Status:   Standing     Number of Occurrences:   1     Order Specific Question:   Release result to MyChart     Answer:   Immediate [1]    Urinalysis with Reflex Microscopic     Standing Status:   Standing     Number of Occurrences:   1     Order Specific Question:   Release result to MyChart     Answer:   Immediate [1]    Microscopic Only, Urine     Standing Status:   Standing     Number of  Occurrences:   1     Order Specific Question:   Release result to [employer]     Answer:   Immediate [1]    Research Communication     Cohort: 8  Dose Level:  10 mg/IV     Standing Status:   Standing     Number of Occurrences:   1    Treatment Conditions     Hold treatment and notify provider if:   Adverse Event GREATER THAN OR EQUAL TO Grade 3     Standing Status:   Standing     Number of Occurrences:   1    Provider Communication - [id number]      Dose Level 4              Tarlatamab 0.1 mg   Dose Level 5              Tarlatamab 0.3 mg   Dose Level 6              Tarlatamab 1 mg   Dose Level 7              Tarlatamab 3 mg   Dose Level 8              Tarlatamab 10 mg   Dose Level 9              Tarlatamab 30 mg   Dose Level 10              Tarlatamab 100 mg     Standing Status:   Standing     Number of Occurrences:   1    Research Triplicate ECG     Refer to study SmartPhrase for instructions and documentation of the research triplicate ECG.     Standing Status:   Standing     Number of Occurrences:   1    Research Triplicate ECG     Refer to study SmartPhrase for instructions and documentation of the research triplicate ECG.     Standing Status:   Standing     Number of Occurrences:   1    Nursing Communication - Hypersensitivity Management, Moderate     Flushing, rash, pruritus, dyspnea, chest discomfort, back pain, angioedema, SBP LESS THAN 90 mmHg, and/or change in mental status above or below patient's baseline. In the event of moderate or severe hypersensitivity reaction to any medication:   Stop infusion.  Assess vital signs, pulse oximetry, nursing assessment, and patient complaints.  Administer medications per Hypersensitivity Reaction Medication Protocol.   Notify physician.     Standing Status:   Standing     Number of Occurrences:   1    Nursing Communication - Hypersensitivity Management, Severe     Worsening of moderate reaction symptoms, SBP LESS THAN 80 mmHg, and/or respiratory distress (respirations  GREATER THAN 40 breaths per minute, wheezing, life-threatening symptoms). In the event of moderate or severe hypersensitivity reaction to any medication:   Stop infusion.  Assess vital signs, pulse oximetry, nursing assessment, and patient complaints.  Administer medications per Hypersensitivity Reaction Medication Protocol.   Notify physician.     Standing Status:   Standing     Number of Occurrences:   1    Nursing Communication - Respiratory Management     Oxygen via nasal cannula at 4 L per minute for respiratory rate GREATER THAN OR EQUAL TO to 28 breaths/minute and/or wheezing. Pulse Oximetry continuous monitoring.     Standing Status:   Standing     Number of Occurrences:   1    Pulse oximetry, continuous     Continuous monitoring to maintain SPO2 GREATER THAN 90%     Standing Status:   Standing     Number of Occurrences:   1    [id number] -  in Subjects With Small Cell Lung Cancer    Patient has no adverse events documented in the Research Adverse Events activity.    Study Specific Instructions and Documentation   WEIGHT  LABS  VITALS  CORREATIVES  STUDY DRUG  VITALS  DISCHARGE     WEIGHT    Obtain weight in kilograms - with shoes off & heavy items removed.   Vitals:    02/13/24 0744   Weight: 50 kg (110 lb 3.7 oz)        PRE-DOSE Safety Labs   Refer to [place] Treatment Plan for orders     Pre-dose Vital Signs   Temp, HR, Resp, BP, Pulse Oximetry: Seated or Semi-Fowlers x 5 minutes before obtaining  Position and temperature location: should be the same that is used throughout the study-record position  @1100     Pre-dose Research Correlatives  Deliver to DCRU/[facility] lab for processing   Access Type: See IV assessment Location: See IV assessment   Refer to [place] Treatment Plan for orders   Time point Specimen Test Volume Tube Handling Draw Time   Pre-dose (within 15 mins of  dosing) Anti- Antibody 2.5 mL SST Ambient, Invert 5X 1100    TARLATAMAB PK  (through cycle 12) 2.5 mL SST Ambient, Invert 5X  1100    Serum Markers    CPT and EDTA drawn (not listed in smart phrase) 2.5 mL SST Ambient, Invert 5X 1100        Infusion-Related Reactions   [facility] Hypersensitivity Orders  Note: CRS table      Research Drug Administration Tarlatamab ()   Refer to [place] Treatment Plan for orders   Administer dose over 60 minutes (+/- 10 mins) followed by a 3 - 5 minute Normal saline flush. (This will be the end time after the flush). Document start time & end of study medication; document start time and end time of the flush.  Participant to remain on Unit for 2 hour post dose observation.      Day 1 Post-Dose Vital Signs   Temp, HR, Resp, BP, Pulse Oximetry: Seated or Semi-Fowlers x 5 minutes before obtaining  Position and temperature location: should be the same that is used throughout the study  End of Infusion  @1220     Discharge Instructions   Patient may be discharged to home after 2 hour observation.  Remind patient to return: Day 15 for treatment & labs  Discharge time aprox. 1420   Marquise Philip RN  02/13/24  Grading and Management of Cytokine Release Syndrome   CRS Grade Description of Severity Minimum Expected Intervention Instructions for Dose Modifications of    1 Symptoms are not life threatening and require symptomatic treatment only,  eg, fever, nausea, fatigue, headache, myalgia's, malaise Administer symptomatic treatment (contact provider for medications/doses). Monitor for CRS symptoms including vital signs and pulse oximetry at least Q2 hours for12 hours or until resolution, Whichever is earlier. N/A     (continued)  Grading and Management of Cytokine Release Syndrome   CRS Grade Description of Severity Minimum Expected Intervention Instructions for Dose Modifications of    2 Symptoms require and respond to moderate intervention  Oxygen requirement <40%,                      OR  Hypotension responsive to fluids or low dose of one vasopressor, OR                                                                        Grade 2 organ toxicity or grade 3 transaminitis per CTCAE criteria Administer:  Symptomatic treatment   (contact provider for medications/doses)  Supplemental oxygen when oxygen saturation is <90% on room air  Intravenous fluids or low dose vasopressor for hypotension is recommended when systolic blood pressure is <85 mmHg. (contact provider for medications/doses) Persistent tachycardia (eg >120 bpm) may also indicate the need for intervention for hypotension.   Monitor for CRS symptoms including vital signs and pulse oximetry at least Q2 hours for12 hours or until resolution to CRS grade <= 1, whichever is earlier. For subjects with extensive co-morbidities or poor performance status, manage per grade 3 CRS guidance below If CRS occurs during  treatment, immediately interrupt the infusion and delay the next  dose until the event resolves to CRS grade <= 1 for no less than 72 hours. Permanently discontinue  if there is no improvement to CRS <= grade 1 within 7 days     3 Symptoms require and respond to aggressive intervention  Oxygen requirement >=40%, OR  Hypotension requiring high dose or multiple vasopressors, OR  Grade 3 organ toxicity or     Grade 4 transaminitis per  CTCAE criteria Admit to intensive care unit for close clinical and vital sign monitoring per institutional guidelines.   Refer to provider/protocol for medications and doses.     If CRS occurs during  treatment, immediately interrupt   and delay the next dose until event resolves to CRS grade <= 1 for no less than 72 hours.    Permanently discontinue  if there is no improvement to CRS                 <= grade 2 within 5 days or CRS <= grade 1 within 7 days.  Permanently discontinue   if CRS grade  3 occurs at the initial run in dose (i.e., at MTD1)  (Applicable only after MTD1 has been defined).     (continued)  Grading and Management of Cytokine Release Syndrome   CRS  Grade Description of Severity Minimum Expected Intervention Instructions for Dose Modifications of    4 Life-threatening symptoms  Requirement for ventilator support OR  Grade 4 organ toxicity (excluding transaminitis) per CTCAE criteria Admit to intensive care unit for close clinical and vital sign monitoring per institutional guidelines.   Refer to provider/protocol for medications and doses.   If CRS occurs during  treatment, Immediately stop the infusion.  Permanently discontinue   therapy.

## 2024-02-13 NOTE — PROGRESS NOTES
Patient ID: Gladys Branch is a 68 y.o. female.    DIAGNOSIS    Small Cell Lung Cancer    By immunostaining, the tumor cells are positive for CAM 5.2, INSM1, and TTF-1, and negative for p40 and LCA. The proliferation index by Ki-67 immunostaining is approximately 90%.  Synaptophysin, Chromogranin and INSM-1 are positive.  AE1/AE3 is negative. NEOGENOMICS, RB protein expression is lost in the tumor cells    STAGING    [id number], limited stage  Recurrence    CURRENT SITES OF DISEASE    RLL, supraclavicular    MOLECULAR GENOMICS      PRIOR THERAPY    Concurrent chemoradiation with Cisplatin/Etoposide every 3 weeks; C1D1 2/15/22, reaction to cisplatin C2D1 and did not receive D2 or D3 etoposide  Carbo/etoposide 4/5/2022 1 cycle c/b rash  PCI 5/21-6/13/22    CURRENT THERAPY    Phase 1 study [id number]  with study drug Taralatamab 1/24/23 - present.    CURRENT ONCOLOGICAL PROBLEMS      HISTORY OF PRESENT ILLNESS    Mrs. Branch is a 65 yo with PMH GERD and COPD who originally was round to have a RLL nodule measuring 11 mm in 4/28/2021 found as part of CT calcium score scan. An ensuing CT chest w/o contrast was done on 5/19/21 which revealed subsolid pulmonary nodules measuring 14 mm in the RLL. She had CT chest w/contrast on 11/29/21 which confirmed stable 14 mm GGO of the RLL and decreased RLL subpleural nodule. She met thoracic surgeon Dr. Farfan, who ordered PET/CT scan done on 12/8/21 which did not reveal any FDG-avid nodules within the lung parenchyma but R hilar nodes with max SUV 8.0. He referred her for bronch, which was done on 1/21/22 by Dr. Mcdaniels. 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 Brain MRI was negative.    She started concurrent chemoradiation with BID radiation with cis/etop 2/15/22, and unfortunately had a reaction to cisplatin on C2D1 with chest pain and hypotension. She was hospitalized with sepsis felt to be due to pneumonia. She trialed carboplatin/etoposide on 4/5/22 with a  resulting rash and opted to forego further chemotherapy as she had completed radiation. Restaging scan 11/3/22 unfortunately with progression with new R hilar lymph node, paratracheal nodes, and increase in size of R supraclavicular mass. She enrolled in [id number] and started C1D1 1/24/23. C1 complicated by anorexia and dysgeusia, and delayed C2 by a week. She has further struggled with fatigue and confusion, which may be related to mirtazapine. Dose reduced for C2 and mirtazapine stopped with improvement in symptoms.     PAST MEDICAL HISTORY    GERD  COPD    SOCIAL HISTORY    Retired - lighting . Lives at home with  and a kitten.  Tobacco: quit smoking 1999. 1 ppd x 25 yrs.  EtOH: wine 1 glass/day  Illicits: none    CURRENT MEDS    Please see med list    ALLERGIES    Codeine, Sulfa drugs, Cisplatin    FAMILY HISTORY    Paternal aunt - breast cancer dx 80s    Subjective    Today 2.13.2024. Patient in clinic with her  for C14D1 for [id number].    She is here today with her . She is feeling well overall. She is out of the dexamethasone. Her appetite comes and goes, but she is continuing to eat. No difficulty breathing or cough. Energy level is good. No complaints or concerns. Her sister is undergoing surgery today for likely lung cancer.    Objective          1/16/2024    10:35 AM 1/16/2024    11:57 AM 1/30/2024     7:13 AM 1/30/2024     7:18 AM 1/30/2024    10:42 AM 1/30/2024    12:00 PM 2/13/2024     7:44 AM   Vitals   Systolic 98 105  109 101 91 104   Diastolic 70 74  69 63 58 72   Heart Rate 73 68  79 69 73 76   Temp 36.2 °C (97.2 °F) 36.1 °C (97 °F)  36.6 °C (97.9 °F) 36.9 °C (98.4 °F) 36.8 °C (98.2 °F) 36.5 °C (97.7 °F)   Resp 16 16  16 16 16 17   Weight (lb)   110.67    110.23   BMI   20.91 kg/m2    20.83 kg/m2   BSA (m2)   1.47 m2    1.47 m2   Visit Report   Report Report Report Report            Physical Exam  Constitutional:       General: She is awake.      Appearance:  Normal appearance. She is normal weight.   HENT:      Head: Normocephalic.      Mouth/Throat:      Mouth: Mucous membranes are moist.   Eyes:      Extraocular Movements: Extraocular movements intact.      Conjunctiva/sclera: Conjunctivae normal.      Pupils: Pupils are equal, round, and reactive to light.   Cardiovascular:      Rate and Rhythm: Normal rate and regular rhythm.      Heart sounds: Normal heart sounds, S1 normal and S2 normal.   Pulmonary:      Effort: Pulmonary effort is normal.      Breath sounds: Normal breath sounds.   Abdominal:      General: Abdomen is flat. Bowel sounds are normal.      Palpations: Abdomen is soft.   Musculoskeletal:      Cervical back: Normal range of motion and neck supple.   Skin:     Comments: No skin rash observed   Neurological:      Mental Status: She is alert.      Cranial Nerves: Cranial nerves 2-12 are intact.      Sensory: Sensation is intact.      Motor: Motor function is intact.      Coordination: Coordination is intact.   Psychiatric:         Attention and Perception: Attention normal.         Mood and Affect: Mood normal.         Speech: Speech normal.         Behavior: Behavior normal. Behavior is cooperative.         Cognition and Memory: Cognition normal.     Labs    Hospital Outpatient Visit on 02/13/2024   Component Date Value Ref Range Status    WBC 02/13/2024 6.6  4.4 - 11.3 x10*3/uL Final    nRBC 02/13/2024 0.0  0.0 - 0.0 /100 WBCs Final    RBC 02/13/2024 3.73 (L)  4.00 - 5.20 x10*6/uL Final    Hemoglobin 02/13/2024 11.6 (L)  12.0 - 16.0 g/dL Final    Hematocrit 02/13/2024 34.4 (L)  36.0 - 46.0 % Final    MCV 02/13/2024 92  80 - 100 fL Final    MCH 02/13/2024 31.1  26.0 - 34.0 pg Final    MCHC 02/13/2024 33.7  32.0 - 36.0 g/dL Final    RDW 02/13/2024 13.0  11.5 - 14.5 % Final    Platelets 02/13/2024 224  150 - 450 x10*3/uL Final    Neutrophils % 02/13/2024 64.3  40.0 - 80.0 % Final    Immature Granulocytes %, Automated 02/13/2024 0.6  0.0 - 0.9 % Final     Lymphocytes % 02/13/2024 21.2  13.0 - 44.0 % Final    Monocytes % 02/13/2024 9.6  2.0 - 10.0 % Final    Eosinophils % 02/13/2024 3.8  0.0 - 6.0 % Final    Basophils % 02/13/2024 0.5  0.0 - 2.0 % Final    Neutrophils Absolute 02/13/2024 4.23  1.20 - 7.70 x10*3/uL Final    Immature Granulocytes Absolute, Au* 02/13/2024 0.04  0.00 - 0.70 x10*3/uL Final    Lymphocytes Absolute 02/13/2024 1.39  1.20 - 4.80 x10*3/uL Final    Monocytes Absolute 02/13/2024 0.63  0.10 - 1.00 x10*3/uL Final    Eosinophils Absolute 02/13/2024 0.25  0.00 - 0.70 x10*3/uL Final    Basophils Absolute 02/13/2024 0.03  0.00 - 0.10 x10*3/uL Final    Glucose 02/13/2024 129 (H)  74 - 99 mg/dL Final    Sodium 02/13/2024 139  136 - 145 mmol/L Final    Potassium 02/13/2024 3.7  3.5 - 5.3 mmol/L Final    Chloride 02/13/2024 104  98 - 107 mmol/L Final    Bicarbonate 02/13/2024 25  21 - 32 mmol/L Final    Anion Gap 02/13/2024 14  10 - 20 mmol/L Final    Urea Nitrogen 02/13/2024 24 (H)  6 - 23 mg/dL Final    Creatinine 02/13/2024 0.71  0.50 - 1.05 mg/dL Final    eGFR 02/13/2024 >90  >60 mL/min/1.73m*2 Final    Calcium 02/13/2024 9.1  8.6 - 10.6 mg/dL Final    Albumin 02/13/2024 3.5  3.4 - 5.0 g/dL Final    Alkaline Phosphatase 02/13/2024 47  33 - 136 U/L Final    Total Protein 02/13/2024 5.8 (L)  6.4 - 8.2 g/dL Final    AST 02/13/2024 14  9 - 39 U/L Final    Bilirubin, Total 02/13/2024 0.7  0.0 - 1.2 mg/dL Final    ALT 02/13/2024 12  7 - 45 U/L Final    Amylase 02/13/2024 29  29 - 103 U/L Final    aPTT 02/13/2024 30  27 - 38 seconds Final    Bilirubin, Direct 02/13/2024 0.1  0.0 - 0.3 mg/dL Final    Creatine Kinase 02/13/2024 38  0 - 215 U/L Final    Cortisol 02/13/2024 13.2  2.5 - 20.0 ug/dL Final    C-Reactive Protein 02/13/2024 <0.10  <1.00 mg/dL Final    Estradiol 02/13/2024 <19  pg/mL Final    Ferritin 02/13/2024 78  8 - 150 ng/mL Final    Follicle Stimulating Hormone 02/13/2024 46.3  IU/L Final    Luteinizing Hormone 02/13/2024 15.9  IU/L Final     Lipase 02/13/2024 28  9 - 82 U/L Final    Magnesium 02/13/2024 1.73  1.60 - 2.40 mg/dL Final    Phosphorus 02/13/2024 4.6  2.5 - 4.9 mg/dL Final    Protime 02/13/2024 11.9  9.8 - 12.8 seconds Final    INR 02/13/2024 1.1  0.9 - 1.1 Final    Thyroid Stimulating Hormone 02/13/2024 6.39 (H)  0.44 - 3.98 mIU/L Final    Thyroxine, Free 02/13/2024 0.88  0.78 - 1.48 ng/dL Final   Hospital Outpatient Visit on 01/30/2024   Component Date Value Ref Range Status    WBC 01/30/2024 6.8  4.4 - 11.3 x10*3/uL Final    nRBC 01/30/2024 0.0  0.0 - 0.0 /100 WBCs Final    RBC 01/30/2024 3.50 (L)  4.00 - 5.20 x10*6/uL Final    Hemoglobin 01/30/2024 11.1 (L)  12.0 - 16.0 g/dL Final    Hematocrit 01/30/2024 32.7 (L)  36.0 - 46.0 % Final    MCV 01/30/2024 93  80 - 100 fL Final    MCH 01/30/2024 31.7  26.0 - 34.0 pg Final    MCHC 01/30/2024 33.9  32.0 - 36.0 g/dL Final    RDW 01/30/2024 13.1  11.5 - 14.5 % Final    Platelets 01/30/2024 194  150 - 450 x10*3/uL Final    Neutrophils % 01/30/2024 68.5  40.0 - 80.0 % Final    Immature Granulocytes %, Automated 01/30/2024 0.4  0.0 - 0.9 % Final    Lymphocytes % 01/30/2024 16.3  13.0 - 44.0 % Final    Monocytes % 01/30/2024 11.3  2.0 - 10.0 % Final    Eosinophils % 01/30/2024 3.1  0.0 - 6.0 % Final    Basophils % 01/30/2024 0.4  0.0 - 2.0 % Final    Neutrophils Absolute 01/30/2024 4.66  1.20 - 7.70 x10*3/uL Final    Immature Granulocytes Absolute, Au* 01/30/2024 0.03  0.00 - 0.70 x10*3/uL Final    Lymphocytes Absolute 01/30/2024 1.11 (L)  1.20 - 4.80 x10*3/uL Final    Monocytes Absolute 01/30/2024 0.77  0.10 - 1.00 x10*3/uL Final    Eosinophils Absolute 01/30/2024 0.21  0.00 - 0.70 x10*3/uL Final    Basophils Absolute 01/30/2024 0.03  0.00 - 0.10 x10*3/uL Final    Glucose 01/30/2024 90  74 - 99 mg/dL Final    Sodium 01/30/2024 139  136 - 145 mmol/L Final    Potassium 01/30/2024 3.8  3.5 - 5.3 mmol/L Final    Chloride 01/30/2024 106  98 - 107 mmol/L Final    Bicarbonate 01/30/2024 25  21 - 32 mmol/L  Final    Anion Gap 01/30/2024 12  10 - 20 mmol/L Final    Urea Nitrogen 01/30/2024 19  6 - 23 mg/dL Final    Creatinine 01/30/2024 0.76  0.50 - 1.05 mg/dL Final    eGFR 01/30/2024 85  >60 mL/min/1.73m*2 Final    Calcium 01/30/2024 8.8  8.6 - 10.6 mg/dL Final    Albumin 01/30/2024 3.5  3.4 - 5.0 g/dL Final    Alkaline Phosphatase 01/30/2024 40  33 - 136 U/L Final    Total Protein 01/30/2024 5.6 (L)  6.4 - 8.2 g/dL Final    AST 01/30/2024 15  9 - 39 U/L Final    Bilirubin, Total 01/30/2024 0.7  0.0 - 1.2 mg/dL Final    ALT 01/30/2024 10  7 - 45 U/L Final    aPTT 01/30/2024 32  27 - 38 seconds Final    Bilirubin, Direct 01/30/2024 0.1  0.0 - 0.3 mg/dL Final    Creatine Kinase 01/30/2024 36  0 - 215 U/L Final    C-Reactive Protein 01/30/2024 0.49  <1.00 mg/dL Final    Ferritin 01/30/2024 117  8 - 150 ng/mL Final    Magnesium 01/30/2024 1.76  1.60 - 2.40 mg/dL Final    Phosphorus 01/30/2024 4.3  2.5 - 4.9 mg/dL Final    Protime 01/30/2024 12.0  9.8 - 12.8 seconds Final    INR 01/30/2024 1.1  0.9 - 1.1 Final   Hospital Outpatient Visit on 01/16/2024   Component Date Value Ref Range Status    WBC 01/16/2024 5.6  4.4 - 11.3 x10*3/uL Final    nRBC 01/16/2024 0.0  0.0 - 0.0 /100 WBCs Final    RBC 01/16/2024 3.75 (L)  4.00 - 5.20 x10*6/uL Final    Hemoglobin 01/16/2024 11.7 (L)  12.0 - 16.0 g/dL Final    Hematocrit 01/16/2024 35.3 (L)  36.0 - 46.0 % Final    MCV 01/16/2024 94  80 - 100 fL Final    MCH 01/16/2024 31.2  26.0 - 34.0 pg Final    MCHC 01/16/2024 33.1  32.0 - 36.0 g/dL Final    RDW 01/16/2024 13.1  11.5 - 14.5 % Final    Platelets 01/16/2024 247  150 - 450 x10*3/uL Final    Neutrophils % 01/16/2024 58.4  40.0 - 80.0 % Final    Immature Granulocytes %, Automated 01/16/2024 0.7  0.0 - 0.9 % Final    Lymphocytes % 01/16/2024 25.6  13.0 - 44.0 % Final    Monocytes % 01/16/2024 10.7  2.0 - 10.0 % Final    Eosinophils % 01/16/2024 3.7  0.0 - 6.0 % Final    Basophils % 01/16/2024 0.9  0.0 - 2.0 % Final    Neutrophils  Absolute 01/16/2024 3.29  1.20 - 7.70 x10*3/uL Final    Immature Granulocytes Absolute, Au* 01/16/2024 0.04  0.00 - 0.70 x10*3/uL Final    Lymphocytes Absolute 01/16/2024 1.44  1.20 - 4.80 x10*3/uL Final    Monocytes Absolute 01/16/2024 0.60  0.10 - 1.00 x10*3/uL Final    Eosinophils Absolute 01/16/2024 0.21  0.00 - 0.70 x10*3/uL Final    Basophils Absolute 01/16/2024 0.05  0.00 - 0.10 x10*3/uL Final    Glucose 01/16/2024 92  74 - 99 mg/dL Final    Sodium 01/16/2024 141  136 - 145 mmol/L Final    Potassium 01/16/2024 3.8  3.5 - 5.3 mmol/L Final    Chloride 01/16/2024 107  98 - 107 mmol/L Final    Bicarbonate 01/16/2024 26  21 - 32 mmol/L Final    Anion Gap 01/16/2024 12  10 - 20 mmol/L Final    Urea Nitrogen 01/16/2024 21  6 - 23 mg/dL Final    Creatinine 01/16/2024 0.86  0.50 - 1.05 mg/dL Final    eGFR 01/16/2024 74  >60 mL/min/1.73m*2 Final    Calcium 01/16/2024 8.9  8.6 - 10.6 mg/dL Final    Albumin 01/16/2024 3.6  3.4 - 5.0 g/dL Final    Alkaline Phosphatase 01/16/2024 43  33 - 136 U/L Final    Total Protein 01/16/2024 5.8 (L)  6.4 - 8.2 g/dL Final    AST 01/16/2024 12  9 - 39 U/L Final    Bilirubin, Total 01/16/2024 0.5  0.0 - 1.2 mg/dL Final    ALT 01/16/2024 10  7 - 45 U/L Final    Adrenocorticotropic Hormone (ACTH) 01/16/2024 73.7 (H)  7.2 - 63.3 pg/mL Final    Amylase 01/16/2024 26 (L)  29 - 103 U/L Final    aPTT 01/16/2024 30  27 - 38 seconds Final    Bilirubin, Direct 01/16/2024 0.1  0.0 - 0.3 mg/dL Final    Creatine Kinase 01/16/2024 37  0 - 215 U/L Final    Cortisol 01/16/2024 7.6  2.5 - 20.0 ug/dL Final    C-Reactive Protein 01/16/2024 <0.10  <1.00 mg/dL Final    Estradiol 01/16/2024 <19  pg/mL Final    Ferritin 01/16/2024 110  8 - 150 ng/mL Final    Follicle Stimulating Hormone 01/16/2024 44.0  IU/L Final    Luteinizing Hormone 01/16/2024 24.8  IU/L Final    Lipase 01/16/2024 34  9 - 82 U/L Final    Magnesium 01/16/2024 1.95  1.60 - 2.40 mg/dL Final    Phosphorus 01/16/2024 4.9  2.5 - 4.9 mg/dL Final     Protime 01/16/2024 11.4  9.8 - 12.8 seconds Final    INR 01/16/2024 1.0  0.9 - 1.1 Final    Thyroid Stimulating Hormone 01/16/2024 11.90 (H)  0.44 - 3.98 mIU/L Final    Thyroxine, Free 01/16/2024 0.91  0.78 - 1.48 ng/dL Final    Color, Urine 01/16/2024 Yellow  Straw, Yellow Final    Appearance, Urine 01/16/2024 Clear  Clear Final    Specific Gravity, Urine 01/16/2024 1.021  1.005 - 1.035 Final    pH, Urine 01/16/2024 5.0  5.0, 5.5, 6.0, 6.5, 7.0, 7.5, 8.0 Final    Protein, Urine 01/16/2024 NEGATIVE  NEGATIVE mg/dL Final    Glucose, Urine 01/16/2024 NEGATIVE  NEGATIVE mg/dL Final    Blood, Urine 01/16/2024 NEGATIVE  NEGATIVE Final    Ketones, Urine 01/16/2024 5 (TRACE) (A)  NEGATIVE mg/dL Final    Bilirubin, Urine 01/16/2024 NEGATIVE  NEGATIVE Final    Urobilinogen, Urine 01/16/2024 <2.0  <2.0 mg/dL Final    Nitrite, Urine 01/16/2024 NEGATIVE  NEGATIVE Final    Leukocyte Esterase, Urine 01/16/2024 NEGATIVE  NEGATIVE Final    Extra Tube 01/16/2024 Hold for add-ons.   Final    Extra Tube 01/16/2024 Hold for add-ons.   Final    Extra Tube 01/16/2024 Hold for add-ons.   Final            Performance Status:    ECOG 1: Restricted in physically strenuous activity but ambulatory and able to carry out work of a light or sedentary nature, e.g., light house work, office work.     CT chest abdomen pelvis w IV contrast    Result Date: 12/14/2023  Impression: CHEST: Stable findings including stable irregular areas of linear and nodular thickening in the right midlung anteriorly.   Stable ground-glass area of nodularity in the right lower lobe.   ABDOMEN-PELVIS: Diverticulosis without definite diverticulitis. No definite metastatic disease to the abdomen or the pelvis.   MACRO: None   Signed by: Micaela Luna 12/14/2023 9:50 AM Dictation workstation:   [id number]       Assessment/Plan      Mrs. Heminger is a 68 yo with COPD and GERD who presented with newly diagnosed SCLC completed BID radiation planned concurrently with  cis/etoposide but had a reaction C2D1 to cisplatin. Completed 1 cycle carbo/etop D1 4/5/22 also complicated by facial flushing and erythematous rash and stopped further treatment. Now s/p PCI in 6/2022. Most recent CT concerning for disease progression. Referred for clinical trial AMGN [id number], C1D1 1/24/23. Treatment has been complicated by orthostatic hypotension, worsening short-term memory, increased lethargy, weight loss, and poor appetite. Delayed C2 by 1 week and dose-reduced. Confusion has resolved during C3 after stopping mirtazapine and dose reduction.     # SCLC  - limited stage, brain MRI negative  - previously discussed concurrent chemoradiation, with cisplatin/etoposide with side effects including but not limited to allergic reaction, fatigue, risk of infection, tinnitus, neuropathy, injury to liver/kidney, risk of anemia/thrombocytopenia and was consented  - she was also interested in scalp cooling, which unfortunately she is not a candidate for d/t SCLC  - started concurrent chemoradiation BID with Q3week cis/etop on 2/15 at [place]  -unfortunately had reaction to cisplatin on C2D1 resulting in hospitalization and also felt to be septic due to pneumonia  - discussed that we typically do 3-4 cycles chemotherapy, and alternative regimens include carboplatin/etoposide vs CAV. Given reaction to cisplatin, concern for possible reaction to carboplatin as well, although unknown rates of cross reaction. Alternatively, they also asked about discontinuation of chemotherapy, since she completed radiation. She ultimately decided to trial carbo/etoposide. She is aware of the heightened risk of allergic reaction, as well as other side effects including but not limited to fatigue, risk of infection, neuropathy, injury to liver/kidney, risk of anemia/thrombocytopenia. consented 3/28/22  -s/p 1 cycle Carbo/Etop D1 4/5/22, developed facial flushing and erythematous rash on arms and chest s/p treatment, resolved with  benadryl  -opted to forego further chemotherapy and will continue on maintenance  - s/p PCI 6/2022  - CT C/A/P and brain MRI 5/2022 and most recently 8/2022 with good response and no disease  -  MRI brain 11/7 without evidence of metastatic disease  - CT C/A/P 11/3 with multiple new enlarged LN concerning for disease progression - discussed with Dr. Valdivia not able to repeat radiation.  - referred to phase 1 clinical trial and consideration for [id number] or [id number]  - 1.5.2023 : Patient signed consent to take part on phase 1 study [id number]. Look clinically good to take part in study. Will start on study ASAP if eligible.   - 1.24.2023: Seen today and ready to start trial on [id number].  - 1.31.2023: Seen today, ready for C1D8 AMGN 1518   - 2.7.2023: Seen today for C1D15 [id number] - continue with trial   - 2.14.2023: Seen in follow-up on [id number] with overall worsening of general health  - 2.21.2023: Seen today for C2D1 AMGN 1518 with continued weight loss although perhaps starting to plateau, more energy since being off treatment, still poor appetite. will delay by 1 week, started dexamethasone 2 mg daily, and consider dose reduction.  - 2.28.2023: Seen today for C2D1 AMGN 1518 after delaying for 1 week. Energy level and appetite are improved, although still losing a little weight. Confusion is worse today, possibly due to mirtazapine vs study-related. Will dose reduce today.  - 3.7.2023: Seen today C2D8 AMGN 1518 after dose-reduction last week. Confusion is mildly improved, energy level and appetite are stable. Will add marinol for appetite.   - 3.14.2023: Seen today C2D15 AMGN 1518. Overall improved: confusion continues to improve, energy level and appetite are improved. Weight is improved. Will continue dexamethasone and marinol.  - 3.15.2023: C2D16 No CRS symptoms observed after last treatment Overall Symptoms are improving and she is tolerating treatment.  -3.16.2023: C2D17 No CRS symptoms observed after last  treatment Overall Symptoms are improving and she is tolerating treatment.  - 3.28.2023 : Most recent imaging suggest patient benefiting from current treatment. Ongoing symptom  possible common cold/season allergies. Since the patient looks clinically good we will proceed with C3D1 today. Educated patient to call the office ASAP if symptoms worsen.   - 4.11.2023: C3D15 Feeling well and overall improved with fatigue, confusion, anorexia. Proceed at current dosing and weaning steroids as below  -4.25.2023: C4D1 Pt is feeling well, no complaints of fatigue, confusion, memory loss. Has gained weight. Energy levels are good. Proceed with the current dosing. Pt is no longer on steroids.   -5.9.2023: C4D15. Pt is tolerating treatment well with no new complains. Continues to have good energy level endorses poor appetite with out any weight loss. Suggested to start taking the dronabinol.  Will proceed with treatment today.  -5.23.2023: C5D1. Pt is tolerating treatment well. Energy levels are good, is having some weight loss and constipation. Started back on dex for appetite. Pt will let us know if she remains constipated over the next several days.  Personally reviewed scans with stable disease. Will proceed with treatment today.   -6.6.2023: C5D15. Continues to tolerate treatment well. Since we restarted her on Dexamethasone she reports her energy levels and appetite has improved. Will continue on low dose Dex. With no new symptoms, we will proceed with treatment today.   -6.20.2023: C6D1. Doing well on dexamethasone 1 mg daily. Will proceed with treatment.  -7.5.2023: C6D15. We will proceed with treatment since the patient is tolerating treatment with no significant AE's and also give 1/2 liter of IV fluids.   - 7.18.2023: C7D1. Doing well, will proceed with treatment. She is out of dexamethasone, and will monitor off steroids.   -8.1.2023: C7D15. Continues to tolerate treatment. Will proceed with C7D15 today. RTC per  protocol.  - 8.29.2023: C8D1. C8 delayed due to hospitalization for diverticulitis. Has completed antibiotics and feeling well for treatment.  - 9.12.2023: C8D15. Most recent imgaing suggest the patient continues to benefit from current treatment. The imaging also suggest improvement of previously visualized segmental colitis associated with diverticulosis affecting the sigmoid colon with minimal residual pericolonic fat standing and hyperemia of the sigmoid colon. Imaging also suggest resolving inflammatory process without evidence of new or worsening complications. We will proceed with C8D15 today since the patient is also clinically feeling good. RTC per protocol.  -9.26.2023: C9D1. Patient looks clinically good. With no JACQUELYN's we will proceed with treatment C9D1 today.  -10.24.23: C10D1. Pt feels well, no acute events, no TRAEs, continue treatment today as scheduled.  -11.7.23: C10D15. Pt feels well, no TRAEs, continue treatment as scheduled.  - 11.21.23: C11D1. Continues to feel well, will continue treatment today.  - 12.19.23: C12D1. Personally reviewed most recent scan without evidence of progression. Continues to feel well and will continue with treatment today.  - 01.02.24: C12D15. Continues to tolerate treatment well. No new JACQUELYN's will proceed with treatment. RTC per protocol.  - 01.16.24: C13D1. No new JACQUELYN's proceed with treatment. RTC per protocol.   - 01.30.24: C13D15. Continues to feel well. Proceed with treatment.  - 2.13.24: D14D1. Personally reviewed most recent scan without evidence of progression. Continue with treatment today. RTC per protocol.    # Confusion - resolved  - significantly worsened after taking double dose of mirtazapine accidentally, although has been generally down-trending since starting study (which is also when she started taking mirtazapine) and now resolved  - brain MRI without progression of cancer  - will continue off mirtazapine    # Unintentional weight loss - stable  #  Anorexia- Improving  # Dysgeusia- improving  - all improved on steroids. unclear if this is from cancer or side effect of study drug  - stopped mirtazapine due to concerns for confusion   - weaned off dexamethasone and started marinol, but kept forgetting to take it  - dexamethasone trial started again on 5.23.2023. Continued on 1 mg daily - will trial off after 7.18.23.     # Fatigue - Resolved  - back to normal pretty much, weaning steroids as above    # frequent PVCs - asymptomatic  - saw onco-cardiology and completed 24-hr Holter monitor  - recommended decreasing caffeine and sudafed intake  - continue to monitor    # Forgetfulness - improved - however noted decline on AMGN study- unclear etiology.    - started after PCI, on memantine daily and has since stopped  - offered neurocognitive evaluation previously and she will think about this and readdress next visit  -  notes some decline in last 3 weeks- especially short term memory loss.    - Improved    # Neuropathy - resolved  - mild peripheral neuropathy with numbness of the toes - likely due to cisplatin  - will continue to monitor closely  - not endorsing any neuropathy at this visit     # Rash - resolved  Waxing and waning rash throughout her body. ?improving. Unclear etiology, patient and  feel timing coincided with starting BMX.   - she will hold off on further BMX  - thought to be due to sulfa allergy, possibly due to platinum agents   - continue to monitor    #odynophagia - resolved  -rad onc aware  -prescribed BMX solution  -will continue to monitor    #constipation-improving  -occasional. Prescribed Senna S  -will monitor    Francy Archer MD

## 2024-02-14 LAB — ACTH PLAS-MCNC: 68.9 PG/ML (ref 7.2–63.3)

## 2024-02-27 ENCOUNTER — EDUCATION (OUTPATIENT)
Dept: HEMATOLOGY/ONCOLOGY | Facility: HOSPITAL | Age: 69
End: 2024-02-27

## 2024-02-27 ENCOUNTER — HOSPITAL ENCOUNTER (OUTPATIENT)
Dept: RESEARCH | Facility: HOSPITAL | Age: 69
Discharge: HOME | End: 2024-02-27
Payer: MEDICARE

## 2024-02-27 ENCOUNTER — APPOINTMENT (OUTPATIENT)
Dept: HEMATOLOGY/ONCOLOGY | Facility: HOSPITAL | Age: 69
End: 2024-02-27
Payer: MEDICARE

## 2024-02-27 VITALS
WEIGHT: 109.79 LBS | DIASTOLIC BLOOD PRESSURE: 78 MMHG | HEART RATE: 73 BPM | BODY MASS INDEX: 20.74 KG/M2 | TEMPERATURE: 97.5 F | SYSTOLIC BLOOD PRESSURE: 107 MMHG | RESPIRATION RATE: 16 BRPM | OXYGEN SATURATION: 96 %

## 2024-02-27 DIAGNOSIS — C34.90 SMALL CELL LUNG CANCER (MULTI): ICD-10-CM

## 2024-02-27 DIAGNOSIS — Z00.6 CLINICAL TRIAL PARTICIPANT: ICD-10-CM

## 2024-02-27 DIAGNOSIS — C34.90 MALIGNANT NEOPLASM OF LUNG, UNSPECIFIED LATERALITY, UNSPECIFIED PART OF LUNG (MULTI): ICD-10-CM

## 2024-02-27 DIAGNOSIS — C34.90 MALIGNANT NEOPLASM OF LUNG, UNSPECIFIED LATERALITY, UNSPECIFIED PART OF LUNG (MULTI): Primary | ICD-10-CM

## 2024-02-27 DIAGNOSIS — E55.9 VITAMIN D DEFICIENCY, UNSPECIFIED: ICD-10-CM

## 2024-02-27 LAB
25(OH)D3 SERPL-MCNC: 35 NG/ML (ref 30–100)
ALBUMIN SERPL BCP-MCNC: 3.6 G/DL (ref 3.4–5)
ALP SERPL-CCNC: 45 U/L (ref 33–136)
ALT SERPL W P-5'-P-CCNC: 11 U/L (ref 7–45)
ANION GAP SERPL CALC-SCNC: 12 MMOL/L (ref 10–20)
APTT PPP: 32 SECONDS (ref 27–38)
AST SERPL W P-5'-P-CCNC: 15 U/L (ref 9–39)
BASOPHILS # BLD AUTO: 0.04 X10*3/UL (ref 0–0.1)
BASOPHILS NFR BLD AUTO: 0.8 %
BILIRUB DIRECT SERPL-MCNC: 0.1 MG/DL (ref 0–0.3)
BILIRUB SERPL-MCNC: 0.8 MG/DL (ref 0–1.2)
BUN SERPL-MCNC: 19 MG/DL (ref 6–23)
CALCIUM SERPL-MCNC: 9.1 MG/DL (ref 8.6–10.6)
CHLORIDE SERPL-SCNC: 106 MMOL/L (ref 98–107)
CK SERPL-CCNC: 54 U/L (ref 0–215)
CO2 SERPL-SCNC: 26 MMOL/L (ref 21–32)
CREAT SERPL-MCNC: 0.79 MG/DL (ref 0.5–1.05)
CRP SERPL-MCNC: <0.1 MG/DL
EGFRCR SERPLBLD CKD-EPI 2021: 82 ML/MIN/1.73M*2
EOSINOPHIL # BLD AUTO: 0.21 X10*3/UL (ref 0–0.7)
EOSINOPHIL NFR BLD AUTO: 4.2 %
ERYTHROCYTE [DISTWIDTH] IN BLOOD BY AUTOMATED COUNT: 12.9 % (ref 11.5–14.5)
FERRITIN SERPL-MCNC: 76 NG/ML (ref 8–150)
GLUCOSE SERPL-MCNC: 94 MG/DL (ref 74–99)
HCT VFR BLD AUTO: 34 % (ref 36–46)
HGB BLD-MCNC: 11.7 G/DL (ref 12–16)
IMM GRANULOCYTES # BLD AUTO: 0.02 X10*3/UL (ref 0–0.7)
IMM GRANULOCYTES NFR BLD AUTO: 0.4 % (ref 0–0.9)
INR PPP: 1 (ref 0.9–1.1)
LYMPHOCYTES # BLD AUTO: 1.1 X10*3/UL (ref 1.2–4.8)
LYMPHOCYTES NFR BLD AUTO: 21.9 %
MAGNESIUM SERPL-MCNC: 1.92 MG/DL (ref 1.6–2.4)
MCH RBC QN AUTO: 31.5 PG (ref 26–34)
MCHC RBC AUTO-ENTMCNC: 34.4 G/DL (ref 32–36)
MCV RBC AUTO: 91 FL (ref 80–100)
MONOCYTES # BLD AUTO: 0.53 X10*3/UL (ref 0.1–1)
MONOCYTES NFR BLD AUTO: 10.5 %
NEUTROPHILS # BLD AUTO: 3.13 X10*3/UL (ref 1.2–7.7)
NEUTROPHILS NFR BLD AUTO: 62.2 %
NRBC BLD-RTO: 0 /100 WBCS (ref 0–0)
PHOSPHATE SERPL-MCNC: 3.9 MG/DL (ref 2.5–4.9)
PLATELET # BLD AUTO: 195 X10*3/UL (ref 150–450)
POTASSIUM SERPL-SCNC: 3.7 MMOL/L (ref 3.5–5.3)
PROT SERPL-MCNC: 5.6 G/DL (ref 6.4–8.2)
PROTHROMBIN TIME: 11.6 SECONDS (ref 9.8–12.8)
RBC # BLD AUTO: 3.72 X10*6/UL (ref 4–5.2)
SODIUM SERPL-SCNC: 140 MMOL/L (ref 136–145)
WBC # BLD AUTO: 5 X10*3/UL (ref 4.4–11.3)

## 2024-02-27 PROCEDURE — 86140 C-REACTIVE PROTEIN: CPT | Performed by: STUDENT IN AN ORGANIZED HEALTH CARE EDUCATION/TRAINING PROGRAM

## 2024-02-27 PROCEDURE — 85730 THROMBOPLASTIN TIME PARTIAL: CPT | Performed by: STUDENT IN AN ORGANIZED HEALTH CARE EDUCATION/TRAINING PROGRAM

## 2024-02-27 PROCEDURE — 85025 COMPLETE CBC W/AUTO DIFF WBC: CPT | Performed by: STUDENT IN AN ORGANIZED HEALTH CARE EDUCATION/TRAINING PROGRAM

## 2024-02-27 PROCEDURE — 82248 BILIRUBIN DIRECT: CPT | Performed by: STUDENT IN AN ORGANIZED HEALTH CARE EDUCATION/TRAINING PROGRAM

## 2024-02-27 PROCEDURE — 83735 ASSAY OF MAGNESIUM: CPT | Performed by: STUDENT IN AN ORGANIZED HEALTH CARE EDUCATION/TRAINING PROGRAM

## 2024-02-27 PROCEDURE — 82306 VITAMIN D 25 HYDROXY: CPT | Performed by: STUDENT IN AN ORGANIZED HEALTH CARE EDUCATION/TRAINING PROGRAM

## 2024-02-27 PROCEDURE — 82728 ASSAY OF FERRITIN: CPT | Performed by: STUDENT IN AN ORGANIZED HEALTH CARE EDUCATION/TRAINING PROGRAM

## 2024-02-27 PROCEDURE — 99214 OFFICE O/P EST MOD 30 MIN: CPT

## 2024-02-27 PROCEDURE — 96413 CHEMO IV INFUSION 1 HR: CPT

## 2024-02-27 PROCEDURE — 82550 ASSAY OF CK (CPK): CPT | Performed by: STUDENT IN AN ORGANIZED HEALTH CARE EDUCATION/TRAINING PROGRAM

## 2024-02-27 PROCEDURE — 80053 COMPREHEN METABOLIC PANEL: CPT | Performed by: STUDENT IN AN ORGANIZED HEALTH CARE EDUCATION/TRAINING PROGRAM

## 2024-02-27 PROCEDURE — 84100 ASSAY OF PHOSPHORUS: CPT | Performed by: STUDENT IN AN ORGANIZED HEALTH CARE EDUCATION/TRAINING PROGRAM

## 2024-02-27 PROCEDURE — 2500000005 HC RX 250 GENERAL PHARMACY W/O HCPCS: Performed by: STUDENT IN AN ORGANIZED HEALTH CARE EDUCATION/TRAINING PROGRAM

## 2024-02-27 PROCEDURE — 85610 PROTHROMBIN TIME: CPT | Performed by: STUDENT IN AN ORGANIZED HEALTH CARE EDUCATION/TRAINING PROGRAM

## 2024-02-27 RX ORDER — HEPARIN SODIUM,PORCINE/PF 10 UNIT/ML
50 SYRINGE (ML) INTRAVENOUS AS NEEDED
Status: DISCONTINUED | OUTPATIENT
Start: 2024-02-27 | End: 2024-02-28 | Stop reason: HOSPADM

## 2024-02-27 RX ORDER — HEPARIN 100 UNIT/ML
500 SYRINGE INTRAVENOUS AS NEEDED
Status: CANCELLED | OUTPATIENT
Start: 2024-02-27

## 2024-02-27 RX ORDER — ALBUTEROL SULFATE 0.83 MG/ML
3 SOLUTION RESPIRATORY (INHALATION) AS NEEDED
Status: DISCONTINUED | OUTPATIENT
Start: 2024-02-27 | End: 2024-02-28 | Stop reason: HOSPADM

## 2024-02-27 RX ORDER — FAMOTIDINE 10 MG/ML
20 INJECTION INTRAVENOUS ONCE AS NEEDED
Status: DISCONTINUED | OUTPATIENT
Start: 2024-02-27 | End: 2024-02-28 | Stop reason: HOSPADM

## 2024-02-27 RX ORDER — DIPHENHYDRAMINE HYDROCHLORIDE 50 MG/ML
50 INJECTION INTRAMUSCULAR; INTRAVENOUS AS NEEDED
Status: DISCONTINUED | OUTPATIENT
Start: 2024-02-27 | End: 2024-02-28 | Stop reason: HOSPADM

## 2024-02-27 RX ORDER — HEPARIN SODIUM,PORCINE/PF 10 UNIT/ML
50 SYRINGE (ML) INTRAVENOUS AS NEEDED
Status: CANCELLED | OUTPATIENT
Start: 2024-02-27

## 2024-02-27 RX ORDER — EPINEPHRINE 1 MG/ML
0.3 INJECTION INTRAMUSCULAR; INTRAVENOUS; SUBCUTANEOUS EVERY 5 MIN PRN
Status: DISCONTINUED | OUTPATIENT
Start: 2024-02-27 | End: 2024-02-28 | Stop reason: HOSPADM

## 2024-02-27 RX ORDER — HEPARIN 100 UNIT/ML
500 SYRINGE INTRAVENOUS AS NEEDED
Status: DISCONTINUED | OUTPATIENT
Start: 2024-02-27 | End: 2024-02-28 | Stop reason: HOSPADM

## 2024-02-27 RX ADMIN — Medication 3 MG: at 10:11

## 2024-02-27 NOTE — SIGNIFICANT EVENT
02/27/24 1001   Prechemo Checklist   Has the patient been in the hospital, ED, or urgent care since last date of service No   Chemo/Immuno Consent Signed Yes   Protocol/Indications Verified Yes   Confirmed to previous date/time of medication Yes   Compared to previous dose Yes   All medications are dated accurately Yes   Pregnancy Test Negative Not applicable   Parameters Met Yes   BSA/Weight-Height Verified Yes   Dose Calculations Verified Yes

## 2024-02-27 NOTE — RESEARCH NOTES
Research Note Treatment Day    Gladys Branch is here today for treatment on [id number]. Today is C14D15. Procedures completed per protocol. AE's and con-meds reviewed with patient. Patient is aware of treatment plan.    [x]   Received treatment as planned   OR  []    Treatment delayed; patient calendar updated as required   Treatment delayed because:    []   AE    []   Physician Discretion    []   Clinical Deterioration or Progression     []   Other    Education Documentation  General Medication Information, taught by Vladimir Fernandez RN at 2/27/2024 11:55 AM.  Learner: Significant Other, Patient  Readiness: Eager  Method: Explanation  Response: Verbalizes Understanding    Treatment Plan and Schedule, taught by Vladimir Fernandez RN at 2/27/2024 11:55 AM.  Learner: Significant Other, Patient  Readiness: Eager  Method: Explanation  Response: Verbalizes Understanding    Supportive Medications, taught by Vladimir Fernandez RN at 2/27/2024 11:55 AM.  Learner: Significant Other, Patient  Readiness: Eager  Method: Explanation  Response: Verbalizes Understanding    Nutrition, taught by Vladimir Fernandez RN at 2/27/2024 11:55 AM.  Learner: Significant Other, Patient  Readiness: Eager  Method: Explanation  Response: Verbalizes Understanding    Education Comments  No comments found.

## 2024-02-27 NOTE — RESEARCH NOTES
[facility]><[id number]><C5+D15><PART A>    DCRU NURSING VISIT NOTE  Study Name: ALEXANDRU Foote8  IRB#: [id number]  DCRU#: [id number]  Protocol Version Dated: A9 3.22.23  PI: Roshan Kumar MD.    Time point: Cycle 5 and beyond - Day 15  CYCLE 14     Encounter Date: 02/27/2024  Encounter Time:  8:30 AM EST  Encounter Department: [facility] HEMATOLOGY AND ONCOLOGY     #1     Phone Pager   Carmen Rios [phone]    #2 Phone Pager        Other Phone Pager          Study Regimen and Dosing   Part A Dose Exploration/Expansion- Small Cell Lung Cancer Relapsed or Refractory patients who progressed or recurred following at least 1 platinum-based regimen.   Cycle = 28 days    administered IV Day 1, Day 8, and Day 15 of Cycle 1. Cycle 2 and beyond     administered on Day 1 and Day 15.        Dietary Guidelines   Regular diet:     Admission and Prior to Starting Study Activities   Complete DCRU and [facility] Admission/Visit Note.  Notify SC of patient arrival after initial lab and vitals  Insert one peripheral IV line for sample collection procedures (flush line with normal saline following each blood draw). Access mediport (if available) otherwise insert second peripheral line in opposite arm for Investigative drug administration (if peripheral line, flush line with normal saline before & after infusion)     Criteria to Treat   DCRU RN reviewed and meets eligibility to proceed with treatment plan   Time team notified: 0922 am  DCRU RN notifies study team to review eligibility and approval before dosing procedures  Time team approves: 0922 am     Maddison Branch is a 68 y.o. female and is here for a Research clinical visit.    Visit Provider: [id number] [facility] ROOM 3 [facility]     Allergies:   Allergies   Allergen Reactions    Cisplatin Other     CHEMO INDUCED (Moderate); Dyspepsia (Moderate); Headaches (Moderate); Hypotension (Moderate); Numbness (Moderate); Resp  Distress (Moderate)    Codeine Nausea Only and Other     nausea    Sulfa (Sulfonamide Antibiotics) Rash       Objective     Vital Signs:    Vitals:    02/27/24 0745 02/27/24 1007 02/27/24 1113   BP: 106/74 103/69 107/78   Pulse: 82 82 73   Resp: 18 16 16   Temp: 36.2 °C (97.2 °F) 36.6 °C (97.9 °F) 36.4 °C (97.5 °F)   TempSrc: Oral Oral Oral   SpO2: 96% 96% 96%   Weight: 49.8 kg (109 lb 12.6 oz)         Physical Exam     ASSESSMENT and PLAN:  Problem List Items Addressed This Visit          Hematology and Neoplasia    Small cell lung cancer (CMS/HCC)    Relevant Medications    heparin flush 10 unit/mL syringe 50 Units    heparin flush 100 unit/mL syringe 500 Units    alteplase (Cathflo Activase) injection 2 mg    Study [id number] Tarlatamab (AMG-757) 3 mg in sodium chloride 0.9% 250 mL IV (Completed)    sodium chloride 0.9 % bolus 500 mL    dextrose 5 % in water (D5W) bolus    diphenhydrAMINE (BENADryl) injection 50 mg    methylPREDNISolone sod succinate (PF) (SOLU-Medrol) 40 mg/mL injection 40 mg    famotidine PF (Pepcid) injection 20 mg    EPINEPHrine (Adrenalin) injection 0.3 mg    albuterol 2.5 mg /3 mL (0.083 %) nebulizer solution 3 mL    Other Relevant Orders    Infusion Appointment Request (Completed)    CBC and Auto Differential (Completed)    Comprehensive metabolic panel (Completed)    Nursing Communication - Vascular Access (Completed)    Venous Access, CVAD    CENTRAL VENOUS LINE DRESSING CHANGE - ADULT    Insert peripheral IV    Convert IV to saline lock    APTT (Completed)    Bilirubin, Direct (Completed)    CK (Completed)    C-Reactive Protein (Completed)    Ferritin (Completed)    Magnesium (Completed)    Phosphorus (Completed)    Protime-INR (Completed)    Treatment Conditions (Completed)    Provider Communication - [id number] (Completed)    Research Triplicate ECG (Completed)    Research Triplicate ECG (Completed)    Nursing Communication - Hypersensitivity Management, Moderate (Completed)    Nursing  Communication - Hypersensitivity Management, Severe (Completed)    Nursing Communication - Respiratory Management (Completed)    Pulse oximetry, continuous (Completed)     Other Visit Diagnoses       Malignant neoplasm of lung, unspecified laterality, unspecified part of lung (CMS/HCC)        Relevant Orders    Vitamin D 25-Hydroxy,Total (for eval of Vitamin D levels) (Completed)    Clinical trial participant        Relevant Orders    Vitamin D 25-Hydroxy,Total (for eval of Vitamin D levels) (Completed)    Vitamin D deficiency, unspecified        Relevant Orders    Vitamin D 25-Hydroxy,Total (for eval of Vitamin D levels) (Completed)             Medications as of the completion of today's visit:  Current Outpatient Medications   Medication Sig Dispense Refill    cetirizine (ZyrTEC) 10 mg tablet Take 1 tablet (10 mg) by mouth once daily. (Patient taking differently: Take 1 tablet (10 mg) by mouth once daily. Alternating with Zyrtec D) 30 tablet 11    cetirizine-pseudoephedrine (ZyrTEC-D) 5-120 mg 12 hr tablet Take 1 tablet by mouth 2 times a day. 60 tablet 11    cholecalciferol (Vitamin D-3) 25 MCG (1000 UT) tablet Take by mouth.      dexAMETHasone (Decadron) 2 mg tablet Take 0.5 tablets (1 mg) by mouth once daily with breakfast. 30 tablet 2    dextromethorphan (Delsym) 30 mg/5 mL liquid Take 5 mL (30 mg) by mouth once daily as needed.      dronabinol (Marinol) 2.5 mg capsule once daily as needed. One hour before dinner      estrogens, conjugated, (Premarin) 0.3 mg tablet Take 0.5 tablets (0.15 mg) by mouth once daily.      omeprazole (PriLOSEC) 40 mg DR capsule Take 1 capsule (40 mg) by mouth once daily. Do not crush or chew. 90 each 3    ondansetron (Zofran) 8 mg tablet Take 0.5 tablets (4 mg) by mouth every 8 hours if needed.      polyethylene glycol (Glycolax, Miralax) 17 gram packet Take 17 g by mouth 2 times a day. 60 packet 2    prochlorperazine (Compazine) 10 mg tablet Take 0.5 tablets (5 mg) by mouth every 6  hours if needed.      psyllium husk, aspartame, (Metamucil Sugar-Free, aspart,) 3.4 gram/5.8 gram powder Take 1 Dose by mouth once daily. 425 g 11    vit A/vit C/vit E/zinc/copper (PRESERVISION AREDS ORAL) Take by mouth once daily as needed.       Current Facility-Administered Medications   Medication Dose Route Frequency Provider Last Rate Last Admin    albuterol 2.5 mg /3 mL (0.083 %) nebulizer solution 3 mL  3 mL nebulization PRN Francy Archer MD        alteplase (Cathflo Activase) injection 2 mg  2 mg intra-catheter PRN RUPINDER Stone        dextrose 5 % in water (D5W) bolus  500 mL intravenous PRN Francy Archer MD        diphenhydrAMINE (BENADryl) injection 50 mg  50 mg intravenous PRN Francy Archer MD        EPINEPHrine (Adrenalin) injection 0.3 mg  0.3 mg intramuscular q5 min PRN Francy Archer MD        famotidine PF (Pepcid) injection 20 mg  20 mg intravenous Once PRN Francy Archer MD        heparin flush 10 unit/mL syringe 50 Units  50 Units intra-catheter PRN RUPINDER Stone        heparin flush 100 unit/mL syringe 500 Units  500 Units intra-catheter PRN RUPINDER Stone        methylPREDNISolone sod succinate (PF) (SOLU-Medrol) 40 mg/mL injection 40 mg  40 mg intravenous PRN Francy Archer MD        sodium chloride 0.9 % bolus 500 mL  500 mL intravenous PRN Francy Archer MD           Administrations This Visit       Study [id number] Tarlatamab (AMG-757) 3 mg in sodium chloride 0.9% 250 mL IV       Admin Date  02/27/2024 Action  New Bag Dose  3 mg Route  intravenous Administered By  Catie Hansen RN                    Orders placed during today's visit:  Orders Placed This Encounter   Procedures    CBC and Auto Differential     Standing Status:   Standing     Number of Occurrences:   1     Order Specific Question:   Release result to [facility]     Answer:   Immediate    Comprehensive metabolic panel     Standing Status:   Standing     Number of Occurrences:   1      Order Specific Question:   Release result to MyChart     Answer:   Immediate    APTT     Standing Status:   Standing     Number of Occurrences:   1     Order Specific Question:   Release result to MyChart     Answer:   Immediate [1]    Bilirubin, Direct     Standing Status:   Standing     Number of Occurrences:   1     Order Specific Question:   Release result to MyChart     Answer:   Immediate [1]    CK     Standing Status:   Standing     Number of Occurrences:   1     Order Specific Question:   Release result to MyChart     Answer:   Immediate [1]    C-Reactive Protein     Standing Status:   Standing     Number of Occurrences:   1     Order Specific Question:   Release result to MyChart     Answer:   Immediate [1]    Ferritin     Standing Status:   Standing     Number of Occurrences:   1     Order Specific Question:   Release result to MyChart     Answer:   Immediate [1]    Magnesium     Standing Status:   Standing     Number of Occurrences:   1     Order Specific Question:   Release result to MyChart     Answer:   Immediate [1]    Phosphorus     Standing Status:   Standing     Number of Occurrences:   1     Order Specific Question:   Release result to MyChart     Answer:   Immediate [1]    Protime-INR     Standing Status:   Standing     Number of Occurrences:   1     Order Specific Question:   Release result to MyChart     Answer:   Immediate [1]    Vitamin D 25-Hydroxy,Total (for eval of Vitamin D levels)     Standing Status:   Standing     Number of Occurrences:   1     Order Specific Question:   Release result to MyChart     Answer:   Immediate [1]    Adult diet     Order Specific Question:   Diet type     Answer:   Regular    Nursing Communication - Vascular Access     Refer to the vascular access device resource page and the following policies for additional information on line insertion, maintenance and removal.    CP-148 - Central Vascular Access Device Insertion, Maintenance, and Removal, Adult  NP-28 -  Midline Cathter Maintenance & Removal, Adult  NP-34 - High-flow Catheters, (e.g. Hemodialysis, Apheresis, and Continuous Renal Replacement Therapy [CRRT]), Care and Utilization, Adult  NP-39 - Peripheral Intravenous Catheter (PIV) Insertion, Maintenance, Blood Collection & Removal - Adult     Standing Status:   Standing     Number of Occurrences:   1    CENTRAL VENOUS LINE DRESSING CHANGE - ADULT     Standing Status:   Standing     Number of Occurrences:   1    Treatment Conditions     Hold treatment and notify provider if:   Adverse Event GREATER THAN OR EQUAL TO Grade 3     Standing Status:   Standing     Number of Occurrences:   1    Provider Communication - [id number]      Dose Level 4              Tarlatamab 0.1 mg   Dose Level 5              Tarlatamab 0.3 mg   Dose Level 6              Tarlatamab 1 mg   Dose Level 7              Tarlatamab 3 mg   Dose Level 8              Tarlatamab 10 mg   Dose Level 9              Tarlatamab 30 mg   Dose Level 10              Tarlatamab 100 mg     Standing Status:   Standing     Number of Occurrences:   1    Research Triplicate ECG     Refer to study [employer] for instructions and documentation of the research triplicate ECG.     Standing Status:   Standing     Number of Occurrences:   1    Research Triplicate ECG     Refer to study [employer] for instructions and documentation of the research triplicate ECG.     Standing Status:   Standing     Number of Occurrences:   1    Nursing Communication - Hypersensitivity Management, Moderate     Flushing, rash, pruritus, dyspnea, chest discomfort, back pain, angioedema, SBP LESS THAN 90 mmHg, and/or change in mental status above or below patient's baseline. In the event of moderate or severe hypersensitivity reaction to any medication:   Stop infusion.  Assess vital signs, pulse oximetry, nursing assessment, and patient complaints.  Administer medications per Hypersensitivity Reaction Medication Protocol.   Notify physician.      Standing Status:   Standing     Number of Occurrences:   1    Nursing Communication - Hypersensitivity Management, Severe     Worsening of moderate reaction symptoms, SBP LESS THAN 80 mmHg, and/or respiratory distress (respirations GREATER THAN 40 breaths per minute, wheezing, life-threatening symptoms). In the event of moderate or severe hypersensitivity reaction to any medication:   Stop infusion.  Assess vital signs, pulse oximetry, nursing assessment, and patient complaints.  Administer medications per Hypersensitivity Reaction Medication Protocol.   Notify physician.     Standing Status:   Standing     Number of Occurrences:   1    Nursing Communication - Respiratory Management     Oxygen via nasal cannula at 4 L per minute for respiratory rate GREATER THAN OR EQUAL TO to 28 breaths/minute and/or wheezing. Pulse Oximetry continuous monitoring.     Standing Status:   Standing     Number of Occurrences:   1    Pulse oximetry, continuous     Continuous monitoring to maintain SPO2 GREATER THAN 90%     Standing Status:   Standing     Number of Occurrences:   1    Venous Access, CVAD     Standing Status:   Standing     Number of Occurrences:   1    Insert peripheral IV     Obtain venous access for treatment via PIV if no CVAD access or if unable to obtain blood return from CVAD.     Standing Status:   Standing     Number of Occurrences:   1    Convert IV to saline lock     Only if venous access is required over consecutive days. Peripheral catheter can remain in place until completion of last consecutive day infusion, unless IV-related complications are encountered.     Standing Status:   Standing     Number of Occurrences:   1        [id number] -  in Subjects With Small Cell Lung Cancer    Patient has no adverse events documented in the Research Adverse Events activity.       Study Specific Instructions and Documentation   WEIGHT  LABS  VITALS  STUDY DRUG  VITALS  DISCHARGE     PRE-DOSE Safety Labs   Refer to  [place] Treatment Plan for orders (done @ 0812)     Day 15 Pre-dose Vital Signs (done @ 1007)   Temp, HR, Resp, BP, Pulse Oximetry: Seated or Semi-Fowlers x 5 minutes before obtaining  Position and temperature location: should be the same that is used throughout the study-record position     Infusion-Related Reactions   [facility] Hypersensitivity Orders  Note: CRS table      Research Drug Administration Tarlatamab ()   Refer to [place] Treatment Plan for orders   Administer dose over 60 minutes (+/- 10 mins) followed by a 3 - 5 minute Normal saline flush. (This will be the end time after the flush). Document start time & end of study medication; document start time and end time of the flush.  Participant to remain on Unit for 2 hour post dose observation.      Post-Dose Vital Signs   Temp, HR, Resp, BP, Pulse Oximetry: Seated or Semi-Fowlers x 5 minutes before obtaining  Position and temperature location: should be the same that is used throughout the study  End of Infusion (done @ 1113)     Day 15 Discharge Instructions   Patient may be discharged after 2 hour observation.  Discharge time 1315     Catie Hansen RN  02/27/24    Grading and Management of Cytokine Release Syndrome   CRS Grade Description of Severity Minimum Expected Intervention Instructions for Dose Modifications of    1 Symptoms are not life threatening and require symptomatic treatment only,  eg, fever, nausea, fatigue, headache, myalgia's, malaise Administer symptomatic treatment (contact provider for medications/doses). Monitor for CRS symptoms including vital signs and pulse oximetry at least Q2 hours for12 hours or until resolution, Whichever is earlier. N/A     (continued)  Grading and Management of Cytokine Release Syndrome   CRS Grade Description of Severity Minimum Expected Intervention Instructions for Dose Modifications of    2 Symptoms require and respond to moderate intervention  Oxygen requirement <40%,                       OR  Hypotension responsive to fluids or low dose of one vasopressor, OR                                                                       Grade 2 organ toxicity or grade 3 transaminitis per CTCAE criteria Administer:  Symptomatic treatment   (contact provider for medications/doses)  Supplemental oxygen when oxygen saturation is <90% on room air  Intravenous fluids or low dose vasopressor for hypotension is recommended when systolic blood pressure is <85 mmHg. (contact provider for medications/doses) Persistent tachycardia (eg >120 bpm) may also indicate the need for intervention for hypotension.   Monitor for CRS symptoms including vital signs and pulse oximetry at least Q2 hours for12 hours or until resolution to CRS grade <= 1, whichever is earlier. For subjects with extensive co-morbidities or poor performance status, manage per grade 3 CRS guidance below If CRS occurs during  treatment, immediately interrupt the infusion and delay the next  dose until the event resolves to CRS grade <= 1 for no less than 72 hours. Permanently discontinue  if there is no improvement to CRS <= grade 1 within 7 days     3 Symptoms require and respond to aggressive intervention  Oxygen requirement >=40%, OR  Hypotension requiring high dose or multiple vasopressors, OR  Grade 3 organ toxicity or     Grade 4 transaminitis per  CTCAE criteria Admit to intensive care unit for close clinical and vital sign monitoring per institutional guidelines.   Refer to provider/protocol for medications and doses.     If CRS occurs during  treatment, immediately interrupt   and delay the next dose until event resolves to CRS grade <= 1 for no less than 72 hours.    Permanently discontinue  if there is no improvement to CRS                 <= grade 2 within 5 days or CRS <= grade 1 within 7 days.  Permanently discontinue   if CRS grade  3 occurs at the initial run in dose (i.e., at  MTD1)  (Applicable only after MTD1 has been defined).     (continued)  Grading and Management of Cytokine Release Syndrome   CRS Grade Description of Severity Minimum Expected Intervention Instructions for Dose Modifications of    4 Life-threatening symptoms  Requirement for ventilator support OR  Grade 4 organ toxicity (excluding transaminitis) per CTCAE criteria Admit to intensive care unit for close clinical and vital sign monitoring per institutional guidelines.   Refer to provider/protocol for medications and doses.   If CRS occurs during  treatment, Immediately stop the infusion.  Permanently discontinue   therapy.

## 2024-02-27 NOTE — PROGRESS NOTES
Patient ID: Gladys Branch is a 68 y.o. female.    DIAGNOSIS    Small Cell Lung Cancer    By immunostaining, the tumor cells are positive for CAM 5.2, INSM1, and TTF-1, and negative for p40 and LCA. The proliferation index by Ki-67 immunostaining is approximately 90%.  Synaptophysin, Chromogranin and INSM-1 are positive.  AE1/AE3 is negative. NEOGENOMICS, RB protein expression is lost in the tumor cells    STAGING    [id number], limited stage  Recurrence    CURRENT SITES OF DISEASE    RLL, supraclavicular    MOLECULAR GENOMICS      PRIOR THERAPY    Concurrent chemoradiation with Cisplatin/Etoposide every 3 weeks; C1D1 2/15/22, reaction to cisplatin C2D1 and did not receive D2 or D3 etoposide  Carbo/etoposide 4/5/2022 1 cycle c/b rash  PCI 5/21-6/13/22    CURRENT THERAPY    Phase 1 study [id number]  with study drug Taralatamab 1/24/23 - present.    CURRENT ONCOLOGICAL PROBLEMS      HISTORY OF PRESENT ILLNESS    Mrs. Branch is a 65 yo with PMH GERD and COPD who originally was round to have a RLL nodule measuring 11 mm in 4/28/2021 found as part of CT calcium score scan. An ensuing CT chest w/o contrast was done on 5/19/21 which revealed subsolid pulmonary nodules measuring 14 mm in the RLL. She had CT chest w/contrast on 11/29/21 which confirmed stable 14 mm GGO of the RLL and decreased RLL subpleural nodule. She met thoracic surgeon Dr. Farfan, who ordered PET/CT scan done on 12/8/21 which did not reveal any FDG-avid nodules within the lung parenchyma but R hilar nodes with max SUV 8.0. He referred her for bronch, which was done on 1/21/22 by Dr. Mcdaniels. 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 Brain MRI was negative.    She started concurrent chemoradiation with BID radiation with cis/etop 2/15/22, and unfortunately had a reaction to cisplatin on C2D1 with chest pain and hypotension. She was hospitalized with sepsis felt to be due to pneumonia. She trialed carboplatin/etoposide on 4/5/22 with a  resulting rash and opted to forego further chemotherapy as she had completed radiation. Restaging scan 11/3/22 unfortunately with progression with new R hilar lymph node, paratracheal nodes, and increase in size of R supraclavicular mass. She enrolled in [id number] and started C1D1 1/24/23. C1 complicated by anorexia and dysgeusia, and delayed C2 by a week. She has further struggled with fatigue and confusion, which may be related to mirtazapine. Dose reduced for C2 and mirtazapine stopped with improvement in symptoms.     PAST MEDICAL HISTORY    GERD  COPD    SOCIAL HISTORY    Retired - lighting . Lives at home with  and a kitten.  Tobacco: quit smoking 1999. 1 ppd x 25 yrs.  EtOH: wine 1 glass/day  Illicits: none    CURRENT MEDS    Please see med list    ALLERGIES    Codeine, Sulfa drugs, Cisplatin    FAMILY HISTORY    Paternal aunt - breast cancer dx 80s    Subjective    Today 2.14.2024. Patient in clinic with her  for C14D15 for [id number].    Patient reports she continues to tolerate treatment well without any new AE's. She continues to experience symptoms of acid reflux and hiccups. She reports having a decent appetite but does mention continues to experience taste alteration. She reports her symptoms of diverticulitis has resolved and she has not had any recent symptoms of diverticulitis. Patient denies any pain, dizziness, lightheadedness, headaches, rash/itching, chills or fever, SOB, cough, sputum, chest pain or chest tightness, painful inflammation/ulceration oral mucous membranes, nausea/vomiting, diarrhea/constipation, hematemesis /hemoptysis, hematuria/rectal bleeding, urinary symptoms, swelling extremities, weakness/fatigue, or peripheral neuropathy. ROS as above. Remainder of 10 point review of systems elicited and otherwise unremarkable.     Objective          1/30/2024     7:18 AM 1/30/2024    10:42 AM 1/30/2024    12:00 PM 2/13/2024     7:44 AM 2/13/2024    11:00 AM  2/13/2024    12:20 PM 2/27/2024     7:45 AM   Vitals   Systolic 109 101 91 104 109 105 106   Diastolic 69 63 58 72 76 74 74   Heart Rate 79 69 73 76 80 70 82   Temp 36.6 °C (97.9 °F) 36.9 °C (98.4 °F) 36.8 °C (98.2 °F) 36.5 °C (97.7 °F) 36.6 °C (97.9 °F) 36.4 °C (97.5 °F) 36.2 °C (97.2 °F)   Resp 16 16 16 17 17 17 18   Weight (lb)    110.23   109.79   BMI    20.83 kg/m2   20.74 kg/m2   BSA (m2)    1.47 m2   1.46 m2   Visit Report Report Report Report Report Report Report        Daily Weight  02/27/24 : 49.8 kg (109 lb 12.6 oz)  02/13/24 : 50 kg (110 lb 3.7 oz)  01/30/24 : 50.2 kg (110 lb 10.7 oz)  01/16/24 : 53.1 kg (117 lb 1 oz)  01/02/24 : 50.6 kg (111 lb 8.8 oz)           Physical Exam  Constitutional:       General: She is awake.      Appearance: Normal appearance. She is normal weight.   HENT:      Head: Normocephalic.      Mouth/Throat:      Mouth: Mucous membranes are moist.   Eyes:      Extraocular Movements: Extraocular movements intact.      Conjunctiva/sclera: Conjunctivae normal.      Pupils: Pupils are equal, round, and reactive to light.   Cardiovascular:      Rate and Rhythm: Normal rate and regular rhythm.      Heart sounds: Normal heart sounds, S1 normal and S2 normal.   Pulmonary:      Effort: Pulmonary effort is normal.      Breath sounds: Normal breath sounds.   Abdominal:      General: Abdomen is flat. Bowel sounds are normal.      Palpations: Abdomen is soft.   Musculoskeletal:      Cervical back: Normal range of motion and neck supple.   Skin:     Comments: No skin rash observed   Neurological:      Mental Status: She is alert.      Cranial Nerves: Cranial nerves 2-12 are intact.      Sensory: Sensation is intact.      Motor: Motor function is intact.      Coordination: Coordination is intact.   Psychiatric:         Attention and Perception: Attention normal.         Mood and Affect: Mood normal.         Speech: Speech normal.         Behavior: Behavior normal. Behavior is cooperative.          Cognition and Memory: Cognition normal.     Labs    Hospital Outpatient Visit on 02/27/2024   Component Date Value Ref Range Status    WBC 02/27/2024 5.0  4.4 - 11.3 x10*3/uL Final    nRBC 02/27/2024 0.0  0.0 - 0.0 /100 WBCs Final    RBC 02/27/2024 3.72 (L)  4.00 - 5.20 x10*6/uL Final    Hemoglobin 02/27/2024 11.7 (L)  12.0 - 16.0 g/dL Final    Hematocrit 02/27/2024 34.0 (L)  36.0 - 46.0 % Final    MCV 02/27/2024 91  80 - 100 fL Final    MCH 02/27/2024 31.5  26.0 - 34.0 pg Final    MCHC 02/27/2024 34.4  32.0 - 36.0 g/dL Final    RDW 02/27/2024 12.9  11.5 - 14.5 % Final    Platelets 02/27/2024 195  150 - 450 x10*3/uL Final    Neutrophils % 02/27/2024 62.2  40.0 - 80.0 % Final    Immature Granulocytes %, Automated 02/27/2024 0.4  0.0 - 0.9 % Final    Lymphocytes % 02/27/2024 21.9  13.0 - 44.0 % Final    Monocytes % 02/27/2024 10.5  2.0 - 10.0 % Final    Eosinophils % 02/27/2024 4.2  0.0 - 6.0 % Final    Basophils % 02/27/2024 0.8  0.0 - 2.0 % Final    Neutrophils Absolute 02/27/2024 3.13  1.20 - 7.70 x10*3/uL Final    Immature Granulocytes Absolute, Au* 02/27/2024 0.02  0.00 - 0.70 x10*3/uL Final    Lymphocytes Absolute 02/27/2024 1.10 (L)  1.20 - 4.80 x10*3/uL Final    Monocytes Absolute 02/27/2024 0.53  0.10 - 1.00 x10*3/uL Final    Eosinophils Absolute 02/27/2024 0.21  0.00 - 0.70 x10*3/uL Final    Basophils Absolute 02/27/2024 0.04  0.00 - 0.10 x10*3/uL Final    Glucose 02/27/2024 94  74 - 99 mg/dL Final    Sodium 02/27/2024 140  136 - 145 mmol/L Final    Potassium 02/27/2024 3.7  3.5 - 5.3 mmol/L Final    Chloride 02/27/2024 106  98 - 107 mmol/L Final    Bicarbonate 02/27/2024 26  21 - 32 mmol/L Final    Anion Gap 02/27/2024 12  10 - 20 mmol/L Final    Urea Nitrogen 02/27/2024 19  6 - 23 mg/dL Final    Creatinine 02/27/2024 0.79  0.50 - 1.05 mg/dL Final    eGFR 02/27/2024 82  >60 mL/min/1.73m*2 Final    Calcium 02/27/2024 9.1  8.6 - 10.6 mg/dL Final    Albumin 02/27/2024 3.6  3.4 - 5.0 g/dL Final    Alkaline  Phosphatase 02/27/2024 45  33 - 136 U/L Final    Total Protein 02/27/2024 5.6 (L)  6.4 - 8.2 g/dL Final    AST 02/27/2024 15  9 - 39 U/L Final    Bilirubin, Total 02/27/2024 0.8  0.0 - 1.2 mg/dL Final    ALT 02/27/2024 11  7 - 45 U/L Final    aPTT 02/27/2024 32  27 - 38 seconds Final    Bilirubin, Direct 02/27/2024 0.1  0.0 - 0.3 mg/dL Final    Creatine Kinase 02/27/2024 54  0 - 215 U/L Final    C-Reactive Protein 02/27/2024 <0.10  <1.00 mg/dL Final    Ferritin 02/27/2024 76  8 - 150 ng/mL Final    Magnesium 02/27/2024 1.92  1.60 - 2.40 mg/dL Final    Phosphorus 02/27/2024 3.9  2.5 - 4.9 mg/dL Final    Protime 02/27/2024 11.6  9.8 - 12.8 seconds Final    INR 02/27/2024 1.0  0.9 - 1.1 Final    Vitamin D, 25-Hydroxy, Total 02/27/2024 35  30 - 100 ng/mL Final   Hospital Outpatient Visit on 02/13/2024   Component Date Value Ref Range Status    WBC 02/13/2024 6.6  4.4 - 11.3 x10*3/uL Final    nRBC 02/13/2024 0.0  0.0 - 0.0 /100 WBCs Final    RBC 02/13/2024 3.73 (L)  4.00 - 5.20 x10*6/uL Final    Hemoglobin 02/13/2024 11.6 (L)  12.0 - 16.0 g/dL Final    Hematocrit 02/13/2024 34.4 (L)  36.0 - 46.0 % Final    MCV 02/13/2024 92  80 - 100 fL Final    MCH 02/13/2024 31.1  26.0 - 34.0 pg Final    MCHC 02/13/2024 33.7  32.0 - 36.0 g/dL Final    RDW 02/13/2024 13.0  11.5 - 14.5 % Final    Platelets 02/13/2024 224  150 - 450 x10*3/uL Final    Neutrophils % 02/13/2024 64.3  40.0 - 80.0 % Final    Immature Granulocytes %, Automated 02/13/2024 0.6  0.0 - 0.9 % Final    Lymphocytes % 02/13/2024 21.2  13.0 - 44.0 % Final    Monocytes % 02/13/2024 9.6  2.0 - 10.0 % Final    Eosinophils % 02/13/2024 3.8  0.0 - 6.0 % Final    Basophils % 02/13/2024 0.5  0.0 - 2.0 % Final    Neutrophils Absolute 02/13/2024 4.23  1.20 - 7.70 x10*3/uL Final    Immature Granulocytes Absolute, Au* 02/13/2024 0.04  0.00 - 0.70 x10*3/uL Final    Lymphocytes Absolute 02/13/2024 1.39  1.20 - 4.80 x10*3/uL Final    Monocytes Absolute 02/13/2024 0.63  0.10 - 1.00  x10*3/uL Final    Eosinophils Absolute 02/13/2024 0.25  0.00 - 0.70 x10*3/uL Final    Basophils Absolute 02/13/2024 0.03  0.00 - 0.10 x10*3/uL Final    Glucose 02/13/2024 129 (H)  74 - 99 mg/dL Final    Sodium 02/13/2024 139  136 - 145 mmol/L Final    Potassium 02/13/2024 3.7  3.5 - 5.3 mmol/L Final    Chloride 02/13/2024 104  98 - 107 mmol/L Final    Bicarbonate 02/13/2024 25  21 - 32 mmol/L Final    Anion Gap 02/13/2024 14  10 - 20 mmol/L Final    Urea Nitrogen 02/13/2024 24 (H)  6 - 23 mg/dL Final    Creatinine 02/13/2024 0.71  0.50 - 1.05 mg/dL Final    eGFR 02/13/2024 >90  >60 mL/min/1.73m*2 Final    Calcium 02/13/2024 9.1  8.6 - 10.6 mg/dL Final    Albumin 02/13/2024 3.5  3.4 - 5.0 g/dL Final    Alkaline Phosphatase 02/13/2024 47  33 - 136 U/L Final    Total Protein 02/13/2024 5.8 (L)  6.4 - 8.2 g/dL Final    AST 02/13/2024 14  9 - 39 U/L Final    Bilirubin, Total 02/13/2024 0.7  0.0 - 1.2 mg/dL Final    ALT 02/13/2024 12  7 - 45 U/L Final    Adrenocorticotropic Hormone (ACTH) 02/13/2024 68.9 (H)  7.2 - 63.3 pg/mL Final    Amylase 02/13/2024 29  29 - 103 U/L Final    aPTT 02/13/2024 30  27 - 38 seconds Final    Bilirubin, Direct 02/13/2024 0.1  0.0 - 0.3 mg/dL Final    Creatine Kinase 02/13/2024 38  0 - 215 U/L Final    Cortisol 02/13/2024 13.2  2.5 - 20.0 ug/dL Final    C-Reactive Protein 02/13/2024 <0.10  <1.00 mg/dL Final    Estradiol 02/13/2024 <19  pg/mL Final    Ferritin 02/13/2024 78  8 - 150 ng/mL Final    Follicle Stimulating Hormone 02/13/2024 46.3  IU/L Final    Luteinizing Hormone 02/13/2024 15.9  IU/L Final    Lipase 02/13/2024 28  9 - 82 U/L Final    Magnesium 02/13/2024 1.73  1.60 - 2.40 mg/dL Final    Phosphorus 02/13/2024 4.6  2.5 - 4.9 mg/dL Final    Protime 02/13/2024 11.9  9.8 - 12.8 seconds Final    INR 02/13/2024 1.1  0.9 - 1.1 Final    Thyroid Stimulating Hormone 02/13/2024 6.39 (H)  0.44 - 3.98 mIU/L Final    Thyroxine, Free 02/13/2024 0.88  0.78 - 1.48 ng/dL Final    Color, Urine  02/13/2024 Yellow  Light-Yellow, Yellow, Dark-Yellow Final    Appearance, Urine 02/13/2024 Turbid (N)  Clear Final    Specific Gravity, Urine 02/13/2024 1.027  1.005 - 1.035 Final    pH, Urine 02/13/2024 5.5  5.0, 5.5, 6.0, 6.5, 7.0, 7.5, 8.0 Final    Protein, Urine 02/13/2024 10 (TRACE)  NEGATIVE, 10 (TRACE), 20 (TRACE) mg/dL Final    Glucose, Urine 02/13/2024 Normal  Normal mg/dL Final    Blood, Urine 02/13/2024 NEGATIVE  NEGATIVE Final    Ketones, Urine 02/13/2024 NEGATIVE  NEGATIVE mg/dL Final    Bilirubin, Urine 02/13/2024 NEGATIVE  NEGATIVE Final    Urobilinogen, Urine 02/13/2024 Normal  Normal mg/dL Final    Nitrite, Urine 02/13/2024 NEGATIVE  NEGATIVE Final    Leukocyte Esterase, Urine 02/13/2024 NEGATIVE  NEGATIVE Final    WBC, Urine 02/13/2024 1-5  1-5, NONE /HPF Final    RBC, Urine 02/13/2024 1-2  NONE, 1-2, 3-5 /HPF Final    Squamous Epithelial Cells, Urine 02/13/2024 10-25 (FEW)  Reference range not established. /HPF Final    Mucus, Urine 02/13/2024 3+  Reference range not established. /LPF Final    Calcium Oxalate Crystals, Urine 02/13/2024 1+  NONE, 1+ /HPF Final   Hospital Outpatient Visit on 01/30/2024   Component Date Value Ref Range Status    WBC 01/30/2024 6.8  4.4 - 11.3 x10*3/uL Final    nRBC 01/30/2024 0.0  0.0 - 0.0 /100 WBCs Final    RBC 01/30/2024 3.50 (L)  4.00 - 5.20 x10*6/uL Final    Hemoglobin 01/30/2024 11.1 (L)  12.0 - 16.0 g/dL Final    Hematocrit 01/30/2024 32.7 (L)  36.0 - 46.0 % Final    MCV 01/30/2024 93  80 - 100 fL Final    MCH 01/30/2024 31.7  26.0 - 34.0 pg Final    MCHC 01/30/2024 33.9  32.0 - 36.0 g/dL Final    RDW 01/30/2024 13.1  11.5 - 14.5 % Final    Platelets 01/30/2024 194  150 - 450 x10*3/uL Final    Neutrophils % 01/30/2024 68.5  40.0 - 80.0 % Final    Immature Granulocytes %, Automated 01/30/2024 0.4  0.0 - 0.9 % Final    Lymphocytes % 01/30/2024 16.3  13.0 - 44.0 % Final    Monocytes % 01/30/2024 11.3  2.0 - 10.0 % Final    Eosinophils % 01/30/2024 3.1  0.0 - 6.0  % Final    Basophils % 01/30/2024 0.4  0.0 - 2.0 % Final    Neutrophils Absolute 01/30/2024 4.66  1.20 - 7.70 x10*3/uL Final    Immature Granulocytes Absolute, Au* 01/30/2024 0.03  0.00 - 0.70 x10*3/uL Final    Lymphocytes Absolute 01/30/2024 1.11 (L)  1.20 - 4.80 x10*3/uL Final    Monocytes Absolute 01/30/2024 0.77  0.10 - 1.00 x10*3/uL Final    Eosinophils Absolute 01/30/2024 0.21  0.00 - 0.70 x10*3/uL Final    Basophils Absolute 01/30/2024 0.03  0.00 - 0.10 x10*3/uL Final    Glucose 01/30/2024 90  74 - 99 mg/dL Final    Sodium 01/30/2024 139  136 - 145 mmol/L Final    Potassium 01/30/2024 3.8  3.5 - 5.3 mmol/L Final    Chloride 01/30/2024 106  98 - 107 mmol/L Final    Bicarbonate 01/30/2024 25  21 - 32 mmol/L Final    Anion Gap 01/30/2024 12  10 - 20 mmol/L Final    Urea Nitrogen 01/30/2024 19  6 - 23 mg/dL Final    Creatinine 01/30/2024 0.76  0.50 - 1.05 mg/dL Final    eGFR 01/30/2024 85  >60 mL/min/1.73m*2 Final    Calcium 01/30/2024 8.8  8.6 - 10.6 mg/dL Final    Albumin 01/30/2024 3.5  3.4 - 5.0 g/dL Final    Alkaline Phosphatase 01/30/2024 40  33 - 136 U/L Final    Total Protein 01/30/2024 5.6 (L)  6.4 - 8.2 g/dL Final    AST 01/30/2024 15  9 - 39 U/L Final    Bilirubin, Total 01/30/2024 0.7  0.0 - 1.2 mg/dL Final    ALT 01/30/2024 10  7 - 45 U/L Final    aPTT 01/30/2024 32  27 - 38 seconds Final    Bilirubin, Direct 01/30/2024 0.1  0.0 - 0.3 mg/dL Final    Creatine Kinase 01/30/2024 36  0 - 215 U/L Final    C-Reactive Protein 01/30/2024 0.49  <1.00 mg/dL Final    Ferritin 01/30/2024 117  8 - 150 ng/mL Final    Magnesium 01/30/2024 1.76  1.60 - 2.40 mg/dL Final    Phosphorus 01/30/2024 4.3  2.5 - 4.9 mg/dL Final    Protime 01/30/2024 12.0  9.8 - 12.8 seconds Final    INR 01/30/2024 1.1  0.9 - 1.1 Final              Performance Status:    ECOG 1: Restricted in physically strenuous activity but ambulatory and able to carry out work of a light or sedentary nature, e.g., light house work, office work.     CT  chest abdomen pelvis w IV contrast    Result Date: 12/14/2023  Impression: CHEST: Stable findings including stable irregular areas of linear and nodular thickening in the right midlung anteriorly.   Stable ground-glass area of nodularity in the right lower lobe.   ABDOMEN-PELVIS: Diverticulosis without definite diverticulitis. No definite metastatic disease to the abdomen or the pelvis.   MACRO: None   Signed by: Micaela Luna 12/14/2023 9:50 AM Dictation workstation:   [id number]       Assessment/Plan      Mrs. Branch is a 66 yo with COPD and GERD who presented with newly diagnosed SCLC completed BID radiation planned concurrently with cis/etoposide but had a reaction C2D1 to cisplatin. Completed 1 cycle carbo/etop D1 4/5/22 also complicated by facial flushing and erythematous rash and stopped further treatment. Now s/p PCI in 6/2022. Most recent CT concerning for disease progression. Referred for clinical trial AMGN [id number], C1D1 1/24/23. Treatment has been complicated by orthostatic hypotension, worsening short-term memory, increased lethargy, weight loss, and poor appetite. Delayed C2 by 1 week and dose-reduced. Confusion has resolved during C3 after stopping mirtazapine and dose reduction.     # SCLC  - limited stage, brain MRI negative  - previously discussed concurrent chemoradiation, with cisplatin/etoposide with side effects including but not limited to allergic reaction, fatigue, risk of infection, tinnitus, neuropathy, injury to liver/kidney, risk of anemia/thrombocytopenia and was consented  - she was also interested in scalp cooling, which unfortunately she is not a candidate for d/t SCLC  - started concurrent chemoradiation BID with Q3week cis/etop on 2/15 at [facility]  -unfortunately had reaction to cisplatin on C2D1 resulting in hospitalization and also felt to be septic due to pneumonia  - discussed that we typically do 3-4 cycles chemotherapy, and alternative regimens include carboplatin/etoposide  vs CAV. Given reaction to cisplatin, concern for possible reaction to carboplatin as well, although unknown rates of cross reaction. Alternatively, they also asked about discontinuation of chemotherapy, since she completed radiation. She ultimately decided to trial carbo/etoposide. She is aware of the heightened risk of allergic reaction, as well as other side effects including but not limited to fatigue, risk of infection, neuropathy, injury to liver/kidney, risk of anemia/thrombocytopenia. consented 3/28/22  -s/p 1 cycle Carbo/Etop D1 4/5/22, developed facial flushing and erythematous rash on arms and chest s/p treatment, resolved with benadryl  -opted to forego further chemotherapy and will continue on maintenance  - s/p PCI 6/2022  - CT C/A/P and brain MRI 5/2022 and most recently 8/2022 with good response and no disease  -  MRI brain 11/7 without evidence of metastatic disease  - CT C/A/P 11/3 with multiple new enlarged LN concerning for disease progression - discussed with Dr. Valdivia not able to repeat radiation.  - referred to phase 1 clinical trial and consideration for [id number] or [id number]  - 1.5.2023 : Patient signed consent to take part on phase 1 study [id number]. Look clinically good to take part in study. Will start on study ASAP if eligible.   - 1.24.2023: Seen today and ready to start trial on [id number].  - 1.31.2023: Seen today, ready for C1D8 AMGN 1518   - 2.7.2023: Seen today for C1D15 [id number] - continue with trial   - 2.14.2023: Seen in follow-up on [id number] with overall worsening of general health  - 2.21.2023: Seen today for C2D1 AMGN 1518 with continued weight loss although perhaps starting to plateau, more energy since being off treatment, still poor appetite. will delay by 1 week, started dexamethasone 2 mg daily, and consider dose reduction.  - 2.28.2023: Seen today for C2D1 AMGN 1518 after delaying for 1 week. Energy level and appetite are improved, although still losing a little weight.  Confusion is worse today, possibly due to mirtazapine vs study-related. Will dose reduce today.  - 3.7.2023: Seen today C2D8 AMGN 1518 after dose-reduction last week. Confusion is mildly improved, energy level and appetite are stable. Will add marinol for appetite.   - 3.14.2023: Seen today C2D15 AMGN 1518. Overall improved: confusion continues to improve, energy level and appetite are improved. Weight is improved. Will continue dexamethasone and marinol.  - 3.15.2023: C2D16 No CRS symptoms observed after last treatment Overall Symptoms are improving and she is tolerating treatment.  -3.16.2023: C2D17 No CRS symptoms observed after last treatment Overall Symptoms are improving and she is tolerating treatment.  - 3.28.2023 : Most recent imaging suggest patient benefiting from current treatment. Ongoing symptom  possible common cold/season allergies. Since the patient looks clinically good we will proceed with C3D1 today. Educated patient to call the office ASAP if symptoms worsen.   - 4.11.2023: C3D15 Feeling well and overall improved with fatigue, confusion, anorexia. Proceed at current dosing and weaning steroids as below  -4.25.2023: C4D1 Pt is feeling well, no complaints of fatigue, confusion, memory loss. Has gained weight. Energy levels are good. Proceed with the current dosing. Pt is no longer on steroids.   -5.9.2023: C4D15. Pt is tolerating treatment well with no new complains. Continues to have good energy level endorses poor appetite with out any weight loss. Suggested to start taking the dronabinol.  Will proceed with treatment today.  -5.23.2023: C5D1. Pt is tolerating treatment well. Energy levels are good, is having some weight loss and constipation. Started back on dex for appetite. Pt will let us know if she remains constipated over the next several days.  Personally reviewed scans with stable disease. Will proceed with treatment today.   -6.6.2023: C5D15. Continues to tolerate treatment well. Since  we restarted her on Dexamethasone she reports her energy levels and appetite has improved. Will continue on low dose Dex. With no new symptoms, we will proceed with treatment today.   -6.20.2023: C6D1. Doing well on dexamethasone 1 mg daily. Will proceed with treatment.  -7.5.2023: C6D15. We will proceed with treatment since the patient is tolerating treatment with no significant AE's and also give 1/2 liter of IV fluids.   - 7.18.2023: C7D1. Doing well, will proceed with treatment. She is out of dexamethasone, and will monitor off steroids.   -8.1.2023: C7D15. Continues to tolerate treatment. Will proceed with C7D15 today. RTC per protocol.  - 8.29.2023: C8D1. C8 delayed due to hospitalization for diverticulitis. Has completed antibiotics and feeling well for treatment.  - 9.12.2023: C8D15. Most recent imgaing suggest the patient continues to benefit from current treatment. The imaging also suggest improvement of previously visualized segmental colitis associated with diverticulosis affecting the sigmoid colon with minimal residual pericolonic fat standing and hyperemia of the sigmoid colon. Imaging also suggest resolving inflammatory process without evidence of new or worsening complications. We will proceed with C8D15 today since the patient is also clinically feeling good. RTC per protocol.  -9.26.2023: C9D1. Patient looks clinically good. With no JACQUELYN's we will proceed with treatment C9D1 today.  -10.24.23: C10D1. Pt feels well, no acute events, no TRAEs, continue treatment today as scheduled.  -11.7.23: C10D15. Pt feels well, no TRAEs, continue treatment as scheduled.  - 11.21.23: C11D1. Continues to feel well, will continue treatment today.  - 12.19.23: C12D1. Personally reviewed most recent scan without evidence of progression. Continues to feel well and will continue with treatment today.  - 01.02.24: C12D15. Continues to tolerate treatment well. No new JACQUELYN's will proceed with treatment. RTC per  protocol.  - 01.16.24: C13D1. No new JACQUELYN's proceed with treatment. RTC per protocol.   - 01.30.24: C13D15. Continues to feel well. Proceed with treatment.  - 2.13.24: C14D1. Personally reviewed most recent scan without evidence of progression. Continue with treatment today. RTC per protocol.  - 2.27.24: C14D15. Continues to feel good and tolerate treatment without any JACQUELYN's. We will proceed with treatment today. RTC per protocol.    # Confusion - resolved  - significantly worsened after taking double dose of mirtazapine accidentally, although has been generally down-trending since starting study (which is also when she started taking mirtazapine) and now resolved  - brain MRI without progression of cancer  - will continue off mirtazapine    # Unintentional weight loss - stable  # Anorexia- Improving  # Dysgeusia- improving  - all improved on steroids. unclear if this is from cancer or side effect of study drug  - stopped mirtazapine due to concerns for confusion   - weaned off dexamethasone and started marinol, but kept forgetting to take it  - dexamethasone trial started again on 5.23.2023. Continued on 1 mg daily - will trial off after 7.18.23.     # Fatigue - Resolved  - back to normal pretty much, weaning steroids as above    # frequent PVCs - asymptomatic  - saw onco-cardiology and completed 24-hr Holter monitor  - recommended decreasing caffeine and sudafed intake  - continue to monitor    # Forgetfulness - improved - however noted decline on AMGN study- unclear etiology.    - started after PCI, on memantine daily and has since stopped  - offered neurocognitive evaluation previously and she will think about this and readdress next visit  -  notes some decline in last 3 weeks- especially short term memory loss.    - Improved    # Neuropathy - resolved  - mild peripheral neuropathy with numbness of the toes - likely due to cisplatin  - will continue to monitor closely  - not endorsing any neuropathy at  this visit     # Rash - resolved  Waxing and waning rash throughout her body. ?improving. Unclear etiology, patient and  feel timing coincided with starting BMX.   - she will hold off on further BMX  - thought to be due to sulfa allergy, possibly due to platinum agents   - continue to monitor    #odynophagia - resolved  -rad onc aware  -prescribed BMX solution  -will continue to monitor    #constipation-improving  -occasional. Prescribed Senna S  -will monitor    CASEY Stone-CNP

## 2024-03-04 PROBLEM — R79.89 ELEVATED FERRITIN LEVEL: Status: RESOLVED | Noted: 2023-05-12 | Resolved: 2024-03-04

## 2024-03-04 NOTE — PROGRESS NOTES
Subjective   Patient ID: Gladys Branch is a 68 y.o. female who presents for No chief complaint on file..  HPI  Patient presents today for follow up labs and chronic conditions.  Patient otherwise feels well. No other complaints or concerns.    The patient's relevant past medical, surgical, family, and social history was reviewed in [facility].  All pertinent lab work and results for this visit were reviewed with patient.    Hospital Outpatient Visit on 02/27/2024   Component Date Value Ref Range Status    WBC 02/27/2024 5.0  4.4 - 11.3 x10*3/uL Final    nRBC 02/27/2024 0.0  0.0 - 0.0 /100 WBCs Final    RBC 02/27/2024 3.72 (L)  4.00 - 5.20 x10*6/uL Final    Hemoglobin 02/27/2024 11.7 (L)  12.0 - 16.0 g/dL Final    Hematocrit 02/27/2024 34.0 (L)  36.0 - 46.0 % Final    MCV 02/27/2024 91  80 - 100 fL Final    MCH 02/27/2024 31.5  26.0 - 34.0 pg Final    MCHC 02/27/2024 34.4  32.0 - 36.0 g/dL Final    RDW 02/27/2024 12.9  11.5 - 14.5 % Final    Platelets 02/27/2024 195  150 - 450 x10*3/uL Final    Neutrophils % 02/27/2024 62.2  40.0 - 80.0 % Final    Immature Granulocytes %, Automated 02/27/2024 0.4  0.0 - 0.9 % Final    Immature Granulocyte Count (IG) includes promyelocytes, myelocytes and metamyelocytes but does not include bands. Percent differential counts (%) should be interpreted in the context of the absolute cell counts (cells/UL).    Lymphocytes % 02/27/2024 21.9  13.0 - 44.0 % Final    Monocytes % 02/27/2024 10.5  2.0 - 10.0 % Final    Eosinophils % 02/27/2024 4.2  0.0 - 6.0 % Final    Basophils % 02/27/2024 0.8  0.0 - 2.0 % Final    Neutrophils Absolute 02/27/2024 3.13  1.20 - 7.70 x10*3/uL Final    Percent differential counts (%) should be interpreted in the context of the absolute cell counts (cells/uL).    Immature Granulocytes Absolute, Au* 02/27/2024 0.02  0.00 - 0.70 x10*3/uL Final    Lymphocytes Absolute 02/27/2024 1.10 (L)  1.20 - 4.80 x10*3/uL Final    Monocytes Absolute 02/27/2024 0.53  0.10 - 1.00  x10*3/uL Final    Eosinophils Absolute 02/27/2024 0.21  0.00 - 0.70 x10*3/uL Final    Basophils Absolute 02/27/2024 0.04  0.00 - 0.10 x10*3/uL Final    Glucose 02/27/2024 94  74 - 99 mg/dL Final    Sodium 02/27/2024 140  136 - 145 mmol/L Final    Potassium 02/27/2024 3.7  3.5 - 5.3 mmol/L Final    Chloride 02/27/2024 106  98 - 107 mmol/L Final    Bicarbonate 02/27/2024 26  21 - 32 mmol/L Final    Anion Gap 02/27/2024 12  10 - 20 mmol/L Final    Urea Nitrogen 02/27/2024 19  6 - 23 mg/dL Final    Creatinine 02/27/2024 0.79  0.50 - 1.05 mg/dL Final    eGFR 02/27/2024 82  >60 mL/min/1.73m*2 Final    Calculations of estimated GFR are performed using the 2021 CKD-EPI Study Refit equation without the race variable for the IDMS-Traceable creatinine methods.  https://jasn.asnjournals.org/content/early/2021/09/22/ASN.1821420487    Calcium 02/27/2024 9.1  8.6 - 10.6 mg/dL Final    Albumin 02/27/2024 3.6  3.4 - 5.0 g/dL Final    Alkaline Phosphatase 02/27/2024 45  33 - 136 U/L Final    Total Protein 02/27/2024 5.6 (L)  6.4 - 8.2 g/dL Final    AST 02/27/2024 15  9 - 39 U/L Final    Bilirubin, Total 02/27/2024 0.8  0.0 - 1.2 mg/dL Final    ALT 02/27/2024 11  7 - 45 U/L Final    Patients treated with Sulfasalazine may generate falsely decreased results for ALT.    aPTT 02/27/2024 32  27 - 38 seconds Final    Bilirubin, Direct 02/27/2024 0.1  0.0 - 0.3 mg/dL Final    Creatine Kinase 02/27/2024 54  0 - 215 U/L Final    C-Reactive Protein 02/27/2024 <0.10  <1.00 mg/dL Final    Ferritin 02/27/2024 76  8 - 150 ng/mL Final    Magnesium 02/27/2024 1.92  1.60 - 2.40 mg/dL Final    Phosphorus 02/27/2024 3.9  2.5 - 4.9 mg/dL Final    The performance characteristics of phosphorus testing in heparinized plasma have been validated by the individual  laboratory site where testing is performed. Testing on heparinized plasma is not approved by the FDA; however, such approval is not necessary.    Protime 02/27/2024 11.6  9.8 - 12.8 seconds Final     INR 02/27/2024 1.0  0.9 - 1.1 Final    Vitamin D, 25-Hydroxy, Total 02/27/2024 35  30 - 100 ng/mL Final   Hospital Outpatient Visit on 02/13/2024   Component Date Value Ref Range Status    WBC 02/13/2024 6.6  4.4 - 11.3 x10*3/uL Final    nRBC 02/13/2024 0.0  0.0 - 0.0 /100 WBCs Final    RBC 02/13/2024 3.73 (L)  4.00 - 5.20 x10*6/uL Final    Hemoglobin 02/13/2024 11.6 (L)  12.0 - 16.0 g/dL Final    Hematocrit 02/13/2024 34.4 (L)  36.0 - 46.0 % Final    MCV 02/13/2024 92  80 - 100 fL Final    MCH 02/13/2024 31.1  26.0 - 34.0 pg Final    MCHC 02/13/2024 33.7  32.0 - 36.0 g/dL Final    RDW 02/13/2024 13.0  11.5 - 14.5 % Final    Platelets 02/13/2024 224  150 - 450 x10*3/uL Final    Neutrophils % 02/13/2024 64.3  40.0 - 80.0 % Final    Immature Granulocytes %, Automated 02/13/2024 0.6  0.0 - 0.9 % Final    Immature Granulocyte Count (IG) includes promyelocytes, myelocytes and metamyelocytes but does not include bands. Percent differential counts (%) should be interpreted in the context of the absolute cell counts (cells/UL).    Lymphocytes % 02/13/2024 21.2  13.0 - 44.0 % Final    Monocytes % 02/13/2024 9.6  2.0 - 10.0 % Final    Eosinophils % 02/13/2024 3.8  0.0 - 6.0 % Final    Basophils % 02/13/2024 0.5  0.0 - 2.0 % Final    Neutrophils Absolute 02/13/2024 4.23  1.20 - 7.70 x10*3/uL Final    Percent differential counts (%) should be interpreted in the context of the absolute cell counts (cells/uL).    Immature Granulocytes Absolute, Au* 02/13/2024 0.04  0.00 - 0.70 x10*3/uL Final    Lymphocytes Absolute 02/13/2024 1.39  1.20 - 4.80 x10*3/uL Final    Monocytes Absolute 02/13/2024 0.63  0.10 - 1.00 x10*3/uL Final    Eosinophils Absolute 02/13/2024 0.25  0.00 - 0.70 x10*3/uL Final    Basophils Absolute 02/13/2024 0.03  0.00 - 0.10 x10*3/uL Final    Glucose 02/13/2024 129 (H)  74 - 99 mg/dL Final    Sodium 02/13/2024 139  136 - 145 mmol/L Final    Potassium 02/13/2024 3.7  3.5 - 5.3 mmol/L Final    Chloride 02/13/2024  104  98 - 107 mmol/L Final    Bicarbonate 02/13/2024 25  21 - 32 mmol/L Final    Anion Gap 02/13/2024 14  10 - 20 mmol/L Final    Urea Nitrogen 02/13/2024 24 (H)  6 - 23 mg/dL Final    Creatinine 02/13/2024 0.71  0.50 - 1.05 mg/dL Final    eGFR 02/13/2024 >90  >60 mL/min/1.73m*2 Final    Calculations of estimated GFR are performed using the 2021 CKD-EPI Study Refit equation without the race variable for the IDMS-Traceable creatinine methods.  https://jasn.asnjournals.org/content/early/2021/09/22/ASN.1657407446    Calcium 02/13/2024 9.1  8.6 - 10.6 mg/dL Final    Albumin 02/13/2024 3.5  3.4 - 5.0 g/dL Final    Alkaline Phosphatase 02/13/2024 47  33 - 136 U/L Final    Total Protein 02/13/2024 5.8 (L)  6.4 - 8.2 g/dL Final    AST 02/13/2024 14  9 - 39 U/L Final    Bilirubin, Total 02/13/2024 0.7  0.0 - 1.2 mg/dL Final    ALT 02/13/2024 12  7 - 45 U/L Final    Patients treated with Sulfasalazine may generate falsely decreased results for ALT.    Adrenocorticotropic Hormone (ACTH) 02/13/2024 68.9 (H)  7.2 - 63.3 pg/mL Final    INTERPRETIVE INFORMATION: Adrenocorticotropic Hormone    Reference interval based on samples collected between 7 a.m. and   10 a.m.  No reference intervals established for p.m. collections.    Pediatric reference values are the same as adults (Acta Paediatr   Scand 1981;70:341-345).  This assay measures intact ACTH 1-39;   some types of synthetic ACTH and ACTH fragments are not detected   by this assay.  Performed By: [facility]  [address] [place] [id number]  : Darian Marrero MD, PhD  CLIA Number: [id number]    Amylase 02/13/2024 29  29 - 103 U/L Final    aPTT 02/13/2024 30  27 - 38 seconds Final    Bilirubin, Direct 02/13/2024 0.1  0.0 - 0.3 mg/dL Final    Creatine Kinase 02/13/2024 38  0 - 215 U/L Final    Cortisol 02/13/2024 13.2  2.5 - 20.0 ug/dL Final    C-Reactive Protein 02/13/2024 <0.10  <1.00 mg/dL Final    Estradiol 02/13/2024 <19  pg/mL Final     Ferritin 02/13/2024 78  8 - 150 ng/mL Final    Follicle Stimulating Hormone 02/13/2024 46.3  IU/L Final    FSH Ref Values  Follicular   2.0-12.0  IU/L  Mid-Cycle        12.0-25.0  IU/L  Luteal Phase      2.0-12.0  IU/L  Menopause       30.0-150.0  IU/L  Pre-puberty     50% Adult IU/L  Adult Male        2.0-10.0  IU/L   Infants          0.0-1.0  IU/L    Luteinizing Hormone 02/13/2024 15.9  IU/L Final    LH Reference Values  Follicular Phase          1.9-12.5 IU/L  Mid-Cycle                 8.7-76.3 IU/L  Luteal Phase              0.5-16.9 IU/L  Post Menopause            5.0-55.2 IU/L  Children                    0- 6.0 IU/L  Adult Male 18-70 years    1.5- 9.3 IU/L  Adult Male >70 years      3.1-34.6 IU/L    Lipase 02/13/2024 28  9 - 82 U/L Final    Magnesium 02/13/2024 1.73  1.60 - 2.40 mg/dL Final    Phosphorus 02/13/2024 4.6  2.5 - 4.9 mg/dL Final    The performance characteristics of phosphorus testing in heparinized plasma have been validated by the individual  laboratory site where testing is performed. Testing on heparinized plasma is not approved by the FDA; however, such approval is not necessary.    Protime 02/13/2024 11.9  9.8 - 12.8 seconds Final    INR 02/13/2024 1.1  0.9 - 1.1 Final    Thyroid Stimulating Hormone 02/13/2024 6.39 (H)  0.44 - 3.98 mIU/L Final    Thyroxine, Free 02/13/2024 0.88  0.78 - 1.48 ng/dL Final    Color, Urine 02/13/2024 Yellow  Light-Yellow, Yellow, Dark-Yellow Final    Appearance, Urine 02/13/2024 Turbid (N)  Clear Final    Specific Gravity, Urine 02/13/2024 1.027  1.005 - 1.035 Final    pH, Urine 02/13/2024 5.5  5.0, 5.5, 6.0, 6.5, 7.0, 7.5, 8.0 Final    Protein, Urine 02/13/2024 10 (TRACE)  NEGATIVE, 10 (TRACE), 20 (TRACE) mg/dL Final    Glucose, Urine 02/13/2024 Normal  Normal mg/dL Final    Blood, Urine 02/13/2024 NEGATIVE  NEGATIVE Final    Ketones, Urine 02/13/2024 NEGATIVE  NEGATIVE mg/dL Final    Bilirubin, Urine 02/13/2024 NEGATIVE  NEGATIVE Final    Urobilinogen,  Urine 02/13/2024 Normal  Normal mg/dL Final    Nitrite, Urine 02/13/2024 NEGATIVE  NEGATIVE Final    Leukocyte Esterase, Urine 02/13/2024 NEGATIVE  NEGATIVE Final    WBC, Urine 02/13/2024 1-5  1-5, NONE /HPF Final    RBC, Urine 02/13/2024 1-2  NONE, 1-2, 3-5 /HPF Final    Squamous Epithelial Cells, Urine 02/13/2024 10-25 (FEW)  Reference range not established. /HPF Final    Mucus, Urine 02/13/2024 3+  Reference range not established. /LPF Final    Calcium Oxalate Crystals, Urine 02/13/2024 1+  NONE, 1+ /HPF Final   Hospital Outpatient Visit on 01/30/2024   Component Date Value Ref Range Status    WBC 01/30/2024 6.8  4.4 - 11.3 x10*3/uL Final    nRBC 01/30/2024 0.0  0.0 - 0.0 /100 WBCs Final    RBC 01/30/2024 3.50 (L)  4.00 - 5.20 x10*6/uL Final    Hemoglobin 01/30/2024 11.1 (L)  12.0 - 16.0 g/dL Final    Hematocrit 01/30/2024 32.7 (L)  36.0 - 46.0 % Final    MCV 01/30/2024 93  80 - 100 fL Final    MCH 01/30/2024 31.7  26.0 - 34.0 pg Final    MCHC 01/30/2024 33.9  32.0 - 36.0 g/dL Final    RDW 01/30/2024 13.1  11.5 - 14.5 % Final    Platelets 01/30/2024 194  150 - 450 x10*3/uL Final    Neutrophils % 01/30/2024 68.5  40.0 - 80.0 % Final    Immature Granulocytes %, Automated 01/30/2024 0.4  0.0 - 0.9 % Final    Immature Granulocyte Count (IG) includes promyelocytes, myelocytes and metamyelocytes but does not include bands. Percent differential counts (%) should be interpreted in the context of the absolute cell counts (cells/UL).    Lymphocytes % 01/30/2024 16.3  13.0 - 44.0 % Final    Monocytes % 01/30/2024 11.3  2.0 - 10.0 % Final    Eosinophils % 01/30/2024 3.1  0.0 - 6.0 % Final    Basophils % 01/30/2024 0.4  0.0 - 2.0 % Final    Neutrophils Absolute 01/30/2024 4.66  1.20 - 7.70 x10*3/uL Final    Percent differential counts (%) should be interpreted in the context of the absolute cell counts (cells/uL).    Immature Granulocytes Absolute, Au* 01/30/2024 0.03  0.00 - 0.70 x10*3/uL Final    Lymphocytes Absolute  01/30/2024 1.11 (L)  1.20 - 4.80 x10*3/uL Final    Monocytes Absolute 01/30/2024 0.77  0.10 - 1.00 x10*3/uL Final    Eosinophils Absolute 01/30/2024 0.21  0.00 - 0.70 x10*3/uL Final    Basophils Absolute 01/30/2024 0.03  0.00 - 0.10 x10*3/uL Final    Glucose 01/30/2024 90  74 - 99 mg/dL Final    Sodium 01/30/2024 139  136 - 145 mmol/L Final    Potassium 01/30/2024 3.8  3.5 - 5.3 mmol/L Final    Chloride 01/30/2024 106  98 - 107 mmol/L Final    Bicarbonate 01/30/2024 25  21 - 32 mmol/L Final    Anion Gap 01/30/2024 12  10 - 20 mmol/L Final    Urea Nitrogen 01/30/2024 19  6 - 23 mg/dL Final    Creatinine 01/30/2024 0.76  0.50 - 1.05 mg/dL Final    eGFR 01/30/2024 85  >60 mL/min/1.73m*2 Final    Calculations of estimated GFR are performed using the 2021 CKD-EPI Study Refit equation without the race variable for the IDMS-Traceable creatinine methods.  https://jasn.asnjournals.org/content/early/2021/09/22/ASN.3473909951    Calcium 01/30/2024 8.8  8.6 - 10.6 mg/dL Final    Albumin 01/30/2024 3.5  3.4 - 5.0 g/dL Final    Alkaline Phosphatase 01/30/2024 40  33 - 136 U/L Final    Total Protein 01/30/2024 5.6 (L)  6.4 - 8.2 g/dL Final    AST 01/30/2024 15  9 - 39 U/L Final    Bilirubin, Total 01/30/2024 0.7  0.0 - 1.2 mg/dL Final    ALT 01/30/2024 10  7 - 45 U/L Final    Patients treated with Sulfasalazine may generate falsely decreased results for ALT.    aPTT 01/30/2024 32  27 - 38 seconds Final    Bilirubin, Direct 01/30/2024 0.1  0.0 - 0.3 mg/dL Final    Creatine Kinase 01/30/2024 36  0 - 215 U/L Final    C-Reactive Protein 01/30/2024 0.49  <1.00 mg/dL Final    Ferritin 01/30/2024 117  8 - 150 ng/mL Final    Magnesium 01/30/2024 1.76  1.60 - 2.40 mg/dL Final    Phosphorus 01/30/2024 4.3  2.5 - 4.9 mg/dL Final    The performance characteristics of phosphorus testing in heparinized plasma have been validated by the individual  laboratory site where testing is performed. Testing on heparinized plasma is not approved by the  FDA; however, such approval is not necessary.    Protime 01/30/2024 12.0  9.8 - 12.8 seconds Final    INR 01/30/2024 1.1  0.9 - 1.1 Final           Review of Systems   A complete review of systems was performed and all systems were normal except what is noted in the HPI.        Objective   LMP  (LMP Unknown)    Physical Exam    Health Maintenance Due   Topic Date Due    TB Test  Never done    Derm Melanoma Skin Check  Never done    Bone Density Scan  04/07/2024        Assessment/Plan   Problem List Items Addressed This Visit       COPD (chronic obstructive pulmonary disease) (CMS/HCC)     Stable  Reevaluate in 6 months.           Hyperlipidemia     Mildly elevated LDL and triglycerides   Work on diet reviewed with patient.   Reevaluate in 6 months.  .         Relevant Orders    TSH with reflex to Free T4 if abnormal    Lipid Panel    Cholesterol, LDL Direct    Comprehensive Metabolic Panel    Impaired fasting blood sugar - Primary     well controlled   Work on diet reviewed with patient.   Reevaluate in 6 months.           Relevant Orders    TSH with reflex to Free T4 if abnormal    Hemoglobin A1C    Small cell lung cancer (CMS/HCC)     Diagnosed per bronch 1/22  S/P radiation  Now with reoccurrence  On research protocol per oncology               Relevant Orders    TSH with reflex to Free T4 if abnormal    Vitamin D deficiency     Well controlled. Continue current medicine and recheck in 6 months.           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           [facility]  SHALONDA Omer MD

## 2024-03-04 NOTE — ASSESSMENT & PLAN NOTE
Diagnosed per bronch 1/22  S/P radiation  Now with reoccurrence  On research protocol per oncology

## 2024-03-04 NOTE — ASSESSMENT & PLAN NOTE
Mildly elevated LDL and triglycerides   Work on diet reviewed with patient.   Reevaluate in 6 months.  .

## 2024-03-08 ENCOUNTER — APPOINTMENT (OUTPATIENT)
Dept: PRIMARY CARE | Facility: CLINIC | Age: 69
End: 2024-03-08
Payer: MEDICARE

## 2024-03-11 RX ORDER — EPINEPHRINE 1 MG/ML
0.3 INJECTION INTRAMUSCULAR; INTRAVENOUS; SUBCUTANEOUS EVERY 5 MIN PRN
Status: CANCELLED | OUTPATIENT
Start: 2024-03-26

## 2024-03-11 RX ORDER — DIPHENHYDRAMINE HYDROCHLORIDE 50 MG/ML
50 INJECTION INTRAMUSCULAR; INTRAVENOUS AS NEEDED
Status: CANCELLED | OUTPATIENT
Start: 2024-03-26

## 2024-03-11 RX ORDER — FAMOTIDINE 10 MG/ML
20 INJECTION INTRAVENOUS ONCE AS NEEDED
Status: CANCELLED | OUTPATIENT
Start: 2024-03-26

## 2024-03-11 RX ORDER — EPINEPHRINE 1 MG/ML
0.3 INJECTION INTRAMUSCULAR; INTRAVENOUS; SUBCUTANEOUS EVERY 5 MIN PRN
Status: CANCELLED | OUTPATIENT
Start: 2024-03-12

## 2024-03-11 RX ORDER — ALBUTEROL SULFATE 0.83 MG/ML
3 SOLUTION RESPIRATORY (INHALATION) AS NEEDED
Status: CANCELLED | OUTPATIENT
Start: 2024-03-12

## 2024-03-11 RX ORDER — ALBUTEROL SULFATE 0.83 MG/ML
3 SOLUTION RESPIRATORY (INHALATION) AS NEEDED
Status: CANCELLED | OUTPATIENT
Start: 2024-03-26

## 2024-03-11 RX ORDER — FAMOTIDINE 10 MG/ML
20 INJECTION INTRAVENOUS ONCE AS NEEDED
Status: CANCELLED | OUTPATIENT
Start: 2024-03-12

## 2024-03-11 RX ORDER — DIPHENHYDRAMINE HYDROCHLORIDE 50 MG/ML
50 INJECTION INTRAMUSCULAR; INTRAVENOUS AS NEEDED
Status: CANCELLED | OUTPATIENT
Start: 2024-03-12

## 2024-03-12 ENCOUNTER — NUTRITION (OUTPATIENT)
Dept: HEMATOLOGY/ONCOLOGY | Facility: HOSPITAL | Age: 69
End: 2024-03-12

## 2024-03-12 ENCOUNTER — EDUCATION (OUTPATIENT)
Dept: HEMATOLOGY/ONCOLOGY | Facility: HOSPITAL | Age: 69
End: 2024-03-12

## 2024-03-12 ENCOUNTER — HOSPITAL ENCOUNTER (OUTPATIENT)
Dept: RESEARCH | Facility: HOSPITAL | Age: 69
Discharge: HOME | End: 2024-03-12
Payer: MEDICARE

## 2024-03-12 ENCOUNTER — OFFICE VISIT (OUTPATIENT)
Dept: HEMATOLOGY/ONCOLOGY | Facility: HOSPITAL | Age: 69
End: 2024-03-12
Payer: MEDICARE

## 2024-03-12 VITALS — HEIGHT: 60 IN | BODY MASS INDEX: 21.06 KG/M2

## 2024-03-12 DIAGNOSIS — C34.90 SMALL CELL LUNG CANCER (MULTI): Primary | ICD-10-CM

## 2024-03-12 DIAGNOSIS — C34.90 MALIGNANT NEOPLASM OF LUNG, UNSPECIFIED LATERALITY, UNSPECIFIED PART OF LUNG (MULTI): ICD-10-CM

## 2024-03-12 DIAGNOSIS — Z00.6 CLINICAL TRIAL EXAM: ICD-10-CM

## 2024-03-12 DIAGNOSIS — C34.90 MALIGNANT NEOPLASM OF LUNG, UNSPECIFIED LATERALITY, UNSPECIFIED PART OF LUNG (MULTI): Primary | ICD-10-CM

## 2024-03-12 DIAGNOSIS — E43 UNSPECIFIED SEVERE PROTEIN-CALORIE MALNUTRITION (MULTI): ICD-10-CM

## 2024-03-12 DIAGNOSIS — Z00.6 CLINICAL TRIAL EXAM: Primary | ICD-10-CM

## 2024-03-12 DIAGNOSIS — Z00.6 CLINICAL TRIAL PARTICIPANT: ICD-10-CM

## 2024-03-12 DIAGNOSIS — C34.90 SMALL CELL LUNG CANCER (MULTI): ICD-10-CM

## 2024-03-12 LAB
25(OH)D3 SERPL-MCNC: 39 NG/ML (ref 30–100)
ALBUMIN SERPL BCP-MCNC: 3.8 G/DL (ref 3.4–5)
ALP SERPL-CCNC: 50 U/L (ref 33–136)
ALT SERPL W P-5'-P-CCNC: 15 U/L (ref 7–45)
AMYLASE SERPL-CCNC: 32 U/L (ref 29–103)
ANION GAP SERPL CALC-SCNC: 13 MMOL/L (ref 10–20)
APPEARANCE UR: CLEAR
APTT PPP: 29 SECONDS (ref 27–38)
AST SERPL W P-5'-P-CCNC: 17 U/L (ref 9–39)
BASOPHILS # BLD AUTO: 0.02 X10*3/UL (ref 0–0.1)
BASOPHILS NFR BLD AUTO: 0.3 %
BILIRUB DIRECT SERPL-MCNC: 0.1 MG/DL (ref 0–0.3)
BILIRUB SERPL-MCNC: 0.5 MG/DL (ref 0–1.2)
BILIRUB UR STRIP.AUTO-MCNC: NEGATIVE MG/DL
BUN SERPL-MCNC: 20 MG/DL (ref 6–23)
CALCIUM SERPL-MCNC: 9.2 MG/DL (ref 8.6–10.6)
CHLORIDE SERPL-SCNC: 105 MMOL/L (ref 98–107)
CK SERPL-CCNC: 40 U/L (ref 0–215)
CO2 SERPL-SCNC: 25 MMOL/L (ref 21–32)
COLOR UR: NORMAL
CORTIS SERPL-MCNC: 0.7 UG/DL (ref 2.5–20)
CREAT SERPL-MCNC: 0.72 MG/DL (ref 0.5–1.05)
CRP SERPL-MCNC: 0.28 MG/DL
EGFRCR SERPLBLD CKD-EPI 2021: >90 ML/MIN/1.73M*2
EOSINOPHIL # BLD AUTO: 0.1 X10*3/UL (ref 0–0.7)
EOSINOPHIL NFR BLD AUTO: 1.6 %
ERYTHROCYTE [DISTWIDTH] IN BLOOD BY AUTOMATED COUNT: 13.2 % (ref 11.5–14.5)
ESTRADIOL SERPL-MCNC: <19 PG/ML
FERRITIN SERPL-MCNC: 62 NG/ML (ref 8–150)
FSH SERPL-ACNC: 30 IU/L
GLUCOSE SERPL-MCNC: 102 MG/DL (ref 74–99)
GLUCOSE UR STRIP.AUTO-MCNC: NORMAL MG/DL
HCT VFR BLD AUTO: 34.9 % (ref 36–46)
HGB BLD-MCNC: 11.6 G/DL (ref 12–16)
HOLD SPECIMEN: NORMAL
IMM GRANULOCYTES # BLD AUTO: 0.02 X10*3/UL (ref 0–0.7)
IMM GRANULOCYTES NFR BLD AUTO: 0.3 % (ref 0–0.9)
INR PPP: 0.9 (ref 0.9–1.1)
KETONES UR STRIP.AUTO-MCNC: NEGATIVE MG/DL
LEUKOCYTE ESTERASE UR QL STRIP.AUTO: NEGATIVE
LH SERPL-ACNC: 7.8 IU/L
LIPASE SERPL-CCNC: 30 U/L (ref 9–82)
LYMPHOCYTES # BLD AUTO: 1.57 X10*3/UL (ref 1.2–4.8)
LYMPHOCYTES NFR BLD AUTO: 25.4 %
MAGNESIUM SERPL-MCNC: 2.02 MG/DL (ref 1.6–2.4)
MCH RBC QN AUTO: 31.3 PG (ref 26–34)
MCHC RBC AUTO-ENTMCNC: 33.2 G/DL (ref 32–36)
MCV RBC AUTO: 94 FL (ref 80–100)
MONOCYTES # BLD AUTO: 0.66 X10*3/UL (ref 0.1–1)
MONOCYTES NFR BLD AUTO: 10.7 %
MUCOUS THREADS #/AREA URNS AUTO: ABNORMAL /LPF
NEUTROPHILS # BLD AUTO: 3.81 X10*3/UL (ref 1.2–7.7)
NEUTROPHILS NFR BLD AUTO: 61.7 %
NITRITE UR QL STRIP.AUTO: NEGATIVE
NRBC BLD-RTO: 0 /100 WBCS (ref 0–0)
PH UR STRIP.AUTO: 6.5 [PH]
PHOSPHATE SERPL-MCNC: 4 MG/DL (ref 2.5–4.9)
PLATELET # BLD AUTO: 237 X10*3/UL (ref 150–450)
POTASSIUM SERPL-SCNC: 3.9 MMOL/L (ref 3.5–5.3)
PROT SERPL-MCNC: 6 G/DL (ref 6.4–8.2)
PROT UR STRIP.AUTO-MCNC: NORMAL MG/DL
PROTHROMBIN TIME: 10.6 SECONDS (ref 9.8–12.8)
RBC # BLD AUTO: 3.71 X10*6/UL (ref 4–5.2)
RBC # UR STRIP.AUTO: NEGATIVE /UL
RBC #/AREA URNS AUTO: ABNORMAL /HPF
SODIUM SERPL-SCNC: 139 MMOL/L (ref 136–145)
SP GR UR STRIP.AUTO: 1.02
SQUAMOUS #/AREA URNS AUTO: ABNORMAL /HPF
T4 FREE SERPL-MCNC: 0.87 NG/DL (ref 0.78–1.48)
TSH SERPL-ACNC: 2.39 MIU/L (ref 0.44–3.98)
UROBILINOGEN UR STRIP.AUTO-MCNC: NORMAL MG/DL
WBC # BLD AUTO: 6.2 X10*3/UL (ref 4.4–11.3)
WBC #/AREA URNS AUTO: ABNORMAL /HPF
YEAST BUDDING #/AREA UR COMP ASSIST: PRESENT /HPF

## 2024-03-12 PROCEDURE — 82150 ASSAY OF AMYLASE: CPT | Performed by: STUDENT IN AN ORGANIZED HEALTH CARE EDUCATION/TRAINING PROGRAM

## 2024-03-12 PROCEDURE — 84100 ASSAY OF PHOSPHORUS: CPT | Performed by: STUDENT IN AN ORGANIZED HEALTH CARE EDUCATION/TRAINING PROGRAM

## 2024-03-12 PROCEDURE — 82728 ASSAY OF FERRITIN: CPT | Performed by: STUDENT IN AN ORGANIZED HEALTH CARE EDUCATION/TRAINING PROGRAM

## 2024-03-12 PROCEDURE — 83735 ASSAY OF MAGNESIUM: CPT | Performed by: STUDENT IN AN ORGANIZED HEALTH CARE EDUCATION/TRAINING PROGRAM

## 2024-03-12 PROCEDURE — 85025 COMPLETE CBC W/AUTO DIFF WBC: CPT | Performed by: STUDENT IN AN ORGANIZED HEALTH CARE EDUCATION/TRAINING PROGRAM

## 2024-03-12 PROCEDURE — 81001 URINALYSIS AUTO W/SCOPE: CPT | Performed by: STUDENT IN AN ORGANIZED HEALTH CARE EDUCATION/TRAINING PROGRAM

## 2024-03-12 PROCEDURE — 99214 OFFICE O/P EST MOD 30 MIN: CPT | Performed by: STUDENT IN AN ORGANIZED HEALTH CARE EDUCATION/TRAINING PROGRAM

## 2024-03-12 PROCEDURE — 84443 ASSAY THYROID STIM HORMONE: CPT | Performed by: STUDENT IN AN ORGANIZED HEALTH CARE EDUCATION/TRAINING PROGRAM

## 2024-03-12 PROCEDURE — 80053 COMPREHEN METABOLIC PANEL: CPT | Performed by: STUDENT IN AN ORGANIZED HEALTH CARE EDUCATION/TRAINING PROGRAM

## 2024-03-12 PROCEDURE — 1159F MED LIST DOCD IN RCRD: CPT | Performed by: STUDENT IN AN ORGANIZED HEALTH CARE EDUCATION/TRAINING PROGRAM

## 2024-03-12 PROCEDURE — 83690 ASSAY OF LIPASE: CPT | Performed by: STUDENT IN AN ORGANIZED HEALTH CARE EDUCATION/TRAINING PROGRAM

## 2024-03-12 PROCEDURE — 1126F AMNT PAIN NOTED NONE PRSNT: CPT | Performed by: STUDENT IN AN ORGANIZED HEALTH CARE EDUCATION/TRAINING PROGRAM

## 2024-03-12 PROCEDURE — 82306 VITAMIN D 25 HYDROXY: CPT | Performed by: STUDENT IN AN ORGANIZED HEALTH CARE EDUCATION/TRAINING PROGRAM

## 2024-03-12 PROCEDURE — 85610 PROTHROMBIN TIME: CPT | Performed by: STUDENT IN AN ORGANIZED HEALTH CARE EDUCATION/TRAINING PROGRAM

## 2024-03-12 PROCEDURE — 85730 THROMBOPLASTIN TIME PARTIAL: CPT | Performed by: STUDENT IN AN ORGANIZED HEALTH CARE EDUCATION/TRAINING PROGRAM

## 2024-03-12 PROCEDURE — 82533 TOTAL CORTISOL: CPT | Performed by: STUDENT IN AN ORGANIZED HEALTH CARE EDUCATION/TRAINING PROGRAM

## 2024-03-12 PROCEDURE — 83001 ASSAY OF GONADOTROPIN (FSH): CPT | Performed by: STUDENT IN AN ORGANIZED HEALTH CARE EDUCATION/TRAINING PROGRAM

## 2024-03-12 PROCEDURE — 82550 ASSAY OF CK (CPK): CPT | Performed by: STUDENT IN AN ORGANIZED HEALTH CARE EDUCATION/TRAINING PROGRAM

## 2024-03-12 PROCEDURE — 84439 ASSAY OF FREE THYROXINE: CPT | Performed by: STUDENT IN AN ORGANIZED HEALTH CARE EDUCATION/TRAINING PROGRAM

## 2024-03-12 PROCEDURE — 82670 ASSAY OF TOTAL ESTRADIOL: CPT | Performed by: STUDENT IN AN ORGANIZED HEALTH CARE EDUCATION/TRAINING PROGRAM

## 2024-03-12 PROCEDURE — 82248 BILIRUBIN DIRECT: CPT | Performed by: STUDENT IN AN ORGANIZED HEALTH CARE EDUCATION/TRAINING PROGRAM

## 2024-03-12 PROCEDURE — 2500000005 HC RX 250 GENERAL PHARMACY W/O HCPCS: Performed by: STUDENT IN AN ORGANIZED HEALTH CARE EDUCATION/TRAINING PROGRAM

## 2024-03-12 PROCEDURE — 82024 ASSAY OF ACTH: CPT | Performed by: STUDENT IN AN ORGANIZED HEALTH CARE EDUCATION/TRAINING PROGRAM

## 2024-03-12 PROCEDURE — 96413 CHEMO IV INFUSION 1 HR: CPT

## 2024-03-12 PROCEDURE — 86140 C-REACTIVE PROTEIN: CPT | Performed by: STUDENT IN AN ORGANIZED HEALTH CARE EDUCATION/TRAINING PROGRAM

## 2024-03-12 PROCEDURE — 1036F TOBACCO NON-USER: CPT | Performed by: STUDENT IN AN ORGANIZED HEALTH CARE EDUCATION/TRAINING PROGRAM

## 2024-03-12 RX ORDER — HEPARIN SODIUM,PORCINE/PF 10 UNIT/ML
50 SYRINGE (ML) INTRAVENOUS AS NEEDED
Status: DISCONTINUED | OUTPATIENT
Start: 2024-03-12 | End: 2024-03-13 | Stop reason: HOSPADM

## 2024-03-12 RX ORDER — HEPARIN 100 UNIT/ML
500 SYRINGE INTRAVENOUS AS NEEDED
Status: CANCELLED | OUTPATIENT
Start: 2024-03-12

## 2024-03-12 RX ORDER — HEPARIN 100 UNIT/ML
500 SYRINGE INTRAVENOUS AS NEEDED
Status: DISCONTINUED | OUTPATIENT
Start: 2024-03-12 | End: 2024-03-13 | Stop reason: HOSPADM

## 2024-03-12 RX ORDER — HEPARIN SODIUM,PORCINE/PF 10 UNIT/ML
50 SYRINGE (ML) INTRAVENOUS AS NEEDED
Status: CANCELLED | OUTPATIENT
Start: 2024-03-12

## 2024-03-12 RX ADMIN — Medication 3 MG: at 10:30

## 2024-03-12 NOTE — RESEARCH NOTES
[facility]><[id number]><C5+D1><PARTA>    DCRU NURSING VISIT NOTE  Study Name: ALEXANDRU Foote8  IRB#: [id number]  DCRU#: [id number]  Protocol Version Dated: A9 3.22.23  PI: Roshan Kumar MD.    Time point: Cycle 5 and beyond - Day 1  CYCLE 15     Encounter Date: 03/12/2024  Encounter Time:  8:30 AM EDT  Encounter Department: [facility] HEMATOLOGY AND ONCOLOGY     #1     Phone Pager   Carmen Rios [phone]    #2 Phone Pager        Other Phone Pager          Study Regimen and Dosing   Part A Dose Exploration/Expansion- Small Cell Lung Cancer Relapsed or Refractory patients who progressed or recurred following at least 1 platinum-based regimen.   Cycle = 28 days    administered IV Day 1, Day 8, and Day 15 of Cycle 1. Cycle 2 and beyond     administered on Day 1 and Day 15.        Dietary Guidelines   Regular diet:     Admission and Prior to Starting Study Activities   Notify  when patient arrives to unit.  Complete DCRU/Mojica intake form in EMR.  Insert one peripheral IV line for sample collection procedures (flush line with normal saline following each blood draw). Access mediport (if available) otherwise insert second peripheral line in opposite arm for Investigative drug administration (if peripheral line, flush line with normal saline before & after infusion)     Criteria to Treat   DCRU RN reviewed and meets eligibility to proceed with treatment plan   Time team notified: 0920 am  DCRU RN notifies study team to review eligibility and approval before dosing procedures  Time team approves: 0921 am     Maddison Branch is a 68 y.o. female and is here for a Research clinical visit.    Visit Provider: [id number] [facility] ROOM 13 [facility]     Allergies:   Allergies   Allergen Reactions    Cisplatin Other     CHEMO INDUCED (Moderate); Dyspepsia (Moderate); Headaches (Moderate); Hypotension (Moderate); Numbness (Moderate); Resp Distress  (Moderate)    Codeine Nausea Only and Other     nausea    Sulfa (Sulfonamide Antibiotics) Rash       Objective     Vital Signs:    Vitals:    03/12/24 0728 03/12/24 1022 03/12/24 1135   BP: 110/72 105/70 111/72   Pulse: 59 70 66   Resp: 18 18 18   Temp: 36.8 °C (98.2 °F) 36.9 °C (98.4 °F) 37 °C (98.6 °F)   TempSrc: Temporal Temporal Temporal   SpO2: 98% 97% 97%   Weight: 49.1 kg (108 lb 3.9 oz)         Physical Exam     ASSESSMENT and PLAN:  Problem List Items Addressed This Visit          Hematology and Neoplasia    Small cell lung cancer (CMS/HCC)    Relevant Orders    Clinic Appointment Request (Completed)    Infusion Appointment Request (Completed)    Research Communication (Completed)    Treatment Conditions (Completed)    Provider Communication - [id number] (Completed)    Research Triplicate ECG (Completed)    Research Triplicate ECG (Completed)    CBC and Auto Differential (Completed)    Comprehensive metabolic panel (Completed)    Acth (Completed)    Amylase (Completed)    APTT (Completed)    Bilirubin, Direct (Completed)    CK (Completed)    Cortisol (Completed)    C-Reactive Protein (Completed)    Estradiol (Completed)    Ferritin (Completed)    FSH & LH (Completed)    Lipase (Completed)    Magnesium (Completed)    Phosphorus (Completed)    Protime-INR (Completed)    TSH (Completed)    T4, free (Completed)    Urinalysis with Reflex Microscopic (Completed)    Nursing Communication - Vascular Access (Completed)    Microscopic Only, Urine (Completed)    Research collection: 2.5mL Red SST - Research Collect (Completed)    Research collection: 2.5mL Red SST - Research Collect (Completed)     Other Visit Diagnoses       Malignant neoplasm of lung, unspecified laterality, unspecified part of lung (CMS/HCC)        Relevant Orders    Vitamin D 25-Hydroxy,Total (for eval of Vitamin D levels) (Completed)    Research collection: 4mL Lavender EDTA - Research Collect (Completed)    Research collection: 8mL Red/Green CPT  Sodium Heparin - Research Collect (Completed)    Clinical trial participant        Relevant Orders    Vitamin D 25-Hydroxy,Total (for eval of Vitamin D levels) (Completed)    Unspecified severe protein-calorie malnutrition (CMS/HCC)        Relevant Orders    Vitamin D 25-Hydroxy,Total (for eval of Vitamin D levels) (Completed)    Clinical trial exam        Relevant Orders    Research collection: 4mL Lavender EDTA - Research Collect (Completed)    Research collection: 8mL Red/Green CPT Sodium Heparin - Research Collect (Completed)             Medications as of the completion of today's visit:  Current Outpatient Medications   Medication Sig Dispense Refill    cetirizine (ZyrTEC) 10 mg tablet Take 1 tablet (10 mg) by mouth once daily. (Patient not taking: Reported on 3/12/2024) 30 tablet 11    cetirizine-pseudoephedrine (ZyrTEC-D) 5-120 mg 12 hr tablet Take 1 tablet by mouth 2 times a day. (Patient not taking: Reported on 3/12/2024) 60 tablet 11    cholecalciferol (Vitamin D-3) 25 MCG (1000 UT) tablet Take by mouth.      dexAMETHasone (Decadron) 2 mg tablet Take 0.5 tablets (1 mg) by mouth once daily with breakfast. 30 tablet 2    dextromethorphan (Delsym) 30 mg/5 mL liquid Take 5 mL (30 mg) by mouth once daily as needed.      dronabinol (Marinol) 2.5 mg capsule once daily as needed. One hour before dinner      estrogens, conjugated, (Premarin) 0.3 mg tablet Take 0.5 tablets (0.15 mg) by mouth once daily.      mv-min/FA/vit K/lutein/zeaxant (PRESERVISION AREDS 2 PLUS MV ORAL) Take 1 tablet by mouth once daily.      omeprazole (PriLOSEC) 40 mg DR capsule Take 1 capsule (40 mg) by mouth once daily. Do not crush or chew. 90 each 3    ondansetron (Zofran) 8 mg tablet Take 0.5 tablets (4 mg) by mouth every 8 hours if needed.      polyethylene glycol (Glycolax, Miralax) 17 gram packet Take 17 g by mouth 2 times a day. (Patient not taking: Reported on 3/12/2024) 60 packet 2    prochlorperazine (Compazine) 10 mg tablet Take  0.5 tablets (5 mg) by mouth every 6 hours if needed.      psyllium husk, aspartame, (Metamucil Sugar-Free, aspart,) 3.4 gram/5.8 gram powder Take 1 Dose by mouth once daily. (Patient not taking: Reported on 3/12/2024) 425 g 11     No current facility-administered medications for this encounter.       Administrations This Visit       Study [id number] Tarlatamab (AMG-757) 3 mg in sodium chloride 0.9% 250 mL IV       Admin Date  03/12/2024 Action  New Bag Dose  3 mg Route  intravenous Administered By  Tom Don, RN                    Orders placed during today's visit:  Orders Placed This Encounter   Procedures    CBC and Auto Differential     Standing Status:   Standing     Number of Occurrences:   1     Order Specific Question:   Release result to MyChart     Answer:   Immediate [1]    Comprehensive metabolic panel     Standing Status:   Standing     Number of Occurrences:   1     Order Specific Question:   Release result to MyChart     Answer:   Immediate [1]    Acth     Standing Status:   Standing     Number of Occurrences:   1     Order Specific Question:   Release result to MyChart     Answer:   Immediate [1]    Amylase     Standing Status:   Standing     Number of Occurrences:   1     Order Specific Question:   Release result to MyChart     Answer:   Immediate [1]    APTT     Standing Status:   Standing     Number of Occurrences:   1     Order Specific Question:   Release result to MyChart     Answer:   Immediate [1]    Bilirubin, Direct     Standing Status:   Standing     Number of Occurrences:   1     Order Specific Question:   Release result to MyChart     Answer:   Immediate [1]    CK     Standing Status:   Standing     Number of Occurrences:   1     Order Specific Question:   Release result to MyChart     Answer:   Immediate [1]    Cortisol     Standing Status:   Standing     Number of Occurrences:   1     Order Specific Question:   Release result to MyChart     Answer:   Immediate [1]    C-Reactive  Protein     Standing Status:   Standing     Number of Occurrences:   1     Order Specific Question:   Release result to MyChart     Answer:   Immediate [1]    Estradiol     Standing Status:   Standing     Number of Occurrences:   1     Order Specific Question:   Release result to MyChart     Answer:   Immediate [1]    Ferritin     Standing Status:   Standing     Number of Occurrences:   1     Order Specific Question:   Release result to MyChart     Answer:   Immediate [1]    FSH & LH     Standing Status:   Standing     Number of Occurrences:   1     Order Specific Question:   Release result to MyChart     Answer:   Immediate [1]    Lipase     Standing Status:   Standing     Number of Occurrences:   1     Order Specific Question:   Release result to MyChart     Answer:   Immediate [1]    Magnesium     Standing Status:   Standing     Number of Occurrences:   1     Order Specific Question:   Release result to MyChart     Answer:   Immediate [1]    Phosphorus     Standing Status:   Standing     Number of Occurrences:   1     Order Specific Question:   Release result to MyChart     Answer:   Immediate [1]    Protime-INR     Standing Status:   Standing     Number of Occurrences:   1     Order Specific Question:   Release result to MyChart     Answer:   Immediate [1]    TSH     Standing Status:   Standing     Number of Occurrences:   1     Order Specific Question:   Release result to MyChart     Answer:   Immediate [1]    T4, free     Standing Status:   Standing     Number of Occurrences:   1     Order Specific Question:   Release result to MyChart     Answer:   Immediate [1]    Urinalysis with Reflex Microscopic     Standing Status:   Standing     Number of Occurrences:   1     Order Specific Question:   Release result to MyChart     Answer:   Immediate [1]    Microscopic Only, Urine     Standing Status:   Standing     Number of Occurrences:   1     Order Specific Question:   Release result to MyChart     Answer:    Immediate [1]    Vitamin D 25-Hydroxy,Total (for eval of Vitamin D levels)     Standing Status:   Standing     Number of Occurrences:   1     Order Specific Question:   Release result to [facility]     Answer:   Immediate [1]    Research collection: 2.5mL Red SST - Research Collect     Standing Status:   Standing     Number of Occurrences:   1     Order Specific Question:   Test     Answer:   Anti-Tarlatamab Antibody     Order Specific Question:   Timepoint     Answer:   Pre-Dose     Order Specific Question:   Pre-Dose     Answer:   Other     Comments:   Within 15 minutes     Order Specific Question:   Temperature     Answer:   Ambient     Order Specific Question:   Invert     Answer:   5x     Order Specific Question:   Optional?     Answer:   No    Research collection: 2.5mL Red SST - Research Collect     Standing Status:   Standing     Number of Occurrences:   1     Order Specific Question:   Test     Answer:   Serum Markers     Order Specific Question:   Timepoint     Answer:   Pre-Dose     Order Specific Question:   Pre-Dose     Answer:   Other     Comments:   Within 15 minutes     Order Specific Question:   Temperature     Answer:   Ambient     Order Specific Question:   Invert     Answer:   5x     Order Specific Question:   Optional?     Answer:   No    Research collection: 4mL Lavender EDTA - Research Collect     Standing Status:   Standing     Number of Occurrences:   1     Order Specific Question:   Test     Answer:   Plasma PK     Order Specific Question:   Temperature     Answer:   Ambient     Order Specific Question:   Invert     Answer:   8-10x    Research collection: 8mL Red/Green CPT Sodium Heparin - Research Collect     Standing Status:   Standing     Number of Occurrences:   1     Order Specific Question:   Test     Answer:    RO     Order Specific Question:   Temperature     Answer:   Ambient     Order Specific Question:   Invert     Answer:   8-10x    Research Communication     Cohort: 8  Dose  Level:  10 mg/IV     Standing Status:   Standing     Number of Occurrences:   1    Treatment Conditions     Hold treatment and notify provider if:   Adverse Event GREATER THAN OR EQUAL TO Grade 3     Standing Status:   Standing     Number of Occurrences:   1    Provider Communication - [id number]      Dose Level 4              Tarlatamab 0.1 mg   Dose Level 5              Tarlatamab 0.3 mg   Dose Level 6              Tarlatamab 1 mg   Dose Level 7              Tarlatamab 3 mg   Dose Level 8              Tarlatamab 10 mg   Dose Level 9              Tarlatamab 30 mg   Dose Level 10              Tarlatamab 100 mg     Standing Status:   Standing     Number of Occurrences:   1    Research Triplicate ECG     Refer to study [employer] for instructions and documentation of the research triplicate ECG.     Standing Status:   Standing     Number of Occurrences:   1    Research Triplicate ECG     Refer to study [employer] for instructions and documentation of the research triplicate ECG.     Standing Status:   Standing     Number of Occurrences:   1    Nursing Communication - Vascular Access     Refer to the vascular access device resource page and the following policies for additional information on line insertion, maintenance and removal.    CP-148 - Central Vascular Access Device Insertion, Maintenance, and Removal, Adult  NP-28 - Midline Cathter Maintenance & Removal, Adult  NP-34 - High-flow Catheters, (e.g. Hemodialysis, Apheresis, and Continuous Renal Replacement Therapy [CRRT]), Care and Utilization, Adult  NP-39 - Peripheral Intravenous Catheter (PIV) Insertion, Maintenance, Blood Collection & Removal - Adult     Standing Status:   Standing     Number of Occurrences:   1        [id number] -  in Subjects With Small Cell Lung Cancer    Patient has no adverse events documented in the Research Adverse Events activity.       Study Specific Instructions and Documentation   WEIGHT  LABS  VITALS  CORREATIVES  STUDY  DRUG  VITALS  DISCHARGE     WEIGHT    Obtain weight in kilograms - with shoes off & heavy items removed.   49.1 kg     PRE-DOSE Safety Labs   Refer to [place] Treatment Plan for orders   Drawn @ 0745 - 22g peripheral IV / R forearm    Pre-dose Vital Signs   Temp, HR, Resp, BP, Pulse Oximetry: Seated or Semi-Fowlers x 5 minutes before obtaining  Position and temperature location: should be the same that is used throughout the study-record position   @ 1022 - refer to vitals table     Pre-dose Research Correlatives  Deliver to DCRU/[facility] lab for processing   Access Type: 22g peripheral IV Location: R forearm    Refer to [place] Treatment Plan for orders   Time point Specimen Test Volume Tube Handling Draw Time   Pre-dose (within 15 mins of  dosing)    draw in order [facility]      4 mL CPT Ambient, Invert 8-10X 1026    Anti- Antibody 2.5 mL SST Ambient, Invert 5X 1026    TARLATAMAB PK  (through cycle 12) 2.5 mL SST Ambient, Invert 5X N/A    Serum Markers 2.5 mL SST Ambient, Invert 5X 1026    Immune Cells 4 mL   K3 EDTA Ambient, Invert 8-10X 1026          Infusion-Related Reactions   [facility] Hypersensitivity Orders  Note: CRS table      Research Drug Administration Tarlatamab ()   Refer to [place] Treatment Plan for orders   Administer dose over 60 minutes (+/- 10 mins) followed by a 3 - 5 minute Normal saline flush. (This will be the end time after the flush). Document start time & end of study medication; document start time and end time of the flush.  Participant to remain on Unit for 2 hour post dose observation.    Start Tarlatamab: 1030 via 22g peripheral IV / R forearm  End Tarlatamab: 1130  Start NS Flush: 1130  End NS Flush / EOI: 1135     Day 1 Post-Dose Vital Signs   Temp, HR, Resp, BP, Pulse Oximetry: Seated or Semi-Fowlers x 5 minutes before obtaining  Position and temperature location: should be the same that is used throughout the study  End of Infusion   @ 1135 - refer to vitals table     Discharge  Instructions   Patient may be discharged to home after 2 hour observation.  Remind patient to return: Day 15 for treatment & labs  Discharge time 1335     Tom Don RN  03/18/24      Grading and Management of Cytokine Release Syndrome   CRS Grade Description of Severity Minimum Expected Intervention Instructions for Dose Modifications of    1 Symptoms are not life threatening and require symptomatic treatment only,  eg, fever, nausea, fatigue, headache, myalgia's, malaise Administer symptomatic treatment (contact provider for medications/doses). Monitor for CRS symptoms including vital signs and pulse oximetry at least Q2 hours for12 hours or until resolution, Whichever is earlier. N/A     (continued)  Grading and Management of Cytokine Release Syndrome   CRS Grade Description of Severity Minimum Expected Intervention Instructions for Dose Modifications of    2 Symptoms require and respond to moderate intervention  Oxygen requirement <40%,                      OR  Hypotension responsive to fluids or low dose of one vasopressor, OR                                                                       Grade 2 organ toxicity or grade 3 transaminitis per CTCAE criteria Administer:  Symptomatic treatment   (contact provider for medications/doses)  Supplemental oxygen when oxygen saturation is <90% on room air  Intravenous fluids or low dose vasopressor for hypotension is recommended when systolic blood pressure is <85 mmHg. (contact provider for medications/doses) Persistent tachycardia (eg >120 bpm) may also indicate the need for intervention for hypotension.   Monitor for CRS symptoms including vital signs and pulse oximetry at least Q2 hours for12 hours or until resolution to CRS grade <= 1, whichever is earlier. For subjects with extensive co-morbidities or poor performance status, manage per grade 3 CRS guidance below If CRS occurs during  treatment, immediately interrupt the infusion and  delay the next  dose until the event resolves to CRS grade <= 1 for no less than 72 hours. Permanently discontinue  if there is no improvement to CRS <= grade 1 within 7 days     3 Symptoms require and respond to aggressive intervention  Oxygen requirement >=40%, OR  Hypotension requiring high dose or multiple vasopressors, OR  Grade 3 organ toxicity or     Grade 4 transaminitis per  CTCAE criteria Admit to intensive care unit for close clinical and vital sign monitoring per institutional guidelines.   Refer to provider/protocol for medications and doses.     If CRS occurs during  treatment, immediately interrupt   and delay the next dose until event resolves to CRS grade <= 1 for no less than 72 hours.    Permanently discontinue  if there is no improvement to CRS                 <= grade 2 within 5 days or CRS <= grade 1 within 7 days.  Permanently discontinue   if CRS grade  3 occurs at the initial run in dose (i.e., at MTD1)  (Applicable only after MTD1 has been defined).     (continued)  Grading and Management of Cytokine Release Syndrome   CRS Grade Description of Severity Minimum Expected Intervention Instructions for Dose Modifications of    4 Life-threatening symptoms  Requirement for ventilator support OR  Grade 4 organ toxicity (excluding transaminitis) per CTCAE criteria Admit to intensive care unit for close clinical and vital sign monitoring per institutional guidelines.   Refer to provider/protocol for medications and doses.   If CRS occurs during  treatment, Immediately stop the infusion.  Permanently discontinue   therapy.

## 2024-03-12 NOTE — PROGRESS NOTES
NUTRITION Assessment NOTE    Nutrition Assessment     Reason for Visit:  Gladys Branch is a 68 y.o. female with small cell lung cancer in RLL and supraclavicular who presents for nutrition assessment.    PRIOR THERAPY  Concurrent chemoradiation with Cisplatin/Etoposide every 3 weeks; C1D1 2/15/22, reaction to cisplatin C2D1 and did not receive D2 or D3 etoposide  Carbo/etoposide 4/5/2022 1 cycle c/b rash  PCI 5/21-6/13/22     CURRENT THERAPY  Phase 1 study [id number]  with study drug Taralatamab 1/24/23 - present.    Patient experiencing decreased appetite and further taste changes. This has limited intake. Weight stable, however, patient reports 20# weight loss from UBW of 128#. Last weighed this in 2022 according to charts.    Patient currently drinking Boost Glucose Control, but recommended she switch to Boost Plus (350 kcal, 14 g pro) to improve calorie intake.    Reviewed education for taste changes and increasing protein intake. Patient understanding.    Will continue to monitor patient.     Lab Results   Component Value Date/Time    GLUCOSE 102 (H) 03/12/2024 0745     03/12/2024 0745    K 3.9 03/12/2024 0745     03/12/2024 0745    CO2 25 03/12/2024 0745    ANIONGAP 13 03/12/2024 0745    BUN 20 03/12/2024 0745    CREATININE 0.72 03/12/2024 0745    EGFR >90 03/12/2024 0745    CALCIUM 9.2 03/12/2024 0745    ALBUMIN 3.8 03/12/2024 0745    ALKPHOS 50 03/12/2024 0745    PROT 6.0 (L) 03/12/2024 0745    AST 17 03/12/2024 0745    BILITOT 0.5 03/12/2024 0745    ALT 15 03/12/2024 0745     Lab Results   Component Value Date/Time    VITD25 39 03/12/2024 0745       Anthropometrics:  Anthropometrics  IBW/kg (Dietitian Calculated): 52.5 kg  Percent of IBW: 93 %  Weight Change  Weight History / % Weight Change: Reported UBW of 55-58kg. Overall 20# weight loss. Unable to find reported UBW within the past year. Weight currently stable.        Wt Readings from Last 10 Encounters:   03/12/24 49.1 kg (108 lb 3.9 oz)  "  02/27/24 49.8 kg (109 lb 12.6 oz)   02/13/24 50 kg (110 lb 3.7 oz)   01/30/24 50.2 kg (110 lb 10.7 oz)   01/16/24 53.1 kg (117 lb 1 oz)   01/02/24 50.6 kg (111 lb 8.8 oz)   12/20/23 50.3 kg (111 lb)   12/19/23 50.1 kg (110 lb 7.2 oz)   12/05/23 49.9 kg (110 lb 0.2 oz)   11/21/23 49.2 kg (108 lb 7.5 oz)        Food And Nutrient Intake:  Food and Nutrient History  Food and Nutrient History: Prior to loss of appetite, patient was eating 2-3 times per day. Some smell and taste changes (dulled).  Energy Intake: Poor < 50 %  Fluid Intake: Fluids poor. 2 cups of coffee (mix of regular and decaf). 1 16 oz bottle of water per day. 4 oz reisling \"once in a while.\"  Food Allergy: NKFA.  GI Symptoms: anorexia  Oral Problems: dysgeusia     Food Supplement Intake  Oral Nutrition Supplements: Boost Glucose Control    Medication and Complementary/Alternative Medicine Use  Prescription Medication Use: Decadron; Prilosec; Metamucil; Miralax      Nutrition Focused Physical Exam Findings:    Subcutaneous Fat Loss  Orbital Fat Pads: Mild-Moderate (slight dark circles and slight hollowing)  Buccal Fat Pads: Mild-Moderate (flat cheeks, minimal bounce)  Triceps: Defer  Ribs: Defer    Muscle Wasting  Temporalis: Mild-Moderate (slight depression)  Pectoralis (Clavicular Region): Mild-Moderate (some protrusion of clavicle)  Deltoid/Trapezius: Defer  Interosseous: Defer  Trapezius/Infraspinatus/Supraspinatus (Scapular Region): Defer  Quadriceps: Defer  Gastrocnemius: Defer      Energy Needs  Estimated Energy Needs  Total Energy Estimated Needs (kCal): 1700 kCal  Method for Estimating Needs: 34 kcal/kg ABW  Estimated Fluid Needs  Total Fluid Estimated Needs (mL): 1700 mL  Method for Estimating Needs: 1 ml/kcal or per team  Estimated Protein Needs  Total Protein Estimated Needs (g): 68 g  Method for Estimating Needs: 1.4 g/kg ABW        Nutrition Diagnosis   Malnutrition Diagnosis  Patient has Malnutrition Diagnosis: No    Nutrition " Diagnosis  Patient has Nutrition Diagnosis: Yes  Diagnosis Status (1): New  Nutrition Diagnosis 1: Inadequate energy intake  Related to (1): dysgeusia and poor appetite  As Evidenced by (1): 24 hr recall    Nutrition Interventions/Recommendations   Nutrition Prescription       Food and Nutrition Delivery  Food and Nutrition Delivery  Meals & Snacks: Modify schedule of foods/fluids, Modify Composition of Meals/Snacks, Protein-modified diet  Goals: 5-6 small frequent meals; find more protein sources (ex. protein powder in oatmeal OR hummus OR greek yogurt dips with veggies or fruit).  Medical Food Supplement: Boost Plus  Goals: 2x/day    Nutrition Education  Nutrition Education  Nutrition Education Content: Content related nutrition education  Goals: taste changes    Coordination of Care       There are no Patient Instructions on file for this visit.    Nutrition Monitoring and Evaluation   Food/Nutrient Related History Monitoring  Monitoring and Evaluation Plan: Energy intake, Fluid intake, Amount of food, Meal/snack pattern  Energy Intake: Estimated energy intake  Criteria: 24 hr recall  Fluid Intake: Estimated fluid intake  Criteria: 24 hr recall  Amount of Food: Estimated amout of food, Medical food intake  Criteria: 24 hr recall  Meal/Snack Pattern: Estimated meal and snack pattern  Criteria: 24 hr recall  Body Composition/Growth/Weight History  Monitoring and Evaluation Plan: Weight  Weight: Measured weight          Time Spent  Prep time on day of patient encounter: 10 minutes  Time spent directly with patient, family or caregiver: 20 minutes  Additional Time Spent on Patient Care Activities: 5 minutes  Documentation Time: 20 minutes  Other Time Spent: 0 minutes  Total: 55 minutes        Readiness to Change : Good  Level of Understanding : Good  Anticipated Compliant : Good

## 2024-03-12 NOTE — PROGRESS NOTES
Patient ID: Gladys Branch is a 68 y.o. female.    DIAGNOSIS    Small Cell Lung Cancer    By immunostaining, the tumor cells are positive for CAM 5.2, INSM1, and TTF-1, and negative for p40 and LCA. The proliferation index by Ki-67 immunostaining is approximately 90%.  Synaptophysin, Chromogranin and INSM-1 are positive.  AE1/AE3 is negative. NEOGENOMICS, RB protein expression is lost in the tumor cells    STAGING    [id number], limited stage  Recurrence    CURRENT SITES OF DISEASE    RLL, supraclavicular    MOLECULAR GENOMICS      PRIOR THERAPY    Concurrent chemoradiation with Cisplatin/Etoposide every 3 weeks; C1D1 2/15/22, reaction to cisplatin C2D1 and did not receive D2 or D3 etoposide  Carbo/etoposide 4/5/2022 1 cycle c/b rash  PCI 5/21-6/13/22    CURRENT THERAPY    Phase 1 study [id number]  with study drug Taralatamab 1/24/23 - present.    CURRENT ONCOLOGICAL PROBLEMS      HISTORY OF PRESENT ILLNESS    Mrs. Branch is a 67 yo with PMH GERD and COPD who originally was round to have a RLL nodule measuring 11 mm in 4/28/2021 found as part of CT calcium score scan. An ensuing CT chest w/o contrast was done on 5/19/21 which revealed subsolid pulmonary nodules measuring 14 mm in the RLL. She had CT chest w/contrast on 11/29/21 which confirmed stable 14 mm GGO of the RLL and decreased RLL subpleural nodule. She met thoracic surgeon Dr. Farfan, who ordered PET/CT scan done on 12/8/21 which did not reveal any FDG-avid nodules within the lung parenchyma but R hilar nodes with max SUV 8.0. He referred her for bronch, which was done on 1/21/22 by Dr. Mcdaniels. 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 Brain MRI was negative.    She started concurrent chemoradiation with BID radiation with cis/etop 2/15/22, and unfortunately had a reaction to cisplatin on C2D1 with chest pain and hypotension. She was hospitalized with sepsis felt to be due to pneumonia. She trialed carboplatin/etoposide on 4/5/22 with a  "resulting rash and opted to forego further chemotherapy as she had completed radiation. Restaging scan 11/3/22 unfortunately with progression with new R hilar lymph node, paratracheal nodes, and increase in size of R supraclavicular mass. She enrolled in [id number] and started C1D1 1/24/23. C1 complicated by anorexia and dysgeusia, and delayed C2 by a week. She has further struggled with fatigue and confusion, which may be related to mirtazapine. Dose reduced for C2 and mirtazapine stopped with improvement in symptoms.     PAST MEDICAL HISTORY    GERD  COPD    SOCIAL HISTORY    Retired - lighting . Lives at home with  and a kitten.  Tobacco: quit smoking 1999. 1 ppd x 25 yrs.  EtOH: wine 1 glass/day  Illicits: none    CURRENT MEDS    Please see med list    ALLERGIES    Codeine, Sulfa drugs, Cisplatin    FAMILY HISTORY    Paternal aunt - breast cancer dx 80s    Subjective    Today 3.12.2024. Patient in clinic with her  for C15D1 for [id number].    She is feeling well overlal. Her long term memory is intermittently off, but not all the time. She is taking a new pill for help with her memory - asking if it's ok to take. She thinks it helps, but her  doesn't. But she does remember things most of the time. Her energy level continues to be good. Appetite waxes and wanes.       Objective          2/13/2024    11:00 AM 2/13/2024    12:20 PM 2/27/2024     7:45 AM 2/27/2024    10:07 AM 2/27/2024    11:13 AM 3/12/2024     7:28 AM 3/12/2024     8:31 AM   Vitals   Systolic 109 105 106 103 107 110    Diastolic 76 74 74 69 78 72    Heart Rate 80 70 82 82 73 59    Temp 36.6 °C (97.9 °F) 36.4 °C (97.5 °F) 36.2 °C (97.2 °F) 36.6 °C (97.9 °F) 36.4 °C (97.5 °F) 36.8 °C (98.2 °F)    Resp 17 17 18 16 16 18    Height (in)       1.527 m (5' 0.12\")   Weight (lb)   109.79   108.25    BMI   20.74 kg/m2   20.45 kg/m2 21.06 kg/m2   BSA (m2)   1.46 m2   1.45 m2 1.44 m2   Visit Report Report Report    Report Report "       Daily Weight  03/12/24 : 49.1 kg (108 lb 3.9 oz)  02/27/24 : 49.8 kg (109 lb 12.6 oz)  02/13/24 : 50 kg (110 lb 3.7 oz)  01/30/24 : 50.2 kg (110 lb 10.7 oz)  01/16/24 : 53.1 kg (117 lb 1 oz)           Physical Exam  Constitutional:       General: She is awake.      Appearance: Normal appearance. She is normal weight.   HENT:      Head: Normocephalic.      Mouth/Throat:      Mouth: Mucous membranes are moist.   Eyes:      Extraocular Movements: Extraocular movements intact.      Conjunctiva/sclera: Conjunctivae normal.      Pupils: Pupils are equal, round, and reactive to light.   Cardiovascular:      Rate and Rhythm: Normal rate and regular rhythm.      Heart sounds: Normal heart sounds, S1 normal and S2 normal.   Pulmonary:      Effort: Pulmonary effort is normal.      Breath sounds: Normal breath sounds.   Abdominal:      General: Abdomen is flat. Bowel sounds are normal.      Palpations: Abdomen is soft.   Musculoskeletal:      Cervical back: Normal range of motion and neck supple.   Skin:     Comments: No skin rash observed   Neurological:      Mental Status: She is alert.      Cranial Nerves: Cranial nerves 2-12 are intact.      Sensory: Sensation is intact.      Motor: Motor function is intact.      Coordination: Coordination is intact.   Psychiatric:         Attention and Perception: Attention normal.         Mood and Affect: Mood normal.         Speech: Speech normal.         Behavior: Behavior normal. Behavior is cooperative.         Cognition and Memory: Cognition normal.     Labs    Hospital Outpatient Visit on 03/12/2024   Component Date Value Ref Range Status    WBC 03/12/2024 6.2  4.4 - 11.3 x10*3/uL Final    nRBC 03/12/2024 0.0  0.0 - 0.0 /100 WBCs Final    RBC 03/12/2024 3.71 (L)  4.00 - 5.20 x10*6/uL Final    Hemoglobin 03/12/2024 11.6 (L)  12.0 - 16.0 g/dL Final    Hematocrit 03/12/2024 34.9 (L)  36.0 - 46.0 % Final    MCV 03/12/2024 94  80 - 100 fL Final    MCH 03/12/2024 31.3  26.0 - 34.0  pg Final    MCHC 03/12/2024 33.2  32.0 - 36.0 g/dL Final    RDW 03/12/2024 13.2  11.5 - 14.5 % Final    Platelets 03/12/2024 237  150 - 450 x10*3/uL Final    Neutrophils % 03/12/2024 61.7  40.0 - 80.0 % Final    Immature Granulocytes %, Automated 03/12/2024 0.3  0.0 - 0.9 % Final    Lymphocytes % 03/12/2024 25.4  13.0 - 44.0 % Final    Monocytes % 03/12/2024 10.7  2.0 - 10.0 % Final    Eosinophils % 03/12/2024 1.6  0.0 - 6.0 % Final    Basophils % 03/12/2024 0.3  0.0 - 2.0 % Final    Neutrophils Absolute 03/12/2024 3.81  1.20 - 7.70 x10*3/uL Final    Immature Granulocytes Absolute, Au* 03/12/2024 0.02  0.00 - 0.70 x10*3/uL Final    Lymphocytes Absolute 03/12/2024 1.57  1.20 - 4.80 x10*3/uL Final    Monocytes Absolute 03/12/2024 0.66  0.10 - 1.00 x10*3/uL Final    Eosinophils Absolute 03/12/2024 0.10  0.00 - 0.70 x10*3/uL Final    Basophils Absolute 03/12/2024 0.02  0.00 - 0.10 x10*3/uL Final    Glucose 03/12/2024 102 (H)  74 - 99 mg/dL Final    Sodium 03/12/2024 139  136 - 145 mmol/L Final    Potassium 03/12/2024 3.9  3.5 - 5.3 mmol/L Final    Chloride 03/12/2024 105  98 - 107 mmol/L Final    Bicarbonate 03/12/2024 25  21 - 32 mmol/L Final    Anion Gap 03/12/2024 13  10 - 20 mmol/L Final    Urea Nitrogen 03/12/2024 20  6 - 23 mg/dL Final    Creatinine 03/12/2024 0.72  0.50 - 1.05 mg/dL Final    eGFR 03/12/2024 >90  >60 mL/min/1.73m*2 Final    Calcium 03/12/2024 9.2  8.6 - 10.6 mg/dL Final    Albumin 03/12/2024 3.8  3.4 - 5.0 g/dL Final    Alkaline Phosphatase 03/12/2024 50  33 - 136 U/L Final    Total Protein 03/12/2024 6.0 (L)  6.4 - 8.2 g/dL Final    AST 03/12/2024 17  9 - 39 U/L Final    Bilirubin, Total 03/12/2024 0.5  0.0 - 1.2 mg/dL Final    ALT 03/12/2024 15  7 - 45 U/L Final    Amylase 03/12/2024 32  29 - 103 U/L Final    aPTT 03/12/2024 29  27 - 38 seconds Final    Bilirubin, Direct 03/12/2024 0.1  0.0 - 0.3 mg/dL Final    Creatine Kinase 03/12/2024 40  0 - 215 U/L Final    C-Reactive Protein 03/12/2024  0.28  <1.00 mg/dL Final    Ferritin 03/12/2024 62  8 - 150 ng/mL Final    Lipase 03/12/2024 30  9 - 82 U/L Final    Magnesium 03/12/2024 2.02  1.60 - 2.40 mg/dL Final    Phosphorus 03/12/2024 4.0  2.5 - 4.9 mg/dL Final    Protime 03/12/2024 10.6  9.8 - 12.8 seconds Final    INR 03/12/2024 0.9  0.9 - 1.1 Final    Color, Urine 03/12/2024 Dark-Yellow  Light-Yellow, Yellow, Dark-Yellow Final    Appearance, Urine 03/12/2024 Clear  Clear Final    Specific Gravity, Urine 03/12/2024 1.025  1.005 - 1.035 Final    pH, Urine 03/12/2024 6.5  5.0, 5.5, 6.0, 6.5, 7.0, 7.5, 8.0 Final    Protein, Urine 03/12/2024 10 (TRACE)  NEGATIVE, 10 (TRACE), 20 (TRACE) mg/dL Final    Glucose, Urine 03/12/2024 Normal  Normal mg/dL Final    Blood, Urine 03/12/2024 NEGATIVE  NEGATIVE Final    Ketones, Urine 03/12/2024 NEGATIVE  NEGATIVE mg/dL Final    Bilirubin, Urine 03/12/2024 NEGATIVE  NEGATIVE Final    Urobilinogen, Urine 03/12/2024 Normal  Normal mg/dL Final    Nitrite, Urine 03/12/2024 NEGATIVE  NEGATIVE Final    Leukocyte Esterase, Urine 03/12/2024 NEGATIVE  NEGATIVE Final    WBC, Urine 03/12/2024 1-5  1-5, NONE /HPF Final    RBC, Urine 03/12/2024 1-2  NONE, 1-2, 3-5 /HPF Final    Squamous Epithelial Cells, Urine 03/12/2024 10-25 (FEW)  Reference range not established. /HPF Final    Budding Yeast, Urine 03/12/2024 PRESENT (A)  NONE /HPF Final    Mucus, Urine 03/12/2024 2+  Reference range not established. /LPF Final   Hospital Outpatient Visit on 02/27/2024   Component Date Value Ref Range Status    WBC 02/27/2024 5.0  4.4 - 11.3 x10*3/uL Final    nRBC 02/27/2024 0.0  0.0 - 0.0 /100 WBCs Final    RBC 02/27/2024 3.72 (L)  4.00 - 5.20 x10*6/uL Final    Hemoglobin 02/27/2024 11.7 (L)  12.0 - 16.0 g/dL Final    Hematocrit 02/27/2024 34.0 (L)  36.0 - 46.0 % Final    MCV 02/27/2024 91  80 - 100 fL Final    MCH 02/27/2024 31.5  26.0 - 34.0 pg Final    MCHC 02/27/2024 34.4  32.0 - 36.0 g/dL Final    RDW 02/27/2024 12.9  11.5 - 14.5 % Final     Platelets 02/27/2024 195  150 - 450 x10*3/uL Final    Neutrophils % 02/27/2024 62.2  40.0 - 80.0 % Final    Immature Granulocytes %, Automated 02/27/2024 0.4  0.0 - 0.9 % Final    Lymphocytes % 02/27/2024 21.9  13.0 - 44.0 % Final    Monocytes % 02/27/2024 10.5  2.0 - 10.0 % Final    Eosinophils % 02/27/2024 4.2  0.0 - 6.0 % Final    Basophils % 02/27/2024 0.8  0.0 - 2.0 % Final    Neutrophils Absolute 02/27/2024 3.13  1.20 - 7.70 x10*3/uL Final    Immature Granulocytes Absolute, Au* 02/27/2024 0.02  0.00 - 0.70 x10*3/uL Final    Lymphocytes Absolute 02/27/2024 1.10 (L)  1.20 - 4.80 x10*3/uL Final    Monocytes Absolute 02/27/2024 0.53  0.10 - 1.00 x10*3/uL Final    Eosinophils Absolute 02/27/2024 0.21  0.00 - 0.70 x10*3/uL Final    Basophils Absolute 02/27/2024 0.04  0.00 - 0.10 x10*3/uL Final    Glucose 02/27/2024 94  74 - 99 mg/dL Final    Sodium 02/27/2024 140  136 - 145 mmol/L Final    Potassium 02/27/2024 3.7  3.5 - 5.3 mmol/L Final    Chloride 02/27/2024 106  98 - 107 mmol/L Final    Bicarbonate 02/27/2024 26  21 - 32 mmol/L Final    Anion Gap 02/27/2024 12  10 - 20 mmol/L Final    Urea Nitrogen 02/27/2024 19  6 - 23 mg/dL Final    Creatinine 02/27/2024 0.79  0.50 - 1.05 mg/dL Final    eGFR 02/27/2024 82  >60 mL/min/1.73m*2 Final    Calcium 02/27/2024 9.1  8.6 - 10.6 mg/dL Final    Albumin 02/27/2024 3.6  3.4 - 5.0 g/dL Final    Alkaline Phosphatase 02/27/2024 45  33 - 136 U/L Final    Total Protein 02/27/2024 5.6 (L)  6.4 - 8.2 g/dL Final    AST 02/27/2024 15  9 - 39 U/L Final    Bilirubin, Total 02/27/2024 0.8  0.0 - 1.2 mg/dL Final    ALT 02/27/2024 11  7 - 45 U/L Final    aPTT 02/27/2024 32  27 - 38 seconds Final    Bilirubin, Direct 02/27/2024 0.1  0.0 - 0.3 mg/dL Final    Creatine Kinase 02/27/2024 54  0 - 215 U/L Final    C-Reactive Protein 02/27/2024 <0.10  <1.00 mg/dL Final    Ferritin 02/27/2024 76  8 - 150 ng/mL Final    Magnesium 02/27/2024 1.92  1.60 - 2.40 mg/dL Final    Phosphorus 02/27/2024 3.9   2.5 - 4.9 mg/dL Final    Protime 02/27/2024 11.6  9.8 - 12.8 seconds Final    INR 02/27/2024 1.0  0.9 - 1.1 Final    Vitamin D, 25-Hydroxy, Total 02/27/2024 35  30 - 100 ng/mL Final   Hospital Outpatient Visit on 02/13/2024   Component Date Value Ref Range Status    WBC 02/13/2024 6.6  4.4 - 11.3 x10*3/uL Final    nRBC 02/13/2024 0.0  0.0 - 0.0 /100 WBCs Final    RBC 02/13/2024 3.73 (L)  4.00 - 5.20 x10*6/uL Final    Hemoglobin 02/13/2024 11.6 (L)  12.0 - 16.0 g/dL Final    Hematocrit 02/13/2024 34.4 (L)  36.0 - 46.0 % Final    MCV 02/13/2024 92  80 - 100 fL Final    MCH 02/13/2024 31.1  26.0 - 34.0 pg Final    MCHC 02/13/2024 33.7  32.0 - 36.0 g/dL Final    RDW 02/13/2024 13.0  11.5 - 14.5 % Final    Platelets 02/13/2024 224  150 - 450 x10*3/uL Final    Neutrophils % 02/13/2024 64.3  40.0 - 80.0 % Final    Immature Granulocytes %, Automated 02/13/2024 0.6  0.0 - 0.9 % Final    Lymphocytes % 02/13/2024 21.2  13.0 - 44.0 % Final    Monocytes % 02/13/2024 9.6  2.0 - 10.0 % Final    Eosinophils % 02/13/2024 3.8  0.0 - 6.0 % Final    Basophils % 02/13/2024 0.5  0.0 - 2.0 % Final    Neutrophils Absolute 02/13/2024 4.23  1.20 - 7.70 x10*3/uL Final    Immature Granulocytes Absolute, Au* 02/13/2024 0.04  0.00 - 0.70 x10*3/uL Final    Lymphocytes Absolute 02/13/2024 1.39  1.20 - 4.80 x10*3/uL Final    Monocytes Absolute 02/13/2024 0.63  0.10 - 1.00 x10*3/uL Final    Eosinophils Absolute 02/13/2024 0.25  0.00 - 0.70 x10*3/uL Final    Basophils Absolute 02/13/2024 0.03  0.00 - 0.10 x10*3/uL Final    Glucose 02/13/2024 129 (H)  74 - 99 mg/dL Final    Sodium 02/13/2024 139  136 - 145 mmol/L Final    Potassium 02/13/2024 3.7  3.5 - 5.3 mmol/L Final    Chloride 02/13/2024 104  98 - 107 mmol/L Final    Bicarbonate 02/13/2024 25  21 - 32 mmol/L Final    Anion Gap 02/13/2024 14  10 - 20 mmol/L Final    Urea Nitrogen 02/13/2024 24 (H)  6 - 23 mg/dL Final    Creatinine 02/13/2024 0.71  0.50 - 1.05 mg/dL Final    eGFR 02/13/2024 >90   >60 mL/min/1.73m*2 Final    Calcium 02/13/2024 9.1  8.6 - 10.6 mg/dL Final    Albumin 02/13/2024 3.5  3.4 - 5.0 g/dL Final    Alkaline Phosphatase 02/13/2024 47  33 - 136 U/L Final    Total Protein 02/13/2024 5.8 (L)  6.4 - 8.2 g/dL Final    AST 02/13/2024 14  9 - 39 U/L Final    Bilirubin, Total 02/13/2024 0.7  0.0 - 1.2 mg/dL Final    ALT 02/13/2024 12  7 - 45 U/L Final    Adrenocorticotropic Hormone (ACTH) 02/13/2024 68.9 (H)  7.2 - 63.3 pg/mL Final    Amylase 02/13/2024 29  29 - 103 U/L Final    aPTT 02/13/2024 30  27 - 38 seconds Final    Bilirubin, Direct 02/13/2024 0.1  0.0 - 0.3 mg/dL Final    Creatine Kinase 02/13/2024 38  0 - 215 U/L Final    Cortisol 02/13/2024 13.2  2.5 - 20.0 ug/dL Final    C-Reactive Protein 02/13/2024 <0.10  <1.00 mg/dL Final    Estradiol 02/13/2024 <19  pg/mL Final    Ferritin 02/13/2024 78  8 - 150 ng/mL Final    Follicle Stimulating Hormone 02/13/2024 46.3  IU/L Final    Luteinizing Hormone 02/13/2024 15.9  IU/L Final    Lipase 02/13/2024 28  9 - 82 U/L Final    Magnesium 02/13/2024 1.73  1.60 - 2.40 mg/dL Final    Phosphorus 02/13/2024 4.6  2.5 - 4.9 mg/dL Final    Protime 02/13/2024 11.9  9.8 - 12.8 seconds Final    INR 02/13/2024 1.1  0.9 - 1.1 Final    Thyroid Stimulating Hormone 02/13/2024 6.39 (H)  0.44 - 3.98 mIU/L Final    Thyroxine, Free 02/13/2024 0.88  0.78 - 1.48 ng/dL Final    Color, Urine 02/13/2024 Yellow  Light-Yellow, Yellow, Dark-Yellow Final    Appearance, Urine 02/13/2024 Turbid (N)  Clear Final    Specific Gravity, Urine 02/13/2024 1.027  1.005 - 1.035 Final    pH, Urine 02/13/2024 5.5  5.0, 5.5, 6.0, 6.5, 7.0, 7.5, 8.0 Final    Protein, Urine 02/13/2024 10 (TRACE)  NEGATIVE, 10 (TRACE), 20 (TRACE) mg/dL Final    Glucose, Urine 02/13/2024 Normal  Normal mg/dL Final    Blood, Urine 02/13/2024 NEGATIVE  NEGATIVE Final    Ketones, Urine 02/13/2024 NEGATIVE  NEGATIVE mg/dL Final    Bilirubin, Urine 02/13/2024 NEGATIVE  NEGATIVE Final    Urobilinogen, Urine  02/13/2024 Normal  Normal mg/dL Final    Nitrite, Urine 02/13/2024 NEGATIVE  NEGATIVE Final    Leukocyte Esterase, Urine 02/13/2024 NEGATIVE  NEGATIVE Final    WBC, Urine 02/13/2024 1-5  1-5, NONE /HPF Final    RBC, Urine 02/13/2024 1-2  NONE, 1-2, 3-5 /HPF Final    Squamous Epithelial Cells, Urine 02/13/2024 10-25 (FEW)  Reference range not established. /HPF Final    Mucus, Urine 02/13/2024 3+  Reference range not established. /LPF Final    Calcium Oxalate Crystals, Urine 02/13/2024 1+  NONE, 1+ /HPF Final              Performance Status:    ECOG 1: Restricted in physically strenuous activity but ambulatory and able to carry out work of a light or sedentary nature, e.g., light house work, office work.         Assessment/Plan      Mrs. Branch is a 69 yo with COPD and GERD who presented with newly diagnosed SCLC completed BID radiation planned concurrently with cis/etoposide but had a reaction C2D1 to cisplatin. Completed 1 cycle carbo/etop D1 4/5/22 also complicated by facial flushing and erythematous rash and stopped further treatment. Now s/p PCI in 6/2022. Most recent CT concerning for disease progression. Referred for clinical trial AMGN [id number], C1D1 1/24/23. Treatment has been complicated by orthostatic hypotension, worsening short-term memory, increased lethargy, weight loss, and poor appetite. Delayed C2 by 1 week and dose-reduced. Confusion has resolved during C3 after stopping mirtazapine and dose reduction.     # SCLC  - limited stage, brain MRI negative  - previously discussed concurrent chemoradiation, with cisplatin/etoposide with side effects including but not limited to allergic reaction, fatigue, risk of infection, tinnitus, neuropathy, injury to liver/kidney, risk of anemia/thrombocytopenia and was consented  - she was also interested in scalp cooling, which unfortunately she is not a candidate for d/t SCLC  - started concurrent chemoradiation BID with Q3week cis/etop on 2/15 at [facility]  [place]  -unfortunately had reaction to cisplatin on C2D1 resulting in hospitalization and also felt to be septic due to pneumonia  - discussed that we typically do 3-4 cycles chemotherapy, and alternative regimens include carboplatin/etoposide vs CAV. Given reaction to cisplatin, concern for possible reaction to carboplatin as well, although unknown rates of cross reaction. Alternatively, they also asked about discontinuation of chemotherapy, since she completed radiation. She ultimately decided to trial carbo/etoposide. She is aware of the heightened risk of allergic reaction, as well as other side effects including but not limited to fatigue, risk of infection, neuropathy, injury to liver/kidney, risk of anemia/thrombocytopenia. consented 3/28/22  -s/p 1 cycle Carbo/Etop D1 4/5/22, developed facial flushing and erythematous rash on arms and chest s/p treatment, resolved with benadryl  -opted to forego further chemotherapy and will continue on maintenance  - s/p PCI 6/2022  - CT C/A/P and brain MRI 5/2022 and most recently 8/2022 with good response and no disease  -  MRI brain 11/7 without evidence of metastatic disease  - CT C/A/P 11/3 with multiple new enlarged LN concerning for disease progression - discussed with Dr. Valdivia not able to repeat radiation.  - referred to phase 1 clinical trial and consideration for [id number] or [id number]  - 1.5.2023 : Patient signed consent to take part on phase 1 study [id number]. Look clinically good to take part in study. Will start on study ASAP if eligible.   - 1.24.2023: Seen today and ready to start trial on [id number].  - 1.31.2023: Seen today, ready for C1D8 AMGN 1518   - 2.7.2023: Seen today for C1D15 [id number] - continue with trial   - 2.14.2023: Seen in follow-up on [id number] with overall worsening of general health  - 2.21.2023: Seen today for C2D1 AMGN 1518 with continued weight loss although perhaps starting to plateau, more energy since being off treatment, still poor  appetite. will delay by 1 week, started dexamethasone 2 mg daily, and consider dose reduction.  - 2.28.2023: Seen today for C2D1 AMGN 1518 after delaying for 1 week. Energy level and appetite are improved, although still losing a little weight. Confusion is worse today, possibly due to mirtazapine vs study-related. Will dose reduce today.  - 3.7.2023: Seen today C2D8 AMGN 1518 after dose-reduction last week. Confusion is mildly improved, energy level and appetite are stable. Will add marinol for appetite.   - 3.14.2023: Seen today C2D15 AMGN 1518. Overall improved: confusion continues to improve, energy level and appetite are improved. Weight is improved. Will continue dexamethasone and marinol.  - 3.15.2023: C2D16 No CRS symptoms observed after last treatment Overall Symptoms are improving and she is tolerating treatment.  -3.16.2023: C2D17 No CRS symptoms observed after last treatment Overall Symptoms are improving and she is tolerating treatment.  - 3.28.2023 : Most recent imaging suggest patient benefiting from current treatment. Ongoing symptom  possible common cold/season allergies. Since the patient looks clinically good we will proceed with C3D1 today. Educated patient to call the office ASAP if symptoms worsen.   - 4.11.2023: C3D15 Feeling well and overall improved with fatigue, confusion, anorexia. Proceed at current dosing and weaning steroids as below  -4.25.2023: C4D1 Pt is feeling well, no complaints of fatigue, confusion, memory loss. Has gained weight. Energy levels are good. Proceed with the current dosing. Pt is no longer on steroids.   -5.9.2023: C4D15. Pt is tolerating treatment well with no new complains. Continues to have good energy level endorses poor appetite with out any weight loss. Suggested to start taking the dronabinol.  Will proceed with treatment today.  -5.23.2023: C5D1. Pt is tolerating treatment well. Energy levels are good, is having some weight loss and constipation. Started  back on dex for appetite. Pt will let us know if she remains constipated over the next several days.  Personally reviewed scans with stable disease. Will proceed with treatment today.   -6.6.2023: C5D15. Continues to tolerate treatment well. Since we restarted her on Dexamethasone she reports her energy levels and appetite has improved. Will continue on low dose Dex. With no new symptoms, we will proceed with treatment today.   -6.20.2023: C6D1. Doing well on dexamethasone 1 mg daily. Will proceed with treatment.  -7.5.2023: C6D15. We will proceed with treatment since the patient is tolerating treatment with no significant AE's and also give 1/2 liter of IV fluids.   - 7.18.2023: C7D1. Doing well, will proceed with treatment. She is out of dexamethasone, and will monitor off steroids.   -8.1.2023: C7D15. Continues to tolerate treatment. Will proceed with C7D15 today. RTC per protocol.  - 8.29.2023: C8D1. C8 delayed due to hospitalization for diverticulitis. Has completed antibiotics and feeling well for treatment.  - 9.12.2023: C8D15. Most recent imgaing suggest the patient continues to benefit from current treatment. The imaging also suggest improvement of previously visualized segmental colitis associated with diverticulosis affecting the sigmoid colon with minimal residual pericolonic fat standing and hyperemia of the sigmoid colon. Imaging also suggest resolving inflammatory process without evidence of new or worsening complications. We will proceed with C8D15 today since the patient is also clinically feeling good. RTC per protocol.  -9.26.2023: C9D1. Patient looks clinically good. With no JACQUELYN's we will proceed with treatment C9D1 today.  -10.24.23: C10D1. Pt feels well, no acute events, no TRAEs, continue treatment today as scheduled.  -11.7.23: C10D15. Pt feels well, no TRAEs, continue treatment as scheduled.  - 11.21.23: C11D1. Continues to feel well, will continue treatment today.  - 12.19.23: C12D1.  Personally reviewed most recent scan without evidence of progression. Continues to feel well and will continue with treatment today.  - 01.02.24: C12D15. Continues to tolerate treatment well. No new JACQUELYN's will proceed with treatment. RTC per protocol.  - 01.16.24: C13D1. No new JACQUELYN's proceed with treatment. RTC per protocol.   - 01.30.24: C13D15. Continues to feel well. Proceed with treatment.  - 2.13.24: C14D1. Personally reviewed most recent scan without evidence of progression. Continue with treatment today. RTC per protocol.  - 2.27.24: C14D15. Continues to feel good and tolerate treatment without any JACQUELYN's. We will proceed with treatment today. RTC per protocol.  - 3.12.24: C15D1. Continues to feel well and tolerate treatment without new JACQUELYN's. Will continue on treatment.    # Confusion - resolved  - significantly worsened after taking double dose of mirtazapine accidentally, although has been generally down-trending since starting study (which is also when she started taking mirtazapine) and now resolved  - brain MRI without progression of cancer  - will continue off mirtazapine    # Unintentional weight loss - stable  # Anorexia- Improving  # Dysgeusia- improving  - all improved on steroids. unclear if this is from cancer or side effect of study drug  - stopped mirtazapine due to concerns for confusion   - weaned off dexamethasone and started marinol, but kept forgetting to take it  - dexamethasone trial started again on 5.23.2023. Continued on 1 mg daily - will trial off after 7.18.23.     # Fatigue - Resolved  - back to normal pretty much, weaning steroids as above    # frequent PVCs - asymptomatic  - saw onco-cardiology and completed 24-hr Holter monitor  - recommended decreasing caffeine and sudafed intake  - continue to monitor    # Forgetfulness - improved - however noted decline on AMGN study- unclear etiology.    - started after PCI, on memantine daily and has since stopped  - offered  neurocognitive evaluation previously and she will think about this and readdress next visit  -  notes some decline in last 3 weeks- especially short term memory loss.    - Improved    # Neuropathy - resolved  - mild peripheral neuropathy with numbness of the toes - likely due to cisplatin  - will continue to monitor closely  - not endorsing any neuropathy at this visit     # Rash - resolved  Waxing and waning rash throughout her body. ?improving. Unclear etiology, patient and  feel timing coincided with starting BMX.   - she will hold off on further BMX  - thought to be due to sulfa allergy, possibly due to platinum agents   - continue to monitor    #odynophagia - resolved  -rad onc aware  -prescribed BMX solution  -will continue to monitor    #constipation-improving  -occasional. Prescribed Senna S  -will monitor    Francy Archer MD

## 2024-03-12 NOTE — RESEARCH NOTES
Research Note Treatment Day    Gladys Branch is here today for treatment on [id number]. Today is C15D1. Procedures completed per protocol. AE's and con-meds reviewed with patient. Patient is aware of treatment plan.    [x]   Received treatment as planned   OR  []    Treatment delayed; patient calendar updated as required   Treatment delayed because:    []   AE    []   Physician Discretion    []   Clinical Deterioration or Progression     []   Other    Pt had Nutrition consult scheduled and completed today    Education Documentation  General Medication Information, taught by Vladimir Fernadnez RN at 3/12/2024  1:26 PM.  Learner: Significant Other, Patient  Readiness: Eager  Method: Explanation  Response: Verbalizes Understanding    Treatment Plan and Schedule, taught by Vladimir Fernandez RN at 3/12/2024  1:26 PM.  Learner: Significant Other, Patient  Readiness: Eager  Method: Explanation  Response: Verbalizes Understanding    Supportive Medications, taught by Vladimir Fernandez RN at 3/12/2024  1:26 PM.  Learner: Significant Other, Patient  Readiness: Eager  Method: Explanation  Response: Verbalizes Understanding    Nutrition/Diet, taught by Vladimir Fernandez RN at 3/12/2024  1:26 PM.  Learner: Significant Other, Patient  Readiness: Eager  Method: Explanation  Response: Verbalizes Understanding    Diagnostic Studies, taught by Vladimir Fernandez RN at 3/12/2024  1:26 PM.  Learner: Significant Other, Patient  Readiness: Eager  Method: Explanation  Response: Verbalizes Understanding    Comprehensive Metabolic Panel (CMP), taught by Vladimir Fernandez RN at 3/12/2024  1:26 PM.  Learner: Significant Other, Patient  Readiness: Eager  Method: Explanation  Response: Verbalizes Understanding    Education Comments  No comments found.

## 2024-03-13 DIAGNOSIS — Z00.6 CLINICAL TRIAL EXAM: ICD-10-CM

## 2024-03-13 DIAGNOSIS — C34.90 MALIGNANT NEOPLASM OF LUNG, UNSPECIFIED LATERALITY, UNSPECIFIED PART OF LUNG (MULTI): Primary | ICD-10-CM

## 2024-03-13 LAB — ACTH PLAS-MCNC: 3.5 PG/ML (ref 7.2–63.3)

## 2024-03-18 VITALS
OXYGEN SATURATION: 97 % | SYSTOLIC BLOOD PRESSURE: 111 MMHG | BODY MASS INDEX: 20.45 KG/M2 | RESPIRATION RATE: 18 BRPM | DIASTOLIC BLOOD PRESSURE: 72 MMHG | TEMPERATURE: 98.6 F | WEIGHT: 108.25 LBS | HEART RATE: 66 BPM

## 2024-03-18 NOTE — ADDENDUM NOTE
Encounter addended by: Tom Don RN on: 3/18/2024 7:33 AM   Actions taken: Flowsheet accepted, Clinical Note Signed

## 2024-03-18 NOTE — ADDENDUM NOTE
Encounter addended by: Tom Don RN on: 3/18/2024 7:25 AM   Actions taken: Flowsheet accepted, Clinical Note Signed

## 2024-03-26 ENCOUNTER — HOSPITAL ENCOUNTER (OUTPATIENT)
Dept: RESEARCH | Facility: HOSPITAL | Age: 69
Discharge: HOME | End: 2024-03-26
Payer: MEDICARE

## 2024-03-26 VITALS
DIASTOLIC BLOOD PRESSURE: 62 MMHG | BODY MASS INDEX: 21.44 KG/M2 | OXYGEN SATURATION: 97 % | SYSTOLIC BLOOD PRESSURE: 97 MMHG | RESPIRATION RATE: 17 BRPM | TEMPERATURE: 97.9 F | HEART RATE: 82 BPM | WEIGHT: 110.23 LBS

## 2024-03-26 DIAGNOSIS — D63.8 ANEMIA, CHRONIC DISEASE: ICD-10-CM

## 2024-03-26 DIAGNOSIS — E78.2 MIXED HYPERLIPIDEMIA: ICD-10-CM

## 2024-03-26 DIAGNOSIS — C34.90 SMALL CELL LUNG CANCER (MULTI): Primary | ICD-10-CM

## 2024-03-26 LAB
ALBUMIN SERPL BCP-MCNC: 3.4 G/DL (ref 3.4–5)
ALP SERPL-CCNC: 48 U/L (ref 33–136)
ALT SERPL W P-5'-P-CCNC: 11 U/L (ref 7–45)
ANION GAP SERPL CALC-SCNC: 12 MMOL/L (ref 10–20)
APTT PPP: 30 SECONDS (ref 27–38)
AST SERPL W P-5'-P-CCNC: 13 U/L (ref 9–39)
BASOPHILS # BLD AUTO: 0.02 X10*3/UL (ref 0–0.1)
BASOPHILS NFR BLD AUTO: 0.3 %
BILIRUB DIRECT SERPL-MCNC: 0.1 MG/DL (ref 0–0.3)
BILIRUB SERPL-MCNC: 0.5 MG/DL (ref 0–1.2)
BUN SERPL-MCNC: 19 MG/DL (ref 6–23)
CALCIUM SERPL-MCNC: 8.7 MG/DL (ref 8.6–10.6)
CHLORIDE SERPL-SCNC: 104 MMOL/L (ref 98–107)
CK SERPL-CCNC: 34 U/L (ref 0–215)
CO2 SERPL-SCNC: 28 MMOL/L (ref 21–32)
CORTIS AM PEAK SERPL-MSCNC: 10.5 UG/DL (ref 5–20)
CREAT SERPL-MCNC: 0.71 MG/DL (ref 0.5–1.05)
CRP SERPL-MCNC: 8.8 MG/DL
EGFRCR SERPLBLD CKD-EPI 2021: >90 ML/MIN/1.73M*2
EOSINOPHIL # BLD AUTO: 0.18 X10*3/UL (ref 0–0.7)
EOSINOPHIL NFR BLD AUTO: 2.9 %
ERYTHROCYTE [DISTWIDTH] IN BLOOD BY AUTOMATED COUNT: 12.9 % (ref 11.5–14.5)
ERYTHROCYTE [DISTWIDTH] IN BLOOD BY AUTOMATED COUNT: 13 % (ref 11.5–14.5)
FERRITIN SERPL-MCNC: 175 NG/ML (ref 8–150)
GLUCOSE SERPL-MCNC: 102 MG/DL (ref 74–99)
HCT VFR BLD AUTO: 27.7 % (ref 36–46)
HCT VFR BLD AUTO: 31.1 % (ref 36–46)
HGB BLD-MCNC: 10.8 G/DL (ref 12–16)
HGB BLD-MCNC: 9.4 G/DL (ref 12–16)
IMM GRANULOCYTES # BLD AUTO: 0.03 X10*3/UL (ref 0–0.7)
IMM GRANULOCYTES NFR BLD AUTO: 0.5 % (ref 0–0.9)
INR PPP: 1.1 (ref 0.9–1.1)
LYMPHOCYTES # BLD AUTO: 1.06 X10*3/UL (ref 1.2–4.8)
LYMPHOCYTES NFR BLD AUTO: 16.9 %
MAGNESIUM SERPL-MCNC: 1.86 MG/DL (ref 1.6–2.4)
MCH RBC QN AUTO: 31.8 PG (ref 26–34)
MCH RBC QN AUTO: 32 PG (ref 26–34)
MCHC RBC AUTO-ENTMCNC: 33.9 G/DL (ref 32–36)
MCHC RBC AUTO-ENTMCNC: 34.7 G/DL (ref 32–36)
MCV RBC AUTO: 92 FL (ref 80–100)
MCV RBC AUTO: 94 FL (ref 80–100)
MONOCYTES # BLD AUTO: 0.63 X10*3/UL (ref 0.1–1)
MONOCYTES NFR BLD AUTO: 10 %
NEUTROPHILS # BLD AUTO: 4.35 X10*3/UL (ref 1.2–7.7)
NEUTROPHILS NFR BLD AUTO: 69.4 %
NRBC BLD-RTO: 0 /100 WBCS (ref 0–0)
NRBC BLD-RTO: 0 /100 WBCS (ref 0–0)
PHOSPHATE SERPL-MCNC: 3.4 MG/DL (ref 2.5–4.9)
PLATELET # BLD AUTO: 179 X10*3/UL (ref 150–450)
PLATELET # BLD AUTO: 212 X10*3/UL (ref 150–450)
POTASSIUM SERPL-SCNC: 3.7 MMOL/L (ref 3.5–5.3)
PROT SERPL-MCNC: 5.9 G/DL (ref 6.4–8.2)
PROTHROMBIN TIME: 12.1 SECONDS (ref 9.8–12.8)
RBC # BLD AUTO: 2.96 X10*6/UL (ref 4–5.2)
RBC # BLD AUTO: 3.37 X10*6/UL (ref 4–5.2)
SODIUM SERPL-SCNC: 140 MMOL/L (ref 136–145)
WBC # BLD AUTO: 6.3 X10*3/UL (ref 4.4–11.3)
WBC # BLD AUTO: 7.1 X10*3/UL (ref 4.4–11.3)

## 2024-03-26 PROCEDURE — 96413 CHEMO IV INFUSION 1 HR: CPT

## 2024-03-26 PROCEDURE — 2500000005 HC RX 250 GENERAL PHARMACY W/O HCPCS: Performed by: STUDENT IN AN ORGANIZED HEALTH CARE EDUCATION/TRAINING PROGRAM

## 2024-03-26 PROCEDURE — 99214 OFFICE O/P EST MOD 30 MIN: CPT

## 2024-03-26 PROCEDURE — 96361 HYDRATE IV INFUSION ADD-ON: CPT

## 2024-03-26 PROCEDURE — 2500000004 HC RX 250 GENERAL PHARMACY W/ HCPCS (ALT 636 FOR OP/ED)

## 2024-03-26 PROCEDURE — 86140 C-REACTIVE PROTEIN: CPT | Performed by: STUDENT IN AN ORGANIZED HEALTH CARE EDUCATION/TRAINING PROGRAM

## 2024-03-26 PROCEDURE — 82550 ASSAY OF CK (CPK): CPT | Performed by: STUDENT IN AN ORGANIZED HEALTH CARE EDUCATION/TRAINING PROGRAM

## 2024-03-26 PROCEDURE — 82533 TOTAL CORTISOL: CPT

## 2024-03-26 PROCEDURE — 85025 COMPLETE CBC W/AUTO DIFF WBC: CPT | Performed by: STUDENT IN AN ORGANIZED HEALTH CARE EDUCATION/TRAINING PROGRAM

## 2024-03-26 PROCEDURE — 82024 ASSAY OF ACTH: CPT

## 2024-03-26 PROCEDURE — 85730 THROMBOPLASTIN TIME PARTIAL: CPT | Performed by: STUDENT IN AN ORGANIZED HEALTH CARE EDUCATION/TRAINING PROGRAM

## 2024-03-26 PROCEDURE — 82248 BILIRUBIN DIRECT: CPT | Performed by: STUDENT IN AN ORGANIZED HEALTH CARE EDUCATION/TRAINING PROGRAM

## 2024-03-26 PROCEDURE — 80053 COMPREHEN METABOLIC PANEL: CPT | Performed by: FAMILY MEDICINE

## 2024-03-26 PROCEDURE — 85610 PROTHROMBIN TIME: CPT | Performed by: STUDENT IN AN ORGANIZED HEALTH CARE EDUCATION/TRAINING PROGRAM

## 2024-03-26 PROCEDURE — 82728 ASSAY OF FERRITIN: CPT | Performed by: STUDENT IN AN ORGANIZED HEALTH CARE EDUCATION/TRAINING PROGRAM

## 2024-03-26 PROCEDURE — 83735 ASSAY OF MAGNESIUM: CPT | Performed by: STUDENT IN AN ORGANIZED HEALTH CARE EDUCATION/TRAINING PROGRAM

## 2024-03-26 PROCEDURE — 84100 ASSAY OF PHOSPHORUS: CPT | Performed by: STUDENT IN AN ORGANIZED HEALTH CARE EDUCATION/TRAINING PROGRAM

## 2024-03-26 PROCEDURE — 85027 COMPLETE CBC AUTOMATED: CPT | Performed by: FAMILY MEDICINE

## 2024-03-26 RX ORDER — DIPHENHYDRAMINE HYDROCHLORIDE 50 MG/ML
50 INJECTION INTRAMUSCULAR; INTRAVENOUS AS NEEDED
Status: DISCONTINUED | OUTPATIENT
Start: 2024-03-26 | End: 2024-03-27 | Stop reason: HOSPADM

## 2024-03-26 RX ORDER — FAMOTIDINE 10 MG/ML
20 INJECTION INTRAVENOUS ONCE AS NEEDED
Status: DISCONTINUED | OUTPATIENT
Start: 2024-03-26 | End: 2024-03-27 | Stop reason: HOSPADM

## 2024-03-26 RX ORDER — ALBUTEROL SULFATE 0.83 MG/ML
3 SOLUTION RESPIRATORY (INHALATION) AS NEEDED
Status: DISCONTINUED | OUTPATIENT
Start: 2024-03-26 | End: 2024-03-27 | Stop reason: HOSPADM

## 2024-03-26 RX ORDER — EPINEPHRINE 1 MG/ML
0.3 INJECTION INTRAMUSCULAR; INTRAVENOUS; SUBCUTANEOUS EVERY 5 MIN PRN
Status: DISCONTINUED | OUTPATIENT
Start: 2024-03-26 | End: 2024-03-27 | Stop reason: HOSPADM

## 2024-03-26 RX ADMIN — SODIUM CHLORIDE 1000 ML: 9 INJECTION, SOLUTION INTRAVENOUS at 08:45

## 2024-03-26 RX ADMIN — Medication 3 MG: at 10:43

## 2024-03-26 NOTE — RESEARCH NOTES
[facility]><[id number]><C5+D15><PART A>  DCRU NURSING VISIT NOTE  Study Name: ALEXANDRU Foote8  IRB#: [id number]  DCRU#: [id number]  Protocol Version Dated: A9 3.22.23  PI: Roshan Kumar MD.  Time point: Cycle 5 and beyond - Day 15  CYCLE 15   Encounter Date: 03/26/2024  Encounter Time:  8:30 AM EDT  Encounter Department: [facility] HEMATOLOGY AND ONCOLOGY   #1     Phone Pager   Carmen Rios -[phone]    #2 Phone Pager        Other Phone Pager          Study Regimen and Dosing   Part A Dose Exploration/Expansion- Small Cell Lung Cancer Relapsed or Refractory patients who progressed or recurred following at least 1 platinum-based regimen.   Cycle = 28 days    administered IV Day 1, Day 8, and Day 15 of Cycle 1. Cycle 2 and beyond     administered on Day 1 and Day 15.        Dietary Guidelines   Regular diet:     Admission and Prior to Starting Study Activities   Complete DCRU and [facility] Admission/Visit Note.  Notify SC of patient arrival after initial lab and vitals  Insert one peripheral IV line for sample collection procedures (flush line with normal saline following each blood draw). Access mediport (if available) otherwise insert second peripheral line in opposite arm for Investigative drug administration (if peripheral line, flush line with normal saline before & after infusion)     Criteria to Treat   DCRU RN reviewed and meets eligibility to proceed with treatment plan   Time team notified: 0950 am  DCRU RN notifies study team to review eligibility and approval before dosing procedures  Time team approves: 0950 am   Maddison Branch is a 68 y.o. female and is here for a Research clinical visit.  Visit Provider: [id number], [facility] ROOM 12 [facility]   Allergies:   Allergies   Allergen Reactions    Cisplatin Other     CHEMO INDUCED (Moderate); Dyspepsia (Moderate); Headaches (Moderate); Hypotension (Moderate); Numbness (Moderate); Resp Distress (Moderate)     Codeine Nausea Only and Other     nausea    Sulfa (Sulfonamide Antibiotics) Rash   Objective   Vital Signs:    There were no vitals filed for this visit.  Physical Exam   ASSESSMENT and PLAN:  Problem List Items Addressed This Visit    None  Medications as of the completion of today's visit:  Current Outpatient Medications   Medication Sig Dispense Refill    cetirizine (ZyrTEC) 10 mg tablet Take 1 tablet (10 mg) by mouth once daily. (Patient not taking: Reported on 3/12/2024) 30 tablet 11    cetirizine-pseudoephedrine (ZyrTEC-D) 5-120 mg 12 hr tablet Take 1 tablet by mouth 2 times a day. (Patient not taking: Reported on 3/12/2024) 60 tablet 11    cholecalciferol (Vitamin D-3) 25 MCG (1000 UT) tablet Take by mouth.      dexAMETHasone (Decadron) 2 mg tablet Take 0.5 tablets (1 mg) by mouth once daily with breakfast. 30 tablet 2    dextromethorphan (Delsym) 30 mg/5 mL liquid Take 5 mL (30 mg) by mouth once daily as needed.      dronabinol (Marinol) 2.5 mg capsule once daily as needed. One hour before dinner      estrogens, conjugated, (Premarin) 0.3 mg tablet Take 0.5 tablets (0.15 mg) by mouth once daily.      mv-min/FA/vit K/lutein/zeaxant (PRESERVISION AREDS 2 PLUS MV ORAL) Take 1 tablet by mouth once daily.      omeprazole (PriLOSEC) 40 mg DR capsule Take 1 capsule (40 mg) by mouth once daily. Do not crush or chew. 90 each 3    ondansetron (Zofran) 8 mg tablet Take 0.5 tablets (4 mg) by mouth every 8 hours if needed.      polyethylene glycol (Glycolax, Miralax) 17 gram packet Take 17 g by mouth 2 times a day. (Patient not taking: Reported on 3/12/2024) 60 packet 2    prochlorperazine (Compazine) 10 mg tablet Take 0.5 tablets (5 mg) by mouth every 6 hours if needed.      psyllium husk, aspartame, (Metamucil Sugar-Free, aspart,) 3.4 gram/5.8 gram powder Take 1 Dose by mouth once daily. (Patient not taking: Reported on 3/12/2024) 425 g 11     No current facility-administered medications for this encounter.        Orders placed during today's visit:  No orders of the defined types were placed in this encounter.  [id number] -  in Subjects With Small Cell Lung Cancer    Patient has no adverse events documented in the Research Adverse Events activity.    Study Specific Instructions and Documentation   WEIGHT  LABS  VITALS  STUDY DRUG  VITALS  DISCHARGE     PRE-DOSE Safety Labs   Refer to [facility] Treatment Plan for orders     Day 15 Pre-dose Vital Signs    Temp, HR, Resp, BP, Pulse Oximetry: Seated or Semi-Fowlers x 5 minutes before obtaining  Position and temperature location: should be the same that is used throughout the study-record position     Infusion-Related Reactions   [facility] SOC Hypersensitivity Orders  Note: CRS table      Research Drug Administration [place] ()   Refer to [facility] [place] Treatment Plan for orders   Administer dose over 60 minutes (+/- 10 mins) followed by a 3 - 5 minute Normal saline flush. (This will be the end time after the flush). Document start time & end of study medication; document start time and end time of the flush.  Participant to remain on Unit for 2 hour post dose observation.      Post-Dose Vital Signs   Temp, HR, Resp, BP, Pulse Oximetry: Seated or Semi-Fowlers x 5 minutes before obtaining  Position and temperature location: should be the same that is used throughout the study  End of Infusion     Day 15 Discharge Instructions   Patient may be discharged after 2 hour observation.  Discharge time 1349 aprox.   Marquise Philip RN  03/26/24  Grading and Management of Cytokine Release Syndrome   CRS Grade Description of Severity Minimum Expected Intervention Instructions for Dose Modifications of    1 Symptoms are not life threatening and require symptomatic treatment only,  eg, fever, nausea, fatigue, headache, myalgia's, malaise Administer symptomatic treatment (contact provider for medications/doses). Monitor for CRS symptoms including vital signs and pulse oximetry at  least Q2 hours for12 hours or until resolution, Whichever is earlier. N/A     (continued)  Grading and Management of Cytokine Release Syndrome   CRS Grade Description of Severity Minimum Expected Intervention Instructions for Dose Modifications of    2 Symptoms require and respond to moderate intervention  Oxygen requirement <40%,                      OR  Hypotension responsive to fluids or low dose of one vasopressor, OR                                                                       Grade 2 organ toxicity or grade 3 transaminitis per CTCAE criteria Administer:  Symptomatic treatment   (contact provider for medications/doses)  Supplemental oxygen when oxygen saturation is <90% on room air  Intravenous fluids or low dose vasopressor for hypotension is recommended when systolic blood pressure is <85 mmHg. (contact provider for medications/doses) Persistent tachycardia (eg >120 bpm) may also indicate the need for intervention for hypotension.   Monitor for CRS symptoms including vital signs and pulse oximetry at least Q2 hours for12 hours or until resolution to CRS grade <= 1, whichever is earlier. For subjects with extensive co-morbidities or poor performance status, manage per grade 3 CRS guidance below If CRS occurs during  treatment, immediately interrupt the infusion and delay the next  dose until the event resolves to CRS grade <= 1 for no less than 72 hours. Permanently discontinue  if there is no improvement to CRS <= grade 1 within 7 days     3 Symptoms require and respond to aggressive intervention  Oxygen requirement >=40%, OR  Hypotension requiring high dose or multiple vasopressors, OR  Grade 3 organ toxicity or     Grade 4 transaminitis per  CTCAE criteria Admit to intensive care unit for close clinical and vital sign monitoring per institutional guidelines.   Refer to provider/protocol for medications and doses.     If CRS occurs during  treatment, immediately  interrupt   and delay the next dose until event resolves to CRS grade <= 1 for no less than 72 hours.    Permanently discontinue  if there is no improvement to CRS                 <= grade 2 within 5 days or CRS <= grade 1 within 7 days.  Permanently discontinue   if CRS grade  3 occurs at the initial run in dose (i.e., at MTD1)  (Applicable only after MTD1 has been defined).     (continued)  Grading and Management of Cytokine Release Syndrome   CRS Grade Description of Severity Minimum Expected Intervention Instructions for Dose Modifications of    4 Life-threatening symptoms  Requirement for ventilator support OR  Grade 4 organ toxicity (excluding transaminitis) per CTCAE criteria Admit to intensive care unit for close clinical and vital sign monitoring per institutional guidelines.   Refer to provider/protocol for medications and doses.   If CRS occurs during  treatment, Immediately stop the infusion.  Permanently discontinue   therapy.

## 2024-03-26 NOTE — RESEARCH NOTES
Research Note Treatment Day    Gladys Branch is here today for treatment on [id number]. Today is C1515_. Procedures completed per protocol. AE's and con-meds reviewed with patient. Patient is aware of treatment plan.    [x]   Received treatment as planned   OR  []    Treatment delayed; patient calendar updated as required   Treatment delayed because:    []   AE    []   Physician Discretion    []   Clinical Deterioration or Progression     []   Other    Pt also received Nutrition Consult.    Education Documentation  Changes in Appetite, taught by Vladimir Fernandez RN at 3/26/2024  1:10 PM.  Learner: Significant Other, Patient  Readiness: Eager  Method: Explanation  Response: Verbalizes Understanding    General Medication Information, taught by Vladimir Fernandez RN at 3/26/2024  1:10 PM.  Learner: Significant Other, Patient  Readiness: Eager  Method: Explanation  Response: Verbalizes Understanding    Treatment Plan and Schedule, taught by Vladimir Fernandez RN at 3/26/2024  1:10 PM.  Learner: Significant Other, Patient  Readiness: Eager  Method: Explanation  Response: Verbalizes Understanding    Supportive Medications, taught by Vladimir Fernandez RN at 3/26/2024  1:10 PM.  Learner: Significant Other, Patient  Readiness: Eager  Method: Explanation  Response: Verbalizes Understanding    Nutrition, taught by Vladimir Fernandez RN at 3/26/2024  1:10 PM.  Learner: Significant Other, Patient  Readiness: Eager  Method: Explanation  Response: Verbalizes Understanding    Diagnostic Studies, taught by Vladimir Fernandez RN at 3/26/2024  1:10 PM.  Learner: Significant Other, Patient  Readiness: Eager  Method: Explanation  Response: Verbalizes Understanding    Comprehensive Metabolic Panel (CMP), taught by Vladimir Fernandez RN at 3/26/2024  1:10 PM.  Learner: Significant Other, Patient  Readiness: Eager  Method: Explanation  Response: Verbalizes Understanding    Complete Blood Count with Differential (CBC w/ Diff), taught by Vladimir Fernandez RN at 3/26/2024   1:10 PM.  Learner: Significant Other, Patient  Readiness: Eager  Method: Explanation  Response: Verbalizes Understanding    Education Comments  No comments found.

## 2024-03-26 NOTE — PROGRESS NOTES
Patient ID: Gladys Branch is a 68 y.o. female.    DIAGNOSIS    Small Cell Lung Cancer    By immunostaining, the tumor cells are positive for CAM 5.2, INSM1, and TTF-1, and negative for p40 and LCA. The proliferation index by Ki-67 immunostaining is approximately 90%.  Synaptophysin, Chromogranin and INSM-1 are positive.  AE1/AE3 is negative. NEOGENOMICS, RB protein expression is lost in the tumor cells    STAGING    [id number], limited stage  Recurrence    CURRENT SITES OF DISEASE    RLL, supraclavicular    MOLECULAR GENOMICS      PRIOR THERAPY    Concurrent chemoradiation with Cisplatin/Etoposide every 3 weeks; C1D1 2/15/22, reaction to cisplatin C2D1 and did not receive D2 or D3 etoposide  Carbo/etoposide 4/5/2022 1 cycle c/b rash  PCI 5/21-6/13/22    CURRENT THERAPY    Phase 1 study [id number]  with study drug Taralatamab 1/24/23 - present.    CURRENT ONCOLOGICAL PROBLEMS      HISTORY OF PRESENT ILLNESS    Mrs. Branch is a 67 yo with PMH GERD and COPD who originally was round to have a RLL nodule measuring 11 mm in 4/28/2021 found as part of CT calcium score scan. An ensuing CT chest w/o contrast was done on 5/19/21 which revealed subsolid pulmonary nodules measuring 14 mm in the RLL. She had CT chest w/contrast on 11/29/21 which confirmed stable 14 mm GGO of the RLL and decreased RLL subpleural nodule. She met thoracic surgeon Dr. Farfan, who ordered PET/CT scan done on 12/8/21 which did not reveal any FDG-avid nodules within the lung parenchyma but R hilar nodes with max SUV 8.0. He referred her for bronch, which was done on 1/21/22 by Dr. Mcdaniels. 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 Brain MRI was negative.    She started concurrent chemoradiation with BID radiation with cis/etop 2/15/22, and unfortunately had a reaction to cisplatin on C2D1 with chest pain and hypotension. She was hospitalized with sepsis felt to be due to pneumonia. She trialed carboplatin/etoposide on 4/5/22 with a  resulting rash and opted to forego further chemotherapy as she had completed radiation. Restaging scan 11/3/22 unfortunately with progression with new R hilar lymph node, paratracheal nodes, and increase in size of R supraclavicular mass. She enrolled in [id number] and started C1D1 1/24/23. C1 complicated by anorexia and dysgeusia, and delayed C2 by a week. She has further struggled with fatigue and confusion, which may be related to mirtazapine. Dose reduced for C2 and mirtazapine stopped with improvement in symptoms.     PAST MEDICAL HISTORY    GERD  COPD    SOCIAL HISTORY    Retired - lighting . Lives at home with  and a kitten.  Tobacco: quit smoking 1999. 1 ppd x 25 yrs.  EtOH: wine 1 glass/day  Illicits: none    CURRENT MEDS    Please see med list    ALLERGIES    Codeine, Sulfa drugs, Cisplatin    FAMILY HISTORY    Paternal aunt - breast cancer dx 80s    Subjective    Today 3.26.2024. Patient in clinic with her  for C15D15 for [id number].    Patient reports this past week her  was sick and she also started to have symptoms of cough with yellow color sputum, feeling lightheaded and fatigue. Her  took her to the ER and she was evaluated and diagnosed with viral infection and dehydration. Started on ZPACK and also IV fluids. Today she mentioned she feels much better with more energy.  She reports her PO intake has improved but not back to baseline. She continues to have mild numbness in her feet which is her baseline. She also continues to have problems with memory but with no major changes from baseline. She also continues to experience symptoms of taste alteration. Patient denies any pain, headaches, rash/itching, chills or fever, SOB, cough, sputum, chest pain or chest tightness, painful inflammation/ulceration oral mucous membranes, nausea/vomiting, diarrhea/constipation, hematemesis /hemoptysis, hematuria/rectal bleeding, urinary symptoms, swelling extremities,  "weakness. ROS as above. Remainder of 10 point review of systems elicited and otherwise unremarkable.       Objective          2/27/2024    11:13 AM 3/12/2024     7:28 AM 3/12/2024     8:31 AM 3/12/2024    10:22 AM 3/12/2024    11:35 AM 3/26/2024     7:00 AM 3/26/2024     7:37 AM   Vitals   Systolic 107 110  105 111  109   Diastolic 78 72  70 72  76   Heart Rate 73 59  70 66  76   Temp 36.4 °C (97.5 °F) 36.8 °C (98.2 °F)  36.9 °C (98.4 °F) 37 °C (98.6 °F)  36.4 °C (97.5 °F)   Resp 16 18  18 18  18   Height (in)   1.527 m (5' 0.12\")       Weight (lb)  108.25    110.23    BMI  20.45 kg/m2 21.06 kg/m2   21.44 kg/m2    BSA (m2)  1.45 m2 1.44 m2   1.46 m2    Visit Report  Report Report Report Report         Daily Weight  03/26/24 : 50 kg (110 lb 3.7 oz)  03/12/24 : 49.1 kg (108 lb 3.9 oz)  02/27/24 : 49.8 kg (109 lb 12.6 oz)  02/13/24 : 50 kg (110 lb 3.7 oz)  01/30/24 : 50.2 kg (110 lb 10.7 oz)        Physical Exam  Constitutional:       General: She is awake.      Appearance: Normal appearance. She is normal weight.   HENT:      Head: Normocephalic.      Mouth/Throat:      Mouth: Mucous membranes are moist.   Eyes:      Extraocular Movements: Extraocular movements intact.      Conjunctiva/sclera: Conjunctivae normal.      Pupils: Pupils are equal, round, and reactive to light.   Cardiovascular:      Rate and Rhythm: Normal rate and regular rhythm.      Heart sounds: Normal heart sounds, S1 normal and S2 normal.   Pulmonary:      Effort: Pulmonary effort is normal.      Breath sounds: Normal breath sounds.   Abdominal:      General: Abdomen is flat. Bowel sounds are normal.      Palpations: Abdomen is soft.   Musculoskeletal:      Cervical back: Normal range of motion and neck supple.   Skin:     Comments: No skin rash observed   Neurological:      Mental Status: She is alert.      Cranial Nerves: Cranial nerves 2-12 are intact.      Sensory: Sensation is intact.      Motor: Motor function is intact.      Coordination: " Coordination is intact.   Psychiatric:         Attention and Perception: Attention normal.         Mood and Affect: Mood normal.         Speech: Speech normal.         Behavior: Behavior normal. Behavior is cooperative.         Cognition and Memory: Cognition normal.     Labs    Hospital Outpatient Visit on 03/26/2024   Component Date Value Ref Range Status    Protime 03/26/2024 12.1  9.8 - 12.8 seconds Final    INR 03/26/2024 1.1  0.9 - 1.1 Final    aPTT 03/26/2024 30  27 - 38 seconds Final    Bilirubin, Direct 03/26/2024 0.1  0.0 - 0.3 mg/dL Final    Creatine Kinase 03/26/2024 34  0 - 215 U/L Final    C-Reactive Protein 03/26/2024 8.80 (H)  <1.00 mg/dL Final    Ferritin 03/26/2024 175 (H)  8 - 150 ng/mL Final    Magnesium 03/26/2024 1.86  1.60 - 2.40 mg/dL Final    Phosphorus 03/26/2024 3.4  2.5 - 4.9 mg/dL Final    Glucose 03/26/2024 102 (H)  74 - 99 mg/dL Final    Sodium 03/26/2024 140  136 - 145 mmol/L Final    Potassium 03/26/2024 3.7  3.5 - 5.3 mmol/L Final    Chloride 03/26/2024 104  98 - 107 mmol/L Final    Bicarbonate 03/26/2024 28  21 - 32 mmol/L Final    Anion Gap 03/26/2024 12  10 - 20 mmol/L Final    Urea Nitrogen 03/26/2024 19  6 - 23 mg/dL Final    Creatinine 03/26/2024 0.71  0.50 - 1.05 mg/dL Final    eGFR 03/26/2024 >90  >60 mL/min/1.73m*2 Final    Calcium 03/26/2024 8.7  8.6 - 10.6 mg/dL Final    Albumin 03/26/2024 3.4  3.4 - 5.0 g/dL Final    Alkaline Phosphatase 03/26/2024 48  33 - 136 U/L Final    Total Protein 03/26/2024 5.9 (L)  6.4 - 8.2 g/dL Final    AST 03/26/2024 13  9 - 39 U/L Final    Bilirubin, Total 03/26/2024 0.5  0.0 - 1.2 mg/dL Final    ALT 03/26/2024 11  7 - 45 U/L Final    WBC 03/26/2024 7.1  4.4 - 11.3 x10*3/uL Final    nRBC 03/26/2024 0.0  0.0 - 0.0 /100 WBCs Final    RBC 03/26/2024 3.37 (L)  4.00 - 5.20 x10*6/uL Final    Hemoglobin 03/26/2024 10.8 (L)  12.0 - 16.0 g/dL Final    Hematocrit 03/26/2024 31.1 (L)  36.0 - 46.0 % Final    MCV 03/26/2024 92  80 - 100 fL Final    MCH  03/26/2024 32.0  26.0 - 34.0 pg Final    MCHC 03/26/2024 34.7  32.0 - 36.0 g/dL Final    RDW 03/26/2024 12.9  11.5 - 14.5 % Final    Platelets 03/26/2024 212  150 - 450 x10*3/uL Final    Cortisol  A.M. 03/26/2024 10.5  5.0 - 20.0 ug/dL Final    WBC 03/26/2024 6.3  4.4 - 11.3 x10*3/uL Final    nRBC 03/26/2024 0.0  0.0 - 0.0 /100 WBCs Final    RBC 03/26/2024 2.96 (L)  4.00 - 5.20 x10*6/uL Final    Hemoglobin 03/26/2024 9.4 (L)  12.0 - 16.0 g/dL Final    Hematocrit 03/26/2024 27.7 (L)  36.0 - 46.0 % Final    MCV 03/26/2024 94  80 - 100 fL Final    MCH 03/26/2024 31.8  26.0 - 34.0 pg Final    MCHC 03/26/2024 33.9  32.0 - 36.0 g/dL Final    RDW 03/26/2024 13.0  11.5 - 14.5 % Final    Platelets 03/26/2024 179  150 - 450 x10*3/uL Final    Neutrophils % 03/26/2024 69.4  40.0 - 80.0 % Final    Immature Granulocytes %, Automated 03/26/2024 0.5  0.0 - 0.9 % Final    Lymphocytes % 03/26/2024 16.9  13.0 - 44.0 % Final    Monocytes % 03/26/2024 10.0  2.0 - 10.0 % Final    Eosinophils % 03/26/2024 2.9  0.0 - 6.0 % Final    Basophils % 03/26/2024 0.3  0.0 - 2.0 % Final    Neutrophils Absolute 03/26/2024 4.35  1.20 - 7.70 x10*3/uL Final    Immature Granulocytes Absolute, Au* 03/26/2024 0.03  0.00 - 0.70 x10*3/uL Final    Lymphocytes Absolute 03/26/2024 1.06 (L)  1.20 - 4.80 x10*3/uL Final    Monocytes Absolute 03/26/2024 0.63  0.10 - 1.00 x10*3/uL Final    Eosinophils Absolute 03/26/2024 0.18  0.00 - 0.70 x10*3/uL Final    Basophils Absolute 03/26/2024 0.02  0.00 - 0.10 x10*3/uL Final   Hospital Outpatient Visit on 03/12/2024   Component Date Value Ref Range Status    WBC 03/12/2024 6.2  4.4 - 11.3 x10*3/uL Final    nRBC 03/12/2024 0.0  0.0 - 0.0 /100 WBCs Final    RBC 03/12/2024 3.71 (L)  4.00 - 5.20 x10*6/uL Final    Hemoglobin 03/12/2024 11.6 (L)  12.0 - 16.0 g/dL Final    Hematocrit 03/12/2024 34.9 (L)  36.0 - 46.0 % Final    MCV 03/12/2024 94  80 - 100 fL Final    MCH 03/12/2024 31.3  26.0 - 34.0 pg Final    MCHC 03/12/2024  33.2  32.0 - 36.0 g/dL Final    RDW 03/12/2024 13.2  11.5 - 14.5 % Final    Platelets 03/12/2024 237  150 - 450 x10*3/uL Final    Neutrophils % 03/12/2024 61.7  40.0 - 80.0 % Final    Immature Granulocytes %, Automated 03/12/2024 0.3  0.0 - 0.9 % Final    Lymphocytes % 03/12/2024 25.4  13.0 - 44.0 % Final    Monocytes % 03/12/2024 10.7  2.0 - 10.0 % Final    Eosinophils % 03/12/2024 1.6  0.0 - 6.0 % Final    Basophils % 03/12/2024 0.3  0.0 - 2.0 % Final    Neutrophils Absolute 03/12/2024 3.81  1.20 - 7.70 x10*3/uL Final    Immature Granulocytes Absolute, Au* 03/12/2024 0.02  0.00 - 0.70 x10*3/uL Final    Lymphocytes Absolute 03/12/2024 1.57  1.20 - 4.80 x10*3/uL Final    Monocytes Absolute 03/12/2024 0.66  0.10 - 1.00 x10*3/uL Final    Eosinophils Absolute 03/12/2024 0.10  0.00 - 0.70 x10*3/uL Final    Basophils Absolute 03/12/2024 0.02  0.00 - 0.10 x10*3/uL Final    Glucose 03/12/2024 102 (H)  74 - 99 mg/dL Final    Sodium 03/12/2024 139  136 - 145 mmol/L Final    Potassium 03/12/2024 3.9  3.5 - 5.3 mmol/L Final    Chloride 03/12/2024 105  98 - 107 mmol/L Final    Bicarbonate 03/12/2024 25  21 - 32 mmol/L Final    Anion Gap 03/12/2024 13  10 - 20 mmol/L Final    Urea Nitrogen 03/12/2024 20  6 - 23 mg/dL Final    Creatinine 03/12/2024 0.72  0.50 - 1.05 mg/dL Final    eGFR 03/12/2024 >90  >60 mL/min/1.73m*2 Final    Calcium 03/12/2024 9.2  8.6 - 10.6 mg/dL Final    Albumin 03/12/2024 3.8  3.4 - 5.0 g/dL Final    Alkaline Phosphatase 03/12/2024 50  33 - 136 U/L Final    Total Protein 03/12/2024 6.0 (L)  6.4 - 8.2 g/dL Final    AST 03/12/2024 17  9 - 39 U/L Final    Bilirubin, Total 03/12/2024 0.5  0.0 - 1.2 mg/dL Final    ALT 03/12/2024 15  7 - 45 U/L Final    Adrenocorticotropic Hormone (ACTH) 03/12/2024 3.5 (L)  7.2 - 63.3 pg/mL Final    Amylase 03/12/2024 32  29 - 103 U/L Final    aPTT 03/12/2024 29  27 - 38 seconds Final    Bilirubin, Direct 03/12/2024 0.1  0.0 - 0.3 mg/dL Final    Creatine Kinase 03/12/2024 40  0  - 215 U/L Final    Cortisol 03/12/2024 0.7 (L)  2.5 - 20.0 ug/dL Final    C-Reactive Protein 03/12/2024 0.28  <1.00 mg/dL Final    Estradiol 03/12/2024 <19  pg/mL Final    Ferritin 03/12/2024 62  8 - 150 ng/mL Final    Follicle Stimulating Hormone 03/12/2024 30.0  IU/L Final    Luteinizing Hormone 03/12/2024 7.8  IU/L Final    Lipase 03/12/2024 30  9 - 82 U/L Final    Magnesium 03/12/2024 2.02  1.60 - 2.40 mg/dL Final    Phosphorus 03/12/2024 4.0  2.5 - 4.9 mg/dL Final    Protime 03/12/2024 10.6  9.8 - 12.8 seconds Final    INR 03/12/2024 0.9  0.9 - 1.1 Final    Thyroid Stimulating Hormone 03/12/2024 2.39  0.44 - 3.98 mIU/L Final    Thyroxine, Free 03/12/2024 0.87  0.78 - 1.48 ng/dL Final    Color, Urine 03/12/2024 Dark-Yellow  Light-Yellow, Yellow, Dark-Yellow Final    Appearance, Urine 03/12/2024 Clear  Clear Final    Specific Gravity, Urine 03/12/2024 1.025  1.005 - 1.035 Final    pH, Urine 03/12/2024 6.5  5.0, 5.5, 6.0, 6.5, 7.0, 7.5, 8.0 Final    Protein, Urine 03/12/2024 10 (TRACE)  NEGATIVE, 10 (TRACE), 20 (TRACE) mg/dL Final    Glucose, Urine 03/12/2024 Normal  Normal mg/dL Final    Blood, Urine 03/12/2024 NEGATIVE  NEGATIVE Final    Ketones, Urine 03/12/2024 NEGATIVE  NEGATIVE mg/dL Final    Bilirubin, Urine 03/12/2024 NEGATIVE  NEGATIVE Final    Urobilinogen, Urine 03/12/2024 Normal  Normal mg/dL Final    Nitrite, Urine 03/12/2024 NEGATIVE  NEGATIVE Final    Leukocyte Esterase, Urine 03/12/2024 NEGATIVE  NEGATIVE Final    WBC, Urine 03/12/2024 1-5  1-5, NONE /HPF Final    RBC, Urine 03/12/2024 1-2  NONE, 1-2, 3-5 /HPF Final    Squamous Epithelial Cells, Urine 03/12/2024 10-25 (FEW)  Reference range not established. /HPF Final    Budding Yeast, Urine 03/12/2024 PRESENT (A)  NONE /HPF Final    Mucus, Urine 03/12/2024 2+  Reference range not established. /LPF Final    Vitamin D, 25-Hydroxy, Total 03/12/2024 39  30 - 100 ng/mL Final    Extra Tube 03/12/2024 Hold for add-ons.   Final    Extra Tube 03/12/2024  Hold for add-ons.   Final    Extra Tube 03/12/2024 Hold for add-ons.   Final    Extra Tube 03/12/2024 Hold for add-ons.   Final   Hospital Outpatient Visit on 02/27/2024   Component Date Value Ref Range Status    WBC 02/27/2024 5.0  4.4 - 11.3 x10*3/uL Final    nRBC 02/27/2024 0.0  0.0 - 0.0 /100 WBCs Final    RBC 02/27/2024 3.72 (L)  4.00 - 5.20 x10*6/uL Final    Hemoglobin 02/27/2024 11.7 (L)  12.0 - 16.0 g/dL Final    Hematocrit 02/27/2024 34.0 (L)  36.0 - 46.0 % Final    MCV 02/27/2024 91  80 - 100 fL Final    MCH 02/27/2024 31.5  26.0 - 34.0 pg Final    MCHC 02/27/2024 34.4  32.0 - 36.0 g/dL Final    RDW 02/27/2024 12.9  11.5 - 14.5 % Final    Platelets 02/27/2024 195  150 - 450 x10*3/uL Final    Neutrophils % 02/27/2024 62.2  40.0 - 80.0 % Final    Immature Granulocytes %, Automated 02/27/2024 0.4  0.0 - 0.9 % Final    Lymphocytes % 02/27/2024 21.9  13.0 - 44.0 % Final    Monocytes % 02/27/2024 10.5  2.0 - 10.0 % Final    Eosinophils % 02/27/2024 4.2  0.0 - 6.0 % Final    Basophils % 02/27/2024 0.8  0.0 - 2.0 % Final    Neutrophils Absolute 02/27/2024 3.13  1.20 - 7.70 x10*3/uL Final    Immature Granulocytes Absolute, Au* 02/27/2024 0.02  0.00 - 0.70 x10*3/uL Final    Lymphocytes Absolute 02/27/2024 1.10 (L)  1.20 - 4.80 x10*3/uL Final    Monocytes Absolute 02/27/2024 0.53  0.10 - 1.00 x10*3/uL Final    Eosinophils Absolute 02/27/2024 0.21  0.00 - 0.70 x10*3/uL Final    Basophils Absolute 02/27/2024 0.04  0.00 - 0.10 x10*3/uL Final    Glucose 02/27/2024 94  74 - 99 mg/dL Final    Sodium 02/27/2024 140  136 - 145 mmol/L Final    Potassium 02/27/2024 3.7  3.5 - 5.3 mmol/L Final    Chloride 02/27/2024 106  98 - 107 mmol/L Final    Bicarbonate 02/27/2024 26  21 - 32 mmol/L Final    Anion Gap 02/27/2024 12  10 - 20 mmol/L Final    Urea Nitrogen 02/27/2024 19  6 - 23 mg/dL Final    Creatinine 02/27/2024 0.79  0.50 - 1.05 mg/dL Final    eGFR 02/27/2024 82  >60 mL/min/1.73m*2 Final    Calcium 02/27/2024 9.1  8.6 -  10.6 mg/dL Final    Albumin 02/27/2024 3.6  3.4 - 5.0 g/dL Final    Alkaline Phosphatase 02/27/2024 45  33 - 136 U/L Final    Total Protein 02/27/2024 5.6 (L)  6.4 - 8.2 g/dL Final    AST 02/27/2024 15  9 - 39 U/L Final    Bilirubin, Total 02/27/2024 0.8  0.0 - 1.2 mg/dL Final    ALT 02/27/2024 11  7 - 45 U/L Final    aPTT 02/27/2024 32  27 - 38 seconds Final    Bilirubin, Direct 02/27/2024 0.1  0.0 - 0.3 mg/dL Final    Creatine Kinase 02/27/2024 54  0 - 215 U/L Final    C-Reactive Protein 02/27/2024 <0.10  <1.00 mg/dL Final    Ferritin 02/27/2024 76  8 - 150 ng/mL Final    Magnesium 02/27/2024 1.92  1.60 - 2.40 mg/dL Final    Phosphorus 02/27/2024 3.9  2.5 - 4.9 mg/dL Final    Protime 02/27/2024 11.6  9.8 - 12.8 seconds Final    INR 02/27/2024 1.0  0.9 - 1.1 Final    Vitamin D, 25-Hydroxy, Total 02/27/2024 35  30 - 100 ng/mL Final                 Performance Status:    ECOG 1: Restricted in physically strenuous activity but ambulatory and able to carry out work of a light or sedentary nature, e.g., light house work, office work.         Assessment/Plan      Mrs. Branch is a 69 yo with COPD and GERD who presented with newly diagnosed SCLC completed BID radiation planned concurrently with cis/etoposide but had a reaction C2D1 to cisplatin. Completed 1 cycle carbo/etop D1 4/5/22 also complicated by facial flushing and erythematous rash and stopped further treatment. Now s/p PCI in 6/2022. Most recent CT concerning for disease progression. Referred for clinical trial AMGN 1518, C1D1 1/24/23. Treatment has been complicated by orthostatic hypotension, worsening short-term memory, increased lethargy, weight loss, and poor appetite. Delayed C2 by 1 week and dose-reduced. Confusion has resolved during C3 after stopping mirtazapine and dose reduction.     # SCLC  - limited stage, brain MRI negative  - previously discussed concurrent chemoradiation, with cisplatin/etoposide with side effects including but not limited to  allergic reaction, fatigue, risk of infection, tinnitus, neuropathy, injury to liver/kidney, risk of anemia/thrombocytopenia and was consented  - she was also interested in scalp cooling, which unfortunately she is not a candidate for d/t SCLC  - started concurrent chemoradiation BID with Q3week cis/etop on 2/15 at [facility]  -unfortunately had reaction to cisplatin on C2D1 resulting in hospitalization and also felt to be septic due to pneumonia  - discussed that we typically do 3-4 cycles chemotherapy, and alternative regimens include carboplatin/etoposide vs CAV. Given reaction to cisplatin, concern for possible reaction to carboplatin as well, although unknown rates of cross reaction. Alternatively, they also asked about discontinuation of chemotherapy, since she completed radiation. She ultimately decided to trial carbo/etoposide. She is aware of the heightened risk of allergic reaction, as well as other side effects including but not limited to fatigue, risk of infection, neuropathy, injury to liver/kidney, risk of anemia/thrombocytopenia. consented 3/28/22  -s/p 1 cycle Carbo/Etop D1 4/5/22, developed facial flushing and erythematous rash on arms and chest s/p treatment, resolved with benadryl  -opted to forego further chemotherapy and will continue on maintenance  - s/p PCI 6/2022  - CT C/A/P and brain MRI 5/2022 and most recently 8/2022 with good response and no disease  -  MRI brain 11/7 without evidence of metastatic disease  - CT C/A/P 11/3 with multiple new enlarged LN concerning for disease progression - discussed with Dr. Valdivia not able to repeat radiation.  - referred to phase 1 clinical trial and consideration for [id number] or [id number]  - 1.5.2023 : Patient signed consent to take part on phase 1 study [id number]. Look clinically good to take part in study. Will start on study ASAP if eligible.   - 1.24.2023: Seen today and ready to start trial on [id number].  - 1.31.2023: Seen today, ready for C1D8 AMGN  1518   - 2.7.2023: Seen today for C1D15 [id number] - continue with trial   - 2.14.2023: Seen in follow-up on [id number] with overall worsening of general health  - 2.21.2023: Seen today for C2D1 AMGN 1518 with continued weight loss although perhaps starting to plateau, more energy since being off treatment, still poor appetite. will delay by 1 week, started dexamethasone 2 mg daily, and consider dose reduction.  - 2.28.2023: Seen today for C2D1 AMGN 1518 after delaying for 1 week. Energy level and appetite are improved, although still losing a little weight. Confusion is worse today, possibly due to mirtazapine vs study-related. Will dose reduce today.  - 3.7.2023: Seen today C2D8 AMGN 1518 after dose-reduction last week. Confusion is mildly improved, energy level and appetite are stable. Will add marinol for appetite.   - 3.14.2023: Seen today C2D15 AMGN 1518. Overall improved: confusion continues to improve, energy level and appetite are improved. Weight is improved. Will continue dexamethasone and marinol.  - 3.15.2023: C2D16 No CRS symptoms observed after last treatment Overall Symptoms are improving and she is tolerating treatment.  -3.16.2023: C2D17 No CRS symptoms observed after last treatment Overall Symptoms are improving and she is tolerating treatment.  - 3.28.2023 : Most recent imaging suggest patient benefiting from current treatment. Ongoing symptom  possible common cold/season allergies. Since the patient looks clinically good we will proceed with C3D1 today. Educated patient to call the office ASAP if symptoms worsen.   - 4.11.2023: C3D15 Feeling well and overall improved with fatigue, confusion, anorexia. Proceed at current dosing and weaning steroids as below  -4.25.2023: C4D1 Pt is feeling well, no complaints of fatigue, confusion, memory loss. Has gained weight. Energy levels are good. Proceed with the current dosing. Pt is no longer on steroids.   -5.9.2023: C4D15. Pt is tolerating treatment well  with no new complains. Continues to have good energy level endorses poor appetite with out any weight loss. Suggested to start taking the dronabinol.  Will proceed with treatment today.  -5.23.2023: C5D1. Pt is tolerating treatment well. Energy levels are good, is having some weight loss and constipation. Started back on dex for appetite. Pt will let us know if she remains constipated over the next several days.  Personally reviewed scans with stable disease. Will proceed with treatment today.   -6.6.2023: C5D15. Continues to tolerate treatment well. Since we restarted her on Dexamethasone she reports her energy levels and appetite has improved. Will continue on low dose Dex. With no new symptoms, we will proceed with treatment today.   -6.20.2023: C6D1. Doing well on dexamethasone 1 mg daily. Will proceed with treatment.  -7.5.2023: C6D15. We will proceed with treatment since the patient is tolerating treatment with no significant AE's and also give 1/2 liter of IV fluids.   - 7.18.2023: C7D1. Doing well, will proceed with treatment. She is out of dexamethasone, and will monitor off steroids.   -8.1.2023: C7D15. Continues to tolerate treatment. Will proceed with C7D15 today. RTC per protocol.  - 8.29.2023: C8D1. C8 delayed due to hospitalization for diverticulitis. Has completed antibiotics and feeling well for treatment.  - 9.12.2023: C8D15. Most recent imgaing suggest the patient continues to benefit from current treatment. The imaging also suggest improvement of previously visualized segmental colitis associated with diverticulosis affecting the sigmoid colon with minimal residual pericolonic fat standing and hyperemia of the sigmoid colon. Imaging also suggest resolving inflammatory process without evidence of new or worsening complications. We will proceed with C8D15 today since the patient is also clinically feeling good. RTC per protocol.  -9.26.2023: C9D1. Patient looks clinically good. With no JACQUELYN's we  will proceed with treatment C9D1 today.  -10.24.23: C10D1. Pt feels well, no acute events, no TRAEs, continue treatment today as scheduled.  -11.7.23: C10D15. Pt feels well, no TRAEs, continue treatment as scheduled.  - 11.21.23: C11D1. Continues to feel well, will continue treatment today.  - 12.19.23: C12D1. Personally reviewed most recent scan without evidence of progression. Continues to feel well and will continue with treatment today.  - 01.02.24: C12D15. Continues to tolerate treatment well. No new JACQUELYN's will proceed with treatment. RTC per protocol.  - 01.16.24: C13D1. No new JACQUELYN's proceed with treatment. RTC per protocol.   - 01.30.24: C13D15. Continues to feel well. Proceed with treatment.  - 2.13.24: C14D1. Personally reviewed most recent scan without evidence of progression. Continue with treatment today. RTC per protocol.  - 2.27.24: C14D15. Continues to feel good and tolerate treatment without any JACQUELYN's. We will proceed with treatment today. RTC per protocol.  - 3.12.24: C15D1. Continues to feel well and tolerate treatment without new JACQUELYN's. Will continue on treatment.  - 3.26.24: C15D15. She feels good today. Will give her IV fluids. Will continue on treatment. RTC in per protocol with scan prior to next visit.    # Confusion - resolved  - significantly worsened after taking double dose of mirtazapine accidentally, although has been generally down-trending since starting study (which is also when she started taking mirtazapine) and now resolved  - brain MRI without progression of cancer  - will continue off mirtazapine    # Unintentional weight loss - stable  # Anorexia- Improving  # Dysgeusia- improving  - all improved on steroids. unclear if this is from cancer or side effect of study drug  - stopped mirtazapine due to concerns for confusion   - weaned off dexamethasone and started marinol, but kept forgetting to take it  - dexamethasone trial started again on 5.23.2023. Continued on 1 mg daily  - will trial off after 7.18.23.     # Fatigue - Resolved  - back to normal pretty much, weaning steroids as above    # frequent PVCs - asymptomatic  - saw onco-cardiology and completed 24-hr Holter monitor  - recommended decreasing caffeine and sudafed intake  - continue to monitor    # Forgetfulness - improved - however noted decline on AMGN study- unclear etiology.    - started after PCI, on memantine daily and has since stopped  - offered neurocognitive evaluation previously and she will think about this and readdress next visit  -  notes some decline in last 3 weeks- especially short term memory loss.    - Improved    # Neuropathy - resolved  - mild peripheral neuropathy with numbness of the toes - likely due to cisplatin  - will continue to monitor closely  - not endorsing any neuropathy at this visit     # Rash - resolved  Waxing and waning rash throughout her body. ?improving. Unclear etiology, patient and  feel timing coincided with starting BMX.   - she will hold off on further BMX  - thought to be due to sulfa allergy, possibly due to platinum agents   - continue to monitor    #odynophagia - resolved  -rad onc aware  -prescribed BMX solution  -will continue to monitor    #constipation-improving  -occasional. Prescribed Senna S  -will monitor    CASEY Stone-CNP

## 2024-03-28 DIAGNOSIS — C34.90 MALIGNANT NEOPLASM OF LUNG, UNSPECIFIED LATERALITY, UNSPECIFIED PART OF LUNG (MULTI): Primary | ICD-10-CM

## 2024-03-28 DIAGNOSIS — Z00.6 EXAM FOR CLINICAL TRIAL: ICD-10-CM

## 2024-03-28 LAB — ACTH PLAS-MCNC: 22.7 PG/ML (ref 7.2–63.3)

## 2024-04-03 ENCOUNTER — HOSPITAL ENCOUNTER (OUTPATIENT)
Dept: RADIOLOGY | Facility: HOSPITAL | Age: 69
Discharge: HOME | End: 2024-04-03
Payer: MEDICARE

## 2024-04-03 ENCOUNTER — ANESTHESIA EVENT (OUTPATIENT)
Dept: GASTROENTEROLOGY | Facility: HOSPITAL | Age: 69
End: 2024-04-03
Payer: MEDICARE

## 2024-04-03 DIAGNOSIS — C34.90 MALIGNANT NEOPLASM OF LUNG, UNSPECIFIED LATERALITY, UNSPECIFIED PART OF LUNG (MULTI): ICD-10-CM

## 2024-04-03 DIAGNOSIS — Z00.6 CLINICAL TRIAL EXAM: ICD-10-CM

## 2024-04-03 PROCEDURE — 71260 CT THORAX DX C+: CPT | Performed by: RADIOLOGY

## 2024-04-03 PROCEDURE — 2550000001 HC RX 255 CONTRASTS: Performed by: STUDENT IN AN ORGANIZED HEALTH CARE EDUCATION/TRAINING PROGRAM

## 2024-04-03 PROCEDURE — 74177 CT ABD & PELVIS W/CONTRAST: CPT | Performed by: RADIOLOGY

## 2024-04-03 PROCEDURE — 71260 CT THORAX DX C+: CPT

## 2024-04-03 RX ADMIN — IOHEXOL 75 ML: 350 INJECTION, SOLUTION INTRAVENOUS at 10:44

## 2024-04-04 ENCOUNTER — ANESTHESIA (OUTPATIENT)
Dept: GASTROENTEROLOGY | Facility: HOSPITAL | Age: 69
End: 2024-04-04
Payer: MEDICARE

## 2024-04-04 ENCOUNTER — HOSPITAL ENCOUNTER (OUTPATIENT)
Dept: GASTROENTEROLOGY | Facility: HOSPITAL | Age: 69
Discharge: HOME | End: 2024-04-04
Payer: MEDICARE

## 2024-04-04 VITALS
TEMPERATURE: 98 F | SYSTOLIC BLOOD PRESSURE: 113 MMHG | RESPIRATION RATE: 19 BRPM | BODY MASS INDEX: 21.6 KG/M2 | OXYGEN SATURATION: 94 % | HEIGHT: 60 IN | HEART RATE: 63 BPM | DIASTOLIC BLOOD PRESSURE: 86 MMHG | WEIGHT: 110 LBS

## 2024-04-04 DIAGNOSIS — K57.92 DIVERTICULITIS: ICD-10-CM

## 2024-04-04 DIAGNOSIS — R06.6 HICCUPS: ICD-10-CM

## 2024-04-04 DIAGNOSIS — R11.10 VOMITING, UNSPECIFIED VOMITING TYPE, UNSPECIFIED WHETHER NAUSEA PRESENT: ICD-10-CM

## 2024-04-04 DIAGNOSIS — R10.9 ABDOMINAL PAIN, UNSPECIFIED ABDOMINAL LOCATION: ICD-10-CM

## 2024-04-04 PROCEDURE — 88305 TISSUE EXAM BY PATHOLOGIST: CPT | Performed by: STUDENT IN AN ORGANIZED HEALTH CARE EDUCATION/TRAINING PROGRAM

## 2024-04-04 PROCEDURE — 7100000010 HC PHASE TWO TIME - EACH INCREMENTAL 1 MINUTE

## 2024-04-04 PROCEDURE — 2500000004 HC RX 250 GENERAL PHARMACY W/ HCPCS (ALT 636 FOR OP/ED): Performed by: NURSE ANESTHETIST, CERTIFIED REGISTERED

## 2024-04-04 PROCEDURE — 7100000009 HC PHASE TWO TIME - INITIAL BASE CHARGE

## 2024-04-04 PROCEDURE — 2500000004 HC RX 250 GENERAL PHARMACY W/ HCPCS (ALT 636 FOR OP/ED): Performed by: SURGERY

## 2024-04-04 PROCEDURE — 3700000002 HC GENERAL ANESTHESIA TIME - EACH INCREMENTAL 1 MINUTE

## 2024-04-04 PROCEDURE — 88342 IMHCHEM/IMCYTCHM 1ST ANTB: CPT | Performed by: STUDENT IN AN ORGANIZED HEALTH CARE EDUCATION/TRAINING PROGRAM

## 2024-04-04 PROCEDURE — 3700000001 HC GENERAL ANESTHESIA TIME - INITIAL BASE CHARGE

## 2024-04-04 PROCEDURE — 88305 TISSUE EXAM BY PATHOLOGIST: CPT | Mod: TC,PORLAB | Performed by: SURGERY

## 2024-04-04 PROCEDURE — 45378 DIAGNOSTIC COLONOSCOPY: CPT | Performed by: SURGERY

## 2024-04-04 PROCEDURE — 2500000005 HC RX 250 GENERAL PHARMACY W/O HCPCS: Performed by: NURSE ANESTHETIST, CERTIFIED REGISTERED

## 2024-04-04 PROCEDURE — 43239 EGD BIOPSY SINGLE/MULTIPLE: CPT | Performed by: SURGERY

## 2024-04-04 RX ORDER — FENTANYL CITRATE 50 UG/ML
INJECTION, SOLUTION INTRAMUSCULAR; INTRAVENOUS AS NEEDED
Status: DISCONTINUED | OUTPATIENT
Start: 2024-04-04 | End: 2024-04-04

## 2024-04-04 RX ORDER — LIDOCAINE HCL/PF 100 MG/5ML
SYRINGE (ML) INTRAVENOUS AS NEEDED
Status: DISCONTINUED | OUTPATIENT
Start: 2024-04-04 | End: 2024-04-04

## 2024-04-04 RX ORDER — SODIUM CHLORIDE 9 MG/ML
20 INJECTION, SOLUTION INTRAVENOUS CONTINUOUS
Status: DISCONTINUED | OUTPATIENT
Start: 2024-04-04 | End: 2024-04-05 | Stop reason: HOSPADM

## 2024-04-04 RX ORDER — PROPOFOL 10 MG/ML
INJECTION, EMULSION INTRAVENOUS AS NEEDED
Status: DISCONTINUED | OUTPATIENT
Start: 2024-04-04 | End: 2024-04-04

## 2024-04-04 RX ADMIN — SODIUM CHLORIDE 20 ML/HR: 9 INJECTION, SOLUTION INTRAVENOUS at 12:42

## 2024-04-04 RX ADMIN — FENTANYL CITRATE 50 MCG: 50 INJECTION INTRAMUSCULAR; INTRAVENOUS at 12:56

## 2024-04-04 RX ADMIN — LIDOCAINE HYDROCHLORIDE 30 MG: 20 INJECTION, SOLUTION INTRAVENOUS at 12:57

## 2024-04-04 RX ADMIN — LIDOCAINE HYDROCHLORIDE 30 MG: 20 INJECTION, SOLUTION INTRAVENOUS at 12:56

## 2024-04-04 RX ADMIN — PROPOFOL 30 MG: 10 INJECTION, EMULSION INTRAVENOUS at 12:56

## 2024-04-04 RX ADMIN — PROPOFOL 30 MG: 10 INJECTION, EMULSION INTRAVENOUS at 12:57

## 2024-04-04 RX ADMIN — FENTANYL CITRATE 50 MCG: 50 INJECTION INTRAMUSCULAR; INTRAVENOUS at 12:57

## 2024-04-04 SDOH — HEALTH STABILITY: MENTAL HEALTH: CURRENT SMOKER: 0

## 2024-04-04 ASSESSMENT — PAIN SCALES - GENERAL
PAINLEVEL_OUTOF10: 0 - NO PAIN

## 2024-04-04 ASSESSMENT — COLUMBIA-SUICIDE SEVERITY RATING SCALE - C-SSRS
2. HAVE YOU ACTUALLY HAD ANY THOUGHTS OF KILLING YOURSELF?: NO
1. IN THE PAST MONTH, HAVE YOU WISHED YOU WERE DEAD OR WISHED YOU COULD GO TO SLEEP AND NOT WAKE UP?: NO
6. HAVE YOU EVER DONE ANYTHING, STARTED TO DO ANYTHING, OR PREPARED TO DO ANYTHING TO END YOUR LIFE?: NO

## 2024-04-04 ASSESSMENT — PAIN - FUNCTIONAL ASSESSMENT
PAIN_FUNCTIONAL_ASSESSMENT: 0-10

## 2024-04-04 NOTE — ANESTHESIA POSTPROCEDURE EVALUATION
Patient: Gladys Branch    Procedure Summary       Date: 04/04/24 Room / Location: [facility]    Anesthesia Start: 1247 Anesthesia Stop: 1330    Procedures:       EGD      COLONOSCOPY Diagnosis:       Vomiting, unspecified vomiting type, unspecified whether nausea present      Abdominal pain, unspecified abdominal location      Hiccups      Diverticulitis    Scheduled Providers: Ashwin Geller MD Responsible Provider: CHIKA Goodman    Anesthesia Type: MAC ASA Status: 3            Anesthesia Type: MAC    Vitals Value Taken Time   /75 04/04/24 1324   Temp 36.9 °C (98.4 °F) 04/04/24 1313   Pulse 62 04/04/24 1324   Resp 19 04/04/24 1324   SpO2 95 % 04/04/24 1324       Anesthesia Post Evaluation    Patient location during evaluation: bedside  Patient participation: complete - patient participated  Level of consciousness: awake  Pain management: adequate  Airway patency: patent  Cardiovascular status: acceptable  Respiratory status: acceptable  Hydration status: acceptable  Postoperative Nausea and Vomiting: none    There were no known notable events for this encounter.

## 2024-04-04 NOTE — H&P
History Of Present Illness  Gladys Branch is a 68 y.o. female presenting with abd pain, hx diverticulitis.     Past Medical History  She has a past medical history of Disease due to severe acute respiratory syndrome coronavirus 2 (SARS-CoV-2) (02/07/2023), Other specified abnormal findings of blood chemistry (07/26/2022), Other specified abnormal findings of blood chemistry (07/26/2022), Other specified abnormal findings of blood chemistry (07/26/2022), Other specified abnormal findings of blood chemistry (07/26/2022), Other specified abnormal findings of blood chemistry (07/26/2022), Other specified abnormal findings of blood chemistry (07/26/2022), Other specified abnormal findings of blood chemistry (09/15/2020), and Personal history of other diseases of the respiratory system.    Surgical History  She has a past surgical history that includes Other surgical history (03/09/2020); Other surgical history (03/16/2021); Other surgical history (03/16/2021); and US guided biopsy lymph node superficial (1/17/2023).     Social History  She reports that she quit smoking about 25 years ago. Her smoking use included cigarettes. She has a 25.00 pack-year smoking history. She has never used smokeless tobacco. She reports current alcohol use of about 1.0 - 2.0 standard drink of alcohol per week. She reports that she does not use drugs.    Family History  Family History   Problem Relation Name Age of Onset    Dementia Mother      Depression Mother      Other (varicose veins of lower extremity) Mother      Alcohol abuse Father      Breast cancer Father's Sister          Allergies  Cisplatin, Codeine, and Sulfa (sulfonamide antibiotics)    Review of Systems   All other systems reviewed and are negative.       Physical Exam  Vitals reviewed.   Cardiovascular:      Rate and Rhythm: Normal rate and regular rhythm.   Pulmonary:      Breath sounds: Normal breath sounds.   Skin:     General: Skin is warm and dry.   Neurological:       Mental Status: She is alert.   Psychiatric:         Mood and Affect: Mood normal.          Last Recorded Vitals  Blood pressure 104/79, pulse 80, temperature 36.7 °C (98 °F), temperature source Temporal, resp. rate 18, height 1.524 m (5'), weight 49.9 kg (110 lb), SpO2 97 %.    Relevant Results               Assessment/Plan   Active Problems:  There are no active Hospital Problems.      For egd / colo       I spent 15 minutes in the professional and overall care of this patient.      Ashwin Geller MD

## 2024-04-04 NOTE — ANESTHESIA PREPROCEDURE EVALUATION
Patient: Gladys Branch    Procedure Information       Date/Time: 04/04/24 1245    Scheduled providers: Ashwin Geller MD    Procedures:       EGD      COLONOSCOPY    Location: [facility]            Relevant Problems   Anesthesia (within normal limits)      Cardiac   (+) Abnormal EKG   (+) Frequent unifocal PVCs   (+) Hyperlipidemia      Pulmonary   (+) COPD (chronic obstructive pulmonary disease) (CMS/HCC)   (+) Small cell lung cancer (CMS/HCC)      Neuro   (+) Peripheral neuropathy due to and not concurrent with chemotherapy (CMS/HCC)      GI   (+) Chronic GERD      Hematology   (+) Anemia, chronic disease       Clinical information reviewed:   Tobacco  Allergies    Med Hx  Surg Hx   Fam Hx  Soc Hx        NPO Detail:  NPO/Void Status  Date of Last Liquid: 04/04/24  Time of Last Liquid: 1015  Date of Last Solid: 04/02/24  Last Intake Type: Clear fluids         Physical Exam    Airway  Mallampati: II     Cardiovascular - normal exam     Dental    Pulmonary - normal exam     Abdominal            Anesthesia Plan    History of general anesthesia?: yes  History of complications of general anesthesia?: no    ASA 3     MAC     The patient is not a current smoker.    Anesthetic plan and risks discussed with patient.  Use of blood products discussed with who consented to blood products.

## 2024-04-08 DIAGNOSIS — C34.90 SMALL CELL LUNG CANCER (MULTI): Primary | ICD-10-CM

## 2024-04-08 RX ORDER — ALBUTEROL SULFATE 0.83 MG/ML
3 SOLUTION RESPIRATORY (INHALATION) AS NEEDED
Status: CANCELLED | OUTPATIENT
Start: 2024-04-09

## 2024-04-08 RX ORDER — DIPHENHYDRAMINE HYDROCHLORIDE 50 MG/ML
50 INJECTION INTRAMUSCULAR; INTRAVENOUS AS NEEDED
Status: CANCELLED | OUTPATIENT
Start: 2024-04-09

## 2024-04-08 RX ORDER — EPINEPHRINE 1 MG/ML
0.3 INJECTION INTRAMUSCULAR; INTRAVENOUS; SUBCUTANEOUS EVERY 5 MIN PRN
Status: CANCELLED | OUTPATIENT
Start: 2024-04-09

## 2024-04-08 RX ORDER — ALBUTEROL SULFATE 0.83 MG/ML
3 SOLUTION RESPIRATORY (INHALATION) AS NEEDED
Status: CANCELLED | OUTPATIENT
Start: 2024-04-23

## 2024-04-08 RX ORDER — DIPHENHYDRAMINE HYDROCHLORIDE 50 MG/ML
50 INJECTION INTRAMUSCULAR; INTRAVENOUS AS NEEDED
Status: CANCELLED | OUTPATIENT
Start: 2024-04-23

## 2024-04-08 RX ORDER — FAMOTIDINE 10 MG/ML
20 INJECTION INTRAVENOUS ONCE AS NEEDED
Status: CANCELLED | OUTPATIENT
Start: 2024-04-09

## 2024-04-08 RX ORDER — EPINEPHRINE 1 MG/ML
0.3 INJECTION INTRAMUSCULAR; INTRAVENOUS; SUBCUTANEOUS EVERY 5 MIN PRN
Status: CANCELLED | OUTPATIENT
Start: 2024-04-23

## 2024-04-08 RX ORDER — FAMOTIDINE 10 MG/ML
20 INJECTION INTRAVENOUS ONCE AS NEEDED
Status: CANCELLED | OUTPATIENT
Start: 2024-04-23

## 2024-04-09 ENCOUNTER — HOSPITAL ENCOUNTER (OUTPATIENT)
Dept: RESEARCH | Facility: HOSPITAL | Age: 69
Discharge: HOME | End: 2024-04-09
Payer: MEDICARE

## 2024-04-09 ENCOUNTER — NUTRITION (OUTPATIENT)
Dept: HEMATOLOGY/ONCOLOGY | Facility: HOSPITAL | Age: 69
End: 2024-04-09
Payer: MEDICARE

## 2024-04-09 VITALS
HEART RATE: 78 BPM | RESPIRATION RATE: 16 BRPM | DIASTOLIC BLOOD PRESSURE: 59 MMHG | BODY MASS INDEX: 21.14 KG/M2 | WEIGHT: 108.25 LBS | OXYGEN SATURATION: 97 % | SYSTOLIC BLOOD PRESSURE: 98 MMHG | TEMPERATURE: 97.5 F

## 2024-04-09 DIAGNOSIS — E55.9 VITAMIN D DEFICIENCY: ICD-10-CM

## 2024-04-09 DIAGNOSIS — Z00.6 CLINICAL TRIAL PARTICIPANT: Primary | ICD-10-CM

## 2024-04-09 DIAGNOSIS — I49.3 FREQUENT UNIFOCAL PVCS: ICD-10-CM

## 2024-04-09 DIAGNOSIS — C34.90 SMALL CELL CARCINOMA OF LUNG (MULTI): ICD-10-CM

## 2024-04-09 DIAGNOSIS — Z00.6 CLINICAL TRIAL PARTICIPANT: ICD-10-CM

## 2024-04-09 DIAGNOSIS — R94.31 ABNORMAL EKG: ICD-10-CM

## 2024-04-09 DIAGNOSIS — G62.0 PERIPHERAL NEUROPATHY DUE TO AND NOT CONCURRENT WITH CHEMOTHERAPY (MULTI): ICD-10-CM

## 2024-04-09 DIAGNOSIS — K21.9 CHRONIC GERD: ICD-10-CM

## 2024-04-09 DIAGNOSIS — D63.8 ANEMIA, CHRONIC DISEASE: ICD-10-CM

## 2024-04-09 DIAGNOSIS — E78.5 HYPERLIPIDEMIA: ICD-10-CM

## 2024-04-09 DIAGNOSIS — R73.01 IMPAIRED FASTING BLOOD SUGAR: ICD-10-CM

## 2024-04-09 DIAGNOSIS — Z91.89 FRAMINGHAM CARDIAC RISK <10% IN NEXT 10 YEARS: ICD-10-CM

## 2024-04-09 DIAGNOSIS — J44.9 COPD (CHRONIC OBSTRUCTIVE PULMONARY DISEASE) (MULTI): ICD-10-CM

## 2024-04-09 DIAGNOSIS — Z00.00 MEDICARE ANNUAL WELLNESS VISIT, SUBSEQUENT: ICD-10-CM

## 2024-04-09 DIAGNOSIS — K59.09 CHRONIC CONSTIPATION: Primary | ICD-10-CM

## 2024-04-09 DIAGNOSIS — J30.2 SEASONAL ALLERGIC RHINITIS: ICD-10-CM

## 2024-04-09 DIAGNOSIS — R93.1 AGATSTON CAC SCORE, <100: ICD-10-CM

## 2024-04-09 DIAGNOSIS — C34.90 SMALL CELL LUNG CANCER (MULTI): ICD-10-CM

## 2024-04-09 DIAGNOSIS — Z87.19 HISTORY OF DIVERTICULITIS: ICD-10-CM

## 2024-04-09 DIAGNOSIS — F10.90 PROBLEM DRINKING: ICD-10-CM

## 2024-04-09 DIAGNOSIS — T45.1X5S PERIPHERAL NEUROPATHY DUE TO AND NOT CONCURRENT WITH CHEMOTHERAPY (MULTI): ICD-10-CM

## 2024-04-09 LAB
ALBUMIN SERPL BCP-MCNC: 3.5 G/DL (ref 3.4–5)
ALP SERPL-CCNC: 44 U/L (ref 33–136)
ALT SERPL W P-5'-P-CCNC: 9 U/L (ref 7–45)
AMYLASE SERPL-CCNC: 33 U/L (ref 29–103)
ANION GAP SERPL CALC-SCNC: 12 MMOL/L (ref 10–20)
APPEARANCE UR: CLEAR
APTT PPP: 32 SECONDS (ref 27–38)
AST SERPL W P-5'-P-CCNC: 15 U/L (ref 9–39)
BASOPHILS # BLD AUTO: 0.02 X10*3/UL (ref 0–0.1)
BASOPHILS NFR BLD AUTO: 0.2 %
BILIRUB DIRECT SERPL-MCNC: 0.1 MG/DL (ref 0–0.3)
BILIRUB SERPL-MCNC: 0.6 MG/DL (ref 0–1.2)
BILIRUB UR STRIP.AUTO-MCNC: NEGATIVE MG/DL
BUN SERPL-MCNC: 14 MG/DL (ref 6–23)
CALCIUM SERPL-MCNC: 9.2 MG/DL (ref 8.6–10.6)
CHLORIDE SERPL-SCNC: 104 MMOL/L (ref 98–107)
CK SERPL-CCNC: 44 U/L (ref 0–215)
CO2 SERPL-SCNC: 28 MMOL/L (ref 21–32)
COLOR UR: YELLOW
CORTIS SERPL-MCNC: 9.2 UG/DL (ref 2.5–20)
CREAT SERPL-MCNC: 0.79 MG/DL (ref 0.5–1.05)
CRP SERPL-MCNC: 0.16 MG/DL
EGFRCR SERPLBLD CKD-EPI 2021: 82 ML/MIN/1.73M*2
EOSINOPHIL # BLD AUTO: 0.08 X10*3/UL (ref 0–0.7)
EOSINOPHIL NFR BLD AUTO: 0.9 %
ERYTHROCYTE [DISTWIDTH] IN BLOOD BY AUTOMATED COUNT: 12.9 % (ref 11.5–14.5)
ESTRADIOL SERPL-MCNC: <19 PG/ML
FERRITIN SERPL-MCNC: 99 NG/ML (ref 8–150)
FSH SERPL-ACNC: 50.3 IU/L
GLUCOSE SERPL-MCNC: 86 MG/DL (ref 74–99)
GLUCOSE UR STRIP.AUTO-MCNC: NORMAL MG/DL
HCT VFR BLD AUTO: 32.1 % (ref 36–46)
HGB BLD-MCNC: 10.6 G/DL (ref 12–16)
HOLD SPECIMEN: NORMAL
IMM GRANULOCYTES # BLD AUTO: 0.05 X10*3/UL (ref 0–0.7)
IMM GRANULOCYTES NFR BLD AUTO: 0.6 % (ref 0–0.9)
INR PPP: 1 (ref 0.9–1.1)
KETONES UR STRIP.AUTO-MCNC: NEGATIVE MG/DL
LEUKOCYTE ESTERASE UR QL STRIP.AUTO: NEGATIVE
LH SERPL-ACNC: 15 IU/L
LIPASE SERPL-CCNC: 30 U/L (ref 9–82)
LYMPHOCYTES # BLD AUTO: 0.76 X10*3/UL (ref 1.2–4.8)
LYMPHOCYTES NFR BLD AUTO: 8.6 %
MAGNESIUM SERPL-MCNC: 2.08 MG/DL (ref 1.6–2.4)
MCH RBC QN AUTO: 31.5 PG (ref 26–34)
MCHC RBC AUTO-ENTMCNC: 33 G/DL (ref 32–36)
MCV RBC AUTO: 95 FL (ref 80–100)
MONOCYTES # BLD AUTO: 0.47 X10*3/UL (ref 0.1–1)
MONOCYTES NFR BLD AUTO: 5.3 %
NEUTROPHILS # BLD AUTO: 7.43 X10*3/UL (ref 1.2–7.7)
NEUTROPHILS NFR BLD AUTO: 84.4 %
NITRITE UR QL STRIP.AUTO: NEGATIVE
NRBC BLD-RTO: 0 /100 WBCS (ref 0–0)
PH UR STRIP.AUTO: 6 [PH]
PHOSPHATE SERPL-MCNC: 3.7 MG/DL (ref 2.5–4.9)
PLATELET # BLD AUTO: 212 X10*3/UL (ref 150–450)
POTASSIUM SERPL-SCNC: 4.1 MMOL/L (ref 3.5–5.3)
PROT SERPL-MCNC: 5.9 G/DL (ref 6.4–8.2)
PROT UR STRIP.AUTO-MCNC: NEGATIVE MG/DL
PROTHROMBIN TIME: 11.5 SECONDS (ref 9.8–12.8)
RBC # BLD AUTO: 3.37 X10*6/UL (ref 4–5.2)
RBC # UR STRIP.AUTO: NEGATIVE /UL
SODIUM SERPL-SCNC: 140 MMOL/L (ref 136–145)
SP GR UR STRIP.AUTO: 1.02
T4 FREE SERPL-MCNC: 0.86 NG/DL (ref 0.78–1.48)
TSH SERPL-ACNC: 4.11 MIU/L (ref 0.44–3.98)
UROBILINOGEN UR STRIP.AUTO-MCNC: NORMAL MG/DL
WBC # BLD AUTO: 8.8 X10*3/UL (ref 4.4–11.3)

## 2024-04-09 PROCEDURE — 2500000005 HC RX 250 GENERAL PHARMACY W/O HCPCS: Performed by: STUDENT IN AN ORGANIZED HEALTH CARE EDUCATION/TRAINING PROGRAM

## 2024-04-09 PROCEDURE — 83001 ASSAY OF GONADOTROPIN (FSH): CPT | Performed by: STUDENT IN AN ORGANIZED HEALTH CARE EDUCATION/TRAINING PROGRAM

## 2024-04-09 PROCEDURE — 85730 THROMBOPLASTIN TIME PARTIAL: CPT | Performed by: STUDENT IN AN ORGANIZED HEALTH CARE EDUCATION/TRAINING PROGRAM

## 2024-04-09 PROCEDURE — 84443 ASSAY THYROID STIM HORMONE: CPT | Performed by: STUDENT IN AN ORGANIZED HEALTH CARE EDUCATION/TRAINING PROGRAM

## 2024-04-09 PROCEDURE — 81003 URINALYSIS AUTO W/O SCOPE: CPT | Performed by: STUDENT IN AN ORGANIZED HEALTH CARE EDUCATION/TRAINING PROGRAM

## 2024-04-09 PROCEDURE — 82533 TOTAL CORTISOL: CPT | Performed by: STUDENT IN AN ORGANIZED HEALTH CARE EDUCATION/TRAINING PROGRAM

## 2024-04-09 PROCEDURE — 82670 ASSAY OF TOTAL ESTRADIOL: CPT | Performed by: STUDENT IN AN ORGANIZED HEALTH CARE EDUCATION/TRAINING PROGRAM

## 2024-04-09 PROCEDURE — 85610 PROTHROMBIN TIME: CPT | Performed by: STUDENT IN AN ORGANIZED HEALTH CARE EDUCATION/TRAINING PROGRAM

## 2024-04-09 PROCEDURE — 82150 ASSAY OF AMYLASE: CPT | Performed by: STUDENT IN AN ORGANIZED HEALTH CARE EDUCATION/TRAINING PROGRAM

## 2024-04-09 PROCEDURE — 84100 ASSAY OF PHOSPHORUS: CPT | Performed by: STUDENT IN AN ORGANIZED HEALTH CARE EDUCATION/TRAINING PROGRAM

## 2024-04-09 PROCEDURE — 83690 ASSAY OF LIPASE: CPT | Performed by: STUDENT IN AN ORGANIZED HEALTH CARE EDUCATION/TRAINING PROGRAM

## 2024-04-09 PROCEDURE — 83735 ASSAY OF MAGNESIUM: CPT | Performed by: STUDENT IN AN ORGANIZED HEALTH CARE EDUCATION/TRAINING PROGRAM

## 2024-04-09 PROCEDURE — 82024 ASSAY OF ACTH: CPT | Performed by: STUDENT IN AN ORGANIZED HEALTH CARE EDUCATION/TRAINING PROGRAM

## 2024-04-09 PROCEDURE — 80053 COMPREHEN METABOLIC PANEL: CPT

## 2024-04-09 PROCEDURE — 82728 ASSAY OF FERRITIN: CPT | Performed by: STUDENT IN AN ORGANIZED HEALTH CARE EDUCATION/TRAINING PROGRAM

## 2024-04-09 PROCEDURE — 85025 COMPLETE CBC W/AUTO DIFF WBC: CPT

## 2024-04-09 PROCEDURE — 84439 ASSAY OF FREE THYROXINE: CPT | Performed by: STUDENT IN AN ORGANIZED HEALTH CARE EDUCATION/TRAINING PROGRAM

## 2024-04-09 PROCEDURE — 82248 BILIRUBIN DIRECT: CPT | Performed by: STUDENT IN AN ORGANIZED HEALTH CARE EDUCATION/TRAINING PROGRAM

## 2024-04-09 PROCEDURE — 82550 ASSAY OF CK (CPK): CPT | Performed by: STUDENT IN AN ORGANIZED HEALTH CARE EDUCATION/TRAINING PROGRAM

## 2024-04-09 PROCEDURE — 86140 C-REACTIVE PROTEIN: CPT | Performed by: STUDENT IN AN ORGANIZED HEALTH CARE EDUCATION/TRAINING PROGRAM

## 2024-04-09 PROCEDURE — 96413 CHEMO IV INFUSION 1 HR: CPT

## 2024-04-09 RX ORDER — EPINEPHRINE 1 MG/ML
0.3 INJECTION INTRAMUSCULAR; INTRAVENOUS; SUBCUTANEOUS EVERY 5 MIN PRN
Status: DISCONTINUED | OUTPATIENT
Start: 2024-04-09 | End: 2024-04-10 | Stop reason: HOSPADM

## 2024-04-09 RX ORDER — HEPARIN 100 UNIT/ML
500 SYRINGE INTRAVENOUS AS NEEDED
Status: CANCELLED | OUTPATIENT
Start: 2024-04-09

## 2024-04-09 RX ORDER — DIPHENHYDRAMINE HYDROCHLORIDE 50 MG/ML
50 INJECTION INTRAMUSCULAR; INTRAVENOUS AS NEEDED
Status: DISCONTINUED | OUTPATIENT
Start: 2024-04-09 | End: 2024-04-10 | Stop reason: HOSPADM

## 2024-04-09 RX ORDER — HEPARIN SODIUM,PORCINE/PF 10 UNIT/ML
50 SYRINGE (ML) INTRAVENOUS AS NEEDED
Status: CANCELLED | OUTPATIENT
Start: 2024-04-09

## 2024-04-09 RX ORDER — FAMOTIDINE 10 MG/ML
20 INJECTION INTRAVENOUS ONCE AS NEEDED
Status: DISCONTINUED | OUTPATIENT
Start: 2024-04-09 | End: 2024-04-10 | Stop reason: HOSPADM

## 2024-04-09 RX ORDER — ALBUTEROL SULFATE 0.83 MG/ML
3 SOLUTION RESPIRATORY (INHALATION) AS NEEDED
Status: DISCONTINUED | OUTPATIENT
Start: 2024-04-09 | End: 2024-04-10 | Stop reason: HOSPADM

## 2024-04-09 RX ADMIN — Medication 3 MG: at 12:46

## 2024-04-09 ASSESSMENT — PAIN SCALES - GENERAL: PAINLEVEL: 0-NO PAIN

## 2024-04-09 NOTE — PROGRESS NOTES
NUTRITION Follow-up NOTE    Nutrition Assessment     Reason for Visit:  Gladys Branch is a 68 y.o. female with small cell lung cancer in RLL and supraclavicular who presents for nutrition follow up.    PRIOR THERAPY  Concurrent chemoradiation with Cisplatin/Etoposide every 3 weeks; C1D1 2/15/22, reaction to cisplatin C2D1 and did not receive D2 or D3 etoposide  Carbo/etoposide 4/5/2022 1 cycle c/b rash  PCI 5/21-6/13/22     CURRENT THERAPY  Phase 1 study [id number]  with study drug Taralatamab 1/24/23 - present.    Patient states that appetite and taste changes have been improving. Patient was eating during visit. Recently had her stomach scoped during a colonoscopy. Patient was told she had a pocket of food and was instructed to drink papaya juice daily.     Stable bowel movements. Denies n/v. Patient would like to gain more weight but is happy it remains stable.     Will continue to follow patient.     Lab Results   Component Value Date/Time    GLUCOSE 86 04/09/2024 0828     04/09/2024 0828    K 4.1 04/09/2024 0828     04/09/2024 0828    CO2 28 04/09/2024 0828    ANIONGAP 12 04/09/2024 0828    BUN 14 04/09/2024 0828    CREATININE 0.79 04/09/2024 0828    EGFR 82 04/09/2024 0828    CALCIUM 9.2 04/09/2024 0828    ALBUMIN 3.5 04/09/2024 0828    ALKPHOS 44 04/09/2024 0828    PROT 5.9 (L) 04/09/2024 0828    AST 15 04/09/2024 0828    BILITOT 0.6 04/09/2024 0828    ALT 9 04/09/2024 0828     Lab Results   Component Value Date/Time    VITD25 39 03/12/2024 0745       Anthropometrics:  Anthropometrics  IBW/kg (Dietitian Calculated): 52.5 kg  Percent of IBW: 94 %  Weight Change  Weight History / % Weight Change: Reported UBW of 55-58kg. Overall 20# weight loss. Unable to find reported UBW within the past year. Weight currently stable.        Wt Readings from Last 10 Encounters:   04/09/24 49.1 kg (108 lb 3.9 oz)   04/04/24 49.9 kg (110 lb)   03/26/24 50 kg (110 lb 3.7 oz)   03/12/24 49.1 kg (108 lb 3.9 oz)  "  02/27/24 49.8 kg (109 lb 12.6 oz)   02/13/24 50 kg (110 lb 3.7 oz)   01/30/24 50.2 kg (110 lb 10.7 oz)   01/16/24 53.1 kg (117 lb 1 oz)   01/02/24 50.6 kg (111 lb 8.8 oz)   12/20/23 50.3 kg (111 lb)        Food And Nutrient Intake:  Food and Nutrient History  Food and Nutrient History: patient recently had a scope of her stomach during her colonoscopy, and she was told there was a pocket of food seen in stomach. She was told to drink 8 oz papaya juice daily for \"a few months.\" Patient feels this is helping with early satiety. Patient feels her taste and intake are improving despite not gaining weight.  Energy Intake: Fair 50-75 %  Fluid Intake: Fluids poor. 2 cups of coffee (mix of regular and decaf). 1 16 oz bottle of water per day. 4 oz reisling \"once in a while.\"  Food Allergy: NKFA.  GI Symptoms: none  Oral Problems: dysgeusia (Improving)     Food Supplement Intake  Oral Nutrition Supplements: Boost Glucose Control    Medication and Complementary/Alternative Medicine Use  Prescription Medication Use: Decadron; Prilosec; Metamucil; Miralax      Nutrition Focused Physical Exam Findings:  defer: See note from 3/20    Energy Needs  Estimated Energy Needs  Total Energy Estimated Needs (kCal): 1700 kCal  Method for Estimating Needs: 34 kcal/kg ABW  Estimated Fluid Needs  Total Fluid Estimated Needs (mL): 1700 mL  Method for Estimating Needs: 1 ml/kcal or per team  Estimated Protein Needs  Total Protein Estimated Needs (g): 68 g  Method for Estimating Needs: 1.4 g/kg ABW        Nutrition Diagnosis   Malnutrition Diagnosis  Patient has Malnutrition Diagnosis: No    Nutrition Diagnosis  Patient has Nutrition Diagnosis: Yes  Diagnosis Status (1): Ongoing  Nutrition Diagnosis 1: Inadequate energy intake  Related to (1): dysgeusia and poor appetite  As Evidenced by (1): 24 hr recall    Nutrition Interventions/Recommendations   Nutrition Prescription       Food and Nutrition Delivery  Food and Nutrition Delivery  Meals & " Snacks: Modify schedule of foods/fluids, Modify Composition of Meals/Snacks, Protein-modified diet  Goals: 5-6 small frequent meals; find more protein sources (ex. protein powder in oatmeal OR hummus OR greek yogurt dips with veggies or fruit).  Medical Food Supplement: Boost Plus  Goals: 2x/day    Nutrition Education       Coordination of Care       There are no Patient Instructions on file for this visit.    Nutrition Monitoring and Evaluation   Food/Nutrient Related History Monitoring  Monitoring and Evaluation Plan: Energy intake, Fluid intake, Amount of food, Meal/snack pattern  Energy Intake: Estimated energy intake  Criteria: 24 hr recall  Fluid Intake: Estimated fluid intake  Criteria: 24 hr recall  Amount of Food: Estimated amout of food, Medical food intake  Criteria: 24 hr recall  Meal/Snack Pattern: Estimated meal and snack pattern  Criteria: 24 hr recall  Body Composition/Growth/Weight History  Monitoring and Evaluation Plan: Weight  Weight: Measured weight          Time Spent  Prep time on day of patient encounter: 10 minutes  Time spent directly with patient, family or caregiver: 15 minutes  Additional Time Spent on Patient Care Activities: 0 minutes  Documentation Time: 15 minutes  Other Time Spent: 0 minutes  Total: 40 minutes        Readiness to Change : Good  Level of Understanding : Good  Anticipated Compliant : Good

## 2024-04-09 NOTE — RESEARCH NOTES
[facility]><[id number]><C5+D1><PARTA>    DCRU NURSING VISIT NOTE  Study Name: ALEXANDRU 1518  IRB#: [id number]  DCRU#: [id number]  Protocol Version Dated: A9 3.22.23  PI: Roshan Kumar MD.    Time point: Cycle 5 and beyond - Day 1  CYCLE 16     Encounter Date: 04/09/2024  Encounter Time: 0725  Encounter Department: [facility] HEMATOLOGY AND ONCOLOGY     #1     Phone Pager   Herlinda Fernandez RN [phone] [place]    #2 Phone Pager        Other Phone Pager          Study Regimen and Dosing   Part A Dose Exploration/Expansion- Small Cell Lung Cancer Relapsed or Refractory patients who progressed or recurred following at least 1 platinum-based regimen.   Cycle = 28 days    administered IV Day 1, Day 8, and Day 15 of Cycle 1. Cycle 2 and beyond     administered on Day 1 and Day 15.        Dietary Guidelines   Regular diet:     Admission and Prior to Starting Study Activities   Notify  when patient arrives to unit.  Complete DCRU/[facility] intake form in EMR.  Insert one peripheral IV line for sample collection procedures (flush line with normal saline following each blood draw). Access mediport (if available) otherwise insert second peripheral line in opposite arm for Investigative drug administration (if peripheral line, flush line with normal saline before & after infusion)     Criteria to Treat   DCRU RN reviewed and meets eligibility to proceed with treatment plan   Time team notified: 1140   DCRU RN notifies study team to review eligibility and approval before dosing procedures  Time team approves: 1140      Subjective   Gladys Branch is a 68 y.o. female and is here for a Research clinical visit.    Visit Provider: [id number], [facility] ROOM 6 [facility]     Allergies:   Allergies   Allergen Reactions    Cisplatin Other     CHEMO INDUCED (Moderate); Dyspepsia (Moderate); Headaches (Moderate); Hypotension (Moderate); Numbness (Moderate); Resp Distress (Moderate)    Codeine  Nausea Only and Other     nausea    Sulfa (Sulfonamide Antibiotics) Rash       Objective     Vital Signs:    Blood pressure 108/75, pulse 73, temperature 36.2 °C (97.2 °F), temperature source Temporal, resp. rate 16, weight 49.1 kg (108 lb 3.9 oz), SpO2 98 %.     Physical Exam     ASSESSMENT and PLAN:  Problem List Items Addressed This Visit    None       Medications as of the completion of today's visit:            Orders placed during today's visit:  No orders of the defined types were placed in this encounter.       [id number] -  in Subjects With Small Cell Lung Cancer    Patient has no adverse events documented in the Research Adverse Events activity.       Study Specific Instructions and Documentation   WEIGHT  LABS  VITALS  CORREATIVES  STUDY DRUG  VITALS  DISCHARGE     WEIGHT    Obtain weight in kilograms - with shoes off & heavy items removed.   49.1kg     PRE-DOSE Safety Labs   Refer to [place] Treatment Plan for orders  Drawn at 0828 per butterfly in right AC     Pre-dose Vital Signs   Temp, HR, Resp, BP, Pulse Oximetry: Seated or Semi-Fowlers x 5 minutes before obtaining  Position and temperature location: should be the same that is used throughout the study-record position  Blood pressure 95/61, pulse 71, temperature 36.4 °C (97.5 °F), temperature source Temporal, resp. rate 16, weight 49.1 kg (108 lb 3.9 oz), SpO2 96 %.      Pre-dose Research Correlatives  Deliver to DCRU/TRPC lab for processing   Access Type: 22g angiocath Location: left ac   Refer to [place] Treatment Plan for orders   Time point Specimen Test Volume Tube Handling Draw Time   Pre-dose (within 15 mins of  dosing)    draw in order [facility]      4 mL CPT Ambient, Invert 8-10X 1147    Anti- Antibody 2.5 mL SST Ambient, Invert 5X 1147    TARLATAMAB PK  (through cycle 12) 2.5 mL SST Ambient, Invert 5X na    Serum Markers 2.5 mL SST Ambient, Invert 5X 1147    Immune Cells 4 mL   K3 EDTA Ambient, Invert 8-10X 1147           Infusion-Related Reactions   [facility] SOC Hypersensitivity Orders  Note: CRS table      Research Drug Administration Tarlatamab ()   Refer to [place] Treatment Plan for orders   Administer dose over 60 minutes (+/- 10 mins) followed by a 3 - 5 minute Normal saline flush. (This will be the end time after the flush). Document start time & end of study medication; document start time and end time of the flush.  Participant to remain on Unit for 2 hour post dose observation.      Day 1 Post-Dose Vital Signs   Temp, HR, Resp, BP, Pulse Oximetry: Seated or Semi-Fowlers x 5 minutes before obtaining  Position and temperature location: should be the same that is used throughout the study  End of Infusion Blood pressure 98/59, pulse 78, temperature 36.4 °C (97.5 °F), temperature source Temporal, resp. rate 16, weight 49.1 kg (108 lb 3.9 oz), SpO2 97 %. [id number]     Discharge Instructions   Patient may be discharged to home after 2 hour observation.  Remind patient to return: Day 15 for treatment & labs  Discharge time 1551     Aurora Chowdary RN  04/09/24      Grading and Management of Cytokine Release Syndrome   CRS Grade Description of Severity Minimum Expected Intervention Instructions for Dose Modifications of    1 Symptoms are not life threatening and require symptomatic treatment only,  eg, fever, nausea, fatigue, headache, myalgia's, malaise Administer symptomatic treatment (contact provider for medications/doses). Monitor for CRS symptoms including vital signs and pulse oximetry at least Q2 hours for12 hours or until resolution, Whichever is earlier. N/A     (continued)  Grading and Management of Cytokine Release Syndrome   CRS Grade Description of Severity Minimum Expected Intervention Instructions for Dose Modifications of    2 Symptoms require and respond to moderate intervention  Oxygen requirement <40%,                      OR  Hypotension responsive to fluids or low dose of one vasopressor, OR                                                                        Grade 2 organ toxicity or grade 3 transaminitis per CTCAE criteria Administer:  Symptomatic treatment   (contact provider for medications/doses)  Supplemental oxygen when oxygen saturation is <90% on room air  Intravenous fluids or low dose vasopressor for hypotension is recommended when systolic blood pressure is <85 mmHg. (contact provider for medications/doses) Persistent tachycardia (eg >120 bpm) may also indicate the need for intervention for hypotension.   Monitor for CRS symptoms including vital signs and pulse oximetry at least Q2 hours for12 hours or until resolution to CRS grade <= 1, whichever is earlier. For subjects with extensive co-morbidities or poor performance status, manage per grade 3 CRS guidance below If CRS occurs during  treatment, immediately interrupt the infusion and delay the next  dose until the event resolves to CRS grade <= 1 for no less than 72 hours. Permanently discontinue  if there is no improvement to CRS <= grade 1 within 7 days     3 Symptoms require and respond to aggressive intervention  Oxygen requirement >=40%, OR  Hypotension requiring high dose or multiple vasopressors, OR  Grade 3 organ toxicity or     Grade 4 transaminitis per  CTCAE criteria Admit to intensive care unit for close clinical and vital sign monitoring per institutional guidelines.   Refer to provider/protocol for medications and doses.     If CRS occurs during  treatment, immediately interrupt   and delay the next dose until event resolves to CRS grade <= 1 for no less than 72 hours.    Permanently discontinue  if there is no improvement to CRS                 <= grade 2 within 5 days or CRS <= grade 1 within 7 days.  Permanently discontinue   if CRS grade  3 occurs at the initial run in dose (i.e., at MTD1)  (Applicable only after MTD1 has been defined).     (continued)  Grading and Management  of Cytokine Release Syndrome   CRS Grade Description of Severity Minimum Expected Intervention Instructions for Dose Modifications of    4 Life-threatening symptoms  Requirement for ventilator support OR  Grade 4 organ toxicity (excluding transaminitis) per CTCAE criteria Admit to intensive care unit for close clinical and vital sign monitoring per institutional guidelines.   Refer to provider/protocol for medications and doses.   If CRS occurs during  treatment, Immediately stop the infusion.  Permanently discontinue   therapy.

## 2024-04-09 NOTE — PROGRESS NOTES
Patient ID: Gladys Branch is a 68 y.o. female.    DIAGNOSIS    Small Cell Lung Cancer    By immunostaining, the tumor cells are positive for CAM 5.2, INSM1, and TTF-1, and negative for p40 and LCA. The proliferation index by Ki-67 immunostaining is approximately 90%.  Synaptophysin, Chromogranin and INSM-1 are positive.  AE1/AE3 is negative. NEOGENOMICS, RB protein expression is lost in the tumor cells    STAGING    [id number], limited stage  Recurrence    CURRENT SITES OF DISEASE    RLL, supraclavicular    MOLECULAR GENOMICS      PRIOR THERAPY    Concurrent chemoradiation with Cisplatin/Etoposide every 3 weeks; C1D1 2/15/22, reaction to cisplatin C2D1 and did not receive D2 or D3 etoposide  Carbo/etoposide 4/5/2022 1 cycle c/b rash  PCI 5/21-6/13/22    CURRENT THERAPY    Phase 1 study [id number]  with study drug Taralatamab 1/24/23 - present.    CURRENT ONCOLOGICAL PROBLEMS      HISTORY OF PRESENT ILLNESS    Mrs. Branch is a 67 yo with PMH GERD and COPD who originally was round to have a RLL nodule measuring 11 mm in 4/28/2021 found as part of CT calcium score scan. An ensuing CT chest w/o contrast was done on 5/19/21 which revealed subsolid pulmonary nodules measuring 14 mm in the RLL. She had CT chest w/contrast on 11/29/21 which confirmed stable 14 mm GGO of the RLL and decreased RLL subpleural nodule. She met thoracic surgeon Dr. Farfan, who ordered PET/CT scan done on 12/8/21 which did not reveal any FDG-avid nodules within the lung parenchyma but R hilar nodes with max SUV 8.0. He referred her for bronch, which was done on 1/21/22 by Dr. Mcdaniels. 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 Brain MRI was negative.    She started concurrent chemoradiation with BID radiation with cis/etop 2/15/22, and unfortunately had a reaction to cisplatin on C2D1 with chest pain and hypotension. She was hospitalized with sepsis felt to be due to pneumonia. She trialed carboplatin/etoposide on 4/5/22 with a  resulting rash and opted to forego further chemotherapy as she had completed radiation. Restaging scan 11/3/22 unfortunately with progression with new R hilar lymph node, paratracheal nodes, and increase in size of R supraclavicular mass. She enrolled in [id number] and started C1D1 1/24/23. C1 complicated by anorexia and dysgeusia, and delayed C2 by a week. She has further struggled with fatigue and confusion, which may be related to mirtazapine. Dose reduced for C2 and mirtazapine stopped with improvement in symptoms.     PAST MEDICAL HISTORY    GERD  COPD    SOCIAL HISTORY    Retired - lighting . Lives at home with  and a kitten.  Tobacco: quit smoking 1999. 1 ppd x 25 yrs.  EtOH: wine 1 glass/day  Illicits: none    CURRENT MEDS    Please see med list    ALLERGIES    Codeine, Sulfa drugs, Cisplatin    FAMILY HISTORY    Paternal aunt - breast cancer dx 80s    Subjective    Today 4.9.2024. Patient in clinic with her  for C16D1 for [id number].      Patient reports this past week her  was sick and she also started to have symptoms of cough with yellow color sputum, feeling lightheaded and fatigue. Her  took her to the ER and she was evaluated and diagnosed with viral infection and dehydration. Started on ZPACK and also IV fluids. Today she mentioned she feels much better with more energy.  She reports her PO intake has improved but not back to baseline. She continues to have mild numbness in her feet which is her baseline. She also continues to have problems with memory but with no major changes from baseline. She also continues to experience symptoms of taste alteration. Patient denies any pain, headaches, rash/itching, chills or fever, SOB, cough, sputum, chest pain or chest tightness, painful inflammation/ulceration oral mucous membranes, nausea/vomiting, diarrhea/constipation, hematemesis /hemoptysis, hematuria/rectal bleeding, urinary symptoms, swelling extremities,  weakness. ROS as above. Remainder of 10 point review of systems elicited and otherwise unremarkable.     Objective          3/26/2024    10:40 AM 3/26/2024    11:54 AM 4/4/2024    12:37 PM 4/4/2024     1:13 PM 4/4/2024     1:24 PM 4/4/2024     1:33 PM 4/9/2024     7:30 AM   Vitals   Systolic 96 97 104 102 101 113 108   Diastolic 63 62 79 71 75 86 75   Heart Rate 75 82 80 64 62 63 73   Temp 36.8 °C (98.2 °F) 36.6 °C (97.9 °F) 36.7 °C (98 °F) 36.9 °C (98.4 °F)  36.7 °C (98 °F) 36.2 °C (97.2 °F)   Resp 17 17 18 18 19 19 16   Height (in)   1.524 m (5')       Weight (lb)   110    108.25   BMI   21.48 kg/m2    21.14 kg/m2   BSA (m2)   1.45 m2    1.44 m2       Daily Weight  04/09/24 : 49.1 kg (108 lb 3.9 oz)  04/04/24 : 49.9 kg (110 lb)  03/26/24 : 50 kg (110 lb 3.7 oz)  03/12/24 : 49.1 kg (108 lb 3.9 oz)  02/27/24 : 49.8 kg (109 lb 12.6 oz)        Physical Exam  Constitutional:       General: She is awake.      Appearance: Normal appearance. She is normal weight.   HENT:      Head: Normocephalic.      Mouth/Throat:      Mouth: Mucous membranes are moist.   Eyes:      Extraocular Movements: Extraocular movements intact.      Conjunctiva/sclera: Conjunctivae normal.      Pupils: Pupils are equal, round, and reactive to light.   Cardiovascular:      Rate and Rhythm: Normal rate and regular rhythm.      Heart sounds: Normal heart sounds, S1 normal and S2 normal.   Pulmonary:      Effort: Pulmonary effort is normal.      Breath sounds: Normal breath sounds.   Abdominal:      General: Abdomen is flat. Bowel sounds are normal.      Palpations: Abdomen is soft.   Musculoskeletal:      Cervical back: Normal range of motion and neck supple.   Skin:     Comments: No skin rash observed   Neurological:      Mental Status: She is alert.      Cranial Nerves: Cranial nerves 2-12 are intact.      Sensory: Sensation is intact.      Motor: Motor function is intact.      Coordination: Coordination is intact.   Psychiatric:         Attention  and Perception: Attention normal.         Mood and Affect: Mood normal.         Speech: Speech normal.         Behavior: Behavior normal. Behavior is cooperative.         Cognition and Memory: Cognition normal.     Labs    Hospital Outpatient Visit on 03/26/2024   Component Date Value Ref Range Status   • Protime 03/26/2024 12.1  9.8 - 12.8 seconds Final   • INR 03/26/2024 1.1  0.9 - 1.1 Final   • aPTT 03/26/2024 30  27 - 38 seconds Final   • Bilirubin, Direct 03/26/2024 0.1  0.0 - 0.3 mg/dL Final   • Creatine Kinase 03/26/2024 34  0 - 215 U/L Final   • C-Reactive Protein 03/26/2024 8.80 (H)  <1.00 mg/dL Final   • Ferritin 03/26/2024 175 (H)  8 - 150 ng/mL Final   • Magnesium 03/26/2024 1.86  1.60 - 2.40 mg/dL Final   • Phosphorus 03/26/2024 3.4  2.5 - 4.9 mg/dL Final   • Glucose 03/26/2024 102 (H)  74 - 99 mg/dL Final   • Sodium 03/26/2024 140  136 - 145 mmol/L Final   • Potassium 03/26/2024 3.7  3.5 - 5.3 mmol/L Final   • Chloride 03/26/2024 104  98 - 107 mmol/L Final   • Bicarbonate 03/26/2024 28  21 - 32 mmol/L Final   • Anion Gap 03/26/2024 12  10 - 20 mmol/L Final   • Urea Nitrogen 03/26/2024 19  6 - 23 mg/dL Final   • Creatinine 03/26/2024 0.71  0.50 - 1.05 mg/dL Final   • eGFR 03/26/2024 >90  >60 mL/min/1.73m*2 Final   • Calcium 03/26/2024 8.7  8.6 - 10.6 mg/dL Final   • Albumin 03/26/2024 3.4  3.4 - 5.0 g/dL Final   • Alkaline Phosphatase 03/26/2024 48  33 - 136 U/L Final   • Total Protein 03/26/2024 5.9 (L)  6.4 - 8.2 g/dL Final   • AST 03/26/2024 13  9 - 39 U/L Final   • Bilirubin, Total 03/26/2024 0.5  0.0 - 1.2 mg/dL Final   • ALT 03/26/2024 11  7 - 45 U/L Final   • WBC 03/26/2024 7.1  4.4 - 11.3 x10*3/uL Final   • nRBC 03/26/2024 0.0  0.0 - 0.0 /100 WBCs Final   • RBC 03/26/2024 3.37 (L)  4.00 - 5.20 x10*6/uL Final   • Hemoglobin 03/26/2024 10.8 (L)  12.0 - 16.0 g/dL Final   • Hematocrit 03/26/2024 31.1 (L)  36.0 - 46.0 % Final   • MCV 03/26/2024 92  80 - 100 fL Final   • MCH 03/26/2024 32.0  26.0 -  34.0 pg Final   • MCHC 03/26/2024 34.7  32.0 - 36.0 g/dL Final   • RDW 03/26/2024 12.9  11.5 - 14.5 % Final   • Platelets 03/26/2024 212  150 - 450 x10*3/uL Final   • Cortisol  A.M. 03/26/2024 10.5  5.0 - 20.0 ug/dL Final   • Adrenocorticotropic Hormone (ACTH) 03/26/2024 22.7  7.2 - 63.3 pg/mL Final   • WBC 03/26/2024 6.3  4.4 - 11.3 x10*3/uL Final   • nRBC 03/26/2024 0.0  0.0 - 0.0 /100 WBCs Final   • RBC 03/26/2024 2.96 (L)  4.00 - 5.20 x10*6/uL Final   • Hemoglobin 03/26/2024 9.4 (L)  12.0 - 16.0 g/dL Final   • Hematocrit 03/26/2024 27.7 (L)  36.0 - 46.0 % Final   • MCV 03/26/2024 94  80 - 100 fL Final   • MCH 03/26/2024 31.8  26.0 - 34.0 pg Final   • MCHC 03/26/2024 33.9  32.0 - 36.0 g/dL Final   • RDW 03/26/2024 13.0  11.5 - 14.5 % Final   • Platelets 03/26/2024 179  150 - 450 x10*3/uL Final   • Neutrophils % 03/26/2024 69.4  40.0 - 80.0 % Final   • Immature Granulocytes %, Automated 03/26/2024 0.5  0.0 - 0.9 % Final   • Lymphocytes % 03/26/2024 16.9  13.0 - 44.0 % Final   • Monocytes % 03/26/2024 10.0  2.0 - 10.0 % Final   • Eosinophils % 03/26/2024 2.9  0.0 - 6.0 % Final   • Basophils % 03/26/2024 0.3  0.0 - 2.0 % Final   • Neutrophils Absolute 03/26/2024 4.35  1.20 - 7.70 x10*3/uL Final   • Immature Granulocytes Absolute, Au* 03/26/2024 0.03  0.00 - 0.70 x10*3/uL Final   • Lymphocytes Absolute 03/26/2024 1.06 (L)  1.20 - 4.80 x10*3/uL Final   • Monocytes Absolute 03/26/2024 0.63  0.10 - 1.00 x10*3/uL Final   • Eosinophils Absolute 03/26/2024 0.18  0.00 - 0.70 x10*3/uL Final   • Basophils Absolute 03/26/2024 0.02  0.00 - 0.10 x10*3/uL Final   Hospital Outpatient Visit on 03/12/2024   Component Date Value Ref Range Status   • WBC 03/12/2024 6.2  4.4 - 11.3 x10*3/uL Final   • nRBC 03/12/2024 0.0  0.0 - 0.0 /100 WBCs Final   • RBC 03/12/2024 3.71 (L)  4.00 - 5.20 x10*6/uL Final   • Hemoglobin 03/12/2024 11.6 (L)  12.0 - 16.0 g/dL Final   • Hematocrit 03/12/2024 34.9 (L)  36.0 - 46.0 % Final   • MCV 03/12/2024 94   80 - 100 fL Final   • MCH 03/12/2024 31.3  26.0 - 34.0 pg Final   • MCHC 03/12/2024 33.2  32.0 - 36.0 g/dL Final   • RDW 03/12/2024 13.2  11.5 - 14.5 % Final   • Platelets 03/12/2024 237  150 - 450 x10*3/uL Final   • Neutrophils % 03/12/2024 61.7  40.0 - 80.0 % Final   • Immature Granulocytes %, Automated 03/12/2024 0.3  0.0 - 0.9 % Final   • Lymphocytes % 03/12/2024 25.4  13.0 - 44.0 % Final   • Monocytes % 03/12/2024 10.7  2.0 - 10.0 % Final   • Eosinophils % 03/12/2024 1.6  0.0 - 6.0 % Final   • Basophils % 03/12/2024 0.3  0.0 - 2.0 % Final   • Neutrophils Absolute 03/12/2024 3.81  1.20 - 7.70 x10*3/uL Final   • Immature Granulocytes Absolute, Au* 03/12/2024 0.02  0.00 - 0.70 x10*3/uL Final   • Lymphocytes Absolute 03/12/2024 1.57  1.20 - 4.80 x10*3/uL Final   • Monocytes Absolute 03/12/2024 0.66  0.10 - 1.00 x10*3/uL Final   • Eosinophils Absolute 03/12/2024 0.10  0.00 - 0.70 x10*3/uL Final   • Basophils Absolute 03/12/2024 0.02  0.00 - 0.10 x10*3/uL Final   • Glucose 03/12/2024 102 (H)  74 - 99 mg/dL Final   • Sodium 03/12/2024 139  136 - 145 mmol/L Final   • Potassium 03/12/2024 3.9  3.5 - 5.3 mmol/L Final   • Chloride 03/12/2024 105  98 - 107 mmol/L Final   • Bicarbonate 03/12/2024 25  21 - 32 mmol/L Final   • Anion Gap 03/12/2024 13  10 - 20 mmol/L Final   • Urea Nitrogen 03/12/2024 20  6 - 23 mg/dL Final   • Creatinine 03/12/2024 0.72  0.50 - 1.05 mg/dL Final   • eGFR 03/12/2024 >90  >60 mL/min/1.73m*2 Final   • Calcium 03/12/2024 9.2  8.6 - 10.6 mg/dL Final   • Albumin 03/12/2024 3.8  3.4 - 5.0 g/dL Final   • Alkaline Phosphatase 03/12/2024 50  33 - 136 U/L Final   • Total Protein 03/12/2024 6.0 (L)  6.4 - 8.2 g/dL Final   • AST 03/12/2024 17  9 - 39 U/L Final   • Bilirubin, Total 03/12/2024 0.5  0.0 - 1.2 mg/dL Final   • ALT 03/12/2024 15  7 - 45 U/L Final   • Adrenocorticotropic Hormone (ACTH) 03/12/2024 3.5 (L)  7.2 - 63.3 pg/mL Final   • Amylase 03/12/2024 32  29 - 103 U/L Final   • aPTT 03/12/2024 29   27 - 38 seconds Final   • Bilirubin, Direct 03/12/2024 0.1  0.0 - 0.3 mg/dL Final   • Creatine Kinase 03/12/2024 40  0 - 215 U/L Final   • Cortisol 03/12/2024 0.7 (L)  2.5 - 20.0 ug/dL Final   • C-Reactive Protein 03/12/2024 0.28  <1.00 mg/dL Final   • Estradiol 03/12/2024 <19  pg/mL Final   • Ferritin 03/12/2024 62  8 - 150 ng/mL Final   • Follicle Stimulating Hormone 03/12/2024 30.0  IU/L Final   • Luteinizing Hormone 03/12/2024 7.8  IU/L Final   • Lipase 03/12/2024 30  9 - 82 U/L Final   • Magnesium 03/12/2024 2.02  1.60 - 2.40 mg/dL Final   • Phosphorus 03/12/2024 4.0  2.5 - 4.9 mg/dL Final   • Protime 03/12/2024 10.6  9.8 - 12.8 seconds Final   • INR 03/12/2024 0.9  0.9 - 1.1 Final   • Thyroid Stimulating Hormone 03/12/2024 2.39  0.44 - 3.98 mIU/L Final   • Thyroxine, Free 03/12/2024 0.87  0.78 - 1.48 ng/dL Final   • Color, Urine 03/12/2024 Dark-Yellow  Light-Yellow, Yellow, Dark-Yellow Final   • Appearance, Urine 03/12/2024 Clear  Clear Final   • Specific Gravity, Urine 03/12/2024 1.025  1.005 - 1.035 Final   • pH, Urine 03/12/2024 6.5  5.0, 5.5, 6.0, 6.5, 7.0, 7.5, 8.0 Final   • Protein, Urine 03/12/2024 10 (TRACE)  NEGATIVE, 10 (TRACE), 20 (TRACE) mg/dL Final   • Glucose, Urine 03/12/2024 Normal  Normal mg/dL Final   • Blood, Urine 03/12/2024 NEGATIVE  NEGATIVE Final   • Ketones, Urine 03/12/2024 NEGATIVE  NEGATIVE mg/dL Final   • Bilirubin, Urine 03/12/2024 NEGATIVE  NEGATIVE Final   • Urobilinogen, Urine 03/12/2024 Normal  Normal mg/dL Final   • Nitrite, Urine 03/12/2024 NEGATIVE  NEGATIVE Final   • Leukocyte Esterase, Urine 03/12/2024 NEGATIVE  NEGATIVE Final   • WBC, Urine 03/12/2024 1-5  1-5, NONE /HPF Final   • RBC, Urine 03/12/2024 1-2  NONE, 1-2, 3-5 /HPF Final   • Squamous Epithelial Cells, Urine 03/12/2024 10-25 (FEW)  Reference range not established. /HPF Final   • Budding Yeast, Urine 03/12/2024 PRESENT (A)  NONE /HPF Final   • Mucus, Urine 03/12/2024 2+  Reference range not established. /LPF  Final   • Vitamin D, 25-Hydroxy, Total 03/12/2024 39  30 - 100 ng/mL Final   • Extra Tube 03/12/2024 Hold for add-ons.   Final   • Extra Tube 03/12/2024 Hold for add-ons.   Final   • Extra Tube 03/12/2024 Hold for add-ons.   Final   • Extra Tube 03/12/2024 Hold for add-ons.   Final                 Performance Status:    ECOG 1: Restricted in physically strenuous activity but ambulatory and able to carry out work of a light or sedentary nature, e.g., light house work, office work.         Assessment/Plan      Mrs. Branch is a 67 yo with COPD and GERD who presented with newly diagnosed SCLC completed BID radiation planned concurrently with cis/etoposide but had a reaction C2D1 to cisplatin. Completed 1 cycle carbo/etop D1 4/5/22 also complicated by facial flushing and erythematous rash and stopped further treatment. Now s/p PCI in 6/2022. Most recent CT concerning for disease progression. Referred for clinical trial AMGN [id number], C1D1 1/24/23. Treatment has been complicated by orthostatic hypotension, worsening short-term memory, increased lethargy, weight loss, and poor appetite. Delayed C2 by 1 week and dose-reduced. Confusion has resolved during C3 after stopping mirtazapine and dose reduction.     # SCLC  - limited stage, brain MRI negative  - previously discussed concurrent chemoradiation, with cisplatin/etoposide with side effects including but not limited to allergic reaction, fatigue, risk of infection, tinnitus, neuropathy, injury to liver/kidney, risk of anemia/thrombocytopenia and was consented  - she was also interested in scalp cooling, which unfortunately she is not a candidate for d/t SCLC  - started concurrent chemoradiation BID with Q3week cis/etop on 2/15 at [facility]  -unfortunately had reaction to cisplatin on C2D1 resulting in hospitalization and also felt to be septic due to pneumonia  - discussed that we typically do 3-4 cycles chemotherapy, and alternative regimens include carboplatin/etoposide  vs CAV. Given reaction to cisplatin, concern for possible reaction to carboplatin as well, although unknown rates of cross reaction. Alternatively, they also asked about discontinuation of chemotherapy, since she completed radiation. She ultimately decided to trial carbo/etoposide. She is aware of the heightened risk of allergic reaction, as well as other side effects including but not limited to fatigue, risk of infection, neuropathy, injury to liver/kidney, risk of anemia/thrombocytopenia. consented 3/28/22  -s/p 1 cycle Carbo/Etop D1 4/5/22, developed facial flushing and erythematous rash on arms and chest s/p treatment, resolved with benadryl  -opted to forego further chemotherapy and will continue on maintenance  - s/p PCI 6/2022  - CT C/A/P and brain MRI 5/2022 and most recently 8/2022 with good response and no disease  -  MRI brain 11/7 without evidence of metastatic disease  - CT C/A/P 11/3 with multiple new enlarged LN concerning for disease progression - discussed with Dr. Valdivia not able to repeat radiation.  - referred to phase 1 clinical trial and consideration for [id number] or [id number]  - 1.5.2023 : Patient signed consent to take part on phase 1 study [id number]. Look clinically good to take part in study. Will start on study ASAP if eligible.   - 1.24.2023: Seen today and ready to start trial on [id number].  - 1.31.2023: Seen today, ready for C1D8 AMGN 1518   - 2.7.2023: Seen today for C1D15 [id number] - continue with trial   - 2.14.2023: Seen in follow-up on [id number] with overall worsening of general health  - 2.21.2023: Seen today for C2D1 AMGN 1518 with continued weight loss although perhaps starting to plateau, more energy since being off treatment, still poor appetite. will delay by 1 week, started dexamethasone 2 mg daily, and consider dose reduction.  - 2.28.2023: Seen today for C2D1 AMGN 1518 after delaying for 1 week. Energy level and appetite are improved, although still losing a little weight.  Confusion is worse today, possibly due to mirtazapine vs study-related. Will dose reduce today.  - 3.7.2023: Seen today C2D8 AMGN 1518 after dose-reduction last week. Confusion is mildly improved, energy level and appetite are stable. Will add marinol for appetite.   - 3.14.2023: Seen today C2D15 AMGN 1518. Overall improved: confusion continues to improve, energy level and appetite are improved. Weight is improved. Will continue dexamethasone and marinol.  - 3.15.2023: C2D16 No CRS symptoms observed after last treatment Overall Symptoms are improving and she is tolerating treatment.  -3.16.2023: C2D17 No CRS symptoms observed after last treatment Overall Symptoms are improving and she is tolerating treatment.  - 3.28.2023 : Most recent imaging suggest patient benefiting from current treatment. Ongoing symptom  possible common cold/season allergies. Since the patient looks clinically good we will proceed with C3D1 today. Educated patient to call the office ASAP if symptoms worsen.   - 4.11.2023: C3D15 Feeling well and overall improved with fatigue, confusion, anorexia. Proceed at current dosing and weaning steroids as below  -4.25.2023: C4D1 Pt is feeling well, no complaints of fatigue, confusion, memory loss. Has gained weight. Energy levels are good. Proceed with the current dosing. Pt is no longer on steroids.   -5.9.2023: C4D15. Pt is tolerating treatment well with no new complains. Continues to have good energy level endorses poor appetite with out any weight loss. Suggested to start taking the dronabinol.  Will proceed with treatment today.  -5.23.2023: C5D1. Pt is tolerating treatment well. Energy levels are good, is having some weight loss and constipation. Started back on dex for appetite. Pt will let us know if she remains constipated over the next several days.  Personally reviewed scans with stable disease. Will proceed with treatment today.   -6.6.2023: C5D15. Continues to tolerate treatment well. Since  we restarted her on Dexamethasone she reports her energy levels and appetite has improved. Will continue on low dose Dex. With no new symptoms, we will proceed with treatment today.   -6.20.2023: C6D1. Doing well on dexamethasone 1 mg daily. Will proceed with treatment.  -7.5.2023: C6D15. We will proceed with treatment since the patient is tolerating treatment with no significant AE's and also give 1/2 liter of IV fluids.   - 7.18.2023: C7D1. Doing well, will proceed with treatment. She is out of dexamethasone, and will monitor off steroids.   -8.1.2023: C7D15. Continues to tolerate treatment. Will proceed with C7D15 today. RTC per protocol.  - 8.29.2023: C8D1. C8 delayed due to hospitalization for diverticulitis. Has completed antibiotics and feeling well for treatment.  - 9.12.2023: C8D15. Most recent imgaing suggest the patient continues to benefit from current treatment. The imaging also suggest improvement of previously visualized segmental colitis associated with diverticulosis affecting the sigmoid colon with minimal residual pericolonic fat standing and hyperemia of the sigmoid colon. Imaging also suggest resolving inflammatory process without evidence of new or worsening complications. We will proceed with C8D15 today since the patient is also clinically feeling good. RTC per protocol.  -9.26.2023: C9D1. Patient looks clinically good. With no JACQUELYN's we will proceed with treatment C9D1 today.  -10.24.23: C10D1. Pt feels well, no acute events, no TRAEs, continue treatment today as scheduled.  -11.7.23: C10D15. Pt feels well, no TRAEs, continue treatment as scheduled.  - 11.21.23: C11D1. Continues to feel well, will continue treatment today.  - 12.19.23: C12D1. Personally reviewed most recent scan without evidence of progression. Continues to feel well and will continue with treatment today.  - 01.02.24: C12D15. Continues to tolerate treatment well. No new JACQUELYN's will proceed with treatment. RTC per  protocol.  - 01.16.24: C13D1. No new JACQUELYN's proceed with treatment. RTC per protocol.   - 01.30.24: C13D15. Continues to feel well. Proceed with treatment.  - 2.13.24: C14D1. Personally reviewed most recent scan without evidence of progression. Continue with treatment today. RTC per protocol.  - 2.27.24: C14D15. Continues to feel good and tolerate treatment without any JACQUELYN's. We will proceed with treatment today. RTC per protocol.  - 3.12.24: C15D1. Continues to feel well and tolerate treatment without new JACQUELYN's. Will continue on treatment.  - 3.26.24: C15D15. She feels good today. Will give her IV fluids. Will continue on treatment. RTC in per protocol with scan prior to next visit.    # Confusion - resolved  - significantly worsened after taking double dose of mirtazapine accidentally, although has been generally down-trending since starting study (which is also when she started taking mirtazapine) and now resolved  - brain MRI without progression of cancer  - will continue off mirtazapine    # Unintentional weight loss - stable  # Anorexia- Improving  # Dysgeusia- improving  - all improved on steroids. unclear if this is from cancer or side effect of study drug  - stopped mirtazapine due to concerns for confusion   - weaned off dexamethasone and started marinol, but kept forgetting to take it  - dexamethasone trial started again on 5.23.2023. Continued on 1 mg daily - will trial off after 7.18.23.     # Fatigue - Resolved  - back to normal pretty much, weaning steroids as above    # frequent PVCs - asymptomatic  - saw onco-cardiology and completed 24-hr Holter monitor  - recommended decreasing caffeine and sudafed intake  - continue to monitor    # Forgetfulness - improved - however noted decline on AMGN study- unclear etiology.    - started after PCI, on memantine daily and has since stopped  - offered neurocognitive evaluation previously and she will think about this and readdress next visit  -  notes  some decline in last 3 weeks- especially short term memory loss.    - Improved    # Neuropathy - resolved  - mild peripheral neuropathy with numbness of the toes - likely due to cisplatin  - will continue to monitor closely  - not endorsing any neuropathy at this visit     # Rash - resolved  Waxing and waning rash throughout her body. ?improving. Unclear etiology, patient and  feel timing coincided with starting BMX.   - she will hold off on further BMX  - thought to be due to sulfa allergy, possibly due to platinum agents   - continue to monitor    #odynophagia - resolved  -rad onc aware  -prescribed BMX solution  -will continue to monitor    #constipation-improving  -occasional. Prescribed Senna S  -will monitor    CASEY Stone-CNP

## 2024-04-09 NOTE — RESEARCH NOTES
Research Note Treatment Day    Gladys Branch is here today for treatment on [id number]. Today is C16D1. Procedures completed per protocol. AE's and con-meds reviewed with patient. Patient is aware of treatment plan.    [x]   Received treatment as planned   OR  []    Treatment delayed; patient calendar updated as required   Treatment delayed because:    []   AE    []   Physician Discretion    []   Clinical Deterioration or Progression     []   Other    Education Documentation  Memory Problems, taught by Vladimir Fernandez RN at 4/9/2024  2:45 PM.  Learner: Significant Other, Patient  Readiness: Eager  Method: Explanation  Response: Verbalizes Understanding    General Medication Information, taught by Vladimir Fernandez RN at 4/9/2024  2:45 PM.  Learner: Significant Other, Patient  Readiness: Eager  Method: Explanation  Response: Verbalizes Understanding    Treatment Plan and Schedule, taught by Vladimir Fernandez RN at 4/9/2024  2:45 PM.  Learner: Significant Other, Patient  Readiness: Eager  Method: Explanation  Response: Verbalizes Understanding    Supportive Medications, taught by Vladimir Fernandez RN at 4/9/2024  2:45 PM.  Learner: Significant Other, Patient  Readiness: Eager  Method: Explanation  Response: Verbalizes Understanding    Nutrition, taught by Vladimir Fernandez RN at 4/9/2024  2:45 PM.  Learner: Significant Other, Patient  Readiness: Eager  Method: Explanation  Response: Verbalizes Understanding    Diagnostic Studies, taught by Vladimir Fernandez RN at 4/9/2024  2:45 PM.  Learner: Significant Other, Patient  Readiness: Eager  Method: Explanation  Response: Verbalizes Understanding    Education Comments  No comments found.

## 2024-04-11 LAB — ACTH PLAS-MCNC: 28 PG/ML (ref 7.2–63.3)

## 2024-04-11 NOTE — ADDENDUM NOTE
Encounter addended by: Betsy Hadley RN on: 4/11/2024 7:13 PM   Actions taken: Charge Capture section accepted

## 2024-04-12 LAB
LAB AP ASR DISCLAIMER: NORMAL
LABORATORY COMMENT REPORT: NORMAL
PATH REPORT.FINAL DX SPEC: NORMAL
PATH REPORT.GROSS SPEC: NORMAL
PATH REPORT.RELEVANT HX SPEC: NORMAL
PATH REPORT.TOTAL CANCER: NORMAL

## 2024-04-23 ENCOUNTER — HOSPITAL ENCOUNTER (OUTPATIENT)
Dept: RESEARCH | Facility: HOSPITAL | Age: 69
Discharge: HOME | End: 2024-04-23
Payer: MEDICARE

## 2024-04-23 ENCOUNTER — EDUCATION (OUTPATIENT)
Dept: HEMATOLOGY/ONCOLOGY | Facility: HOSPITAL | Age: 69
End: 2024-04-23

## 2024-04-23 ENCOUNTER — OFFICE VISIT (OUTPATIENT)
Dept: HEMATOLOGY/ONCOLOGY | Facility: HOSPITAL | Age: 69
End: 2024-04-23
Payer: MEDICARE

## 2024-04-23 VITALS
WEIGHT: 110.89 LBS | RESPIRATION RATE: 17 BRPM | HEART RATE: 72 BPM | DIASTOLIC BLOOD PRESSURE: 62 MMHG | BODY MASS INDEX: 21.66 KG/M2 | TEMPERATURE: 98.1 F | SYSTOLIC BLOOD PRESSURE: 96 MMHG | OXYGEN SATURATION: 99 %

## 2024-04-23 DIAGNOSIS — C34.90 SMALL CELL LUNG CANCER (MULTI): Primary | ICD-10-CM

## 2024-04-23 DIAGNOSIS — C34.90 SMALL CELL LUNG CANCER (MULTI): ICD-10-CM

## 2024-04-23 LAB
ALBUMIN SERPL BCP-MCNC: 3.5 G/DL (ref 3.4–5)
ALP SERPL-CCNC: 45 U/L (ref 33–136)
ALT SERPL W P-5'-P-CCNC: 14 U/L (ref 7–45)
ANION GAP SERPL CALC-SCNC: 13 MMOL/L (ref 10–20)
APTT PPP: 29 SECONDS (ref 27–38)
AST SERPL W P-5'-P-CCNC: 16 U/L (ref 9–39)
BASOPHILS # BLD AUTO: 0.03 X10*3/UL (ref 0–0.1)
BASOPHILS NFR BLD AUTO: 0.4 %
BILIRUB DIRECT SERPL-MCNC: 0.1 MG/DL (ref 0–0.3)
BILIRUB SERPL-MCNC: 0.5 MG/DL (ref 0–1.2)
BUN SERPL-MCNC: 17 MG/DL (ref 6–23)
CALCIUM SERPL-MCNC: 8.8 MG/DL (ref 8.6–10.6)
CHLORIDE SERPL-SCNC: 105 MMOL/L (ref 98–107)
CK SERPL-CCNC: 45 U/L (ref 0–215)
CO2 SERPL-SCNC: 25 MMOL/L (ref 21–32)
CREAT SERPL-MCNC: 0.71 MG/DL (ref 0.5–1.05)
CRP SERPL-MCNC: <0.1 MG/DL
EGFRCR SERPLBLD CKD-EPI 2021: >90 ML/MIN/1.73M*2
EOSINOPHIL # BLD AUTO: 0.2 X10*3/UL (ref 0–0.7)
EOSINOPHIL NFR BLD AUTO: 2.8 %
ERYTHROCYTE [DISTWIDTH] IN BLOOD BY AUTOMATED COUNT: 13.8 % (ref 11.5–14.5)
FERRITIN SERPL-MCNC: 55 NG/ML (ref 8–150)
GLUCOSE SERPL-MCNC: 84 MG/DL (ref 74–99)
HCT VFR BLD AUTO: 34.4 % (ref 36–46)
HGB BLD-MCNC: 11.4 G/DL (ref 12–16)
IMM GRANULOCYTES # BLD AUTO: 0.05 X10*3/UL (ref 0–0.7)
IMM GRANULOCYTES NFR BLD AUTO: 0.7 % (ref 0–0.9)
INR PPP: 1 (ref 0.9–1.1)
LYMPHOCYTES # BLD AUTO: 1.76 X10*3/UL (ref 1.2–4.8)
LYMPHOCYTES NFR BLD AUTO: 24.9 %
MAGNESIUM SERPL-MCNC: 2.02 MG/DL (ref 1.6–2.4)
MCH RBC QN AUTO: 31.1 PG (ref 26–34)
MCHC RBC AUTO-ENTMCNC: 33.1 G/DL (ref 32–36)
MCV RBC AUTO: 94 FL (ref 80–100)
MONOCYTES # BLD AUTO: 0.7 X10*3/UL (ref 0.1–1)
MONOCYTES NFR BLD AUTO: 9.9 %
NEUTROPHILS # BLD AUTO: 4.34 X10*3/UL (ref 1.2–7.7)
NEUTROPHILS NFR BLD AUTO: 61.3 %
NRBC BLD-RTO: 0 /100 WBCS (ref 0–0)
PHOSPHATE SERPL-MCNC: 3.7 MG/DL (ref 2.5–4.9)
PLATELET # BLD AUTO: 226 X10*3/UL (ref 150–450)
POTASSIUM SERPL-SCNC: 4 MMOL/L (ref 3.5–5.3)
PROT SERPL-MCNC: 6 G/DL (ref 6.4–8.2)
PROTHROMBIN TIME: 11.1 SECONDS (ref 9.8–12.8)
RBC # BLD AUTO: 3.67 X10*6/UL (ref 4–5.2)
SODIUM SERPL-SCNC: 139 MMOL/L (ref 136–145)
WBC # BLD AUTO: 7.1 X10*3/UL (ref 4.4–11.3)

## 2024-04-23 PROCEDURE — 85025 COMPLETE CBC W/AUTO DIFF WBC: CPT | Performed by: STUDENT IN AN ORGANIZED HEALTH CARE EDUCATION/TRAINING PROGRAM

## 2024-04-23 PROCEDURE — 96413 CHEMO IV INFUSION 1 HR: CPT

## 2024-04-23 PROCEDURE — 86140 C-REACTIVE PROTEIN: CPT | Performed by: STUDENT IN AN ORGANIZED HEALTH CARE EDUCATION/TRAINING PROGRAM

## 2024-04-23 PROCEDURE — 83735 ASSAY OF MAGNESIUM: CPT | Performed by: STUDENT IN AN ORGANIZED HEALTH CARE EDUCATION/TRAINING PROGRAM

## 2024-04-23 PROCEDURE — 82550 ASSAY OF CK (CPK): CPT | Performed by: STUDENT IN AN ORGANIZED HEALTH CARE EDUCATION/TRAINING PROGRAM

## 2024-04-23 PROCEDURE — 82248 BILIRUBIN DIRECT: CPT | Performed by: STUDENT IN AN ORGANIZED HEALTH CARE EDUCATION/TRAINING PROGRAM

## 2024-04-23 PROCEDURE — 84075 ASSAY ALKALINE PHOSPHATASE: CPT

## 2024-04-23 PROCEDURE — 1159F MED LIST DOCD IN RCRD: CPT | Performed by: STUDENT IN AN ORGANIZED HEALTH CARE EDUCATION/TRAINING PROGRAM

## 2024-04-23 PROCEDURE — 85610 PROTHROMBIN TIME: CPT | Performed by: STUDENT IN AN ORGANIZED HEALTH CARE EDUCATION/TRAINING PROGRAM

## 2024-04-23 PROCEDURE — 99214 OFFICE O/P EST MOD 30 MIN: CPT | Performed by: STUDENT IN AN ORGANIZED HEALTH CARE EDUCATION/TRAINING PROGRAM

## 2024-04-23 PROCEDURE — 2500000005 HC RX 250 GENERAL PHARMACY W/O HCPCS: Performed by: STUDENT IN AN ORGANIZED HEALTH CARE EDUCATION/TRAINING PROGRAM

## 2024-04-23 PROCEDURE — 84100 ASSAY OF PHOSPHORUS: CPT | Performed by: STUDENT IN AN ORGANIZED HEALTH CARE EDUCATION/TRAINING PROGRAM

## 2024-04-23 PROCEDURE — 85730 THROMBOPLASTIN TIME PARTIAL: CPT | Performed by: STUDENT IN AN ORGANIZED HEALTH CARE EDUCATION/TRAINING PROGRAM

## 2024-04-23 PROCEDURE — 82728 ASSAY OF FERRITIN: CPT | Performed by: STUDENT IN AN ORGANIZED HEALTH CARE EDUCATION/TRAINING PROGRAM

## 2024-04-23 RX ORDER — ALBUTEROL SULFATE 0.83 MG/ML
3 SOLUTION RESPIRATORY (INHALATION) AS NEEDED
Status: DISCONTINUED | OUTPATIENT
Start: 2024-04-23 | End: 2024-04-24 | Stop reason: HOSPADM

## 2024-04-23 RX ORDER — DIPHENHYDRAMINE HYDROCHLORIDE 50 MG/ML
50 INJECTION INTRAMUSCULAR; INTRAVENOUS AS NEEDED
Status: DISCONTINUED | OUTPATIENT
Start: 2024-04-23 | End: 2024-04-24 | Stop reason: HOSPADM

## 2024-04-23 RX ORDER — EPINEPHRINE 1 MG/ML
0.3 INJECTION INTRAMUSCULAR; INTRAVENOUS; SUBCUTANEOUS EVERY 5 MIN PRN
Status: DISCONTINUED | OUTPATIENT
Start: 2024-04-23 | End: 2024-04-24 | Stop reason: HOSPADM

## 2024-04-23 RX ORDER — FAMOTIDINE 10 MG/ML
20 INJECTION INTRAVENOUS ONCE AS NEEDED
Status: DISCONTINUED | OUTPATIENT
Start: 2024-04-23 | End: 2024-04-24 | Stop reason: HOSPADM

## 2024-04-23 RX ADMIN — Medication 3 MG: at 11:31

## 2024-04-23 NOTE — RESEARCH NOTES
Research Note Treatment Day    Gladys Branch is here today for treatment on [id number]. Today is C16D15. Procedures completed per protocol. AE's and con-meds reviewed with patient. Patient is aware of treatment plan.    [x]   Received treatment as planned   OR  []    Treatment delayed; patient calendar updated as required   Treatment delayed because:    []   AE    []   Physician Discretion    []   Clinical Deterioration or Progression     []   Other    Education Documentation  Memory Problems, taught by Vladimir Fernandez RN at 4/23/2024  1:35 PM.  Learner: Significant Other, Patient  Readiness: Eager  Method: Explanation  Response: Verbalizes Understanding    General Medication Information, taught by Vladimir Fernandez RN at 4/23/2024  1:35 PM.  Learner: Significant Other, Patient  Readiness: Eager  Method: Explanation  Response: Verbalizes Understanding    Treatment Plan and Schedule, taught by Vladimir Fernandez RN at 4/23/2024  1:35 PM.  Learner: Significant Other, Patient  Readiness: Eager  Method: Explanation  Response: Verbalizes Understanding    Supportive Medications, taught by Vladimir Fernandez RN at 4/23/2024  1:35 PM.  Learner: Significant Other, Patient  Readiness: Eager  Method: Explanation  Response: Verbalizes Understanding    Nutrition, taught by Vladimir Fernandez RN at 4/23/2024  1:35 PM.  Learner: Significant Other, Patient  Readiness: Eager  Method: Explanation  Response: Verbalizes Understanding    Education Comments  No comments found.

## 2024-04-23 NOTE — RESEARCH NOTES
[facility]><[id number]><C5+D15><PART A>  DCRU NURSING VISIT NOTE  Study Name: ALEXANDRU Foote8  IRB#: [id number]  DCRU#: [id number]  Protocol Version Dated: A9 3.22.23  PI: Roshan Kumar MD.  Time point: Cycle 5 and beyond - Day 15  CYCLE 16   Encounter Date: 04/23/2024  Encounter Time:  8:30 AM EDT  Encounter Department: [facility] HEMATOLOGY AND ONCOLOGY   #1     Phone Pager   Carmen Rios, DENYS [phone]     Study Regimen and Dosing   Part A Dose Exploration/Expansion- Small Cell Lung Cancer Relapsed or Refractory patients who progressed or recurred following at least 1 platinum-based regimen.   Cycle = 28 days    administered IV Day 1, Day 8, and Day 15 of Cycle 1. Cycle 2 and beyond     administered on Day 1 and Day 15.      Dietary Guidelines   Regular diet:     Admission and Prior to Starting Study Activities   Complete DCRU and EPIC Admission/Visit Note.  Notify [facility] of patient arrival after initial lab and vitals  Insert one peripheral IV line for sample collection procedures (flush line with normal saline following each blood draw). Access mediport (if available) otherwise insert second peripheral line in opposite arm for Investigative drug administration (if peripheral line, flush line with normal saline before & after infusion)     Criteria to Treat   DCRU RN reviewed and meets eligibility to proceed with treatment plan   Time team notified: 1015 am  DCRU RN notifies study team to review eligibility and approval before dosing procedures  Time team approves: 1015 am   Maddison Branch is a 68 y.o. female and is here for a Research clinical visit.  Visit Provider: [id number], [facility] ROOM 2 [facility]   Allergies:   Allergies   Allergen Reactions    Cisplatin Other     CHEMO INDUCED (Moderate); Dyspepsia (Moderate); Headaches (Moderate); Hypotension (Moderate); Numbness (Moderate); Resp Distress (Moderate)    Codeine Nausea Only and Other     nausea    Sulfa (Sulfonamide  Antibiotics) Rash   Objective   Vital Signs:    Vitals:    04/23/24 0745 04/23/24 1100 04/23/24 1237   BP: 100/72 106/73 96/62   Pulse: 64 82 72   Resp: 16 17 17   Temp: 36.5 °C (97.7 °F) 36.7 °C (98.1 °F) 36.7 °C (98.1 °F)   TempSrc: Oral Temporal Temporal   SpO2: 98% 98% 99%   Weight: 50.3 kg (110 lb 14.3 oz)     Physical Exam   ASSESSMENT and PLAN:  Problem List Items Addressed This Visit          Hematology and Neoplasia    Small cell lung cancer (Multi)    Relevant Medications    Study [id number] Tarlatamab (AMG-757) 3 mg in sodium chloride 0.9% 250 mL IV (Completed)    sodium chloride 0.9 % bolus 500 mL    dextrose 5 % in water (D5W) bolus    diphenhydrAMINE (BENADryl) injection 50 mg    methylPREDNISolone sod succinate (SOLU-Medrol) 40 mg/mL injection 40 mg    famotidine PF (Pepcid) injection 20 mg    EPINEPHrine (Adrenalin) injection 0.3 mg    albuterol 2.5 mg /3 mL (0.083 %) nebulizer solution 3 mL    Other Relevant Orders    CBC and Auto Differential (Completed)    Comprehensive metabolic panel    APTT (Completed)    Bilirubin, Direct (Completed)    CK (Completed)    C-Reactive Protein (Completed)    Ferritin (Completed)    Magnesium (Completed)    Phosphorus (Completed)    Protime-INR (Completed)    Adult diet Regular    Treatment Conditions (Completed)    Provider Communication - [id number] (Completed)    Nursing Communication - Hypersensitivity Management, Moderate (Completed)    Nursing Communication - Hypersensitivity Management, Severe (Completed)    Nursing Communication - Respiratory Management (Completed)    Pulse oximetry, continuous   Medications as of the completion of today's visit:  Current Outpatient Medications   Medication Sig Dispense Refill    cetirizine-pseudoephedrine (ZyrTEC-D) 5-120 mg 12 hr tablet Take 1 tablet by mouth 2 times a day. 60 tablet 11    cholecalciferol (Vitamin D-3) 25 MCG (1000 UT) tablet Take by mouth.      dexAMETHasone (Decadron) 2 mg tablet Take 0.5 tablets (1 mg) by  mouth once daily with breakfast. 30 tablet 2    dextromethorphan (Delsym) 30 mg/5 mL liquid Take 5 mL (30 mg) by mouth once daily as needed.      dronabinol (Marinol) 2.5 mg capsule once daily as needed. One hour before dinner      estrogens, conjugated, (Premarin) 0.3 mg tablet Take 0.5 tablets (0.15 mg) by mouth once daily.      mv-min/FA/vit K/lutein/zeaxant (PRESERVISION AREDS 2 PLUS MV ORAL) Take 1 tablet by mouth once daily.      omeprazole (PriLOSEC) 40 mg DR capsule Take 1 capsule (40 mg) by mouth once daily. Do not crush or chew. 90 each 3    ondansetron (Zofran) 8 mg tablet Take 0.5 tablets (4 mg) by mouth every 8 hours if needed.      polyethylene glycol (Glycolax, Miralax) 17 gram packet Take 17 g by mouth 2 times a day. (Patient taking differently: Take 17 g by mouth if needed.) 60 packet 2    prochlorperazine (Compazine) 10 mg tablet Take 0.5 tablets (5 mg) by mouth every 6 hours if needed.      psyllium husk, aspartame, (Metamucil Sugar-Free, aspart,) 3.4 gram/5.8 gram powder Take 1 Dose by mouth once daily. (Patient not taking: Reported on 3/12/2024) 425 g 11     Current Facility-Administered Medications   Medication Dose Route Frequency Provider Last Rate Last Admin    albuterol 2.5 mg /3 mL (0.083 %) nebulizer solution 3 mL  3 mL nebulization PRN Francy Archer MD        dextrose 5 % in water (D5W) bolus  500 mL intravenous PRN Francy Archer MD        diphenhydrAMINE (BENADryl) injection 50 mg  50 mg intravenous PRN Francy Archer MD        EPINEPHrine (Adrenalin) injection 0.3 mg  0.3 mg intramuscular q5 min PRN Francy Archer MD        famotidine PF (Pepcid) injection 20 mg  20 mg intravenous Once PRN Francy Archer MD        methylPREDNISolone sod succinate (SOLU-Medrol) 40 mg/mL injection 40 mg  40 mg intravenous PRN Francy Archer MD        sodium chloride 0.9 % bolus 500 mL  500 mL intravenous PRN Francy Archer MD           Administrations This Visit       Study [id number] Tarlatamab  (AMG-757) 3 mg in sodium chloride 0.9% 250 mL IV       Admin Date  04/23/2024 Action  New Bag Dose  3 mg Route  intravenous Documented By  Marquise Nash RN                Orders placed during today's visit:  Orders Placed This Encounter   Procedures    CBC and Auto Differential     Standing Status:   Standing     Number of Occurrences:   1     Order Specific Question:   Release result to MyChart     Answer:   Immediate    Comprehensive metabolic panel     Standing Status:   Standing     Number of Occurrences:   1     Order Specific Question:   Release result to MyChart     Answer:   Immediate    APTT     Standing Status:   Standing     Number of Occurrences:   1     Order Specific Question:   Release result to MyChart     Answer:   Immediate [1]    Bilirubin, Direct     Standing Status:   Standing     Number of Occurrences:   1     Order Specific Question:   Release result to MyChart     Answer:   Immediate [1]    CK     Standing Status:   Standing     Number of Occurrences:   1     Order Specific Question:   Release result to MyChart     Answer:   Immediate [1]    C-Reactive Protein     Standing Status:   Standing     Number of Occurrences:   1     Order Specific Question:   Release result to MyChart     Answer:   Immediate [1]    Ferritin     Standing Status:   Standing     Number of Occurrences:   1     Order Specific Question:   Release result to MyChart     Answer:   Immediate [1]    Magnesium     Standing Status:   Standing     Number of Occurrences:   1     Order Specific Question:   Release result to MyChart     Answer:   Immediate [1]    Phosphorus     Standing Status:   Standing     Number of Occurrences:   1     Order Specific Question:   Release result to MyChart     Answer:   Immediate [1]    Protime-INR     Standing Status:   Standing     Number of Occurrences:   1     Order Specific Question:   Release result to MyChart     Answer:   Immediate [1]    Adult diet Regular     Standing Status:    Standing     Number of Occurrences:   1     Order Specific Question:   Diet type     Answer:   Regular    Treatment Conditions     Hold treatment and notify provider if:   Adverse Event GREATER THAN OR EQUAL TO Grade 3     Standing Status:   Standing     Number of Occurrences:   1    Provider Communication - [id number]      Dose Level 4              Tarlatamab 0.1 mg   Dose Level 5              Tarlatamab 0.3 mg   Dose Level 6              Tarlatamab 1 mg   Dose Level 7              Tarlatamab 3 mg   Dose Level 8              Tarlatamab 10 mg   Dose Level 9              Tarlatamab 30 mg   Dose Level 10              Tarlatamab 100 mg     Standing Status:   Standing     Number of Occurrences:   1    Nursing Communication - Hypersensitivity Management, Moderate     Flushing, rash, pruritus, dyspnea, chest discomfort, back pain, angioedema, SBP LESS THAN 90 mmHg, and/or change in mental status above or below patient's baseline. In the event of moderate or severe hypersensitivity reaction to any medication:   Stop infusion.  Assess vital signs, pulse oximetry, nursing assessment, and patient complaints.  Administer medications per Hypersensitivity Reaction Medication Protocol.   Notify physician.     Standing Status:   Standing     Number of Occurrences:   1    Nursing Communication - Hypersensitivity Management, Severe     Worsening of moderate reaction symptoms, SBP LESS THAN 80 mmHg, and/or respiratory distress (respirations GREATER THAN 40 breaths per minute, wheezing, life-threatening symptoms). In the event of moderate or severe hypersensitivity reaction to any medication:   Stop infusion.  Assess vital signs, pulse oximetry, nursing assessment, and patient complaints.  Administer medications per Hypersensitivity Reaction Medication Protocol.   Notify physician.     Standing Status:   Standing     Number of Occurrences:   1    Nursing Communication - Respiratory Management     Oxygen via nasal cannula at 4 L per  minute for respiratory rate GREATER THAN OR EQUAL TO to 28 breaths/minute and/or wheezing. Pulse Oximetry continuous monitoring.     Standing Status:   Standing     Number of Occurrences:   1    Pulse oximetry, continuous     Continuous monitoring to maintain SPO2 GREATER THAN 90%     Standing Status:   Standing     Number of Occurrences:   1   [id number] -  in Subjects With Small Cell Lung Cancer    Patient has no adverse events documented in the Research Adverse Events activity.    Study Specific Instructions and Documentation   WEIGHT[x]  LABS[x]  VITALS[x]  STUDY DRUG[x]  VITALS[x]  DISCHARGE     PRE-DOSE Safety Labs   Refer to [place] Treatment Plan for orders     Day 15 Pre-dose Vital Signs    Temp, HR, Resp, BP, Pulse Oximetry: Seated or Semi-Fowlers x 5 minutes before obtaining  Position and temperature location: should be the same that is used throughout the study-record position     Infusion-Related Reactions   [facility] SOC Hypersensitivity Orders  Note: CRS table    SOI: 1131  Research Drug Administration Tarlatamab ()   Refer to [place] Treatment Plan for orders   Administer dose over 60 minutes (+/- 10 mins) followed by a 3 - 5 minute Normal saline flush. (This will be the end time after the flush). Document start time & end of study medication; document start time and end time of the flush.  Participant to remain on Unit for 2 hour post dose observation.    Eoi: 1234  Post-Dose Vital Signs   Temp, HR, Resp, BP, Pulse Oximetry: Seated or Semi-Fowlers x 5 minutes before obtaining  Position and temperature location: should be the same that is used throughout the study  End of Infusion     Day 15 Discharge Instructions   Patient may be discharged after 2 hour observation.  Discharge time 1434 aprox.   Marquise Philip RN  04/23/24  Grading and Management of Cytokine Release Syndrome   CRS Grade Description of Severity Minimum Expected Intervention Instructions for Dose Modifications of    1  Symptoms are not life threatening and require symptomatic treatment only,  eg, fever, nausea, fatigue, headache, myalgia's, malaise Administer symptomatic treatment (contact provider for medications/doses). Monitor for CRS symptoms including vital signs and pulse oximetry at least Q2 hours for12 hours or until resolution, Whichever is earlier. N/A     (continued)  Grading and Management of Cytokine Release Syndrome   CRS Grade Description of Severity Minimum Expected Intervention Instructions for Dose Modifications of    2 Symptoms require and respond to moderate intervention  Oxygen requirement <40%,                      OR  Hypotension responsive to fluids or low dose of one vasopressor, OR                                                                       Grade 2 organ toxicity or grade 3 transaminitis per CTCAE criteria Administer:  Symptomatic treatment   (contact provider for medications/doses)  Supplemental oxygen when oxygen saturation is <90% on room air  Intravenous fluids or low dose vasopressor for hypotension is recommended when systolic blood pressure is <85 mmHg. (contact provider for medications/doses) Persistent tachycardia (eg >120 bpm) may also indicate the need for intervention for hypotension.   Monitor for CRS symptoms including vital signs and pulse oximetry at least Q2 hours for12 hours or until resolution to CRS grade <= 1, whichever is earlier. For subjects with extensive co-morbidities or poor performance status, manage per grade 3 CRS guidance below If CRS occurs during  treatment, immediately interrupt the infusion and delay the next  dose until the event resolves to CRS grade <= 1 for no less than 72 hours. Permanently discontinue  if there is no improvement to CRS <= grade 1 within 7 days     3 Symptoms require and respond to aggressive intervention  Oxygen requirement >=40%, OR  Hypotension requiring high dose or multiple vasopressors, OR  Grade 3 organ  toxicity or     Grade 4 transaminitis per  CTCAE criteria Admit to intensive care unit for close clinical and vital sign monitoring per institutional guidelines.   Refer to provider/protocol for medications and doses.     If CRS occurs during  treatment, immediately interrupt   and delay the next dose until event resolves to CRS grade <= 1 for no less than 72 hours.    Permanently discontinue  if there is no improvement to CRS                 <= grade 2 within 5 days or CRS <= grade 1 within 7 days.  Permanently discontinue   if CRS grade  3 occurs at the initial run in dose (i.e., at MTD1)  (Applicable only after MTD1 has been defined).     (continued)  Grading and Management of Cytokine Release Syndrome   CRS Grade Description of Severity Minimum Expected Intervention Instructions for Dose Modifications of    4 Life-threatening symptoms  Requirement for ventilator support OR  Grade 4 organ toxicity (excluding transaminitis) per CTCAE criteria Admit to intensive care unit for close clinical and vital sign monitoring per institutional guidelines.   Refer to provider/protocol for medications and doses.   If CRS occurs during  treatment, Immediately stop the infusion.  Permanently discontinue   therapy.

## 2024-04-23 NOTE — PROGRESS NOTES
Patient ID: Gladys Branch is a 68 y.o. female.    DIAGNOSIS    Small Cell Lung Cancer    By immunostaining, the tumor cells are positive for CAM 5.2, INSM1, and TTF-1, and negative for p40 and LCA. The proliferation index by Ki-67 immunostaining is approximately 90%.  Synaptophysin, Chromogranin and INSM-1 are positive.  AE1/AE3 is negative. NEOGENOMICS, RB protein expression is lost in the tumor cells    STAGING    [id number], limited stage  Recurrence    CURRENT SITES OF DISEASE    RLL, supraclavicular    MOLECULAR GENOMICS      PRIOR THERAPY    Concurrent chemoradiation with Cisplatin/Etoposide every 3 weeks; C1D1 2/15/22, reaction to cisplatin C2D1 and did not receive D2 or D3 etoposide  Carbo/etoposide 4/5/2022 1 cycle c/b rash  PCI 5/21-6/13/22    CURRENT THERAPY    Phase 1 study [id number]  with study drug Taralatamab 1/24/23 - present.    CURRENT ONCOLOGICAL PROBLEMS      HISTORY OF PRESENT ILLNESS    Mrs. Branch is a 65 yo with PMH GERD and COPD who originally was round to have a RLL nodule measuring 11 mm in 4/28/2021 found as part of CT calcium score scan. An ensuing CT chest w/o contrast was done on 5/19/21 which revealed subsolid pulmonary nodules measuring 14 mm in the RLL. She had CT chest w/contrast on 11/29/21 which confirmed stable 14 mm GGO of the RLL and decreased RLL subpleural nodule. She met thoracic surgeon Dr. Farfan, who ordered PET/CT scan done on 12/8/21 which did not reveal any FDG-avid nodules within the lung parenchyma but R hilar nodes with max SUV 8.0. He referred her for bronch, which was done on 1/21/22 by Dr. Mcdaniels. 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 Brain MRI was negative.    She started concurrent chemoradiation with BID radiation with cis/etop 2/15/22, and unfortunately had a reaction to cisplatin on C2D1 with chest pain and hypotension. She was hospitalized with sepsis felt to be due to pneumonia. She trialed carboplatin/etoposide on 4/5/22 with a  resulting rash and opted to forego further chemotherapy as she had completed radiation. Restaging scan 11/3/22 unfortunately with progression with new R hilar lymph node, paratracheal nodes, and increase in size of R supraclavicular mass. She enrolled in [id number] and started C1D1 1/24/23. C1 complicated by anorexia and dysgeusia, and delayed C2 by a week. She has further struggled with fatigue and confusion, which may be related to mirtazapine. Dose reduced for C2 and mirtazapine stopped with improvement in symptoms.     PAST MEDICAL HISTORY    GERD  COPD    SOCIAL HISTORY    Retired - lighting . Lives at home with  and a kitten.  Tobacco: quit smoking 1999. 1 ppd x 25 yrs.  EtOH: wine 1 glass/day  Illicits: none    CURRENT MEDS    Please see med list    ALLERGIES    Codeine, Sulfa drugs, Cisplatin    FAMILY HISTORY    Paternal aunt - breast cancer dx 80s    Subjective    Today 4.23.2024. Patient in clinic with her  for C17D1 for [id number].      She is having diarrhea after starting the papaya enzyme drink for the bezoar found on EGD. She is planning to cut back on the papaya drink. She is also burping a little less. She's a bit better with eating too. She will see them back in a couple months for colonoscopy. Her balance has been a little off - she did fall on the curb last week - didn't really hurt herself. Her energy level is still good, gardening. Still a little forgetful.       Objective          4/4/2024     1:13 PM 4/4/2024     1:24 PM 4/4/2024     1:33 PM 4/9/2024     7:30 AM 4/9/2024    12:30 PM 4/9/2024     1:50 PM 4/23/2024     7:45 AM   Vitals   Systolic 102 101 113 108 95 98 100   Diastolic 71 75 86 75 61 59 72   Heart Rate 64 62 63 73 71 78 64   Temp 36.9 °C (98.4 °F)  36.7 °C (98 °F) 36.2 °C (97.2 °F) 36.4 °C (97.5 °F) 36.4 °C (97.5 °F) 36.5 °C (97.7 °F)   Resp 18 19 19 16 16 16 16   Weight (lb)    108.25   110.89   BMI    21.14 kg/m2   21.66 kg/m2   BSA (m2)    1.44 m2    1.46 m2   Visit Report       Report       Daily Weight  04/23/24 : 50.3 kg (110 lb 14.3 oz)  04/09/24 : 49.1 kg (108 lb 3.9 oz)  04/04/24 : 49.9 kg (110 lb)  03/26/24 : 50 kg (110 lb 3.7 oz)  03/12/24 : 49.1 kg (108 lb 3.9 oz)        Physical Exam  Constitutional:       General: She is awake.      Appearance: Normal appearance. She is normal weight.   HENT:      Head: Normocephalic.      Mouth/Throat:      Mouth: Mucous membranes are moist.   Eyes:      Extraocular Movements: Extraocular movements intact.      Conjunctiva/sclera: Conjunctivae normal.      Pupils: Pupils are equal, round, and reactive to light.   Cardiovascular:      Rate and Rhythm: Normal rate and regular rhythm.      Heart sounds: Normal heart sounds, S1 normal and S2 normal.   Pulmonary:      Effort: Pulmonary effort is normal.      Breath sounds: Normal breath sounds.   Abdominal:      General: Abdomen is flat. Bowel sounds are normal.      Palpations: Abdomen is soft.   Musculoskeletal:      Cervical back: Normal range of motion and neck supple.   Skin:     Comments: No skin rash observed   Neurological:      Mental Status: She is alert.      Cranial Nerves: Cranial nerves 2-12 are intact.      Sensory: Sensation is intact.      Motor: Motor function is intact.      Coordination: Coordination is intact.   Psychiatric:         Attention and Perception: Attention normal.         Mood and Affect: Mood normal.         Speech: Speech normal.         Behavior: Behavior normal. Behavior is cooperative.         Cognition and Memory: Cognition normal.       Labs    Hospital Outpatient Visit on 04/23/2024   Component Date Value Ref Range Status    WBC 04/23/2024 7.1  4.4 - 11.3 x10*3/uL Final    nRBC 04/23/2024 0.0  0.0 - 0.0 /100 WBCs Final    RBC 04/23/2024 3.67 (L)  4.00 - 5.20 x10*6/uL Final    Hemoglobin 04/23/2024 11.4 (L)  12.0 - 16.0 g/dL Final    Hematocrit 04/23/2024 34.4 (L)  36.0 - 46.0 % Final    MCV 04/23/2024 94  80 - 100 fL Final    MCH  04/23/2024 31.1  26.0 - 34.0 pg Final    MCHC 04/23/2024 33.1  32.0 - 36.0 g/dL Final    RDW 04/23/2024 13.8  11.5 - 14.5 % Final    Platelets 04/23/2024 226  150 - 450 x10*3/uL Final    Neutrophils % 04/23/2024 61.3  40.0 - 80.0 % Final    Immature Granulocytes %, Automated 04/23/2024 0.7  0.0 - 0.9 % Final    Lymphocytes % 04/23/2024 24.9  13.0 - 44.0 % Final    Monocytes % 04/23/2024 9.9  2.0 - 10.0 % Final    Eosinophils % 04/23/2024 2.8  0.0 - 6.0 % Final    Basophils % 04/23/2024 0.4  0.0 - 2.0 % Final    Neutrophils Absolute 04/23/2024 4.34  1.20 - 7.70 x10*3/uL Final    Immature Granulocytes Absolute, Au* 04/23/2024 0.05  0.00 - 0.70 x10*3/uL Final    Lymphocytes Absolute 04/23/2024 1.76  1.20 - 4.80 x10*3/uL Final    Monocytes Absolute 04/23/2024 0.70  0.10 - 1.00 x10*3/uL Final    Eosinophils Absolute 04/23/2024 0.20  0.00 - 0.70 x10*3/uL Final    Basophils Absolute 04/23/2024 0.03  0.00 - 0.10 x10*3/uL Final    aPTT 04/23/2024 29  27 - 38 seconds Final    Bilirubin, Direct 04/23/2024 0.1  0.0 - 0.3 mg/dL Final    Creatine Kinase 04/23/2024 45  0 - 215 U/L Final    C-Reactive Protein 04/23/2024 <0.10  <1.00 mg/dL Final    Ferritin 04/23/2024 55  8 - 150 ng/mL Final    Magnesium 04/23/2024 2.02  1.60 - 2.40 mg/dL Final    Phosphorus 04/23/2024 3.7  2.5 - 4.9 mg/dL Final    Protime 04/23/2024 11.1  9.8 - 12.8 seconds Final    INR 04/23/2024 1.0  0.9 - 1.1 Final   Hospital Outpatient Visit on 04/09/2024   Component Date Value Ref Range Status    Adrenocorticotropic Hormone (ACTH) 04/09/2024 28.0  7.2 - 63.3 pg/mL Final    Amylase 04/09/2024 33  29 - 103 U/L Final    aPTT 04/09/2024 32  27 - 38 seconds Final    Bilirubin, Direct 04/09/2024 0.1  0.0 - 0.3 mg/dL Final    Creatine Kinase 04/09/2024 44  0 - 215 U/L Final    Cortisol 04/09/2024 9.2  2.5 - 20.0 ug/dL Final    C-Reactive Protein 04/09/2024 0.16  <1.00 mg/dL Final    Estradiol 04/09/2024 <19  pg/mL Final    Ferritin 04/09/2024 99  8 - 150 ng/mL Final     Follicle Stimulating Hormone 04/09/2024 50.3  IU/L Final    Luteinizing Hormone 04/09/2024 15.0  IU/L Final    Lipase 04/09/2024 30  9 - 82 U/L Final    Magnesium 04/09/2024 2.08  1.60 - 2.40 mg/dL Final    Phosphorus 04/09/2024 3.7  2.5 - 4.9 mg/dL Final    Protime 04/09/2024 11.5  9.8 - 12.8 seconds Final    INR 04/09/2024 1.0  0.9 - 1.1 Final    Thyroid Stimulating Hormone 04/09/2024 4.11 (H)  0.44 - 3.98 mIU/L Final    Thyroxine, Free 04/09/2024 0.86  0.78 - 1.48 ng/dL Final    Color, Urine 04/09/2024 Yellow  Light-Yellow, Yellow, Dark-Yellow Final    Appearance, Urine 04/09/2024 Clear  Clear Final    Specific Gravity, Urine 04/09/2024 1.022  1.005 - 1.035 Final    pH, Urine 04/09/2024 6.0  5.0, 5.5, 6.0, 6.5, 7.0, 7.5, 8.0 Final    Protein, Urine 04/09/2024 NEGATIVE  NEGATIVE, 10 (TRACE), 20 (TRACE) mg/dL Final    Glucose, Urine 04/09/2024 Normal  Normal mg/dL Final    Blood, Urine 04/09/2024 NEGATIVE  NEGATIVE Final    Ketones, Urine 04/09/2024 NEGATIVE  NEGATIVE mg/dL Final    Bilirubin, Urine 04/09/2024 NEGATIVE  NEGATIVE Final    Urobilinogen, Urine 04/09/2024 Normal  Normal mg/dL Final    Nitrite, Urine 04/09/2024 NEGATIVE  NEGATIVE Final    Leukocyte Esterase, Urine 04/09/2024 NEGATIVE  NEGATIVE Final    WBC 04/09/2024 8.8  4.4 - 11.3 x10*3/uL Final    nRBC 04/09/2024 0.0  0.0 - 0.0 /100 WBCs Final    RBC 04/09/2024 3.37 (L)  4.00 - 5.20 x10*6/uL Final    Hemoglobin 04/09/2024 10.6 (L)  12.0 - 16.0 g/dL Final    Hematocrit 04/09/2024 32.1 (L)  36.0 - 46.0 % Final    MCV 04/09/2024 95  80 - 100 fL Final    MCH 04/09/2024 31.5  26.0 - 34.0 pg Final    MCHC 04/09/2024 33.0  32.0 - 36.0 g/dL Final    RDW 04/09/2024 12.9  11.5 - 14.5 % Final    Platelets 04/09/2024 212  150 - 450 x10*3/uL Final    Neutrophils % 04/09/2024 84.4  40.0 - 80.0 % Final    Immature Granulocytes %, Automated 04/09/2024 0.6  0.0 - 0.9 % Final    Lymphocytes % 04/09/2024 8.6  13.0 - 44.0 % Final    Monocytes % 04/09/2024 5.3  2.0 -  10.0 % Final    Eosinophils % 04/09/2024 0.9  0.0 - 6.0 % Final    Basophils % 04/09/2024 0.2  0.0 - 2.0 % Final    Neutrophils Absolute 04/09/2024 7.43  1.20 - 7.70 x10*3/uL Final    Immature Granulocytes Absolute, Au* 04/09/2024 0.05  0.00 - 0.70 x10*3/uL Final    Lymphocytes Absolute 04/09/2024 0.76 (L)  1.20 - 4.80 x10*3/uL Final    Monocytes Absolute 04/09/2024 0.47  0.10 - 1.00 x10*3/uL Final    Eosinophils Absolute 04/09/2024 0.08  0.00 - 0.70 x10*3/uL Final    Basophils Absolute 04/09/2024 0.02  0.00 - 0.10 x10*3/uL Final    Glucose 04/23/2024 84  74 - 99 mg/dL Final    Sodium 04/23/2024 139  136 - 145 mmol/L Final    Potassium 04/23/2024 4.0  3.5 - 5.3 mmol/L Final    Chloride 04/23/2024 105  98 - 107 mmol/L Final    Bicarbonate 04/23/2024 25  21 - 32 mmol/L Final    Anion Gap 04/23/2024 13  10 - 20 mmol/L Final    Urea Nitrogen 04/23/2024 17  6 - 23 mg/dL Final    Creatinine 04/23/2024 0.71  0.50 - 1.05 mg/dL Final    eGFR 04/23/2024 >90  >60 mL/min/1.73m*2 Final    Calcium 04/23/2024 8.8  8.6 - 10.6 mg/dL Final    Albumin 04/23/2024 3.5  3.4 - 5.0 g/dL Final    Alkaline Phosphatase 04/23/2024 45  33 - 136 U/L Final    Total Protein 04/23/2024 6.0 (L)  6.4 - 8.2 g/dL Final    AST 04/23/2024 16  9 - 39 U/L Final    Bilirubin, Total 04/23/2024 0.5  0.0 - 1.2 mg/dL Final    ALT 04/23/2024 14  7 - 45 U/L Final    Glucose 04/09/2024 86  74 - 99 mg/dL Final    Sodium 04/09/2024 140  136 - 145 mmol/L Final    Potassium 04/09/2024 4.1  3.5 - 5.3 mmol/L Final    Chloride 04/09/2024 104  98 - 107 mmol/L Final    Bicarbonate 04/09/2024 28  21 - 32 mmol/L Final    Anion Gap 04/09/2024 12  10 - 20 mmol/L Final    Urea Nitrogen 04/09/2024 14  6 - 23 mg/dL Final    Creatinine 04/09/2024 0.79  0.50 - 1.05 mg/dL Final    eGFR 04/09/2024 82  >60 mL/min/1.73m*2 Final    Calcium 04/09/2024 9.2  8.6 - 10.6 mg/dL Final    Albumin 04/09/2024 3.5  3.4 - 5.0 g/dL Final    Alkaline Phosphatase 04/09/2024 44  33 - 136 U/L Final     Total Protein 04/09/2024 5.9 (L)  6.4 - 8.2 g/dL Final    AST 04/09/2024 15  9 - 39 U/L Final    Bilirubin, Total 04/09/2024 0.6  0.0 - 1.2 mg/dL Final    ALT 04/09/2024 9  7 - 45 U/L Final    Extra Tube 04/09/2024 Hold for add-ons.   Final    Extra Tube 04/09/2024 Hold for add-ons.   Final    Extra Tube 04/09/2024 Hold for add-ons.   Final    Extra Tube 04/09/2024 Hold for add-ons.   Final    Extra Tube 04/09/2024 Hold for add-ons.   Final   Hospital Outpatient Visit on 04/04/2024   Component Date Value Ref Range Status    Case Report 04/04/2024    Final                    Value:Surgical Pathology                                Case: [id number]                                  Authorizing Provider:  Ashwin Geller MD            Collected:           04/04/2024 1305              [facility]    Received:            04/04/2024 1422                                     [facility]                                                                     Pathologist:           Sharifa Palacios MD PhD                                                        Specimens:   A) - STOMACH BODY/CORPUS BIOPSY, NODULAR MUCOSA                                                     B) - STOMACH ANTRUM BIOPSY                                                                 FINAL DIAGNOSIS 04/04/2024    Final                    Value:This result contains rich text formatting which cannot be displayed here.      04/04/2024    Final                    Value:This result contains rich text formatting which cannot be displayed here.    Clinical History 04/04/2024    Final                    Value:This result contains rich text formatting which cannot be displayed here.    Gross Description 04/04/2024    Final                    Value:This result contains rich text formatting which cannot be displayed here.    Disclaimer 04/04/2024    Final                    Value:This result contains rich text formatting which cannot be  displayed here.   Hospital Outpatient Visit on 03/26/2024   Component Date Value Ref Range Status    Protime 03/26/2024 12.1  9.8 - 12.8 seconds Final    INR 03/26/2024 1.1  0.9 - 1.1 Final    aPTT 03/26/2024 30  27 - 38 seconds Final    Bilirubin, Direct 03/26/2024 0.1  0.0 - 0.3 mg/dL Final    Creatine Kinase 03/26/2024 34  0 - 215 U/L Final    C-Reactive Protein 03/26/2024 8.80 (H)  <1.00 mg/dL Final    Ferritin 03/26/2024 175 (H)  8 - 150 ng/mL Final    Magnesium 03/26/2024 1.86  1.60 - 2.40 mg/dL Final    Phosphorus 03/26/2024 3.4  2.5 - 4.9 mg/dL Final    Glucose 03/26/2024 102 (H)  74 - 99 mg/dL Final    Sodium 03/26/2024 140  136 - 145 mmol/L Final    Potassium 03/26/2024 3.7  3.5 - 5.3 mmol/L Final    Chloride 03/26/2024 104  98 - 107 mmol/L Final    Bicarbonate 03/26/2024 28  21 - 32 mmol/L Final    Anion Gap 03/26/2024 12  10 - 20 mmol/L Final    Urea Nitrogen 03/26/2024 19  6 - 23 mg/dL Final    Creatinine 03/26/2024 0.71  0.50 - 1.05 mg/dL Final    eGFR 03/26/2024 >90  >60 mL/min/1.73m*2 Final    Calcium 03/26/2024 8.7  8.6 - 10.6 mg/dL Final    Albumin 03/26/2024 3.4  3.4 - 5.0 g/dL Final    Alkaline Phosphatase 03/26/2024 48  33 - 136 U/L Final    Total Protein 03/26/2024 5.9 (L)  6.4 - 8.2 g/dL Final    AST 03/26/2024 13  9 - 39 U/L Final    Bilirubin, Total 03/26/2024 0.5  0.0 - 1.2 mg/dL Final    ALT 03/26/2024 11  7 - 45 U/L Final    WBC 03/26/2024 7.1  4.4 - 11.3 x10*3/uL Final    nRBC 03/26/2024 0.0  0.0 - 0.0 /100 WBCs Final    RBC 03/26/2024 3.37 (L)  4.00 - 5.20 x10*6/uL Final    Hemoglobin 03/26/2024 10.8 (L)  12.0 - 16.0 g/dL Final    Hematocrit 03/26/2024 31.1 (L)  36.0 - 46.0 % Final    MCV 03/26/2024 92  80 - 100 fL Final    MCH 03/26/2024 32.0  26.0 - 34.0 pg Final    MCHC 03/26/2024 34.7  32.0 - 36.0 g/dL Final    RDW 03/26/2024 12.9  11.5 - 14.5 % Final    Platelets 03/26/2024 212  150 - 450 x10*3/uL Final    Cortisol  A.M. 03/26/2024 10.5  5.0 - 20.0 ug/dL Final     Adrenocorticotropic Hormone (ACTH) 03/26/2024 22.7  7.2 - 63.3 pg/mL Final    WBC 03/26/2024 6.3  4.4 - 11.3 x10*3/uL Final    nRBC 03/26/2024 0.0  0.0 - 0.0 /100 WBCs Final    RBC 03/26/2024 2.96 (L)  4.00 - 5.20 x10*6/uL Final    Hemoglobin 03/26/2024 9.4 (L)  12.0 - 16.0 g/dL Final    Hematocrit 03/26/2024 27.7 (L)  36.0 - 46.0 % Final    MCV 03/26/2024 94  80 - 100 fL Final    MCH 03/26/2024 31.8  26.0 - 34.0 pg Final    MCHC 03/26/2024 33.9  32.0 - 36.0 g/dL Final    RDW 03/26/2024 13.0  11.5 - 14.5 % Final    Platelets 03/26/2024 179  150 - 450 x10*3/uL Final    Neutrophils % 03/26/2024 69.4  40.0 - 80.0 % Final    Immature Granulocytes %, Automated 03/26/2024 0.5  0.0 - 0.9 % Final    Lymphocytes % 03/26/2024 16.9  13.0 - 44.0 % Final    Monocytes % 03/26/2024 10.0  2.0 - 10.0 % Final    Eosinophils % 03/26/2024 2.9  0.0 - 6.0 % Final    Basophils % 03/26/2024 0.3  0.0 - 2.0 % Final    Neutrophils Absolute 03/26/2024 4.35  1.20 - 7.70 x10*3/uL Final    Immature Granulocytes Absolute, Au* 03/26/2024 0.03  0.00 - 0.70 x10*3/uL Final    Lymphocytes Absolute 03/26/2024 1.06 (L)  1.20 - 4.80 x10*3/uL Final    Monocytes Absolute 03/26/2024 0.63  0.10 - 1.00 x10*3/uL Final    Eosinophils Absolute 03/26/2024 0.18  0.00 - 0.70 x10*3/uL Final    Basophils Absolute 03/26/2024 0.02  0.00 - 0.10 x10*3/uL Final                 Performance Status:    ECOG 1: Restricted in physically strenuous activity but ambulatory and able to carry out work of a light or sedentary nature, e.g., light house work, office work.         Assessment/Plan      Mrs. Branch is a 67 yo with COPD and GERD who presented with newly diagnosed SCLC completed BID radiation planned concurrently with cis/etoposide but had a reaction C2D1 to cisplatin. Completed 1 cycle carbo/etop D1 4/5/22 also complicated by facial flushing and erythematous rash and stopped further treatment. Now s/p PCI in 6/2022. Most recent CT concerning for disease progression.  Referred for clinical trial [facility] 1518, C1D1 1/24/23. Treatment has been complicated by orthostatic hypotension, worsening short-term memory, increased lethargy, weight loss, and poor appetite. Delayed C2 by 1 week and dose-reduced. Confusion has resolved during C3 after stopping mirtazapine and dose reduction.     # SCLC  - limited stage, brain MRI negative  - previously discussed concurrent chemoradiation, with cisplatin/etoposide with side effects including but not limited to allergic reaction, fatigue, risk of infection, tinnitus, neuropathy, injury to liver/kidney, risk of anemia/thrombocytopenia and was consented  - she was also interested in scalp cooling, which unfortunately she is not a candidate for d/t SCLC  - started concurrent chemoradiation BID with Q3week cis/etop on 2/15 at [place]  -unfortunately had reaction to cisplatin on C2D1 resulting in hospitalization and also felt to be septic due to pneumonia  - discussed that we typically do 3-4 cycles chemotherapy, and alternative regimens include carboplatin/etoposide vs CAV. Given reaction to cisplatin, concern for possible reaction to carboplatin as well, although unknown rates of cross reaction. Alternatively, they also asked about discontinuation of chemotherapy, since she completed radiation. She ultimately decided to trial carbo/etoposide. She is aware of the heightened risk of allergic reaction, as well as other side effects including but not limited to fatigue, risk of infection, neuropathy, injury to liver/kidney, risk of anemia/thrombocytopenia. consented 3/28/22  -s/p 1 cycle Carbo/Etop D1 4/5/22, developed facial flushing and erythematous rash on arms and chest s/p treatment, resolved with benadryl  -opted to forego further chemotherapy and will continue on maintenance  - s/p PCI 6/2022  - CT C/A/P and brain MRI 5/2022 and most recently 8/2022 with good response and no disease  -  MRI brain 11/7 without evidence of metastatic disease  - CT  C/A/P 11/3 with multiple new enlarged LN concerning for disease progression - discussed with Dr. Vladivia not able to repeat radiation.  - referred to phase 1 clinical trial and consideration for [id number] or [id number]  - 1.5.2023 : Patient signed consent to take part on phase 1 study [id number]. Look clinically good to take part in study. Will start on study ASAP if eligible.   - 1.24.2023: Seen today and ready to start trial on [id number].  - 1.31.2023: Seen today, ready for C1D8 [facility] 1518   - 2.7.2023: Seen today for C1D15 [id number] - continue with trial   - 2.14.2023: Seen in follow-up on [id number] with overall worsening of general health  - 2.21.2023: Seen today for C2D1 [facility] 1518 with continued weight loss although perhaps starting to plateau, more energy since being off treatment, still poor appetite. will delay by 1 week, started dexamethasone 2 mg daily, and consider dose reduction.  - 2.28.2023: Seen today for C2D1 [facility] 1518 after delaying for 1 week. Energy level and appetite are improved, although still losing a little weight. Confusion is worse today, possibly due to mirtazapine vs study-related. Will dose reduce today.  - 3.7.2023: Seen today C2D8 [facility] 1518 after dose-reduction last week. Confusion is mildly improved, energy level and appetite are stable. Will add marinol for appetite.   - 3.14.2023: Seen today C2D15 [facility] 1518. Overall improved: confusion continues to improve, energy level and appetite are improved. Weight is improved. Will continue dexamethasone and marinol.  - 3.15.2023: C2D16 No CRS symptoms observed after last treatment Overall Symptoms are improving and she is tolerating treatment.  -3.16.2023: C2D17 No CRS symptoms observed after last treatment Overall Symptoms are improving and she is tolerating treatment.  - 3.28.2023 : Most recent imaging suggest patient benefiting from current treatment. Ongoing symptom  possible common cold/season allergies. Since the patient looks clinically good we  will proceed with C3D1 today. Educated patient to call the office ASAP if symptoms worsen.   - 4.11.2023: C3D15 Feeling well and overall improved with fatigue, confusion, anorexia. Proceed at current dosing and weaning steroids as below  -4.25.2023: C4D1 Pt is feeling well, no complaints of fatigue, confusion, memory loss. Has gained weight. Energy levels are good. Proceed with the current dosing. Pt is no longer on steroids.   -5.9.2023: C4D15. Pt is tolerating treatment well with no new complains. Continues to have good energy level endorses poor appetite with out any weight loss. Suggested to start taking the dronabinol.  Will proceed with treatment today.  -5.23.2023: C5D1. Pt is tolerating treatment well. Energy levels are good, is having some weight loss and constipation. Started back on dex for appetite. Pt will let us know if she remains constipated over the next several days.  Personally reviewed scans with stable disease. Will proceed with treatment today.   -6.6.2023: C5D15. Continues to tolerate treatment well. Since we restarted her on Dexamethasone she reports her energy levels and appetite has improved. Will continue on low dose Dex. With no new symptoms, we will proceed with treatment today.   -6.20.2023: C6D1. Doing well on dexamethasone 1 mg daily. Will proceed with treatment.  -7.5.2023: C6D15. We will proceed with treatment since the patient is tolerating treatment with no significant AE's and also give 1/2 liter of IV fluids.   - 7.18.2023: C7D1. Doing well, will proceed with treatment. She is out of dexamethasone, and will monitor off steroids.   -8.1.2023: C7D15. Continues to tolerate treatment. Will proceed with C7D15 today. RTC per protocol.  - 8.29.2023: C8D1. C8 delayed due to hospitalization for diverticulitis. Has completed antibiotics and feeling well for treatment.  - 9.12.2023: C8D15. Most recent imgaing suggest the patient continues to benefit from current treatment. The imaging also  suggest improvement of previously visualized segmental colitis associated with diverticulosis affecting the sigmoid colon with minimal residual pericolonic fat standing and hyperemia of the sigmoid colon. Imaging also suggest resolving inflammatory process without evidence of new or worsening complications. We will proceed with C8D15 today since the patient is also clinically feeling good. RTC per protocol.  -9.26.2023: C9D1. Patient looks clinically good. With no JACQUELYN's we will proceed with treatment C9D1 today.  -10.24.23: C10D1. Pt feels well, no acute events, no TRAEs, continue treatment today as scheduled.  -11.7.23: C10D15. Pt feels well, no TRAEs, continue treatment as scheduled.  - 11.21.23: C11D1. Continues to feel well, will continue treatment today.  - 12.19.23: C12D1. Personally reviewed most recent scan without evidence of progression. Continues to feel well and will continue with treatment today.  - 01.02.24: C12D15. Continues to tolerate treatment well. No new JACQUELYN's will proceed with treatment. RTC per protocol.  - 01.16.24: C13D1. No new JACQUELYN's proceed with treatment. RTC per protocol.   - 01.30.24: C13D15. Continues to feel well. Proceed with treatment.  - 2.13.24: C14D1. Personally reviewed most recent scan without evidence of progression. Continue with treatment today. RTC per protocol.  - 2.27.24: C14D15. Continues to feel good and tolerate treatment without any JACQUELYN's. We will proceed with treatment today. RTC per protocol.  - 3.12.24: C15D1. Continues to feel well and tolerate treatment without new JACQUELYN's. Will continue on treatment.  - 3.26.24: C15D15. She feels good today. Will give her IV fluids. Will continue on treatment. RTC in per protocol with scan prior to next visit.  - 4.23.24: C17D1. Feeling well and will continue per protocol.    # Confusion - resolved  - significantly worsened after taking double dose of mirtazapine accidentally, although has been generally down-trending since  starting study (which is also when she started taking mirtazapine) and now resolved  - brain MRI without progression of cancer  - will continue off mirtazapine    # Unintentional weight loss - stable  # Anorexia- Improving  # Dysgeusia- improving  - all improved on steroids. unclear if this is from cancer or side effect of study drug  - stopped mirtazapine due to concerns for confusion   - weaned off dexamethasone and started marinol, but kept forgetting to take it  - dexamethasone trial started again on 5.23.2023. Continued on 1 mg daily - will trial off after 7.18.23.     # Fatigue - Resolved  - back to normal pretty much, weaning steroids as above    # frequent PVCs - asymptomatic  - saw onco-cardiology and completed 24-hr Holter monitor  - recommended decreasing caffeine and sudafed intake  - continue to monitor    # Forgetfulness - improved - however noted decline on AMGN study- unclear etiology.    - started after PCI, on memantine daily and has since stopped  - offered neurocognitive evaluation previously and she will think about this and readdress next visit  -  notes some decline in last 3 weeks- especially short term memory loss.    - Improved    # Neuropathy - resolved  - mild peripheral neuropathy with numbness of the toes - likely due to cisplatin  - will continue to monitor closely  - not endorsing any neuropathy at this visit     # Rash - resolved  Waxing and waning rash throughout her body. ?improving. Unclear etiology, patient and  feel timing coincided with starting BMX.   - she will hold off on further BMX  - thought to be due to sulfa allergy, possibly due to platinum agents   - continue to monitor    #odynophagia - resolved  -rad onc aware  -prescribed BMX solution  -will continue to monitor    #constipation-improving  -occasional. Prescribed Senna S  -will monitor    Francy Archer MD

## 2024-04-24 NOTE — ADDENDUM NOTE
Encounter addended by: Ashwin Geller MD on: 4/24/2024 2:49 PM   Actions taken: Image edited, SmartForm saved

## 2024-05-06 DIAGNOSIS — C34.90 SMALL CELL LUNG CANCER (MULTI): Primary | ICD-10-CM

## 2024-05-07 ENCOUNTER — EDUCATION (OUTPATIENT)
Dept: HEMATOLOGY/ONCOLOGY | Facility: HOSPITAL | Age: 69
End: 2024-05-07

## 2024-05-07 ENCOUNTER — HOSPITAL ENCOUNTER (OUTPATIENT)
Dept: RESEARCH | Facility: HOSPITAL | Age: 69
Discharge: HOME | End: 2024-05-07
Payer: MEDICARE

## 2024-05-07 ENCOUNTER — OFFICE VISIT (OUTPATIENT)
Dept: HEMATOLOGY/ONCOLOGY | Facility: HOSPITAL | Age: 69
End: 2024-05-07
Payer: MEDICARE

## 2024-05-07 VITALS
SYSTOLIC BLOOD PRESSURE: 108 MMHG | BODY MASS INDEX: 21.83 KG/M2 | OXYGEN SATURATION: 98 % | TEMPERATURE: 97.7 F | RESPIRATION RATE: 18 BRPM | HEART RATE: 74 BPM | WEIGHT: 111.77 LBS | DIASTOLIC BLOOD PRESSURE: 71 MMHG

## 2024-05-07 DIAGNOSIS — D63.8 ANEMIA, CHRONIC DISEASE: ICD-10-CM

## 2024-05-07 DIAGNOSIS — C34.90 SMALL CELL LUNG CANCER (MULTI): ICD-10-CM

## 2024-05-07 DIAGNOSIS — E78.2 MIXED HYPERLIPIDEMIA: ICD-10-CM

## 2024-05-07 DIAGNOSIS — C34.90 SMALL CELL LUNG CANCER (MULTI): Primary | ICD-10-CM

## 2024-05-07 DIAGNOSIS — E55.9 VITAMIN D DEFICIENCY: ICD-10-CM

## 2024-05-07 DIAGNOSIS — R73.01 IMPAIRED FASTING BLOOD SUGAR: ICD-10-CM

## 2024-05-07 LAB
25(OH)D3 SERPL-MCNC: 46 NG/ML (ref 30–100)
ALBUMIN SERPL BCP-MCNC: 3.5 G/DL (ref 3.4–5)
ALP SERPL-CCNC: 41 U/L (ref 33–136)
ALT SERPL W P-5'-P-CCNC: 13 U/L (ref 7–45)
AMYLASE SERPL-CCNC: 31 U/L (ref 29–103)
ANION GAP SERPL CALC-SCNC: 11 MMOL/L (ref 10–20)
APPEARANCE UR: CLEAR
APTT PPP: 32 SECONDS (ref 27–38)
AST SERPL W P-5'-P-CCNC: 16 U/L (ref 9–39)
BASOPHILS # BLD AUTO: 0.02 X10*3/UL (ref 0–0.1)
BASOPHILS NFR BLD AUTO: 0.4 %
BILIRUB DIRECT SERPL-MCNC: 0.1 MG/DL (ref 0–0.3)
BILIRUB SERPL-MCNC: 0.6 MG/DL (ref 0–1.2)
BILIRUB UR STRIP.AUTO-MCNC: NEGATIVE MG/DL
BUN SERPL-MCNC: 22 MG/DL (ref 6–23)
CALCIUM SERPL-MCNC: 8.6 MG/DL (ref 8.6–10.6)
CHLORIDE SERPL-SCNC: 101 MMOL/L (ref 98–107)
CHOLEST SERPL-MCNC: 243 MG/DL (ref 0–199)
CHOLESTEROL/HDL RATIO: 3.8
CK SERPL-CCNC: 40 U/L (ref 0–215)
CO2 SERPL-SCNC: 28 MMOL/L (ref 21–32)
COLOR UR: NORMAL
CORTIS SERPL-MCNC: 6.8 UG/DL (ref 2.5–20)
CREAT SERPL-MCNC: 0.7 MG/DL (ref 0.5–1.05)
CRP SERPL-MCNC: 0.47 MG/DL
EGFRCR SERPLBLD CKD-EPI 2021: >90 ML/MIN/1.73M*2
EOSINOPHIL # BLD AUTO: 0.17 X10*3/UL (ref 0–0.7)
EOSINOPHIL NFR BLD AUTO: 3.5 %
ERYTHROCYTE [DISTWIDTH] IN BLOOD BY AUTOMATED COUNT: 13.3 % (ref 11.5–14.5)
EST. AVERAGE GLUCOSE BLD GHB EST-MCNC: 111 MG/DL
ESTRADIOL SERPL-MCNC: <19 PG/ML
FERRITIN SERPL-MCNC: 70 NG/ML (ref 8–150)
FSH SERPL-ACNC: 45.7 IU/L
GLUCOSE SERPL-MCNC: 91 MG/DL (ref 74–99)
GLUCOSE UR STRIP.AUTO-MCNC: NORMAL MG/DL
HBA1C MFR BLD: 5.5 %
HCT VFR BLD AUTO: 34.5 % (ref 36–46)
HDLC SERPL-MCNC: 63.9 MG/DL
HGB BLD-MCNC: 11.6 G/DL (ref 12–16)
HOLD SPECIMEN: NORMAL
IMM GRANULOCYTES # BLD AUTO: 0.02 X10*3/UL (ref 0–0.7)
IMM GRANULOCYTES NFR BLD AUTO: 0.4 % (ref 0–0.9)
INR PPP: 1 (ref 0.9–1.1)
KETONES UR STRIP.AUTO-MCNC: NEGATIVE MG/DL
LDLC SERPL CALC-MCNC: 135 MG/DL
LDLC SERPL DIRECT ASSAY-MCNC: 157 MG/DL (ref 0–129)
LEUKOCYTE ESTERASE UR QL STRIP.AUTO: NEGATIVE
LH SERPL-ACNC: 18.3 IU/L
LIPASE SERPL-CCNC: 24 U/L (ref 9–82)
LYMPHOCYTES # BLD AUTO: 1.09 X10*3/UL (ref 1.2–4.8)
LYMPHOCYTES NFR BLD AUTO: 22.3 %
MAGNESIUM SERPL-MCNC: 1.9 MG/DL (ref 1.6–2.4)
MCH RBC QN AUTO: 31 PG (ref 26–34)
MCHC RBC AUTO-ENTMCNC: 33.6 G/DL (ref 32–36)
MCV RBC AUTO: 92 FL (ref 80–100)
MONOCYTES # BLD AUTO: 0.52 X10*3/UL (ref 0.1–1)
MONOCYTES NFR BLD AUTO: 10.6 %
NEUTROPHILS # BLD AUTO: 3.07 X10*3/UL (ref 1.2–7.7)
NEUTROPHILS NFR BLD AUTO: 62.8 %
NITRITE UR QL STRIP.AUTO: NEGATIVE
NON HDL CHOLESTEROL: 179 MG/DL (ref 0–149)
NRBC BLD-RTO: 0 /100 WBCS (ref 0–0)
PH UR STRIP.AUTO: 6.5 [PH]
PHOSPHATE SERPL-MCNC: 3.9 MG/DL (ref 2.5–4.9)
PLATELET # BLD AUTO: 193 X10*3/UL (ref 150–450)
POTASSIUM SERPL-SCNC: 3.8 MMOL/L (ref 3.5–5.3)
PROT SERPL-MCNC: 5.8 G/DL (ref 6.4–8.2)
PROT UR STRIP.AUTO-MCNC: NEGATIVE MG/DL
PROTHROMBIN TIME: 11.2 SECONDS (ref 9.8–12.8)
RBC # BLD AUTO: 3.74 X10*6/UL (ref 4–5.2)
RBC # UR STRIP.AUTO: NEGATIVE /UL
SODIUM SERPL-SCNC: 136 MMOL/L (ref 136–145)
SP GR UR STRIP.AUTO: 1.01
T4 FREE SERPL-MCNC: 0.96 NG/DL (ref 0.78–1.48)
TRIGL SERPL-MCNC: 220 MG/DL (ref 0–149)
TSH SERPL-ACNC: 3.64 MIU/L (ref 0.44–3.98)
UROBILINOGEN UR STRIP.AUTO-MCNC: NORMAL MG/DL
VIT B12 SERPL-MCNC: 544 PG/ML (ref 211–911)
VLDL: 44 MG/DL (ref 0–40)
WBC # BLD AUTO: 4.9 X10*3/UL (ref 4.4–11.3)

## 2024-05-07 PROCEDURE — 81003 URINALYSIS AUTO W/O SCOPE: CPT

## 2024-05-07 PROCEDURE — 1159F MED LIST DOCD IN RCRD: CPT | Performed by: STUDENT IN AN ORGANIZED HEALTH CARE EDUCATION/TRAINING PROGRAM

## 2024-05-07 PROCEDURE — 82306 VITAMIN D 25 HYDROXY: CPT | Performed by: FAMILY MEDICINE

## 2024-05-07 PROCEDURE — 83735 ASSAY OF MAGNESIUM: CPT

## 2024-05-07 PROCEDURE — 86140 C-REACTIVE PROTEIN: CPT

## 2024-05-07 PROCEDURE — 2500000005 HC RX 250 GENERAL PHARMACY W/O HCPCS: Performed by: STUDENT IN AN ORGANIZED HEALTH CARE EDUCATION/TRAINING PROGRAM

## 2024-05-07 PROCEDURE — 84478 ASSAY OF TRIGLYCERIDES: CPT | Mod: 91 | Performed by: FAMILY MEDICINE

## 2024-05-07 PROCEDURE — 82728 ASSAY OF FERRITIN: CPT

## 2024-05-07 PROCEDURE — 84100 ASSAY OF PHOSPHORUS: CPT

## 2024-05-07 PROCEDURE — 96413 CHEMO IV INFUSION 1 HR: CPT

## 2024-05-07 PROCEDURE — 82248 BILIRUBIN DIRECT: CPT

## 2024-05-07 PROCEDURE — 82550 ASSAY OF CK (CPK): CPT

## 2024-05-07 PROCEDURE — 84439 ASSAY OF FREE THYROXINE: CPT

## 2024-05-07 PROCEDURE — 85730 THROMBOPLASTIN TIME PARTIAL: CPT

## 2024-05-07 PROCEDURE — 82533 TOTAL CORTISOL: CPT

## 2024-05-07 PROCEDURE — 83690 ASSAY OF LIPASE: CPT

## 2024-05-07 PROCEDURE — 82670 ASSAY OF TOTAL ESTRADIOL: CPT

## 2024-05-07 PROCEDURE — 82607 VITAMIN B-12: CPT | Performed by: FAMILY MEDICINE

## 2024-05-07 PROCEDURE — 83001 ASSAY OF GONADOTROPIN (FSH): CPT

## 2024-05-07 PROCEDURE — 83721 ASSAY OF BLOOD LIPOPROTEIN: CPT | Mod: 59 | Performed by: FAMILY MEDICINE

## 2024-05-07 PROCEDURE — 99214 OFFICE O/P EST MOD 30 MIN: CPT | Mod: 25 | Performed by: STUDENT IN AN ORGANIZED HEALTH CARE EDUCATION/TRAINING PROGRAM

## 2024-05-07 PROCEDURE — 83036 HEMOGLOBIN GLYCOSYLATED A1C: CPT | Performed by: FAMILY MEDICINE

## 2024-05-07 PROCEDURE — 80053 COMPREHEN METABOLIC PANEL: CPT

## 2024-05-07 PROCEDURE — 85025 COMPLETE CBC W/AUTO DIFF WBC: CPT

## 2024-05-07 PROCEDURE — 99214 OFFICE O/P EST MOD 30 MIN: CPT | Performed by: STUDENT IN AN ORGANIZED HEALTH CARE EDUCATION/TRAINING PROGRAM

## 2024-05-07 PROCEDURE — 82024 ASSAY OF ACTH: CPT

## 2024-05-07 PROCEDURE — 82150 ASSAY OF AMYLASE: CPT

## 2024-05-07 PROCEDURE — 84443 ASSAY THYROID STIM HORMONE: CPT

## 2024-05-07 PROCEDURE — 85610 PROTHROMBIN TIME: CPT

## 2024-05-07 RX ORDER — ALBUTEROL SULFATE 0.83 MG/ML
3 SOLUTION RESPIRATORY (INHALATION) AS NEEDED
Status: CANCELLED | OUTPATIENT
Start: 2024-05-07

## 2024-05-07 RX ORDER — HEPARIN SODIUM,PORCINE/PF 10 UNIT/ML
50 SYRINGE (ML) INTRAVENOUS AS NEEDED
Status: DISCONTINUED | OUTPATIENT
Start: 2024-05-07 | End: 2024-05-08 | Stop reason: HOSPADM

## 2024-05-07 RX ORDER — EPINEPHRINE 1 MG/ML
0.3 INJECTION INTRAMUSCULAR; INTRAVENOUS; SUBCUTANEOUS EVERY 5 MIN PRN
Status: DISCONTINUED | OUTPATIENT
Start: 2024-05-07 | End: 2024-05-08 | Stop reason: HOSPADM

## 2024-05-07 RX ORDER — ALBUTEROL SULFATE 0.83 MG/ML
3 SOLUTION RESPIRATORY (INHALATION) AS NEEDED
Status: DISCONTINUED | OUTPATIENT
Start: 2024-05-07 | End: 2024-05-08 | Stop reason: HOSPADM

## 2024-05-07 RX ORDER — FAMOTIDINE 10 MG/ML
20 INJECTION INTRAVENOUS ONCE AS NEEDED
Status: DISCONTINUED | OUTPATIENT
Start: 2024-05-07 | End: 2024-05-08 | Stop reason: HOSPADM

## 2024-05-07 RX ORDER — ALBUTEROL SULFATE 0.83 MG/ML
3 SOLUTION RESPIRATORY (INHALATION) AS NEEDED
Status: CANCELLED | OUTPATIENT
Start: 2024-05-21

## 2024-05-07 RX ORDER — FAMOTIDINE 10 MG/ML
20 INJECTION INTRAVENOUS ONCE AS NEEDED
Status: CANCELLED | OUTPATIENT
Start: 2024-05-21

## 2024-05-07 RX ORDER — DIPHENHYDRAMINE HYDROCHLORIDE 50 MG/ML
50 INJECTION INTRAMUSCULAR; INTRAVENOUS AS NEEDED
Status: CANCELLED | OUTPATIENT
Start: 2024-05-21

## 2024-05-07 RX ORDER — HEPARIN 100 UNIT/ML
500 SYRINGE INTRAVENOUS AS NEEDED
Status: CANCELLED | OUTPATIENT
Start: 2024-05-07

## 2024-05-07 RX ORDER — DIPHENHYDRAMINE HYDROCHLORIDE 50 MG/ML
50 INJECTION INTRAMUSCULAR; INTRAVENOUS AS NEEDED
Status: CANCELLED | OUTPATIENT
Start: 2024-05-07

## 2024-05-07 RX ORDER — HEPARIN SODIUM,PORCINE/PF 10 UNIT/ML
50 SYRINGE (ML) INTRAVENOUS AS NEEDED
Status: CANCELLED | OUTPATIENT
Start: 2024-05-07

## 2024-05-07 RX ORDER — HEPARIN 100 UNIT/ML
500 SYRINGE INTRAVENOUS AS NEEDED
Status: DISCONTINUED | OUTPATIENT
Start: 2024-05-07 | End: 2024-05-08 | Stop reason: HOSPADM

## 2024-05-07 RX ORDER — FAMOTIDINE 10 MG/ML
20 INJECTION INTRAVENOUS ONCE AS NEEDED
Status: CANCELLED | OUTPATIENT
Start: 2024-05-07

## 2024-05-07 RX ORDER — EPINEPHRINE 1 MG/ML
0.3 INJECTION INTRAMUSCULAR; INTRAVENOUS; SUBCUTANEOUS EVERY 5 MIN PRN
Status: CANCELLED | OUTPATIENT
Start: 2024-05-07

## 2024-05-07 RX ORDER — EPINEPHRINE 1 MG/ML
0.3 INJECTION INTRAMUSCULAR; INTRAVENOUS; SUBCUTANEOUS EVERY 5 MIN PRN
Status: CANCELLED | OUTPATIENT
Start: 2024-05-21

## 2024-05-07 RX ORDER — DIPHENHYDRAMINE HYDROCHLORIDE 50 MG/ML
50 INJECTION INTRAMUSCULAR; INTRAVENOUS AS NEEDED
Status: DISCONTINUED | OUTPATIENT
Start: 2024-05-07 | End: 2024-05-08 | Stop reason: HOSPADM

## 2024-05-07 RX ADMIN — Medication 3 MG: at 11:07

## 2024-05-07 NOTE — PATIENT INSTRUCTIONS
Dr. Archer would like to see you on May 21 for your next follow-up visit. Please call [phone] option 5, option 2 for any questions or concerns. Please call [phone] option 1, for any scheduling concerns or issues.

## 2024-05-07 NOTE — PROGRESS NOTES
Patient ID: Gladys Branch is a 68 y.o. female.    DIAGNOSIS    Small Cell Lung Cancer    By immunostaining, the tumor cells are positive for CAM 5.2, INSM1, and TTF-1, and negative for p40 and LCA. The proliferation index by Ki-67 immunostaining is approximately 90%.  Synaptophysin, Chromogranin and INSM-1 are positive.  AE1/AE3 is negative. NEOGENOMICS, RB protein expression is lost in the tumor cells    STAGING    [id number], limited stage  Recurrence    CURRENT SITES OF DISEASE    RLL, supraclavicular    MOLECULAR GENOMICS      PRIOR THERAPY    Concurrent chemoradiation with Cisplatin/Etoposide every 3 weeks; C1D1 2/15/22, reaction to cisplatin C2D1 and did not receive D2 or D3 etoposide  Carbo/etoposide 4/5/2022 1 cycle c/b rash  PCI 5/21-6/13/22    CURRENT THERAPY    Phase 1 study [id number]  with study drug Taralatamab 1/24/23 - present.    CURRENT ONCOLOGICAL PROBLEMS      HISTORY OF PRESENT ILLNESS    Mrs. Branch is a 65 yo with PMH GERD and COPD who originally was round to have a RLL nodule measuring 11 mm in 4/28/2021 found as part of CT calcium score scan. An ensuing CT chest w/o contrast was done on 5/19/21 which revealed subsolid pulmonary nodules measuring 14 mm in the RLL. She had CT chest w/contrast on 11/29/21 which confirmed stable 14 mm GGO of the RLL and decreased RLL subpleural nodule. She met thoracic surgeon Dr. Farfan, who ordered PET/CT scan done on 12/8/21 which did not reveal any FDG-avid nodules within the lung parenchyma but R hilar nodes with max SUV 8.0. He referred her for bronch, which was done on 1/21/22 by Dr. Mcdaniels. 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 Brain MRI was negative.    She started concurrent chemoradiation with BID radiation with cis/etop 2/15/22, and unfortunately had a reaction to cisplatin on C2D1 with chest pain and hypotension. She was hospitalized with sepsis felt to be due to pneumonia. She trialed carboplatin/etoposide on 4/5/22 with a  resulting rash and opted to forego further chemotherapy as she had completed radiation. Restaging scan 11/3/22 unfortunately with progression with new R hilar lymph node, paratracheal nodes, and increase in size of R supraclavicular mass. She enrolled in [id number] and started C1D1 1/24/23. C1 complicated by anorexia and dysgeusia, and delayed C2 by a week. She has further struggled with fatigue and confusion, which may be related to mirtazapine. Dose reduced for C2 and mirtazapine stopped with improvement in symptoms.     PAST MEDICAL HISTORY    GERD  COPD    SOCIAL HISTORY    Retired - lighting . Lives at home with  and a kitten.  Tobacco: quit smoking 1999. 1 ppd x 25 yrs.  EtOH: wine 1 glass/day  Illicits: none    CURRENT MEDS    Please see med list    ALLERGIES    Codeine, Sulfa drugs, Cisplatin    FAMILY HISTORY    Paternal aunt - breast cancer dx 80s    Subjective    Today 5.7.2024. Patient in clinic with her  for C17D1 for [id number].      She is overall feeling well, but was pretty sluggish. She is not feeling as sluggish today, not like yesterday. She's eating and drinking well. She is not nauseous. Bowel movements are ok. Memory is better but still not how it used to be. She's a little sore in her lower abdomen. No dysuria, changes to urination.       Objective          4/9/2024     7:30 AM 4/9/2024    12:30 PM 4/9/2024     1:50 PM 4/23/2024     7:45 AM 4/23/2024    11:00 AM 4/23/2024    12:37 PM 5/7/2024     7:42 AM   Vitals   Systolic 108 95 98 100 106 96 118   Diastolic 75 61 59 72 73 62 90   Heart Rate 73 71 78 64 82 72 70   Temp 36.2 °C (97.2 °F) 36.4 °C (97.5 °F) 36.4 °C (97.5 °F) 36.5 °C (97.7 °F) 36.7 °C (98.1 °F) 36.7 °C (98.1 °F) 36.2 °C (97.2 °F)   Resp 16 16 16 16 17 17 16   Weight (lb) 108.25   110.89   111.77   BMI 21.14 kg/m2   21.66 kg/m2   21.83 kg/m2   BSA (m2) 1.44 m2   1.46 m2   1.47 m2   Visit Report    Report Report Report        Daily Weight  05/07/24 : 50.7  kg (111 lb 12.4 oz)  04/23/24 : 50.3 kg (110 lb 14.3 oz)  04/09/24 : 49.1 kg (108 lb 3.9 oz)  04/04/24 : 49.9 kg (110 lb)  03/26/24 : 50 kg (110 lb 3.7 oz)        Physical Exam  Constitutional:       General: She is awake.      Appearance: Normal appearance. She is normal weight.   HENT:      Head: Normocephalic.      Mouth/Throat:      Mouth: Mucous membranes are moist.   Eyes:      Extraocular Movements: Extraocular movements intact.      Conjunctiva/sclera: Conjunctivae normal.      Pupils: Pupils are equal, round, and reactive to light.   Cardiovascular:      Rate and Rhythm: Normal rate and regular rhythm.      Heart sounds: Normal heart sounds, S1 normal and S2 normal.   Pulmonary:      Effort: Pulmonary effort is normal.      Breath sounds: Normal breath sounds.   Abdominal:      General: Abdomen is flat. Bowel sounds are normal.      Palpations: Abdomen is soft.   Musculoskeletal:      Cervical back: Normal range of motion and neck supple.   Skin:     Comments: No skin rash observed   Neurological:      Mental Status: She is alert.      Cranial Nerves: Cranial nerves 2-12 are intact.      Sensory: Sensation is intact.      Motor: Motor function is intact.      Coordination: Coordination is intact.   Psychiatric:         Attention and Perception: Attention normal.         Mood and Affect: Mood normal.         Speech: Speech normal.         Behavior: Behavior normal. Behavior is cooperative.         Cognition and Memory: Cognition normal.       Labs    Hospital Outpatient Visit on 05/07/2024   Component Date Value Ref Range Status    Lipase 05/07/2024 24  9 - 82 U/L Final    Color, Urine 05/07/2024 Light-Yellow  Light-Yellow, Yellow, Dark-Yellow Final    Appearance, Urine 05/07/2024 Clear  Clear Final    Specific Gravity, Urine 05/07/2024 1.012  1.005 - 1.035 Final    pH, Urine 05/07/2024 6.5  5.0, 5.5, 6.0, 6.5, 7.0, 7.5, 8.0 Final    Protein, Urine 05/07/2024 NEGATIVE  NEGATIVE, 10 (TRACE), 20 (TRACE) mg/dL  Final    Glucose, Urine 05/07/2024 Normal  Normal mg/dL Final    Blood, Urine 05/07/2024 NEGATIVE  NEGATIVE Final    Ketones, Urine 05/07/2024 NEGATIVE  NEGATIVE mg/dL Final    Bilirubin, Urine 05/07/2024 NEGATIVE  NEGATIVE Final    Urobilinogen, Urine 05/07/2024 Normal  Normal mg/dL Final    Nitrite, Urine 05/07/2024 NEGATIVE  NEGATIVE Final    Leukocyte Esterase, Urine 05/07/2024 NEGATIVE  NEGATIVE Final    Protime 05/07/2024 11.2  9.8 - 12.8 seconds Final    INR 05/07/2024 1.0  0.9 - 1.1 Final    Magnesium 05/07/2024 1.90  1.60 - 2.40 mg/dL Final    Phosphorus 05/07/2024 3.9  2.5 - 4.9 mg/dL Final    Ferritin 05/07/2024 70  8 - 150 ng/mL Final    C-Reactive Protein 05/07/2024 0.47  <1.00 mg/dL Final    Creatine Kinase 05/07/2024 40  0 - 215 U/L Final    Bilirubin, Direct 05/07/2024 0.1  0.0 - 0.3 mg/dL Final    aPTT 05/07/2024 32  27 - 38 seconds Final    Amylase 05/07/2024 31  29 - 103 U/L Final    Glucose 05/07/2024 91  74 - 99 mg/dL Final    Sodium 05/07/2024 136  136 - 145 mmol/L Final    Potassium 05/07/2024 3.8  3.5 - 5.3 mmol/L Final    Chloride 05/07/2024 101  98 - 107 mmol/L Final    Bicarbonate 05/07/2024 28  21 - 32 mmol/L Final    Anion Gap 05/07/2024 11  10 - 20 mmol/L Final    Urea Nitrogen 05/07/2024 22  6 - 23 mg/dL Final    Creatinine 05/07/2024 0.70  0.50 - 1.05 mg/dL Final    eGFR 05/07/2024 >90  >60 mL/min/1.73m*2 Final    Calcium 05/07/2024 8.6  8.6 - 10.6 mg/dL Final    Albumin 05/07/2024 3.5  3.4 - 5.0 g/dL Final    Alkaline Phosphatase 05/07/2024 41  33 - 136 U/L Final    Total Protein 05/07/2024 5.8 (L)  6.4 - 8.2 g/dL Final    AST 05/07/2024 16  9 - 39 U/L Final    Bilirubin, Total 05/07/2024 0.6  0.0 - 1.2 mg/dL Final    ALT 05/07/2024 13  7 - 45 U/L Final    WBC 05/07/2024 4.9  4.4 - 11.3 x10*3/uL Final    nRBC 05/07/2024 0.0  0.0 - 0.0 /100 WBCs Final    RBC 05/07/2024 3.74 (L)  4.00 - 5.20 x10*6/uL Final    Hemoglobin 05/07/2024 11.6 (L)  12.0 - 16.0 g/dL Final    Hematocrit  05/07/2024 34.5 (L)  36.0 - 46.0 % Final    MCV 05/07/2024 92  80 - 100 fL Final    MCH 05/07/2024 31.0  26.0 - 34.0 pg Final    MCHC 05/07/2024 33.6  32.0 - 36.0 g/dL Final    RDW 05/07/2024 13.3  11.5 - 14.5 % Final    Platelets 05/07/2024 193  150 - 450 x10*3/uL Final    Neutrophils % 05/07/2024 62.8  40.0 - 80.0 % Final    Immature Granulocytes %, Automated 05/07/2024 0.4  0.0 - 0.9 % Final    Lymphocytes % 05/07/2024 22.3  13.0 - 44.0 % Final    Monocytes % 05/07/2024 10.6  2.0 - 10.0 % Final    Eosinophils % 05/07/2024 3.5  0.0 - 6.0 % Final    Basophils % 05/07/2024 0.4  0.0 - 2.0 % Final    Neutrophils Absolute 05/07/2024 3.07  1.20 - 7.70 x10*3/uL Final    Immature Granulocytes Absolute, Au* 05/07/2024 0.02  0.00 - 0.70 x10*3/uL Final    Lymphocytes Absolute 05/07/2024 1.09 (L)  1.20 - 4.80 x10*3/uL Final    Monocytes Absolute 05/07/2024 0.52  0.10 - 1.00 x10*3/uL Final    Eosinophils Absolute 05/07/2024 0.17  0.00 - 0.70 x10*3/uL Final    Basophils Absolute 05/07/2024 0.02  0.00 - 0.10 x10*3/uL Final   Hospital Outpatient Visit on 04/23/2024   Component Date Value Ref Range Status    WBC 04/23/2024 7.1  4.4 - 11.3 x10*3/uL Final    nRBC 04/23/2024 0.0  0.0 - 0.0 /100 WBCs Final    RBC 04/23/2024 3.67 (L)  4.00 - 5.20 x10*6/uL Final    Hemoglobin 04/23/2024 11.4 (L)  12.0 - 16.0 g/dL Final    Hematocrit 04/23/2024 34.4 (L)  36.0 - 46.0 % Final    MCV 04/23/2024 94  80 - 100 fL Final    MCH 04/23/2024 31.1  26.0 - 34.0 pg Final    MCHC 04/23/2024 33.1  32.0 - 36.0 g/dL Final    RDW 04/23/2024 13.8  11.5 - 14.5 % Final    Platelets 04/23/2024 226  150 - 450 x10*3/uL Final    Neutrophils % 04/23/2024 61.3  40.0 - 80.0 % Final    Immature Granulocytes %, Automated 04/23/2024 0.7  0.0 - 0.9 % Final    Lymphocytes % 04/23/2024 24.9  13.0 - 44.0 % Final    Monocytes % 04/23/2024 9.9  2.0 - 10.0 % Final    Eosinophils % 04/23/2024 2.8  0.0 - 6.0 % Final    Basophils % 04/23/2024 0.4  0.0 - 2.0 % Final     Neutrophils Absolute 04/23/2024 4.34  1.20 - 7.70 x10*3/uL Final    Immature Granulocytes Absolute, Au* 04/23/2024 0.05  0.00 - 0.70 x10*3/uL Final    Lymphocytes Absolute 04/23/2024 1.76  1.20 - 4.80 x10*3/uL Final    Monocytes Absolute 04/23/2024 0.70  0.10 - 1.00 x10*3/uL Final    Eosinophils Absolute 04/23/2024 0.20  0.00 - 0.70 x10*3/uL Final    Basophils Absolute 04/23/2024 0.03  0.00 - 0.10 x10*3/uL Final    aPTT 04/23/2024 29  27 - 38 seconds Final    Bilirubin, Direct 04/23/2024 0.1  0.0 - 0.3 mg/dL Final    Creatine Kinase 04/23/2024 45  0 - 215 U/L Final    C-Reactive Protein 04/23/2024 <0.10  <1.00 mg/dL Final    Ferritin 04/23/2024 55  8 - 150 ng/mL Final    Magnesium 04/23/2024 2.02  1.60 - 2.40 mg/dL Final    Phosphorus 04/23/2024 3.7  2.5 - 4.9 mg/dL Final    Protime 04/23/2024 11.1  9.8 - 12.8 seconds Final    INR 04/23/2024 1.0  0.9 - 1.1 Final   Hospital Outpatient Visit on 04/09/2024   Component Date Value Ref Range Status    Adrenocorticotropic Hormone (ACTH) 04/09/2024 28.0  7.2 - 63.3 pg/mL Final    Amylase 04/09/2024 33  29 - 103 U/L Final    aPTT 04/09/2024 32  27 - 38 seconds Final    Bilirubin, Direct 04/09/2024 0.1  0.0 - 0.3 mg/dL Final    Creatine Kinase 04/09/2024 44  0 - 215 U/L Final    Cortisol 04/09/2024 9.2  2.5 - 20.0 ug/dL Final    C-Reactive Protein 04/09/2024 0.16  <1.00 mg/dL Final    Estradiol 04/09/2024 <19  pg/mL Final    Ferritin 04/09/2024 99  8 - 150 ng/mL Final    Follicle Stimulating Hormone 04/09/2024 50.3  IU/L Final    Luteinizing Hormone 04/09/2024 15.0  IU/L Final    Lipase 04/09/2024 30  9 - 82 U/L Final    Magnesium 04/09/2024 2.08  1.60 - 2.40 mg/dL Final    Phosphorus 04/09/2024 3.7  2.5 - 4.9 mg/dL Final    Protime 04/09/2024 11.5  9.8 - 12.8 seconds Final    INR 04/09/2024 1.0  0.9 - 1.1 Final    Thyroid Stimulating Hormone 04/09/2024 4.11 (H)  0.44 - 3.98 mIU/L Final    Thyroxine, Free 04/09/2024 0.86  0.78 - 1.48 ng/dL Final    Color, Urine 04/09/2024  Yellow  Light-Yellow, Yellow, Dark-Yellow Final    Appearance, Urine 04/09/2024 Clear  Clear Final    Specific Gravity, Urine 04/09/2024 1.022  1.005 - 1.035 Final    pH, Urine 04/09/2024 6.0  5.0, 5.5, 6.0, 6.5, 7.0, 7.5, 8.0 Final    Protein, Urine 04/09/2024 NEGATIVE  NEGATIVE, 10 (TRACE), 20 (TRACE) mg/dL Final    Glucose, Urine 04/09/2024 Normal  Normal mg/dL Final    Blood, Urine 04/09/2024 NEGATIVE  NEGATIVE Final    Ketones, Urine 04/09/2024 NEGATIVE  NEGATIVE mg/dL Final    Bilirubin, Urine 04/09/2024 NEGATIVE  NEGATIVE Final    Urobilinogen, Urine 04/09/2024 Normal  Normal mg/dL Final    Nitrite, Urine 04/09/2024 NEGATIVE  NEGATIVE Final    Leukocyte Esterase, Urine 04/09/2024 NEGATIVE  NEGATIVE Final    WBC 04/09/2024 8.8  4.4 - 11.3 x10*3/uL Final    nRBC 04/09/2024 0.0  0.0 - 0.0 /100 WBCs Final    RBC 04/09/2024 3.37 (L)  4.00 - 5.20 x10*6/uL Final    Hemoglobin 04/09/2024 10.6 (L)  12.0 - 16.0 g/dL Final    Hematocrit 04/09/2024 32.1 (L)  36.0 - 46.0 % Final    MCV 04/09/2024 95  80 - 100 fL Final    MCH 04/09/2024 31.5  26.0 - 34.0 pg Final    MCHC 04/09/2024 33.0  32.0 - 36.0 g/dL Final    RDW 04/09/2024 12.9  11.5 - 14.5 % Final    Platelets 04/09/2024 212  150 - 450 x10*3/uL Final    Neutrophils % 04/09/2024 84.4  40.0 - 80.0 % Final    Immature Granulocytes %, Automated 04/09/2024 0.6  0.0 - 0.9 % Final    Lymphocytes % 04/09/2024 8.6  13.0 - 44.0 % Final    Monocytes % 04/09/2024 5.3  2.0 - 10.0 % Final    Eosinophils % 04/09/2024 0.9  0.0 - 6.0 % Final    Basophils % 04/09/2024 0.2  0.0 - 2.0 % Final    Neutrophils Absolute 04/09/2024 7.43  1.20 - 7.70 x10*3/uL Final    Immature Granulocytes Absolute, Au* 04/09/2024 0.05  0.00 - 0.70 x10*3/uL Final    Lymphocytes Absolute 04/09/2024 0.76 (L)  1.20 - 4.80 x10*3/uL Final    Monocytes Absolute 04/09/2024 0.47  0.10 - 1.00 x10*3/uL Final    Eosinophils Absolute 04/09/2024 0.08  0.00 - 0.70 x10*3/uL Final    Basophils Absolute 04/09/2024 0.02  0.00  - 0.10 x10*3/uL Final    Glucose 04/23/2024 84  74 - 99 mg/dL Final    Sodium 04/23/2024 139  136 - 145 mmol/L Final    Potassium 04/23/2024 4.0  3.5 - 5.3 mmol/L Final    Chloride 04/23/2024 105  98 - 107 mmol/L Final    Bicarbonate 04/23/2024 25  21 - 32 mmol/L Final    Anion Gap 04/23/2024 13  10 - 20 mmol/L Final    Urea Nitrogen 04/23/2024 17  6 - 23 mg/dL Final    Creatinine 04/23/2024 0.71  0.50 - 1.05 mg/dL Final    eGFR 04/23/2024 >90  >60 mL/min/1.73m*2 Final    Calcium 04/23/2024 8.8  8.6 - 10.6 mg/dL Final    Albumin 04/23/2024 3.5  3.4 - 5.0 g/dL Final    Alkaline Phosphatase 04/23/2024 45  33 - 136 U/L Final    Total Protein 04/23/2024 6.0 (L)  6.4 - 8.2 g/dL Final    AST 04/23/2024 16  9 - 39 U/L Final    Bilirubin, Total 04/23/2024 0.5  0.0 - 1.2 mg/dL Final    ALT 04/23/2024 14  7 - 45 U/L Final    Glucose 04/09/2024 86  74 - 99 mg/dL Final    Sodium 04/09/2024 140  136 - 145 mmol/L Final    Potassium 04/09/2024 4.1  3.5 - 5.3 mmol/L Final    Chloride 04/09/2024 104  98 - 107 mmol/L Final    Bicarbonate 04/09/2024 28  21 - 32 mmol/L Final    Anion Gap 04/09/2024 12  10 - 20 mmol/L Final    Urea Nitrogen 04/09/2024 14  6 - 23 mg/dL Final    Creatinine 04/09/2024 0.79  0.50 - 1.05 mg/dL Final    eGFR 04/09/2024 82  >60 mL/min/1.73m*2 Final    Calcium 04/09/2024 9.2  8.6 - 10.6 mg/dL Final    Albumin 04/09/2024 3.5  3.4 - 5.0 g/dL Final    Alkaline Phosphatase 04/09/2024 44  33 - 136 U/L Final    Total Protein 04/09/2024 5.9 (L)  6.4 - 8.2 g/dL Final    AST 04/09/2024 15  9 - 39 U/L Final    Bilirubin, Total 04/09/2024 0.6  0.0 - 1.2 mg/dL Final    ALT 04/09/2024 9  7 - 45 U/L Final    Extra Tube 04/09/2024 Hold for add-ons.   Final    Extra Tube 04/09/2024 Hold for add-ons.   Final    Extra Tube 04/09/2024 Hold for add-ons.   Final    Extra Tube 04/09/2024 Hold for add-ons.   Final    Extra Tube 04/09/2024 Hold for add-ons.   Final                 Performance Status:    ECOG 1: Restricted in  physically strenuous activity but ambulatory and able to carry out work of a light or sedentary nature, e.g., light house work, office work.         Assessment/Plan      Mrs. Branch is a 69 yo with COPD and GERD who presented with newly diagnosed SCLC completed BID radiation planned concurrently with cis/etoposide but had a reaction C2D1 to cisplatin. Completed 1 cycle carbo/etop D1 4/5/22 also complicated by facial flushing and erythematous rash and stopped further treatment. Now s/p PCI in 6/2022. Most recent CT concerning for disease progression. Referred for clinical trial AMGN [id number], C1D1 1/24/23. Treatment has been complicated by orthostatic hypotension, worsening short-term memory, increased lethargy, weight loss, and poor appetite. Delayed C2 by 1 week and dose-reduced. Confusion has resolved during C3 after stopping mirtazapine and dose reduction.     # SCLC  - limited stage, brain MRI negative  - previously discussed concurrent chemoradiation, with cisplatin/etoposide with side effects including but not limited to allergic reaction, fatigue, risk of infection, tinnitus, neuropathy, injury to liver/kidney, risk of anemia/thrombocytopenia and was consented  - she was also interested in scalp cooling, which unfortunately she is not a candidate for d/t SCLC  - started concurrent chemoradiation BID with Q3week cis/etop on 2/15 at [place]  -unfortunately had reaction to cisplatin on C2D1 resulting in hospitalization and also felt to be septic due to pneumonia  - discussed that we typically do 3-4 cycles chemotherapy, and alternative regimens include carboplatin/etoposide vs CAV. Given reaction to cisplatin, concern for possible reaction to carboplatin as well, although unknown rates of cross reaction. Alternatively, they also asked about discontinuation of chemotherapy, since she completed radiation. She ultimately decided to trial carbo/etoposide. She is aware of the heightened risk of allergic reaction, as  well as other side effects including but not limited to fatigue, risk of infection, neuropathy, injury to liver/kidney, risk of anemia/thrombocytopenia. consented 3/28/22  -s/p 1 cycle Carbo/Etop D1 4/5/22, developed facial flushing and erythematous rash on arms and chest s/p treatment, resolved with benadryl  -opted to forego further chemotherapy and will continue on maintenance  - s/p PCI 6/2022  - CT C/A/P and brain MRI 5/2022 and most recently 8/2022 with good response and no disease  -  MRI brain 11/7 without evidence of metastatic disease  - CT C/A/P 11/3 with multiple new enlarged LN concerning for disease progression - discussed with Dr. Valdivia not able to repeat radiation.  - referred to phase 1 clinical trial and consideration for [id number] or [id number]  - 1.5.2023 : Patient signed consent to take part on phase 1 study [id number]. Look clinically good to take part in study. Will start on study ASAP if eligible.   - 1.24.2023: Seen today and ready to start trial on [id number].  - 1.31.2023: Seen today, ready for C1D8 AMGN 1518   - 2.7.2023: Seen today for C1D15 [id number] - continue with trial   - 2.14.2023: Seen in follow-up on [id number] with overall worsening of general health  - 2.21.2023: Seen today for C2D1 AMGN 1518 with continued weight loss although perhaps starting to plateau, more energy since being off treatment, still poor appetite. will delay by 1 week, started dexamethasone 2 mg daily, and consider dose reduction.  - 2.28.2023: Seen today for C2D1 AMGN 1518 after delaying for 1 week. Energy level and appetite are improved, although still losing a little weight. Confusion is worse today, possibly due to mirtazapine vs study-related. Will dose reduce today.  - 3.7.2023: Seen today C2D8 AMGN 1518 after dose-reduction last week. Confusion is mildly improved, energy level and appetite are stable. Will add marinol for appetite.   - 3.14.2023: Seen today C2D15 AMGN 1518. Overall improved: confusion continues  to improve, energy level and appetite are improved. Weight is improved. Will continue dexamethasone and marinol.  - 3.15.2023: C2D16 No CRS symptoms observed after last treatment Overall Symptoms are improving and she is tolerating treatment.  -3.16.2023: C2D17 No CRS symptoms observed after last treatment Overall Symptoms are improving and she is tolerating treatment.  - 3.28.2023 : Most recent imaging suggest patient benefiting from current treatment. Ongoing symptom  possible common cold/season allergies. Since the patient looks clinically good we will proceed with C3D1 today. Educated patient to call the office ASAP if symptoms worsen.   - 4.11.2023: C3D15 Feeling well and overall improved with fatigue, confusion, anorexia. Proceed at current dosing and weaning steroids as below  -4.25.2023: C4D1 Pt is feeling well, no complaints of fatigue, confusion, memory loss. Has gained weight. Energy levels are good. Proceed with the current dosing. Pt is no longer on steroids.   -5.9.2023: C4D15. Pt is tolerating treatment well with no new complains. Continues to have good energy level endorses poor appetite with out any weight loss. Suggested to start taking the dronabinol.  Will proceed with treatment today.  -5.23.2023: C5D1. Pt is tolerating treatment well. Energy levels are good, is having some weight loss and constipation. Started back on dex for appetite. Pt will let us know if she remains constipated over the next several days.  Personally reviewed scans with stable disease. Will proceed with treatment today.   -6.6.2023: C5D15. Continues to tolerate treatment well. Since we restarted her on Dexamethasone she reports her energy levels and appetite has improved. Will continue on low dose Dex. With no new symptoms, we will proceed with treatment today.   -6.20.2023: C6D1. Doing well on dexamethasone 1 mg daily. Will proceed with treatment.  -7.5.2023: C6D15. We will proceed with treatment since the patient is  tolerating treatment with no significant AE's and also give 1/2 liter of IV fluids.   - 7.18.2023: C7D1. Doing well, will proceed with treatment. She is out of dexamethasone, and will monitor off steroids.   -8.1.2023: C7D15. Continues to tolerate treatment. Will proceed with C7D15 today. RTC per protocol.  - 8.29.2023: C8D1. C8 delayed due to hospitalization for diverticulitis. Has completed antibiotics and feeling well for treatment.  - 9.12.2023: C8D15. Most recent imgaing suggest the patient continues to benefit from current treatment. The imaging also suggest improvement of previously visualized segmental colitis associated with diverticulosis affecting the sigmoid colon with minimal residual pericolonic fat standing and hyperemia of the sigmoid colon. Imaging also suggest resolving inflammatory process without evidence of new or worsening complications. We will proceed with C8D15 today since the patient is also clinically feeling good. RTC per protocol.  -9.26.2023: C9D1. Patient looks clinically good. With no JACQUELYN's we will proceed with treatment C9D1 today.  -10.24.23: C10D1. Pt feels well, no acute events, no TRAEs, continue treatment today as scheduled.  -11.7.23: C10D15. Pt feels well, no TRAEs, continue treatment as scheduled.  - 11.21.23: C11D1. Continues to feel well, will continue treatment today.  - 12.19.23: C12D1. Personally reviewed most recent scan without evidence of progression. Continues to feel well and will continue with treatment today.  - 01.02.24: C12D15. Continues to tolerate treatment well. No new JACQUELYN's will proceed with treatment. RTC per protocol.  - 01.16.24: C13D1. No new JACQUELYN's proceed with treatment. RTC per protocol.   - 01.30.24: C13D15. Continues to feel well. Proceed with treatment.  - 2.13.24: C14D1. Personally reviewed most recent scan without evidence of progression. Continue with treatment today. RTC per protocol.  - 2.27.24: C14D15. Continues to feel good and tolerate  treatment without any JACQUELYN's. We will proceed with treatment today. RTC per protocol.  - 3.12.24: C15D1. Continues to feel well and tolerate treatment without new JACQUELYN's. Will continue on treatment.  - 3.26.24: C15D15. She feels good today. Will give her IV fluids. Will continue on treatment. RTC in per protocol with scan prior to next visit.  - 4.23.24: C16D15. Feeling well and will continue per protocol.  - 5.7.24: C17D1. Feels well overall and will continue on trial.     # Confusion - resolved  - significantly worsened after taking double dose of mirtazapine accidentally, although has been generally down-trending since starting study (which is also when she started taking mirtazapine) and now resolved  - brain MRI without progression of cancer  - will continue off mirtazapine    # Unintentional weight loss - stable  # Anorexia- Improving  # Dysgeusia- improving  - all improved on steroids. unclear if this is from cancer or side effect of study drug  - stopped mirtazapine due to concerns for confusion   - weaned off dexamethasone and started marinol, but kept forgetting to take it  - dexamethasone trial started again on 5.23.2023. Continued on 1 mg daily - will trial off after 7.18.23.     # Fatigue - Resolved  - back to normal pretty much, weaning steroids as above    # frequent PVCs - asymptomatic  - saw onco-cardiology and completed 24-hr Holter monitor  - recommended decreasing caffeine and sudafed intake  - continue to monitor    # Forgetfulness - improved - however noted decline on AMGN study- unclear etiology.    - started after PCI, on memantine daily and has since stopped  - offered neurocognitive evaluation previously and she will think about this and readdress next visit  -  notes some decline in last 3 weeks- especially short term memory loss.    - Improved    # Neuropathy - resolved  - mild peripheral neuropathy with numbness of the toes - likely due to cisplatin  - will continue to monitor  closely  - not endorsing any neuropathy at this visit     # Rash - resolved  Waxing and waning rash throughout her body. ?improving. Unclear etiology, patient and  feel timing coincided with starting BMX.   - she will hold off on further BMX  - thought to be due to sulfa allergy, possibly due to platinum agents   - continue to monitor    #odynophagia - resolved  -rad onc aware  -prescribed BMX solution  -will continue to monitor    #constipation-improving  -occasional. Prescribed Senna S  -will monitor    Francy Archer MD

## 2024-05-07 NOTE — ADDENDUM NOTE
Encounter addended by: Lizzy Fox RN on: 5/7/2024 1:45 PM   Actions taken: Specialty comments modified, Clinical Note Signed

## 2024-05-07 NOTE — RESEARCH NOTES
Research Note Treatment Day    Gladys Branch is here today for treatment on [id number]. Today is C17D1. Procedures completed per protocol. AE's and con-meds reviewed with patient. Patient is aware of treatment plan.    [x]   Received treatment as planned   OR  []    Treatment delayed; patient calendar updated as required   Treatment delayed because:    []   AE    []   Physician Discretion    []   Clinical Deterioration or Progression     []   Other    Education Documentation  Memory Problems, taught by Vladimir Fernandez RN at 5/7/2024 12:52 PM.  Learner: Significant Other, Patient  Readiness: Eager  Method: Explanation  Response: Verbalizes Understanding    General Medication Information, taught by Vladimir Fernandez RN at 5/7/2024 12:52 PM.  Learner: Significant Other, Patient  Readiness: Eager  Method: Explanation  Response: Verbalizes Understanding    Treatment Plan and Schedule, taught by Vladimir Fernandez RN at 5/7/2024 12:52 PM.  Learner: Significant Other, Patient  Readiness: Eager  Method: Explanation  Response: Verbalizes Understanding    Supportive Medications, taught by Vladimir Fernandez RN at 5/7/2024 12:52 PM.  Learner: Significant Other, Patient  Readiness: Eager  Method: Explanation  Response: Verbalizes Understanding    Nutrition/Diet, taught by Vladimir Fernandez RN at 5/7/2024 12:52 PM.  Learner: Significant Other, Patient  Readiness: Eager  Method: Explanation  Response: Verbalizes Understanding    Education Comments  No comments found.

## 2024-05-07 NOTE — RESEARCH NOTES
[facility]><[id number]><[id number]><PARTA>    DCRU NURSING VISIT NOTE  Study Name: ALEXANDRU Foote8  IRB#: [id number]  DCRU#: [id number]  Protocol Version Dated: A9 3.22.23  PI: Roshan Kumar MD.    Time point: Cycle 5 and beyond - Day 1  CYCLE 17     Encounter Date: 05/07/2024  Encounter Time:  8:30 AM EDT  Encounter Department: [facility] HEMATOLOGY AND ONCOLOGY     #1     Phone Pager   Carmen Rios [phone]    #2 Phone Pager        Other Phone Pager          Study Regimen and Dosing   Part A Dose Exploration/Expansion- Small Cell Lung Cancer Relapsed or Refractory patients who progressed or recurred following at least 1 platinum-based regimen.   Cycle = 28 days    administered IV Day 1, Day 8, and Day 15 of Cycle 1. Cycle 2 and beyond     administered on Day 1 and Day 15.        Dietary Guidelines   Regular diet:     Admission and Prior to Starting Study Activities   Notify  when patient arrives to unit.  Complete DCRU/[facility] intake form in EMR.  Insert one peripheral IV line for sample collection procedures (flush line with normal saline following each blood draw). Access mediport (if available) otherwise insert second peripheral line in opposite arm for Investigative drug administration (if peripheral line, flush line with normal saline before & after infusion)     Criteria to Treat   DCRU RN reviewed and meets eligibility to proceed with treatment plan   Time team notified: 10 am  DCRU RN notifies study team to review eligibility and approval before dosing procedures  Time team approves: 10 am     Maddison Branch is a 68 y.o. female and is here for a Research clinical visit.    Visit Provider: [address] [facility] ROOM 3 [facility]     Allergies:       Objective     Vital Signs:    Patient Vitals for the past 24 hrs:   BP Temp Temp src Pulse Resp SpO2 Weight   05/07/24 1212 108/71 36.5 °C (97.7 °F) Temporal 74 18 98 % --   05/07/24 1056  129/74 37 °C (98.6 °F) Temporal 75 16 98 % --   05/07/24 0742 118/90 36.2 °C (97.2 °F) Temporal 70 16 97 % 50.7 kg (111 lb 12.4 oz)        Physical Exam     ASSESSMENT and PLAN:  Problem List Items Addressed This Visit    None       Medications as of the completion of today's visit:      Orders placed during today's visit:  Keanu Lewis and patient requested that I draw labs for Dr. Rodarte, so labs drawn per EPIC and patient request.     [id number] -  in Subjects With Small Cell Lung Cancer    Patient has no adverse events documented in the Research Adverse Events activity.       Study Specific Instructions and Documentation   WEIGHT  LABS  VITALS  CORREATIVES  STUDY DRUG  VITALS  DISCHARGE     WEIGHT    Obtain weight in kilograms - with shoes off & heavy items removed.   50.7 kg     PRE-DOSE Safety Labs   Refer to [facility] Treatment Plan for orders     Pre-dose Vital Signs   Temp, HR, Resp, BP, Pulse Oximetry: Seated or Semi-Fowlers x 5 minutes before obtaining  Position and temperature location: should be the same that is used throughout the study-record position     Pre-dose Research Correlatives  Deliver to DCRU/[facility] lab for processing   Access Type: 22G IV Location: Right AC   Refer to Smilax Treatment Plan for orders   Time point Specimen Test Volume Tube Handling Draw Time   Pre-dose (within 15 mins of  dosing)    draw in order [facility]      4 mL CPT Ambient, Invert 8-10X 1104    Anti- Antibody 2.5 mL SST Ambient, Invert 5X 1104    TARLATAMAB PK  (through cycle 12) 2.5 mL SST Ambient, Invert 5X NA    Serum Markers 2.5 mL SST Ambient, Invert 5X 1104    Immune Cells 4 mL   K3 EDTA Ambient, Invert 8-10X 1104          Infusion-Related Reactions   [facility] Hypersensitivity Orders  Note: CRS table      Research Drug Administration Tarlatamab ()   Refer to Smilax Treatment Plan for orders   Administer dose over 60 minutes (+/- 10 mins) followed by a 3 - 5 minute Normal saline flush. (This will be the  end time after the flush).   Document start time & end of study medication; Start=1107 Lwk=[id number]  Document start time and end time of the flush. Start=1208 Grh=3458  Participant to remain on Unit for 2 hour post dose observation.      Day 1 Post-Dose Vital Signs   Temp, HR, Resp, BP, Pulse Oximetry: Seated or Semi-Fowlers x 5 minutes before obtaining  Position and temperature location: should be the same that is used throughout the study  End of Infusion     Discharge Instructions   Patient may be discharged to home after 2 hour observation.  Remind patient to return: Day 15 for treatment & labs  Discharge time 1415     Lizzy Fox RN  05/07/24      Grading and Management of Cytokine Release Syndrome   CRS Grade Description of Severity Minimum Expected Intervention Instructions for Dose Modifications of    1 Symptoms are not life threatening and require symptomatic treatment only,  eg, fever, nausea, fatigue, headache, myalgia's, malaise Administer symptomatic treatment (contact provider for medications/doses). Monitor for CRS symptoms including vital signs and pulse oximetry at least Q2 hours for12 hours or until resolution, Whichever is earlier. N/A     (continued)  Grading and Management of Cytokine Release Syndrome   CRS Grade Description of Severity Minimum Expected Intervention Instructions for Dose Modifications of    2 Symptoms require and respond to moderate intervention  Oxygen requirement <40%,                      OR  Hypotension responsive to fluids or low dose of one vasopressor, OR                                                                       Grade 2 organ toxicity or grade 3 transaminitis per CTCAE criteria Administer:  Symptomatic treatment   (contact provider for medications/doses)  Supplemental oxygen when oxygen saturation is <90% on room air  Intravenous fluids or low dose vasopressor for hypotension is recommended when systolic blood pressure is <85 mmHg. (contact  provider for medications/doses) Persistent tachycardia (eg >120 bpm) may also indicate the need for intervention for hypotension.   Monitor for CRS symptoms including vital signs and pulse oximetry at least Q2 hours for12 hours or until resolution to CRS grade <= 1, whichever is earlier. For subjects with extensive co-morbidities or poor performance status, manage per grade 3 CRS guidance below If CRS occurs during  treatment, immediately interrupt the infusion and delay the next  dose until the event resolves to CRS grade <= 1 for no less than 72 hours. Permanently discontinue  if there is no improvement to CRS <= grade 1 within 7 days     3 Symptoms require and respond to aggressive intervention  Oxygen requirement >=40%, OR  Hypotension requiring high dose or multiple vasopressors, OR  Grade 3 organ toxicity or     Grade 4 transaminitis per  CTCAE criteria Admit to intensive care unit for close clinical and vital sign monitoring per institutional guidelines.   Refer to provider/protocol for medications and doses.     If CRS occurs during  treatment, immediately interrupt   and delay the next dose until event resolves to CRS grade <= 1 for no less than 72 hours.    Permanently discontinue  if there is no improvement to CRS                 <= grade 2 within 5 days or CRS <= grade 1 within 7 days.  Permanently discontinue   if CRS grade  3 occurs at the initial run in dose (i.e., at MTD1)  (Applicable only after MTD1 has been defined).     (continued)  Grading and Management of Cytokine Release Syndrome   CRS Grade Description of Severity Minimum Expected Intervention Instructions for Dose Modifications of    4 Life-threatening symptoms  Requirement for ventilator support OR  Grade 4 organ toxicity (excluding transaminitis) per CTCAE criteria Admit to intensive care unit for close clinical and vital sign monitoring per institutional guidelines.   Refer to  provider/protocol for medications and doses.   If CRS occurs during  treatment, Immediately stop the infusion.  Permanently discontinue   therapy.

## 2024-05-07 NOTE — SIGNIFICANT EVENT

## 2024-05-08 LAB — ACTH PLAS-MCNC: 17.2 PG/ML (ref 7.2–63.3)

## 2024-05-10 ENCOUNTER — NURSE TRIAGE (OUTPATIENT)
Dept: HEMATOLOGY/ONCOLOGY | Facility: HOSPITAL | Age: 69
End: 2024-05-10
Payer: MEDICARE

## 2024-05-10 NOTE — TELEPHONE ENCOUNTER
Pt advised that there was nothing unusual on her gallbladder per her last scan.  She states that she took anti-emetic and feels better.  Pt advised to call back with any new or worsening symptoms such as fevers, abdominal pain, nausea/vomiting unmanaged with home medications or if she doesn't have a BM.

## 2024-05-10 NOTE — TELEPHONE ENCOUNTER
Pt called to report she vomited x1 yesterday and feels nauseated today but has had no further vomiting.  She ate a bowl of cereal this morning, nothing further but she is drinking water without difficulty.  No fevers, chest pain, difficulty breathing, abdominal pain or diarrhea.  Her last BM was 5/8.  She is voiding yellow urine about every hour.    She has anti-emetic medication at home but has not taken anything.  Pt encouraged to take medication now and also recommended that she take something for her bowels if she doesn't have a BM by tomorrow.  Pt agreeable to this plan.  She specifically asked if Dr. Archer thought vomiting could be related to her gallbladder?    Additional Information   If getting chemo, ask: do you have anti-nausea medicine at home?     Has zofran and compazine, states she usually takes a half tablet of one but wasn't able to recall which medication   Commented on: How many times have you thrown up in the last 24 hours?     Vomited once yesterday   Commented on: On a scale of 0 to 10 with zero being no pain and 10 being the worst possible, how would you rate your headache pain?     Had a headache earlier today, rated as moderate.  Resolved now   Commented on: Do you have any of these signs of dehydration?     Sometimes feels dizzy   Commented on: When was the last time you had a bowel movement? Is this/what is normal for you?     Last BM 5/8   Commented on: What else do you want to tell me about this problem?     Denies fever, chest pain or difficulty breathing    Protocols used: Nausea/Vomiting

## 2024-05-13 NOTE — PROGRESS NOTES
Subjective   Patient ID: Gladys Branch is a 68 y.o. female who presents for Follow-up (6 month follow up with labs and patient  both eyes issue ).    HPI  Patient presents today for follow up labs and chronic conditions.  Has 2 week history of double vision in left eye, AM headache - not the worst of her life - and nausea. Known h/o breast cancer on experimental protocol per Dr. Archer.    Patient otherwise feels well. No other complaints or concerns.    The patient's relevant past medical, surgical, family, and social history was reviewed in [facility].  All pertinent lab work and results for this visit were reviewed with patient.    Hospital Outpatient Visit on 05/07/2024   Component Date Value Ref Range Status    Lipase 05/07/2024 24  9 - 82 U/L Final    Color, Urine 05/07/2024 Light-Yellow  Light-Yellow, Yellow, Dark-Yellow Final    Appearance, Urine 05/07/2024 Clear  Clear Final    Specific Gravity, Urine 05/07/2024 1.012  1.005 - 1.035 Final    pH, Urine 05/07/2024 6.5  5.0, 5.5, 6.0, 6.5, 7.0, 7.5, 8.0 Final    Protein, Urine 05/07/2024 NEGATIVE  NEGATIVE, 10 (TRACE), 20 (TRACE) mg/dL Final    Glucose, Urine 05/07/2024 Normal  Normal mg/dL Final    Blood, Urine 05/07/2024 NEGATIVE  NEGATIVE Final    Ketones, Urine 05/07/2024 NEGATIVE  NEGATIVE mg/dL Final    Bilirubin, Urine 05/07/2024 NEGATIVE  NEGATIVE Final    Urobilinogen, Urine 05/07/2024 Normal  Normal mg/dL Final    Nitrite, Urine 05/07/2024 NEGATIVE  NEGATIVE Final    Leukocyte Esterase, Urine 05/07/2024 NEGATIVE  NEGATIVE Final    Thyroxine, Free 05/07/2024 0.96  0.78 - 1.48 ng/dL Final    Thyroid Stimulating Hormone 05/07/2024 3.64  0.44 - 3.98 mIU/L Final    Protime 05/07/2024 11.2  9.8 - 12.8 seconds Final    INR 05/07/2024 1.0  0.9 - 1.1 Final    Magnesium 05/07/2024 1.90  1.60 - 2.40 mg/dL Final    Phosphorus 05/07/2024 3.9  2.5 - 4.9 mg/dL Final    The performance characteristics of phosphorus testing in heparinized plasma have been validated by the  individual  laboratory site where testing is performed. Testing on heparinized plasma is not approved by the FDA; however, such approval is not necessary.    Follicle Stimulating Hormone 05/07/2024 45.7  IU/L Final    FSH Ref Values  Follicular   2.0-12.0  IU/L  Mid-Cycle        12.0-25.0  IU/L  Luteal Phase      2.0-12.0  IU/L  Menopause       30.0-150.0  IU/L  Pre-puberty     50% Adult IU/L  Adult Male        2.0-10.0  IU/L   Infants          0.0-1.0  IU/L    Luteinizing Hormone 05/07/2024 18.3  IU/L Final    LH Reference Values  Follicular Phase          1.9-12.5 IU/L  Mid-Cycle                 8.7-76.3 IU/L  Luteal Phase              0.5-16.9 IU/L  Post Menopause            5.0-55.2 IU/L  Children                    0- 6.0 IU/L  Adult Male 18-70 years    1.5- 9.3 IU/L  Adult Male >70 years      3.1-34.6 IU/L    Ferritin 05/07/2024 70  8 - 150 ng/mL Final    Estradiol 05/07/2024 <19  pg/mL Final    C-Reactive Protein 05/07/2024 0.47  <1.00 mg/dL Final    Cortisol 05/07/2024 6.8  2.5 - 20.0 ug/dL Final    Creatine Kinase 05/07/2024 40  0 - 215 U/L Final    Bilirubin, Direct 05/07/2024 0.1  0.0 - 0.3 mg/dL Final    aPTT 05/07/2024 32  27 - 38 seconds Final    Amylase 05/07/2024 31  29 - 103 U/L Final    Adrenocorticotropic Hormone (ACTH) 05/07/2024 17.2  7.2 - 63.3 pg/mL Final    INTERPRETIVE INFORMATION: Adrenocorticotropic Hormone    Reference interval based on samples collected between 7 a.m. and   10 a.m.  No reference intervals established for p.m. collections.    Pediatric reference values are the same as adults (Acta Paediatr   Scand 1981;70:341-345).  This assay measures intact ACTH 1-39;   some types of synthetic ACTH and ACTH fragments are not detected   by this assay.  Performed By: [facility]  [address] [place] [id number]  : Darian Marrero MD, PhD  CLIA Number: [id number]    Glucose 05/07/2024 91  74 - 99 mg/dL Final    Sodium 05/07/2024 136  136 - 145  mmol/L Final    Potassium 05/07/2024 3.8  3.5 - 5.3 mmol/L Final    Chloride 05/07/2024 101  98 - 107 mmol/L Final    Bicarbonate 05/07/2024 28  21 - 32 mmol/L Final    Anion Gap 05/07/2024 11  10 - 20 mmol/L Final    Urea Nitrogen 05/07/2024 22  6 - 23 mg/dL Final    Creatinine 05/07/2024 0.70  0.50 - 1.05 mg/dL Final    eGFR 05/07/2024 >90  >60 mL/min/1.73m*2 Final    Calculations of estimated GFR are performed using the 2021 CKD-EPI Study Refit equation without the race variable for the IDMS-Traceable creatinine methods.  https://jasn.asnjournals.org/content/early/2021/09/22/ASN.5450623614    Calcium 05/07/2024 8.6  8.6 - 10.6 mg/dL Final    Albumin 05/07/2024 3.5  3.4 - 5.0 g/dL Final    Alkaline Phosphatase 05/07/2024 41  33 - 136 U/L Final    Total Protein 05/07/2024 5.8 (L)  6.4 - 8.2 g/dL Final    AST 05/07/2024 16  9 - 39 U/L Final    Bilirubin, Total 05/07/2024 0.6  0.0 - 1.2 mg/dL Final    ALT 05/07/2024 13  7 - 45 U/L Final    Patients treated with Sulfasalazine may generate falsely decreased results for ALT.    WBC 05/07/2024 4.9  4.4 - 11.3 x10*3/uL Final    nRBC 05/07/2024 0.0  0.0 - 0.0 /100 WBCs Final    RBC 05/07/2024 3.74 (L)  4.00 - 5.20 x10*6/uL Final    Hemoglobin 05/07/2024 11.6 (L)  12.0 - 16.0 g/dL Final    Hematocrit 05/07/2024 34.5 (L)  36.0 - 46.0 % Final    MCV 05/07/2024 92  80 - 100 fL Final    MCH 05/07/2024 31.0  26.0 - 34.0 pg Final    MCHC 05/07/2024 33.6  32.0 - 36.0 g/dL Final    RDW 05/07/2024 13.3  11.5 - 14.5 % Final    Platelets 05/07/2024 193  150 - 450 x10*3/uL Final    Neutrophils % 05/07/2024 62.8  40.0 - 80.0 % Final    Immature Granulocytes %, Automated 05/07/2024 0.4  0.0 - 0.9 % Final    Immature Granulocyte Count (IG) includes promyelocytes, myelocytes and metamyelocytes but does not include bands. Percent differential counts (%) should be interpreted in the context of the absolute cell counts (cells/UL).    Lymphocytes % 05/07/2024 22.3  13.0 - 44.0 % Final     Monocytes % 05/07/2024 10.6  2.0 - 10.0 % Final    Eosinophils % 05/07/2024 3.5  0.0 - 6.0 % Final    Basophils % 05/07/2024 0.4  0.0 - 2.0 % Final    Neutrophils Absolute 05/07/2024 3.07  1.20 - 7.70 x10*3/uL Final    Percent differential counts (%) should be interpreted in the context of the absolute cell counts (cells/uL).    Immature Granulocytes Absolute, Au* 05/07/2024 0.02  0.00 - 0.70 x10*3/uL Final    Lymphocytes Absolute 05/07/2024 1.09 (L)  1.20 - 4.80 x10*3/uL Final    Monocytes Absolute 05/07/2024 0.52  0.10 - 1.00 x10*3/uL Final    Eosinophils Absolute 05/07/2024 0.17  0.00 - 0.70 x10*3/uL Final    Basophils Absolute 05/07/2024 0.02  0.00 - 0.10 x10*3/uL Final    Extra Tube 05/07/2024 Hold for add-ons.   Final    Auto resulted.    Extra Tube 05/07/2024 Hold for add-ons.   Final    Auto resulted.    Extra Tube 05/07/2024 Hold for add-ons.   Final    Auto resulted.    Extra Tube 05/07/2024 Hold for add-ons.   Final    Auto resulted.    Vitamin B12 05/07/2024 544  211 - 911 pg/mL Final    Cholesterol 05/07/2024 243 (H)  0 - 199 mg/dL Final          Age      Desirable   Borderline High   High     0-19 Y     0 - 169       170 - 199     >/= 200    20-24 Y     0 - 189       190 - 224     >/= 225         >24 Y     0 - 199       200 - 239     >/= 240   **All ranges are based on fasting samples. Specific   therapeutic targets will vary based on patient-specific   cardiac risk.    Pediatric guidelines reference:Pediatrics 2011, 128(S5).Adult guidelines reference: NCEP ATPIII Guidelines,JOSE DAVID 2001, 258:2486-97    Venipuncture immediately after or during the administration of Metamizole may lead to falsely low results. Testing should be performed immediately prior to Metamizole dosing.    HDL-Cholesterol 05/07/2024 63.9  mg/dL Final      Age       Very Low   Low     Normal    High    0-19 Y    < 35      < 40     40-45     ----  20-24 Y    ----     < 40      >45      ----        >24 Y      ----     < 40     40-60       >60      Cholesterol/HDL Ratio 05/07/2024 3.8   Final      Ref Values  Desirable  < 3.4  High Risk  > 5.0    LDL Calculated 05/07/2024 135 (H)  <=99 mg/dL Final                                Near   Borderline      AGE      Desirable  Optimal    High     High     Very High     0-19 Y     0 - 109     ---    110-129   >/= 130     ----    20-24 Y     0 - 119     ---    120-159   >/= 160     ----      >24 Y     0 -  99   100-129  130-159   160-189     >/=190      VLDL 05/07/2024 44 (H)  0 - 40 mg/dL Final    Triglycerides 05/07/2024 220 (H)  0 - 149 mg/dL Final       Age         Desirable   Borderline High   High     Very High   0 D-90 D    19 - 174         ----         ----        ----  91 D- 9 Y     0 -  74        75 -  99     >/= 100      ----    10-19 Y     0 -  89        90 - 129     >/= 130      ----    20-24 Y     0 - 114       115 - 149     >/= 150      ----         >24 Y     0 - 149       150 - 199    200- 499    >/= 500    Venipuncture immediately after or during the administration of Metamizole may lead to falsely low results. Testing should be performed immediately prior to Metamizole dosing.    Non HDL Cholesterol 05/07/2024 179 (H)  0 - 149 mg/dL Final          Age       Desirable   Borderline High   High     Very High     0-19 Y     0 - 119       120 - 144     >/= 145    >/= 160    20-24 Y     0 - 149       150 - 189     >/= 190      ----         >24 Y    30 mg/dL above LDL Cholesterol goal      LDL, Direct 05/07/2024 157 (H)  0 - 129 mg/dL Final    Hemoglobin A1C 05/07/2024 5.5  see below % Final    Estimated Average Glucose 05/07/2024 111  Not Established mg/dL Final    Vitamin D, 25-Hydroxy, Total 05/07/2024 46  30 - 100 ng/mL Final   Hospital Outpatient Visit on 04/23/2024   Component Date Value Ref Range Status    WBC 04/23/2024 7.1  4.4 - 11.3 x10*3/uL Final    nRBC 04/23/2024 0.0  0.0 - 0.0 /100 WBCs Final    RBC 04/23/2024 3.67 (L)  4.00 - 5.20 x10*6/uL Final    Hemoglobin 04/23/2024 11.4 (L)   12.0 - 16.0 g/dL Final    Hematocrit 04/23/2024 34.4 (L)  36.0 - 46.0 % Final    MCV 04/23/2024 94  80 - 100 fL Final    MCH 04/23/2024 31.1  26.0 - 34.0 pg Final    MCHC 04/23/2024 33.1  32.0 - 36.0 g/dL Final    RDW 04/23/2024 13.8  11.5 - 14.5 % Final    Platelets 04/23/2024 226  150 - 450 x10*3/uL Final    Neutrophils % 04/23/2024 61.3  40.0 - 80.0 % Final    Immature Granulocytes %, Automated 04/23/2024 0.7  0.0 - 0.9 % Final    Immature Granulocyte Count (IG) includes promyelocytes, myelocytes and metamyelocytes but does not include bands. Percent differential counts (%) should be interpreted in the context of the absolute cell counts (cells/UL).    Lymphocytes % 04/23/2024 24.9  13.0 - 44.0 % Final    Monocytes % 04/23/2024 9.9  2.0 - 10.0 % Final    Eosinophils % 04/23/2024 2.8  0.0 - 6.0 % Final    Basophils % 04/23/2024 0.4  0.0 - 2.0 % Final    Neutrophils Absolute 04/23/2024 4.34  1.20 - 7.70 x10*3/uL Final    Percent differential counts (%) should be interpreted in the context of the absolute cell counts (cells/uL).    Immature Granulocytes Absolute, Au* 04/23/2024 0.05  0.00 - 0.70 x10*3/uL Final    Lymphocytes Absolute 04/23/2024 1.76  1.20 - 4.80 x10*3/uL Final    Monocytes Absolute 04/23/2024 0.70  0.10 - 1.00 x10*3/uL Final    Eosinophils Absolute 04/23/2024 0.20  0.00 - 0.70 x10*3/uL Final    Basophils Absolute 04/23/2024 0.03  0.00 - 0.10 x10*3/uL Final    aPTT 04/23/2024 29  27 - 38 seconds Final    Bilirubin, Direct 04/23/2024 0.1  0.0 - 0.3 mg/dL Final    Creatine Kinase 04/23/2024 45  0 - 215 U/L Final    C-Reactive Protein 04/23/2024 <0.10  <1.00 mg/dL Final    Ferritin 04/23/2024 55  8 - 150 ng/mL Final    Magnesium 04/23/2024 2.02  1.60 - 2.40 mg/dL Final    Phosphorus 04/23/2024 3.7  2.5 - 4.9 mg/dL Final    The performance characteristics of phosphorus testing in heparinized plasma have been validated by the individual  laboratory site where testing is performed. Testing on heparinized  plasma is not approved by the FDA; however, such approval is not necessary.    Protime 04/23/2024 11.1  9.8 - 12.8 seconds Final    INR 04/23/2024 1.0  0.9 - 1.1 Final   Hospital Outpatient Visit on 04/09/2024   Component Date Value Ref Range Status    Adrenocorticotropic Hormone (ACTH) 04/09/2024 28.0  7.2 - 63.3 pg/mL Final    INTERPRETIVE INFORMATION: Adrenocorticotropic Hormone    Reference interval based on samples collected between 7 a.m. and   10 a.m.  No reference intervals established for p.m. collections.    Pediatric reference values are the same as adults (Acta Paediatr   Scand 1981;70:341-345).  This assay measures intact ACTH 1-39;   some types of synthetic ACTH and ACTH fragments are not detected   by this assay.  Performed By: [facility]  [address] [place] [id number]  : Darian Marrero MD, PhD  CLIA Number: [id number]    Amylase 04/09/2024 33  29 - 103 U/L Final    aPTT 04/09/2024 32  27 - 38 seconds Final    Bilirubin, Direct 04/09/2024 0.1  0.0 - 0.3 mg/dL Final    Creatine Kinase 04/09/2024 44  0 - 215 U/L Final    Cortisol 04/09/2024 9.2  2.5 - 20.0 ug/dL Final    C-Reactive Protein 04/09/2024 0.16  <1.00 mg/dL Final    Estradiol 04/09/2024 <19  pg/mL Final    Ferritin 04/09/2024 99  8 - 150 ng/mL Final    Follicle Stimulating Hormone 04/09/2024 50.3  IU/L Final    FSH Ref Values  Follicular   2.0-12.0  IU/L  Mid-Cycle        12.0-25.0  IU/L  Luteal Phase      2.0-12.0  IU/L  Menopause       30.0-150.0  IU/L  Pre-puberty     50% Adult IU/L  Adult Male        2.0-10.0  IU/L   Infants          0.0-1.0  IU/L    Luteinizing Hormone 04/09/2024 15.0  IU/L Final    LH Reference Values  Follicular Phase          1.9-12.5 IU/L  Mid-Cycle                 8.7-76.3 IU/L  Luteal Phase              0.5-16.9 IU/L  Post Menopause            5.0-55.2 IU/L  Children                    0- 6.0 IU/L  Adult Male 18-70 years    1.5- 9.3 IU/L  Adult Male >70 years      3.1-34.6  IU/L    Lipase 04/09/2024 30  9 - 82 U/L Final    Magnesium 04/09/2024 2.08  1.60 - 2.40 mg/dL Final    Phosphorus 04/09/2024 3.7  2.5 - 4.9 mg/dL Final    The performance characteristics of phosphorus testing in heparinized plasma have been validated by the individual  laboratory site where testing is performed. Testing on heparinized plasma is not approved by the FDA; however, such approval is not necessary.    Protime 04/09/2024 11.5  9.8 - 12.8 seconds Final    INR 04/09/2024 1.0  0.9 - 1.1 Final    Thyroid Stimulating Hormone 04/09/2024 4.11 (H)  0.44 - 3.98 mIU/L Final    Thyroxine, Free 04/09/2024 0.86  0.78 - 1.48 ng/dL Final    Color, Urine 04/09/2024 Yellow  Light-Yellow, Yellow, Dark-Yellow Final    Appearance, Urine 04/09/2024 Clear  Clear Final    Specific Gravity, Urine 04/09/2024 1.022  1.005 - 1.035 Final    pH, Urine 04/09/2024 6.0  5.0, 5.5, 6.0, 6.5, 7.0, 7.5, 8.0 Final    Protein, Urine 04/09/2024 NEGATIVE  NEGATIVE, 10 (TRACE), 20 (TRACE) mg/dL Final    Glucose, Urine 04/09/2024 Normal  Normal mg/dL Final    Blood, Urine 04/09/2024 NEGATIVE  NEGATIVE Final    Ketones, Urine 04/09/2024 NEGATIVE  NEGATIVE mg/dL Final    Bilirubin, Urine 04/09/2024 NEGATIVE  NEGATIVE Final    Urobilinogen, Urine 04/09/2024 Normal  Normal mg/dL Final    Nitrite, Urine 04/09/2024 NEGATIVE  NEGATIVE Final    Leukocyte Esterase, Urine 04/09/2024 NEGATIVE  NEGATIVE Final    WBC 04/09/2024 8.8  4.4 - 11.3 x10*3/uL Final    nRBC 04/09/2024 0.0  0.0 - 0.0 /100 WBCs Final    RBC 04/09/2024 3.37 (L)  4.00 - 5.20 x10*6/uL Final    Hemoglobin 04/09/2024 10.6 (L)  12.0 - 16.0 g/dL Final    Hematocrit 04/09/2024 32.1 (L)  36.0 - 46.0 % Final    MCV 04/09/2024 95  80 - 100 fL Final    MCH 04/09/2024 31.5  26.0 - 34.0 pg Final    MCHC 04/09/2024 33.0  32.0 - 36.0 g/dL Final    RDW 04/09/2024 12.9  11.5 - 14.5 % Final    Platelets 04/09/2024 212  150 - 450 x10*3/uL Final    Neutrophils % 04/09/2024 84.4  40.0 - 80.0 % Final     Immature Granulocytes %, Automated 04/09/2024 0.6  0.0 - 0.9 % Final    Immature Granulocyte Count (IG) includes promyelocytes, myelocytes and metamyelocytes but does not include bands. Percent differential counts (%) should be interpreted in the context of the absolute cell counts (cells/UL).    Lymphocytes % 04/09/2024 8.6  13.0 - 44.0 % Final    Monocytes % 04/09/2024 5.3  2.0 - 10.0 % Final    Eosinophils % 04/09/2024 0.9  0.0 - 6.0 % Final    Basophils % 04/09/2024 0.2  0.0 - 2.0 % Final    Neutrophils Absolute 04/09/2024 7.43  1.20 - 7.70 x10*3/uL Final    Percent differential counts (%) should be interpreted in the context of the absolute cell counts (cells/uL).    Immature Granulocytes Absolute, Au* 04/09/2024 0.05  0.00 - 0.70 x10*3/uL Final    Lymphocytes Absolute 04/09/2024 0.76 (L)  1.20 - 4.80 x10*3/uL Final    Monocytes Absolute 04/09/2024 0.47  0.10 - 1.00 x10*3/uL Final    Eosinophils Absolute 04/09/2024 0.08  0.00 - 0.70 x10*3/uL Final    Basophils Absolute 04/09/2024 0.02  0.00 - 0.10 x10*3/uL Final    Glucose 04/23/2024 84  74 - 99 mg/dL Final    Sodium 04/23/2024 139  136 - 145 mmol/L Final    Potassium 04/23/2024 4.0  3.5 - 5.3 mmol/L Final    Chloride 04/23/2024 105  98 - 107 mmol/L Final    Bicarbonate 04/23/2024 25  21 - 32 mmol/L Final    Anion Gap 04/23/2024 13  10 - 20 mmol/L Final    Urea Nitrogen 04/23/2024 17  6 - 23 mg/dL Final    Creatinine 04/23/2024 0.71  0.50 - 1.05 mg/dL Final    eGFR 04/23/2024 >90  >60 mL/min/1.73m*2 Final    Calculations of estimated GFR are performed using the 2021 CKD-EPI Study Refit equation without the race variable for the IDMS-Traceable creatinine methods.  https://jasn.asnjournals.org/content/early/2021/09/22/ASN.5512244116    Calcium 04/23/2024 8.8  8.6 - 10.6 mg/dL Final    Albumin 04/23/2024 3.5  3.4 - 5.0 g/dL Final    Alkaline Phosphatase 04/23/2024 45  33 - 136 U/L Final    Total Protein 04/23/2024 6.0 (L)  6.4 - 8.2 g/dL Final    AST 04/23/2024 16   9 - 39 U/L Final    Bilirubin, Total 04/23/2024 0.5  0.0 - 1.2 mg/dL Final    ALT 04/23/2024 14  7 - 45 U/L Final    Patients treated with Sulfasalazine may generate falsely decreased results for ALT.    Glucose 04/09/2024 86  74 - 99 mg/dL Final    Sodium 04/09/2024 140  136 - 145 mmol/L Final    Potassium 04/09/2024 4.1  3.5 - 5.3 mmol/L Final    Chloride 04/09/2024 104  98 - 107 mmol/L Final    Bicarbonate 04/09/2024 28  21 - 32 mmol/L Final    Anion Gap 04/09/2024 12  10 - 20 mmol/L Final    Urea Nitrogen 04/09/2024 14  6 - 23 mg/dL Final    Creatinine 04/09/2024 0.79  0.50 - 1.05 mg/dL Final    eGFR 04/09/2024 82  >60 mL/min/1.73m*2 Final    Calculations of estimated GFR are performed using the 2021 CKD-EPI Study Refit equation without the race variable for the IDMS-Traceable creatinine methods.  https://jasn.asnjournals.org/content/early/2021/09/22/ASN.4313018959    Calcium 04/09/2024 9.2  8.6 - 10.6 mg/dL Final    Albumin 04/09/2024 3.5  3.4 - 5.0 g/dL Final    Alkaline Phosphatase 04/09/2024 44  33 - 136 U/L Final    Total Protein 04/09/2024 5.9 (L)  6.4 - 8.2 g/dL Final    AST 04/09/2024 15  9 - 39 U/L Final    Bilirubin, Total 04/09/2024 0.6  0.0 - 1.2 mg/dL Final    ALT 04/09/2024 9  7 - 45 U/L Final    Patients treated with Sulfasalazine may generate falsely decreased results for ALT.    Extra Tube 04/09/2024 Hold for add-ons.   Final    Auto resulted.    Extra Tube 04/09/2024 Hold for add-ons.   Final    Auto resulted.    Extra Tube 04/09/2024 Hold for add-ons.   Final    Auto resulted.    Extra Tube 04/09/2024 Hold for add-ons.   Final    Auto resulted.    Extra Tube 04/09/2024 Hold for add-ons.   Final    Auto resulted.           Review of Systems   A complete review of systems was performed and all systems were normal except what is noted in the HPI.        Objective   /78 (BP Location: Right arm, Patient Position: Sitting, BP Cuff Size: Adult)   Pulse 81   Temp 36.7 °C (98 °F) (Temporal)    Resp 14   Ht 1.524 m (5')   Wt 51.2 kg (112 lb 12.8 oz)   LMP  (LMP Unknown)   SpO2 95%   BMI 22.03 kg/m²    Physical Exam  Constitutional:       Appearance: Normal appearance.   HENT:      Head: Normocephalic and atraumatic.   Eyes:      General: Lids are normal. Gaze aligned appropriately.      Extraocular Movements: Extraocular movements intact.      Conjunctiva/sclera: Conjunctivae normal.      Comments: PERRLA     Neck:      Vascular: No carotid bruit.   Cardiovascular:      Rate and Rhythm: Normal rate and regular rhythm.      Heart sounds: Normal heart sounds.   Pulmonary:      Effort: Pulmonary effort is normal.      Breath sounds: Normal breath sounds. No wheezing, rhonchi or rales.   Abdominal:      General: Abdomen is flat. Bowel sounds are normal.      Palpations: Abdomen is soft.      Tenderness: There is no abdominal tenderness. There is no guarding.   Musculoskeletal:         General: Normal range of motion.      Right lower leg: No edema.      Left lower leg: No edema.   Skin:     General: Skin is dry.   Neurological:      General: No focal deficit present.      Mental Status: She is alert and oriented to person, place, and time.      Cranial Nerves: Cranial nerves 2-12 are intact.      Sensory: Sensation is intact.      Motor: Motor function is intact.      Coordination: Coordination is intact.   Psychiatric:         Mood and Affect: Mood normal.         Behavior: Behavior normal.         Thought Content: Thought content normal.         Health Maintenance Due   Topic Date Due    TB Test  Never done    Derm Melanoma Skin Check  Never done    COVID-19 Vaccine (6 - 2023-24 season) 02/06/2024    Bone Density Scan  04/07/2024    Colorectal Cancer Screening  04/18/2024        Assessment/Plan   Problem List Items Addressed This Visit       COPD (chronic obstructive pulmonary disease) (Multi)     Stable and asymptomatic   Not on medication  Reevaluate in 6 months.           Hyperlipidemia     LDL  mildly elevated  triglycerides elevated  Work on diet reviewed with patient.   Reevaluate in 6 months.  .         Relevant Orders    TSH with reflex to Free T4 if abnormal    Lipid Panel    Cholesterol, LDL Direct    Impaired fasting blood sugar - Primary     well controlled   Work on diet reviewed with patient.   Reevaluate in 6 months.           Relevant Orders    Comprehensive Metabolic Panel    Hemoglobin A1C    Small cell lung cancer (Multi)     Diagnosed per bronch 1/22  S/P radiation  On chemo per oncology - doing well per patient  Has follow up oncology later this month         Relevant Orders    CT head w IV contrast    Vitamin D deficiency     Well controlled. Continue current medicine and recheck in 6 months.          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)     Mild, stable   Recheck 6 months          Anemia, chronic disease     Stable  Monitored by oncology  Recheck 6 months          Relevant Orders    CBC    Double vision     Concern for brains mets in this cancer patient  Stat CT head  Dr. Archer notified via Secure Chat  If normal has follow up with optho already scheduled         Relevant Orders    CT head w IV contrast    New onset of headache in cancer patient     Concern for brains mets in this cancer patient  Stat CT head  Dr. Archer notified via Secure Chat         Relevant Orders    CT head w IV contrast     Other Visit Diagnoses       Asymptomatic menopause        Relevant Orders    XR DEXA bone density              Patient understands and agrees with care plan.           Nova Omer MD

## 2024-05-13 NOTE — ASSESSMENT & PLAN NOTE
LDL mildly elevated  triglycerides elevated  Work on diet reviewed with patient.   Reevaluate in 6 months.  .

## 2024-05-17 ENCOUNTER — OFFICE VISIT (OUTPATIENT)
Dept: PRIMARY CARE | Facility: CLINIC | Age: 69
End: 2024-05-17
Payer: MEDICARE

## 2024-05-17 ENCOUNTER — HOSPITAL ENCOUNTER (EMERGENCY)
Facility: HOSPITAL | Age: 69
Discharge: OTHER INSTITUTION | End: 2024-05-17
Payer: MEDICARE

## 2024-05-17 ENCOUNTER — APPOINTMENT (OUTPATIENT)
Dept: PRIMARY CARE | Facility: CLINIC | Age: 69
End: 2024-05-17
Payer: MEDICARE

## 2024-05-17 ENCOUNTER — HOSPITAL ENCOUNTER (OUTPATIENT)
Dept: RADIOLOGY | Facility: HOSPITAL | Age: 69
Discharge: HOME | End: 2024-05-17
Payer: MEDICARE

## 2024-05-17 VITALS
DIASTOLIC BLOOD PRESSURE: 78 MMHG | BODY MASS INDEX: 22.15 KG/M2 | RESPIRATION RATE: 14 BRPM | HEART RATE: 81 BPM | SYSTOLIC BLOOD PRESSURE: 105 MMHG | HEIGHT: 60 IN | TEMPERATURE: 98 F | OXYGEN SATURATION: 95 % | WEIGHT: 112.8 LBS

## 2024-05-17 DIAGNOSIS — R51.9 NEW ONSET OF HEADACHE IN CANCER PATIENT: ICD-10-CM

## 2024-05-17 DIAGNOSIS — J44.9 CHRONIC OBSTRUCTIVE PULMONARY DISEASE, UNSPECIFIED COPD TYPE (MULTI): ICD-10-CM

## 2024-05-17 DIAGNOSIS — C34.90 SMALL CELL LUNG CANCER (MULTI): ICD-10-CM

## 2024-05-17 DIAGNOSIS — E55.9 VITAMIN D DEFICIENCY: ICD-10-CM

## 2024-05-17 DIAGNOSIS — E78.2 MIXED HYPERLIPIDEMIA: ICD-10-CM

## 2024-05-17 DIAGNOSIS — D63.8 ANEMIA, CHRONIC DISEASE: ICD-10-CM

## 2024-05-17 DIAGNOSIS — H53.2 DOUBLE VISION: ICD-10-CM

## 2024-05-17 DIAGNOSIS — G62.0 PERIPHERAL NEUROPATHY DUE TO AND NOT CONCURRENT WITH CHEMOTHERAPY (MULTI): ICD-10-CM

## 2024-05-17 DIAGNOSIS — R73.01 IMPAIRED FASTING BLOOD SUGAR: Primary | ICD-10-CM

## 2024-05-17 DIAGNOSIS — Z78.0 ASYMPTOMATIC MENOPAUSE: ICD-10-CM

## 2024-05-17 DIAGNOSIS — T45.1X5S PERIPHERAL NEUROPATHY DUE TO AND NOT CONCURRENT WITH CHEMOTHERAPY (MULTI): ICD-10-CM

## 2024-05-17 PROCEDURE — 1159F MED LIST DOCD IN RCRD: CPT | Performed by: FAMILY MEDICINE

## 2024-05-17 PROCEDURE — 70460 CT HEAD/BRAIN W/DYE: CPT

## 2024-05-17 PROCEDURE — 1036F TOBACCO NON-USER: CPT | Performed by: FAMILY MEDICINE

## 2024-05-17 PROCEDURE — 99214 OFFICE O/P EST MOD 30 MIN: CPT | Performed by: FAMILY MEDICINE

## 2024-05-17 PROCEDURE — 1160F RVW MEDS BY RX/DR IN RCRD: CPT | Performed by: FAMILY MEDICINE

## 2024-05-17 PROCEDURE — 4500999001 HC ED NO CHARGE

## 2024-05-17 PROCEDURE — G2211 COMPLEX E/M VISIT ADD ON: HCPCS | Performed by: FAMILY MEDICINE

## 2024-05-17 PROCEDURE — 2550000001 HC RX 255 CONTRASTS: Performed by: FAMILY MEDICINE

## 2024-05-17 PROCEDURE — 70460 CT HEAD/BRAIN W/DYE: CPT | Performed by: RADIOLOGY

## 2024-05-17 PROCEDURE — 1123F ACP DISCUSS/DSCN MKR DOCD: CPT | Performed by: FAMILY MEDICINE

## 2024-05-17 RX ADMIN — IOHEXOL 50 ML: 350 INJECTION, SOLUTION INTRAVENOUS at 19:29

## 2024-05-17 ASSESSMENT — ENCOUNTER SYMPTOMS
LOSS OF SENSATION IN FEET: 1
DEPRESSION: 0
OCCASIONAL FEELINGS OF UNSTEADINESS: 1

## 2024-05-17 NOTE — ASSESSMENT & PLAN NOTE
Concern for brains mets in this cancer patient  Stat CT head  Dr. Archer notified via Secure Chat  If normal has follow up with optho already scheduled

## 2024-05-20 ENCOUNTER — HOSPITAL ENCOUNTER (OUTPATIENT)
Dept: RADIOLOGY | Facility: CLINIC | Age: 69
Discharge: HOME | End: 2024-05-20
Payer: MEDICARE

## 2024-05-20 DIAGNOSIS — Z78.0 ASYMPTOMATIC MENOPAUSE: ICD-10-CM

## 2024-05-20 DIAGNOSIS — C34.90 SMALL CELL LUNG CANCER (MULTI): Primary | ICD-10-CM

## 2024-05-20 DIAGNOSIS — H53.2 DOUBLE VISION: ICD-10-CM

## 2024-05-20 PROCEDURE — 77080 DXA BONE DENSITY AXIAL: CPT

## 2024-05-20 PROCEDURE — 77080 DXA BONE DENSITY AXIAL: CPT | Performed by: RADIOLOGY

## 2024-05-21 ENCOUNTER — HOSPITAL ENCOUNTER (OUTPATIENT)
Dept: RESEARCH | Facility: HOSPITAL | Age: 69
Discharge: HOME | End: 2024-05-21
Payer: MEDICARE

## 2024-05-21 ENCOUNTER — OFFICE VISIT (OUTPATIENT)
Dept: HEMATOLOGY/ONCOLOGY | Facility: HOSPITAL | Age: 69
End: 2024-05-21
Payer: MEDICARE

## 2024-05-21 ENCOUNTER — HOSPITAL ENCOUNTER (OUTPATIENT)
Dept: RADIOLOGY | Facility: HOSPITAL | Age: 69
Discharge: HOME | End: 2024-05-21
Payer: MEDICARE

## 2024-05-21 ENCOUNTER — EDUCATION (OUTPATIENT)
Dept: HEMATOLOGY/ONCOLOGY | Facility: HOSPITAL | Age: 69
End: 2024-05-21
Payer: MEDICARE

## 2024-05-21 VITALS
HEART RATE: 84 BPM | WEIGHT: 110.45 LBS | RESPIRATION RATE: 16 BRPM | SYSTOLIC BLOOD PRESSURE: 108 MMHG | BODY MASS INDEX: 21.57 KG/M2 | OXYGEN SATURATION: 96 % | DIASTOLIC BLOOD PRESSURE: 71 MMHG | TEMPERATURE: 98.1 F

## 2024-05-21 DIAGNOSIS — C34.90 SMALL CELL LUNG CANCER (MULTI): Primary | ICD-10-CM

## 2024-05-21 DIAGNOSIS — C34.90 SMALL CELL LUNG CANCER (MULTI): ICD-10-CM

## 2024-05-21 DIAGNOSIS — H53.2 DOUBLE VISION: ICD-10-CM

## 2024-05-21 LAB
ALBUMIN SERPL BCP-MCNC: 3.4 G/DL (ref 3.4–5)
ALP SERPL-CCNC: 44 U/L (ref 33–136)
ALT SERPL W P-5'-P-CCNC: 10 U/L (ref 7–45)
ANION GAP SERPL CALC-SCNC: 12 MMOL/L (ref 10–20)
APTT PPP: 31 SECONDS (ref 27–38)
AST SERPL W P-5'-P-CCNC: 17 U/L (ref 9–39)
BASOPHILS # BLD AUTO: 0.03 X10*3/UL (ref 0–0.1)
BASOPHILS NFR BLD AUTO: 0.4 %
BILIRUB DIRECT SERPL-MCNC: 0.1 MG/DL (ref 0–0.3)
BILIRUB SERPL-MCNC: 0.7 MG/DL (ref 0–1.2)
BUN SERPL-MCNC: 24 MG/DL (ref 6–23)
CALCIUM SERPL-MCNC: 8.7 MG/DL (ref 8.6–10.6)
CHLORIDE SERPL-SCNC: 104 MMOL/L (ref 98–107)
CK SERPL-CCNC: 44 U/L (ref 0–215)
CO2 SERPL-SCNC: 27 MMOL/L (ref 21–32)
CREAT SERPL-MCNC: 0.74 MG/DL (ref 0.5–1.05)
CRP SERPL-MCNC: 0.27 MG/DL
EGFRCR SERPLBLD CKD-EPI 2021: 88 ML/MIN/1.73M*2
EOSINOPHIL # BLD AUTO: 0.12 X10*3/UL (ref 0–0.7)
EOSINOPHIL NFR BLD AUTO: 1.6 %
ERYTHROCYTE [DISTWIDTH] IN BLOOD BY AUTOMATED COUNT: 13.2 % (ref 11.5–14.5)
FERRITIN SERPL-MCNC: 76 NG/ML (ref 8–150)
GLUCOSE SERPL-MCNC: 106 MG/DL (ref 74–99)
HCT VFR BLD AUTO: 32.8 % (ref 36–46)
HGB BLD-MCNC: 11.2 G/DL (ref 12–16)
IMM GRANULOCYTES # BLD AUTO: 0.02 X10*3/UL (ref 0–0.7)
IMM GRANULOCYTES NFR BLD AUTO: 0.3 % (ref 0–0.9)
INR PPP: 1.1 (ref 0.9–1.1)
LYMPHOCYTES # BLD AUTO: 1.04 X10*3/UL (ref 1.2–4.8)
LYMPHOCYTES NFR BLD AUTO: 13.6 %
MAGNESIUM SERPL-MCNC: 1.86 MG/DL (ref 1.6–2.4)
MCH RBC QN AUTO: 31 PG (ref 26–34)
MCHC RBC AUTO-ENTMCNC: 34.1 G/DL (ref 32–36)
MCV RBC AUTO: 91 FL (ref 80–100)
MONOCYTES # BLD AUTO: 0.66 X10*3/UL (ref 0.1–1)
MONOCYTES NFR BLD AUTO: 8.6 %
NEUTROPHILS # BLD AUTO: 5.8 X10*3/UL (ref 1.2–7.7)
NEUTROPHILS NFR BLD AUTO: 75.5 %
NRBC BLD-RTO: 0 /100 WBCS (ref 0–0)
PHOSPHATE SERPL-MCNC: 4.1 MG/DL (ref 2.5–4.9)
PLATELET # BLD AUTO: 228 X10*3/UL (ref 150–450)
POTASSIUM SERPL-SCNC: 3.7 MMOL/L (ref 3.5–5.3)
PROT SERPL-MCNC: 5.6 G/DL (ref 6.4–8.2)
PROTHROMBIN TIME: 11.9 SECONDS (ref 9.8–12.8)
RBC # BLD AUTO: 3.61 X10*6/UL (ref 4–5.2)
SODIUM SERPL-SCNC: 139 MMOL/L (ref 136–145)
WBC # BLD AUTO: 7.7 X10*3/UL (ref 4.4–11.3)

## 2024-05-21 PROCEDURE — 86140 C-REACTIVE PROTEIN: CPT | Performed by: STUDENT IN AN ORGANIZED HEALTH CARE EDUCATION/TRAINING PROGRAM

## 2024-05-21 PROCEDURE — 96413 CHEMO IV INFUSION 1 HR: CPT

## 2024-05-21 PROCEDURE — 99215 OFFICE O/P EST HI 40 MIN: CPT | Performed by: STUDENT IN AN ORGANIZED HEALTH CARE EDUCATION/TRAINING PROGRAM

## 2024-05-21 PROCEDURE — A9575 INJ GADOTERATE MEGLUMI 0.1ML: HCPCS

## 2024-05-21 PROCEDURE — 70553 MRI BRAIN STEM W/O & W/DYE: CPT | Performed by: STUDENT IN AN ORGANIZED HEALTH CARE EDUCATION/TRAINING PROGRAM

## 2024-05-21 PROCEDURE — 83735 ASSAY OF MAGNESIUM: CPT | Performed by: STUDENT IN AN ORGANIZED HEALTH CARE EDUCATION/TRAINING PROGRAM

## 2024-05-21 PROCEDURE — 85025 COMPLETE CBC W/AUTO DIFF WBC: CPT | Performed by: STUDENT IN AN ORGANIZED HEALTH CARE EDUCATION/TRAINING PROGRAM

## 2024-05-21 PROCEDURE — 2550000001 HC RX 255 CONTRASTS

## 2024-05-21 PROCEDURE — 1160F RVW MEDS BY RX/DR IN RCRD: CPT | Performed by: STUDENT IN AN ORGANIZED HEALTH CARE EDUCATION/TRAINING PROGRAM

## 2024-05-21 PROCEDURE — 85610 PROTHROMBIN TIME: CPT | Performed by: STUDENT IN AN ORGANIZED HEALTH CARE EDUCATION/TRAINING PROGRAM

## 2024-05-21 PROCEDURE — 82248 BILIRUBIN DIRECT: CPT | Performed by: STUDENT IN AN ORGANIZED HEALTH CARE EDUCATION/TRAINING PROGRAM

## 2024-05-21 PROCEDURE — 2500000005 HC RX 250 GENERAL PHARMACY W/O HCPCS: Performed by: STUDENT IN AN ORGANIZED HEALTH CARE EDUCATION/TRAINING PROGRAM

## 2024-05-21 PROCEDURE — 82550 ASSAY OF CK (CPK): CPT | Performed by: STUDENT IN AN ORGANIZED HEALTH CARE EDUCATION/TRAINING PROGRAM

## 2024-05-21 PROCEDURE — 82728 ASSAY OF FERRITIN: CPT | Performed by: STUDENT IN AN ORGANIZED HEALTH CARE EDUCATION/TRAINING PROGRAM

## 2024-05-21 PROCEDURE — 85730 THROMBOPLASTIN TIME PARTIAL: CPT | Performed by: STUDENT IN AN ORGANIZED HEALTH CARE EDUCATION/TRAINING PROGRAM

## 2024-05-21 PROCEDURE — 1159F MED LIST DOCD IN RCRD: CPT | Performed by: STUDENT IN AN ORGANIZED HEALTH CARE EDUCATION/TRAINING PROGRAM

## 2024-05-21 PROCEDURE — 80076 HEPATIC FUNCTION PANEL: CPT | Performed by: STUDENT IN AN ORGANIZED HEALTH CARE EDUCATION/TRAINING PROGRAM

## 2024-05-21 PROCEDURE — 1123F ACP DISCUSS/DSCN MKR DOCD: CPT | Performed by: STUDENT IN AN ORGANIZED HEALTH CARE EDUCATION/TRAINING PROGRAM

## 2024-05-21 PROCEDURE — 70553 MRI BRAIN STEM W/O & W/DYE: CPT

## 2024-05-21 PROCEDURE — 84100 ASSAY OF PHOSPHORUS: CPT | Performed by: STUDENT IN AN ORGANIZED HEALTH CARE EDUCATION/TRAINING PROGRAM

## 2024-05-21 RX ORDER — FAMOTIDINE 10 MG/ML
20 INJECTION INTRAVENOUS ONCE AS NEEDED
Status: DISCONTINUED | OUTPATIENT
Start: 2024-05-21 | End: 2024-05-22 | Stop reason: HOSPADM

## 2024-05-21 RX ORDER — ALBUTEROL SULFATE 0.83 MG/ML
3 SOLUTION RESPIRATORY (INHALATION) AS NEEDED
Status: DISCONTINUED | OUTPATIENT
Start: 2024-05-21 | End: 2024-05-22 | Stop reason: HOSPADM

## 2024-05-21 RX ORDER — HEPARIN SODIUM,PORCINE/PF 10 UNIT/ML
50 SYRINGE (ML) INTRAVENOUS AS NEEDED
OUTPATIENT
Start: 2024-05-21

## 2024-05-21 RX ORDER — GADOTERATE MEGLUMINE 376.9 MG/ML
10 INJECTION INTRAVENOUS
Status: COMPLETED | OUTPATIENT
Start: 2024-05-21 | End: 2024-05-21

## 2024-05-21 RX ORDER — DIPHENHYDRAMINE HYDROCHLORIDE 50 MG/ML
50 INJECTION INTRAMUSCULAR; INTRAVENOUS AS NEEDED
Status: DISCONTINUED | OUTPATIENT
Start: 2024-05-21 | End: 2024-05-22 | Stop reason: HOSPADM

## 2024-05-21 RX ORDER — EPINEPHRINE 1 MG/ML
0.3 INJECTION INTRAMUSCULAR; INTRAVENOUS; SUBCUTANEOUS EVERY 5 MIN PRN
Status: DISCONTINUED | OUTPATIENT
Start: 2024-05-21 | End: 2024-05-22 | Stop reason: HOSPADM

## 2024-05-21 RX ORDER — HEPARIN 100 UNIT/ML
500 SYRINGE INTRAVENOUS AS NEEDED
OUTPATIENT
Start: 2024-05-21

## 2024-05-21 RX ADMIN — GADOTERATE MEGLUMINE 10 ML: 376.9 INJECTION INTRAVENOUS at 20:23

## 2024-05-21 RX ADMIN — Medication 3 MG: at 11:05

## 2024-05-21 NOTE — PROGRESS NOTES
Patient ID: Gladys Branch is a 68 y.o. female.    DIAGNOSIS    Small Cell Lung Cancer    By immunostaining, the tumor cells are positive for CAM 5.2, INSM1, and TTF-1, and negative for p40 and LCA. The proliferation index by Ki-67 immunostaining is approximately 90%.  Synaptophysin, Chromogranin and INSM-1 are positive.  AE1/AE3 is negative. NEOGENOMICS, RB protein expression is lost in the tumor cells    STAGING    [id number], limited stage  Recurrence    CURRENT SITES OF DISEASE    RLL, supraclavicular    MOLECULAR GENOMICS      PRIOR THERAPY    Concurrent chemoradiation with Cisplatin/Etoposide every 3 weeks; C1D1 2/15/22, reaction to cisplatin C2D1 and did not receive D2 or D3 etoposide  Carbo/etoposide 4/5/2022 1 cycle c/b rash  PCI 5/21-6/13/22    CURRENT THERAPY    Phase 1 study [id number]  with study drug Taralatamab 1/24/23 - present.    CURRENT ONCOLOGICAL PROBLEMS      HISTORY OF PRESENT ILLNESS    Mrs. Branch is a 67 yo with PMH GERD and COPD who originally was round to have a RLL nodule measuring 11 mm in 4/28/2021 found as part of CT calcium score scan. An ensuing CT chest w/o contrast was done on 5/19/21 which revealed subsolid pulmonary nodules measuring 14 mm in the RLL. She had CT chest w/contrast on 11/29/21 which confirmed stable 14 mm GGO of the RLL and decreased RLL subpleural nodule. She met thoracic surgeon Dr. Farfan, who ordered PET/CT scan done on 12/8/21 which did not reveal any FDG-avid nodules within the lung parenchyma but R hilar nodes with max SUV 8.0. He referred her for bronch, which was done on 1/21/22 by Dr. Mcdaniels. 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 Brain MRI was negative.    She started concurrent chemoradiation with BID radiation with cis/etop 2/15/22, and unfortunately had a reaction to cisplatin on C2D1 with chest pain and hypotension. She was hospitalized with sepsis felt to be due to pneumonia. She trialed carboplatin/etoposide on 4/5/22 with a  resulting rash and opted to forego further chemotherapy as she had completed radiation. Restaging scan 11/3/22 unfortunately with progression with new R hilar lymph node, paratracheal nodes, and increase in size of R supraclavicular mass. She enrolled in [id number] and started C1D1 1/24/23. C1 complicated by anorexia and dysgeusia, and delayed C2 by a week. She has further struggled with fatigue and confusion, which may be related to mirtazapine. Dose reduced for C2 and mirtazapine stopped with improvement in symptoms.     PAST MEDICAL HISTORY    GERD  COPD    SOCIAL HISTORY    Retired - lighting . Lives at home with  and a kitten.  Tobacco: quit smoking 1999. 1 ppd x 25 yrs.  EtOH: wine 1 glass/day  Illicits: none    CURRENT MEDS    Please see med list    ALLERGIES    Codeine, Sulfa drugs, Cisplatin    FAMILY HISTORY    Paternal aunt - breast cancer dx 80s    Subjective    Today 5.21.2024. Patient in clinic with her  for C18D1 for [id number].      She is overall feeling well, but has had double vision for a few weeks. She has also had some nausea in the mornings - had some days where she vomited everything - this has also been for a couple week since she vomited. She stopped taking her nausea pills - but does take half sometimes. She hasn't had nausea in about a week though, this is better.       Objective          4/23/2024    11:00 AM 4/23/2024    12:37 PM 5/7/2024     7:42 AM 5/7/2024    10:56 AM 5/7/2024    12:12 PM 5/17/2024     1:31 PM 5/21/2024     8:45 AM   Vitals   Systolic 106 96 118 129 108 105 109   Diastolic 73 62 90 74 71 78 71   Heart Rate 82 72 70 75 74 81 65   Temp 36.7 °C (98.1 °F) 36.7 °C (98.1 °F) 36.2 °C (97.2 °F) 37 °C (98.6 °F) 36.5 °C (97.7 °F) 36.7 °C (98 °F) 36.5 °C (97.7 °F)   Resp 17 17 16 16 18 14 16   Height (in)      1.524 m (5')    Weight (lb)   111.77   112.8 110.45   BMI   21.83 kg/m2   22.03 kg/m2 21.57 kg/m2   BSA (m2)   1.47 m2   1.47 m2 1.46 m2   Visit  Report Report Report Report Report Report Report        Daily Weight  05/21/24 : 50.1 kg (110 lb 7.2 oz)  05/17/24 : 51.2 kg (112 lb 12.8 oz)  05/07/24 : 50.7 kg (111 lb 12.4 oz)  04/23/24 : 50.3 kg (110 lb 14.3 oz)  04/09/24 : 49.1 kg (108 lb 3.9 oz)        Physical Exam  Constitutional:       General: She is awake.      Appearance: Normal appearance. She is normal weight.   HENT:      Head: Normocephalic.      Mouth/Throat:      Mouth: Mucous membranes are moist.   Eyes:      Extraocular Movements: Extraocular movements intact.      Conjunctiva/sclera: Conjunctivae normal.      Pupils: Pupils are equal, round, and reactive to light.   Cardiovascular:      Rate and Rhythm: Normal rate and regular rhythm.      Heart sounds: Normal heart sounds, S1 normal and S2 normal.   Pulmonary:      Effort: Pulmonary effort is normal.      Breath sounds: Normal breath sounds.   Abdominal:      General: Abdomen is flat. Bowel sounds are normal.      Palpations: Abdomen is soft.   Musculoskeletal:      Cervical back: Normal range of motion and neck supple.   Skin:     Comments: No skin rash observed   Neurological:      Mental Status: She is alert.      Cranial Nerves: Cranial nerves 2-12 are intact.      Sensory: Sensation is intact.      Motor: Motor function is intact.      Coordination: Coordination is intact.   Psychiatric:         Attention and Perception: Attention normal.         Mood and Affect: Mood normal.         Speech: Speech normal.         Behavior: Behavior normal. Behavior is cooperative.         Cognition and Memory: Cognition normal.       Labs    Hospital Outpatient Visit on 05/21/2024   Component Date Value Ref Range Status    WBC 05/21/2024 7.7  4.4 - 11.3 x10*3/uL Final    nRBC 05/21/2024 0.0  0.0 - 0.0 /100 WBCs Final    RBC 05/21/2024 3.61 (L)  4.00 - 5.20 x10*6/uL Final    Hemoglobin 05/21/2024 11.2 (L)  12.0 - 16.0 g/dL Final    Hematocrit 05/21/2024 32.8 (L)  36.0 - 46.0 % Final    MCV 05/21/2024 91   80 - 100 fL Final    MCH 05/21/2024 31.0  26.0 - 34.0 pg Final    MCHC 05/21/2024 34.1  32.0 - 36.0 g/dL Final    RDW 05/21/2024 13.2  11.5 - 14.5 % Final    Platelets 05/21/2024 228  150 - 450 x10*3/uL Final    Neutrophils % 05/21/2024 75.5  40.0 - 80.0 % Final    Immature Granulocytes %, Automated 05/21/2024 0.3  0.0 - 0.9 % Final    Lymphocytes % 05/21/2024 13.6  13.0 - 44.0 % Final    Monocytes % 05/21/2024 8.6  2.0 - 10.0 % Final    Eosinophils % 05/21/2024 1.6  0.0 - 6.0 % Final    Basophils % 05/21/2024 0.4  0.0 - 2.0 % Final    Neutrophils Absolute 05/21/2024 5.80  1.20 - 7.70 x10*3/uL Final    Immature Granulocytes Absolute, Au* 05/21/2024 0.02  0.00 - 0.70 x10*3/uL Final    Lymphocytes Absolute 05/21/2024 1.04 (L)  1.20 - 4.80 x10*3/uL Final    Monocytes Absolute 05/21/2024 0.66  0.10 - 1.00 x10*3/uL Final    Eosinophils Absolute 05/21/2024 0.12  0.00 - 0.70 x10*3/uL Final    Basophils Absolute 05/21/2024 0.03  0.00 - 0.10 x10*3/uL Final    aPTT 05/21/2024 31  27 - 38 seconds Final    Protime 05/21/2024 11.9  9.8 - 12.8 seconds Final    INR 05/21/2024 1.1  0.9 - 1.1 Final   Hospital Outpatient Visit on 05/07/2024   Component Date Value Ref Range Status    Lipase 05/07/2024 24  9 - 82 U/L Final    Color, Urine 05/07/2024 Light-Yellow  Light-Yellow, Yellow, Dark-Yellow Final    Appearance, Urine 05/07/2024 Clear  Clear Final    Specific Gravity, Urine 05/07/2024 1.012  1.005 - 1.035 Final    pH, Urine 05/07/2024 6.5  5.0, 5.5, 6.0, 6.5, 7.0, 7.5, 8.0 Final    Protein, Urine 05/07/2024 NEGATIVE  NEGATIVE, 10 (TRACE), 20 (TRACE) mg/dL Final    Glucose, Urine 05/07/2024 Normal  Normal mg/dL Final    Blood, Urine 05/07/2024 NEGATIVE  NEGATIVE Final    Ketones, Urine 05/07/2024 NEGATIVE  NEGATIVE mg/dL Final    Bilirubin, Urine 05/07/2024 NEGATIVE  NEGATIVE Final    Urobilinogen, Urine 05/07/2024 Normal  Normal mg/dL Final    Nitrite, Urine 05/07/2024 NEGATIVE  NEGATIVE Final    Leukocyte Esterase, Urine  05/07/2024 NEGATIVE  NEGATIVE Final    Thyroxine, Free 05/07/2024 0.96  0.78 - 1.48 ng/dL Final    Thyroid Stimulating Hormone 05/07/2024 3.64  0.44 - 3.98 mIU/L Final    Protime 05/07/2024 11.2  9.8 - 12.8 seconds Final    INR 05/07/2024 1.0  0.9 - 1.1 Final    Magnesium 05/07/2024 1.90  1.60 - 2.40 mg/dL Final    Phosphorus 05/07/2024 3.9  2.5 - 4.9 mg/dL Final    Follicle Stimulating Hormone 05/07/2024 45.7  IU/L Final    Luteinizing Hormone 05/07/2024 18.3  IU/L Final    Ferritin 05/07/2024 70  8 - 150 ng/mL Final    Estradiol 05/07/2024 <19  pg/mL Final    C-Reactive Protein 05/07/2024 0.47  <1.00 mg/dL Final    Cortisol 05/07/2024 6.8  2.5 - 20.0 ug/dL Final    Creatine Kinase 05/07/2024 40  0 - 215 U/L Final    Bilirubin, Direct 05/07/2024 0.1  0.0 - 0.3 mg/dL Final    aPTT 05/07/2024 32  27 - 38 seconds Final    Amylase 05/07/2024 31  29 - 103 U/L Final    Adrenocorticotropic Hormone (ACTH) 05/07/2024 17.2  7.2 - 63.3 pg/mL Final    Glucose 05/07/2024 91  74 - 99 mg/dL Final    Sodium 05/07/2024 136  136 - 145 mmol/L Final    Potassium 05/07/2024 3.8  3.5 - 5.3 mmol/L Final    Chloride 05/07/2024 101  98 - 107 mmol/L Final    Bicarbonate 05/07/2024 28  21 - 32 mmol/L Final    Anion Gap 05/07/2024 11  10 - 20 mmol/L Final    Urea Nitrogen 05/07/2024 22  6 - 23 mg/dL Final    Creatinine 05/07/2024 0.70  0.50 - 1.05 mg/dL Final    eGFR 05/07/2024 >90  >60 mL/min/1.73m*2 Final    Calcium 05/07/2024 8.6  8.6 - 10.6 mg/dL Final    Albumin 05/07/2024 3.5  3.4 - 5.0 g/dL Final    Alkaline Phosphatase 05/07/2024 41  33 - 136 U/L Final    Total Protein 05/07/2024 5.8 (L)  6.4 - 8.2 g/dL Final    AST 05/07/2024 16  9 - 39 U/L Final    Bilirubin, Total 05/07/2024 0.6  0.0 - 1.2 mg/dL Final    ALT 05/07/2024 13  7 - 45 U/L Final    WBC 05/07/2024 4.9  4.4 - 11.3 x10*3/uL Final    nRBC 05/07/2024 0.0  0.0 - 0.0 /100 WBCs Final    RBC 05/07/2024 3.74 (L)  4.00 - 5.20 x10*6/uL Final    Hemoglobin 05/07/2024 11.6 (L)  12.0 -  16.0 g/dL Final    Hematocrit 05/07/2024 34.5 (L)  36.0 - 46.0 % Final    MCV 05/07/2024 92  80 - 100 fL Final    MCH 05/07/2024 31.0  26.0 - 34.0 pg Final    MCHC 05/07/2024 33.6  32.0 - 36.0 g/dL Final    RDW 05/07/2024 13.3  11.5 - 14.5 % Final    Platelets 05/07/2024 193  150 - 450 x10*3/uL Final    Neutrophils % 05/07/2024 62.8  40.0 - 80.0 % Final    Immature Granulocytes %, Automated 05/07/2024 0.4  0.0 - 0.9 % Final    Lymphocytes % 05/07/2024 22.3  13.0 - 44.0 % Final    Monocytes % 05/07/2024 10.6  2.0 - 10.0 % Final    Eosinophils % 05/07/2024 3.5  0.0 - 6.0 % Final    Basophils % 05/07/2024 0.4  0.0 - 2.0 % Final    Neutrophils Absolute 05/07/2024 3.07  1.20 - 7.70 x10*3/uL Final    Immature Granulocytes Absolute, Au* 05/07/2024 0.02  0.00 - 0.70 x10*3/uL Final    Lymphocytes Absolute 05/07/2024 1.09 (L)  1.20 - 4.80 x10*3/uL Final    Monocytes Absolute 05/07/2024 0.52  0.10 - 1.00 x10*3/uL Final    Eosinophils Absolute 05/07/2024 0.17  0.00 - 0.70 x10*3/uL Final    Basophils Absolute 05/07/2024 0.02  0.00 - 0.10 x10*3/uL Final    Extra Tube 05/07/2024 Hold for add-ons.   Final    Extra Tube 05/07/2024 Hold for add-ons.   Final    Extra Tube 05/07/2024 Hold for add-ons.   Final    Extra Tube 05/07/2024 Hold for add-ons.   Final    Vitamin B12 05/07/2024 544  211 - 911 pg/mL Final    Cholesterol 05/07/2024 243 (H)  0 - 199 mg/dL Final    HDL-Cholesterol 05/07/2024 63.9  mg/dL Final    Cholesterol/HDL Ratio 05/07/2024 3.8   Final    LDL Calculated 05/07/2024 135 (H)  <=99 mg/dL Final    VLDL 05/07/2024 44 (H)  0 - 40 mg/dL Final    Triglycerides 05/07/2024 220 (H)  0 - 149 mg/dL Final    Non HDL Cholesterol 05/07/2024 179 (H)  0 - 149 mg/dL Final    LDL, Direct 05/07/2024 157 (H)  0 - 129 mg/dL Final    Hemoglobin A1C 05/07/2024 5.5  see below % Final    Estimated Average Glucose 05/07/2024 111  Not Established mg/dL Final    Vitamin D, 25-Hydroxy, Total 05/07/2024 46  30 - 100 ng/mL Final   Hospital  Outpatient Visit on 04/23/2024   Component Date Value Ref Range Status    WBC 04/23/2024 7.1  4.4 - 11.3 x10*3/uL Final    nRBC 04/23/2024 0.0  0.0 - 0.0 /100 WBCs Final    RBC 04/23/2024 3.67 (L)  4.00 - 5.20 x10*6/uL Final    Hemoglobin 04/23/2024 11.4 (L)  12.0 - 16.0 g/dL Final    Hematocrit 04/23/2024 34.4 (L)  36.0 - 46.0 % Final    MCV 04/23/2024 94  80 - 100 fL Final    MCH 04/23/2024 31.1  26.0 - 34.0 pg Final    MCHC 04/23/2024 33.1  32.0 - 36.0 g/dL Final    RDW 04/23/2024 13.8  11.5 - 14.5 % Final    Platelets 04/23/2024 226  150 - 450 x10*3/uL Final    Neutrophils % 04/23/2024 61.3  40.0 - 80.0 % Final    Immature Granulocytes %, Automated 04/23/2024 0.7  0.0 - 0.9 % Final    Lymphocytes % 04/23/2024 24.9  13.0 - 44.0 % Final    Monocytes % 04/23/2024 9.9  2.0 - 10.0 % Final    Eosinophils % 04/23/2024 2.8  0.0 - 6.0 % Final    Basophils % 04/23/2024 0.4  0.0 - 2.0 % Final    Neutrophils Absolute 04/23/2024 4.34  1.20 - 7.70 x10*3/uL Final    Immature Granulocytes Absolute, Au* 04/23/2024 0.05  0.00 - 0.70 x10*3/uL Final    Lymphocytes Absolute 04/23/2024 1.76  1.20 - 4.80 x10*3/uL Final    Monocytes Absolute 04/23/2024 0.70  0.10 - 1.00 x10*3/uL Final    Eosinophils Absolute 04/23/2024 0.20  0.00 - 0.70 x10*3/uL Final    Basophils Absolute 04/23/2024 0.03  0.00 - 0.10 x10*3/uL Final    aPTT 04/23/2024 29  27 - 38 seconds Final    Bilirubin, Direct 04/23/2024 0.1  0.0 - 0.3 mg/dL Final    Creatine Kinase 04/23/2024 45  0 - 215 U/L Final    C-Reactive Protein 04/23/2024 <0.10  <1.00 mg/dL Final    Ferritin 04/23/2024 55  8 - 150 ng/mL Final    Magnesium 04/23/2024 2.02  1.60 - 2.40 mg/dL Final    Phosphorus 04/23/2024 3.7  2.5 - 4.9 mg/dL Final    Protime 04/23/2024 11.1  9.8 - 12.8 seconds Final    INR 04/23/2024 1.0  0.9 - 1.1 Final                 Performance Status:    ECOG 1: Restricted in physically strenuous activity but ambulatory and able to carry out work of a light or sedentary nature, e.g.,  light house work, office work.         Assessment/Plan      Mrs. Branch is a 69 yo with COPD and GERD who presented with newly diagnosed SCLC completed BID radiation planned concurrently with cis/etoposide but had a reaction C2D1 to cisplatin. Completed 1 cycle carbo/etop D1 4/5/22 also complicated by facial flushing and erythematous rash and stopped further treatment. Now s/p PCI in 6/2022. Most recent CT concerning for disease progression. Referred for clinical trial AMGN 1518, C1D1 1/24/23. Treatment has been complicated by orthostatic hypotension, worsening short-term memory, increased lethargy, weight loss, and poor appetite. Delayed C2 by 1 week and dose-reduced. Confusion has resolved during C3 after stopping mirtazapine and dose reduction.     # SCLC  - limited stage, brain MRI negative  - previously discussed concurrent chemoradiation, with cisplatin/etoposide with side effects including but not limited to allergic reaction, fatigue, risk of infection, tinnitus, neuropathy, injury to liver/kidney, risk of anemia/thrombocytopenia and was consented  - she was also interested in scalp cooling, which unfortunately she is not a candidate for d/t SCLC  - started concurrent chemoradiation BID with Q3week cis/etop on 2/15 at [facility]  -unfortunately had reaction to cisplatin on C2D1 resulting in hospitalization and also felt to be septic due to pneumonia  - discussed that we typically do 3-4 cycles chemotherapy, and alternative regimens include carboplatin/etoposide vs CAV. Given reaction to cisplatin, concern for possible reaction to carboplatin as well, although unknown rates of cross reaction. Alternatively, they also asked about discontinuation of chemotherapy, since she completed radiation. She ultimately decided to trial carbo/etoposide. She is aware of the heightened risk of allergic reaction, as well as other side effects including but not limited to fatigue, risk of infection, neuropathy, injury to  liver/kidney, risk of anemia/thrombocytopenia. consented 3/28/22  -s/p 1 cycle Carbo/Etop D1 4/5/22, developed facial flushing and erythematous rash on arms and chest s/p treatment, resolved with benadryl  -opted to forego further chemotherapy and will continue on maintenance  - s/p PCI 6/2022  - CT C/A/P and brain MRI 5/2022 and most recently 8/2022 with good response and no disease  -  MRI brain 11/7 without evidence of metastatic disease  - CT C/A/P 11/3 with multiple new enlarged LN concerning for disease progression - discussed with Dr. Valdivia not able to repeat radiation.  - referred to phase 1 clinical trial and consideration for [id number] or [id number]  - 1.5.2023 : Patient signed consent to take part on phase 1 study [id number]. Look clinically good to take part in study. Will start on study ASAP if eligible.   - 1.24.2023: Seen today and ready to start trial on [id number].  - 1.31.2023: Seen today, ready for C1D8 AMGN 1518   - 2.7.2023: Seen today for C1D15 [id number] - continue with trial   - 2.14.2023: Seen in follow-up on [id number] with overall worsening of general health  - 2.21.2023: Seen today for C2D1 AMGN 1518 with continued weight loss although perhaps starting to plateau, more energy since being off treatment, still poor appetite. will delay by 1 week, started dexamethasone 2 mg daily, and consider dose reduction.  - 2.28.2023: Seen today for C2D1 AMGN 1518 after delaying for 1 week. Energy level and appetite are improved, although still losing a little weight. Confusion is worse today, possibly due to mirtazapine vs study-related. Will dose reduce today.  - 3.7.2023: Seen today C2D8 AMGN 1518 after dose-reduction last week. Confusion is mildly improved, energy level and appetite are stable. Will add marinol for appetite.   - 3.14.2023: Seen today C2D15 AMGN 1518. Overall improved: confusion continues to improve, energy level and appetite are improved. Weight is improved. Will continue dexamethasone and  marinol.  - 3.15.2023: C2D16 No CRS symptoms observed after last treatment Overall Symptoms are improving and she is tolerating treatment.  -3.16.2023: C2D17 No CRS symptoms observed after last treatment Overall Symptoms are improving and she is tolerating treatment.  - 3.28.2023 : Most recent imaging suggest patient benefiting from current treatment. Ongoing symptom  possible common cold/season allergies. Since the patient looks clinically good we will proceed with C3D1 today. Educated patient to call the office ASAP if symptoms worsen.   - 4.11.2023: C3D15 Feeling well and overall improved with fatigue, confusion, anorexia. Proceed at current dosing and weaning steroids as below  -4.25.2023: C4D1 Pt is feeling well, no complaints of fatigue, confusion, memory loss. Has gained weight. Energy levels are good. Proceed with the current dosing. Pt is no longer on steroids.   -5.9.2023: C4D15. Pt is tolerating treatment well with no new complains. Continues to have good energy level endorses poor appetite with out any weight loss. Suggested to start taking the dronabinol.  Will proceed with treatment today.  -5.23.2023: C5D1. Pt is tolerating treatment well. Energy levels are good, is having some weight loss and constipation. Started back on dex for appetite. Pt will let us know if she remains constipated over the next several days.  Personally reviewed scans with stable disease. Will proceed with treatment today.   -6.6.2023: C5D15. Continues to tolerate treatment well. Since we restarted her on Dexamethasone she reports her energy levels and appetite has improved. Will continue on low dose Dex. With no new symptoms, we will proceed with treatment today.   -6.20.2023: C6D1. Doing well on dexamethasone 1 mg daily. Will proceed with treatment.  -7.5.2023: C6D15. We will proceed with treatment since the patient is tolerating treatment with no significant AE's and also give 1/2 liter of IV fluids.   - 7.18.2023: C7D1.  Doing well, will proceed with treatment. She is out of dexamethasone, and will monitor off steroids.   -8.1.2023: C7D15. Continues to tolerate treatment. Will proceed with C7D15 today. RTC per protocol.  - 8.29.2023: C8D1. C8 delayed due to hospitalization for diverticulitis. Has completed antibiotics and feeling well for treatment.  - 9.12.2023: C8D15. Most recent imgaing suggest the patient continues to benefit from current treatment. The imaging also suggest improvement of previously visualized segmental colitis associated with diverticulosis affecting the sigmoid colon with minimal residual pericolonic fat standing and hyperemia of the sigmoid colon. Imaging also suggest resolving inflammatory process without evidence of new or worsening complications. We will proceed with C8D15 today since the patient is also clinically feeling good. RTC per protocol.  -9.26.2023: C9D1. Patient looks clinically good. With no JACQUELYN's we will proceed with treatment C9D1 today.  -10.24.23: C10D1. Pt feels well, no acute events, no TRAEs, continue treatment today as scheduled.  -11.7.23: C10D15. Pt feels well, no TRAEs, continue treatment as scheduled.  - 11.21.23: C11D1. Continues to feel well, will continue treatment today.  - 12.19.23: C12D1. Personally reviewed most recent scan without evidence of progression. Continues to feel well and will continue with treatment today.  - 01.02.24: C12D15. Continues to tolerate treatment well. No new JACQUELYN's will proceed with treatment. RTC per protocol.  - 01.16.24: C13D1. No new JACQUELYN's proceed with treatment. RTC per protocol.   - 01.30.24: C13D15. Continues to feel well. Proceed with treatment.  - 2.13.24: C14D1. Personally reviewed most recent scan without evidence of progression. Continue with treatment today. RTC per protocol.  - 2.27.24: C14D15. Continues to feel good and tolerate treatment without any JACQUELYN's. We will proceed with treatment today. RTC per protocol.  - 3.12.24: C15D1.  Continues to feel well and tolerate treatment without new JACQUELYN's. Will continue on treatment.  - 3.26.24: C15D15. She feels good today. Will give her IV fluids. Will continue on treatment. RTC in per protocol with scan prior to next visit.  - 4.23.24: C16D15. Feeling well and will continue per protocol.  - 5.7.24: C17D1. Feels well overall and will continue on trial.   - 5.21.24: C18D1. Having double vision and will obtain brain MRI. She will see eye doctor as well. Continue on trial.    # Confusion - resolved  - significantly worsened after taking double dose of mirtazapine accidentally, although has been generally down-trending since starting study (which is also when she started taking mirtazapine) and now resolved  - brain MRI without progression of cancer  - will continue off mirtazapine    # Unintentional weight loss - stable  # Anorexia- Improving  # Dysgeusia- improving  - all improved on steroids. unclear if this is from cancer or side effect of study drug  - stopped mirtazapine due to concerns for confusion   - weaned off dexamethasone and started marinol, but kept forgetting to take it  - dexamethasone trial started again on 5.23.2023. Continued on 1 mg daily - will trial off after 7.18.23.     # Fatigue - Resolved  - back to normal pretty much, weaning steroids as above    # frequent PVCs - asymptomatic  - saw onco-cardiology and completed 24-hr Holter monitor  - recommended decreasing caffeine and sudafed intake  - continue to monitor    # Forgetfulness - improved - however noted decline on AMGN study- unclear etiology.    - started after PCI, on memantine daily and has since stopped  - offered neurocognitive evaluation previously and she will think about this and readdress next visit  -  notes some decline in last 3 weeks- especially short term memory loss.    - Improved    # Neuropathy - resolved  - mild peripheral neuropathy with numbness of the toes - likely due to cisplatin  - will continue  to monitor closely  - not endorsing any neuropathy at this visit     # Rash - resolved  Waxing and waning rash throughout her body. ?improving. Unclear etiology, patient and  feel timing coincided with starting BMX.   - she will hold off on further BMX  - thought to be due to sulfa allergy, possibly due to platinum agents   - continue to monitor    #odynophagia - resolved  -rad onc aware  -prescribed BMX solution  -will continue to monitor    #constipation-improving  -occasional. Prescribed Senna S  -will monitor    Francy Archer MD

## 2024-05-21 NOTE — RESEARCH NOTES
Research Note Treatment Day    Gladys Branch is here today for treatment on [id number]. Today is C17D15. Procedures completed per protocol. AE's and con-meds reviewed with patient. Patient is aware of treatment plan.    [x]   Received treatment as planned   OR  []    Treatment delayed; patient calendar updated as required   Treatment delayed because:    []   AE    []   Physician Discretion    []   Clinical Deterioration or Progression     []   Other    Pt had clinbic appointment with Dr Archer. Scheduled for MRI brain this evening regarding double vision episodes, CT negative.    Education Documentation  General Medication Information, taught by Vladimir Fernandez RN at 5/21/2024 12:55 PM.  Learner: Significant Other, Patient  Readiness: Eager  Method: Explanation  Response: Verbalizes Understanding    Treatment Plan and Schedule, taught by Vladimir Fernandez RN at 5/21/2024 12:55 PM.  Learner: Significant Other, Patient  Readiness: Eager  Method: Explanation  Response: Verbalizes Understanding    Supportive Medications, taught by Vladimir Fernandez RN at 5/21/2024 12:55 PM.  Learner: Significant Other, Patient  Readiness: Eager  Method: Explanation  Response: Verbalizes Understanding    Diagnostic Studies, taught by Vladimir Fernandez RN at 5/21/2024 12:55 PM.  Learner: Significant Other, Patient  Readiness: Eager  Method: Explanation  Response: Verbalizes Understanding    Education Comments  No comments found.

## 2024-05-21 NOTE — RESEARCH NOTES
[facility]><[id number]><C5+D15><PART A>    DCRU NURSING VISIT NOTE  Study Name: ALEXANDRU Foote8  IRB#: [id number]  DCRU#: [id number]  Protocol Version Dated: A9 3.22.23  PI: Roshan Kumar MD.    Time point: Cycle 5 and beyond - Day 15  CYCLE 17     Encounter Date: 05/21/2024  Encounter Time:  8:30 AM EDT Arrived at 0738  Encounter Department: [facility] HEMATOLOGY AND ONCOLOGY     #1     Phone Pager   Carmen Rios [phone]    #2 Phone Pager        Other Phone Pager          Study Regimen and Dosing   Part A Dose Exploration/Expansion- Small Cell Lung Cancer Relapsed or Refractory patients who progressed or recurred following at least 1 platinum-based regimen.   Cycle = 28 days    administered IV Day 1, Day 8, and Day 15 of Cycle 1. Cycle 2 and beyond     administered on Day 1 and Day 15.        Dietary Guidelines   Regular diet:     Admission and Prior to Starting Study Activities   Complete DCRU and [facility] Admission/Visit Note.  Notify [facility] of patient arrival after initial lab and vitals  Insert one peripheral IV line for sample collection procedures (flush line with normal saline following each blood draw). Access mediport (if available) otherwise insert second peripheral line in opposite arm for Investigative drug administration (if peripheral line, flush line with normal saline before & after infusion)     Criteria to Treat   DCRU RN reviewed and meets eligibility to proceed with treatment plan   Time team notified: [id number]  DCRU RN notifies study team to review eligibility and approval before dosing procedures  Time team approves: 1000     Subjective   Gladys Branch is a 68 y.o. female and is here for a Research clinical visit.    Visit Provider: [id number], [facility] ROOM 7 [facility]     Allergies:   Allergies   Allergen Reactions    Cisplatin Other     CHEMO INDUCED (Moderate); Dyspepsia (Moderate); Headaches (Moderate); Hypotension (Moderate); Numbness (Moderate);  Resp Distress (Moderate)    Codeine Nausea Only and Other     nausea    Sulfa (Sulfonamide Antibiotics) Rash       Objective     Vital Signs:    Vitals:    05/21/24 0845 05/21/24 1058 05/21/24 1211   BP: 109/71 129/75 108/71   Pulse: 65 69 84   Resp: 16 16 16   Temp: 36.5 °C (97.7 °F) 36.7 °C (98.1 °F) 36.7 °C (98.1 °F)   TempSrc: Oral Oral Oral   SpO2: 94% 95% 96%   Weight: 50.1 kg (110 lb 7.2 oz)         Physical Exam     ASSESSMENT and PLAN:  Problem List Items Addressed This Visit       Small cell lung cancer (Multi)    Relevant Medications    Study [id number] Tarlatamab (AMG-757) 3 mg in sodium chloride 0.9% 250 mL IV (Completed)    sodium chloride 0.9 % bolus 500 mL    dextrose 5 % in water (D5W) bolus    diphenhydrAMINE (BENADryl) injection 50 mg    methylPREDNISolone sod succinate (SOLU-Medrol) 40 mg/mL injection 40 mg    famotidine PF (Pepcid) injection 20 mg    EPINEPHrine (Adrenalin) injection 0.3 mg    albuterol 2.5 mg /3 mL (0.083 %) nebulizer solution 3 mL    Other Relevant Orders    CBC and Auto Differential (Completed)    Comprehensive metabolic panel (Completed)    APTT (Completed)    Bilirubin, Direct (Completed)    CK (Completed)    C-Reactive Protein (Completed)    Ferritin (Completed)    Magnesium (Completed)    Phosphorus (Completed)    Protime-INR (Completed)    Adult diet Regular    Treatment Conditions (Completed)    Provider Communication - [id number] (Completed)    Research Triplicate ECG (Completed)    Research Triplicate ECG (Completed)    Nursing Communication - Hypersensitivity Management, Moderate (Completed)    Nursing Communication - Hypersensitivity Management, Severe (Completed)    Nursing Communication - Respiratory Management (Completed)    Pulse oximetry, continuous (Completed)    Insert peripheral IV    Convert IV to saline lock        Medications as of the completion of today's visit:  Current Outpatient Medications   Medication Sig Dispense Refill    cetirizine-pseudoephedrine  (ZyrTEC-D) 5-120 mg 12 hr tablet Take 1 tablet by mouth 2 times a day. (Patient taking differently: Take 1 tablet by mouth once daily.) 60 tablet 11    cholecalciferol (Vitamin D-3) 25 MCG (1000 UT) tablet Take by mouth.      dexAMETHasone (Decadron) 2 mg tablet Take 0.5 tablets (1 mg) by mouth once daily with breakfast. 30 tablet 2    dextromethorphan (Delsym) 30 mg/5 mL liquid Take 5 mL (30 mg) by mouth once daily as needed.      dronabinol (Marinol) 2.5 mg capsule once daily as needed. One hour before dinner      estrogens, conjugated, (Premarin) 0.3 mg tablet Take 0.5 tablets (0.15 mg) by mouth once daily.      mv-min/FA/vit K/lutein/zeaxant (PRESERVISION AREDS 2 PLUS MV ORAL) Take 1 tablet by mouth once daily.      omeprazole (PriLOSEC) 40 mg DR capsule Take 1 capsule (40 mg) by mouth once daily. Do not crush or chew. 90 each 3    ondansetron (Zofran) 8 mg tablet Take 0.5 tablets (4 mg) by mouth every 8 hours if needed.      polyethylene glycol (Glycolax, Miralax) 17 gram packet Take 17 g by mouth 2 times a day. (Patient taking differently: Take 17 g by mouth if needed.) 60 packet 2    prochlorperazine (Compazine) 10 mg tablet Take 0.5 tablets (5 mg) by mouth every 6 hours if needed.      psyllium husk, aspartame, (Metamucil Sugar-Free, aspart,) 3.4 gram/5.8 gram powder Take 1 Dose by mouth once daily. (Patient taking differently: Take 1 Dose by mouth once daily as needed.) 425 g 11     Current Facility-Administered Medications   Medication Dose Route Frequency Provider Last Rate Last Admin    albuterol 2.5 mg /3 mL (0.083 %) nebulizer solution 3 mL  3 mL nebulization PRN Francy Archer MD        dextrose 5 % in water (D5W) bolus  500 mL intravenous PRN Francy Archer MD        diphenhydrAMINE (BENADryl) injection 50 mg  50 mg intravenous PRN Francy Archer MD        EPINEPHrine (Adrenalin) injection 0.3 mg  0.3 mg intramuscular q5 min PRN Francy Archer MD        famotidine PF (Pepcid) injection 20 mg  20 mg  intravenous Once PRN Francy Archer MD        methylPREDNISolone sod succinate (SOLU-Medrol) 40 mg/mL injection 40 mg  40 mg intravenous PRN Francy Archer MD        sodium chloride 0.9 % bolus 500 mL  500 mL intravenous PRN Francy Archer MD           Administrations This Visit       Study [id number] Tarlatamab (AMG-757) 3 mg in sodium chloride 0.9% 250 mL IV       Admin Date  05/21/2024 Action  New Bag Dose  3 mg Route  intravenous Documented By  Carmen Leos RN                    Orders placed during today's visit:  Orders Placed This Encounter   Procedures    CBC and Auto Differential     Standing Status:   Standing     Number of Occurrences:   1     Order Specific Question:   Release result to MyChart     Answer:   Immediate    Comprehensive metabolic panel     Standing Status:   Standing     Number of Occurrences:   1     Order Specific Question:   Release result to MyChart     Answer:   Immediate    APTT     Standing Status:   Standing     Number of Occurrences:   1     Order Specific Question:   Release result to MyChart     Answer:   Immediate [1]    Bilirubin, Direct     Standing Status:   Standing     Number of Occurrences:   1     Order Specific Question:   Release result to MyChart     Answer:   Immediate [1]    CK     Standing Status:   Standing     Number of Occurrences:   1     Order Specific Question:   Release result to MyChart     Answer:   Immediate [1]    C-Reactive Protein     Standing Status:   Standing     Number of Occurrences:   1     Order Specific Question:   Release result to MyChart     Answer:   Immediate [1]    Ferritin     Standing Status:   Standing     Number of Occurrences:   1     Order Specific Question:   Release result to MyChart     Answer:   Immediate [1]    Magnesium     Standing Status:   Standing     Number of Occurrences:   1     Order Specific Question:   Release result to MyChart     Answer:   Immediate [1]    Phosphorus     Standing Status:   Standing     Number of  Occurrences:   1     Order Specific Question:   Release result to MyChart     Answer:   Immediate [1]    Protime-INR     Standing Status:   Standing     Number of Occurrences:   1     Order Specific Question:   Release result to MyChart     Answer:   Immediate [1]    Adult diet Regular     Standing Status:   Standing     Number of Occurrences:   1     Order Specific Question:   Diet type     Answer:   Regular    Treatment Conditions     Hold treatment and notify provider if:   Adverse Event GREATER THAN OR EQUAL TO Grade 3     Standing Status:   Standing     Number of Occurrences:   1    Provider Communication - [id number]      Dose Level 4              Tarlatamab 0.1 mg   Dose Level 5              Tarlatamab 0.3 mg   Dose Level 6              Tarlatamab 1 mg   Dose Level 7              Tarlatamab 3 mg   Dose Level 8              Tarlatamab 10 mg   Dose Level 9              Tarlatamab 30 mg   Dose Level 10              Tarlatamab 100 mg     Standing Status:   Standing     Number of Occurrences:   1    Research Triplicate ECG     Refer to study [employer] for instructions and documentation of the research triplicate ECG.     Standing Status:   Standing     Number of Occurrences:   1    Research Triplicate ECG     Refer to study [employer] for instructions and documentation of the research triplicate ECG.     Standing Status:   Standing     Number of Occurrences:   1    Nursing Communication - Hypersensitivity Management, Moderate     Flushing, rash, pruritus, dyspnea, chest discomfort, back pain, angioedema, SBP LESS THAN 90 mmHg, and/or change in mental status above or below patient's baseline. In the event of moderate or severe hypersensitivity reaction to any medication:   Stop infusion.  Assess vital signs, pulse oximetry, nursing assessment, and patient complaints.  Administer medications per Hypersensitivity Reaction Medication Protocol.   Notify physician.     Standing Status:   Standing     Number of  Occurrences:   1    Nursing Communication - Hypersensitivity Management, Severe     Worsening of moderate reaction symptoms, SBP LESS THAN 80 mmHg, and/or respiratory distress (respirations GREATER THAN 40 breaths per minute, wheezing, life-threatening symptoms). In the event of moderate or severe hypersensitivity reaction to any medication:   Stop infusion.  Assess vital signs, pulse oximetry, nursing assessment, and patient complaints.  Administer medications per Hypersensitivity Reaction Medication Protocol.   Notify physician.     Standing Status:   Standing     Number of Occurrences:   1    Nursing Communication - Respiratory Management     Oxygen via nasal cannula at 4 L per minute for respiratory rate GREATER THAN OR EQUAL TO to 28 breaths/minute and/or wheezing. Pulse Oximetry continuous monitoring.     Standing Status:   Standing     Number of Occurrences:   1    Pulse oximetry, continuous     Continuous monitoring to maintain SPO2 GREATER THAN 90%     Standing Status:   Standing     Number of Occurrences:   1    Insert peripheral IV     Obtain venous access for treatment via PIV if no CVAD access or if unable to obtain blood return from CVAD.     Standing Status:   Standing     Number of Occurrences:   1    Convert IV to saline lock     Only if venous access is required over consecutive days. Peripheral catheter can remain in place until completion of last consecutive day infusion, unless IV-related complications are encountered.     Standing Status:   Standing     Number of Occurrences:   1        [id number] -  in Subjects With Small Cell Lung Cancer    Patient has no adverse events documented in the Research Adverse Events activity.       Study Specific Instructions and Documentation   WEIGHT  LABS  VITALS  STUDY DRUG  VITALS  DISCHARGE     PRE-DOSE Safety Labs   Refer to Organ Treatment Plan for orders  Drawn at 0835; 22g peripheral IV, Left AC     Day 15 Pre-dose Vital Signs    Temp, HR, Resp,  BP, Pulse Oximetry: Seated or Semi-Fowlers x 5 minutes before obtaining  Position and temperature location: should be the same that is used throughout the study-record position  Obtained at 1058: See Flowsheet     Infusion-Related Reactions   [facility] Hypersensitivity Orders  Note: CRS table      Research Drug Administration Tarlatamab ()   Refer to [place] Treatment Plan for orders   Administer dose over 60 minutes (+/- 10 mins) followed by a 3 - 5 minute Normal saline flush. (This will be the end time after the flush). Document start time & end of study medication; document start time and end time of the flush.  Participant to remain on Unit for 2 hour post dose observation.   Infusion started at 1105; See MAR     Post-Dose Vital Signs   Temp, HR, Resp, BP, Pulse Oximetry: Seated or Semi-Fowlers x 5 minutes before obtaining  Position and temperature location: should be the same that is used throughout the study  End of Infusion  Obtained at 1211; See Flowsheet     Day 15 Discharge Instructions   Patient may be discharged after 2 hour observation.  Observation ended at 1411  Discharge time 1413     Carmen Leos RN  05/21/24    Grading and Management of Cytokine Release Syndrome   CRS Grade Description of Severity Minimum Expected Intervention Instructions for Dose Modifications of    1 Symptoms are not life threatening and require symptomatic treatment only,  eg, fever, nausea, fatigue, headache, myalgia's, malaise Administer symptomatic treatment (contact provider for medications/doses). Monitor for CRS symptoms including vital signs and pulse oximetry at least Q2 hours for12 hours or until resolution, Whichever is earlier. N/A     (continued)  Grading and Management of Cytokine Release Syndrome   CRS Grade Description of Severity Minimum Expected Intervention Instructions for Dose Modifications of    2 Symptoms require and respond to moderate intervention  Oxygen requirement <40%,                       OR  Hypotension responsive to fluids or low dose of one vasopressor, OR                                                                       Grade 2 organ toxicity or grade 3 transaminitis per CTCAE criteria Administer:  Symptomatic treatment   (contact provider for medications/doses)  Supplemental oxygen when oxygen saturation is <90% on room air  Intravenous fluids or low dose vasopressor for hypotension is recommended when systolic blood pressure is <85 mmHg. (contact provider for medications/doses) Persistent tachycardia (eg >120 bpm) may also indicate the need for intervention for hypotension.   Monitor for CRS symptoms including vital signs and pulse oximetry at least Q2 hours for12 hours or until resolution to CRS grade <= 1, whichever is earlier. For subjects with extensive co-morbidities or poor performance status, manage per grade 3 CRS guidance below If CRS occurs during  treatment, immediately interrupt the infusion and delay the next  dose until the event resolves to CRS grade <= 1 for no less than 72 hours. Permanently discontinue  if there is no improvement to CRS <= grade 1 within 7 days     3 Symptoms require and respond to aggressive intervention  Oxygen requirement >=40%, OR  Hypotension requiring high dose or multiple vasopressors, OR  Grade 3 organ toxicity or     Grade 4 transaminitis per  CTCAE criteria Admit to intensive care unit for close clinical and vital sign monitoring per institutional guidelines.   Refer to provider/protocol for medications and doses.     If CRS occurs during  treatment, immediately interrupt   and delay the next dose until event resolves to CRS grade <= 1 for no less than 72 hours.    Permanently discontinue  if there is no improvement to CRS                 <= grade 2 within 5 days or CRS <= grade 1 within 7 days.  Permanently discontinue   if CRS grade  3 occurs at the initial run in dose (i.e., at  MTD1)  (Applicable only after MTD1 has been defined).     (continued)  Grading and Management of Cytokine Release Syndrome   CRS Grade Description of Severity Minimum Expected Intervention Instructions for Dose Modifications of    4 Life-threatening symptoms  Requirement for ventilator support OR  Grade 4 organ toxicity (excluding transaminitis) per CTCAE criteria Admit to intensive care unit for close clinical and vital sign monitoring per institutional guidelines.   Refer to provider/protocol for medications and doses.   If CRS occurs during  treatment, Immediately stop the infusion.  Permanently discontinue   therapy.

## 2024-05-23 ENCOUNTER — TELEPHONE VISIT (OUTPATIENT)
Dept: HEMATOLOGY/ONCOLOGY | Facility: HOSPITAL | Age: 69
End: 2024-05-23
Payer: MEDICARE

## 2024-05-28 ENCOUNTER — HOSPITAL ENCOUNTER (OUTPATIENT)
Dept: RADIOLOGY | Facility: HOSPITAL | Age: 69
Discharge: HOME | End: 2024-05-28
Payer: MEDICARE

## 2024-05-28 DIAGNOSIS — Z00.6 EXAM FOR CLINICAL TRIAL: ICD-10-CM

## 2024-05-28 DIAGNOSIS — C34.90 MALIGNANT NEOPLASM OF LUNG, UNSPECIFIED LATERALITY, UNSPECIFIED PART OF LUNG (MULTI): ICD-10-CM

## 2024-05-28 PROCEDURE — 74177 CT ABD & PELVIS W/CONTRAST: CPT

## 2024-05-28 PROCEDURE — 2550000001 HC RX 255 CONTRASTS: Performed by: STUDENT IN AN ORGANIZED HEALTH CARE EDUCATION/TRAINING PROGRAM

## 2024-05-28 RX ADMIN — IOHEXOL 75 ML: 350 INJECTION, SOLUTION INTRAVENOUS at 09:39

## 2024-05-29 ENCOUNTER — PATIENT MESSAGE (OUTPATIENT)
Dept: HEMATOLOGY/ONCOLOGY | Facility: HOSPITAL | Age: 69
End: 2024-05-29
Payer: MEDICARE

## 2024-06-04 ENCOUNTER — EDUCATION (OUTPATIENT)
Dept: HEMATOLOGY/ONCOLOGY | Facility: HOSPITAL | Age: 69
End: 2024-06-04
Payer: MEDICARE

## 2024-06-04 ENCOUNTER — HOSPITAL ENCOUNTER (OUTPATIENT)
Dept: RESEARCH | Facility: HOSPITAL | Age: 69
Discharge: HOME | End: 2024-06-04
Payer: MEDICARE

## 2024-06-04 VITALS
HEART RATE: 72 BPM | SYSTOLIC BLOOD PRESSURE: 105 MMHG | BODY MASS INDEX: 22 KG/M2 | OXYGEN SATURATION: 97 % | WEIGHT: 112.66 LBS | RESPIRATION RATE: 16 BRPM | DIASTOLIC BLOOD PRESSURE: 66 MMHG | TEMPERATURE: 97.3 F

## 2024-06-04 DIAGNOSIS — C34.90 SMALL CELL LUNG CANCER (MULTI): Primary | ICD-10-CM

## 2024-06-04 LAB
ALBUMIN SERPL BCP-MCNC: 3.8 G/DL (ref 3.4–5)
ALP SERPL-CCNC: 47 U/L (ref 33–136)
ALT SERPL W P-5'-P-CCNC: 13 U/L (ref 7–45)
AMYLASE SERPL-CCNC: 35 U/L (ref 29–103)
ANION GAP SERPL CALC-SCNC: 11 MMOL/L (ref 10–20)
APPEARANCE UR: CLEAR
APTT PPP: 31 SECONDS (ref 27–38)
AST SERPL W P-5'-P-CCNC: 20 U/L (ref 9–39)
BASOPHILS # BLD AUTO: 0.05 X10*3/UL (ref 0–0.1)
BASOPHILS NFR BLD AUTO: 0.7 %
BILIRUB DIRECT SERPL-MCNC: 0.1 MG/DL (ref 0–0.3)
BILIRUB SERPL-MCNC: 0.8 MG/DL (ref 0–1.2)
BILIRUB UR STRIP.AUTO-MCNC: NEGATIVE MG/DL
BUN SERPL-MCNC: 18 MG/DL (ref 6–23)
CALCIUM SERPL-MCNC: 9 MG/DL (ref 8.6–10.6)
CHLORIDE SERPL-SCNC: 102 MMOL/L (ref 98–107)
CK SERPL-CCNC: 56 U/L (ref 0–215)
CO2 SERPL-SCNC: 29 MMOL/L (ref 21–32)
COLOR UR: NORMAL
CORTIS SERPL-MCNC: 11.9 UG/DL (ref 2.5–20)
CREAT SERPL-MCNC: 0.72 MG/DL (ref 0.5–1.05)
CRP SERPL-MCNC: <0.1 MG/DL
EGFRCR SERPLBLD CKD-EPI 2021: >90 ML/MIN/1.73M*2
EOSINOPHIL # BLD AUTO: 0.19 X10*3/UL (ref 0–0.7)
EOSINOPHIL NFR BLD AUTO: 2.8 %
ERYTHROCYTE [DISTWIDTH] IN BLOOD BY AUTOMATED COUNT: 13.3 % (ref 11.5–14.5)
ESTRADIOL SERPL-MCNC: <19 PG/ML
FERRITIN SERPL-MCNC: 66 NG/ML (ref 8–150)
FSH SERPL-ACNC: 51.1 IU/L
GLUCOSE SERPL-MCNC: 86 MG/DL (ref 74–99)
GLUCOSE UR STRIP.AUTO-MCNC: NORMAL MG/DL
HCT VFR BLD AUTO: 34.8 % (ref 36–46)
HGB BLD-MCNC: 11.7 G/DL (ref 12–16)
HOLD SPECIMEN: NORMAL
IMM GRANULOCYTES # BLD AUTO: 0.03 X10*3/UL (ref 0–0.7)
IMM GRANULOCYTES NFR BLD AUTO: 0.4 % (ref 0–0.9)
INR PPP: 1 (ref 0.9–1.1)
KETONES UR STRIP.AUTO-MCNC: NEGATIVE MG/DL
LEUKOCYTE ESTERASE UR QL STRIP.AUTO: NEGATIVE
LH SERPL-ACNC: 18.8 IU/L
LIPASE SERPL-CCNC: 37 U/L (ref 9–82)
LYMPHOCYTES # BLD AUTO: 1.3 X10*3/UL (ref 1.2–4.8)
LYMPHOCYTES NFR BLD AUTO: 19.1 %
MAGNESIUM SERPL-MCNC: 1.86 MG/DL (ref 1.6–2.4)
MCH RBC QN AUTO: 31 PG (ref 26–34)
MCHC RBC AUTO-ENTMCNC: 33.6 G/DL (ref 32–36)
MCV RBC AUTO: 92 FL (ref 80–100)
MONOCYTES # BLD AUTO: 0.61 X10*3/UL (ref 0.1–1)
MONOCYTES NFR BLD AUTO: 9 %
NEUTROPHILS # BLD AUTO: 4.61 X10*3/UL (ref 1.2–7.7)
NEUTROPHILS NFR BLD AUTO: 68 %
NITRITE UR QL STRIP.AUTO: NEGATIVE
NRBC BLD-RTO: 0 /100 WBCS (ref 0–0)
PH UR STRIP.AUTO: 7 [PH]
PHOSPHATE SERPL-MCNC: 4.1 MG/DL (ref 2.5–4.9)
PLATELET # BLD AUTO: 213 X10*3/UL (ref 150–450)
POTASSIUM SERPL-SCNC: 4 MMOL/L (ref 3.5–5.3)
PROT SERPL-MCNC: 6.2 G/DL (ref 6.4–8.2)
PROT UR STRIP.AUTO-MCNC: NEGATIVE MG/DL
PROTHROMBIN TIME: 11.5 SECONDS (ref 9.8–12.8)
RBC # BLD AUTO: 3.77 X10*6/UL (ref 4–5.2)
RBC # UR STRIP.AUTO: NEGATIVE /UL
SODIUM SERPL-SCNC: 138 MMOL/L (ref 136–145)
SP GR UR STRIP.AUTO: 1.02
T4 FREE SERPL-MCNC: 0.9 NG/DL (ref 0.78–1.48)
TSH SERPL-ACNC: 9.11 MIU/L (ref 0.44–3.98)
UROBILINOGEN UR STRIP.AUTO-MCNC: NORMAL MG/DL
WBC # BLD AUTO: 6.8 X10*3/UL (ref 4.4–11.3)

## 2024-06-04 PROCEDURE — 82550 ASSAY OF CK (CPK): CPT

## 2024-06-04 PROCEDURE — 82024 ASSAY OF ACTH: CPT

## 2024-06-04 PROCEDURE — 80053 COMPREHEN METABOLIC PANEL: CPT

## 2024-06-04 PROCEDURE — 85610 PROTHROMBIN TIME: CPT

## 2024-06-04 PROCEDURE — 82248 BILIRUBIN DIRECT: CPT

## 2024-06-04 PROCEDURE — 84443 ASSAY THYROID STIM HORMONE: CPT

## 2024-06-04 PROCEDURE — 83735 ASSAY OF MAGNESIUM: CPT

## 2024-06-04 PROCEDURE — 82533 TOTAL CORTISOL: CPT

## 2024-06-04 PROCEDURE — 82728 ASSAY OF FERRITIN: CPT

## 2024-06-04 PROCEDURE — 84439 ASSAY OF FREE THYROXINE: CPT

## 2024-06-04 PROCEDURE — 96413 CHEMO IV INFUSION 1 HR: CPT

## 2024-06-04 PROCEDURE — 86140 C-REACTIVE PROTEIN: CPT

## 2024-06-04 PROCEDURE — 83690 ASSAY OF LIPASE: CPT

## 2024-06-04 PROCEDURE — 84100 ASSAY OF PHOSPHORUS: CPT

## 2024-06-04 PROCEDURE — 85730 THROMBOPLASTIN TIME PARTIAL: CPT

## 2024-06-04 PROCEDURE — 2500000005 HC RX 250 GENERAL PHARMACY W/O HCPCS

## 2024-06-04 PROCEDURE — 81003 URINALYSIS AUTO W/O SCOPE: CPT

## 2024-06-04 PROCEDURE — 85025 COMPLETE CBC W/AUTO DIFF WBC: CPT

## 2024-06-04 PROCEDURE — 83001 ASSAY OF GONADOTROPIN (FSH): CPT

## 2024-06-04 PROCEDURE — 82670 ASSAY OF TOTAL ESTRADIOL: CPT

## 2024-06-04 PROCEDURE — 82150 ASSAY OF AMYLASE: CPT

## 2024-06-04 RX ORDER — EPINEPHRINE 1 MG/ML
0.3 INJECTION INTRAMUSCULAR; INTRAVENOUS; SUBCUTANEOUS EVERY 5 MIN PRN
Status: DISCONTINUED | OUTPATIENT
Start: 2024-06-04 | End: 2024-06-05 | Stop reason: HOSPADM

## 2024-06-04 RX ORDER — FAMOTIDINE 10 MG/ML
20 INJECTION INTRAVENOUS ONCE AS NEEDED
Status: DISCONTINUED | OUTPATIENT
Start: 2024-06-04 | End: 2024-06-05 | Stop reason: HOSPADM

## 2024-06-04 RX ORDER — DIPHENHYDRAMINE HYDROCHLORIDE 50 MG/ML
50 INJECTION INTRAMUSCULAR; INTRAVENOUS AS NEEDED
Status: DISCONTINUED | OUTPATIENT
Start: 2024-06-04 | End: 2024-06-05 | Stop reason: HOSPADM

## 2024-06-04 RX ORDER — ALBUTEROL SULFATE 0.83 MG/ML
3 SOLUTION RESPIRATORY (INHALATION) AS NEEDED
Status: DISCONTINUED | OUTPATIENT
Start: 2024-06-04 | End: 2024-06-05 | Stop reason: HOSPADM

## 2024-06-04 RX ADMIN — Medication 3 MG: at 11:05

## 2024-06-04 NOTE — RESEARCH NOTES
[facility]><[id number]><C5+D1><PARTA>    DCRU NURSING VISIT NOTE  Study Name: ALEXANDRU Foote8  IRB#: [id number]  DCRU#: [id number]  Protocol Version Dated: A9 3.22.23  PI: Roshan Kumar MD.    Time point: Cycle 5 and beyond - Day 1  CYCLE 18     Encounter Date: 06/04/2024  Encounter Time:  8:30 AM EDT  Encounter Department: [facility] HEMATOLOGY AND ONCOLOGY     #1     Phone Pager   Carmen Rios [phone]    #2 Phone Pager        Other Phone Pager          Study Regimen and Dosing   Part A Dose Exploration/Expansion- Small Cell Lung Cancer Relapsed or Refractory patients who progressed or recurred following at least 1 platinum-based regimen.   Cycle = 28 days    administered IV Day 1, Day 8, and Day 15 of Cycle 1. Cycle 2 and beyond     administered on Day 1 and Day 15.        Dietary Guidelines   Regular diet:     Admission and Prior to Starting Study Activities   Notify  when patient arrives to unit.  Complete DCRU/[facility] intake form in EMR.  Insert one peripheral IV line for sample collection procedures (flush line with normal saline following each blood draw). Access mediport (if available) otherwise insert second peripheral line in opposite arm for Investigative drug administration (if peripheral line, flush line with normal saline before & after infusion)     Criteria to Treat   DCRU RN reviewed and meets eligibility to proceed with treatment plan   Time team notified: [id number]   DCRU RN notifies study team to review eligibility and approval before dosing procedures  Time team approves: 1001      Subjective   Gladys Branch is a 68 y.o. female and is here for a Research clinical visit.    Visit Provider: [address] [facility] ROOM 13 [facility]     Allergies:   Allergies   Allergen Reactions    Cisplatin Other     CHEMO INDUCED (Moderate); Dyspepsia (Moderate); Headaches (Moderate); Hypotension (Moderate); Numbness (Moderate); Resp Distress (Moderate)     Codeine Nausea Only and Other     nausea    Sulfa (Sulfonamide Antibiotics) Rash       Objective     Vital Signs:    Vitals:    06/04/24 0749   BP: 129/81   Pulse: 64   Resp: 18   Temp: 36.3 °C (97.3 °F)   TempSrc: Oral   SpO2: 97%   Weight: 51.1 kg (112 lb 10.5 oz)       Physical Exam     ASSESSMENT and PLAN:  Problem List Items Addressed This Visit       Small cell lung cancer (Multi) - Primary    Relevant Orders    Clinic Appointment Request    Infusion Appointment Request    CBC and Auto Differential    Comprehensive metabolic panel    Acth    Amylase    APTT    Bilirubin, Direct    CK    Cortisol    C-Reactive Protein    Estradiol    Ferritin    FSH & LH    Lipase    Magnesium    Phosphorus    Protime-INR    TSH    T4, free    Urinalysis with Reflex Microscopic    Research collection: 4mL Lavender EDTA - Research Collect Immune Cells: Cd20, TIGIT, ; Pre-Dose; Within 15 minutes; Ambient; 8-10x    Research collection: 4mL Blue/Black CPT Sodium Citrate - Clinical Collect PBMC; Pre-Dose; Within 15 minutes; Ambient; 8-10x    Research collection: 2.5mL Red SST - Research Collect Anti-Tarlatamab Antibody; Pre-Dose; Other (Within 15 minutes); Ambient; 5x    Research collection: 2.5mL Red SST - Research Collect Tarlatamab PK; Pre-Dose; Other (Within 15 minutes); Ambient; 5x    Research collection: 2.5mL Red SST - Research Collect Serum Markers; Pre-Dose; Other (Within 15 minutes); Ambient; 5x    CBC and Auto Differential    Comprehensive metabolic panel    Acth    Amylase    APTT    Bilirubin, Direct    CK    Cortisol    C-Reactive Protein    Estradiol    Ferritin    FSH & LH    Lipase    Magnesium    Phosphorus    Protime-INR    TSH    T4, free    Urinalysis with Reflex Microscopic        Medications as of the completion of today's visit:  Current Outpatient Medications   Medication Sig Dispense Refill    cetirizine-pseudoephedrine (ZyrTEC-D) 5-120 mg 12 hr tablet Take 1 tablet by mouth 2 times a day. (Patient  taking differently: Take 1 tablet by mouth once daily.) 60 tablet 11    cholecalciferol (Vitamin D-3) 25 MCG (1000 UT) tablet Take by mouth.      dexAMETHasone (Decadron) 2 mg tablet Take 0.5 tablets (1 mg) by mouth once daily with breakfast. 30 tablet 2    dextromethorphan (Delsym) 30 mg/5 mL liquid Take 5 mL (30 mg) by mouth once daily as needed.      dronabinol (Marinol) 2.5 mg capsule once daily as needed. One hour before dinner      estrogens, conjugated, (Premarin) 0.3 mg tablet Take 0.5 tablets (0.15 mg) by mouth once daily.      mv-min/FA/vit K/lutein/zeaxant (PRESERVISION AREDS 2 PLUS MV ORAL) Take 1 tablet by mouth once daily.      omeprazole (PriLOSEC) 40 mg DR capsule Take 1 capsule (40 mg) by mouth once daily. Do not crush or chew. 90 each 3    ondansetron (Zofran) 8 mg tablet Take 0.5 tablets (4 mg) by mouth every 8 hours if needed.      polyethylene glycol (Glycolax, Miralax) 17 gram packet Take 17 g by mouth 2 times a day. (Patient taking differently: Take 17 g by mouth if needed.) 60 packet 2    prochlorperazine (Compazine) 10 mg tablet Take 0.5 tablets (5 mg) by mouth every 6 hours if needed.      psyllium husk, aspartame, (Metamucil Sugar-Free, aspart,) 3.4 gram/5.8 gram powder Take 1 Dose by mouth once daily. (Patient taking differently: Take 1 Dose by mouth once daily as needed.) 425 g 11     No current facility-administered medications for this encounter.           Orders placed during today's visit:  Orders Placed This Encounter   Procedures    CBC and Auto Differential     Standing Status:   Future     Number of Occurrences:   1     Standing Expiration Date:   6/4/2025     Order Specific Question:   Release result to [facility]     Answer:   Immediate [1]    Comprehensive metabolic panel     Standing Status:   Future     Number of Occurrences:   1     Standing Expiration Date:   6/4/2025     Order Specific Question:   Release result to [facility]     Answer:   Immediate [1]    Acth     Standing  Status:   Future     Number of Occurrences:   1     Standing Expiration Date:   6/4/2025     Order Specific Question:   Release result to MyChart     Answer:   Immediate [1]    Amylase     Standing Status:   Future     Number of Occurrences:   1     Standing Expiration Date:   6/4/2025     Order Specific Question:   Release result to MyChart     Answer:   Immediate [1]    APTT     Standing Status:   Future     Number of Occurrences:   1     Standing Expiration Date:   6/4/2025     Order Specific Question:   Release result to MyChart     Answer:   Immediate [1]    Bilirubin, Direct     Standing Status:   Future     Number of Occurrences:   1     Standing Expiration Date:   6/4/2025     Order Specific Question:   Release result to MyChart     Answer:   Immediate [1]    CK     Standing Status:   Future     Number of Occurrences:   1     Standing Expiration Date:   6/4/2025     Order Specific Question:   Release result to MyChart     Answer:   Immediate [1]    Cortisol     Standing Status:   Future     Number of Occurrences:   1     Standing Expiration Date:   6/4/2025     Order Specific Question:   Release result to MyChart     Answer:   Immediate [1]    C-Reactive Protein     Standing Status:   Future     Number of Occurrences:   1     Standing Expiration Date:   6/4/2025     Order Specific Question:   Release result to MyChart     Answer:   Immediate [1]    Estradiol     Standing Status:   Future     Number of Occurrences:   1     Standing Expiration Date:   6/4/2025     Order Specific Question:   Release result to MyChart     Answer:   Immediate [1]    Ferritin     Standing Status:   Future     Number of Occurrences:   1     Standing Expiration Date:   6/4/2025     Order Specific Question:   Release result to MyChart     Answer:   Immediate [1]    FSH & LH     Standing Status:   Future     Number of Occurrences:   1     Standing Expiration Date:   6/4/2025     Order Specific Question:   Release result to MyChart      Answer:   Immediate [1]    Lipase     Standing Status:   Future     Number of Occurrences:   1     Standing Expiration Date:   6/4/2025     Order Specific Question:   Release result to MyChart     Answer:   Immediate [1]    Magnesium     Standing Status:   Future     Number of Occurrences:   1     Standing Expiration Date:   6/4/2025     Order Specific Question:   Release result to MyChart     Answer:   Immediate [1]    Phosphorus     Standing Status:   Future     Number of Occurrences:   1     Standing Expiration Date:   6/4/2025     Order Specific Question:   Release result to MyChart     Answer:   Immediate [1]    Protime-INR     Standing Status:   Future     Number of Occurrences:   1     Standing Expiration Date:   6/4/2025     Order Specific Question:   Release result to MyChart     Answer:   Immediate [1]    TSH     Standing Status:   Future     Number of Occurrences:   1     Standing Expiration Date:   6/4/2025     Order Specific Question:   Release result to MyChart     Answer:   Immediate [1]    T4, free     Standing Status:   Future     Number of Occurrences:   1     Standing Expiration Date:   6/4/2025     Order Specific Question:   Release result to MyChart     Answer:   Immediate [1]    Urinalysis with Reflex Microscopic     Standing Status:   Future     Number of Occurrences:   1     Standing Expiration Date:   6/4/2025     Order Specific Question:   Release result to MyChart     Answer:   Immediate [1]    Research collection: 4mL Lavender EDTA - Research Collect Immune Cells: Cd20, TIGIT, ; Pre-Dose; Within 15 minutes; Ambient; 8-10x     Standing Status:   Future     Standing Expiration Date:   6/4/2025     Order Specific Question:   Test     Answer:   Immune Cells: Cd20, TIGIT,      Order Specific Question:   Timepoint     Answer:   Pre-Dose     Order Specific Question:   Pre-Dose     Answer:   Within 15 minutes     Order Specific Question:   Temperature     Answer:   Ambient     Order  Specific Question:   Invert     Answer:   8-10x     Order Specific Question:   Optional?     Answer:   No    Research collection: 4mL Blue/Black CPT Sodium Citrate - Clinical Collect PBMC; Pre-Dose; Within 15 minutes; Ambient; 8-10x     Standing Status:   Future     Standing Expiration Date:   6/4/2025     Order Specific Question:   Test     Answer:   PBMC     Order Specific Question:   Timepoint     Answer:   Pre-Dose     Order Specific Question:   Pre-Dose     Answer:   Within 15 minutes     Order Specific Question:   Temperature     Answer:   Ambient     Order Specific Question:   Invert     Answer:   8-10x     Order Specific Question:   Optional?     Answer:   No    Research collection: 2.5mL [employer] [facility] Collect Anti-Tarlatamab Antibody; Pre-Dose; Other (Within 15 minutes); Ambient; 5x     Standing Status:   Future     Standing Expiration Date:   6/4/2025     Order Specific Question:   Test     Answer:   Anti-Tarlatamab Antibody     Order Specific Question:   Timepoint     Answer:   Pre-Dose     Order Specific Question:   Pre-Dose     Answer:   Other     Comments:   Within 15 minutes     Order Specific Question:   Temperature     Answer:   Ambient     Order Specific Question:   Invert     Answer:   5x     Order Specific Question:   Optional?     Answer:   No    Research collection: 2.5mL [employer] Collect Tarlatamab PK; Pre-Dose; Other (Within 15 minutes); Ambient; 5x     Standing Status:   Future     Standing Expiration Date:   6/4/2025     Order Specific Question:   Test     Answer:   Tarlatamab PK     Order Specific Question:   Timepoint     Answer:   Pre-Dose     Order Specific Question:   Pre-Dose     Answer:   Other     Comments:   Within 15 minutes     Order Specific Question:   Temperature     Answer:   Ambient     Order Specific Question:   Invert     Answer:   5x     Order Specific Question:   Optional?     Answer:   No    Research collection: 2.5mL [employer] [facility] Collect Serum  Markers; Pre-Dose; Other (Within 15 minutes); Ambient; 5x     Standing Status:   Future     Standing Expiration Date:   6/4/2025     Order Specific Question:   Test     Answer:   Serum Markers     Order Specific Question:   Timepoint     Answer:   Pre-Dose     Order Specific Question:   Pre-Dose     Answer:   Other     Comments:   Within 15 minutes     Order Specific Question:   Temperature     Answer:   Ambient     Order Specific Question:   Invert     Answer:   5x     Order Specific Question:   Optional?     Answer:   No    CBC and Auto Differential     Standing Status:   Standing     Number of Occurrences:   1     Order Specific Question:   Release result to MyChart     Answer:   Immediate [1]    Comprehensive metabolic panel     Standing Status:   Standing     Number of Occurrences:   1     Order Specific Question:   Release result to MyChart     Answer:   Immediate [1]    Acth     Standing Status:   Standing     Number of Occurrences:   1     Order Specific Question:   Release result to MyChart     Answer:   Immediate [1]    Amylase     Standing Status:   Standing     Number of Occurrences:   1     Order Specific Question:   Release result to MyChart     Answer:   Immediate [1]    APTT     Standing Status:   Standing     Number of Occurrences:   1     Order Specific Question:   Release result to MyChart     Answer:   Immediate [1]    Bilirubin, Direct     Standing Status:   Standing     Number of Occurrences:   1     Order Specific Question:   Release result to MyChart     Answer:   Immediate [1]    CK     Standing Status:   Standing     Number of Occurrences:   1     Order Specific Question:   Release result to MyChart     Answer:   Immediate [1]    Cortisol     Standing Status:   Standing     Number of Occurrences:   1     Order Specific Question:   Release result to MyChart     Answer:   Immediate [1]    C-Reactive Protein     Standing Status:   Standing     Number of Occurrences:   1     Order Specific  Question:   Release result to MyChart     Answer:   Immediate [1]    Estradiol     Standing Status:   Standing     Number of Occurrences:   1     Order Specific Question:   Release result to MyChart     Answer:   Immediate [1]    Ferritin     Standing Status:   Standing     Number of Occurrences:   1     Order Specific Question:   Release result to MyChart     Answer:   Immediate [1]    FSH & LH     Standing Status:   Standing     Number of Occurrences:   1     Order Specific Question:   Release result to MyChart     Answer:   Immediate [1]    Lipase     Standing Status:   Standing     Number of Occurrences:   1     Order Specific Question:   Release result to MyChart     Answer:   Immediate [1]    Magnesium     Standing Status:   Standing     Number of Occurrences:   1     Order Specific Question:   Release result to MyChart     Answer:   Immediate [1]    Phosphorus     Standing Status:   Standing     Number of Occurrences:   1     Order Specific Question:   Release result to MyChart     Answer:   Immediate [1]    Protime-INR     Standing Status:   Standing     Number of Occurrences:   1     Order Specific Question:   Release result to MyChart     Answer:   Immediate [1]    TSH     Standing Status:   Standing     Number of Occurrences:   1     Order Specific Question:   Release result to MyChart     Answer:   Immediate [1]    T4, free     Standing Status:   Standing     Number of Occurrences:   1     Order Specific Question:   Release result to MyChart     Answer:   Immediate [1]    Urinalysis with Reflex Microscopic     Standing Status:   Standing     Number of Occurrences:   1     Order Specific Question:   Release result to MyChart     Answer:   Immediate [1]        [id number] -  in Subjects With Small Cell Lung Cancer    Patient has no adverse events documented in the Research Adverse Events activity.       Study Specific Instructions and Documentation   WEIGHT  LABS  VITALS  CORREATIVES  STUDY  DRUG  VITALS  DISCHARGE     WEIGHT    Obtain weight in kilograms - with shoes off & heavy items removed.   Vitals:    06/04/24 0749   Weight: 51.1 kg (112 lb 10.5 oz)        PRE-DOSE Safety Labs   Refer to [place] Treatment Plan for orders     Pre-dose Vital Signs   Temp, HR, Resp, BP, Pulse Oximetry: Seated or Semi-Fowlers x 5 minutes before obtaining  Position and temperature location: should be the same that is used throughout the study-record position  Vitals:    06/04/24 1015   BP: 103/68   Pulse: 69   Resp: 18   Temp: 36.5 °C (97.7 °F)   SpO2: 97%        Pre-dose Research Correlatives  Deliver to DCRU/[facility] lab for processing   Access Type: peripheral 22g Location: right AC   Refer to [place] Treatment Plan for orders   Time point Specimen Test Volume Tube Handling Draw Time   Pre-dose (within 15 mins of  dosing)    draw in order PBMC      4 mL CPT Ambient, Invert 8-10X 1100    Anti- Antibody 2.5 mL SST Ambient, Invert 5X 1100    TARLATAMAB PK  (through cycle 12) 2.5 mL SST Ambient, Invert 5X 1100    Serum Markers 2.5 mL SST Ambient, Invert 5X 1100    Immune Cells 4 mL   K3 EDTA Ambient, Invert 8-10X 1100          Infusion-Related Reactions   [facility] Hypersensitivity Orders  Note: CRS table      Research Drug Administration Tarlatamab ()   Refer to [place] Treatment Plan for orders   Administer dose over 60 minutes (+/- 10 mins) followed by a 3 - 5 minute Normal saline flush. (This will be the end time after the flush). Document start time & end of study medication; document start time and end time of the flush.  Participant to remain on Unit for 2 hour post dose observation.    Infusion administered 1105 - 1205  Normal Saline flush completed at 1208    Day 1 Post-Dose Vital Signs   Temp, HR, Resp, BP, Pulse Oximetry: Seated or Semi-Fowlers x 5 minutes before obtaining  Position and temperature location: should be the same that is used throughout the study  End of Infusion  Vitals:    06/04/24 1208    BP: 105/66   Pulse: 72   Resp: 16   Temp: 36.3 °C (97.3 °F)   SpO2: 97%        Discharge Instructions   Patient may be discharged to home after 2 hour observation.  Remind patient to return: Day 15 for treatment & labs  Discharge time 1411     Davey Melchor RN  06/04/24      Grading and Management of Cytokine Release Syndrome   CRS Grade Description of Severity Minimum Expected Intervention Instructions for Dose Modifications of    1 Symptoms are not life threatening and require symptomatic treatment only,  eg, fever, nausea, fatigue, headache, myalgia's, malaise Administer symptomatic treatment (contact provider for medications/doses). Monitor for CRS symptoms including vital signs and pulse oximetry at least Q2 hours for12 hours or until resolution, Whichever is earlier. N/A     (continued)  Grading and Management of Cytokine Release Syndrome   CRS Grade Description of Severity Minimum Expected Intervention Instructions for Dose Modifications of    2 Symptoms require and respond to moderate intervention  Oxygen requirement <40%,                      OR  Hypotension responsive to fluids or low dose of one vasopressor, OR                                                                       Grade 2 organ toxicity or grade 3 transaminitis per CTCAE criteria Administer:  Symptomatic treatment   (contact provider for medications/doses)  Supplemental oxygen when oxygen saturation is <90% on room air  Intravenous fluids or low dose vasopressor for hypotension is recommended when systolic blood pressure is <85 mmHg. (contact provider for medications/doses) Persistent tachycardia (eg >120 bpm) may also indicate the need for intervention for hypotension.   Monitor for CRS symptoms including vital signs and pulse oximetry at least Q2 hours for12 hours or until resolution to CRS grade <= 1, whichever is earlier. For subjects with extensive co-morbidities or poor performance status, manage per grade 3 CRS  guidance below If CRS occurs during  treatment, immediately interrupt the infusion and delay the next  dose until the event resolves to CRS grade <= 1 for no less than 72 hours. Permanently discontinue  if there is no improvement to CRS <= grade 1 within 7 days     3 Symptoms require and respond to aggressive intervention  Oxygen requirement >=40%, OR  Hypotension requiring high dose or multiple vasopressors, OR  Grade 3 organ toxicity or     Grade 4 transaminitis per  CTCAE criteria Admit to intensive care unit for close clinical and vital sign monitoring per institutional guidelines.   Refer to provider/protocol for medications and doses.     If CRS occurs during  treatment, immediately interrupt   and delay the next dose until event resolves to CRS grade <= 1 for no less than 72 hours.    Permanently discontinue  if there is no improvement to CRS                 <= grade 2 within 5 days or CRS <= grade 1 within 7 days.  Permanently discontinue   if CRS grade  3 occurs at the initial run in dose (i.e., at MTD1)  (Applicable only after MTD1 has been defined).     (continued)  Grading and Management of Cytokine Release Syndrome   CRS Grade Description of Severity Minimum Expected Intervention Instructions for Dose Modifications of    4 Life-threatening symptoms  Requirement for ventilator support OR  Grade 4 organ toxicity (excluding transaminitis) per CTCAE criteria Admit to intensive care unit for close clinical and vital sign monitoring per institutional guidelines.   Refer to provider/protocol for medications and doses.   If CRS occurs during  treatment, Immediately stop the infusion.  Permanently discontinue   therapy.

## 2024-06-04 NOTE — SIGNIFICANT EVENT
06/04/24 1051   Prechemo Checklist   Has the patient been in the hospital, ED, or urgent care since last date of service Yes   Chemo/Immuno Consent Completed and Signed Yes   Protocol/Indications Verified Yes   Confirmed to previous date/time of medication Yes   Compared to previous dose Yes   All medications are dated accurately Yes   Pregnancy Test Negative No   Parameters Met Yes   BSA/Weight-Height Verified Yes   Dose Calculations Verified (current, total, cumulative) Yes

## 2024-06-04 NOTE — RESEARCH NOTES
Research Note Treatment Day    Gladys Branch is here today for treatment on [id number]. Today is C18D1. Procedures completed per protocol. AE's and con-meds reviewed with patient. Patient is aware of treatment plan.    [x]   Received treatment as planned   OR  []    Treatment delayed; patient calendar updated as required   Treatment delayed because:    []   AE    []   Physician Discretion    []   Clinical Deterioration or Progression     []   Other    Education Documentation  General Medication Information, taught by Vladimir Fernandez RN at 6/4/2024  1:51 PM.  Learner: Significant Other, Patient  Readiness: Eager  Method: Explanation  Response: Verbalizes Understanding    Treatment Plan and Schedule, taught by Vladimir Fernandez RN at 6/4/2024  1:51 PM.  Learner: Significant Other, Patient  Readiness: Eager  Method: Explanation  Response: Verbalizes Understanding    Supportive Medications, taught by Vladimir Fernandez RN at 6/4/2024  1:51 PM.  Learner: Significant Other, Patient  Readiness: Eager  Method: Explanation  Response: Verbalizes Understanding    Diagnostic Studies, taught by Vladimir Fernandez RN at 6/4/2024  1:51 PM.  Learner: Significant Other, Patient  Readiness: Eager  Method: Explanation  Response: Verbalizes Understanding    Education Comments  No comments found.

## 2024-06-04 NOTE — PROGRESS NOTES
Patient ID: Gladys Branch is a 68 y.o. female.    DIAGNOSIS    Small Cell Lung Cancer    By immunostaining, the tumor cells are positive for CAM 5.2, INSM1, and TTF-1, and negative for p40 and LCA. The proliferation index by Ki-67 immunostaining is approximately 90%.  Synaptophysin, Chromogranin and INSM-1 are positive.  AE1/AE3 is negative. NEOGENOMICS, RB protein expression is lost in the tumor cells    STAGING    [id number], limited stage  Recurrence    CURRENT SITES OF DISEASE    RLL, supraclavicular    MOLECULAR GENOMICS      PRIOR THERAPY    Concurrent chemoradiation with Cisplatin/Etoposide every 3 weeks; C1D1 2/15/22, reaction to cisplatin C2D1 and did not receive D2 or D3 etoposide  Carbo/etoposide 4/5/2022 1 cycle c/b rash  PCI 5/21-6/13/22    CURRENT THERAPY    Phase 1 study [id number]  with study drug Taralatamab 1/24/23 - present.    CURRENT ONCOLOGICAL PROBLEMS      HISTORY OF PRESENT ILLNESS    Mrs. Branch is a 65 yo with PMH GERD and COPD who originally was round to have a RLL nodule measuring 11 mm in 4/28/2021 found as part of CT calcium score scan. An ensuing CT chest w/o contrast was done on 5/19/21 which revealed subsolid pulmonary nodules measuring 14 mm in the RLL. She had CT chest w/contrast on 11/29/21 which confirmed stable 14 mm GGO of the RLL and decreased RLL subpleural nodule. She met thoracic surgeon Dr. Farfan, who ordered PET/CT scan done on 12/8/21 which did not reveal any FDG-avid nodules within the lung parenchyma but R hilar nodes with max SUV 8.0. He referred her for bronch, which was done on 1/21/22 by Dr. Mcdaniels. 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 Brain MRI was negative.    She started concurrent chemoradiation with BID radiation with cis/etop 2/15/22, and unfortunately had a reaction to cisplatin on C2D1 with chest pain and hypotension. She was hospitalized with sepsis felt to be due to pneumonia. She trialed carboplatin/etoposide on 4/5/22 with a  resulting rash and opted to forego further chemotherapy as she had completed radiation. Restaging scan 11/3/22 unfortunately with progression with new R hilar lymph node, paratracheal nodes, and increase in size of R supraclavicular mass. She enrolled in [id number] and started C1D1 1/24/23. C1 complicated by anorexia and dysgeusia, and delayed C2 by a week. She has further struggled with fatigue and confusion, which may be related to mirtazapine. Dose reduced for C2 and mirtazapine stopped with improvement in symptoms.     PAST MEDICAL HISTORY    GERD  COPD    SOCIAL HISTORY    Retired - lighting . Lives at home with  and a kitten.  Tobacco: quit smoking 1999. 1 ppd x 25 yrs.  EtOH: wine 1 glass/day  Illicits: none    CURRENT MEDS    Please see med list    ALLERGIES    Codeine, Sulfa drugs, Cisplatin    FAMILY HISTORY    Paternal aunt - breast cancer dx 80s    Subjective    Today 5.21.2024. Patient in clinic with her  for C18D1 for [id number].      Continues to experience double vision only while watching TV. She double vision and blurred vision symptoms in left eye.  Did meet opthalmology and received a new Rx. Reports for for about three day during the memorial day weekend having about 3-5 formed bowel movements a day. She reports her bowel movements are back to baseline.  Intermittent symptoms of nausea with episodes of vomiting sometimes, she contributes these symptoms  that to her acid reflux.      Fatigue.         Patient denies any pain, dizziness, lightheadedness, headaches, rash/itching, chills or fever, SOB, cough, sputum, chest pain or chest tightness, painful inflammation/ulceration oral mucous membranes, nausea/vomiting, diarrhea/constipation, hematemesis /hemoptysis, hematuria/rectal bleeding, urinary symptoms, swelling extremities, weakness/fatigue, or peripheral neuropathy. ROS as above. Remainder of 10 point review of systems elicited and otherwise unremarkable.      Objective          5/7/2024    10:56 AM 5/7/2024    12:12 PM 5/17/2024     1:31 PM 5/21/2024     8:45 AM 5/21/2024    10:58 AM 5/21/2024    12:11 PM 6/4/2024     7:49 AM   Vitals   Systolic 129 108 105 109 129 108 129   Diastolic 74 71 78 71 75 71 81   Heart Rate 75 74 81 65 69 84 64   Temp 37 °C (98.6 °F) 36.5 °C (97.7 °F) 36.7 °C (98 °F) 36.5 °C (97.7 °F) 36.7 °C (98.1 °F) 36.7 °C (98.1 °F) 36.3 °C (97.3 °F)   Resp 16 18 14 16 16 16 18   Height (in)   1.524 m (5')       Weight (lb)   112.8 110.45   112.66   BMI   22.03 kg/m2 21.57 kg/m2   22 kg/m2   BSA (m2)   1.47 m2 1.46 m2   1.47 m2   Visit Report Report Report Report Report Report Report        Daily Weight  06/04/24 : 51.1 kg (112 lb 10.5 oz)  05/21/24 : 50.1 kg (110 lb 7.2 oz)  05/17/24 : 51.2 kg (112 lb 12.8 oz)  05/07/24 : 50.7 kg (111 lb 12.4 oz)  04/23/24 : 50.3 kg (110 lb 14.3 oz)        Physical Exam  Constitutional:       General: She is awake.      Appearance: Normal appearance. She is normal weight.   HENT:      Head: Normocephalic.      Mouth/Throat:      Mouth: Mucous membranes are moist.   Eyes:      Extraocular Movements: Extraocular movements intact.      Conjunctiva/sclera: Conjunctivae normal.      Pupils: Pupils are equal, round, and reactive to light.   Cardiovascular:      Rate and Rhythm: Normal rate and regular rhythm.      Heart sounds: Normal heart sounds, S1 normal and S2 normal.   Pulmonary:      Effort: Pulmonary effort is normal.      Breath sounds: Normal breath sounds.   Abdominal:      General: Abdomen is flat. Bowel sounds are normal.      Palpations: Abdomen is soft.   Musculoskeletal:      Cervical back: Normal range of motion and neck supple.   Skin:     Comments: No skin rash observed   Neurological:      Mental Status: She is alert.      Cranial Nerves: Cranial nerves 2-12 are intact.      Sensory: Sensation is intact.      Motor: Motor function is intact.      Coordination: Coordination is intact.   Psychiatric:          Attention and Perception: Attention normal.         Mood and Affect: Mood normal.         Speech: Speech normal.         Behavior: Behavior normal. Behavior is cooperative.         Cognition and Memory: Cognition normal.     Labs    Hospital Outpatient Visit on 06/04/2024   Component Date Value Ref Range Status   • WBC 06/04/2024 6.8  4.4 - 11.3 x10*3/uL Final   • nRBC 06/04/2024 0.0  0.0 - 0.0 /100 WBCs Final   • RBC 06/04/2024 3.77 (L)  4.00 - 5.20 x10*6/uL Final   • Hemoglobin 06/04/2024 11.7 (L)  12.0 - 16.0 g/dL Final   • Hematocrit 06/04/2024 34.8 (L)  36.0 - 46.0 % Final   • MCV 06/04/2024 92  80 - 100 fL Final   • MCH 06/04/2024 31.0  26.0 - 34.0 pg Final   • MCHC 06/04/2024 33.6  32.0 - 36.0 g/dL Final   • RDW 06/04/2024 13.3  11.5 - 14.5 % Final   • Platelets 06/04/2024 213  150 - 450 x10*3/uL Final   • Neutrophils % 06/04/2024 68.0  40.0 - 80.0 % Final   • Immature Granulocytes %, Automated 06/04/2024 0.4  0.0 - 0.9 % Final   • Lymphocytes % 06/04/2024 19.1  13.0 - 44.0 % Final   • Monocytes % 06/04/2024 9.0  2.0 - 10.0 % Final   • Eosinophils % 06/04/2024 2.8  0.0 - 6.0 % Final   • Basophils % 06/04/2024 0.7  0.0 - 2.0 % Final   • Neutrophils Absolute 06/04/2024 4.61  1.20 - 7.70 x10*3/uL Final   • Immature Granulocytes Absolute, Au* 06/04/2024 0.03  0.00 - 0.70 x10*3/uL Final   • Lymphocytes Absolute 06/04/2024 1.30  1.20 - 4.80 x10*3/uL Final   • Monocytes Absolute 06/04/2024 0.61  0.10 - 1.00 x10*3/uL Final   • Eosinophils Absolute 06/04/2024 0.19  0.00 - 0.70 x10*3/uL Final   • Basophils Absolute 06/04/2024 0.05  0.00 - 0.10 x10*3/uL Final   • Glucose 06/04/2024 86  74 - 99 mg/dL Final   • Sodium 06/04/2024 138  136 - 145 mmol/L Final   • Potassium 06/04/2024 4.0  3.5 - 5.3 mmol/L Final   • Chloride 06/04/2024 102  98 - 107 mmol/L Final   • Bicarbonate 06/04/2024 29  21 - 32 mmol/L Final   • Anion Gap 06/04/2024 11  10 - 20 mmol/L Final   • Urea Nitrogen 06/04/2024 18  6 - 23 mg/dL Final   •  Creatinine 06/04/2024 0.72  0.50 - 1.05 mg/dL Final   • eGFR 06/04/2024 >90  >60 mL/min/1.73m*2 Final   • Calcium 06/04/2024 9.0  8.6 - 10.6 mg/dL Final   • Albumin 06/04/2024 3.8  3.4 - 5.0 g/dL Final   • Alkaline Phosphatase 06/04/2024 47  33 - 136 U/L Final   • Total Protein 06/04/2024 6.2 (L)  6.4 - 8.2 g/dL Final   • AST 06/04/2024 20  9 - 39 U/L Final   • Bilirubin, Total 06/04/2024 0.8  0.0 - 1.2 mg/dL Final   • ALT 06/04/2024 13  7 - 45 U/L Final   • Amylase 06/04/2024 35  29 - 103 U/L Final   • aPTT 06/04/2024 31  27 - 38 seconds Final   • Bilirubin, Direct 06/04/2024 0.1  0.0 - 0.3 mg/dL Final   • Creatine Kinase 06/04/2024 56  0 - 215 U/L Final   • Cortisol 06/04/2024 11.9  2.5 - 20.0 ug/dL Final   • C-Reactive Protein 06/04/2024 <0.10  <1.00 mg/dL Final   • Estradiol 06/04/2024 <19  pg/mL Final   • Ferritin 06/04/2024 66  8 - 150 ng/mL Final   • Lipase 06/04/2024 37  9 - 82 U/L Final   • Magnesium 06/04/2024 1.86  1.60 - 2.40 mg/dL Final   • Phosphorus 06/04/2024 4.1  2.5 - 4.9 mg/dL Final   • Protime 06/04/2024 11.5  9.8 - 12.8 seconds Final   • INR 06/04/2024 1.0  0.9 - 1.1 Final   • Thyroid Stimulating Hormone 06/04/2024 9.11 (H)  0.44 - 3.98 mIU/L Final   • Thyroxine, Free 06/04/2024 0.90  0.78 - 1.48 ng/dL Final   • Color, Urine 06/04/2024 Light-Yellow  Light-Yellow, Yellow, Dark-Yellow Final   • Appearance, Urine 06/04/2024 Clear  Clear Final   • Specific Gravity, Urine 06/04/2024 1.019  1.005 - 1.035 Final   • pH, Urine 06/04/2024 7.0  5.0, 5.5, 6.0, 6.5, 7.0, 7.5, 8.0 Final   • Protein, Urine 06/04/2024 NEGATIVE  NEGATIVE, 10 (TRACE), 20 (TRACE) mg/dL Final   • Glucose, Urine 06/04/2024 Normal  Normal mg/dL Final   • Blood, Urine 06/04/2024 NEGATIVE  NEGATIVE Final   • Ketones, Urine 06/04/2024 NEGATIVE  NEGATIVE mg/dL Final   • Bilirubin, Urine 06/04/2024 NEGATIVE  NEGATIVE Final   • Urobilinogen, Urine 06/04/2024 Normal  Normal mg/dL Final   • Nitrite, Urine 06/04/2024 NEGATIVE  NEGATIVE Final    • Leukocyte Esterase, Urine 06/04/2024 NEGATIVE  NEGATIVE Final   Hospital Outpatient Visit on 05/21/2024   Component Date Value Ref Range Status   • WBC 05/21/2024 7.7  4.4 - 11.3 x10*3/uL Final   • nRBC 05/21/2024 0.0  0.0 - 0.0 /100 WBCs Final   • RBC 05/21/2024 3.61 (L)  4.00 - 5.20 x10*6/uL Final   • Hemoglobin 05/21/2024 11.2 (L)  12.0 - 16.0 g/dL Final   • Hematocrit 05/21/2024 32.8 (L)  36.0 - 46.0 % Final   • MCV 05/21/2024 91  80 - 100 fL Final   • MCH 05/21/2024 31.0  26.0 - 34.0 pg Final   • MCHC 05/21/2024 34.1  32.0 - 36.0 g/dL Final   • RDW 05/21/2024 13.2  11.5 - 14.5 % Final   • Platelets 05/21/2024 228  150 - 450 x10*3/uL Final   • Neutrophils % 05/21/2024 75.5  40.0 - 80.0 % Final   • Immature Granulocytes %, Automated 05/21/2024 0.3  0.0 - 0.9 % Final   • Lymphocytes % 05/21/2024 13.6  13.0 - 44.0 % Final   • Monocytes % 05/21/2024 8.6  2.0 - 10.0 % Final   • Eosinophils % 05/21/2024 1.6  0.0 - 6.0 % Final   • Basophils % 05/21/2024 0.4  0.0 - 2.0 % Final   • Neutrophils Absolute 05/21/2024 5.80  1.20 - 7.70 x10*3/uL Final   • Immature Granulocytes Absolute, Au* 05/21/2024 0.02  0.00 - 0.70 x10*3/uL Final   • Lymphocytes Absolute 05/21/2024 1.04 (L)  1.20 - 4.80 x10*3/uL Final   • Monocytes Absolute 05/21/2024 0.66  0.10 - 1.00 x10*3/uL Final   • Eosinophils Absolute 05/21/2024 0.12  0.00 - 0.70 x10*3/uL Final   • Basophils Absolute 05/21/2024 0.03  0.00 - 0.10 x10*3/uL Final   • Glucose 05/21/2024 106 (H)  74 - 99 mg/dL Final   • Sodium 05/21/2024 139  136 - 145 mmol/L Final   • Potassium 05/21/2024 3.7  3.5 - 5.3 mmol/L Final   • Chloride 05/21/2024 104  98 - 107 mmol/L Final   • Bicarbonate 05/21/2024 27  21 - 32 mmol/L Final   • Anion Gap 05/21/2024 12  10 - 20 mmol/L Final   • Urea Nitrogen 05/21/2024 24 (H)  6 - 23 mg/dL Final   • Creatinine 05/21/2024 0.74  0.50 - 1.05 mg/dL Final   • eGFR 05/21/2024 88  >60 mL/min/1.73m*2 Final   • Calcium 05/21/2024 8.7  8.6 - 10.6 mg/dL Final   •  Albumin 05/21/2024 3.4  3.4 - 5.0 g/dL Final   • Alkaline Phosphatase 05/21/2024 44  33 - 136 U/L Final   • Total Protein 05/21/2024 5.6 (L)  6.4 - 8.2 g/dL Final   • AST 05/21/2024 17  9 - 39 U/L Final   • Bilirubin, Total 05/21/2024 0.7  0.0 - 1.2 mg/dL Final   • ALT 05/21/2024 10  7 - 45 U/L Final   • aPTT 05/21/2024 31  27 - 38 seconds Final   • Bilirubin, Direct 05/21/2024 0.1  0.0 - 0.3 mg/dL Final   • Creatine Kinase 05/21/2024 44  0 - 215 U/L Final   • C-Reactive Protein 05/21/2024 0.27  <1.00 mg/dL Final   • Ferritin 05/21/2024 76  8 - 150 ng/mL Final   • Magnesium 05/21/2024 1.86  1.60 - 2.40 mg/dL Final   • Phosphorus 05/21/2024 4.1  2.5 - 4.9 mg/dL Final   • Protime 05/21/2024 11.9  9.8 - 12.8 seconds Final   • INR 05/21/2024 1.1  0.9 - 1.1 Final   Hospital Outpatient Visit on 05/07/2024   Component Date Value Ref Range Status   • Lipase 05/07/2024 24  9 - 82 U/L Final   • Color, Urine 05/07/2024 Light-Yellow  Light-Yellow, Yellow, Dark-Yellow Final   • Appearance, Urine 05/07/2024 Clear  Clear Final   • Specific Gravity, Urine 05/07/2024 1.012  1.005 - 1.035 Final   • pH, Urine 05/07/2024 6.5  5.0, 5.5, 6.0, 6.5, 7.0, 7.5, 8.0 Final   • Protein, Urine 05/07/2024 NEGATIVE  NEGATIVE, 10 (TRACE), 20 (TRACE) mg/dL Final   • Glucose, Urine 05/07/2024 Normal  Normal mg/dL Final   • Blood, Urine 05/07/2024 NEGATIVE  NEGATIVE Final   • Ketones, Urine 05/07/2024 NEGATIVE  NEGATIVE mg/dL Final   • Bilirubin, Urine 05/07/2024 NEGATIVE  NEGATIVE Final   • Urobilinogen, Urine 05/07/2024 Normal  Normal mg/dL Final   • Nitrite, Urine 05/07/2024 NEGATIVE  NEGATIVE Final   • Leukocyte Esterase, Urine 05/07/2024 NEGATIVE  NEGATIVE Final   • Thyroxine, Free 05/07/2024 0.96  0.78 - 1.48 ng/dL Final   • Thyroid Stimulating Hormone 05/07/2024 3.64  0.44 - 3.98 mIU/L Final   • Protime 05/07/2024 11.2  9.8 - 12.8 seconds Final   • INR 05/07/2024 1.0  0.9 - 1.1 Final   • Magnesium 05/07/2024 1.90  1.60 - 2.40 mg/dL Final   •  Phosphorus 05/07/2024 3.9  2.5 - 4.9 mg/dL Final   • Follicle Stimulating Hormone 05/07/2024 45.7  IU/L Final   • Luteinizing Hormone 05/07/2024 18.3  IU/L Final   • Ferritin 05/07/2024 70  8 - 150 ng/mL Final   • Estradiol 05/07/2024 <19  pg/mL Final   • C-Reactive Protein 05/07/2024 0.47  <1.00 mg/dL Final   • Cortisol 05/07/2024 6.8  2.5 - 20.0 ug/dL Final   • Creatine Kinase 05/07/2024 40  0 - 215 U/L Final   • Bilirubin, Direct 05/07/2024 0.1  0.0 - 0.3 mg/dL Final   • aPTT 05/07/2024 32  27 - 38 seconds Final   • Amylase 05/07/2024 31  29 - 103 U/L Final   • Adrenocorticotropic Hormone (ACTH) 05/07/2024 17.2  7.2 - 63.3 pg/mL Final   • Glucose 05/07/2024 91  74 - 99 mg/dL Final   • Sodium 05/07/2024 136  136 - 145 mmol/L Final   • Potassium 05/07/2024 3.8  3.5 - 5.3 mmol/L Final   • Chloride 05/07/2024 101  98 - 107 mmol/L Final   • Bicarbonate 05/07/2024 28  21 - 32 mmol/L Final   • Anion Gap 05/07/2024 11  10 - 20 mmol/L Final   • Urea Nitrogen 05/07/2024 22  6 - 23 mg/dL Final   • Creatinine 05/07/2024 0.70  0.50 - 1.05 mg/dL Final   • eGFR 05/07/2024 >90  >60 mL/min/1.73m*2 Final   • Calcium 05/07/2024 8.6  8.6 - 10.6 mg/dL Final   • Albumin 05/07/2024 3.5  3.4 - 5.0 g/dL Final   • Alkaline Phosphatase 05/07/2024 41  33 - 136 U/L Final   • Total Protein 05/07/2024 5.8 (L)  6.4 - 8.2 g/dL Final   • AST 05/07/2024 16  9 - 39 U/L Final   • Bilirubin, Total 05/07/2024 0.6  0.0 - 1.2 mg/dL Final   • ALT 05/07/2024 13  7 - 45 U/L Final   • WBC 05/07/2024 4.9  4.4 - 11.3 x10*3/uL Final   • nRBC 05/07/2024 0.0  0.0 - 0.0 /100 WBCs Final   • RBC 05/07/2024 3.74 (L)  4.00 - 5.20 x10*6/uL Final   • Hemoglobin 05/07/2024 11.6 (L)  12.0 - 16.0 g/dL Final   • Hematocrit 05/07/2024 34.5 (L)  36.0 - 46.0 % Final   • MCV 05/07/2024 92  80 - 100 fL Final   • MCH 05/07/2024 31.0  26.0 - 34.0 pg Final   • MCHC 05/07/2024 33.6  32.0 - 36.0 g/dL Final   • RDW 05/07/2024 13.3  11.5 - 14.5 % Final   • Platelets 05/07/2024 193   150 - 450 x10*3/uL Final   • Neutrophils % 05/07/2024 62.8  40.0 - 80.0 % Final   • Immature Granulocytes %, Automated 05/07/2024 0.4  0.0 - 0.9 % Final   • Lymphocytes % 05/07/2024 22.3  13.0 - 44.0 % Final   • Monocytes % 05/07/2024 10.6  2.0 - 10.0 % Final   • Eosinophils % 05/07/2024 3.5  0.0 - 6.0 % Final   • Basophils % 05/07/2024 0.4  0.0 - 2.0 % Final   • Neutrophils Absolute 05/07/2024 3.07  1.20 - 7.70 x10*3/uL Final   • Immature Granulocytes Absolute, Au* 05/07/2024 0.02  0.00 - 0.70 x10*3/uL Final   • Lymphocytes Absolute 05/07/2024 1.09 (L)  1.20 - 4.80 x10*3/uL Final   • Monocytes Absolute 05/07/2024 0.52  0.10 - 1.00 x10*3/uL Final   • Eosinophils Absolute 05/07/2024 0.17  0.00 - 0.70 x10*3/uL Final   • Basophils Absolute 05/07/2024 0.02  0.00 - 0.10 x10*3/uL Final   • Extra Tube 05/07/2024 Hold for add-ons.   Final   • Extra Tube 05/07/2024 Hold for add-ons.   Final   • Extra Tube 05/07/2024 Hold for add-ons.   Final   • Extra Tube 05/07/2024 Hold for add-ons.   Final   • Vitamin B12 05/07/2024 544  211 - 911 pg/mL Final   • Cholesterol 05/07/2024 243 (H)  0 - 199 mg/dL Final   • HDL-Cholesterol 05/07/2024 63.9  mg/dL Final   • Cholesterol/HDL Ratio 05/07/2024 3.8   Final   • LDL Calculated 05/07/2024 135 (H)  <=99 mg/dL Final   • VLDL 05/07/2024 44 (H)  0 - 40 mg/dL Final   • Triglycerides 05/07/2024 220 (H)  0 - 149 mg/dL Final   • Non HDL Cholesterol 05/07/2024 179 (H)  0 - 149 mg/dL Final   • LDL, Direct 05/07/2024 157 (H)  0 - 129 mg/dL Final   • Hemoglobin A1C 05/07/2024 5.5  see below % Final   • Estimated Average Glucose 05/07/2024 111  Not Established mg/dL Final   • Vitamin D, 25-Hydroxy, Total 05/07/2024 46  30 - 100 ng/mL Final                 Performance Status:    ECOG 1: Restricted in physically strenuous activity but ambulatory and able to carry out work of a light or sedentary nature, e.g., light house work, office work.         Assessment/Plan      Mrs. Branch is a 69 yo with COPD  and GERD who presented with newly diagnosed SCLC completed BID radiation planned concurrently with cis/etoposide but had a reaction C2D1 to cisplatin. Completed 1 cycle carbo/etop D1 4/5/22 also complicated by facial flushing and erythematous rash and stopped further treatment. Now s/p PCI in 6/2022. Most recent CT concerning for disease progression. Referred for clinical trial AMGN [id number], C1D1 1/24/23. Treatment has been complicated by orthostatic hypotension, worsening short-term memory, increased lethargy, weight loss, and poor appetite. Delayed C2 by 1 week and dose-reduced. Confusion has resolved during C3 after stopping mirtazapine and dose reduction.     # SCLC  - limited stage, brain MRI negative  - previously discussed concurrent chemoradiation, with cisplatin/etoposide with side effects including but not limited to allergic reaction, fatigue, risk of infection, tinnitus, neuropathy, injury to liver/kidney, risk of anemia/thrombocytopenia and was consented  - she was also interested in scalp cooling, which unfortunately she is not a candidate for d/t SCLC  - started concurrent chemoradiation BID with Q3week cis/etop on 2/15 at [facility]  -unfortunately had reaction to cisplatin on C2D1 resulting in hospitalization and also felt to be septic due to pneumonia  - discussed that we typically do 3-4 cycles chemotherapy, and alternative regimens include carboplatin/etoposide vs CAV. Given reaction to cisplatin, concern for possible reaction to carboplatin as well, although unknown rates of cross reaction. Alternatively, they also asked about discontinuation of chemotherapy, since she completed radiation. She ultimately decided to trial carbo/etoposide. She is aware of the heightened risk of allergic reaction, as well as other side effects including but not limited to fatigue, risk of infection, neuropathy, injury to liver/kidney, risk of anemia/thrombocytopenia. consented 3/28/22  -s/p 1 cycle Carbo/Etop D1 4/5/22,  developed facial flushing and erythematous rash on arms and chest s/p treatment, resolved with benadryl  -opted to forego further chemotherapy and will continue on maintenance  - s/p PCI 6/2022  - CT C/A/P and brain MRI 5/2022 and most recently 8/2022 with good response and no disease  -  MRI brain 11/7 without evidence of metastatic disease  - CT C/A/P 11/3 with multiple new enlarged LN concerning for disease progression - discussed with Dr. Valdivia not able to repeat radiation.  - referred to phase 1 clinical trial and consideration for [id number] or [id number]  - 1.5.2023 : Patient signed consent to take part on phase 1 study [id number]. Look clinically good to take part in study. Will start on study ASAP if eligible.   - 1.24.2023: Seen today and ready to start trial on [id number].  - 1.31.2023: Seen today, ready for C1D8 AMGN 1518   - 2.7.2023: Seen today for C1D15 [id number] - continue with trial   - 2.14.2023: Seen in follow-up on [id number] with overall worsening of general health  - 2.21.2023: Seen today for C2D1 AMGN 1518 with continued weight loss although perhaps starting to plateau, more energy since being off treatment, still poor appetite. will delay by 1 week, started dexamethasone 2 mg daily, and consider dose reduction.  - 2.28.2023: Seen today for C2D1 AMGN 1518 after delaying for 1 week. Energy level and appetite are improved, although still losing a little weight. Confusion is worse today, possibly due to mirtazapine vs study-related. Will dose reduce today.  - 3.7.2023: Seen today C2D8 AMGN 1518 after dose-reduction last week. Confusion is mildly improved, energy level and appetite are stable. Will add marinol for appetite.   - 3.14.2023: Seen today C2D15 AMGN 1518. Overall improved: confusion continues to improve, energy level and appetite are improved. Weight is improved. Will continue dexamethasone and marinol.  - 3.15.2023: C2D16 No CRS symptoms observed after last treatment Overall Symptoms are  improving and she is tolerating treatment.  -3.16.2023: C2D17 No CRS symptoms observed after last treatment Overall Symptoms are improving and she is tolerating treatment.  - 3.28.2023 : Most recent imaging suggest patient benefiting from current treatment. Ongoing symptom  possible common cold/season allergies. Since the patient looks clinically good we will proceed with C3D1 today. Educated patient to call the office ASAP if symptoms worsen.   - 4.11.2023: C3D15 Feeling well and overall improved with fatigue, confusion, anorexia. Proceed at current dosing and weaning steroids as below  -4.25.2023: C4D1 Pt is feeling well, no complaints of fatigue, confusion, memory loss. Has gained weight. Energy levels are good. Proceed with the current dosing. Pt is no longer on steroids.   -5.9.2023: C4D15. Pt is tolerating treatment well with no new complains. Continues to have good energy level endorses poor appetite with out any weight loss. Suggested to start taking the dronabinol.  Will proceed with treatment today.  -5.23.2023: C5D1. Pt is tolerating treatment well. Energy levels are good, is having some weight loss and constipation. Started back on dex for appetite. Pt will let us know if she remains constipated over the next several days.  Personally reviewed scans with stable disease. Will proceed with treatment today.   -6.6.2023: C5D15. Continues to tolerate treatment well. Since we restarted her on Dexamethasone she reports her energy levels and appetite has improved. Will continue on low dose Dex. With no new symptoms, we will proceed with treatment today.   -6.20.2023: C6D1. Doing well on dexamethasone 1 mg daily. Will proceed with treatment.  -7.5.2023: C6D15. We will proceed with treatment since the patient is tolerating treatment with no significant AE's and also give 1/2 liter of IV fluids.   - 7.18.2023: C7D1. Doing well, will proceed with treatment. She is out of dexamethasone, and will monitor off steroids.    -8.1.2023: C7D15. Continues to tolerate treatment. Will proceed with C7D15 today. RTC per protocol.  - 8.29.2023: C8D1. C8 delayed due to hospitalization for diverticulitis. Has completed antibiotics and feeling well for treatment.  - 9.12.2023: C8D15. Most recent imgaing suggest the patient continues to benefit from current treatment. The imaging also suggest improvement of previously visualized segmental colitis associated with diverticulosis affecting the sigmoid colon with minimal residual pericolonic fat standing and hyperemia of the sigmoid colon. Imaging also suggest resolving inflammatory process without evidence of new or worsening complications. We will proceed with C8D15 today since the patient is also clinically feeling good. RTC per protocol.  -9.26.2023: C9D1. Patient looks clinically good. With no JACQUELYN's we will proceed with treatment C9D1 today.  -10.24.23: C10D1. Pt feels well, no acute events, no TRAEs, continue treatment today as scheduled.  -11.7.23: C10D15. Pt feels well, no TRAEs, continue treatment as scheduled.  - 11.21.23: C11D1. Continues to feel well, will continue treatment today.  - 12.19.23: C12D1. Personally reviewed most recent scan without evidence of progression. Continues to feel well and will continue with treatment today.  - 01.02.24: C12D15. Continues to tolerate treatment well. No new JACQUELYN's will proceed with treatment. RTC per protocol.  - 01.16.24: C13D1. No new JACQUELYN's proceed with treatment. RTC per protocol.   - 01.30.24: C13D15. Continues to feel well. Proceed with treatment.  - 2.13.24: C14D1. Personally reviewed most recent scan without evidence of progression. Continue with treatment today. RTC per protocol.  - 2.27.24: C14D15. Continues to feel good and tolerate treatment without any JACQUELYN's. We will proceed with treatment today. RTC per protocol.  - 3.12.24: C15D1. Continues to feel well and tolerate treatment without new JACQUELYN's. Will continue on treatment.  -  3.26.24: C15D15. She feels good today. Will give her IV fluids. Will continue on treatment. RTC in per protocol with scan prior to next visit.  - 4.23.24: C16D15. Feeling well and will continue per protocol.  - 5.7.24: C17D1. Feels well overall and will continue on trial.   - 5.21.24: C18D1. Having double vision and will obtain brain MRI. She will see eye doctor as well. Continue on trial.  - 6.4.2024: C18D15. Continues to have double vision was evaluated by opthalmology and received new prescription for her glasses. Her MRI was  negative.    # Confusion - resolved  - significantly worsened after taking double dose of mirtazapine accidentally, although has been generally down-trending since starting study (which is also when she started taking mirtazapine) and now resolved  - brain MRI without progression of cancer  - will continue off mirtazapine    # Unintentional weight loss - stable  # Anorexia- Improving  # Dysgeusia- improving  - all improved on steroids. unclear if this is from cancer or side effect of study drug  - stopped mirtazapine due to concerns for confusion   - weaned off dexamethasone and started marinol, but kept forgetting to take it  - dexamethasone trial started again on 5.23.2023. Continued on 1 mg daily - will trial off after 7.18.23.     # Fatigue - Resolved  - back to normal pretty much, weaning steroids as above    # frequent PVCs - asymptomatic  - saw onco-cardiology and completed 24-hr Holter monitor  - recommended decreasing caffeine and sudafed intake  - continue to monitor    # Forgetfulness - improved - however noted decline on AMGN study- unclear etiology.    - started after PCI, on memantine daily and has since stopped  - offered neurocognitive evaluation previously and she will think about this and readdress next visit  -  notes some decline in last 3 weeks- especially short term memory loss.    - Improved    # Neuropathy - resolved  - mild peripheral neuropathy with numbness  of the toes - likely due to cisplatin  - will continue to monitor closely  - not endorsing any neuropathy at this visit     # Rash - resolved  Waxing and waning rash throughout her body. ?improving. Unclear etiology, patient and  feel timing coincided with starting BMX.   - she will hold off on further BMX  - thought to be due to sulfa allergy, possibly due to platinum agents   - continue to monitor    #odynophagia - resolved  -rad onc aware  -prescribed BMX solution  -will continue to monitor    #constipation-improving  -occasional. Prescribed Senna S  -will monitor    CASEY Stone-CNP

## 2024-06-06 LAB — ACTH PLAS-MCNC: 38 PG/ML (ref 7.2–63.3)

## 2024-06-10 ENCOUNTER — HOSPITAL ENCOUNTER (OUTPATIENT)
Dept: GASTROENTEROLOGY | Facility: HOSPITAL | Age: 69
Discharge: HOME | End: 2024-06-10
Payer: MEDICARE

## 2024-06-10 ENCOUNTER — ANESTHESIA (OUTPATIENT)
Dept: GASTROENTEROLOGY | Facility: HOSPITAL | Age: 69
End: 2024-06-10
Payer: MEDICARE

## 2024-06-10 ENCOUNTER — ANESTHESIA EVENT (OUTPATIENT)
Dept: GASTROENTEROLOGY | Facility: HOSPITAL | Age: 69
End: 2024-06-10
Payer: MEDICARE

## 2024-06-10 VITALS
DIASTOLIC BLOOD PRESSURE: 68 MMHG | WEIGHT: 112 LBS | BODY MASS INDEX: 21.99 KG/M2 | TEMPERATURE: 97.9 F | RESPIRATION RATE: 16 BRPM | OXYGEN SATURATION: 97 % | HEIGHT: 60 IN | SYSTOLIC BLOOD PRESSURE: 95 MMHG | HEART RATE: 71 BPM

## 2024-06-10 DIAGNOSIS — T18.9XXD BEZOAR, SUBSEQUENT ENCOUNTER: ICD-10-CM

## 2024-06-10 DIAGNOSIS — R10.84 GENERALIZED ABDOMINAL PAIN: ICD-10-CM

## 2024-06-10 DIAGNOSIS — K57.92 DIVERTICULITIS OF INTESTINE WITHOUT PERFORATION OR ABSCESS WITHOUT BLEEDING, UNSPECIFIED PART OF INTESTINAL TRACT: Primary | ICD-10-CM

## 2024-06-10 DIAGNOSIS — R11.10 VOMITING, UNSPECIFIED VOMITING TYPE, UNSPECIFIED WHETHER NAUSEA PRESENT: ICD-10-CM
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
## 2024-06-10 PROCEDURE — 3700000002 HC GENERAL ANESTHESIA TIME - EACH INCREMENTAL 1 MINUTE

## 2024-06-10 PROCEDURE — 2500000004 HC RX 250 GENERAL PHARMACY W/ HCPCS (ALT 636 FOR OP/ED): Performed by: NURSE ANESTHETIST, CERTIFIED REGISTERED

## 2024-06-10 PROCEDURE — 7100000009 HC PHASE TWO TIME - INITIAL BASE CHARGE

## 2024-06-10 PROCEDURE — 2500000004 HC RX 250 GENERAL PHARMACY W/ HCPCS (ALT 636 FOR OP/ED): Performed by: SURGERY

## 2024-06-10 PROCEDURE — 3700000001 HC GENERAL ANESTHESIA TIME - INITIAL BASE CHARGE

## 2024-06-10 PROCEDURE — 43235 EGD DIAGNOSTIC BRUSH WASH: CPT | Performed by: SURGERY

## 2024-06-10 PROCEDURE — 7100000010 HC PHASE TWO TIME - EACH INCREMENTAL 1 MINUTE

## 2024-06-10 RX ORDER — PROPOFOL 10 MG/ML
INJECTION, EMULSION INTRAVENOUS AS NEEDED
Status: DISCONTINUED | OUTPATIENT
Start: 2024-06-10 | End: 2024-06-10

## 2024-06-10 RX ORDER — SODIUM CHLORIDE 9 MG/ML
20 INJECTION, SOLUTION INTRAVENOUS CONTINUOUS
Status: DISCONTINUED | OUTPATIENT
Start: 2024-06-10 | End: 2024-06-11 | Stop reason: HOSPADM

## 2024-06-10 RX ADMIN — SODIUM CHLORIDE 20 ML/HR: 9 INJECTION, SOLUTION INTRAVENOUS at 08:57

## 2024-06-10 RX ADMIN — PROPOFOL 50 MG: 10 INJECTION, EMULSION INTRAVENOUS at 09:24

## 2024-06-10 SDOH — HEALTH STABILITY: MENTAL HEALTH: CURRENT SMOKER: 0

## 2024-06-10 ASSESSMENT — PAIN - FUNCTIONAL ASSESSMENT
PAIN_FUNCTIONAL_ASSESSMENT: 0-10

## 2024-06-10 ASSESSMENT — PAIN SCALES - GENERAL
PAINLEVEL_OUTOF10: 3
PAINLEVEL_OUTOF10: 0 - NO PAIN
PAINLEVEL_OUTOF10: 0 - NO PAIN
PAIN_LEVEL: 0
PAINLEVEL_OUTOF10: 0 - NO PAIN

## 2024-06-10 ASSESSMENT — COLUMBIA-SUICIDE SEVERITY RATING SCALE - C-SSRS
1. IN THE PAST MONTH, HAVE YOU WISHED YOU WERE DEAD OR WISHED YOU COULD GO TO SLEEP AND NOT WAKE UP?: NO
2. HAVE YOU ACTUALLY HAD ANY THOUGHTS OF KILLING YOURSELF?: NO
6. HAVE YOU EVER DONE ANYTHING, STARTED TO DO ANYTHING, OR PREPARED TO DO ANYTHING TO END YOUR LIFE?: NO

## 2024-06-10 NOTE — ANESTHESIA PREPROCEDURE EVALUATION
Patient: Gladys Branch    Procedure Information       Date/Time: 06/10/24 0930    Scheduled providers: Ashwin Geller MD    Procedures:       COLONOSCOPY      EGD    Location: [facility]            Relevant Problems   Cardiac   (+) Abnormal EKG   (+) Frequent unifocal PVCs   (+) Hyperlipidemia      Pulmonary   (+) COPD (chronic obstructive pulmonary disease) (Multi)   (+) Small cell lung cancer (Multi)      Neuro   (+) Peripheral neuropathy due to and not concurrent with chemotherapy (Multi)      GI   (+) Chronic GERD      Hematology   (+) Anemia, chronic disease       Clinical information reviewed:   Tobacco  Allergies  Meds   Med Hx  Surg Hx  OB Status  Fam Hx  Soc   Hx        NPO Detail:  NPO/Void Status  Date of Last Liquid: 06/09/24  Time of Last Liquid: 2130  Date of Last Solid: 06/08/24  Last Intake Type: Clear fluids         Physical Exam    Airway  Mallampati: II  TM distance: >3 FB  Neck ROM: full     Cardiovascular - normal exam     Dental - normal exam     Pulmonary - normal exam     Abdominal - normal exam             Anesthesia Plan    History of general anesthesia?: yes  History of complications of general anesthesia?: no    ASA 3     MAC     The patient is not a current smoker.  Patient was previously instructed to abstain from smoking on day of procedure.  Patient did not smoke on day of procedure.    intravenous induction   Anesthetic plan and risks discussed with patient.

## 2024-06-10 NOTE — ANESTHESIA POSTPROCEDURE EVALUATION
Patient: Gladys Branch    Procedure Summary       Date: 06/10/24 Room / Location: [facility]    Anesthesia Start: 0924 Anesthesia Stop: 0937    Procedures:       COLONOSCOPY      EGD Diagnosis:       Diverticulitis of intestine without perforation or abscess without bleeding, unspecified part of intestinal tract      Generalized abdominal pain      Vomiting, unspecified vomiting type, unspecified whether nausea present      Bezoar, subsequent encounter      Diverticulitis of intestine without perforation or abscess without bleeding, unspecified part of intestinal tract    Scheduled Providers: Ashwin Geller MD Responsible Provider: CASEY Bolton-CRNA    Anesthesia Type: MAC ASA Status: 3            Anesthesia Type: MAC    Vitals Value Taken Time   BP 95/68 06/10/24 0955   Temp 36.6 °C (97.9 °F) 06/10/24 0955   Pulse 71 06/10/24 0955   Resp 16 06/10/24 0955   SpO2 97 % 06/10/24 0955       Anesthesia Post Evaluation    Patient location during evaluation: PACU  Patient participation: complete - patient participated  Level of consciousness: awake  Pain score: 0  Pain management: adequate  Airway patency: patent  Cardiovascular status: acceptable  Respiratory status: acceptable  Hydration status: acceptable  Postoperative Nausea and Vomiting: none        No notable events documented.

## 2024-06-17 RX ORDER — EPINEPHRINE 1 MG/ML
0.3 INJECTION INTRAMUSCULAR; INTRAVENOUS; SUBCUTANEOUS EVERY 5 MIN PRN
Status: CANCELLED | OUTPATIENT
Start: 2024-06-18

## 2024-06-17 RX ORDER — DIPHENHYDRAMINE HYDROCHLORIDE 50 MG/ML
50 INJECTION INTRAMUSCULAR; INTRAVENOUS AS NEEDED
Status: CANCELLED | OUTPATIENT
Start: 2024-06-18

## 2024-06-17 RX ORDER — ALBUTEROL SULFATE 0.83 MG/ML
3 SOLUTION RESPIRATORY (INHALATION) AS NEEDED
Status: CANCELLED | OUTPATIENT
Start: 2024-06-18

## 2024-06-17 RX ORDER — FAMOTIDINE 10 MG/ML
20 INJECTION INTRAVENOUS ONCE AS NEEDED
Status: CANCELLED | OUTPATIENT
Start: 2024-06-18

## 2024-06-18 ENCOUNTER — OFFICE VISIT (OUTPATIENT)
Dept: HEMATOLOGY/ONCOLOGY | Facility: HOSPITAL | Age: 69
End: 2024-06-18
Payer: MEDICARE

## 2024-06-18 ENCOUNTER — HOSPITAL ENCOUNTER (OUTPATIENT)
Dept: RESEARCH | Facility: HOSPITAL | Age: 69
Discharge: HOME | End: 2024-06-18
Payer: MEDICARE

## 2024-06-18 ENCOUNTER — EDUCATION (OUTPATIENT)
Dept: HEMATOLOGY/ONCOLOGY | Facility: HOSPITAL | Age: 69
End: 2024-06-18
Payer: MEDICARE

## 2024-06-18 VITALS
OXYGEN SATURATION: 96 % | TEMPERATURE: 97.6 F | HEART RATE: 76 BPM | SYSTOLIC BLOOD PRESSURE: 102 MMHG | DIASTOLIC BLOOD PRESSURE: 71 MMHG | WEIGHT: 111.77 LBS | BODY MASS INDEX: 21.83 KG/M2 | RESPIRATION RATE: 18 BRPM

## 2024-06-18 DIAGNOSIS — C34.90 MALIGNANT NEOPLASM OF LUNG, UNSPECIFIED LATERALITY, UNSPECIFIED PART OF LUNG (MULTI): ICD-10-CM

## 2024-06-18 DIAGNOSIS — R79.89 ELEVATED TSH: ICD-10-CM

## 2024-06-18 DIAGNOSIS — C34.90 SMALL CELL LUNG CANCER (MULTI): Primary | ICD-10-CM

## 2024-06-18 DIAGNOSIS — Z00.6 CLINICAL TRIAL PARTICIPANT: ICD-10-CM

## 2024-06-18 DIAGNOSIS — C34.92 SMALL CELL CARCINOMA OF LUNG, LEFT (MULTI): Primary | ICD-10-CM

## 2024-06-18 DIAGNOSIS — D63.8 ANEMIA, CHRONIC DISEASE: ICD-10-CM

## 2024-06-18 DIAGNOSIS — E78.2 MIXED HYPERLIPIDEMIA: ICD-10-CM

## 2024-06-18 DIAGNOSIS — Z00.6 EXAM FOR CLINICAL RESEARCH: Primary | ICD-10-CM

## 2024-06-18 DIAGNOSIS — R73.01 IMPAIRED FASTING BLOOD SUGAR: ICD-10-CM

## 2024-06-18 LAB
ALBUMIN SERPL BCP-MCNC: 3.4 G/DL (ref 3.4–5)
ALP SERPL-CCNC: 45 U/L (ref 33–136)
ALT SERPL W P-5'-P-CCNC: 12 U/L (ref 7–45)
ANION GAP SERPL CALC-SCNC: 14 MMOL/L (ref 10–20)
APTT PPP: 31 SECONDS (ref 27–38)
AST SERPL W P-5'-P-CCNC: 15 U/L (ref 9–39)
BASOPHILS # BLD AUTO: 0.03 X10*3/UL (ref 0–0.1)
BASOPHILS NFR BLD AUTO: 0.5 %
BILIRUB DIRECT SERPL-MCNC: 0.1 MG/DL (ref 0–0.3)
BILIRUB SERPL-MCNC: 0.6 MG/DL (ref 0–1.2)
BUN SERPL-MCNC: 22 MG/DL (ref 6–23)
CALCIUM SERPL-MCNC: 8.7 MG/DL (ref 8.6–10.6)
CHLORIDE SERPL-SCNC: 105 MMOL/L (ref 98–107)
CK SERPL-CCNC: 48 U/L (ref 0–215)
CO2 SERPL-SCNC: 26 MMOL/L (ref 21–32)
CREAT SERPL-MCNC: 0.78 MG/DL (ref 0.5–1.05)
CRP SERPL-MCNC: 0.51 MG/DL
EGFRCR SERPLBLD CKD-EPI 2021: 83 ML/MIN/1.73M*2
EOSINOPHIL # BLD AUTO: 0.19 X10*3/UL (ref 0–0.7)
EOSINOPHIL NFR BLD AUTO: 3.2 %
ERYTHROCYTE [DISTWIDTH] IN BLOOD BY AUTOMATED COUNT: 13.1 % (ref 11.5–14.5)
ERYTHROCYTE [DISTWIDTH] IN BLOOD BY AUTOMATED COUNT: 13.1 % (ref 11.5–14.5)
FERRITIN SERPL-MCNC: 68 NG/ML (ref 8–150)
GLUCOSE SERPL-MCNC: 93 MG/DL (ref 74–99)
HCT VFR BLD AUTO: 33.9 % (ref 36–46)
HCT VFR BLD AUTO: 33.9 % (ref 36–46)
HGB BLD-MCNC: 11.4 G/DL (ref 12–16)
HGB BLD-MCNC: 11.4 G/DL (ref 12–16)
IMM GRANULOCYTES # BLD AUTO: 0.03 X10*3/UL (ref 0–0.7)
IMM GRANULOCYTES NFR BLD AUTO: 0.5 % (ref 0–0.9)
INR PPP: 1 (ref 0.9–1.1)
LYMPHOCYTES # BLD AUTO: 1.03 X10*3/UL (ref 1.2–4.8)
LYMPHOCYTES NFR BLD AUTO: 17.1 %
MAGNESIUM SERPL-MCNC: 2.05 MG/DL (ref 1.6–2.4)
MCH RBC QN AUTO: 31.1 PG (ref 26–34)
MCHC RBC AUTO-ENTMCNC: 33.6 G/DL (ref 32–36)
MCHC RBC AUTO-ENTMCNC: 33.6 G/DL (ref 32–36)
MCV RBC AUTO: 92 FL (ref 80–100)
MCV RBC AUTO: 92 FL (ref 80–100)
MONOCYTES # BLD AUTO: 0.52 X10*3/UL (ref 0.1–1)
MONOCYTES NFR BLD AUTO: 8.7 %
NEUTROPHILS # BLD AUTO: 4.21 X10*3/UL (ref 1.2–7.7)
NEUTROPHILS NFR BLD AUTO: 70 %
NRBC BLD-RTO: 0 /100 WBCS (ref 0–0)
PHOSPHATE SERPL-MCNC: 4 MG/DL (ref 2.5–4.9)
PLATELET # BLD AUTO: 224 X10*3/UL (ref 150–450)
PLATELET # BLD AUTO: 224 X10*3/UL (ref 150–450)
POTASSIUM SERPL-SCNC: 3.8 MMOL/L (ref 3.5–5.3)
PROT SERPL-MCNC: 5.7 G/DL (ref 6.4–8.2)
PROTHROMBIN TIME: 11.2 SECONDS (ref 9.8–12.8)
RBC # BLD AUTO: 3.67 X10*6/UL (ref 4–5.2)
RBC # BLD AUTO: 3.67 X10*6/UL (ref 4–5.2)
SODIUM SERPL-SCNC: 141 MMOL/L (ref 136–145)
TSH SERPL-ACNC: 1.92 MIU/L (ref 0.44–3.98)
WBC # BLD AUTO: 6.1 X10*3/UL (ref 4.4–11.3)
WBC # BLD AUTO: 6.1 X10*3/UL (ref 4.4–11.3)

## 2024-06-18 PROCEDURE — 85610 PROTHROMBIN TIME: CPT | Performed by: STUDENT IN AN ORGANIZED HEALTH CARE EDUCATION/TRAINING PROGRAM

## 2024-06-18 PROCEDURE — 86140 C-REACTIVE PROTEIN: CPT | Performed by: STUDENT IN AN ORGANIZED HEALTH CARE EDUCATION/TRAINING PROGRAM

## 2024-06-18 PROCEDURE — 83735 ASSAY OF MAGNESIUM: CPT | Performed by: STUDENT IN AN ORGANIZED HEALTH CARE EDUCATION/TRAINING PROGRAM

## 2024-06-18 PROCEDURE — 85025 COMPLETE CBC W/AUTO DIFF WBC: CPT | Performed by: FAMILY MEDICINE

## 2024-06-18 PROCEDURE — 85730 THROMBOPLASTIN TIME PARTIAL: CPT | Performed by: STUDENT IN AN ORGANIZED HEALTH CARE EDUCATION/TRAINING PROGRAM

## 2024-06-18 PROCEDURE — 82248 BILIRUBIN DIRECT: CPT | Performed by: STUDENT IN AN ORGANIZED HEALTH CARE EDUCATION/TRAINING PROGRAM

## 2024-06-18 PROCEDURE — 2500000005 HC RX 250 GENERAL PHARMACY W/O HCPCS: Performed by: STUDENT IN AN ORGANIZED HEALTH CARE EDUCATION/TRAINING PROGRAM

## 2024-06-18 PROCEDURE — 84100 ASSAY OF PHOSPHORUS: CPT | Performed by: STUDENT IN AN ORGANIZED HEALTH CARE EDUCATION/TRAINING PROGRAM

## 2024-06-18 PROCEDURE — 99214 OFFICE O/P EST MOD 30 MIN: CPT | Performed by: STUDENT IN AN ORGANIZED HEALTH CARE EDUCATION/TRAINING PROGRAM

## 2024-06-18 PROCEDURE — 82728 ASSAY OF FERRITIN: CPT | Performed by: STUDENT IN AN ORGANIZED HEALTH CARE EDUCATION/TRAINING PROGRAM

## 2024-06-18 PROCEDURE — 96413 CHEMO IV INFUSION 1 HR: CPT

## 2024-06-18 PROCEDURE — 80053 COMPREHEN METABOLIC PANEL: CPT | Performed by: STUDENT IN AN ORGANIZED HEALTH CARE EDUCATION/TRAINING PROGRAM

## 2024-06-18 PROCEDURE — 82550 ASSAY OF CK (CPK): CPT | Performed by: STUDENT IN AN ORGANIZED HEALTH CARE EDUCATION/TRAINING PROGRAM

## 2024-06-18 PROCEDURE — 84443 ASSAY THYROID STIM HORMONE: CPT | Performed by: FAMILY MEDICINE

## 2024-06-18 PROCEDURE — 85027 COMPLETE CBC AUTOMATED: CPT | Performed by: FAMILY MEDICINE

## 2024-06-18 RX ORDER — HEPARIN SODIUM,PORCINE/PF 10 UNIT/ML
50 SYRINGE (ML) INTRAVENOUS AS NEEDED
OUTPATIENT
Start: 2024-06-18

## 2024-06-18 RX ORDER — HEPARIN 100 UNIT/ML
500 SYRINGE INTRAVENOUS AS NEEDED
OUTPATIENT
Start: 2024-06-18

## 2024-06-18 NOTE — RESEARCH NOTES
Research Note Treatment Day    Gladys Branch is here today for treatment on [id number]. Today is C18D15.  Procedures completed per protocol. AE's and con-meds reviewed with patient. Patient is aware of treatment plan.    [x]   Received treatment as planned   OR  []    Treatment delayed; patient calendar updated as required   Treatment delayed because:    []   AE    []   Physician Discretion    []   Clinical Deterioration or Progression     []   Other    Education Documentation  Memory Problems, taught by Vladimir Fernandez RN at 6/18/2024 12:54 PM.  Learner: Significant Other, Patient  Readiness: Eager  Method: Explanation  Response: Verbalizes Understanding    General Medication Information, taught by Vladimir Fernandez RN at 6/18/2024 12:54 PM.  Learner: Significant Other, Patient  Readiness: Eager  Method: Explanation  Response: Verbalizes Understanding    Treatment Plan and Schedule, taught by Vladimir Fernandez RN at 6/18/2024 12:54 PM.  Learner: Significant Other, Patient  Readiness: Eager  Method: Explanation  Response: Verbalizes Understanding    Supportive Medications, taught by Vladimir Fernandez RN at 6/18/2024 12:54 PM.  Learner: Significant Other, Patient  Readiness: Eager  Method: Explanation  Response: Verbalizes Understanding    Education Comments  No comments found.

## 2024-06-18 NOTE — RESEARCH NOTES
[facility]><[id number]><C5+D15><PART A>    DCRU NURSING VISIT NOTE  Study Name: ALEXANDRU Foote8  IRB#: [id number]  DCRU#: [id number]  Protocol Version Dated: A9 3.22.23  PI: Roshan Kumar MD.    Time point: Cycle 5 and beyond - Day 15  CYCLE 18     Encounter Date: 06/18/2024  Encounter Time:  8:30 AM EDT  Encounter Department: [facility] HEMATOLOGY AND ONCOLOGY     #1     Phone Pager   Carmen Rios [phone]    #2 Phone Pager        Other Phone Pager          Study Regimen and Dosing   Part A Dose Exploration/Expansion- Small Cell Lung Cancer Relapsed or Refractory patients who progressed or recurred following at least 1 platinum-based regimen.   Cycle = 28 days    administered IV Day 1, Day 8, and Day 15 of Cycle 1. Cycle 2 and beyond     administered on Day 1 and Day 15.        Dietary Guidelines   Regular diet:     Admission and Prior to Starting Study Activities   Complete DCRU and [facility] Admission/Visit Note.  Notify SC of patient arrival after initial lab and vitals  Insert one peripheral IV line for sample collection procedures (flush line with normal saline following each blood draw). Access mediport (if available) otherwise insert second peripheral line in opposite arm for Investigative drug administration (if peripheral line, flush line with normal saline before & after infusion)     Criteria to Treat   DCRU RN reviewed and meets eligibility to proceed with treatment plan   Time team notified: 0800   DCRU RN notifies study team to review eligibility and approval before dosing procedures  Time team approves: 1245      Subjective   Gladys Branch is a 68 y.o. female and is here for a Research clinical visit.    Visit Provider: [id number], [facility] ROOM 15 [facility]     Allergies:   Allergies   Allergen Reactions    Cisplatin Other     CHEMO INDUCED (Moderate); Dyspepsia (Moderate); Headaches (Moderate); Hypotension (Moderate); Numbness (Moderate); Resp Distress  (Moderate)    Codeine Nausea Only and Other     nausea    Sulfa (Sulfonamide Antibiotics) Rash       Objective     Vital Signs:    Vitals:    06/18/24 0828 06/18/24 1356   BP: 112/73 102/71   Pulse: 76 76   Resp: 16 18   Temp: 36.5 °C (97.7 °F) 36.4 °C (97.6 °F)   TempSrc: Oral Oral   SpO2: 96% 96%   Weight: 50.7 kg (111 lb 12.4 oz)        Physical Exam     ASSESSMENT and PLAN:  Problem List Items Addressed This Visit          Cardiac and Vasculature    Hyperlipidemia    Relevant Orders    TSH with reflex to Free T4 if abnormal (Completed)       Endocrine/Metabolic    Impaired fasting blood sugar    Relevant Orders    TSH with reflex to Free T4 if abnormal (Completed)       Hematology and Neoplasia    Small cell lung cancer (Multi) - Primary    Relevant Medications    Study [id number] Tarlatamab (AMG-757) 3 mg in sodium chloride 0.9% 250 mL IV (Completed)    Other Relevant Orders    TSH with reflex to Free T4 if abnormal (Completed)    APTT (Completed)    Bilirubin, Direct (Completed)    CK (Completed)    C-Reactive Protein (Completed)    Ferritin (Completed)    Magnesium (Completed)    Phosphorus (Completed)    Protime-INR (Completed)    CBC and Auto Differential    Comprehensive metabolic panel (Completed)    Comprehensive metabolic panel    CBC and Auto Differential    Anemia, chronic disease    Relevant Orders    CBC (Completed)    TSH with reflex to Free T4 if abnormal (Completed)     Other Visit Diagnoses       Elevated TSH        Relevant Orders    TSH with reflex to Free T4 if abnormal (Completed)             Medications as of the completion of today's visit:  Current Outpatient Medications   Medication Sig Dispense Refill    cetirizine-pseudoephedrine (ZyrTEC-D) 5-120 mg 12 hr tablet Take 1 tablet by mouth 2 times a day. (Patient taking differently: Take 1 tablet by mouth once daily.) 60 tablet 11    cholecalciferol (Vitamin D-3) 25 MCG (1000 UT) tablet Take by mouth.      dexAMETHasone (Decadron) 2 mg tablet  Take 0.5 tablets (1 mg) by mouth once daily with breakfast. 30 tablet 2    dextromethorphan (Delsym) 30 mg/5 mL liquid Take 5 mL (30 mg) by mouth once daily as needed.      dronabinol (Marinol) 2.5 mg capsule once daily as needed. One hour before dinner      estrogens, conjugated, (Premarin) 0.3 mg tablet Take 0.5 tablets (0.15 mg) by mouth once daily.      mv-min/FA/vit K/lutein/zeaxant (PRESERVISION AREDS 2 PLUS MV ORAL) Take 1 tablet by mouth once daily.      omeprazole (PriLOSEC) 40 mg DR capsule Take 1 capsule (40 mg) by mouth once daily. Do not crush or chew. 90 each 3    ondansetron (Zofran) 8 mg tablet Take 0.5 tablets (4 mg) by mouth every 8 hours if needed.      polyethylene glycol (Glycolax, Miralax) 17 gram packet Take 17 g by mouth 2 times a day. (Patient taking differently: Take 17 g by mouth if needed.) 60 packet 2    prochlorperazine (Compazine) 10 mg tablet Take 0.5 tablets (5 mg) by mouth every 6 hours if needed.      psyllium husk, aspartame, (Metamucil Sugar-Free, aspart,) 3.4 gram/5.8 gram powder Take 1 Dose by mouth once daily. (Patient taking differently: Take 1 Dose by mouth once daily as needed.) 425 g 11     No current facility-administered medications for this encounter.       Administrations This Visit       Study [id number] Tarlatamab (AMG-757) 3 mg in sodium chloride 0.9% 250 mL IV       Admin Date  06/18/2024 Action  New Bag Dose  3 mg Route  intravenous Documented By  Sharifa Johnson, RN                    Orders placed during today's visit:  Orders Placed This Encounter   Procedures    CBC     Standing Status:   Standing     Number of Occurrences:   1     Order Specific Question:   Release result to MyChart     Answer:   Immediate    TSH with reflex to Free T4 if abnormal     Standing Status:   Standing     Number of Occurrences:   1     Order Specific Question:   Release result to MyChart     Answer:   Immediate    APTT     Standing Status:   Standing     Number of Occurrences:   1      Order Specific Question:   Release result to MyChart     Answer:   Immediate [1]    Bilirubin, Direct     Standing Status:   Standing     Number of Occurrences:   1     Order Specific Question:   Release result to MyChart     Answer:   Immediate [1]    CK     Standing Status:   Standing     Number of Occurrences:   1     Order Specific Question:   Release result to MyChart     Answer:   Immediate [1]    C-Reactive Protein     Standing Status:   Standing     Number of Occurrences:   1     Order Specific Question:   Release result to MyChart     Answer:   Immediate [1]    Ferritin     Standing Status:   Standing     Number of Occurrences:   1     Order Specific Question:   Release result to MyChart     Answer:   Immediate [1]    Magnesium     Standing Status:   Standing     Number of Occurrences:   1     Order Specific Question:   Release result to MyChart     Answer:   Immediate [1]    Phosphorus     Standing Status:   Standing     Number of Occurrences:   1     Order Specific Question[facility]   [facility]     Answer:   Immediate [1]    Protime-INR     Standing Status:   Standing     Number of Occurrences:   1     Order Specific Question:   [facility]     Answer:   Immediate [1]    CBC and Auto Differential     Standing Status:   Standing     Number of Occurrences:   1     Order Specific Question:   [facility]     Answer:   Immediate    Comprehensive metabolic panel     Standing Status:   Standing     Number of Occurrences:   1     Order Specific Question:   [facility]     Answer:   Immediate    Comprehensive metabolic panel     Standing Status:   Standing     Number of Occurrences:   1     Order Specific Question:   [facility]     Answer:   Immediate    CBC and Auto Differential     Standing Status:   Standing     Number of Occurrences:   1     Order Specific Question:   [facility]     Answer:   Immediate        [id number] -  in  Subjects With Small Cell Lung Cancer    Patient has no adverse events documented in the Research Adverse Events activity.       Study Specific Instructions and Documentation   WEIGHT  LABS  VITALS  STUDY DRUG  VITALS  DISCHARGE     PRE-DOSE Safety Labs   Refer to [facility] Treatment Plan for orders     Day 15 Pre-dose Vital Signs    Temp, HR, Resp, BP, Pulse Oximetry: Seated or Semi-Fowlers x 5 minutes before obtaining  Position and temperature location: should be the same that is used throughout the study-record position     Infusion-Related Reactions   [facility] SOC Hypersensitivity Orders  Note: CRS table      Research Drug Administration Tarlatamab ()   Refer to [facility] Treatment Plan for orders   Administer dose over 60 minutes (+/- 10 mins) followed by a 3 - 5 minute Normal saline flush. (This will be the end time after the flush). Document start time (1251) & end of study medication (1351); document start time and end time of the flush.  ([id number])    NA per Alex Fernandez RN  Post-Dose Vital Signs   Temp, HR, Resp, BP, Pulse Oximetry: Seated or Semi-Fowlers x 5 minutes before obtaining  Position and temperature location: should be the same that is used throughout the study  End of Infusion     Day 15 Discharge Instructions     No longer required, per Alex Fernandez RNDischarge time 1402     Sharifa Johnson RN  06/18/24    Grading and Management of Cytokine Release Syndrome   CRS Grade Description of Severity Minimum Expected Intervention Instructions for Dose Modifications of    1 Symptoms are not life threatening and require symptomatic treatment only,  eg, fever, nausea, fatigue, headache, myalgia's, malaise Administer symptomatic treatment (contact provider for medications/doses). Monitor for CRS symptoms including vital signs and pulse oximetry at least Q2 hours for12 hours or until resolution, Whichever is earlier. N/A     (continued)  Grading and Management of Cytokine Release Syndrome   CRS Grade  Description of Severity Minimum Expected Intervention Instructions for Dose Modifications of    2 Symptoms require and respond to moderate intervention  Oxygen requirement <40%,                      OR  Hypotension responsive to fluids or low dose of one vasopressor, OR                                                                       Grade 2 organ toxicity or grade 3 transaminitis per CTCAE criteria Administer:  Symptomatic treatment   (contact provider for medications/doses)  Supplemental oxygen when oxygen saturation is <90% on room air  Intravenous fluids or low dose vasopressor for hypotension is recommended when systolic blood pressure is <85 mmHg. (contact provider for medications/doses) Persistent tachycardia (eg >120 bpm) may also indicate the need for intervention for hypotension.   Monitor for CRS symptoms including vital signs and pulse oximetry at least Q2 hours for12 hours or until resolution to CRS grade <= 1, whichever is earlier. For subjects with extensive co-morbidities or poor performance status, manage per grade 3 CRS guidance below If CRS occurs during  treatment, immediately interrupt the infusion and delay the next  dose until the event resolves to CRS grade <= 1 for no less than 72 hours. Permanently discontinue  if there is no improvement to CRS <= grade 1 within 7 days     3 Symptoms require and respond to aggressive intervention  Oxygen requirement >=40%, OR  Hypotension requiring high dose or multiple vasopressors, OR  Grade 3 organ toxicity or     Grade 4 transaminitis per  CTCAE criteria Admit to intensive care unit for close clinical and vital sign monitoring per institutional guidelines.   Refer to provider/protocol for medications and doses.     If CRS occurs during  treatment, immediately interrupt   and delay the next dose until event resolves to CRS grade <= 1 for no less than 72 hours.    Permanently discontinue  if there is  no improvement to CRS                 <= grade 2 within 5 days or CRS <= grade 1 within 7 days.  Permanently discontinue   if CRS grade  3 occurs at the initial run in dose (i.e., at MTD1)  (Applicable only after MTD1 has been defined).     (continued)  Grading and Management of Cytokine Release Syndrome   CRS Grade Description of Severity Minimum Expected Intervention Instructions for Dose Modifications of    4 Life-threatening symptoms  Requirement for ventilator support OR  Grade 4 organ toxicity (excluding transaminitis) per CTCAE criteria Admit to intensive care unit for close clinical and vital sign monitoring per institutional guidelines.   Refer to provider/protocol for medications and doses.   If CRS occurs during  treatment, Immediately stop the infusion.  Permanently discontinue   therapy.

## 2024-06-18 NOTE — PROGRESS NOTES
Patient ID: Gladys Branch is a 68 y.o. female.    DIAGNOSIS    Small Cell Lung Cancer    By immunostaining, the tumor cells are positive for CAM 5.2, INSM1, and TTF-1, and negative for p40 and LCA. The proliferation index by Ki-67 immunostaining is approximately 90%.  Synaptophysin, Chromogranin and INSM-1 are positive.  AE1/AE3 is negative. NEOGENOMICS, RB protein expression is lost in the tumor cells    STAGING    [id number], limited stage  Recurrence    CURRENT SITES OF DISEASE    RLL, supraclavicular    MOLECULAR GENOMICS      PRIOR THERAPY    Concurrent chemoradiation with Cisplatin/Etoposide every 3 weeks; C1D1 2/15/22, reaction to cisplatin C2D1 and did not receive D2 or D3 etoposide  Carbo/etoposide 4/5/2022 1 cycle c/b rash  PCI 5/21-6/13/22    CURRENT THERAPY    Phase 1 study [id number]  with study drug Taralatamab 1/24/23 - present.    CURRENT ONCOLOGICAL PROBLEMS      HISTORY OF PRESENT ILLNESS    Mrs. Branch is a 67 yo with PMH GERD and COPD who originally was round to have a RLL nodule measuring 11 mm in 4/28/2021 found as part of CT calcium score scan. An ensuing CT chest w/o contrast was done on 5/19/21 which revealed subsolid pulmonary nodules measuring 14 mm in the RLL. She had CT chest w/contrast on 11/29/21 which confirmed stable 14 mm GGO of the RLL and decreased RLL subpleural nodule. She met thoracic surgeon Dr. Farfan, who ordered PET/CT scan done on 12/8/21 which did not reveal any FDG-avid nodules within the lung parenchyma but R hilar nodes with max SUV 8.0. He referred her for bronch, which was done on 1/21/22 by Dr. Mcdaniels. 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 Brain MRI was negative.    She started concurrent chemoradiation with BID radiation with cis/etop 2/15/22, and unfortunately had a reaction to cisplatin on C2D1 with chest pain and hypotension. She was hospitalized with sepsis felt to be due to pneumonia. She trialed carboplatin/etoposide on 4/5/22 with a  resulting rash and opted to forego further chemotherapy as she had completed radiation. Restaging scan 11/3/22 unfortunately with progression with new R hilar lymph node, paratracheal nodes, and increase in size of R supraclavicular mass. She enrolled in [id number] and started C1D1 1/24/23. C1 complicated by anorexia and dysgeusia, and delayed C2 by a week. She has further struggled with fatigue and confusion, which may be related to mirtazapine. Dose reduced for C2 and mirtazapine stopped with improvement in symptoms.     PAST MEDICAL HISTORY    GERD  COPD    SOCIAL HISTORY    Retired - lighting . Lives at home with  and a kitten.  Tobacco: quit smoking 1999. 1 ppd x 25 yrs.  EtOH: wine 1 glass/day  Illicits: none    CURRENT MEDS    Please see med list    ALLERGIES    Codeine, Sulfa drugs, Cisplatin    FAMILY HISTORY    Paternal aunt - breast cancer dx 80s    Subjective    Today 6.18.2024. Patient in clinic with her  for C19D1 for [id number].      She is here today with her . She can't exchange her lenses until July, so she's waiting. She's still seeing double a bit. Her memory is really good during the day but some problems in the evening. About the same though. Eating is better, weight is stable. Still has bezoar.      Patient denies any pain, dizziness, lightheadedness, headaches, rash/itching, chills or fever, SOB, cough, sputum, chest pain or chest tightness, painful inflammation/ulceration oral mucous membranes, nausea/vomiting, diarrhea/constipation, hematemesis /hemoptysis, hematuria/rectal bleeding, urinary symptoms, swelling extremities, weakness/fatigue, or peripheral neuropathy. ROS as above. Remainder of 10 point review of systems elicited and otherwise unremarkable.       Objective          6/4/2024    10:15 AM 6/4/2024    12:08 PM 6/10/2024     8:24 AM 6/10/2024     9:35 AM 6/10/2024     9:45 AM 6/10/2024     9:55 AM 6/18/2024     8:28 AM   Vitals   Systolic 103 105  110 96 107 95 112   Diastolic 68 66 76 75 77 68 73   Heart Rate 69 72 82 73 73 71 76   Temp 36.5 °C (97.7 °F) 36.3 °C (97.3 °F) 36.5 °C (97.7 °F) 36.2 °C (97.2 °F)  36.6 °C (97.9 °F) 36.5 °C (97.7 °F)   Resp 18 16 18 14 16 16 16   Height (in)   1.524 m (5')       Weight (lb)   112    111.77   BMI   21.87 kg/m2    21.83 kg/m2   BSA (m2)   1.47 m2    1.47 m2       Daily Weight  06/18/24 : 50.7 kg (111 lb 12.4 oz)  06/10/24 : 50.8 kg (112 lb)  06/04/24 : 51.1 kg (112 lb 10.5 oz)  05/21/24 : 50.1 kg (110 lb 7.2 oz)  05/17/24 : 51.2 kg (112 lb 12.8 oz)        Physical Exam  Constitutional:       General: She is awake.      Appearance: Normal appearance. She is normal weight.   HENT:      Head: Normocephalic.      Mouth/Throat:      Mouth: Mucous membranes are moist.   Eyes:      Extraocular Movements: Extraocular movements intact.      Conjunctiva/sclera: Conjunctivae normal.      Pupils: Pupils are equal, round, and reactive to light.   Cardiovascular:      Rate and Rhythm: Normal rate and regular rhythm.      Heart sounds: Normal heart sounds, S1 normal and S2 normal.   Pulmonary:      Effort: Pulmonary effort is normal.      Breath sounds: Normal breath sounds.   Abdominal:      General: Abdomen is flat. Bowel sounds are normal.      Palpations: Abdomen is soft.   Musculoskeletal:      Cervical back: Normal range of motion and neck supple.   Skin:     Comments: No skin rash observed   Neurological:      Mental Status: She is alert.      Cranial Nerves: Cranial nerves 2-12 are intact.      Sensory: Sensation is intact.      Motor: Motor function is intact.      Coordination: Coordination is intact.   Psychiatric:         Attention and Perception: Attention normal.         Mood and Affect: Mood normal.         Speech: Speech normal.         Behavior: Behavior normal. Behavior is cooperative.         Cognition and Memory: Cognition normal.       Labs    Hospital Outpatient Visit on 06/04/2024   Component Date Value Ref  Range Status    WBC 06/04/2024 6.8  4.4 - 11.3 x10*3/uL Final    nRBC 06/04/2024 0.0  0.0 - 0.0 /100 WBCs Final    RBC 06/04/2024 3.77 (L)  4.00 - 5.20 x10*6/uL Final    Hemoglobin 06/04/2024 11.7 (L)  12.0 - 16.0 g/dL Final    Hematocrit 06/04/2024 34.8 (L)  36.0 - 46.0 % Final    MCV 06/04/2024 92  80 - 100 fL Final    MCH 06/04/2024 31.0  26.0 - 34.0 pg Final    MCHC 06/04/2024 33.6  32.0 - 36.0 g/dL Final    RDW 06/04/2024 13.3  11.5 - 14.5 % Final    Platelets 06/04/2024 213  150 - 450 x10*3/uL Final    Neutrophils % 06/04/2024 68.0  40.0 - 80.0 % Final    Immature Granulocytes %, Automated 06/04/2024 0.4  0.0 - 0.9 % Final    Lymphocytes % 06/04/2024 19.1  13.0 - 44.0 % Final    Monocytes % 06/04/2024 9.0  2.0 - 10.0 % Final    Eosinophils % 06/04/2024 2.8  0.0 - 6.0 % Final    Basophils % 06/04/2024 0.7  0.0 - 2.0 % Final    Neutrophils Absolute 06/04/2024 4.61  1.20 - 7.70 x10*3/uL Final    Immature Granulocytes Absolute, Au* 06/04/2024 0.03  0.00 - 0.70 x10*3/uL Final    Lymphocytes Absolute 06/04/2024 1.30  1.20 - 4.80 x10*3/uL Final    Monocytes Absolute 06/04/2024 0.61  0.10 - 1.00 x10*3/uL Final    Eosinophils Absolute 06/04/2024 0.19  0.00 - 0.70 x10*3/uL Final    Basophils Absolute 06/04/2024 0.05  0.00 - 0.10 x10*3/uL Final    Glucose 06/04/2024 86  74 - 99 mg/dL Final    Sodium 06/04/2024 138  136 - 145 mmol/L Final    Potassium 06/04/2024 4.0  3.5 - 5.3 mmol/L Final    Chloride 06/04/2024 102  98 - 107 mmol/L Final    Bicarbonate 06/04/2024 29  21 - 32 mmol/L Final    Anion Gap 06/04/2024 11  10 - 20 mmol/L Final    Urea Nitrogen 06/04/2024 18  6 - 23 mg/dL Final    Creatinine 06/04/2024 0.72  0.50 - 1.05 mg/dL Final    eGFR 06/04/2024 >90  >60 mL/min/1.73m*2 Final    Calcium 06/04/2024 9.0  8.6 - 10.6 mg/dL Final    Albumin 06/04/2024 3.8  3.4 - 5.0 g/dL Final    Alkaline Phosphatase 06/04/2024 47  33 - 136 U/L Final    Total Protein 06/04/2024 6.2 (L)  6.4 - 8.2 g/dL Final    AST 06/04/2024 20  9  - 39 U/L Final    Bilirubin, Total 06/04/2024 0.8  0.0 - 1.2 mg/dL Final    ALT 06/04/2024 13  7 - 45 U/L Final    Adrenocorticotropic Hormone (ACTH) 06/04/2024 38.0  7.2 - 63.3 pg/mL Final    Amylase 06/04/2024 35  29 - 103 U/L Final    aPTT 06/04/2024 31  27 - 38 seconds Final    Bilirubin, Direct 06/04/2024 0.1  0.0 - 0.3 mg/dL Final    Creatine Kinase 06/04/2024 56  0 - 215 U/L Final    Cortisol 06/04/2024 11.9  2.5 - 20.0 ug/dL Final    C-Reactive Protein 06/04/2024 <0.10  <1.00 mg/dL Final    Estradiol 06/04/2024 <19  pg/mL Final    Ferritin 06/04/2024 66  8 - 150 ng/mL Final    Follicle Stimulating Hormone 06/04/2024 51.1  IU/L Final    Luteinizing Hormone 06/04/2024 18.8  IU/L Final    Lipase 06/04/2024 37  9 - 82 U/L Final    Magnesium 06/04/2024 1.86  1.60 - 2.40 mg/dL Final    Phosphorus 06/04/2024 4.1  2.5 - 4.9 mg/dL Final    Protime 06/04/2024 11.5  9.8 - 12.8 seconds Final    INR 06/04/2024 1.0  0.9 - 1.1 Final    Thyroid Stimulating Hormone 06/04/2024 9.11 (H)  0.44 - 3.98 mIU/L Final    Thyroxine, Free 06/04/2024 0.90  0.78 - 1.48 ng/dL Final    Color, Urine 06/04/2024 Light-Yellow  Light-Yellow, Yellow, Dark-Yellow Final    Appearance, Urine 06/04/2024 Clear  Clear Final    Specific Gravity, Urine 06/04/2024 1.019  1.005 - 1.035 Final    pH, Urine 06/04/2024 7.0  5.0, 5.5, 6.0, 6.5, 7.0, 7.5, 8.0 Final    Protein, Urine 06/04/2024 NEGATIVE  NEGATIVE, 10 (TRACE), 20 (TRACE) mg/dL Final    Glucose, Urine 06/04/2024 Normal  Normal mg/dL Final    Blood, Urine 06/04/2024 NEGATIVE  NEGATIVE Final    Ketones, Urine 06/04/2024 NEGATIVE  NEGATIVE mg/dL Final    Bilirubin, Urine 06/04/2024 NEGATIVE  NEGATIVE Final    Urobilinogen, Urine 06/04/2024 Normal  Normal mg/dL Final    Nitrite, Urine 06/04/2024 NEGATIVE  NEGATIVE Final    Leukocyte Esterase, Urine 06/04/2024 NEGATIVE  NEGATIVE Final    Extra Tube 06/04/2024 Hold for add-ons.   Final    Extra Tube 06/04/2024 Hold for add-ons.   Final    Extra Tube  06/04/2024 Hold for add-ons.   Final    Extra Tube 06/04/2024 Hold for add-ons.   Final    Extra Tube 06/04/2024 Hold for add-ons.   Final   Hospital Outpatient Visit on 05/21/2024   Component Date Value Ref Range Status    WBC 05/21/2024 7.7  4.4 - 11.3 x10*3/uL Final    nRBC 05/21/2024 0.0  0.0 - 0.0 /100 WBCs Final    RBC 05/21/2024 3.61 (L)  4.00 - 5.20 x10*6/uL Final    Hemoglobin 05/21/2024 11.2 (L)  12.0 - 16.0 g/dL Final    Hematocrit 05/21/2024 32.8 (L)  36.0 - 46.0 % Final    MCV 05/21/2024 91  80 - 100 fL Final    MCH 05/21/2024 31.0  26.0 - 34.0 pg Final    MCHC 05/21/2024 34.1  32.0 - 36.0 g/dL Final    RDW 05/21/2024 13.2  11.5 - 14.5 % Final    Platelets 05/21/2024 228  150 - 450 x10*3/uL Final    Neutrophils % 05/21/2024 75.5  40.0 - 80.0 % Final    Immature Granulocytes %, Automated 05/21/2024 0.3  0.0 - 0.9 % Final    Lymphocytes % 05/21/2024 13.6  13.0 - 44.0 % Final    Monocytes % 05/21/2024 8.6  2.0 - 10.0 % Final    Eosinophils % 05/21/2024 1.6  0.0 - 6.0 % Final    Basophils % 05/21/2024 0.4  0.0 - 2.0 % Final    Neutrophils Absolute 05/21/2024 5.80  1.20 - 7.70 x10*3/uL Final    Immature Granulocytes Absolute, Au* 05/21/2024 0.02  0.00 - 0.70 x10*3/uL Final    Lymphocytes Absolute 05/21/2024 1.04 (L)  1.20 - 4.80 x10*3/uL Final    Monocytes Absolute 05/21/2024 0.66  0.10 - 1.00 x10*3/uL Final    Eosinophils Absolute 05/21/2024 0.12  0.00 - 0.70 x10*3/uL Final    Basophils Absolute 05/21/2024 0.03  0.00 - 0.10 x10*3/uL Final    Glucose 05/21/2024 106 (H)  74 - 99 mg/dL Final    Sodium 05/21/2024 139  136 - 145 mmol/L Final    Potassium 05/21/2024 3.7  3.5 - 5.3 mmol/L Final    Chloride 05/21/2024 104  98 - 107 mmol/L Final    Bicarbonate 05/21/2024 27  21 - 32 mmol/L Final    Anion Gap 05/21/2024 12  10 - 20 mmol/L Final    Urea Nitrogen 05/21/2024 24 (H)  6 - 23 mg/dL Final    Creatinine 05/21/2024 0.74  0.50 - 1.05 mg/dL Final    eGFR 05/21/2024 88  >60 mL/min/1.73m*2 Final    Calcium  05/21/2024 8.7  8.6 - 10.6 mg/dL Final    Albumin 05/21/2024 3.4  3.4 - 5.0 g/dL Final    Alkaline Phosphatase 05/21/2024 44  33 - 136 U/L Final    Total Protein 05/21/2024 5.6 (L)  6.4 - 8.2 g/dL Final    AST 05/21/2024 17  9 - 39 U/L Final    Bilirubin, Total 05/21/2024 0.7  0.0 - 1.2 mg/dL Final    ALT 05/21/2024 10  7 - 45 U/L Final    aPTT 05/21/2024 31  27 - 38 seconds Final    Bilirubin, Direct 05/21/2024 0.1  0.0 - 0.3 mg/dL Final    Creatine Kinase 05/21/2024 44  0 - 215 U/L Final    C-Reactive Protein 05/21/2024 0.27  <1.00 mg/dL Final    Ferritin 05/21/2024 76  8 - 150 ng/mL Final    Magnesium 05/21/2024 1.86  1.60 - 2.40 mg/dL Final    Phosphorus 05/21/2024 4.1  2.5 - 4.9 mg/dL Final    Protime 05/21/2024 11.9  9.8 - 12.8 seconds Final    INR 05/21/2024 1.1  0.9 - 1.1 Final                 Performance Status:    ECOG 1: Restricted in physically strenuous activity but ambulatory and able to carry out work of a light or sedentary nature, e.g., light house work, office work.         Assessment/Plan      Mrs. Branch is a 67 yo with COPD and GERD who presented with newly diagnosed SCLC completed BID radiation planned concurrently with cis/etoposide but had a reaction C2D1 to cisplatin. Completed 1 cycle carbo/etop D1 4/5/22 also complicated by facial flushing and erythematous rash and stopped further treatment. Now s/p PCI in 6/2022. Most recent CT concerning for disease progression. Referred for clinical trial AMGN 1518, C1D1 1/24/23. Treatment has been complicated by orthostatic hypotension, worsening short-term memory, increased lethargy, weight loss, and poor appetite. Delayed C2 by 1 week and dose-reduced. Confusion has resolved during C3 after stopping mirtazapine and dose reduction.     # SCLC  - limited stage, brain MRI negative  - previously discussed concurrent chemoradiation, with cisplatin/etoposide with side effects including but not limited to allergic reaction, fatigue, risk of infection,  tinnitus, neuropathy, injury to liver/kidney, risk of anemia/thrombocytopenia and was consented  - she was also interested in scalp cooling, which unfortunately she is not a candidate for d/t SCLC  - started concurrent chemoradiation BID with Q3week cis/etop on 2/15 at [place]  -unfortunately had reaction to cisplatin on C2D1 resulting in hospitalization and also felt to be septic due to pneumonia  - discussed that we typically do 3-4 cycles chemotherapy, and alternative regimens include carboplatin/etoposide vs CAV. Given reaction to cisplatin, concern for possible reaction to carboplatin as well, although unknown rates of cross reaction. Alternatively, they also asked about discontinuation of chemotherapy, since she completed radiation. She ultimately decided to trial carbo/etoposide. She is aware of the heightened risk of allergic reaction, as well as other side effects including but not limited to fatigue, risk of infection, neuropathy, injury to liver/kidney, risk of anemia/thrombocytopenia. consented 3/28/22  -s/p 1 cycle Carbo/Etop D1 4/5/22, developed facial flushing and erythematous rash on arms and chest s/p treatment, resolved with benadryl  -opted to forego further chemotherapy and will continue on maintenance  - s/p PCI 6/2022  - CT C/A/P and brain MRI 5/2022 and most recently 8/2022 with good response and no disease  -  MRI brain 11/7 without evidence of metastatic disease  - CT C/A/P 11/3 with multiple new enlarged LN concerning for disease progression - discussed with Dr. Valdivia not able to repeat radiation.  - referred to phase 1 clinical trial and consideration for [id number] or [id number]  - 1.5.2023 : Patient signed consent to take part on phase 1 study [id number]. Look clinically good to take part in study. Will start on study ASAP if eligible.   - 1.24.2023: Seen today and ready to start trial on [id number].  - 1.31.2023: Seen today, ready for C1D8 AMGN 1518   - 2.7.2023: Seen today for C1D15 [id number]  - continue with trial   - 2.14.2023: Seen in follow-up on [id number] with overall worsening of general health  - 2.21.2023: Seen today for C2D1 AMGN 1518 with continued weight loss although perhaps starting to plateau, more energy since being off treatment, still poor appetite. will delay by 1 week, started dexamethasone 2 mg daily, and consider dose reduction.  - 2.28.2023: Seen today for C2D1 AMGN 1518 after delaying for 1 week. Energy level and appetite are improved, although still losing a little weight. Confusion is worse today, possibly due to mirtazapine vs study-related. Will dose reduce today.  - 3.7.2023: Seen today C2D8 AMGN 1518 after dose-reduction last week. Confusion is mildly improved, energy level and appetite are stable. Will add marinol for appetite.   - 3.14.2023: Seen today C2D15 AMGN 1518. Overall improved: confusion continues to improve, energy level and appetite are improved. Weight is improved. Will continue dexamethasone and marinol.  - 3.15.2023: C2D16 No CRS symptoms observed after last treatment Overall Symptoms are improving and she is tolerating treatment.  -3.16.2023: C2D17 No CRS symptoms observed after last treatment Overall Symptoms are improving and she is tolerating treatment.  - 3.28.2023 : Most recent imaging suggest patient benefiting from current treatment. Ongoing symptom  possible common cold/season allergies. Since the patient looks clinically good we will proceed with C3D1 today. Educated patient to call the office ASAP if symptoms worsen.   - 4.11.2023: C3D15 Feeling well and overall improved with fatigue, confusion, anorexia. Proceed at current dosing and weaning steroids as below  -4.25.2023: C4D1 Pt is feeling well, no complaints of fatigue, confusion, memory loss. Has gained weight. Energy levels are good. Proceed with the current dosing. Pt is no longer on steroids.   -5.9.2023: C4D15. Pt is tolerating treatment well with no new complains. Continues to have good  energy level endorses poor appetite with out any weight loss. Suggested to start taking the dronabinol.  Will proceed with treatment today.  -5.23.2023: C5D1. Pt is tolerating treatment well. Energy levels are good, is having some weight loss and constipation. Started back on dex for appetite. Pt will let us know if she remains constipated over the next several days.  Personally reviewed scans with stable disease. Will proceed with treatment today.   -6.6.2023: C5D15. Continues to tolerate treatment well. Since we restarted her on Dexamethasone she reports her energy levels and appetite has improved. Will continue on low dose Dex. With no new symptoms, we will proceed with treatment today.   -6.20.2023: C6D1. Doing well on dexamethasone 1 mg daily. Will proceed with treatment.  -7.5.2023: C6D15. We will proceed with treatment since the patient is tolerating treatment with no significant AE's and also give 1/2 liter of IV fluids.   - 7.18.2023: C7D1. Doing well, will proceed with treatment. She is out of dexamethasone, and will monitor off steroids.   -8.1.2023: C7D15. Continues to tolerate treatment. Will proceed with C7D15 today. RTC per protocol.  - 8.29.2023: C8D1. C8 delayed due to hospitalization for diverticulitis. Has completed antibiotics and feeling well for treatment.  - 9.12.2023: C8D15. Most recent imgaing suggest the patient continues to benefit from current treatment. The imaging also suggest improvement of previously visualized segmental colitis associated with diverticulosis affecting the sigmoid colon with minimal residual pericolonic fat standing and hyperemia of the sigmoid colon. Imaging also suggest resolving inflammatory process without evidence of new or worsening complications. We will proceed with C8D15 today since the patient is also clinically feeling good. RTC per protocol.  -9.26.2023: C9D1. Patient looks clinically good. With no JACQUELYN's we will proceed with treatment C9D1  today.  -10.24.23: C10D1. Pt feels well, no acute events, no TRAEs, continue treatment today as scheduled.  -11.7.23: C10D15. Pt feels well, no TRAEs, continue treatment as scheduled.  - 11.21.23: C11D1. Continues to feel well, will continue treatment today.  - 12.19.23: C12D1. Personally reviewed most recent scan without evidence of progression. Continues to feel well and will continue with treatment today.  - 01.02.24: C12D15. Continues to tolerate treatment well. No new JACQUELYN's will proceed with treatment. RTC per protocol.  - 01.16.24: C13D1. No new JACQUELYN's proceed with treatment. RTC per protocol.   - 01.30.24: C13D15. Continues to feel well. Proceed with treatment.  - 2.13.24: C14D1. Personally reviewed most recent scan without evidence of progression. Continue with treatment today. RTC per protocol.  - 2.27.24: C14D15. Continues to feel good and tolerate treatment without any JACQUELYN's. We will proceed with treatment today. RTC per protocol.  - 3.12.24: C15D1. Continues to feel well and tolerate treatment without new JACQUELYN's. Will continue on treatment.  - 3.26.24: C15D15. She feels good today. Will give her IV fluids. Will continue on treatment. RTC in per protocol with scan prior to next visit.  - 4.23.24: C16D15. Feeling well and will continue per protocol.  - 5.7.24: C17D1. Feels well overall and will continue on trial.   - 5.21.24: C18D1. Having double vision and will obtain brain MRI. She will see eye doctor as well. Continue on trial.  - 6.4.2024: C18D15. Continues to have double vision was evaluated by opthalmology and received new prescription for her glasses. Her MRI was  negative.  - 6.18.2024: C19D1. Has not gotten new glasses, will get them in July. No other new symptoms. Will see GI re bezoar    # Confusion - resolved  - significantly worsened after taking double dose of mirtazapine accidentally, although has been generally down-trending since starting study (which is also when she started taking  mirtazapine) and now resolved  - brain MRI without progression of cancer  - will continue off mirtazapine    # Unintentional weight loss - stable  # Anorexia- Improving  # Dysgeusia- improving  - all improved on steroids. unclear if this is from cancer or side effect of study drug  - stopped mirtazapine due to concerns for confusion   - weaned off dexamethasone and started marinol, but kept forgetting to take it  - dexamethasone trial started again on 5.23.2023. Continued on 1 mg daily - will trial off after 7.18.23.     # Fatigue - Resolved  - back to normal pretty much, weaning steroids as above    # frequent PVCs - asymptomatic  - saw onco-cardiology and completed 24-hr Holter monitor  - recommended decreasing caffeine and sudafed intake  - continue to monitor    # Forgetfulness - improved - however noted decline on AMGN study- unclear etiology.    - started after PCI, on memantine daily and has since stopped  - offered neurocognitive evaluation previously and she will think about this and readdress next visit  -  notes some decline in last 3 weeks- especially short term memory loss.    - Improved    # Neuropathy - resolved  - mild peripheral neuropathy with numbness of the toes - likely due to cisplatin  - will continue to monitor closely  - not endorsing any neuropathy at this visit     # Rash - resolved  Waxing and waning rash throughout her body. ?improving. Unclear etiology, patient and  feel timing coincided with starting BMX.   - she will hold off on further BMX  - thought to be due to sulfa allergy, possibly due to platinum agents   - continue to monitor    #odynophagia - resolved  -rad onc aware  -prescribed BMX solution  -will continue to monitor    #constipation-improving  -occasional. Prescribed Senna S  -will monitor    Francy Archer MD

## 2024-06-25 DIAGNOSIS — Z00.6 CLINICAL TRIAL PARTICIPANT: ICD-10-CM

## 2024-06-25 DIAGNOSIS — C34.90 SMALL CELL CARCINOMA OF LUNG (MULTI): Primary | ICD-10-CM

## 2024-06-28 DIAGNOSIS — C34.90 SMALL CELL LUNG CANCER (MULTI): Primary | ICD-10-CM

## 2024-06-28 RX ORDER — ALBUTEROL SULFATE 0.83 MG/ML
3 SOLUTION RESPIRATORY (INHALATION) AS NEEDED
OUTPATIENT
Start: 2024-07-02

## 2024-06-28 RX ORDER — DIPHENHYDRAMINE HYDROCHLORIDE 50 MG/ML
50 INJECTION INTRAMUSCULAR; INTRAVENOUS AS NEEDED
OUTPATIENT
Start: 2024-07-02

## 2024-06-28 RX ORDER — FAMOTIDINE 10 MG/ML
20 INJECTION INTRAVENOUS ONCE AS NEEDED
OUTPATIENT
Start: 2024-07-02

## 2024-06-28 RX ORDER — EPINEPHRINE 1 MG/ML
0.3 INJECTION INTRAMUSCULAR; INTRAVENOUS; SUBCUTANEOUS EVERY 5 MIN PRN
OUTPATIENT
Start: 2024-07-02

## 2024-07-02 ENCOUNTER — EDUCATION (OUTPATIENT)
Dept: HEMATOLOGY/ONCOLOGY | Facility: HOSPITAL | Age: 69
End: 2024-07-02
Payer: MEDICARE

## 2024-07-02 ENCOUNTER — HOSPITAL ENCOUNTER (OUTPATIENT)
Dept: RESEARCH | Facility: HOSPITAL | Age: 69
Discharge: HOME | End: 2024-07-02
Payer: MEDICARE

## 2024-07-02 VITALS
TEMPERATURE: 97.9 F | SYSTOLIC BLOOD PRESSURE: 124 MMHG | RESPIRATION RATE: 16 BRPM | HEART RATE: 84 BPM | WEIGHT: 114.86 LBS | OXYGEN SATURATION: 97 % | BODY MASS INDEX: 22.43 KG/M2 | DIASTOLIC BLOOD PRESSURE: 89 MMHG

## 2024-07-02 DIAGNOSIS — C34.90 SMALL CELL LUNG CANCER (MULTI): Primary | ICD-10-CM

## 2024-07-02 DIAGNOSIS — C34.90 SMALL CELL LUNG CANCER (MULTI): ICD-10-CM

## 2024-07-02 DIAGNOSIS — Z00.6 EXAM FOR CLINICAL TRIAL: ICD-10-CM

## 2024-07-02 DIAGNOSIS — Z00.6 CLINICAL TRIAL EXAM: ICD-10-CM

## 2024-07-02 DIAGNOSIS — R78.81 BACTEREMIA: ICD-10-CM

## 2024-07-02 LAB
ALBUMIN SERPL BCP-MCNC: 3.5 G/DL (ref 3.4–5)
ALP SERPL-CCNC: 43 U/L (ref 33–136)
ALT SERPL W P-5'-P-CCNC: 8 U/L (ref 7–45)
AMYLASE SERPL-CCNC: 34 U/L (ref 29–103)
ANION GAP SERPL CALC-SCNC: 13 MMOL/L (ref 10–20)
APPEARANCE UR: ABNORMAL
APPEARANCE UR: CLEAR
APTT PPP: 31 SECONDS (ref 27–38)
AST SERPL W P-5'-P-CCNC: 14 U/L (ref 9–39)
BASOPHILS # BLD AUTO: 0.01 X10*3/UL (ref 0–0.1)
BASOPHILS NFR BLD AUTO: 0.2 %
BILIRUB DIRECT SERPL-MCNC: 0.1 MG/DL (ref 0–0.3)
BILIRUB SERPL-MCNC: 0.5 MG/DL (ref 0–1.2)
BILIRUB UR STRIP.AUTO-MCNC: NEGATIVE MG/DL
BILIRUB UR STRIP.AUTO-MCNC: NEGATIVE MG/DL
BUN SERPL-MCNC: 17 MG/DL (ref 6–23)
CALCIUM SERPL-MCNC: 8.6 MG/DL (ref 8.6–10.6)
CAOX CRY #/AREA UR COMP ASSIST: ABNORMAL /HPF
CHLORIDE SERPL-SCNC: 107 MMOL/L (ref 98–107)
CK SERPL-CCNC: 62 U/L (ref 0–215)
CO2 SERPL-SCNC: 24 MMOL/L (ref 21–32)
COLOR UR: ABNORMAL
COLOR UR: YELLOW
CORTIS SERPL-MCNC: 8.4 UG/DL (ref 2.5–20)
CREAT SERPL-MCNC: 0.7 MG/DL (ref 0.5–1.05)
CRP SERPL-MCNC: 6.35 MG/DL
EGFRCR SERPLBLD CKD-EPI 2021: >90 ML/MIN/1.73M*2
EOSINOPHIL # BLD AUTO: 0.18 X10*3/UL (ref 0–0.7)
EOSINOPHIL NFR BLD AUTO: 2.7 %
ERYTHROCYTE [DISTWIDTH] IN BLOOD BY AUTOMATED COUNT: 12.8 % (ref 11.5–14.5)
ESTRADIOL SERPL-MCNC: <19 PG/ML
FERRITIN SERPL-MCNC: 129 NG/ML (ref 8–150)
FSH SERPL-ACNC: 41.6 IU/L
GLUCOSE SERPL-MCNC: 177 MG/DL (ref 74–99)
GLUCOSE UR STRIP.AUTO-MCNC: ABNORMAL MG/DL
GLUCOSE UR STRIP.AUTO-MCNC: NORMAL MG/DL
HCT VFR BLD AUTO: 32.1 % (ref 36–46)
HGB BLD-MCNC: 10.9 G/DL (ref 12–16)
HOLD SPECIMEN: NORMAL
IMM GRANULOCYTES # BLD AUTO: 0.02 X10*3/UL (ref 0–0.7)
IMM GRANULOCYTES NFR BLD AUTO: 0.3 % (ref 0–0.9)
INR PPP: 1.1 (ref 0.9–1.1)
KETONES UR STRIP.AUTO-MCNC: NEGATIVE MG/DL
KETONES UR STRIP.AUTO-MCNC: NEGATIVE MG/DL
LDH SERPL L TO P-CCNC: 127 U/L (ref 84–246)
LEUKOCYTE ESTERASE UR QL STRIP.AUTO: ABNORMAL
LEUKOCYTE ESTERASE UR QL STRIP.AUTO: ABNORMAL
LH SERPL-ACNC: 18.9 IU/L
LIPASE SERPL-CCNC: 29 U/L (ref 9–82)
LYMPHOCYTES # BLD AUTO: 0.93 X10*3/UL (ref 1.2–4.8)
LYMPHOCYTES NFR BLD AUTO: 14 %
MAGNESIUM SERPL-MCNC: 1.88 MG/DL (ref 1.6–2.4)
MCH RBC QN AUTO: 31.1 PG (ref 26–34)
MCHC RBC AUTO-ENTMCNC: 34 G/DL (ref 32–36)
MCV RBC AUTO: 92 FL (ref 80–100)
MONOCYTES # BLD AUTO: 0.58 X10*3/UL (ref 0.1–1)
MONOCYTES NFR BLD AUTO: 8.7 %
MUCOUS THREADS #/AREA URNS AUTO: ABNORMAL /LPF
MUCOUS THREADS #/AREA URNS AUTO: ABNORMAL /LPF
NEUTROPHILS # BLD AUTO: 4.94 X10*3/UL (ref 1.2–7.7)
NEUTROPHILS NFR BLD AUTO: 74.1 %
NITRITE UR QL STRIP.AUTO: NEGATIVE
NITRITE UR QL STRIP.AUTO: NEGATIVE
NRBC BLD-RTO: 0 /100 WBCS (ref 0–0)
PH UR STRIP.AUTO: 6 [PH]
PH UR STRIP.AUTO: 6.5 [PH]
PHOSPHATE SERPL-MCNC: 3.4 MG/DL (ref 2.5–4.9)
PLATELET # BLD AUTO: 225 X10*3/UL (ref 150–450)
POTASSIUM SERPL-SCNC: 3.6 MMOL/L (ref 3.5–5.3)
PROT SERPL-MCNC: 5.8 G/DL (ref 6.4–8.2)
PROT UR STRIP.AUTO-MCNC: ABNORMAL MG/DL
PROT UR STRIP.AUTO-MCNC: ABNORMAL MG/DL
PROTHROMBIN TIME: 12.7 SECONDS (ref 9.8–12.8)
RBC # BLD AUTO: 3.5 X10*6/UL (ref 4–5.2)
RBC # UR STRIP.AUTO: ABNORMAL /UL
RBC # UR STRIP.AUTO: ABNORMAL /UL
RBC #/AREA URNS AUTO: >20 /HPF
RBC #/AREA URNS AUTO: >20 /HPF
SODIUM SERPL-SCNC: 140 MMOL/L (ref 136–145)
SP GR UR STRIP.AUTO: 1.01
SP GR UR STRIP.AUTO: 1.03
SQUAMOUS #/AREA URNS AUTO: ABNORMAL /HPF
SQUAMOUS #/AREA URNS AUTO: ABNORMAL /HPF
T4 FREE SERPL-MCNC: 0.84 NG/DL (ref 0.78–1.48)
TSH SERPL-ACNC: 4.74 MIU/L (ref 0.44–3.98)
UROBILINOGEN UR STRIP.AUTO-MCNC: NORMAL MG/DL
UROBILINOGEN UR STRIP.AUTO-MCNC: NORMAL MG/DL
WBC # BLD AUTO: 6.7 X10*3/UL (ref 4.4–11.3)
WBC #/AREA URNS AUTO: >50 /HPF
WBC #/AREA URNS AUTO: >50 /HPF
WBC CLUMPS #/AREA URNS AUTO: ABNORMAL /HPF

## 2024-07-02 PROCEDURE — 96361 HYDRATE IV INFUSION ADD-ON: CPT

## 2024-07-02 PROCEDURE — 82728 ASSAY OF FERRITIN: CPT

## 2024-07-02 PROCEDURE — 82150 ASSAY OF AMYLASE: CPT

## 2024-07-02 PROCEDURE — 82024 ASSAY OF ACTH: CPT

## 2024-07-02 PROCEDURE — 82550 ASSAY OF CK (CPK): CPT

## 2024-07-02 PROCEDURE — 85025 COMPLETE CBC W/AUTO DIFF WBC: CPT

## 2024-07-02 PROCEDURE — 82533 TOTAL CORTISOL: CPT

## 2024-07-02 PROCEDURE — 83001 ASSAY OF GONADOTROPIN (FSH): CPT

## 2024-07-02 PROCEDURE — 85610 PROTHROMBIN TIME: CPT

## 2024-07-02 PROCEDURE — 83690 ASSAY OF LIPASE: CPT

## 2024-07-02 PROCEDURE — 2500000004 HC RX 250 GENERAL PHARMACY W/ HCPCS (ALT 636 FOR OP/ED)

## 2024-07-02 PROCEDURE — 85730 THROMBOPLASTIN TIME PARTIAL: CPT

## 2024-07-02 PROCEDURE — 2560000001 HC RX 256 EXPERIMENTAL DRUGS

## 2024-07-02 PROCEDURE — 96413 CHEMO IV INFUSION 1 HR: CPT

## 2024-07-02 PROCEDURE — 84439 ASSAY OF FREE THYROXINE: CPT

## 2024-07-02 PROCEDURE — 82248 BILIRUBIN DIRECT: CPT

## 2024-07-02 PROCEDURE — 84443 ASSAY THYROID STIM HORMONE: CPT

## 2024-07-02 PROCEDURE — 84100 ASSAY OF PHOSPHORUS: CPT

## 2024-07-02 PROCEDURE — 83615 LACTATE (LD) (LDH) ENZYME: CPT

## 2024-07-02 PROCEDURE — 80053 COMPREHEN METABOLIC PANEL: CPT

## 2024-07-02 PROCEDURE — 82670 ASSAY OF TOTAL ESTRADIOL: CPT

## 2024-07-02 PROCEDURE — 81001 URINALYSIS AUTO W/SCOPE: CPT

## 2024-07-02 PROCEDURE — 83735 ASSAY OF MAGNESIUM: CPT

## 2024-07-02 PROCEDURE — 84075 ASSAY ALKALINE PHOSPHATASE: CPT

## 2024-07-02 PROCEDURE — 86140 C-REACTIVE PROTEIN: CPT

## 2024-07-02 RX ORDER — DIPHENHYDRAMINE HYDROCHLORIDE 50 MG/ML
50 INJECTION INTRAMUSCULAR; INTRAVENOUS AS NEEDED
Status: DISCONTINUED | OUTPATIENT
Start: 2024-07-02 | End: 2024-07-03 | Stop reason: HOSPADM

## 2024-07-02 RX ORDER — EPINEPHRINE 1 MG/ML
0.3 INJECTION INTRAMUSCULAR; INTRAVENOUS; SUBCUTANEOUS EVERY 5 MIN PRN
Status: DISCONTINUED | OUTPATIENT
Start: 2024-07-02 | End: 2024-07-03 | Stop reason: HOSPADM

## 2024-07-02 RX ORDER — ALBUTEROL SULFATE 0.83 MG/ML
3 SOLUTION RESPIRATORY (INHALATION) AS NEEDED
Status: DISCONTINUED | OUTPATIENT
Start: 2024-07-02 | End: 2024-07-03 | Stop reason: HOSPADM

## 2024-07-02 RX ORDER — FAMOTIDINE 10 MG/ML
20 INJECTION INTRAVENOUS ONCE AS NEEDED
Status: DISCONTINUED | OUTPATIENT
Start: 2024-07-02 | End: 2024-07-03 | Stop reason: HOSPADM

## 2024-07-02 NOTE — RESEARCH NOTES
[facility]><[id number]><C5+D1><PARTA>    DCRU NURSING VISIT NOTE  Study Name: ALEXANDRU Foote8  IRB#: [id number]  DCRU#: [id number]  Protocol Version Dated: A9 3.22.23  PI: Roshan Kumar MD.    Time point: Cycle 5 and beyond - Day 1  CYCLE 19     Encounter Date: 07/02/2024  Encounter Time:  8:30 AM EDT  Encounter Department: [facility] HEMATOLOGY AND ONCOLOGY     #1     Phone Pager   Carmen Rios [phone]    #2 Phone Pager        Other Phone Pager          Study Regimen and Dosing   Part A Dose Exploration/Expansion- Small Cell Lung Cancer Relapsed or Refractory patients who progressed or recurred following at least 1 platinum-based regimen.   Cycle = 28 days    administered IV Day 1, Day 8, and Day 15 of Cycle 1. Cycle 2 and beyond     administered on Day 1 and Day 15.        Dietary Guidelines   Regular diet:     Admission and Prior to Starting Study Activities   Notify  when patient arrives to unit.  Complete DCRU/[facility] intake form in EMR.  Insert one peripheral IV line for sample collection procedures (flush line with normal saline following each blood draw). Access mediport (if available) otherwise insert second peripheral line in opposite arm for Investigative drug administration (if peripheral line, flush line with normal saline before & after infusion)     Criteria to Treat   DCRU RN reviewed and meets eligibility to proceed with treatment plan   Time team notified: 1001   DCRU RN notifies study team to review eligibility and approval before dosing procedures  Time team approves: 1019      Subjective   Gladys Branch is a 68 y.o. female and is here for a Research clinical visit.    Visit Provider: [id number] ROOM 4 [facility]     Allergies:   Allergies   Allergen Reactions    Cisplatin Other     CHEMO INDUCED (Moderate); Dyspepsia (Moderate); Headaches (Moderate); Hypotension (Moderate); Numbness (Moderate); Resp Distress (Moderate)     Codeine Nausea Only and Other     nausea    Sulfa (Sulfonamide Antibiotics) Rash       Objective     Vital Signs:    Vitals:    07/02/24 0810   BP: 106/72   Pulse: 84   Resp: 18   Temp: 37 °C (98.6 °F)   TempSrc: Oral   SpO2: 95%   Weight: 52.1 kg (114 lb 13.8 oz)       Physical Exam     ASSESSMENT and PLAN:  Problem List Items Addressed This Visit       Small cell lung cancer (Multi)    Relevant Orders    CBC and Auto Differential    Comprehensive metabolic panel    Acth    Amylase    APTT    Bilirubin, Direct    CK    Cortisol    C-Reactive Protein    Estradiol    Ferritin    FSH & LH    Lipase    Magnesium    Phosphorus    Protime-INR    TSH    T4, free        Medications as of the completion of today's visit:  Current Outpatient Medications   Medication Sig Dispense Refill    cetirizine-pseudoephedrine (ZyrTEC-D) 5-120 mg 12 hr tablet Take 1 tablet by mouth 2 times a day. (Patient taking differently: Take 1 tablet by mouth once daily.) 60 tablet 11    cholecalciferol (Vitamin D-3) 25 MCG (1000 UT) tablet Take by mouth.      dexAMETHasone (Decadron) 2 mg tablet Take 0.5 tablets (1 mg) by mouth once daily with breakfast. 30 tablet 2    dextromethorphan (Delsym) 30 mg/5 mL liquid Take 5 mL (30 mg) by mouth once daily as needed.      dronabinol (Marinol) 2.5 mg capsule once daily as needed. One hour before dinner      estrogens, conjugated, (Premarin) 0.3 mg tablet Take 0.5 tablets (0.15 mg) by mouth once daily.      mv-min/FA/vit K/lutein/zeaxant (PRESERVISION AREDS 2 PLUS MV ORAL) Take 1 tablet by mouth once daily.      omeprazole (PriLOSEC) 40 mg DR capsule Take 1 capsule (40 mg) by mouth once daily. Do not crush or chew. 90 each 3    ondansetron (Zofran) 8 mg tablet Take 0.5 tablets (4 mg) by mouth every 8 hours if needed.      polyethylene glycol (Glycolax, Miralax) 17 gram packet Take 17 g by mouth 2 times a day. (Patient taking differently: Take 17 g by mouth if needed.) 60 packet 2    prochlorperazine  (Compazine) 10 mg tablet Take 0.5 tablets (5 mg) by mouth every 6 hours if needed.      psyllium husk, aspartame, (Metamucil Sugar-Free, aspart,) 3.4 gram/5.8 gram powder Take 1 Dose by mouth once daily. (Patient taking differently: Take 1 Dose by mouth once daily as needed.) 425 g 11     No current facility-administered medications for this encounter.           Orders placed during today's visit:  Orders Placed This Encounter   Procedures    CBC and Auto Differential     Standing Status:   Standing     Number of Occurrences:   1     Order Specific Question:   Release result to MyChart     Answer:   Immediate [1]    Comprehensive metabolic panel     Standing Status:   Standing     Number of Occurrences:   1     Order Specific Question:   Release result to MyChart     Answer:   Immediate [1]    Acth     Standing Status:   Standing     Number of Occurrences:   1     Order Specific Question:   Release result to MyChart     Answer:   Immediate [1]    Amylase     Standing Status:   Standing     Number of Occurrences:   1     Order Specific Question:   Release result to MyChart     Answer:   Immediate [1]    APTT     Standing Status:   Standing     Number of Occurrences:   1     Order Specific Question:   Release result to MyChart     Answer:   Immediate [1]    Bilirubin, Direct     Standing Status:   Standing     Number of Occurrences:   1     Order Specific Question:   Release result to MyChart     Answer:   Immediate [1]    CK     Standing Status:   Standing     Number of Occurrences:   1     Order Specific Question:   Release result to MyChart     Answer:   Immediate [1]    Cortisol     Standing Status:   Standing     Number of Occurrences:   1     Order Specific Question:   Release result to MyChart     Answer:   Immediate [1]    C-Reactive Protein     Standing Status:   Standing     Number of Occurrences:   1     Order Specific Question:   Release result to MyChart     Answer:   Immediate [1]    Estradiol      Standing Status:   Standing     Number of Occurrences:   1     Order Specific Question:   Release result to MyChart     Answer:   Immediate [1]    Ferritin     Standing Status:   Standing     Number of Occurrences:   1     Order Specific Question:   Release result to MyChart     Answer:   Immediate [1]    FSH & LH     Standing Status:   Standing     Number of Occurrences:   1     Order Specific Question:   Release result to MyChart     Answer:   Immediate [1]    Lipase     Standing Status:   Standing     Number of Occurrences:   1     Order Specific Question:   Release result to MyChart     Answer:   Immediate [1]    Magnesium     Standing Status:   Standing     Number of Occurrences:   1     Order Specific Question:   Release result to MyChart     Answer:   Immediate [1]    Phosphorus     Standing Status:   Standing     Number of Occurrences:   1     Order Specific Question:   Release result to MyChart     Answer:   Immediate [1]    Protime-INR     Standing Status:   Standing     Number of Occurrences:   1     Order Specific Question:   Release result to MyChart     Answer:   Immediate [1]    TSH     Standing Status:   Standing     Number of Occurrences:   1     Order Specific Question:   Release result to MyChart     Answer:   Immediate [1]    T4, free     Standing Status:   Standing     Number of Occurrences:   1     Order Specific Question:   Release result to MyChart     Answer:   Immediate [1]        [id number] -  in Subjects With Small Cell Lung Cancer    Patient has no adverse events documented in the Research Adverse Events activity.       Study Specific Instructions and Documentation   WEIGHT  LABS  VITALS  CORREATIVES  STUDY DRUG  VITALS  DISCHARGE     WEIGHT    Obtain weight in kilograms - with shoes off & heavy items removed.   Vitals:    07/02/24 0810   Weight: 52.1 kg (114 lb 13.8 oz)        PRE-DOSE Safety Labs   Refer to [facility] Treatment Plan for orders     Pre-dose Vital Signs   Temp, HR,  Resp, BP, Pulse Oximetry: Seated or Semi-Fowlers x 5 minutes before obtaining  Position and temperature location: should be the same that is used throughout the study-record position  Vitals:    07/02/24 1125   BP: 128/77   Pulse: 86   Resp: 18   Temp: 36.6 °C (97.9 °F)   SpO2: 95%        Pre-dose Research Correlatives  Deliver to DCRU/[facility] lab for processing   Access Type: Peripheral 22g Location: Right FA   Refer to [facility] Treatment Plan for orders   Time point Specimen Test Volume Tube Handling Draw Time   Pre-dose (within 15 mins of  dosing)    draw in order [facility]      4 mL CPT Ambient, Invert 8-10X N/A    Anti- Antibody 2.5 mL SST Ambient, Invert 5X 1250    TARLATAMAB PK  (through cycle 12) 2.5 mL SST Ambient, Invert 5X N/A    Serum Markers 2.5 mL SST Ambient, Invert 5X 1250    Immune Cells 4 mL   K3 EDTA Ambient, Invert 8-10X 1250          Infusion-Related Reactions   [facility] Hypersensitivity Orders  Note: CRS table      Research Drug Administration Tarlatamab ()   Refer to [facility] Treatment Plan for orders   Administer dose over 60 minutes (+/- 10 mins) followed by a 3 - 5 minute Normal saline flush. (This will be the end time after the flush). Document start time & end of study medication; document start time and end time of the flush.  Participant to remain on Unit for 2 hour post dose observation.      Day 1 Post-Dose Vital Signs   Temp, HR, Resp, BP, Pulse Oximetry: Seated or Semi-Fowlers x 5 minutes before obtaining  Position and temperature location: should be the same that is used throughout the study  End of Infusion  Vitals:    07/02/24 1356   BP: 124/89   Pulse: 84   Resp: 16   Temp: 36.6 °C (97.9 °F)   SpO2: 97%        Discharge Instructions   Patient may be discharged to home after 2 hour observation.  Remind patient to return: Day 15 for treatment & labs  Discharge time 1402     Davey Melchor RN  07/02/24      Grading and Management of Cytokine Release Syndrome   CRS Grade  Description of Severity Minimum Expected Intervention Instructions for Dose Modifications of    1 Symptoms are not life threatening and require symptomatic treatment only,  eg, fever, nausea, fatigue, headache, myalgia's, malaise Administer symptomatic treatment (contact provider for medications/doses). Monitor for CRS symptoms including vital signs and pulse oximetry at least Q2 hours for12 hours or until resolution, Whichever is earlier. N/A     (continued)  Grading and Management of Cytokine Release Syndrome   CRS Grade Description of Severity Minimum Expected Intervention Instructions for Dose Modifications of    2 Symptoms require and respond to moderate intervention  Oxygen requirement <40%,                      OR  Hypotension responsive to fluids or low dose of one vasopressor, OR                                                                       Grade 2 organ toxicity or grade 3 transaminitis per CTCAE criteria Administer:  Symptomatic treatment   (contact provider for medications/doses)  Supplemental oxygen when oxygen saturation is <90% on room air  Intravenous fluids or low dose vasopressor for hypotension is recommended when systolic blood pressure is <85 mmHg. (contact provider for medications/doses) Persistent tachycardia (eg >120 bpm) may also indicate the need for intervention for hypotension.   Monitor for CRS symptoms including vital signs and pulse oximetry at least Q2 hours for12 hours or until resolution to CRS grade <= 1, whichever is earlier. For subjects with extensive co-morbidities or poor performance status, manage per grade 3 CRS guidance below If CRS occurs during  treatment, immediately interrupt the infusion and delay the next  dose until the event resolves to CRS grade <= 1 for no less than 72 hours. Permanently discontinue  if there is no improvement to CRS <= grade 1 within 7 days     3 Symptoms require and respond to aggressive  intervention  Oxygen requirement >=40%, OR  Hypotension requiring high dose or multiple vasopressors, OR  Grade 3 organ toxicity or     Grade 4 transaminitis per  CTCAE criteria Admit to intensive care unit for close clinical and vital sign monitoring per institutional guidelines.   Refer to provider/protocol for medications and doses.     If CRS occurs during  treatment, immediately interrupt   and delay the next dose until event resolves to CRS grade <= 1 for no less than 72 hours.    Permanently discontinue  if there is no improvement to CRS                 <= grade 2 within 5 days or CRS <= grade 1 within 7 days.  Permanently discontinue   if CRS grade  3 occurs at the initial run in dose (i.e., at MTD1)  (Applicable only after MTD1 has been defined).     (continued)  Grading and Management of Cytokine Release Syndrome   CRS Grade Description of Severity Minimum Expected Intervention Instructions for Dose Modifications of    4 Life-threatening symptoms  Requirement for ventilator support OR  Grade 4 organ toxicity (excluding transaminitis) per CTCAE criteria Admit to intensive care unit for close clinical and vital sign monitoring per institutional guidelines.   Refer to provider/protocol for medications and doses.   If CRS occurs during  treatment, Immediately stop the infusion.  Permanently discontinue   therapy.

## 2024-07-02 NOTE — SIGNIFICANT EVENT
07/02/24 1040   Prechemo Checklist   Has the patient been in the hospital, ED, or urgent care since last date of service Yes   Chemo/Immuno Consent Completed and Signed Yes   Protocol/Indications Verified Yes   Confirmed to previous date/time of medication Yes   Compared to previous dose Yes   All medications are dated accurately Yes   Pregnancy Test Negative No   Parameters Met Yes   BSA/Weight-Height Verified Yes   Dose Calculations Verified (current, total, cumulative) Yes

## 2024-07-02 NOTE — RESEARCH NOTES
Research Note Treatment Day    Gladys Branch is here today for treatment on [id number]. Today is C19D1. Procedures completed per protocol. AE's and con-meds reviewed with patient. Patient is aware of treatment plan.    [x]   Received treatment as planned   OR  []    Treatment delayed; patient calendar updated as required   Treatment delayed because:    []   AE    []   Physician Discretion    []   Clinical Deterioration or Progression     []   Other    Education Documentation  Memory Problems, taught by Vladimir Fernandez RN at 7/2/2024  1:37 PM.  Learner: Significant Other, Patient  Readiness: Eager  Method: Explanation  Response: Verbalizes Understanding    General Medication Information, taught by Vladimir Fernandez RN at 7/2/2024  1:37 PM.  Learner: Significant Other, Patient  Readiness: Eager  Method: Explanation  Response: Verbalizes Understanding    Treatment Plan and Schedule, taught by Vladimir Fernandez RN at 7/2/2024  1:37 PM.  Learner: Significant Other, Patient  Readiness: Eager  Method: Explanation  Response: Verbalizes Understanding    Supportive Medications, taught by Vladimir Fernandez RN at 7/2/2024  1:37 PM.  Learner: Significant Other, Patient  Readiness: Eager  Method: Explanation  Response: Verbalizes Understanding    Nutrition/Diet, taught by Vladimir Fernandez RN at 7/2/2024  1:37 PM.  Learner: Significant Other, Patient  Readiness: Eager  Method: Explanation  Response: Verbalizes Understanding    Diagnostic Studies, taught by Vladimir Fernandez RN at 7/2/2024  1:37 PM.  Learner: Significant Other, Patient  Readiness: Eager  Method: Explanation  Response: Verbalizes Understanding    Education Comments  No comments found.

## 2024-07-02 NOTE — PROGRESS NOTES
Patient ID: Gladys Branch is a 68 y.o. female.    DIAGNOSIS    Small Cell Lung Cancer    By immunostaining, the tumor cells are positive for CAM 5.2, INSM1, and TTF-1, and negative for p40 and LCA. The proliferation index by Ki-67 immunostaining is approximately 90%.  Synaptophysin, Chromogranin and INSM-1 are positive.  AE1/AE3 is negative. NEOGENOMICS, RB protein expression is lost in the tumor cells    STAGING    [id number], limited stage  Recurrence    CURRENT SITES OF DISEASE    RLL, supraclavicular    MOLECULAR GENOMICS      PRIOR THERAPY    Concurrent chemoradiation with Cisplatin/Etoposide every 3 weeks; C1D1 2/15/22, reaction to cisplatin C2D1 and did not receive D2 or D3 etoposide  Carbo/etoposide 4/5/2022 1 cycle c/b rash  PCI 5/21-6/13/22    CURRENT THERAPY    Phase 1 study [id number]  with study drug Taralatamab 1/24/23 - present.    CURRENT ONCOLOGICAL PROBLEMS      HISTORY OF PRESENT ILLNESS    Mrs. Branch is a 67 yo with PMH GERD and COPD who originally was round to have a RLL nodule measuring 11 mm in 4/28/2021 found as part of CT calcium score scan. An ensuing CT chest w/o contrast was done on 5/19/21 which revealed subsolid pulmonary nodules measuring 14 mm in the RLL. She had CT chest w/contrast on 11/29/21 which confirmed stable 14 mm GGO of the RLL and decreased RLL subpleural nodule. She met thoracic surgeon Dr. Farfan, who ordered PET/CT scan done on 12/8/21 which did not reveal any FDG-avid nodules within the lung parenchyma but R hilar nodes with max SUV 8.0. He referred her for bronch, which was done on 1/21/22 by Dr. Mcdaniels. 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 Brain MRI was negative.    She started concurrent chemoradiation with BID radiation with cis/etop 2/15/22, and unfortunately had a reaction to cisplatin on C2D1 with chest pain and hypotension. She was hospitalized with sepsis felt to be due to pneumonia. She trialed carboplatin/etoposide on 4/5/22 with a  resulting rash and opted to forego further chemotherapy as she had completed radiation. Restaging scan 11/3/22 unfortunately with progression with new R hilar lymph node, paratracheal nodes, and increase in size of R supraclavicular mass. She enrolled in [id number] and started C1D1 1/24/23. C1 complicated by anorexia and dysgeusia, and delayed C2 by a week. She has further struggled with fatigue and confusion, which may be related to mirtazapine. Dose reduced for C2 and mirtazapine stopped with improvement in symptoms.     PAST MEDICAL HISTORY    GERD  COPD    SOCIAL HISTORY    Retired - lighting . Lives at home with  and a kitten.  Tobacco: quit smoking 1999. 1 ppd x 25 yrs.  EtOH: wine 1 glass/day  Illicits: none    CURRENT MEDS    Please see med list    ALLERGIES    Codeine, Sulfa drugs, Cisplatin    FAMILY HISTORY    Paternal aunt - breast cancer dx 80s    Subjective    Today 6.18.2024. Patient in clinic with her  for C19D1 for [id number].    Patient reports this past weekend she when to the ER because of symptoms of fatigue and abdominal pain. She reports her abdominal pain resolved when she was in the ER. She also got a imaging which was negative for any acute finding. Today she reports he is back to baseline. She reports her fatigue is no worse than her baseline fatigue. She continues to have vision problems and will be getting her new lenses this month. She was able to read in clinic without any problems. She continues to experience some epigastric discomfort which is her baseline. Today she denies any pain, dizziness, lightheadedness, headaches, rash/itching, chills or fever, SOB, cough, sputum, chest pain or chest tightness, painful inflammation/ulceration oral mucous membranes, nausea/vomiting, diarrhea/constipation, hematemesis /hemoptysis, hematuria/rectal bleeding, urinary symptoms, swelling extremities, weakness, or peripheral neuropathy. ROS as above. Remainder of 10 point  review of systems elicited and otherwise unremarkable.       Objective          6/10/2024     9:45 AM 6/10/2024     9:55 AM 6/18/2024     8:28 AM 6/18/2024     1:56 PM 7/2/2024     8:10 AM 7/2/2024    11:25 AM 7/2/2024     1:56 PM   Vitals   Systolic 107 95 112 102 106 128 124   Diastolic 77 68 73 71 72 77 89   Heart Rate 73 71 76 76 84 86 84   Temp  36.6 °C (97.9 °F) 36.5 °C (97.7 °F) 36.4 °C (97.6 °F) 37 °C (98.6 °F) 36.6 °C (97.9 °F) 36.6 °C (97.9 °F)   Resp 16 16 16 18 18 18 16   Weight (lb)   111.77  114.86     BMI   21.83 kg/m2  22.43 kg/m2     BSA (m2)   1.47 m2  1.49 m2     Visit Report   Report Report          Daily Weight  07/02/24 : 52.1 kg (114 lb 13.8 oz)  06/18/24 : 50.7 kg (111 lb 12.4 oz)  06/10/24 : 50.8 kg (112 lb)  06/04/24 : 51.1 kg (112 lb 10.5 oz)  05/21/24 : 50.1 kg (110 lb 7.2 oz)         Physical Exam  Constitutional:       General: She is awake.      Appearance: Normal appearance. She is normal weight.   HENT:      Head: Normocephalic.      Mouth/Throat:      Mouth: Mucous membranes are moist.   Eyes:      Extraocular Movements: Extraocular movements intact.      Conjunctiva/sclera: Conjunctivae normal.      Pupils: Pupils are equal, round, and reactive to light.   Cardiovascular:      Rate and Rhythm: Normal rate and regular rhythm.      Heart sounds: Normal heart sounds, S1 normal and S2 normal.   Pulmonary:      Effort: Pulmonary effort is normal.      Breath sounds: Normal breath sounds.   Abdominal:      General: Abdomen is flat. Bowel sounds are normal.      Palpations: Abdomen is soft.   Musculoskeletal:      Cervical back: Normal range of motion and neck supple.   Skin:     Comments: No skin rash observed   Neurological:      Mental Status: She is alert.      Cranial Nerves: Cranial nerves 2-12 are intact.      Sensory: Sensation is intact.      Motor: Motor function is intact.      Coordination: Coordination is intact.   Psychiatric:         Attention and Perception: Attention  normal.         Mood and Affect: Mood normal.         Speech: Speech normal.         Behavior: Behavior normal. Behavior is cooperative.         Cognition and Memory: Cognition normal.     Labs    Hospital Outpatient Visit on 07/02/2024   Component Date Value Ref Range Status    WBC 07/02/2024 6.7  4.4 - 11.3 x10*3/uL Final    nRBC 07/02/2024 0.0  0.0 - 0.0 /100 WBCs Final    RBC 07/02/2024 3.50 (L)  4.00 - 5.20 x10*6/uL Final    Hemoglobin 07/02/2024 10.9 (L)  12.0 - 16.0 g/dL Final    Hematocrit 07/02/2024 32.1 (L)  36.0 - 46.0 % Final    MCV 07/02/2024 92  80 - 100 fL Final    MCH 07/02/2024 31.1  26.0 - 34.0 pg Final    MCHC 07/02/2024 34.0  32.0 - 36.0 g/dL Final    RDW 07/02/2024 12.8  11.5 - 14.5 % Final    Platelets 07/02/2024 225  150 - 450 x10*3/uL Final    Neutrophils % 07/02/2024 74.1  40.0 - 80.0 % Final    Immature Granulocytes %, Automated 07/02/2024 0.3  0.0 - 0.9 % Final    Lymphocytes % 07/02/2024 14.0  13.0 - 44.0 % Final    Monocytes % 07/02/2024 8.7  2.0 - 10.0 % Final    Eosinophils % 07/02/2024 2.7  0.0 - 6.0 % Final    Basophils % 07/02/2024 0.2  0.0 - 2.0 % Final    Neutrophils Absolute 07/02/2024 4.94  1.20 - 7.70 x10*3/uL Final    Immature Granulocytes Absolute, Au* 07/02/2024 0.02  0.00 - 0.70 x10*3/uL Final    Lymphocytes Absolute 07/02/2024 0.93 (L)  1.20 - 4.80 x10*3/uL Final    Monocytes Absolute 07/02/2024 0.58  0.10 - 1.00 x10*3/uL Final    Eosinophils Absolute 07/02/2024 0.18  0.00 - 0.70 x10*3/uL Final    Basophils Absolute 07/02/2024 0.01  0.00 - 0.10 x10*3/uL Final    Glucose 07/02/2024 177 (H)  74 - 99 mg/dL Final    Sodium 07/02/2024 140  136 - 145 mmol/L Final    Potassium 07/02/2024 3.6  3.5 - 5.3 mmol/L Final    Chloride 07/02/2024 107  98 - 107 mmol/L Final    Bicarbonate 07/02/2024 24  21 - 32 mmol/L Final    Anion Gap 07/02/2024 13  10 - 20 mmol/L Final    Urea Nitrogen 07/02/2024 17  6 - 23 mg/dL Final    Creatinine 07/02/2024 0.70  0.50 - 1.05 mg/dL Final    eGFR  07/02/2024 >90  >60 mL/min/1.73m*2 Final    Calcium 07/02/2024 8.6  8.6 - 10.6 mg/dL Final    Albumin 07/02/2024 3.5  3.4 - 5.0 g/dL Final    Alkaline Phosphatase 07/02/2024 43  33 - 136 U/L Final    Total Protein 07/02/2024 5.8 (L)  6.4 - 8.2 g/dL Final    AST 07/02/2024 14  9 - 39 U/L Final    Bilirubin, Total 07/02/2024 0.5  0.0 - 1.2 mg/dL Final    ALT 07/02/2024 8  7 - 45 U/L Final    Amylase 07/02/2024 34  29 - 103 U/L Final    aPTT 07/02/2024 31  27 - 38 seconds Final    Bilirubin, Direct 07/02/2024 0.1  0.0 - 0.3 mg/dL Final    Creatine Kinase 07/02/2024 62  0 - 215 U/L Final    Cortisol 07/02/2024 8.4  2.5 - 20.0 ug/dL Final    C-Reactive Protein 07/02/2024 6.35 (H)  <1.00 mg/dL Final    Estradiol 07/02/2024 <19  pg/mL Final    Ferritin 07/02/2024 129  8 - 150 ng/mL Final    Follicle Stimulating Hormone 07/02/2024 41.6  IU/L Final    Luteinizing Hormone 07/02/2024 18.9  IU/L Final    Lipase 07/02/2024 29  9 - 82 U/L Final    Magnesium 07/02/2024 1.88  1.60 - 2.40 mg/dL Final    Phosphorus 07/02/2024 3.4  2.5 - 4.9 mg/dL Final    Protime 07/02/2024 12.7  9.8 - 12.8 seconds Final    INR 07/02/2024 1.1  0.9 - 1.1 Final    Thyroid Stimulating Hormone 07/02/2024 4.74 (H)  0.44 - 3.98 mIU/L Final    Thyroxine, Free 07/02/2024 0.84  0.78 - 1.48 ng/dL Final    Color, Urine 07/02/2024 Yellow  Light-Yellow, Yellow, Dark-Yellow Final    Appearance, Urine 07/02/2024 Turbid (N)  Clear Final    Specific Gravity, Urine 07/02/2024 1.029  1.005 - 1.035 Final    pH, Urine 07/02/2024 6.0  5.0, 5.5, 6.0, 6.5, 7.0, 7.5, 8.0 Final    Protein, Urine 07/02/2024 200 (2+) (A)  NEGATIVE, 10 (TRACE), 20 (TRACE) mg/dL Final    Glucose, Urine 07/02/2024 30 (TRACE) (A)  Normal mg/dL Final    Blood, Urine 07/02/2024 OVER (3+) (A)  NEGATIVE Final    Ketones, Urine 07/02/2024 NEGATIVE  NEGATIVE mg/dL Final    Bilirubin, Urine 07/02/2024 NEGATIVE  NEGATIVE Final    Urobilinogen, Urine 07/02/2024 Normal  Normal mg/dL Final    Nitrite, Urine  07/02/2024 NEGATIVE  NEGATIVE Final    Leukocyte Esterase, Urine 07/02/2024 500 Mary/µL (A)  NEGATIVE Final    LDH 07/02/2024 127  84 - 246 U/L Final    WBC, Urine 07/02/2024 >50 (A)  1-5, NONE /HPF Final    RBC, Urine 07/02/2024 >20 (A)  NONE, 1-2, 3-5 /HPF Final    Squamous Epithelial Cells, Urine 07/02/2024 10-25 (FEW)  Reference range not established. /HPF Final    Mucus, Urine 07/02/2024 1+  Reference range not established. /LPF Final    Calcium Oxalate Crystals, Urine 07/02/2024 4+ (A)  NONE, 1+ /HPF Final    Extra Tube 07/02/2024 Hold for add-ons.   Final    Extra Tube 07/02/2024 Hold for add-ons.   Final    Extra Tube 07/02/2024 Hold for add-ons.   Final    Color, Urine 07/02/2024 Light-Yellow  Light-Yellow, Yellow, Dark-Yellow Final    Appearance, Urine 07/02/2024 Clear  Clear Final    Specific Gravity, Urine 07/02/2024 1.010  1.005 - 1.035 Final    pH, Urine 07/02/2024 6.5  5.0, 5.5, 6.0, 6.5, 7.0, 7.5, 8.0 Final    Protein, Urine 07/02/2024 20 (TRACE)  NEGATIVE, 10 (TRACE), 20 (TRACE) mg/dL Final    Glucose, Urine 07/02/2024 Normal  Normal mg/dL Final    Blood, Urine 07/02/2024 0.1 (1+) (A)  NEGATIVE Final    Ketones, Urine 07/02/2024 NEGATIVE  NEGATIVE mg/dL Final    Bilirubin, Urine 07/02/2024 NEGATIVE  NEGATIVE Final    Urobilinogen, Urine 07/02/2024 Normal  Normal mg/dL Final    Nitrite, Urine 07/02/2024 NEGATIVE  NEGATIVE Final    Leukocyte Esterase, Urine 07/02/2024 250 Mary/µL (A)  NEGATIVE Final    WBC, Urine 07/02/2024 >50 (A)  1-5, NONE /HPF Final    WBC Clumps, Urine 07/02/2024 OCCASIONAL  Reference range not established. /HPF Final    RBC, Urine 07/02/2024 >20 (A)  NONE, 1-2, 3-5 /HPF Final    Squamous Epithelial Cells, Urine 07/02/2024 1-9 (SPARSE)  Reference range not established. /HPF Final    Mucus, Urine 07/02/2024 FEW  Reference range not established. /LPF Final   Hospital Outpatient Visit on 06/18/2024   Component Date Value Ref Range Status    WBC 06/18/2024 6.1  4.4 - 11.3 x10*3/uL  Final    nRBC 06/18/2024 0.0  0.0 - 0.0 /100 WBCs Final    RBC 06/18/2024 3.67 (L)  4.00 - 5.20 x10*6/uL Final    Hemoglobin 06/18/2024 11.4 (L)  12.0 - 16.0 g/dL Final    Hematocrit 06/18/2024 33.9 (L)  36.0 - 46.0 % Final    MCV 06/18/2024 92  80 - 100 fL Final    MCH 06/18/2024 31.1  26.0 - 34.0 pg Final    MCHC 06/18/2024 33.6  32.0 - 36.0 g/dL Final    RDW 06/18/2024 13.1  11.5 - 14.5 % Final    Platelets 06/18/2024 224  150 - 450 x10*3/uL Final    Thyroid Stimulating Hormone 06/18/2024 1.92  0.44 - 3.98 mIU/L Final    aPTT 06/18/2024 31  27 - 38 seconds Final    Bilirubin, Direct 06/18/2024 0.1  0.0 - 0.3 mg/dL Final    Creatine Kinase 06/18/2024 48  0 - 215 U/L Final    C-Reactive Protein 06/18/2024 0.51  <1.00 mg/dL Final    Ferritin 06/18/2024 68  8 - 150 ng/mL Final    Magnesium 06/18/2024 2.05  1.60 - 2.40 mg/dL Final    Phosphorus 06/18/2024 4.0  2.5 - 4.9 mg/dL Final    Protime 06/18/2024 11.2  9.8 - 12.8 seconds Final    INR 06/18/2024 1.0  0.9 - 1.1 Final    WBC 06/18/2024 6.1  4.4 - 11.3 x10*3/uL Final    RBC 06/18/2024 3.67 (L)  4.00 - 5.20 x10*6/uL Final    Hemoglobin 06/18/2024 11.4 (L)  12.0 - 16.0 g/dL Final    Hematocrit 06/18/2024 33.9 (L)  36.0 - 46.0 % Final    MCV 06/18/2024 92  80 - 100 fL Final    MCHC 06/18/2024 33.6  32.0 - 36.0 g/dL Final    RDW 06/18/2024 13.1  11.5 - 14.5 % Final    Platelets 06/18/2024 224  150 - 450 x10*3/uL Final    Neutrophils % 06/18/2024 70.0  40.0 - 80.0 % Final    Immature Granulocytes %, Automated 06/18/2024 0.5  0.0 - 0.9 % Final    Lymphocytes % 06/18/2024 17.1  13.0 - 44.0 % Final    Monocytes % 06/18/2024 8.7  2.0 - 10.0 % Final    Eosinophils % 06/18/2024 3.2  0.0 - 6.0 % Final    Basophils % 06/18/2024 0.5  0.0 - 2.0 % Final    Neutrophils Absolute 06/18/2024 4.21  1.20 - 7.70 x10*3/uL Final    Immature Granulocytes Absolute, Au* 06/18/2024 0.03  0.00 - 0.70 x10*3/uL Final    Lymphocytes Absolute 06/18/2024 1.03 (L)  1.20 - 4.80 x10*3/uL Final     Monocytes Absolute 06/18/2024 0.52  0.10 - 1.00 x10*3/uL Final    Eosinophils Absolute 06/18/2024 0.19  0.00 - 0.70 x10*3/uL Final    Basophils Absolute 06/18/2024 0.03  0.00 - 0.10 x10*3/uL Final    Glucose 06/18/2024 93  74 - 99 mg/dL Final    Sodium 06/18/2024 141  136 - 145 mmol/L Final    Potassium 06/18/2024 3.8  3.5 - 5.3 mmol/L Final    Chloride 06/18/2024 105  98 - 107 mmol/L Final    Bicarbonate 06/18/2024 26  21 - 32 mmol/L Final    Anion Gap 06/18/2024 14  10 - 20 mmol/L Final    Urea Nitrogen 06/18/2024 22  6 - 23 mg/dL Final    Creatinine 06/18/2024 0.78  0.50 - 1.05 mg/dL Final    eGFR 06/18/2024 83  >60 mL/min/1.73m*2 Final    Calcium 06/18/2024 8.7  8.6 - 10.6 mg/dL Final    Albumin 06/18/2024 3.4  3.4 - 5.0 g/dL Final    Alkaline Phosphatase 06/18/2024 45  33 - 136 U/L Final    Total Protein 06/18/2024 5.7 (L)  6.4 - 8.2 g/dL Final    AST 06/18/2024 15  9 - 39 U/L Final    Bilirubin, Total 06/18/2024 0.6  0.0 - 1.2 mg/dL Final    ALT 06/18/2024 12  7 - 45 U/L Final   Hospital Outpatient Visit on 06/04/2024   Component Date Value Ref Range Status    WBC 06/04/2024 6.8  4.4 - 11.3 x10*3/uL Final    nRBC 06/04/2024 0.0  0.0 - 0.0 /100 WBCs Final    RBC 06/04/2024 3.77 (L)  4.00 - 5.20 x10*6/uL Final    Hemoglobin 06/04/2024 11.7 (L)  12.0 - 16.0 g/dL Final    Hematocrit 06/04/2024 34.8 (L)  36.0 - 46.0 % Final    MCV 06/04/2024 92  80 - 100 fL Final    MCH 06/04/2024 31.0  26.0 - 34.0 pg Final    MCHC 06/04/2024 33.6  32.0 - 36.0 g/dL Final    RDW 06/04/2024 13.3  11.5 - 14.5 % Final    Platelets 06/04/2024 213  150 - 450 x10*3/uL Final    Neutrophils % 06/04/2024 68.0  40.0 - 80.0 % Final    Immature Granulocytes %, Automated 06/04/2024 0.4  0.0 - 0.9 % Final    Lymphocytes % 06/04/2024 19.1  13.0 - 44.0 % Final    Monocytes % 06/04/2024 9.0  2.0 - 10.0 % Final    Eosinophils % 06/04/2024 2.8  0.0 - 6.0 % Final    Basophils % 06/04/2024 0.7  0.0 - 2.0 % Final    Neutrophils Absolute 06/04/2024 4.61   1.20 - 7.70 x10*3/uL Final    Immature Granulocytes Absolute, Au* 06/04/2024 0.03  0.00 - 0.70 x10*3/uL Final    Lymphocytes Absolute 06/04/2024 1.30  1.20 - 4.80 x10*3/uL Final    Monocytes Absolute 06/04/2024 0.61  0.10 - 1.00 x10*3/uL Final    Eosinophils Absolute 06/04/2024 0.19  0.00 - 0.70 x10*3/uL Final    Basophils Absolute 06/04/2024 0.05  0.00 - 0.10 x10*3/uL Final    Glucose 06/04/2024 86  74 - 99 mg/dL Final    Sodium 06/04/2024 138  136 - 145 mmol/L Final    Potassium 06/04/2024 4.0  3.5 - 5.3 mmol/L Final    Chloride 06/04/2024 102  98 - 107 mmol/L Final    Bicarbonate 06/04/2024 29  21 - 32 mmol/L Final    Anion Gap 06/04/2024 11  10 - 20 mmol/L Final    Urea Nitrogen 06/04/2024 18  6 - 23 mg/dL Final    Creatinine 06/04/2024 0.72  0.50 - 1.05 mg/dL Final    eGFR 06/04/2024 >90  >60 mL/min/1.73m*2 Final    Calcium 06/04/2024 9.0  8.6 - 10.6 mg/dL Final    Albumin 06/04/2024 3.8  3.4 - 5.0 g/dL Final    Alkaline Phosphatase 06/04/2024 47  33 - 136 U/L Final    Total Protein 06/04/2024 6.2 (L)  6.4 - 8.2 g/dL Final    AST 06/04/2024 20  9 - 39 U/L Final    Bilirubin, Total 06/04/2024 0.8  0.0 - 1.2 mg/dL Final    ALT 06/04/2024 13  7 - 45 U/L Final    Adrenocorticotropic Hormone (ACTH) 06/04/2024 38.0  7.2 - 63.3 pg/mL Final    Amylase 06/04/2024 35  29 - 103 U/L Final    aPTT 06/04/2024 31  27 - 38 seconds Final    Bilirubin, Direct 06/04/2024 0.1  0.0 - 0.3 mg/dL Final    Creatine Kinase 06/04/2024 56  0 - 215 U/L Final    Cortisol 06/04/2024 11.9  2.5 - 20.0 ug/dL Final    C-Reactive Protein 06/04/2024 <0.10  <1.00 mg/dL Final    Estradiol 06/04/2024 <19  pg/mL Final    Ferritin 06/04/2024 66  8 - 150 ng/mL Final    Follicle Stimulating Hormone 06/04/2024 51.1  IU/L Final    Luteinizing Hormone 06/04/2024 18.8  IU/L Final    Lipase 06/04/2024 37  9 - 82 U/L Final    Magnesium 06/04/2024 1.86  1.60 - 2.40 mg/dL Final    Phosphorus 06/04/2024 4.1  2.5 - 4.9 mg/dL Final    Protime 06/04/2024 11.5  9.8 -  12.8 seconds Final    INR 06/04/2024 1.0  0.9 - 1.1 Final    Thyroid Stimulating Hormone 06/04/2024 9.11 (H)  0.44 - 3.98 mIU/L Final    Thyroxine, Free 06/04/2024 0.90  0.78 - 1.48 ng/dL Final    Color, Urine 06/04/2024 Light-Yellow  Light-Yellow, Yellow, Dark-Yellow Final    Appearance, Urine 06/04/2024 Clear  Clear Final    Specific Gravity, Urine 06/04/2024 1.019  1.005 - 1.035 Final    pH, Urine 06/04/2024 7.0  5.0, 5.5, 6.0, 6.5, 7.0, 7.5, 8.0 Final    Protein, Urine 06/04/2024 NEGATIVE  NEGATIVE, 10 (TRACE), 20 (TRACE) mg/dL Final    Glucose, Urine 06/04/2024 Normal  Normal mg/dL Final    Blood, Urine 06/04/2024 NEGATIVE  NEGATIVE Final    Ketones, Urine 06/04/2024 NEGATIVE  NEGATIVE mg/dL Final    Bilirubin, Urine 06/04/2024 NEGATIVE  NEGATIVE Final    Urobilinogen, Urine 06/04/2024 Normal  Normal mg/dL Final    Nitrite, Urine 06/04/2024 NEGATIVE  NEGATIVE Final    Leukocyte Esterase, Urine 06/04/2024 NEGATIVE  NEGATIVE Final    Extra Tube 06/04/2024 Hold for add-ons.   Final    Extra Tube 06/04/2024 Hold for add-ons.   Final    Extra Tube 06/04/2024 Hold for add-ons.   Final    Extra Tube 06/04/2024 Hold for add-ons.   Final    Extra Tube 06/04/2024 Hold for add-ons.   Final            Performance Status:    ECOG 1: Restricted in physically strenuous activity but ambulatory and able to carry out work of a light or sedentary nature, e.g., light house work, office work.         Assessment/Plan      Mrs. Branch is a 69 yo with COPD and GERD who presented with newly diagnosed SCLC completed BID radiation planned concurrently with cis/etoposide but had a reaction C2D1 to cisplatin. Completed 1 cycle carbo/etop D1 4/5/22 also complicated by facial flushing and erythematous rash and stopped further treatment. Now s/p PCI in 6/2022. Most recent CT concerning for disease progression. Referred for clinical trial AMGN 1518, C1D1 1/24/23. Treatment has been complicated by orthostatic hypotension, worsening short-term  memory, increased lethargy, weight loss, and poor appetite. Delayed C2 by 1 week and dose-reduced. Confusion has resolved during C3 after stopping mirtazapine and dose reduction.     # SCLC  - limited stage, brain MRI negative  - previously discussed concurrent chemoradiation, with cisplatin/etoposide with side effects including but not limited to allergic reaction, fatigue, risk of infection, tinnitus, neuropathy, injury to liver/kidney, risk of anemia/thrombocytopenia and was consented  - she was also interested in scalp cooling, which unfortunately she is not a candidate for d/t SCLC  - started concurrent chemoradiation BID with Q3week cis/etop on 2/15 at [facility]  -unfortunately had reaction to cisplatin on C2D1 resulting in hospitalization and also felt to be septic due to pneumonia  - discussed that we typically do 3-4 cycles chemotherapy, and alternative regimens include carboplatin/etoposide vs CAV. Given reaction to cisplatin, concern for possible reaction to carboplatin as well, although unknown rates of cross reaction. Alternatively, they also asked about discontinuation of chemotherapy, since she completed radiation. She ultimately decided to trial carbo/etoposide. She is aware of the heightened risk of allergic reaction, as well as other side effects including but not limited to fatigue, risk of infection, neuropathy, injury to liver/kidney, risk of anemia/thrombocytopenia. consented 3/28/22  -s/p 1 cycle Carbo/Etop D1 4/5/22, developed facial flushing and erythematous rash on arms and chest s/p treatment, resolved with benadryl  -opted to forego further chemotherapy and will continue on maintenance  - s/p PCI 6/2022  - CT C/A/P and brain MRI 5/2022 and most recently 8/2022 with good response and no disease  -  MRI brain 11/7 without evidence of metastatic disease  - CT C/A/P 11/3 with multiple new enlarged LN concerning for disease progression - discussed with Dr. Valdivia not able to repeat radiation.  -  referred to phase 1 clinical trial and consideration for [id number] or [id number]  - 1.5.2023 : Patient signed consent to take part on phase 1 study [id number]. Look clinically good to take part in study. Will start on study ASAP if eligible.   - 1.24.2023: Seen today and ready to start trial on [id number].  - 1.31.2023: Seen today, ready for C1D8 AMGN 1518   - 2.7.2023: Seen today for C1D15 [id number] - continue with trial   - 2.14.2023: Seen in follow-up on [id number] with overall worsening of general health  - 2.21.2023: Seen today for C2D1 AMGN 1518 with continued weight loss although perhaps starting to plateau, more energy since being off treatment, still poor appetite. will delay by 1 week, started dexamethasone 2 mg daily, and consider dose reduction.  - 2.28.2023: Seen today for C2D1 AMGN 1518 after delaying for 1 week. Energy level and appetite are improved, although still losing a little weight. Confusion is worse today, possibly due to mirtazapine vs study-related. Will dose reduce today.  - 3.7.2023: Seen today C2D8 AMGN 1518 after dose-reduction last week. Confusion is mildly improved, energy level and appetite are stable. Will add marinol for appetite.   - 3.14.2023: Seen today C2D15 AMGN 1518. Overall improved: confusion continues to improve, energy level and appetite are improved. Weight is improved. Will continue dexamethasone and marinol.  - 3.15.2023: C2D16 No CRS symptoms observed after last treatment Overall Symptoms are improving and she is tolerating treatment.  -3.16.2023: C2D17 No CRS symptoms observed after last treatment Overall Symptoms are improving and she is tolerating treatment.  - 3.28.2023 : Most recent imaging suggest patient benefiting from current treatment. Ongoing symptom  possible common cold/season allergies. Since the patient looks clinically good we will proceed with C3D1 today. Educated patient to call the office ASAP if symptoms worsen.   - 4.11.2023: C3D15 Feeling well and overall  improved with fatigue, confusion, anorexia. Proceed at current dosing and weaning steroids as below  -4.25.2023: C4D1 Pt is feeling well, no complaints of fatigue, confusion, memory loss. Has gained weight. Energy levels are good. Proceed with the current dosing. Pt is no longer on steroids.   -5.9.2023: C4D15. Pt is tolerating treatment well with no new complains. Continues to have good energy level endorses poor appetite with out any weight loss. Suggested to start taking the dronabinol.  Will proceed with treatment today.  -5.23.2023: C5D1. Pt is tolerating treatment well. Energy levels are good, is having some weight loss and constipation. Started back on dex for appetite. Pt will let us know if she remains constipated over the next several days.  Personally reviewed scans with stable disease. Will proceed with treatment today.   -6.6.2023: C5D15. Continues to tolerate treatment well. Since we restarted her on Dexamethasone she reports her energy levels and appetite has improved. Will continue on low dose Dex. With no new symptoms, we will proceed with treatment today.   -6.20.2023: C6D1. Doing well on dexamethasone 1 mg daily. Will proceed with treatment.  -7.5.2023: C6D15. We will proceed with treatment since the patient is tolerating treatment with no significant AE's and also give 1/2 liter of IV fluids.   - 7.18.2023: C7D1. Doing well, will proceed with treatment. She is out of dexamethasone, and will monitor off steroids.   -8.1.2023: C7D15. Continues to tolerate treatment. Will proceed with C7D15 today. RTC per protocol.  - 8.29.2023: C8D1. C8 delayed due to hospitalization for diverticulitis. Has completed antibiotics and feeling well for treatment.  - 9.12.2023: C8D15. Most recent imgaing suggest the patient continues to benefit from current treatment. The imaging also suggest improvement of previously visualized segmental colitis associated with diverticulosis affecting the sigmoid colon with minimal  residual pericolonic fat standing and hyperemia of the sigmoid colon. Imaging also suggest resolving inflammatory process without evidence of new or worsening complications. We will proceed with C8D15 today since the patient is also clinically feeling good. RTC per protocol.  -9.26.2023: C9D1. Patient looks clinically good. With no JACQUELYN's we will proceed with treatment C9D1 today.  -10.24.23: C10D1. Pt feels well, no acute events, no TRAEs, continue treatment today as scheduled.  -11.7.23: C10D15. Pt feels well, no TRAEs, continue treatment as scheduled.  - 11.21.23: C11D1. Continues to feel well, will continue treatment today.  - 12.19.23: C12D1. Personally reviewed most recent scan without evidence of progression. Continues to feel well and will continue with treatment today.  - 01.02.24: C12D15. Continues to tolerate treatment well. No new JACQUELYN's will proceed with treatment. RTC per protocol.  - 01.16.24: C13D1. No new JACQUELYN's proceed with treatment. RTC per protocol.   - 01.30.24: C13D15. Continues to feel well. Proceed with treatment.  - 2.13.24: C14D1. Personally reviewed most recent scan without evidence of progression. Continue with treatment today. RTC per protocol.  - 2.27.24: C14D15. Continues to feel good and tolerate treatment without any JACQUELYN's. We will proceed with treatment today. RTC per protocol.  - 3.12.24: C15D1. Continues to feel well and tolerate treatment without new JACQUELYN's. Will continue on treatment.  - 3.26.24: C15D15. She feels good today. Will give her IV fluids. Will continue on treatment. RTC in per protocol with scan prior to next visit.  - 4.23.24: C16D15. Feeling well and will continue per protocol.  - 5.7.24: C17D1. Feels well overall and will continue on trial.   - 5.21.24: C17D15. Having double vision and will obtain brain MRI. She will see eye doctor as well. Continue on trial.  - 6.4.2024: C18D1. Continues to have double vision was evaluated by opthalmology and received new  prescription for her glasses. Her MRI was  negative.  - 6.18.2024: C18D15. Has not gotten new glasses, will get them in July. No other new symptoms. Will see GI re bezoar.  - 7.2.2024: C19D1  Patient this past weekend went to the ER with complains of abdominal pain and weakness. She got a CT of the abdomen pelvis which showed diverticulosis without any acute diverticulitis.  No small bowel obstruction, No free intraperitoneal air and when she was reeevalauted in the ER her abdominal pain had resolved. During her ER visit her BP was low and she was treated with IV fluids. Patient reports her symptoms of weakness could be the combination of her taking Reglan and drinking alcohol that day. Today she reports the feels good and is back to baseline. She also met with GI for her Bezoar. Plan to started taking diet coke/Reglan and plans for colonoscopy on 7.17.24. Today we will proceed with treatment and also give her IV fluids.     # Confusion - resolved  - significantly worsened after taking double dose of mirtazapine accidentally, although has been generally down-trending since starting study (which is also when she started taking mirtazapine) and now resolved  - brain MRI without progression of cancer  - will continue off mirtazapine    # Unintentional weight loss - stable  # Anorexia- Improving  # Dysgeusia- improving  - all improved on steroids. unclear if this is from cancer or side effect of study drug  - stopped mirtazapine due to concerns for confusion   - weaned off dexamethasone and started marinol, but kept forgetting to take it  - dexamethasone trial started again on 5.23.2023. Continued on 1 mg daily - will trial off after 7.18.23.     # Fatigue - Resolved  - back to normal pretty much, weaning steroids as above    # frequent PVCs - asymptomatic  - saw onco-cardiology and completed 24-hr Holter monitor  - recommended decreasing caffeine and sudafed intake  - continue to monitor    # Forgetfulness - improved -  however noted decline on AMGN study- unclear etiology.    - started after PCI, on memantine daily and has since stopped  - offered neurocognitive evaluation previously and she will think about this and readdress next visit  -  notes some decline in last 3 weeks- especially short term memory loss.    - Improved    # Neuropathy - resolved  - mild peripheral neuropathy with numbness of the toes - likely due to cisplatin  - will continue to monitor closely  - not endorsing any neuropathy at this visit     # Rash - resolved  Waxing and waning rash throughout her body. ?improving. Unclear etiology, patient and  feel timing coincided with starting BMX.   - she will hold off on further BMX  - thought to be due to sulfa allergy, possibly due to platinum agents   - continue to monitor    #odynophagia - resolved  -rad onc aware  -prescribed BMX solution  -will continue to monitor    #constipation-improving  -occasional. Prescribed Senna S  -will monitor    CASEY Stone-CNP

## 2024-07-03 LAB — ACTH PLAS-MCNC: 25.4 PG/ML (ref 7.2–63.3)

## 2024-07-09 NOTE — ADDENDUM NOTE
Encounter addended by: Betsy Hadley RN on: 7/8/2024 9:33 PM   Actions taken: Charge Capture section accepted

## 2024-07-16 ENCOUNTER — APPOINTMENT (OUTPATIENT)
Dept: HEMATOLOGY/ONCOLOGY | Facility: HOSPITAL | Age: 69
End: 2024-07-16
Payer: MEDICARE

## 2024-07-18 ENCOUNTER — HOSPITAL ENCOUNTER (OUTPATIENT)
Dept: RESEARCH | Facility: HOSPITAL | Age: 69
Discharge: HOME | End: 2024-07-18
Payer: MEDICARE

## 2024-07-18 ENCOUNTER — EDUCATION (OUTPATIENT)
Dept: HEMATOLOGY/ONCOLOGY | Facility: HOSPITAL | Age: 69
End: 2024-07-18
Payer: MEDICARE

## 2024-07-18 VITALS
TEMPERATURE: 97.2 F | BODY MASS INDEX: 21.53 KG/M2 | WEIGHT: 110.23 LBS | HEART RATE: 78 BPM | OXYGEN SATURATION: 97 % | RESPIRATION RATE: 18 BRPM | DIASTOLIC BLOOD PRESSURE: 82 MMHG | SYSTOLIC BLOOD PRESSURE: 112 MMHG

## 2024-07-18 DIAGNOSIS — C34.90 SMALL CELL LUNG CANCER (MULTI): Primary | ICD-10-CM

## 2024-07-18 LAB
ALBUMIN SERPL BCP-MCNC: 3.4 G/DL (ref 3.4–5)
ALP SERPL-CCNC: 40 U/L (ref 33–136)
ALT SERPL W P-5'-P-CCNC: 8 U/L (ref 7–45)
ANION GAP SERPL CALC-SCNC: 13 MMOL/L (ref 10–20)
APTT PPP: 30 SECONDS (ref 27–38)
AST SERPL W P-5'-P-CCNC: 18 U/L (ref 9–39)
BASOPHILS # BLD AUTO: 0.03 X10*3/UL (ref 0–0.1)
BASOPHILS NFR BLD AUTO: 0.4 %
BILIRUB DIRECT SERPL-MCNC: 0.1 MG/DL (ref 0–0.3)
BILIRUB SERPL-MCNC: 1 MG/DL (ref 0–1.2)
BUN SERPL-MCNC: 18 MG/DL (ref 6–23)
CALCIUM SERPL-MCNC: 8.6 MG/DL (ref 8.6–10.6)
CHLORIDE SERPL-SCNC: 105 MMOL/L (ref 98–107)
CK SERPL-CCNC: 46 U/L (ref 0–215)
CO2 SERPL-SCNC: 25 MMOL/L (ref 21–32)
CREAT SERPL-MCNC: 0.75 MG/DL (ref 0.5–1.05)
CRP SERPL-MCNC: 1.82 MG/DL
EGFRCR SERPLBLD CKD-EPI 2021: 87 ML/MIN/1.73M*2
EOSINOPHIL # BLD AUTO: 0.19 X10*3/UL (ref 0–0.7)
EOSINOPHIL NFR BLD AUTO: 2.6 %
ERYTHROCYTE [DISTWIDTH] IN BLOOD BY AUTOMATED COUNT: 12.7 % (ref 11.5–14.5)
FERRITIN SERPL-MCNC: 108 NG/ML (ref 8–150)
GLUCOSE SERPL-MCNC: 99 MG/DL (ref 74–99)
HCT VFR BLD AUTO: 33.2 % (ref 36–46)
HGB BLD-MCNC: 11 G/DL (ref 12–16)
IMM GRANULOCYTES # BLD AUTO: 0.02 X10*3/UL (ref 0–0.7)
IMM GRANULOCYTES NFR BLD AUTO: 0.3 % (ref 0–0.9)
INR PPP: 1.1 (ref 0.9–1.1)
LYMPHOCYTES # BLD AUTO: 0.98 X10*3/UL (ref 1.2–4.8)
LYMPHOCYTES NFR BLD AUTO: 13.5 %
MAGNESIUM SERPL-MCNC: 1.99 MG/DL (ref 1.6–2.4)
MCH RBC QN AUTO: 30.7 PG (ref 26–34)
MCHC RBC AUTO-ENTMCNC: 33.1 G/DL (ref 32–36)
MCV RBC AUTO: 93 FL (ref 80–100)
MONOCYTES # BLD AUTO: 0.74 X10*3/UL (ref 0.1–1)
MONOCYTES NFR BLD AUTO: 10.2 %
NEUTROPHILS # BLD AUTO: 5.3 X10*3/UL (ref 1.2–7.7)
NEUTROPHILS NFR BLD AUTO: 73 %
NRBC BLD-RTO: 0 /100 WBCS (ref 0–0)
PHOSPHATE SERPL-MCNC: 3.7 MG/DL (ref 2.5–4.9)
PLATELET # BLD AUTO: 200 X10*3/UL (ref 150–450)
POTASSIUM SERPL-SCNC: 3.8 MMOL/L (ref 3.5–5.3)
PROT SERPL-MCNC: 5.7 G/DL (ref 6.4–8.2)
PROTHROMBIN TIME: 12.9 SECONDS (ref 9.8–12.8)
RBC # BLD AUTO: 3.58 X10*6/UL (ref 4–5.2)
SODIUM SERPL-SCNC: 139 MMOL/L (ref 136–145)
WBC # BLD AUTO: 7.3 X10*3/UL (ref 4.4–11.3)

## 2024-07-18 PROCEDURE — 85025 COMPLETE CBC W/AUTO DIFF WBC: CPT

## 2024-07-18 PROCEDURE — 80053 COMPREHEN METABOLIC PANEL: CPT

## 2024-07-18 PROCEDURE — 84100 ASSAY OF PHOSPHORUS: CPT

## 2024-07-18 PROCEDURE — 82550 ASSAY OF CK (CPK): CPT

## 2024-07-18 PROCEDURE — 82728 ASSAY OF FERRITIN: CPT

## 2024-07-18 PROCEDURE — 82248 BILIRUBIN DIRECT: CPT

## 2024-07-18 PROCEDURE — 85730 THROMBOPLASTIN TIME PARTIAL: CPT

## 2024-07-18 PROCEDURE — 2560000001 HC RX 256 EXPERIMENTAL DRUGS

## 2024-07-18 PROCEDURE — 86140 C-REACTIVE PROTEIN: CPT

## 2024-07-18 PROCEDURE — 96413 CHEMO IV INFUSION 1 HR: CPT

## 2024-07-18 PROCEDURE — 85610 PROTHROMBIN TIME: CPT

## 2024-07-18 PROCEDURE — 83735 ASSAY OF MAGNESIUM: CPT

## 2024-07-18 RX ORDER — DIPHENHYDRAMINE HYDROCHLORIDE 50 MG/ML
50 INJECTION INTRAMUSCULAR; INTRAVENOUS AS NEEDED
Status: DISCONTINUED | OUTPATIENT
Start: 2024-07-18 | End: 2024-07-19 | Stop reason: HOSPADM

## 2024-07-18 RX ORDER — ALBUTEROL SULFATE 0.83 MG/ML
3 SOLUTION RESPIRATORY (INHALATION) AS NEEDED
Status: DISCONTINUED | OUTPATIENT
Start: 2024-07-18 | End: 2024-07-19 | Stop reason: HOSPADM

## 2024-07-18 RX ORDER — EPINEPHRINE 1 MG/ML
0.3 INJECTION INTRAMUSCULAR; INTRAVENOUS; SUBCUTANEOUS EVERY 5 MIN PRN
Status: DISCONTINUED | OUTPATIENT
Start: 2024-07-18 | End: 2024-07-19 | Stop reason: HOSPADM

## 2024-07-18 RX ORDER — FAMOTIDINE 10 MG/ML
20 INJECTION INTRAVENOUS ONCE AS NEEDED
Status: DISCONTINUED | OUTPATIENT
Start: 2024-07-18 | End: 2024-07-19 | Stop reason: HOSPADM

## 2024-07-18 NOTE — RESEARCH NOTES
[facility]><[id number]><C5+D15><PART A>    DCRU NURSING VISIT NOTE  Study Name: ALEXANDRU Foote8  IRB#: [id number]  DCRU#: [id number]  Protocol Version Dated: A9 3.22.23  PI: Roshan Kumar MD.    Time point: Cycle 5 and beyond - Day 15  CYCLE 19     Encounter Date: 07/18/2024  Encounter Time:  8:30 AM EDT  Encounter Department: [facility] HEMATOLOGY AND ONCOLOGY     #1     Phone Pager   Carmen Rios -[phone]    #2 Phone Pager        Other Phone Pager          Study Regimen and Dosing   Part A Dose Exploration/Expansion- Small Cell Lung Cancer Relapsed or Refractory patients who progressed or recurred following at least 1 platinum-based regimen.   Cycle = 28 days    administered IV Day 1, Day 8, and Day 15 of Cycle 1. Cycle 2 and beyond     administered on Day 1 and Day 15.        Dietary Guidelines   Regular diet:     Admission and Prior to Starting Study Activities   Complete DCRU and [facility] Admission/Visit Note.  Notify SC of patient arrival after initial lab and vitals  Insert one peripheral IV line for sample collection procedures (flush line with normal saline following each blood draw). Access mediport (if available) otherwise insert second peripheral line in opposite arm for Investigative drug administration (if peripheral line, flush line with normal saline before & after infusion)     Criteria to Treat   DCRU RN reviewed and meets eligibility to proceed with treatment plan   Time team notified: 0917   DCRU RN notifies study team to review eligibility and approval before dosing procedures  Time team approves: 0920      Subjective   Gladys Branch is a 68 y.o. female and is here for a Research clinical visit.    Visit Provider: [id number] [facility] ROOM 3 [facility]     Allergies:   Allergies   Allergen Reactions   • Cisplatin Other     CHEMO INDUCED (Moderate); Dyspepsia (Moderate); Headaches (Moderate); Hypotension (Moderate); Numbness (Moderate); Resp Distress  (Moderate)   • Codeine Nausea Only and Other     nausea   • Sulfa (Sulfonamide Antibiotics) Rash       Objective     Vital Signs:    Vitals:    07/18/24 0803   BP: 108/80   Pulse: 67   Resp: 18   Temp: 36.1 °C (97 °F)   TempSrc: Oral   SpO2: 96%       Physical Exam     ASSESSMENT and PLAN:  Problem List Items Addressed This Visit       Small cell lung cancer (Multi) - Primary    Relevant Orders    Clinic Appointment Request    Infusion Appointment Request (Completed)    APTT    Bilirubin, Direct    CK    C-Reactive Protein    Ferritin    Magnesium    Phosphorus    Protime-INR    Infusion Appointment Request (Completed)    CBC and Auto Differential    Comprehensive metabolic panel    APTT    Bilirubin, Direct    CK    C-Reactive Protein    Ferritin    Magnesium    Phosphorus    Protime-INR        Medications as of the completion of today's visit:  Current Outpatient Medications   Medication Sig Dispense Refill   • cetirizine-pseudoephedrine (ZyrTEC-D) 5-120 mg 12 hr tablet Take 1 tablet by mouth 2 times a day. (Patient taking differently: Take 1 tablet by mouth once daily.) 60 tablet 11   • cholecalciferol (Vitamin D-3) 25 MCG (1000 UT) tablet Take by mouth.     • dexAMETHasone (Decadron) 2 mg tablet Take 0.5 tablets (1 mg) by mouth once daily with breakfast. 30 tablet 2   • dextromethorphan (Delsym) 30 mg/5 mL liquid Take 5 mL (30 mg) by mouth once daily as needed.     • dronabinol (Marinol) 2.5 mg capsule once daily as needed. One hour before dinner     • estrogens, conjugated, (Premarin) 0.3 mg tablet Take 0.5 tablets (0.15 mg) by mouth once daily.     • mv-min/FA/vit K/lutein/zeaxant (PRESERVISION AREDS 2 PLUS MV ORAL) Take 1 tablet by mouth once daily.     • omeprazole (PriLOSEC) 40 mg DR capsule Take 1 capsule (40 mg) by mouth once daily. Do not crush or chew. 90 each 3   • ondansetron (Zofran) 8 mg tablet Take 0.5 tablets (4 mg) by mouth every 8 hours if needed.     • polyethylene glycol (Glycolax, Miralax) 17  gram packet Take 17 g by mouth 2 times a day. (Patient taking differently: Take 17 g by mouth if needed.) 60 packet 2   • prochlorperazine (Compazine) 10 mg tablet Take 0.5 tablets (5 mg) by mouth every 6 hours if needed.     • psyllium husk, aspartame, (Metamucil Sugar-Free, aspart,) 3.4 gram/5.8 gram powder Take 1 Dose by mouth once daily. (Patient taking differently: Take 1 Dose by mouth once daily as needed.) 425 g 11     No current facility-administered medications for this encounter.           Orders placed during today's visit:  Orders Placed This Encounter   Procedures   • APTT     Standing Status:   Future     Number of Occurrences:   1     Standing Expiration Date:   7/18/2025     Order Specific Question:   Release result to MyChart     Answer:   Immediate [1]   • Bilirubin, Direct     Standing Status:   Future     Number of Occurrences:   1     Standing Expiration Date:   7/18/2025     Order Specific Question:   Release result to MyChart     Answer:   Immediate [1]   • CK     Standing Status:   Future     Number of Occurrences:   1     Standing Expiration Date:   7/18/2025     Order Specific Question:   Release result to MyChart     Answer:   Immediate [1]   • C-Reactive Protein     Standing Status:   Future     Number of Occurrences:   1     Standing Expiration Date:   7/18/2025     Order Specific Question:   Release result to MyChart     Answer:   Immediate [1]   • Ferritin     Standing Status:   Future     Number of Occurrences:   1     Standing Expiration Date:   7/18/2025     Order Specific Question:   Release result to MyChart     Answer:   Immediate [1]   • Magnesium     Standing Status:   Future     Number of Occurrences:   1     Standing Expiration Date:   7/18/2025     Order Specific Question:   Release result to MyChart     Answer:   Immediate [1]   • Phosphorus     Standing Status:   Future     Number of Occurrences:   1     Standing Expiration Date:   7/18/2025     Order Specific Question:    Release result to MyChart     Answer:   Immediate [1]   • Protime-INR     Standing Status:   Future     Number of Occurrences:   1     Standing Expiration Date:   7/18/2025     Order Specific Question:   Release result to MyChart     Answer:   Immediate [1]   • CBC and Auto Differential     Standing Status:   Standing     Number of Occurrences:   1     Order Specific Question:   Release result to MyChart     Answer:   Immediate   • Comprehensive metabolic panel     Standing Status:   Standing     Number of Occurrences:   1     Order Specific Question:   Release result to MyChart     Answer:   Immediate   • APTT     Standing Status:   Standing     Number of Occurrences:   1     Order Specific Question:   Release result to MyChart     Answer:   Immediate [1]   • Bilirubin, Direct     Standing Status:   Standing     Number of Occurrences:   1     Order Specific Question:   Release result to MyChart     Answer:   Immediate [1]   • CK     Standing Status:   Standing     Number of Occurrences:   1     Order Specific Question:   Release result to MyChart     Answer:   Immediate [1]   • C-Reactive Protein     Standing Status:   Standing     Number of Occurrences:   1     Order Specific Question:   Release result to MyChart     Answer:   Immediate [1]   • Ferritin     Standing Status:   Standing     Number of Occurrences:   1     Order Specific Question:   Release result to MyChart     Answer:   Immediate [1]   • Magnesium     Standing Status:   Standing     Number of Occurrences:   1     Order Specific Question:   Release result to MyChart     Answer:   Immediate [1]   • Phosphorus     Standing Status:   Standing     Number of Occurrences:   1     Order Specific Question:   Release result to MyChart     Answer:   Immediate [1]   • Protime-INR     Standing Status:   Standing     Number of Occurrences:   1     Order Specific Question:   Release result to MyChart     Answer:   Immediate [1]        [id number] -  in  Subjects With Small Cell Lung Cancer    Patient has no adverse events documented in the Research Adverse Events activity.       Study Specific Instructions and Documentation   WEIGHT  LABS  VITALS  STUDY DRUG  VITALS  DISCHARGE     PRE-DOSE Safety Labs   Refer to [place] Treatment Plan for orders     Day 15 Pre-dose Vital Signs    Temp, HR, Resp, BP, Pulse Oximetry: Seated or Semi-Fowlers x 5 minutes before obtaining  Position and temperature location: should be the same that is used throughout the study-record position  Vitals:    07/18/24 1028   BP: 96/61   Pulse: 89   Resp: 18   Temp: 36.1 °C (97 °F)   SpO2: 97%        Infusion-Related Reactions   [facility] SOC Hypersensitivity Orders  Note: CRS table      Research Drug Administration [place] ()   Refer to [place] Treatment Plan for orders   Administer dose over 60 minutes (+/- 10 mins) followed by a 3 - 5 minute Normal saline flush. (This will be the end time after the flush). Document start time & end of study medication; document start time and end time of the flush.  Participant to remain on Unit for 2 hour post dose observation.    Study drug infused from 1036 -1136  NS flush administered from 1136 - 1141    Post-Dose Vital Signs   Temp, HR, Resp, BP, Pulse Oximetry: Seated or Semi-Fowlers x 5 minutes before obtaining  Position and temperature location: should be the same that is used throughout the study  End of Infusion  Vitals:    07/18/24 1141   BP: 112/82   Pulse: 78   Resp: 18   Temp: 36.2 °C (97.2 °F)   SpO2: 97%        Day 15 Discharge Instructions   Patient may be discharged after 2 hour observation.  Discharge time 1148     Davey Melchor RN  07/18/24    Grading and Management of Cytokine Release Syndrome   CRS Grade Description of Severity Minimum Expected Intervention Instructions for Dose Modifications of    1 Symptoms are not life threatening and require symptomatic treatment only,  eg, fever, nausea, fatigue, headache, myalgia's,  malaise Administer symptomatic treatment (contact provider for medications/doses). Monitor for CRS symptoms including vital signs and pulse oximetry at least Q2 hours for12 hours or until resolution, Whichever is earlier. N/A     (continued)  Grading and Management of Cytokine Release Syndrome   CRS Grade Description of Severity Minimum Expected Intervention Instructions for Dose Modifications of    2 Symptoms require and respond to moderate intervention  Oxygen requirement <40%,                      OR  Hypotension responsive to fluids or low dose of one vasopressor, OR                                                                       Grade 2 organ toxicity or grade 3 transaminitis per CTCAE criteria Administer:  Symptomatic treatment   (contact provider for medications/doses)  Supplemental oxygen when oxygen saturation is <90% on room air  Intravenous fluids or low dose vasopressor for hypotension is recommended when systolic blood pressure is <85 mmHg. (contact provider for medications/doses) Persistent tachycardia (eg >120 bpm) may also indicate the need for intervention for hypotension.   Monitor for CRS symptoms including vital signs and pulse oximetry at least Q2 hours for12 hours or until resolution to CRS grade <= 1, whichever is earlier. For subjects with extensive co-morbidities or poor performance status, manage per grade 3 CRS guidance below If CRS occurs during  treatment, immediately interrupt the infusion and delay the next  dose until the event resolves to CRS grade <= 1 for no less than 72 hours. Permanently discontinue  if there is no improvement to CRS <= grade 1 within 7 days     3 Symptoms require and respond to aggressive intervention  Oxygen requirement >=40%, OR  Hypotension requiring high dose or multiple vasopressors, OR  Grade 3 organ toxicity or     Grade 4 transaminitis per  CTCAE criteria Admit to intensive care unit for close clinical and vital sign  monitoring per institutional guidelines.   Refer to provider/protocol for medications and doses.     If CRS occurs during  treatment, immediately interrupt   and delay the next dose until event resolves to CRS grade <= 1 for no less than 72 hours.    Permanently discontinue  if there is no improvement to CRS                 <= grade 2 within 5 days or CRS <= grade 1 within 7 days.  Permanently discontinue   if CRS grade  3 occurs at the initial run in dose (i.e., at MTD1)  (Applicable only after MTD1 has been defined).     (continued)  Grading and Management of Cytokine Release Syndrome   CRS Grade Description of Severity Minimum Expected Intervention Instructions for Dose Modifications of    4 Life-threatening symptoms  Requirement for ventilator support OR  Grade 4 organ toxicity (excluding transaminitis) per CTCAE criteria Admit to intensive care unit for close clinical and vital sign monitoring per institutional guidelines.   Refer to provider/protocol for medications and doses.   If CRS occurs during  treatment, Immediately stop the infusion.  Permanently discontinue   therapy.

## 2024-07-18 NOTE — RESEARCH NOTES
Research Note Treatment Day    Gladys Branch is here today for treatment on [id number]. Today is C19D15. Procedures completed per protocol. AE's and con-meds reviewed with patient. Patient is aware of treatment plan.    [x]   Received treatment as planned   OR  []    Treatment delayed; patient calendar updated as required   Treatment delayed because:    []   AE    []   Physician Discretion    []   Clinical Deterioration or Progression     []   Other    Pt doing well, working to increase nutrition intake. Also agreed to push forward to get Neuro consult with the help of NP Supriya. Pt discharged in good condition.    Education Documentation  Memory Problems, taught by Vladimir Fernandez RN at 7/18/2024  1:00 PM.  Learner: Significant Other, Patient  Readiness: Eager  Method: Explanation  Response: Verbalizes Understanding    General Medication Information, taught by Vladimir Fernandez RN at 7/18/2024  1:00 PM.  Learner: Significant Other, Patient  Readiness: Eager  Method: Explanation  Response: Verbalizes Understanding    Treatment Plan and Schedule, taught by Vladimir Fernandez RN at 7/18/2024  1:00 PM.  Learner: Significant Other, Patient  Readiness: Eager  Method: Explanation  Response: Verbalizes Understanding    Supportive Medications, taught by Vladimir Fernandez RN at 7/18/2024  1:00 PM.  Learner: Significant Other, Patient  Readiness: Eager  Method: Explanation  Response: Verbalizes Understanding    Nutrition, taught by Vladimir Fernandez RN at 7/18/2024  1:00 PM.  Learner: Significant Other, Patient  Readiness: Eager  Method: Explanation  Response: Verbalizes Understanding    Education Comments  No comments found.

## 2024-07-19 NOTE — ADDENDUM NOTE
Encounter addended by: CASEY Adams-CNP on: 7/19/2024 4:03 PM   Actions taken: Clinical Note Signed, SmartForm saved, Level of Service modified

## 2024-07-19 NOTE — PROGRESS NOTES
Patient ID: Gladys Branch is a 68 y.o. female.    DIAGNOSIS    Small Cell Lung Cancer    By immunostaining, the tumor cells are positive for CAM 5.2, INSM1, and TTF-1, and negative for p40 and LCA. The proliferation index by Ki-67 immunostaining is approximately 90%.  Synaptophysin, Chromogranin and INSM-1 are positive.  AE1/AE3 is negative. NEOGENOMICS, RB protein expression is lost in the tumor cells    STAGING    [id number], limited stage  Recurrence    CURRENT SITES OF DISEASE    RLL, supraclavicular    MOLECULAR GENOMICS      PRIOR THERAPY    Concurrent chemoradiation with Cisplatin/Etoposide every 3 weeks; C1D1 2/15/22, reaction to cisplatin C2D1 and did not receive D2 or D3 etoposide  Carbo/etoposide 4/5/2022 1 cycle c/b rash  PCI 5/21-6/13/22    CURRENT THERAPY    Phase 1 study [id number]  with study drug Taralatamab 1/24/23 - present.    CURRENT ONCOLOGICAL PROBLEMS      HISTORY OF PRESENT ILLNESS    Mrs. Branch is a 65 yo with PMH GERD and COPD who originally was round to have a RLL nodule measuring 11 mm in 4/28/2021 found as part of CT calcium score scan. An ensuing CT chest w/o contrast was done on 5/19/21 which revealed subsolid pulmonary nodules measuring 14 mm in the RLL. She had CT chest w/contrast on 11/29/21 which confirmed stable 14 mm GGO of the RLL and decreased RLL subpleural nodule. She met thoracic surgeon Dr. Farfan, who ordered PET/CT scan done on 12/8/21 which did not reveal any FDG-avid nodules within the lung parenchyma but R hilar nodes with max SUV 8.0. He referred her for bronch, which was done on 1/21/22 by Dr. Mcdaniels. 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 Brain MRI was negative.    She started concurrent chemoradiation with BID radiation with cis/etop 2/15/22, and unfortunately had a reaction to cisplatin on C2D1 with chest pain and hypotension. She was hospitalized with sepsis felt to be due to pneumonia. She trialed carboplatin/etoposide on 4/5/22 with a  resulting rash and opted to forego further chemotherapy as she had completed radiation. Restaging scan 11/3/22 unfortunately with progression with new R hilar lymph node, paratracheal nodes, and increase in size of R supraclavicular mass. She enrolled in [id number] and started C1D1 1/24/23. C1 complicated by anorexia and dysgeusia, and delayed C2 by a week. She has further struggled with fatigue and confusion, which may be related to mirtazapine. Dose reduced for C2 and mirtazapine stopped with improvement in symptoms. She continues to tolerate regular treatments.    PAST MEDICAL HISTORY    GERD  COPD    SOCIAL HISTORY    Retired - lighting . Lives at home with  and a kitten.  Tobacco: quit smoking 1999. 1 ppd x 25 yrs.  EtOH: wine 1 glass/day  Illicits: none    CURRENT MEDS    Please see med list    ALLERGIES    Codeine, Sulfa drugs, Cisplatin    FAMILY HISTORY    Paternal aunt - breast cancer dx 80s    Subjective    Today 7.18.2024. Patient in clinic with her  for C19D15 for [id number].    Patient had a repeat scope yesterday with partial resolution of bezoar and reports that he is largely feeling good.  She continues to have double vision at times.  Neurophthalmology has seen her and is ordering a prism lens which she has yet to receive.  Her  reports worsening gait instability.  MRI brain w/wo contrast on 5/20/24 showed no acute infarct, hemorrhage or intracranial mass effect.  Mood and affect are good, she is AO x 3, she reports no numbness/tingling, pain, energy good, no fever/chills, appetite is good, no N/V/D/C, no abdominal pain, no oral lesions, coated tongue, dysphagia, cough, dyspnea, chest pain, palpitations, lesions or rashes.  ROS otherwise negative unless specified above.      Objective          6/18/2024     1:56 PM 7/2/2024     8:10 AM 7/2/2024    11:25 AM 7/2/2024     1:56 PM 7/18/2024     8:03 AM 7/18/2024    10:28 AM 7/18/2024    11:41 AM   Vitals   Systolic 102  106 128 124 108 96 112   Diastolic 71 72 77 89 80 61 82   Heart Rate 76 84 86 84 67 89 78   Temp 36.4 °C (97.6 °F) 37 °C (98.6 °F) 36.6 °C (97.9 °F) 36.6 °C (97.9 °F) 36.1 °C (97 °F) 36.1 °C (97 °F) 36.2 °C (97.2 °F)   Resp 18 18 18 16 18 18 18   Weight (lb)  114.86   110.23     BMI  22.43 kg/m2   21.53 kg/m2     BSA (m2)  1.49 m2   1.45 m2     Visit Report Report             Daily Weight  07/18/24 : 50 kg (110 lb 3.7 oz)  07/02/24 : 52.1 kg (114 lb 13.8 oz)  06/18/24 : 50.7 kg (111 lb 12.4 oz)  06/10/24 : 50.8 kg (112 lb)  06/04/24 : 51.1 kg (112 lb 10.5 oz)         Physical Exam  Constitutional:       General: She is awake. She is not in acute distress.     Appearance: Normal appearance. She is normal weight.   HENT:      Head: Normocephalic and atraumatic.      Mouth/Throat:      Mouth: Mucous membranes are moist.      Pharynx: No oropharyngeal exudate or posterior oropharyngeal erythema.   Eyes:      General: No scleral icterus.     Extraocular Movements: Extraocular movements intact.      Conjunctiva/sclera: Conjunctivae normal.      Pupils: Pupils are equal, round, and reactive to light.   Cardiovascular:      Rate and Rhythm: Normal rate and regular rhythm.      Heart sounds: Normal heart sounds, S1 normal and S2 normal. No murmur heard.     No friction rub. No gallop.   Pulmonary:      Effort: Pulmonary effort is normal.      Breath sounds: Normal breath sounds. No wheezing, rhonchi or rales.   Abdominal:      General: Abdomen is flat. Bowel sounds are normal. There is no distension.      Palpations: Abdomen is soft. There is no mass.      Tenderness: There is no abdominal tenderness. There is no guarding or rebound.   Musculoskeletal:      Cervical back: Normal range of motion and neck supple. No rigidity.   Skin:     General: Skin is warm and dry.      Comments: intact   Neurological:      Mental Status: She is alert and oriented to person, place, and time.      GCS: GCS eye subscore is 4. GCS verbal  subscore is 5. GCS motor subscore is 6.      Sensory: Sensation is intact. No sensory deficit.      Motor: Motor function is intact.      Coordination: Romberg sign positive. Finger-Nose-Finger Test normal.      Gait: Gait abnormal and tandem walk abnormal.      Deep Tendon Reflexes:      Reflex Scores:       Tricep reflexes are 2+ on the right side and 2+ on the left side.       Bicep reflexes are 2+ on the right side and 2+ on the left side.       Brachioradialis reflexes are 2+ on the right side and 2+ on the left side.       Patellar reflexes are 2+ on the right side and 2+ on the left side.       Achilles reflexes are 2+ on the right side and 2+ on the left side.     Comments: Corrective saccade PRESENT in vestibular - ocular reflex   Psychiatric:         Attention and Perception: Attention normal.         Mood and Affect: Mood normal.         Speech: Speech normal.         Behavior: Behavior normal. Behavior is cooperative.         Cognition and Memory: Cognition normal.       Labs  Hospital Outpatient Visit on 07/18/2024   Component Date Value Ref Range Status    WBC 07/18/2024 7.3  4.4 - 11.3 x10*3/uL Final    nRBC 07/18/2024 0.0  0.0 - 0.0 /100 WBCs Final    RBC 07/18/2024 3.58 (L)  4.00 - 5.20 x10*6/uL Final    Hemoglobin 07/18/2024 11.0 (L)  12.0 - 16.0 g/dL Final    Hematocrit 07/18/2024 33.2 (L)  36.0 - 46.0 % Final    MCV 07/18/2024 93  80 - 100 fL Final    MCH 07/18/2024 30.7  26.0 - 34.0 pg Final    MCHC 07/18/2024 33.1  32.0 - 36.0 g/dL Final    RDW 07/18/2024 12.7  11.5 - 14.5 % Final    Platelets 07/18/2024 200  150 - 450 x10*3/uL Final    Neutrophils % 07/18/2024 73.0  40.0 - 80.0 % Final    Immature Granulocytes %, Automated 07/18/2024 0.3  0.0 - 0.9 % Final    Immature Granulocyte Count (IG) includes promyelocytes, myelocytes and metamyelocytes but does not include bands. Percent differential counts (%) should be interpreted in the context of the absolute cell counts (cells/UL).    Lymphocytes  % 07/18/2024 13.5  13.0 - 44.0 % Final    Monocytes % 07/18/2024 10.2  2.0 - 10.0 % Final    Eosinophils % 07/18/2024 2.6  0.0 - 6.0 % Final    Basophils % 07/18/2024 0.4  0.0 - 2.0 % Final    Neutrophils Absolute 07/18/2024 5.30  1.20 - 7.70 x10*3/uL Final    Percent differential counts (%) should be interpreted in the context of the absolute cell counts (cells/uL).    Immature Granulocytes Absolute, Au* 07/18/2024 0.02  0.00 - 0.70 x10*3/uL Final    Lymphocytes Absolute 07/18/2024 0.98 (L)  1.20 - 4.80 x10*3/uL Final    Monocytes Absolute 07/18/2024 0.74  0.10 - 1.00 x10*3/uL Final    Eosinophils Absolute 07/18/2024 0.19  0.00 - 0.70 x10*3/uL Final    Basophils Absolute 07/18/2024 0.03  0.00 - 0.10 x10*3/uL Final    Glucose 07/18/2024 99  74 - 99 mg/dL Final    Sodium 07/18/2024 139  136 - 145 mmol/L Final    Potassium 07/18/2024 3.8  3.5 - 5.3 mmol/L Final    Chloride 07/18/2024 105  98 - 107 mmol/L Final    Bicarbonate 07/18/2024 25  21 - 32 mmol/L Final    Anion Gap 07/18/2024 13  10 - 20 mmol/L Final    Urea Nitrogen 07/18/2024 18  6 - 23 mg/dL Final    Creatinine 07/18/2024 0.75  0.50 - 1.05 mg/dL Final    eGFR 07/18/2024 87  >60 mL/min/1.73m*2 Final    Calculations of estimated GFR are performed using the 2021 CKD-EPI Study Refit equation without the race variable for the IDMS-Traceable creatinine methods.  https://jasn.asnjournals.org/content/early/2021/09/22/ASN.6611312979    Calcium 07/18/2024 8.6  8.6 - 10.6 mg/dL Final    Albumin 07/18/2024 3.4  3.4 - 5.0 g/dL Final    Alkaline Phosphatase 07/18/2024 40  33 - 136 U/L Final    Total Protein 07/18/2024 5.7 (L)  6.4 - 8.2 g/dL Final    AST 07/18/2024 18  9 - 39 U/L Final    Bilirubin, Total 07/18/2024 1.0  0.0 - 1.2 mg/dL Final    ALT 07/18/2024 8  7 - 45 U/L Final    Patients treated with Sulfasalazine may generate falsely decreased results for ALT.    aPTT 07/18/2024 30  27 - 38 seconds Final    Bilirubin, Direct 07/18/2024 0.1  0.0 - 0.3 mg/dL Final     Creatine Kinase 07/18/2024 46  0 - 215 U/L Final    C-Reactive Protein 07/18/2024 1.82 (H)  <1.00 mg/dL Final    Ferritin 07/18/2024 108  8 - 150 ng/mL Final    Magnesium 07/18/2024 1.99  1.60 - 2.40 mg/dL Final    Phosphorus 07/18/2024 3.7  2.5 - 4.9 mg/dL Final    The performance characteristics of phosphorus testing in heparinized plasma have been validated by the individual  laboratory site where testing is performed. Testing on heparinized plasma is not approved by the FDA; however, such approval is not necessary.    Protime 07/18/2024 12.9 (H)  9.8 - 12.8 seconds Final    INR 07/18/2024 1.1  0.9 - 1.1 Final               Performance Status:    ECOG 1: Restricted in physically strenuous activity but ambulatory and able to carry out work of a light or sedentary nature, e.g., light house work, office work.         Assessment/Plan      Mrs. Branch is a 67 yo with COPD and GERD who presented with newly diagnosed SCLC completed BID radiation planned concurrently with cis/etoposide but had a reaction C2D1 to cisplatin. Completed 1 cycle carbo/etop D1 4/5/22 also complicated by facial flushing and erythematous rash and stopped further treatment. Now s/p PCI in 6/2022. Treatment has been complicated by worsening short-term memory worse for the day or two after treatment, delayed C2 by 1 week and dose-reduced. Confusion has largely resolved during C3 after stopping mirtazapine but she and her  recognize transient worsening for the day or two after treatment.      #SCLC  -Labs and AE's are within treatment parameters. Continue with treatment    #Bezoar  -Following with Dr. Parsons, Gastroenterology at [facility]    #Double vision/confusion/gait instability  -5/20/24 MRI brain negative.    -She was seen by her ophthalmologist and will continue to follow.  -Follow up appointment with  neurology (Dr. Simms) made for Dec 3. Trying to get appt moved up.  Given stability and the chronicity of her symptoms, neurology follow  up is urgent but there is no apparent emergency.     Ashwin Epps, APRN-CNP

## 2024-07-23 ENCOUNTER — HOSPITAL ENCOUNTER (OUTPATIENT)
Dept: RADIOLOGY | Facility: HOSPITAL | Age: 69
Discharge: HOME | End: 2024-07-23
Payer: MEDICARE

## 2024-07-23 DIAGNOSIS — Z00.6 CLINICAL TRIAL PARTICIPANT: ICD-10-CM

## 2024-07-23 DIAGNOSIS — C34.92 SMALL CELL CARCINOMA OF LUNG, LEFT (MULTI): ICD-10-CM

## 2024-07-23 PROCEDURE — 2550000001 HC RX 255 CONTRASTS: Performed by: STUDENT IN AN ORGANIZED HEALTH CARE EDUCATION/TRAINING PROGRAM

## 2024-07-23 PROCEDURE — 74177 CT ABD & PELVIS W/CONTRAST: CPT

## 2024-07-30 ENCOUNTER — EDUCATION (OUTPATIENT)
Dept: HEMATOLOGY/ONCOLOGY | Facility: HOSPITAL | Age: 69
End: 2024-07-30
Payer: MEDICARE

## 2024-07-30 ENCOUNTER — HOSPITAL ENCOUNTER (OUTPATIENT)
Dept: RESEARCH | Facility: HOSPITAL | Age: 69
Discharge: HOME | End: 2024-07-30
Payer: MEDICARE

## 2024-07-30 VITALS
TEMPERATURE: 97 F | HEART RATE: 76 BPM | WEIGHT: 111.33 LBS | BODY MASS INDEX: 21.74 KG/M2 | SYSTOLIC BLOOD PRESSURE: 91 MMHG | DIASTOLIC BLOOD PRESSURE: 61 MMHG | RESPIRATION RATE: 16 BRPM | OXYGEN SATURATION: 97 %

## 2024-07-30 DIAGNOSIS — C34.90 SMALL CELL LUNG CANCER (MULTI): ICD-10-CM

## 2024-07-30 DIAGNOSIS — Z00.6 EXAMINATION OF PARTICIPANT IN CLINICAL TRIAL: Primary | ICD-10-CM

## 2024-07-30 DIAGNOSIS — C34.90 SMALL CELL LUNG CANCER (MULTI): Primary | ICD-10-CM

## 2024-07-30 LAB
ALBUMIN SERPL BCP-MCNC: 3.8 G/DL (ref 3.4–5)
ALP SERPL-CCNC: 43 U/L (ref 33–136)
ALT SERPL W P-5'-P-CCNC: 9 U/L (ref 7–45)
AMYLASE SERPL-CCNC: 29 U/L (ref 29–103)
ANION GAP SERPL CALC-SCNC: 12 MMOL/L (ref 10–20)
APPEARANCE UR: CLEAR
APTT PPP: 32 SECONDS (ref 27–38)
AST SERPL W P-5'-P-CCNC: 14 U/L (ref 9–39)
BASOPHILS # BLD AUTO: 0.03 X10*3/UL (ref 0–0.1)
BASOPHILS NFR BLD AUTO: 0.5 %
BILIRUB DIRECT SERPL-MCNC: 0.1 MG/DL (ref 0–0.3)
BILIRUB SERPL-MCNC: 0.8 MG/DL (ref 0–1.2)
BILIRUB UR STRIP.AUTO-MCNC: NEGATIVE MG/DL
BUN SERPL-MCNC: 16 MG/DL (ref 6–23)
CALCIUM SERPL-MCNC: 9.3 MG/DL (ref 8.6–10.6)
CHLORIDE SERPL-SCNC: 101 MMOL/L (ref 98–107)
CK SERPL-CCNC: 51 U/L (ref 0–215)
CO2 SERPL-SCNC: 28 MMOL/L (ref 21–32)
COLOR UR: ABNORMAL
CORTIS SERPL-MCNC: 1 UG/DL (ref 2.5–20)
CREAT SERPL-MCNC: 0.86 MG/DL (ref 0.5–1.05)
CRP SERPL-MCNC: 0.16 MG/DL
EGFRCR SERPLBLD CKD-EPI 2021: 74 ML/MIN/1.73M*2
EOSINOPHIL # BLD AUTO: 0.01 X10*3/UL (ref 0–0.7)
EOSINOPHIL NFR BLD AUTO: 0.2 %
ERYTHROCYTE [DISTWIDTH] IN BLOOD BY AUTOMATED COUNT: 12.6 % (ref 11.5–14.5)
ESTRADIOL SERPL-MCNC: <19 PG/ML
FERRITIN SERPL-MCNC: 80 NG/ML (ref 8–150)
FSH SERPL-ACNC: 53.2 IU/L
GLUCOSE SERPL-MCNC: 131 MG/DL (ref 74–99)
GLUCOSE UR STRIP.AUTO-MCNC: NORMAL MG/DL
HCT VFR BLD AUTO: 35.3 % (ref 36–46)
HGB BLD-MCNC: 12 G/DL (ref 12–16)
HOLD SPECIMEN: NORMAL
IMM GRANULOCYTES # BLD AUTO: 0.02 X10*3/UL (ref 0–0.7)
IMM GRANULOCYTES NFR BLD AUTO: 0.3 % (ref 0–0.9)
INR PPP: 1 (ref 0.9–1.1)
KETONES UR STRIP.AUTO-MCNC: NEGATIVE MG/DL
LEUKOCYTE ESTERASE UR QL STRIP.AUTO: ABNORMAL
LH SERPL-ACNC: 20.5 IU/L
LIPASE SERPL-CCNC: 25 U/L (ref 9–82)
LYMPHOCYTES # BLD AUTO: 0.92 X10*3/UL (ref 1.2–4.8)
LYMPHOCYTES NFR BLD AUTO: 14.2 %
MAGNESIUM SERPL-MCNC: 2.05 MG/DL (ref 1.6–2.4)
MCH RBC QN AUTO: 30.8 PG (ref 26–34)
MCHC RBC AUTO-ENTMCNC: 34 G/DL (ref 32–36)
MCV RBC AUTO: 91 FL (ref 80–100)
MONOCYTES # BLD AUTO: 0.37 X10*3/UL (ref 0.1–1)
MONOCYTES NFR BLD AUTO: 5.7 %
MUCOUS THREADS #/AREA URNS AUTO: ABNORMAL /LPF
NEUTROPHILS # BLD AUTO: 5.12 X10*3/UL (ref 1.2–7.7)
NEUTROPHILS NFR BLD AUTO: 79.1 %
NITRITE UR QL STRIP.AUTO: NEGATIVE
NRBC BLD-RTO: 0 /100 WBCS (ref 0–0)
PH UR STRIP.AUTO: 5.5 [PH]
PHOSPHATE SERPL-MCNC: 3.9 MG/DL (ref 2.5–4.9)
PLATELET # BLD AUTO: 231 X10*3/UL (ref 150–450)
POTASSIUM SERPL-SCNC: 4.6 MMOL/L (ref 3.5–5.3)
PROT SERPL-MCNC: 6.2 G/DL (ref 6.4–8.2)
PROT UR STRIP.AUTO-MCNC: NEGATIVE MG/DL
PROTHROMBIN TIME: 11.8 SECONDS (ref 9.8–12.8)
RBC # BLD AUTO: 3.9 X10*6/UL (ref 4–5.2)
RBC # UR STRIP.AUTO: NEGATIVE /UL
RBC #/AREA URNS AUTO: ABNORMAL /HPF
SODIUM SERPL-SCNC: 136 MMOL/L (ref 136–145)
SP GR UR STRIP.AUTO: 1.01
SQUAMOUS #/AREA URNS AUTO: ABNORMAL /HPF
T4 FREE SERPL-MCNC: 0.88 NG/DL (ref 0.78–1.48)
TSH SERPL-ACNC: 1.34 MIU/L (ref 0.44–3.98)
UROBILINOGEN UR STRIP.AUTO-MCNC: NORMAL MG/DL
WBC # BLD AUTO: 6.5 X10*3/UL (ref 4.4–11.3)
WBC #/AREA URNS AUTO: >50 /HPF

## 2024-07-30 PROCEDURE — 80053 COMPREHEN METABOLIC PANEL: CPT

## 2024-07-30 PROCEDURE — 84439 ASSAY OF FREE THYROXINE: CPT

## 2024-07-30 PROCEDURE — 85025 COMPLETE CBC W/AUTO DIFF WBC: CPT

## 2024-07-30 PROCEDURE — 81001 URINALYSIS AUTO W/SCOPE: CPT

## 2024-07-30 PROCEDURE — 82248 BILIRUBIN DIRECT: CPT

## 2024-07-30 PROCEDURE — 82670 ASSAY OF TOTAL ESTRADIOL: CPT

## 2024-07-30 PROCEDURE — 82024 ASSAY OF ACTH: CPT

## 2024-07-30 PROCEDURE — 83690 ASSAY OF LIPASE: CPT

## 2024-07-30 PROCEDURE — 83001 ASSAY OF GONADOTROPIN (FSH): CPT

## 2024-07-30 PROCEDURE — 82550 ASSAY OF CK (CPK): CPT

## 2024-07-30 PROCEDURE — 84100 ASSAY OF PHOSPHORUS: CPT

## 2024-07-30 PROCEDURE — 96413 CHEMO IV INFUSION 1 HR: CPT

## 2024-07-30 PROCEDURE — 85610 PROTHROMBIN TIME: CPT

## 2024-07-30 PROCEDURE — 85730 THROMBOPLASTIN TIME PARTIAL: CPT

## 2024-07-30 PROCEDURE — 2560000001 HC RX 256 EXPERIMENTAL DRUGS

## 2024-07-30 PROCEDURE — 82728 ASSAY OF FERRITIN: CPT

## 2024-07-30 PROCEDURE — 82150 ASSAY OF AMYLASE: CPT

## 2024-07-30 PROCEDURE — 83735 ASSAY OF MAGNESIUM: CPT

## 2024-07-30 PROCEDURE — 86140 C-REACTIVE PROTEIN: CPT

## 2024-07-30 PROCEDURE — 84443 ASSAY THYROID STIM HORMONE: CPT

## 2024-07-30 PROCEDURE — 82533 TOTAL CORTISOL: CPT

## 2024-07-30 RX ORDER — DIPHENHYDRAMINE HYDROCHLORIDE 50 MG/ML
50 INJECTION INTRAMUSCULAR; INTRAVENOUS AS NEEDED
Status: DISCONTINUED | OUTPATIENT
Start: 2024-07-30 | End: 2024-07-31 | Stop reason: HOSPADM

## 2024-07-30 RX ORDER — FAMOTIDINE 10 MG/ML
20 INJECTION INTRAVENOUS ONCE AS NEEDED
Status: DISCONTINUED | OUTPATIENT
Start: 2024-07-30 | End: 2024-07-31 | Stop reason: HOSPADM

## 2024-07-30 RX ORDER — ALBUTEROL SULFATE 0.83 MG/ML
3 SOLUTION RESPIRATORY (INHALATION) AS NEEDED
Status: DISCONTINUED | OUTPATIENT
Start: 2024-07-30 | End: 2024-07-31 | Stop reason: HOSPADM

## 2024-07-30 RX ORDER — EPINEPHRINE 1 MG/ML
0.3 INJECTION INTRAMUSCULAR; INTRAVENOUS; SUBCUTANEOUS EVERY 5 MIN PRN
Status: DISCONTINUED | OUTPATIENT
Start: 2024-07-30 | End: 2024-07-31 | Stop reason: HOSPADM

## 2024-07-30 NOTE — RESEARCH NOTES
[facility]><[id number]><[id number]><PARTA>    DCRU NURSING VISIT NOTE  Study Name: ALEXANDRU Foote8  IRB#: [id number]  DCRU#: [id number]  Protocol Version Dated: A9 3.22.23  PI: Roshan Kumar MD.    Time point: Cycle 5 and beyond - Day 1  CYCLE 20     Encounter Date: 07/30/2024  Encounter Time:  8:30 AM EDT  Encounter Department: [facility] HEMATOLOGY AND ONCOLOGY     #1     Phone Pager   Carmen Rios [phone]    #2 Phone Pager        Other Phone Pager          Study Regimen and Dosing   Part A Dose Exploration/Expansion- Small Cell Lung Cancer Relapsed or Refractory patients who progressed or recurred following at least 1 platinum-based regimen.   Cycle = 28 days    administered IV Day 1, Day 8, and Day 15 of Cycle 1. Cycle 2 and beyond     administered on Day 1 and Day 15.        Dietary Guidelines   Regular diet:     Admission and Prior to Starting Study Activities   Notify  when patient arrives to unit.  Complete DCRU/Yunior intake form in EMR.  Insert one peripheral IV line for sample collection procedures (flush line with normal saline following each blood draw). Access mediport (if available) otherwise insert second peripheral line in opposite arm for Investigative drug administration (if peripheral line, flush line with normal saline before & after infusion)     Criteria to Treat   DCRU RN reviewed and meets eligibility to proceed with treatment plan   Time team notified: 0921   DCRU RN notifies study team to review eligibility and approval before dosing procedures  Time team approves: 0940      Subjective   Gladys Branch is a 68 y.o. female and is here for a Research clinical visit.    Visit Provider: [id number] [facility] ROOM 2 [facility]     Allergies:   Allergies   Allergen Reactions    Cisplatin Other     CHEMO INDUCED (Moderate); Dyspepsia (Moderate); Headaches (Moderate); Hypotension (Moderate); Numbness (Moderate); Resp Distress (Moderate)     Codeine Nausea Only and Other     nausea    Sulfa (Sulfonamide Antibiotics) Rash       Objective     Vital Signs:    Vitals:    07/30/24 0805   BP: 106/77   Pulse: 77   Resp: 18   Temp: 36.5 °C (97.7 °F)   TempSrc: Oral   SpO2: 99%   Weight: 50.5 kg (111 lb 5.3 oz)       Physical Exam     ASSESSMENT and PLAN:  Problem List Items Addressed This Visit       Small cell lung cancer (Multi) - Primary    Relevant Medications    sodium chloride 0.9 % bolus 500 mL    dextrose 5 % in water (D5W) bolus    diphenhydrAMINE (BENADryl) injection 50 mg    methylPREDNISolone sod succinate (SOLU-Medrol) 40 mg/mL injection 40 mg    famotidine PF (Pepcid) injection 20 mg    EPINEPHrine (Adrenalin) injection 0.3 mg    albuterol 2.5 mg /3 mL (0.083 %) nebulizer solution 3 mL    Study [id number] Tarlatamab (AMG-757) 3 mg in sodium chloride 0.9% 250 mL IV (Start on 7/30/2024 10:00 AM)    Other Relevant Orders    Clinic Appointment Request    Infusion Appointment Request    CBC and Auto Differential (Completed)    Comprehensive metabolic panel (Completed)    Acth    Amylase (Completed)    APTT (Completed)    Bilirubin, Direct (Completed)    CK (Completed)    Cortisol (Completed)    C-Reactive Protein (Completed)    Estradiol (Completed)    Ferritin (Completed)    FSH & LH (Completed)    Lipase (Completed)    Magnesium (Completed)    Phosphorus (Completed)    Protime-INR (Completed)    TSH (Completed)    T4, free (Completed)    Urinalysis with Reflex Microscopic    Research collection: 4mL Lavender EDTA - Research Collect Immune Cells: Cd20, TIGIT, ; Pre-Dose; Within 15 minutes; Ambient; 8-10x    Research collection: 4mL Blue/Black CPT Sodium Citrate - Clinical Collect PBMC; Pre-Dose; Within 15 minutes; Ambient; 8-10x    Research collection: 2.5mL Red SST - Research Collect Anti-Tarlatamab Antibody; Pre-Dose; Other (Within 15 minutes); Ambient; 5x    Research collection: 2.5mL Red SST - Research Collect Tarlatamab PK; Pre-Dose; Other  (Within 15 minutes); Ambient; 5x    Research collection: 2.5mL Red SST - Research Collect Serum Markers; Pre-Dose; Other (Within 15 minutes); Ambient; 5x    CBC and Auto Differential (Completed)    Comprehensive metabolic panel (Completed)    Acth    Amylase (Completed)    APTT (Completed)    Bilirubin, Direct (Completed)    CK (Completed)    Cortisol (Completed)    C-Reactive Protein (Completed)    Estradiol (Completed)    Ferritin (Completed)    FSH & LH (Completed)    Lipase (Completed)    Magnesium (Completed)    Phosphorus (Completed)    Protime-INR (Completed)    TSH (Completed)    T4, free (Completed)    Urinalysis with Reflex Microscopic    Adult diet Regular    Research Communication (Completed)    Treatment Conditions (Completed)    Provider Communication - [id number] (Completed)    Research Triplicate ECG (Completed)    Research Triplicate ECG (Completed)    Nursing Communication - Hypersensitivity Management, Moderate (Completed)    Nursing Communication - Hypersensitivity Management, Severe (Completed)    Nursing Communication - Respiratory Management (Completed)    Pulse oximetry, continuous        Medications as of the completion of today's visit:  Current Outpatient Medications   Medication Sig Dispense Refill    cetirizine-pseudoephedrine (ZyrTEC-D) 5-120 mg 12 hr tablet Take 1 tablet by mouth 2 times a day. (Patient taking differently: Take 1 tablet by mouth once daily.) 60 tablet 11    cholecalciferol (Vitamin D-3) 25 MCG (1000 UT) tablet Take by mouth.      dexAMETHasone (Decadron) 2 mg tablet Take 0.5 tablets (1 mg) by mouth once daily with breakfast. 30 tablet 2    dextromethorphan (Delsym) 30 mg/5 mL liquid Take 5 mL (30 mg) by mouth once daily as needed.      dronabinol (Marinol) 2.5 mg capsule once daily as needed. One hour before dinner      estrogens, conjugated, (Premarin) 0.3 mg tablet Take 0.5 tablets (0.15 mg) by mouth once daily.      mv-min/FA/vit K/lutein/zeaxant (PRESERVISION AREDS 2  PLUS MV ORAL) Take 1 tablet by mouth once daily.      omeprazole (PriLOSEC) 40 mg DR capsule Take 1 capsule (40 mg) by mouth once daily. Do not crush or chew. 90 each 3    ondansetron (Zofran) 8 mg tablet Take 0.5 tablets (4 mg) by mouth every 8 hours if needed.      polyethylene glycol (Glycolax, Miralax) 17 gram packet Take 17 g by mouth 2 times a day. (Patient taking differently: Take 17 g by mouth if needed.) 60 packet 2    prochlorperazine (Compazine) 10 mg tablet Take 0.5 tablets (5 mg) by mouth every 6 hours if needed.      psyllium husk, aspartame, (Metamucil Sugar-Free, aspart,) 3.4 gram/5.8 gram powder Take 1 Dose by mouth once daily. (Patient taking differently: Take 1 Dose by mouth once daily as needed.) 425 g 11     Current Facility-Administered Medications   Medication Dose Route Frequency Provider Last Rate Last Admin    albuterol 2.5 mg /3 mL (0.083 %) nebulizer solution 3 mL  3 mL nebulization PRN Ashwin Epps APRN-CNP        dextrose 5 % in water (D5W) bolus  500 mL intravenous PRN Ashwin Epps APRN-CNP        diphenhydrAMINE (BENADryl) injection 50 mg  50 mg intravenous PRN Ashwin Epps APRN-CNP        EPINEPHrine (Adrenalin) injection 0.3 mg  0.3 mg intramuscular q5 min PRN Ashwin Epps APRN-CNP        famotidine PF (Pepcid) injection 20 mg  20 mg intravenous Once PRN Ashwin Epps APRN-CNP        methylPREDNISolone sod succinate (SOLU-Medrol) 40 mg/mL injection 40 mg  40 mg intravenous PRN Ashwin Epps APRN-CNP        sodium chloride 0.9 % bolus 500 mL  500 mL intravenous PRN RUPNIDER Adams        Study [id number] Tarlatamab (AMG-757) 3 mg in sodium chloride 0.9% 250 mL IV  3 mg intravenous Once RUPINDER Stone               Orders placed during today's visit:  Orders Placed This Encounter   Procedures    CBC and Auto Differential     Standing Status:   Future     Number of Occurrences:   1     Standing Expiration Date:   7/30/2025     Order Specific  Question:   Release result to MyChart     Answer:   Immediate [1]    Comprehensive metabolic panel     Standing Status:   Future     Number of Occurrences:   1     Standing Expiration Date:   7/30/2025     Order Specific Question:   Release result to MyChart     Answer:   Immediate [1]    Acth     Standing Status:   Future     Number of Occurrences:   1     Standing Expiration Date:   7/30/2025     Order Specific Question:   Release result to MyChart     Answer:   Immediate [1]    Amylase     Standing Status:   Future     Number of Occurrences:   1     Standing Expiration Date:   7/30/2025     Order Specific Question:   Release result to MyChart     Answer:   Immediate [1]    APTT     Standing Status:   Future     Number of Occurrences:   1     Standing Expiration Date:   7/30/2025     Order Specific Question:   Release result to MyChart     Answer:   Immediate [1]    Bilirubin, Direct     Standing Status:   Future     Number of Occurrences:   1     Standing Expiration Date:   7/30/2025     Order Specific Question:   Release result to MyChart     Answer:   Immediate [1]    CK     Standing Status:   Future     Number of Occurrences:   1     Standing Expiration Date:   7/30/2025     Order Specific Question:   Release result to MyChart     Answer:   Immediate [1]    Cortisol     Standing Status:   Future     Number of Occurrences:   1     Standing Expiration Date:   7/30/2025     Order Specific Question:   Release result to MyChart     Answer:   Immediate [1]    C-Reactive Protein     Standing Status:   Future     Number of Occurrences:   1     Standing Expiration Date:   7/30/2025     Order Specific Question:   Release result to MyChart     Answer:   Immediate [1]    Estradiol     Standing Status:   Future     Number of Occurrences:   1     Standing Expiration Date:   7/30/2025     Order Specific Question:   Release result to MyChart     Answer:   Immediate [1]    Ferritin     Standing Status:   Future     Number of  Occurrences:   1     Standing Expiration Date:   7/30/2025     Order Specific Question:   Release result to MyChart     Answer:   Immediate [1]    FSH & LH     Standing Status:   Future     Number of Occurrences:   1     Standing Expiration Date:   7/30/2025     Order Specific Question:   Release result to MyChart     Answer:   Immediate [1]    Lipase     Standing Status:   Future     Number of Occurrences:   1     Standing Expiration Date:   7/30/2025     Order Specific Question:   Release result to MyChart     Answer:   Immediate [1]    Magnesium     Standing Status:   Future     Number of Occurrences:   1     Standing Expiration Date:   7/30/2025     Order Specific Question:   Release result to MyChart     Answer:   Immediate [1]    Phosphorus     Standing Status:   Future     Number of Occurrences:   1     Standing Expiration Date:   7/30/2025     Order Specific Question:   Release result to MyChart     Answer:   Immediate [1]    Protime-INR     Standing Status:   Future     Number of Occurrences:   1     Standing Expiration Date:   7/30/2025     Order Specific Question:   Release result to MyChart     Answer:   Immediate [1]    TSH     Standing Status:   Future     Number of Occurrences:   1     Standing Expiration Date:   7/30/2025     Order Specific Question:   Release result to MyChart     Answer:   Immediate [1]    T4, free     Standing Status:   Future     Number of Occurrences:   1     Standing Expiration Date:   7/30/2025     Order Specific Question:   Release result to MyChart     Answer:   Immediate [1]    Urinalysis with Reflex Microscopic     Standing Status:   Future     Number of Occurrences:   1     Standing Expiration Date:   7/30/2025     Order Specific Question:   Release result to MyChart     Answer:   Immediate [1]    Research collection: 4mL Lavender EDTA - Research Collect Immune Cells: Cd20, TIGIT, ; Pre-Dose; Within 15 minutes; Ambient; 8-10x     Standing Status:   Future      Standing Expiration Date:   7/30/2025     Order Specific Question:   Test     Answer:   Immune Cells: Cd20, TIGIT,      Order Specific Question:   Timepoint     Answer:   Pre-Dose     Order Specific Question:   Pre-Dose     Answer:   Within 15 minutes     Order Specific Question:   Temperature     Answer:   Ambient     Order Specific Question:   Invert     Answer:   8-10x     Order Specific Question:   Optional?     Answer:   No    Research collection: 4mL Blue/Black CPT Sodium Citrate - Clinical Collect PBMC; Pre-Dose; Within 15 minutes; Ambient; 8-10x     Standing Status:   Future     Standing Expiration Date:   7/30/2025     Order Specific Question:   Test     Answer:   PB[facility]     Order Specific Question:   Timepoint     Answer:   Pre-Dose     Order Specific Question:   Pre-Dose     Answer:   Within 15 minutes     Order Specific Question:   Temperature     Answer:   Ambient     Order Specific Question:   Invert     Answer:   8-10x     Order Specific Question:   Optional?     Answer:   No    Research collection: 2.5mL Red SST - Research Collect Anti-Tarlatamab Antibody; Pre-Dose; Other (Within 15 minutes); Ambient; 5x     Standing Status:   Future     Standing Expiration Date:   7/30/2025     Order Specific Question:   Test     Answer:   Anti-Tarlatamab Antibody     Order Specific Question:   Timepoint     Answer:   Pre-Dose     Order Specific Question:   Pre-Dose     Answer:   Other     Comments:   Within 15 minutes     Order Specific Question:   Temperature     Answer:   Ambient     Order Specific Question:   Invert     Answer:   5x     Order Specific Question:   Optional?     Answer:   No    Research collection: 2.5mL Red SST - Research Collect Tarlatamab PK; Pre-Dose; Other (Within 15 minutes); Ambient; 5x     Standing Status:   Future     Standing Expiration Date:   7/30/2025     Order Specific Question:   Test     Answer:   Tarlatamab PK     Order Specific Question:   Timepoint     Answer:   Pre-Dose      Order Specific Question:   Pre-Dose     Answer:   Other     Comments:   Within 15 minutes     Order Specific Question:   Temperature     Answer:   Ambient     Order Specific Question:   Invert     Answer:   5x     Order Specific Question:   Optional?     Answer:   No    Research collection: 2.5mL Red SST - Research Collect Serum Markers; Pre-Dose; Other (Within 15 minutes); Ambient; 5x     Standing Status:   Future     Standing Expiration Date:   7/30/2025     Order Specific Question:   Test     Answer:   Serum Markers     Order Specific Question:   Timepoint     Answer:   Pre-Dose     Order Specific Question:   Pre-Dose     Answer:   Other     Comments:   Within 15 minutes     Order Specific Question:   Temperature     Answer:   Ambient     Order Specific Question:   Invert     Answer:   5x     Order Specific Question:   Optional?     Answer:   No    CBC and Auto Differential     Standing Status:   Standing     Number of Occurrences:   1     Order Specific Question:   Release result to MyChart     Answer:   Immediate [1]    Comprehensive metabolic panel     Standing Status:   Standing     Number of Occurrences:   1     Order Specific Question:   Release result to MyChart     Answer:   Immediate [1]    Acth     Standing Status:   Standing     Number of Occurrences:   1     Order Specific Question:   Release result to MyChart     Answer:   Immediate [1]    Amylase     Standing Status:   Standing     Number of Occurrences:   1     Order Specific Question:   Release result to MyChart     Answer:   Immediate [1]    APTT     Standing Status:   Standing     Number of Occurrences:   1     Order Specific Question:   Release result to MyChart     Answer:   Immediate [1]    Bilirubin, Direct     Standing Status:   Standing     Number of Occurrences:   1     Order Specific Question:   Release result to MyChart     Answer:   Immediate [1]    CK     Standing Status:   Standing     Number of Occurrences:   1     Order Specific  Question:   Release result to MyChart     Answer:   Immediate [1]    Cortisol     Standing Status:   Standing     Number of Occurrences:   1     Order Specific Question:   Release result to MyChart     Answer:   Immediate [1]    C-Reactive Protein     Standing Status:   Standing     Number of Occurrences:   1     Order Specific Question:   Release result to MyChart     Answer:   Immediate [1]    Estradiol     Standing Status:   Standing     Number of Occurrences:   1     Order Specific Question:   Release result to MyChart     Answer:   Immediate [1]    Ferritin     Standing Status:   Standing     Number of Occurrences:   1     Order Specific Question:   Release result to MyChart     Answer:   Immediate [1]    FSH & LH     Standing Status:   Standing     Number of Occurrences:   1     Order Specific Question:   Release result to MyChart     Answer:   Immediate [1]    Lipase     Standing Status:   Standing     Number of Occurrences:   1     Order Specific Question:   Release result to MyChart     Answer:   Immediate [1]    Magnesium     Standing Status:   Standing     Number of Occurrences:   1     Order Specific Question:   Release result to MyChart     Answer:   Immediate [1]    Phosphorus     Standing Status:   Standing     Number of Occurrences:   1     Order Specific Question:   Release result to MyChart     Answer:   Immediate [1]    Protime-INR     Standing Status:   Standing     Number of Occurrences:   1     Order Specific Question:   Release result to MyChart     Answer:   Immediate [1]    TSH     Standing Status:   Standing     Number of Occurrences:   1     Order Specific Question:   Release result to MyChart     Answer:   Immediate [1]    T4, free     Standing Status:   Standing     Number of Occurrences:   1     Order Specific Question:   Release result to MyChart     Answer:   Immediate [1]    Urinalysis with Reflex Microscopic     Standing Status:   Standing     Number of Occurrences:   1     Order  Specific Question:   Release result to [place]     Answer:   Immediate [1]    Adult diet Regular     Standing Status:   Standing     Number of Occurrences:   1     Order Specific Question:   Diet type     Answer:   Regular    Research Communication     Cohort: 8  Dose Level:  10 mg/IV     Standing Status:   Standing     Number of Occurrences:   1    Treatment Conditions     Hold treatment and notify provider if:   Adverse Event GREATER THAN OR EQUAL TO Grade 3     Standing Status:   Standing     Number of Occurrences:   1    Provider Communication - [id number]      Dose Level 4              Tarlatamab 0.1 mg   Dose Level 5              Tarlatamab 0.3 mg   Dose Level 6              Tarlatamab 1 mg   Dose Level 7              Tarlatamab 3 mg   Dose Level 8              Tarlatamab 10 mg   Dose Level 9              Tarlatamab 30 mg   Dose Level 10              Tarlatamab 100 mg     Standing Status:   Standing     Number of Occurrences:   1    Research Triplicate ECG     Refer to study SmartPhrase for instructions and documentation of the research triplicate ECG.     Standing Status:   Standing     Number of Occurrences:   1    Research Triplicate ECG     Refer to study SmartPhrase for instructions and documentation of the research triplicate ECG.     Standing Status:   Standing     Number of Occurrences:   1    Nursing Communication - Hypersensitivity Management, Moderate     Flushing, rash, pruritus, dyspnea, chest discomfort, back pain, angioedema, SBP LESS THAN 90 mmHg, and/or change in mental status above or below patient's baseline. In the event of moderate or severe hypersensitivity reaction to any medication:   Stop infusion.  Assess vital signs, pulse oximetry, nursing assessment, and patient complaints.  Administer medications per Hypersensitivity Reaction Medication Protocol.   Notify physician.     Standing Status:   Standing     Number of Occurrences:   1    Nursing Communication - Hypersensitivity Management,  Severe     Worsening of moderate reaction symptoms, SBP LESS THAN 80 mmHg, and/or respiratory distress (respirations GREATER THAN 40 breaths per minute, wheezing, life-threatening symptoms). In the event of moderate or severe hypersensitivity reaction to any medication:   Stop infusion.  Assess vital signs, pulse oximetry, nursing assessment, and patient complaints.  Administer medications per Hypersensitivity Reaction Medication Protocol.   Notify physician.     Standing Status:   Standing     Number of Occurrences:   1    Nursing Communication - Respiratory Management     Oxygen via nasal cannula at 4 L per minute for respiratory rate GREATER THAN OR EQUAL TO to 28 breaths/minute and/or wheezing. Pulse Oximetry continuous monitoring.     Standing Status:   Standing     Number of Occurrences:   1    Pulse oximetry, continuous     Continuous monitoring to maintain SPO2 GREATER THAN 90%     Standing Status:   Standing     Number of Occurrences:   1        [id number] -  in Subjects With Small Cell Lung Cancer    Patient has no adverse events documented in the Research Adverse Events activity.       Study Specific Instructions and Documentation   WEIGHT  LABS  VITALS  CORREATIVES  STUDY DRUG  VITALS  DISCHARGE     WEIGHT    Obtain weight in kilograms - with shoes off & heavy items removed.   Vitals:    07/30/24 0805   Weight: 50.5 kg (111 lb 5.3 oz)        PRE-DOSE Safety Labs   Refer to [place] Treatment Plan for orders     Pre-dose Vital Signs   Temp, HR, Resp, BP, Pulse Oximetry: Seated or Semi-Fowlers x 5 minutes before obtaining  Position and temperature location: should be the same that is used throughout the study-record position  Vitals:    07/30/24 1026   BP: 96/64   Pulse: 75   Resp: 16   Temp: 36.1 °C (97 °F)   SpO2: 98%        Pre-dose Research Correlatives  Deliver to DCRU/[facility] lab for processing   Access Type: peripheral 22g Location: Right AC   Refer to [place] Treatment Plan for orders   Time point  Specimen Test Volume Tube Handling Draw Time   Pre-dose (within 15 mins of  dosing)    draw in order PBMC      4 mL CPT Ambient, Invert 8-10X 1036    Anti- Antibody 2.5 mL SST Ambient, Invert 5X 1036    TARLATAMAB PK  (through cycle 12) 2.5 mL SST Ambient, Invert 5X N/A    Serum Markers 2.5 mL SST Ambient, Invert 5X 1036    Immune Cells 4 mL   K3 EDTA Ambient, Invert 8-10X 1036          Infusion-Related Reactions   [facility] Hypersensitivity Orders  Note: CRS table      Research Drug Administration Tarlatamab ()   Refer to [place] Treatment Plan for orders   Administer dose over 60 minutes (+/- 10 mins) followed by a 3 - 5 minute Normal saline flush. (This will be the end time after the flush). Document start time & end of study medication; document start time and end time of the flush.  Participant to remain on Unit for 2 hour post dose observation.    -Study drug administered 1040 - 1142  -NS flush administered 1142 - 1146    Day 1 Post-Dose Vital Signs   Temp, HR, Resp, BP, Pulse Oximetry: Seated or Semi-Fowlers x 5 minutes before obtaining  Position and temperature location: should be the same that is used throughout the study  End of Infusion  Vitals:    07/30/24 1146   BP: 91/61   Pulse: 76   Resp: 16   Temp: 36.1 °C (97 °F)   SpO2: 97%        Discharge Instructions   Patient may be discharged to home after 2 hour observation.  Remind patient to return: Day 15 for treatment & labs  Discharge time 1156     Davey Melchor RN  07/30/24      Grading and Management of Cytokine Release Syndrome   CRS Grade Description of Severity Minimum Expected Intervention Instructions for Dose Modifications of    1 Symptoms are not life threatening and require symptomatic treatment only,  eg, fever, nausea, fatigue, headache, myalgia's, malaise Administer symptomatic treatment (contact provider for medications/doses). Monitor for CRS symptoms including vital signs and pulse oximetry at least Q2 hours for12  hours or until resolution, Whichever is earlier. N/A     (continued)  Grading and Management of Cytokine Release Syndrome   CRS Grade Description of Severity Minimum Expected Intervention Instructions for Dose Modifications of    2 Symptoms require and respond to moderate intervention  Oxygen requirement <40%,                      OR  Hypotension responsive to fluids or low dose of one vasopressor, OR                                                                       Grade 2 organ toxicity or grade 3 transaminitis per CTCAE criteria Administer:  Symptomatic treatment   (contact provider for medications/doses)  Supplemental oxygen when oxygen saturation is <90% on room air  Intravenous fluids or low dose vasopressor for hypotension is recommended when systolic blood pressure is <85 mmHg. (contact provider for medications/doses) Persistent tachycardia (eg >120 bpm) may also indicate the need for intervention for hypotension.   Monitor for CRS symptoms including vital signs and pulse oximetry at least Q2 hours for12 hours or until resolution to CRS grade <= 1, whichever is earlier. For subjects with extensive co-morbidities or poor performance status, manage per grade 3 CRS guidance below If CRS occurs during  treatment, immediately interrupt the infusion and delay the next  dose until the event resolves to CRS grade <= 1 for no less than 72 hours. Permanently discontinue  if there is no improvement to CRS <= grade 1 within 7 days     3 Symptoms require and respond to aggressive intervention  Oxygen requirement >=40%, OR  Hypotension requiring high dose or multiple vasopressors, OR  Grade 3 organ toxicity or     Grade 4 transaminitis per  CTCAE criteria Admit to intensive care unit for close clinical and vital sign monitoring per institutional guidelines.   Refer to provider/protocol for medications and doses.     If CRS occurs during  treatment, immediately interrupt   and  delay the next dose until event resolves to CRS grade <= 1 for no less than 72 hours.    Permanently discontinue  if there is no improvement to CRS                 <= grade 2 within 5 days or CRS <= grade 1 within 7 days.  Permanently discontinue   if CRS grade  3 occurs at the initial run in dose (i.e., at MTD1)  (Applicable only after MTD1 has been defined).     (continued)  Grading and Management of Cytokine Release Syndrome   CRS Grade Description of Severity Minimum Expected Intervention Instructions for Dose Modifications of    4 Life-threatening symptoms  Requirement for ventilator support OR  Grade 4 organ toxicity (excluding transaminitis) per CTCAE criteria Admit to intensive care unit for close clinical and vital sign monitoring per institutional guidelines.   Refer to provider/protocol for medications and doses.   If CRS occurs during  treatment, Immediately stop the infusion.  Permanently discontinue   therapy.

## 2024-07-30 NOTE — RESEARCH NOTES
Research Note Treatment Day    Gladys Branch is here today for treatment on [id number]. Today is C20D1. Procedures completed per protocol. AE's and con-meds reviewed with patient. Patient is aware of treatment plan.    [x]   Received treatment as planned   OR  []    Treatment delayed; patient calendar updated as required   Treatment delayed because:    []   AE    []   Physician Discretion    []   Clinical Deterioration or Progression     []   Other    Education Documentation  General Medication Information, taught by Vladimir Fernandez RN at 7/30/2024 12:30 PM.  Learner: Significant Other, Patient  Readiness: Eager  Method: Explanation  Response: Verbalizes Understanding    Treatment Plan and Schedule, taught by Vladimir Fernandez RN at 7/30/2024 12:30 PM.  Learner: Significant Other, Patient  Readiness: Eager  Method: Explanation  Response: Verbalizes Understanding    Supportive Medications, taught by Vladimir Fernandez RN at 7/30/2024 12:30 PM.  Learner: Significant Other, Patient  Readiness: Eager  Method: Explanation  Response: Verbalizes Understanding    Diagnostic Studies, taught by Vladimir Fernandez RN at 7/30/2024 12:30 PM.  Learner: Significant Other, Patient  Readiness: Eager  Method: Explanation  Response: Verbalizes Understanding    Education Comments  No comments found.

## 2024-07-31 DIAGNOSIS — R63.0 ANOREXIA: Primary | ICD-10-CM

## 2024-07-31 LAB — ACTH PLAS-MCNC: <1.5 PG/ML (ref 7.2–63.3)

## 2024-07-31 RX ORDER — DEXAMETHASONE 1 MG/1
TABLET ORAL
Qty: 30 TABLET | Refills: 2 | Status: SHIPPED | OUTPATIENT
Start: 2024-07-31

## 2024-08-01 NOTE — PROGRESS NOTES
Patient ID: Gladys Branch is a 68 y.o. female.    DIAGNOSIS    Small Cell Lung Cancer    By immunostaining, the tumor cells are positive for CAM 5.2, INSM1, and TTF-1, and negative for p40 and LCA. The proliferation index by Ki-67 immunostaining is approximately 90%.  Synaptophysin, Chromogranin and INSM-1 are positive.  AE1/AE3 is negative. NEOGENOMICS, RB protein expression is lost in the tumor cells    STAGING    [id number], limited stage  Recurrence    CURRENT SITES OF DISEASE    RLL, supraclavicular    MOLECULAR GENOMICS      PRIOR THERAPY    Concurrent chemoradiation with Cisplatin/Etoposide every 3 weeks; C1D1 2/15/22, reaction to cisplatin C2D1 and did not receive D2 or D3 etoposide  Carbo/etoposide 4/5/2022 1 cycle c/b rash  PCI 5/21-6/13/22    CURRENT THERAPY    Phase 1 study [id number]  with study drug Taralatamab 1/24/23 - present.    CURRENT ONCOLOGICAL PROBLEMS      HISTORY OF PRESENT ILLNESS    Mrs. Branch is a 65 yo with PMH GERD and COPD who originally was round to have a RLL nodule measuring 11 mm in 4/28/2021 found as part of CT calcium score scan. An ensuing CT chest w/o contrast was done on 5/19/21 which revealed subsolid pulmonary nodules measuring 14 mm in the RLL. She had CT chest w/contrast on 11/29/21 which confirmed stable 14 mm GGO of the RLL and decreased RLL subpleural nodule. She met thoracic surgeon Dr. Farfan, who ordered PET/CT scan done on 12/8/21 which did not reveal any FDG-avid nodules within the lung parenchyma but R hilar nodes with max SUV 8.0. He referred her for bronch, which was done on 1/21/22 by Dr. Mcdaniels. 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 Brain MRI was negative.    She started concurrent chemoradiation with BID radiation with cis/etop 2/15/22, and unfortunately had a reaction to cisplatin on C2D1 with chest pain and hypotension. She was hospitalized with sepsis felt to be due to pneumonia. She trialed carboplatin/etoposide on 4/5/22 with a  resulting rash and opted to forego further chemotherapy as she had completed radiation. Restaging scan 11/3/22 unfortunately with progression with new R hilar lymph node, paratracheal nodes, and increase in size of R supraclavicular mass. She enrolled in [id number] and started C1D1 1/24/23. C1 complicated by anorexia and dysgeusia, and delayed C2 by a week. She has further struggled with fatigue and confusion, which may be related to mirtazapine. Dose reduced for C2 and mirtazapine stopped with improvement in symptoms. She continues to tolerate regular treatments.    PAST MEDICAL HISTORY    GERD  COPD    SOCIAL HISTORY    Retired - lighting . Lives at home with  and a kitten.  Tobacco: quit smoking 1999. 1 ppd x 25 yrs.  EtOH: wine 1 glass/day  Illicits: none    CURRENT MEDS    Please see med list    ALLERGIES    Codeine, Sulfa drugs, Cisplatin    FAMILY HISTORY    Paternal aunt - breast cancer dx 80s    Subjective    Today 7.30.2024. Patient in clinic with her  for C20D1 for [id number].    Patient has ROS otherwise negative unless specified above.    Objective          7/2/2024     1:56 PM 7/18/2024     8:03 AM 7/18/2024    10:28 AM 7/18/2024    11:41 AM 7/30/2024     8:05 AM 7/30/2024    10:26 AM 7/30/2024    11:46 AM   Vitals   Systolic 124 108 96 112 106 96 91   Diastolic 89 80 61 82 77 64 61   Heart Rate 84 67 89 78 77 75 76   Temp 36.6 °C (97.9 °F) 36.1 °C (97 °F) 36.1 °C (97 °F) 36.2 °C (97.2 °F) 36.5 °C (97.7 °F) 36.1 °C (97 °F) 36.1 °C (97 °F)   Resp 16 18 18 18 18 16 16   Weight (lb)  110.23   111.33     BMI  21.53 kg/m2   21.74 kg/m2     BSA (m2)  1.45 m2   1.46 m2         Daily Weight  07/30/24 : 50.5 kg (111 lb 5.3 oz)  07/18/24 : 50 kg (110 lb 3.7 oz)  07/02/24 : 52.1 kg (114 lb 13.8 oz)  06/18/24 : 50.7 kg (111 lb 12.4 oz)  06/10/24 : 50.8 kg (112 lb)         Physical Exam  Constitutional:       General: She is awake. She is not in acute distress.     Appearance: Normal  appearance. She is normal weight.   HENT:      Head: Normocephalic and atraumatic.      Mouth/Throat:      Mouth: Mucous membranes are moist.      Pharynx: No oropharyngeal exudate or posterior oropharyngeal erythema.   Eyes:      General: No scleral icterus.     Extraocular Movements: Extraocular movements intact.      Conjunctiva/sclera: Conjunctivae normal.      Pupils: Pupils are equal, round, and reactive to light.   Cardiovascular:      Rate and Rhythm: Normal rate and regular rhythm.      Heart sounds: Normal heart sounds, S1 normal and S2 normal. No murmur heard.     No friction rub. No gallop.   Pulmonary:      Effort: Pulmonary effort is normal.      Breath sounds: Normal breath sounds. No wheezing, rhonchi or rales.   Abdominal:      General: Abdomen is flat. Bowel sounds are normal. There is no distension.      Palpations: Abdomen is soft. There is no mass.      Tenderness: There is no abdominal tenderness. There is no guarding or rebound.   Musculoskeletal:      Cervical back: Normal range of motion and neck supple. No rigidity.   Skin:     General: Skin is warm and dry.      Comments: intact   Neurological:      Mental Status: She is alert and oriented to person, place, and time.      GCS: GCS eye subscore is 4. GCS verbal subscore is 5. GCS motor subscore is 6.      Sensory: Sensation is intact. No sensory deficit.      Motor: Motor function is intact.      Coordination: Romberg sign positive. Finger-Nose-Finger Test normal.      Gait: Gait abnormal and tandem walk abnormal.      Deep Tendon Reflexes:      Reflex Scores:       Tricep reflexes are 2+ on the right side and 2+ on the left side.       Bicep reflexes are 2+ on the right side and 2+ on the left side.       Brachioradialis reflexes are 2+ on the right side and 2+ on the left side.       Patellar reflexes are 2+ on the right side and 2+ on the left side.       Achilles reflexes are 2+ on the right side and 2+ on the left side.     Comments:  Corrective saccade PRESENT in vestibular - ocular reflex   Psychiatric:         Attention and Perception: Attention normal.         Mood and Affect: Mood normal.         Speech: Speech normal.         Behavior: Behavior normal. Behavior is cooperative.         Cognition and Memory: Cognition normal.     Labs  Hospital Outpatient Visit on 07/30/2024   Component Date Value Ref Range Status   • WBC 07/30/2024 6.5  4.4 - 11.3 x10*3/uL Final   • nRBC 07/30/2024 0.0  0.0 - 0.0 /100 WBCs Final   • RBC 07/30/2024 3.90 (L)  4.00 - 5.20 x10*6/uL Final   • Hemoglobin 07/30/2024 12.0  12.0 - 16.0 g/dL Final   • Hematocrit 07/30/2024 35.3 (L)  36.0 - 46.0 % Final   • MCV 07/30/2024 91  80 - 100 fL Final   • MCH 07/30/2024 30.8  26.0 - 34.0 pg Final   • MCHC 07/30/2024 34.0  32.0 - 36.0 g/dL Final   • RDW 07/30/2024 12.6  11.5 - 14.5 % Final   • Platelets 07/30/2024 231  150 - 450 x10*3/uL Final   • Neutrophils % 07/30/2024 79.1  40.0 - 80.0 % Final   • Immature Granulocytes %, Automated 07/30/2024 0.3  0.0 - 0.9 % Final    Immature Granulocyte Count (IG) includes promyelocytes, myelocytes and metamyelocytes but does not include bands. Percent differential counts (%) should be interpreted in the context of the absolute cell counts (cells/UL).   • Lymphocytes % 07/30/2024 14.2  13.0 - 44.0 % Final   • Monocytes % 07/30/2024 5.7  2.0 - 10.0 % Final   • Eosinophils % 07/30/2024 0.2  0.0 - 6.0 % Final   • Basophils % 07/30/2024 0.5  0.0 - 2.0 % Final   • Neutrophils Absolute 07/30/2024 5.12  1.20 - 7.70 x10*3/uL Final    Percent differential counts (%) should be interpreted in the context of the absolute cell counts (cells/uL).   • Immature Granulocytes Absolute, Au* 07/30/2024 0.02  0.00 - 0.70 x10*3/uL Final   • Lymphocytes Absolute 07/30/2024 0.92 (L)  1.20 - 4.80 x10*3/uL Final   • Monocytes Absolute 07/30/2024 0.37  0.10 - 1.00 x10*3/uL Final   • Eosinophils Absolute 07/30/2024 0.01  0.00 - 0.70 x10*3/uL Final   • Basophils  Absolute 07/30/2024 0.03  0.00 - 0.10 x10*3/uL Final   • Glucose 07/30/2024 131 (H)  74 - 99 mg/dL Final   • Sodium 07/30/2024 136  136 - 145 mmol/L Final   • Potassium 07/30/2024 4.6  3.5 - 5.3 mmol/L Final   • Chloride 07/30/2024 101  98 - 107 mmol/L Final   • Bicarbonate 07/30/2024 28  21 - 32 mmol/L Final   • Anion Gap 07/30/2024 12  10 - 20 mmol/L Final   • Urea Nitrogen 07/30/2024 16  6 - 23 mg/dL Final   • Creatinine 07/30/2024 0.86  0.50 - 1.05 mg/dL Final   • eGFR 07/30/2024 74  >60 mL/min/1.73m*2 Final    Calculations of estimated GFR are performed using the 2021 CKD-EPI Study Refit equation without the race variable for the IDMS-Traceable creatinine methods.  https://jasn.asnjournals.org/content/early/2021/09/22/ASN.4656137045   • Calcium 07/30/2024 9.3  8.6 - 10.6 mg/dL Final   • Albumin 07/30/2024 3.8  3.4 - 5.0 g/dL Final   • Alkaline Phosphatase 07/30/2024 43  33 - 136 U/L Final   • Total Protein 07/30/2024 6.2 (L)  6.4 - 8.2 g/dL Final   • AST 07/30/2024 14  9 - 39 U/L Final   • Bilirubin, Total 07/30/2024 0.8  0.0 - 1.2 mg/dL Final   • ALT 07/30/2024 9  7 - 45 U/L Final    Patients treated with Sulfasalazine may generate falsely decreased results for ALT.   • Adrenocorticotropic Hormone (ACTH) 07/30/2024 <1.5 (L)  7.2 - 63.3 pg/mL Final    INTERPRETIVE INFORMATION: Adrenocorticotropic Hormone    Reference interval based on samples collected between 7 a.m. and   10 a.m.  No reference intervals established for p.m. collections.    Pediatric reference values are the same as adults (Acta Paediatr   Scand 1981;70:341-345).  This assay measures intact ACTH 1-39;   some types of synthetic ACTH and ACTH fragments are not detected   by this assay.  Performed By: [facility]  [address] [place] [id number]  : Darian Marrero MD, PhD  CLIA Number: [id number]   • Amylase 07/30/2024 29  29 - 103 U/L Final   • aPTT 07/30/2024 32  27 - 38 seconds Final   • Bilirubin, Direct  07/30/2024 0.1  0.0 - 0.3 mg/dL Final   • Creatine Kinase 07/30/2024 51  0 - 215 U/L Final   • Cortisol 07/30/2024 1.0 (L)  2.5 - 20.0 ug/dL Final   • C-Reactive Protein 07/30/2024 0.16  <1.00 mg/dL Final   • Estradiol 07/30/2024 <19  pg/mL Final   • Ferritin 07/30/2024 80  8 - 150 ng/mL Final   • Follicle Stimulating Hormone 07/30/2024 53.2  IU/L Final    FSH Ref Values  Follicular   2.0-12.0  IU/L  Mid-Cycle        12.0-25.0  IU/L  Luteal Phase      2.0-12.0  IU/L  Menopause       30.0-150.0  IU/L  Pre-puberty     50% Adult IU/L  Adult Male        2.0-10.0  IU/L   Infants          0.0-1.0  IU/L   • Luteinizing Hormone 07/30/2024 20.5  IU/L Final    LH Reference Values  Follicular Phase          1.9-12.5 IU/L  Mid-Cycle                 8.7-76.3 IU/L  Luteal Phase              0.5-16.9 IU/L  Post Menopause            5.0-55.2 IU/L  Children                    0- 6.0 IU/L  Adult Male 18-70 years    1.5- 9.3 IU/L  Adult Male >70 years      3.1-34.6 IU/L   • Lipase 07/30/2024 25  9 - 82 U/L Final   • Magnesium 07/30/2024 2.05  1.60 - 2.40 mg/dL Final   • Phosphorus 07/30/2024 3.9  2.5 - 4.9 mg/dL Final    The performance characteristics of phosphorus testing in heparinized plasma have been validated by the individual  laboratory site where testing is performed. Testing on heparinized plasma is not approved by the FDA; however, such approval is not necessary.   • Protime 07/30/2024 11.8  9.8 - 12.8 seconds Final   • INR 07/30/2024 1.0  0.9 - 1.1 Final   • Thyroid Stimulating Hormone 07/30/2024 1.34  0.44 - 3.98 mIU/L Final   • Thyroxine, Free 07/30/2024 0.88  0.78 - 1.48 ng/dL Final   • Color, Urine 07/30/2024 Light-Yellow  Light-Yellow, Yellow, Dark-Yellow Final   • Appearance, Urine 07/30/2024 Clear  Clear Final   • Specific Gravity, Urine 07/30/2024 1.011  1.005 - 1.035 Final   • pH, Urine 07/30/2024 5.5  5.0, 5.5, 6.0, 6.5, 7.0, 7.5, 8.0 Final   • Protein, Urine 07/30/2024 NEGATIVE  NEGATIVE, 10 (TRACE), 20  (TRACE) mg/dL Final   • Glucose, Urine 07/30/2024 Normal  Normal mg/dL Final   • Blood, Urine 07/30/2024 NEGATIVE  NEGATIVE Final   • Ketones, Urine 07/30/2024 NEGATIVE  NEGATIVE mg/dL Final   • Bilirubin, Urine 07/30/2024 NEGATIVE  NEGATIVE Final   • Urobilinogen, Urine 07/30/2024 Normal  Normal mg/dL Final   • Nitrite, Urine 07/30/2024 NEGATIVE  NEGATIVE Final   • Leukocyte Esterase, Urine 07/30/2024 250 Mary/µL (A)  NEGATIVE Final   • WBC, Urine 07/30/2024 >50 (A)  1-5, NONE /HPF Final   • RBC, Urine 07/30/2024 1-2  NONE, 1-2, 3-5 /HPF Final   • Squamous Epithelial Cells, Urine 07/30/2024 1-9 (SPARSE)  Reference range not established. /HPF Final   • Mucus, Urine 07/30/2024 FEW  Reference range not established. /LPF Final   • Extra Tube 07/30/2024 Hold for add-ons.   Final    Auto resulted.   • Extra Tube 07/30/2024 Hold for add-ons.   Final    Auto resulted.   • Extra Tube 07/30/2024 Hold for add-ons.   Final    Auto resulted.   • Extra Tube 07/30/2024 Hold for add-ons.   Final    Auto resulted.               Performance Status:    ECOG 1: Restricted in physically strenuous activity but ambulatory and able to carry out work of a light or sedentary nature, e.g., light house work, office work.         Assessment/Plan      Mrs. Branch is a 67 yo with COPD and GERD who presented with newly diagnosed SCLC completed BID radiation planned concurrently with cis/etoposide but had a reaction C2D1 to cisplatin. Completed 1 cycle carbo/etop D1 4/5/22 also complicated by facial flushing and erythematous rash and stopped further treatment. Now s/p PCI in 6/2022. Treatment has been complicated by worsening short-term memory worse for the day or two after treatment, delayed C2 by 1 week and dose-reduced. Confusion has largely resolved during C3 after stopping mirtazapine but she and her  recognize transient worsening for the day or two after treatment.      #SCLC  -Labs and AE's are within treatment parameters. Continue  with treatment    #Bezoar  -Following with Dr. Parsons, Gastroenterology at [facility]    #Double vision/confusion/gait instability  -5/20/24 MRI brain negative.    -She was seen by her ophthalmologist and will continue to follow.  -Follow up appointment with  neurology (Dr. Simms) made for Dec 3. Trying to get appt moved up.  Given stability and the chronicity of her symptoms, neurology follow up is urgent but there is no apparent emergency.     Ashwin Epps, APRN-CNP

## 2024-08-01 NOTE — ADDENDUM NOTE
Encounter addended by: CASEY Adams-CNP on: 8/1/2024 4:08 PM   Actions taken: Pend clinical note, Clinical Note Signed, SmartForm saved

## 2024-08-13 ENCOUNTER — HOSPITAL ENCOUNTER (OUTPATIENT)
Dept: RESEARCH | Facility: HOSPITAL | Age: 69
Discharge: HOME | End: 2024-08-13
Payer: MEDICARE

## 2024-08-13 VITALS
SYSTOLIC BLOOD PRESSURE: 98 MMHG | DIASTOLIC BLOOD PRESSURE: 66 MMHG | HEART RATE: 74 BPM | RESPIRATION RATE: 16 BRPM | OXYGEN SATURATION: 96 % | WEIGHT: 113.32 LBS | TEMPERATURE: 96.6 F | BODY MASS INDEX: 22.13 KG/M2

## 2024-08-13 DIAGNOSIS — C34.90 SMALL CELL LUNG CANCER (MULTI): Primary | ICD-10-CM

## 2024-08-13 LAB
ALBUMIN SERPL BCP-MCNC: 3.4 G/DL (ref 3.4–5)
ALP SERPL-CCNC: 43 U/L (ref 33–136)
ALT SERPL W P-5'-P-CCNC: 15 U/L (ref 7–45)
ANION GAP SERPL CALC-SCNC: 11 MMOL/L (ref 10–20)
APTT PPP: 28 SECONDS (ref 27–38)
AST SERPL W P-5'-P-CCNC: 16 U/L (ref 9–39)
BASOPHILS # BLD AUTO: 0.02 X10*3/UL (ref 0–0.1)
BASOPHILS NFR BLD AUTO: 0.3 %
BILIRUB DIRECT SERPL-MCNC: 0.1 MG/DL (ref 0–0.3)
BILIRUB SERPL-MCNC: 0.4 MG/DL (ref 0–1.2)
BUN SERPL-MCNC: 21 MG/DL (ref 6–23)
CALCIUM SERPL-MCNC: 8.6 MG/DL (ref 8.6–10.6)
CHLORIDE SERPL-SCNC: 103 MMOL/L (ref 98–107)
CK SERPL-CCNC: 38 U/L (ref 0–215)
CO2 SERPL-SCNC: 28 MMOL/L (ref 21–32)
CREAT SERPL-MCNC: 0.76 MG/DL (ref 0.5–1.05)
CRP SERPL-MCNC: 0.36 MG/DL
EGFRCR SERPLBLD CKD-EPI 2021: 85 ML/MIN/1.73M*2
EOSINOPHIL # BLD AUTO: 0.17 X10*3/UL (ref 0–0.7)
EOSINOPHIL NFR BLD AUTO: 2.6 %
ERYTHROCYTE [DISTWIDTH] IN BLOOD BY AUTOMATED COUNT: 13.5 % (ref 11.5–14.5)
FERRITIN SERPL-MCNC: 40 NG/ML (ref 8–150)
GLUCOSE SERPL-MCNC: 88 MG/DL (ref 74–99)
HCT VFR BLD AUTO: 33.3 % (ref 36–46)
HGB BLD-MCNC: 10.9 G/DL (ref 12–16)
IMM GRANULOCYTES # BLD AUTO: 0.03 X10*3/UL (ref 0–0.7)
IMM GRANULOCYTES NFR BLD AUTO: 0.5 % (ref 0–0.9)
INR PPP: 0.9 (ref 0.9–1.1)
LYMPHOCYTES # BLD AUTO: 1.66 X10*3/UL (ref 1.2–4.8)
LYMPHOCYTES NFR BLD AUTO: 25.4 %
MAGNESIUM SERPL-MCNC: 1.94 MG/DL (ref 1.6–2.4)
MCH RBC QN AUTO: 30.8 PG (ref 26–34)
MCHC RBC AUTO-ENTMCNC: 32.7 G/DL (ref 32–36)
MCV RBC AUTO: 94 FL (ref 80–100)
MONOCYTES # BLD AUTO: 0.61 X10*3/UL (ref 0.1–1)
MONOCYTES NFR BLD AUTO: 9.3 %
NEUTROPHILS # BLD AUTO: 4.05 X10*3/UL (ref 1.2–7.7)
NEUTROPHILS NFR BLD AUTO: 61.9 %
NRBC BLD-RTO: 0 /100 WBCS (ref 0–0)
PHOSPHATE SERPL-MCNC: 3.6 MG/DL (ref 2.5–4.9)
PLATELET # BLD AUTO: 216 X10*3/UL (ref 150–450)
POTASSIUM SERPL-SCNC: 3.7 MMOL/L (ref 3.5–5.3)
PROT SERPL-MCNC: 5.7 G/DL (ref 6.4–8.2)
PROTHROMBIN TIME: 10.6 SECONDS (ref 9.8–12.8)
RBC # BLD AUTO: 3.54 X10*6/UL (ref 4–5.2)
SODIUM SERPL-SCNC: 138 MMOL/L (ref 136–145)
WBC # BLD AUTO: 6.5 X10*3/UL (ref 4.4–11.3)

## 2024-08-13 PROCEDURE — 82248 BILIRUBIN DIRECT: CPT

## 2024-08-13 PROCEDURE — 85025 COMPLETE CBC W/AUTO DIFF WBC: CPT

## 2024-08-13 PROCEDURE — 86140 C-REACTIVE PROTEIN: CPT

## 2024-08-13 PROCEDURE — 2560000001 HC RX 256 EXPERIMENTAL DRUGS

## 2024-08-13 PROCEDURE — 85610 PROTHROMBIN TIME: CPT

## 2024-08-13 PROCEDURE — 82550 ASSAY OF CK (CPK): CPT

## 2024-08-13 PROCEDURE — 96413 CHEMO IV INFUSION 1 HR: CPT

## 2024-08-13 PROCEDURE — 80053 COMPREHEN METABOLIC PANEL: CPT

## 2024-08-13 PROCEDURE — 84100 ASSAY OF PHOSPHORUS: CPT

## 2024-08-13 PROCEDURE — 83735 ASSAY OF MAGNESIUM: CPT

## 2024-08-13 PROCEDURE — 82728 ASSAY OF FERRITIN: CPT

## 2024-08-13 PROCEDURE — 85730 THROMBOPLASTIN TIME PARTIAL: CPT

## 2024-08-13 RX ORDER — EPINEPHRINE 1 MG/ML
0.3 INJECTION INTRAMUSCULAR; INTRAVENOUS; SUBCUTANEOUS EVERY 5 MIN PRN
Status: DISCONTINUED | OUTPATIENT
Start: 2024-08-13 | End: 2024-08-14 | Stop reason: HOSPADM

## 2024-08-13 RX ORDER — ALBUTEROL SULFATE 0.83 MG/ML
3 SOLUTION RESPIRATORY (INHALATION) AS NEEDED
Status: DISCONTINUED | OUTPATIENT
Start: 2024-08-13 | End: 2024-08-14 | Stop reason: HOSPADM

## 2024-08-13 RX ORDER — HEPARIN 100 UNIT/ML
500 SYRINGE INTRAVENOUS AS NEEDED
OUTPATIENT
Start: 2024-08-13

## 2024-08-13 RX ORDER — FAMOTIDINE 10 MG/ML
20 INJECTION INTRAVENOUS ONCE AS NEEDED
Status: DISCONTINUED | OUTPATIENT
Start: 2024-08-13 | End: 2024-08-14 | Stop reason: HOSPADM

## 2024-08-13 RX ORDER — DIPHENHYDRAMINE HYDROCHLORIDE 50 MG/ML
50 INJECTION INTRAMUSCULAR; INTRAVENOUS AS NEEDED
Status: DISCONTINUED | OUTPATIENT
Start: 2024-08-13 | End: 2024-08-14 | Stop reason: HOSPADM

## 2024-08-13 RX ORDER — HEPARIN SODIUM,PORCINE/PF 10 UNIT/ML
50 SYRINGE (ML) INTRAVENOUS AS NEEDED
OUTPATIENT
Start: 2024-08-13

## 2024-08-13 ASSESSMENT — PAIN SCALES - GENERAL
PAINLEVEL_OUTOF10: 0-NO PAIN
PAINLEVEL: 0-NO PAIN

## 2024-08-13 NOTE — RESEARCH NOTES
Research Note Treatment Day    Gladys Branch is here today for treatment on [id number]. Today is C20D15. Procedures completed per protocol. AE's and con-meds reviewed with patient. Patient is aware of treatment plan.    [x]   Received treatment as planned   OR  []    Treatment delayed; patient calendar updated as required   Treatment delayed because:    []   AE    []   Physician Discretion    []   Clinical Deterioration or Progression     []   Other    Education Documentation  Memory Problems, taught by Vladimir Fernandez RN at 8/13/2024 12:29 PM.  Learner: Significant Other, Patient  Readiness: Eager  Method: Explanation  Response: Verbalizes Understanding    General Medication Information, taught by Vladimir Fernandez RN at 8/13/2024 12:29 PM.  Learner: Significant Other, Patient  Readiness: Eager  Method: Explanation  Response: Verbalizes Understanding    Treatment Plan and Schedule, taught by Vladimir Fernandez RN at 8/13/2024 12:29 PM.  Learner: Significant Other, Patient  Readiness: Eager  Method: Explanation  Response: Verbalizes Understanding    Supportive Medications, taught by Vladimir Fernandez RN at 8/13/2024 12:29 PM.  Learner: Significant Other, Patient  Readiness: Eager  Method: Explanation  Response: Verbalizes Understanding    Diagnostic Studies, taught by Vladimir Fernandez RN at 8/13/2024 12:29 PM.  Learner: Significant Other, Patient  Readiness: Eager  Method: Explanation  Response: Verbalizes Understanding    Education Comments  No comments found.

## 2024-08-13 NOTE — RESEARCH NOTES
[facility]><[id number]><C5+D15><PART A>    DCRU NURSING VISIT NOTE  Study Name: ALEXANDRU 1518  IRB#: [id number]  DCRU#: [id number]  Protocol Version Dated: A9 3.22.23  PI: Roshan Kumar MD.    Time point: Cycle 5 and beyond - Day 15  CYCLE 20     Encounter Date: 08/13/2024  Encounter [id number]  Encounter Department: [facility]     #1     Phone Pager   Alex Fernandez RN  [place]    #2 Phone Pager        Other Phone Pager          Study Regimen and Dosing   Part A Dose Exploration/Expansion- Small Cell Lung Cancer Relapsed or Refractory patients who progressed or recurred following at least 1 platinum-based regimen.   Cycle = 28 days    administered IV Day 1, Day 8, and Day 15 of Cycle 1. Cycle 2 and beyond     administered on Day 1 and Day 15.        Dietary Guidelines   Regular diet:     Admission and Prior to Starting Study Activities   Complete DCRU and [facility] Admission/Visit Note.  Notify SC of patient arrival after initial lab and vitals  Insert one peripheral IV line for sample collection procedures (flush line with normal saline following each blood draw). Access mediport (if available) otherwise insert second peripheral line in opposite arm for Investigative drug administration (if peripheral line, flush line with normal saline before & after infusion)     Criteria to Treat   DCRU RN reviewed and meets eligibility to proceed with treatment plan   Time team notified: 0943   DCRU RN notifies study team to review eligibility and approval before dosing procedures  Time team approves: 0943      Subjective   Gladys Branch is a 68 y.o. female and is here for a Research clinical visit.    Visit Provider: [id number], [facility] ROOM 9 [facility]     Allergies:   Allergies   Allergen Reactions    Cisplatin Other     CHEMO INDUCED (Moderate); Dyspepsia (Moderate); Headaches (Moderate); Hypotension (Moderate); Numbness (Moderate); Resp Distress (Moderate)    Codeine Nausea  Only and Other     nausea    Sulfa (Sulfonamide Antibiotics) Rash       Objective     Vital Signs:    Blood pressure 121/78, pulse 56, temperature 36.4 °C (97.5 °F), temperature source Temporal, resp. rate 16, weight 51.8 kg (114 lb 3.2 oz), SpO2 98%.     Physical Exam     ASSESSMENT and PLAN:  Problem List Items Addressed This Visit       Small cell lung cancer (Multi) - Primary    Relevant Orders    Clinic Appointment Request    Infusion Appointment Request    APTT    Bilirubin, Direct    CK    C-Reactive Protein    Ferritin    Magnesium    Phosphorus    Protime-INR        Medications as of the completion of today's visit:  Current Outpatient Medications   Medication Sig Dispense Refill    cetirizine-pseudoephedrine (ZyrTEC-D) 5-120 mg 12 hr tablet Take 1 tablet by mouth 2 times a day. (Patient taking differently: Take 1 tablet by mouth once daily.) 60 tablet 11    cholecalciferol (Vitamin D-3) 25 MCG (1000 UT) tablet Take by mouth.      dexAMETHasone (Decadron) 1 mg tablet Take 1 tablet by mouth each am with food. 30 tablet 2    dextromethorphan (Delsym) 30 mg/5 mL liquid Take 5 mL (30 mg) by mouth once daily as needed.      dronabinol (Marinol) 2.5 mg capsule once daily as needed. One hour before dinner      estrogens, conjugated, (Premarin) 0.3 mg tablet Take 0.5 tablets (0.15 mg) by mouth once daily.      mv-min/FA/vit K/lutein/zeaxant (PRESERVISION AREDS 2 PLUS MV ORAL) Take 1 tablet by mouth once daily.      omeprazole (PriLOSEC) 40 mg DR capsule Take 1 capsule (40 mg) by mouth once daily. Do not crush or chew. 90 each 3    ondansetron (Zofran) 8 mg tablet Take 0.5 tablets (4 mg) by mouth every 8 hours if needed.      polyethylene glycol (Glycolax, Miralax) 17 gram packet Take 17 g by mouth 2 times a day. (Patient taking differently: Take 17 g by mouth if needed.) 60 packet 2    prochlorperazine (Compazine) 10 mg tablet Take 0.5 tablets (5 mg) by mouth every 6 hours if needed.      psyllium husk, aspartame,  (Metamucil Sugar-Free, aspart,) 3.4 gram/5.8 gram powder Take 1 Dose by mouth once daily. (Patient taking differently: Take 1 Dose by mouth once daily as needed.) 425 g 11     No current facility-administered medications for this visit.           Orders placed during today's visit:  Orders Placed This Encounter   Procedures    APTT     Standing Status:   Future     Standing Expiration Date:   8/13/2025     Order Specific Question:   Release result to MyChart     Answer:   Immediate [1]    Bilirubin, Direct     Standing Status:   Future     Standing Expiration Date:   8/13/2025     Order Specific Question:   Release result to MyChart     Answer:   Immediate [1]    CK     Standing Status:   Future     Standing Expiration Date:   8/13/2025     Order Specific Question:   Release result to MyChart     Answer:   Immediate [1]    C-Reactive Protein     Standing Status:   Future     Standing Expiration Date:   8/13/2025     Order Specific Question:   Release result to MyChart     Answer:   Immediate [1]    Ferritin     Standing Status:   Future     Standing Expiration Date:   8/13/2025     Order Specific Question:   Release result to MyChart     Answer:   Immediate [1]    Magnesium     Standing Status:   Future     Standing Expiration Date:   8/13/2025     Order Specific Question:   Release result to MyChart     Answer:   Immediate [1]    Phosphorus     Standing Status:   Future     Standing Expiration Date:   8/13/2025     Order Specific Question:   Release result to MyChart     Answer:   Immediate [1]    Protime-INR     Standing Status:   Future     Standing Expiration Date:   8/13/2025     Order Specific Question:   Release result to MyChart     Answer:   Immediate [1]        [id number] -  in Subjects With Small Cell Lung Cancer    Patient has no adverse events documented in the Research Adverse Events activity.       Study Specific Instructions and Documentation   WEIGHT  LABS  VITALS  STUDY DRUG  VITALS  DISCHARGE      PRE-DOSE Safety Labs   Refer to [place] Treatment Plan for orders  Drawn at 0830 per 22g angio in right AC      Day 15 Pre-dose Vital Signs    Temp, HR, Resp, BP, Pulse Oximetry: Seated or Semi-Fowlers x 5 minutes before obtaining  Position and temperature location: should be the same that is used throughout the study-record position.  Blood pressure 98/66, pulse 74, temperature 35.9 °C (96.6 °F), temperature source Temporal, resp. rate 16, weight 51.4 kg (113 lb 5.1 oz), SpO2 96%.   At 1017      Infusion-Related Reactions   [facility] Hypersensitivity Orders  Note: CRS table      Research Drug Administration Tarlatamab ()   Refer to [place] Treatment Plan for orders   Administer dose over 60 minutes (+/- 10 mins) followed by a 3 - 5 minute Normal saline flush. (This will be the end time after the flush). Document start time & end of study medication; document start time and end time of the flush.  Participant to remain on Unit for 2 hour post dose observation.    Drug administration see MAR (1100 to 1200) NS flush 1200 to 1204  Post-Dose Vital Signs   Temp, HR, Resp, BP, Pulse Oximetry: Seated or Semi-Fowlers x 5 minutes before obtaining  Position and temperature location: should be the same that is used throughout the study  End of Infusion 36.3 68 16 107/61 96% at 1205     Day 15 Discharge Instructions   Patient may be discharged after 2 hour observation.  Discharge time 1211     [facility] Chowdary RN  08/13/24    Grading and Management of Cytokine Release Syndrome   CRS Grade Description of Severity Minimum Expected Intervention Instructions for Dose Modifications of    1 Symptoms are not life threatening and require symptomatic treatment only,  eg, fever, nausea, fatigue, headache, myalgia's, malaise Administer symptomatic treatment (contact provider for medications/doses). Monitor for CRS symptoms including vital signs and pulse oximetry at least Q2 hours for12 hours or until resolution, Whichever is  earlier. N/A     (continued)  Grading and Management of Cytokine Release Syndrome   CRS Grade Description of Severity Minimum Expected Intervention Instructions for Dose Modifications of    2 Symptoms require and respond to moderate intervention  Oxygen requirement <40%,                      OR  Hypotension responsive to fluids or low dose of one vasopressor, OR                                                                       Grade 2 organ toxicity or grade 3 transaminitis per CTCAE criteria Administer:  Symptomatic treatment   (contact provider for medications/doses)  Supplemental oxygen when oxygen saturation is <90% on room air  Intravenous fluids or low dose vasopressor for hypotension is recommended when systolic blood pressure is <85 mmHg. (contact provider for medications/doses) Persistent tachycardia (eg >120 bpm) may also indicate the need for intervention for hypotension.   Monitor for CRS symptoms including vital signs and pulse oximetry at least Q2 hours for12 hours or until resolution to CRS grade <= 1, whichever is earlier. For subjects with extensive co-morbidities or poor performance status, manage per grade 3 CRS guidance below If CRS occurs during  treatment, immediately interrupt the infusion and delay the next  dose until the event resolves to CRS grade <= 1 for no less than 72 hours. Permanently discontinue  if there is no improvement to CRS <= grade 1 within 7 days     3 Symptoms require and respond to aggressive intervention  Oxygen requirement >=40%, OR  Hypotension requiring high dose or multiple vasopressors, OR  Grade 3 organ toxicity or     Grade 4 transaminitis per  CTCAE criteria Admit to intensive care unit for close clinical and vital sign monitoring per institutional guidelines.   Refer to provider/protocol for medications and doses.     If CRS occurs during  treatment, immediately interrupt   and delay the next dose until event resolves  to CRS grade <= 1 for no less than 72 hours.    Permanently discontinue  if there is no improvement to CRS                 <= grade 2 within 5 days or CRS <= grade 1 within 7 days.  Permanently discontinue   if CRS grade  3 occurs at the initial run in dose (i.e., at MTD1)  (Applicable only after MTD1 has been defined).     (continued)  Grading and Management of Cytokine Release Syndrome   CRS Grade Description of Severity Minimum Expected Intervention Instructions for Dose Modifications of    4 Life-threatening symptoms  Requirement for ventilator support OR  Grade 4 organ toxicity (excluding transaminitis) per CTCAE criteria Admit to intensive care unit for close clinical and vital sign monitoring per institutional guidelines.   Refer to provider/protocol for medications and doses.   If CRS occurs during  treatment, Immediately stop the infusion.  Permanently discontinue   therapy.

## 2024-08-15 ENCOUNTER — LAB (OUTPATIENT)
Dept: LAB | Facility: LAB | Age: 69
End: 2024-08-15
Payer: MEDICARE

## 2024-08-15 ENCOUNTER — APPOINTMENT (OUTPATIENT)
Dept: NEUROLOGY | Facility: CLINIC | Age: 69
End: 2024-08-15
Payer: MEDICARE

## 2024-08-15 VITALS
BODY MASS INDEX: 22.62 KG/M2 | DIASTOLIC BLOOD PRESSURE: 85 MMHG | WEIGHT: 115.2 LBS | HEART RATE: 71 BPM | HEIGHT: 60 IN | SYSTOLIC BLOOD PRESSURE: 118 MMHG

## 2024-08-15 DIAGNOSIS — G11.9 CEREBELLAR ATAXIA (MULTI): Primary | ICD-10-CM

## 2024-08-15 DIAGNOSIS — R68.89 OTHER GENERAL SYMPTOMS AND SIGNS: ICD-10-CM

## 2024-08-15 DIAGNOSIS — G11.9 CEREBELLAR ATAXIA (MULTI): ICD-10-CM

## 2024-08-15 LAB
APPEARANCE UR: CLEAR
BILIRUB UR STRIP.AUTO-MCNC: NEGATIVE MG/DL
COLOR UR: NORMAL
ENA SS-A AB SER IA-ACNC: <0.2 AI
ENA SS-B AB SER IA-ACNC: <0.2 AI
FOLATE SERPL-MCNC: 15.7 NG/ML
GLIADIN PEPTIDE IGA SER IA-ACNC: 2.4 U/ML
GLUCOSE UR STRIP.AUTO-MCNC: NORMAL MG/DL
HIV 1+2 AB+HIV1 P24 AG SERPL QL IA: NONREACTIVE
KETONES UR STRIP.AUTO-MCNC: NEGATIVE MG/DL
LEUKOCYTE ESTERASE UR QL STRIP.AUTO: NEGATIVE
NITRITE UR QL STRIP.AUTO: NEGATIVE
PH UR STRIP.AUTO: 5.5 [PH]
PROT UR STRIP.AUTO-MCNC: NEGATIVE MG/DL
RBC # UR STRIP.AUTO: NEGATIVE /UL
SP GR UR STRIP.AUTO: 1.02
THYROPEROXIDASE AB SERPL-ACNC: <28 IU/ML
TREPONEMA PALLIDUM IGG+IGM AB [PRESENCE] IN SERUM OR PLASMA BY IMMUNOASSAY: NONREACTIVE
TSH SERPL-ACNC: 2.54 MIU/L (ref 0.44–3.98)
TTG IGA SER IA-ACNC: <1 U/ML
UROBILINOGEN UR STRIP.AUTO-MCNC: NORMAL MG/DL

## 2024-08-15 PROCEDURE — 86235 NUCLEAR ANTIGEN ANTIBODY: CPT

## 2024-08-15 PROCEDURE — 3008F BODY MASS INDEX DOCD: CPT | Performed by: STUDENT IN AN ORGANIZED HEALTH CARE EDUCATION/TRAINING PROGRAM

## 2024-08-15 PROCEDURE — 86225 DNA ANTIBODY NATIVE: CPT

## 2024-08-15 PROCEDURE — 83516 IMMUNOASSAY NONANTIBODY: CPT

## 2024-08-15 PROCEDURE — 86780 TREPONEMA PALLIDUM: CPT

## 2024-08-15 PROCEDURE — 83519 RIA NONANTIBODY: CPT

## 2024-08-15 PROCEDURE — 99205 OFFICE O/P NEW HI 60 MIN: CPT | Performed by: STUDENT IN AN ORGANIZED HEALTH CARE EDUCATION/TRAINING PROGRAM

## 2024-08-15 PROCEDURE — 84182 PROTEIN WESTERN BLOT TEST: CPT

## 2024-08-15 PROCEDURE — 86255 FLUORESCENT ANTIBODY SCREEN: CPT

## 2024-08-15 PROCEDURE — 1159F MED LIST DOCD IN RCRD: CPT | Performed by: STUDENT IN AN ORGANIZED HEALTH CARE EDUCATION/TRAINING PROGRAM

## 2024-08-15 PROCEDURE — 86038 ANTINUCLEAR ANTIBODIES: CPT

## 2024-08-15 PROCEDURE — 84443 ASSAY THYROID STIM HORMONE: CPT

## 2024-08-15 PROCEDURE — 84446 ASSAY OF VITAMIN E: CPT

## 2024-08-15 PROCEDURE — 82746 ASSAY OF FOLIC ACID SERUM: CPT

## 2024-08-15 PROCEDURE — 1036F TOBACCO NON-USER: CPT | Performed by: STUDENT IN AN ORGANIZED HEALTH CARE EDUCATION/TRAINING PROGRAM

## 2024-08-15 PROCEDURE — G2211 COMPLEX E/M VISIT ADD ON: HCPCS | Performed by: STUDENT IN AN ORGANIZED HEALTH CARE EDUCATION/TRAINING PROGRAM

## 2024-08-15 PROCEDURE — 81003 URINALYSIS AUTO W/O SCOPE: CPT

## 2024-08-15 PROCEDURE — 36415 COLL VENOUS BLD VENIPUNCTURE: CPT

## 2024-08-15 PROCEDURE — 83921 ORGANIC ACID SINGLE QUANT: CPT

## 2024-08-15 PROCEDURE — 87389 HIV-1 AG W/HIV-1&-2 AB AG IA: CPT

## 2024-08-15 PROCEDURE — 1160F RVW MEDS BY RX/DR IN RCRD: CPT | Performed by: STUDENT IN AN ORGANIZED HEALTH CARE EDUCATION/TRAINING PROGRAM

## 2024-08-15 PROCEDURE — 86376 MICROSOMAL ANTIBODY EACH: CPT

## 2024-08-15 PROCEDURE — 86256 FLUORESCENT ANTIBODY TITER: CPT

## 2024-08-15 ASSESSMENT — ENCOUNTER SYMPTOMS
DEPRESSION: 0
LOSS OF SENSATION IN FEET: 1
OCCASIONAL FEELINGS OF UNSTEADINESS: 1

## 2024-08-15 ASSESSMENT — PATIENT HEALTH QUESTIONNAIRE - PHQ9
1. LITTLE INTEREST OR PLEASURE IN DOING THINGS: NOT AT ALL
2. FEELING DOWN, DEPRESSED OR HOPELESS: NOT AT ALL
SUM OF ALL RESPONSES TO PHQ9 QUESTIONS 1 & 2: 0

## 2024-08-15 NOTE — PATIENT INSTRUCTIONS
Thank you for your visit today. You were seen by Dr. Mora for cerebellar ataxia. If you have any questions or need to reach me, call my office at [phone].    We discussed the following plan today:   Bloodwork  Physical therapy  Return in 3 months

## 2024-08-15 NOTE — ADDENDUM NOTE
Encounter addended by: CASEY Adams-CNP on: 8/15/2024 5:05 PM   Actions taken: Clinical Note Signed, SmartForm saved

## 2024-08-15 NOTE — PROGRESS NOTES
Patient ID: Gladys Branch is a 68 y.o. female.    DIAGNOSIS    Small Cell Lung Cancer    By immunostaining, the tumor cells are positive for CAM 5.2, INSM1, and TTF-1, and negative for p40 and LCA. The proliferation index by Ki-67 immunostaining is approximately 90%.  Synaptophysin, Chromogranin and INSM-1 are positive.  AE1/AE3 is negative. NEOGENOMICS, RB protein expression is lost in the tumor cells    STAGING    [id number], limited stage  Recurrence    CURRENT SITES OF DISEASE    RLL, supraclavicular    MOLECULAR GENOMICS      PRIOR THERAPY    Concurrent chemoradiation with Cisplatin/Etoposide every 3 weeks; C1D1 2/15/22, reaction to cisplatin C2D1 and did not receive D2 or D3 etoposide  Carbo/etoposide 4/5/2022 1 cycle c/b rash  PCI 5/21-6/13/22    CURRENT THERAPY    Phase 1 study [id number]  with study drug Taralatamab 1/24/23 - present.    CURRENT ONCOLOGICAL PROBLEMS      HISTORY OF PRESENT ILLNESS    Mrs. Branch is a 67 yo with PMH GERD and COPD who originally was round to have a RLL nodule measuring 11 mm in 4/28/2021 found as part of CT calcium score scan. An ensuing CT chest w/o contrast was done on 5/19/21 which revealed subsolid pulmonary nodules measuring 14 mm in the RLL. She had CT chest w/contrast on 11/29/21 which confirmed stable 14 mm GGO of the RLL and decreased RLL subpleural nodule. She met thoracic surgeon Dr. Farfan, who ordered PET/CT scan done on 12/8/21 which did not reveal any FDG-avid nodules within the lung parenchyma but R hilar nodes with max SUV 8.0. He referred her for bronch, which was done on 1/21/22 by Dr. Mcdaniels. 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 Brain MRI was negative.    She started concurrent chemoradiation with BID radiation with cis/etop 2/15/22, and unfortunately had a reaction to cisplatin on C2D1 with chest pain and hypotension. She was hospitalized with sepsis felt to be due to pneumonia. She trialed carboplatin/etoposide on 4/5/22 with a  resulting rash and opted to forego further chemotherapy as she had completed radiation. Restaging scan 11/3/22 unfortunately with progression with new R hilar lymph node, paratracheal nodes, and increase in size of R supraclavicular mass. She enrolled in [id number] and started C1D1 1/24/23. C1 complicated by anorexia and dysgeusia, and delayed C2 by a week. She has further struggled with fatigue and confusion, which may be related to mirtazapine. Dose reduced for C2 and mirtazapine stopped with improvement in symptoms. She continues to tolerate regular treatments.    PAST MEDICAL HISTORY    GERD  COPD    SOCIAL HISTORY    Retired - lighting . Lives at home with  and a kitten.  Tobacco: quit smoking 1999. 1 ppd x 25 yrs.  EtOH: wine 1 glass/day  Illicits: none    CURRENT MEDS    Please see med list    ALLERGIES    Codeine, Sulfa drugs, Cisplatin    FAMILY HISTORY    Paternal aunt - breast cancer dx 80s    Subjective    Today 8.13.2024. Patient in clinic with her  for C20D15 for [id number].  She states she is doing well but her  continues to endorse dizziness, cognitive dysfunction and weakness on behalf of the patient.  Per the patient, appetite is better since completing treatment for bezoars. She is still dealing with some double vision while awaiting the shipment of a new set of lenses.  She follows regularly with an ophthalmologist.  Energy is good denies fever/chills, N/V/D/C, dysuria/hematuria/PUPD, visual changes/photopsia, hearing changes/tinnitus, numbness/tingling in the extremities, oral lesions/dysphagia/taste changes, cough/dyspnea, chest pain/palpitations, abdominal pain/bloating/bloody or tarry stool, swelling, rash or skin lesions.       Objective          7/18/2024    11:41 AM 7/30/2024     8:05 AM 7/30/2024    10:26 AM 7/30/2024    11:46 AM 8/13/2024     8:11 AM 8/13/2024    10:17 AM 8/15/2024     8:08 AM   Vitals   Systolic 112 106 96 91 121 98 118   Diastolic 82 77  64 61 78 66 85   Heart Rate 78 77 75 76 56 74 71   Temp 36.2 °C (97.2 °F) 36.5 °C (97.7 °F) 36.1 °C (97 °F) 36.1 °C (97 °F) 36.4 °C (97.5 °F) 35.9 °C (96.6 °F)    Resp 18 18 16 16 16 16    Height (in)       1.524 m (5')   Weight (lb)  111.33   113.32  115.2   BMI  21.74 kg/m2   22.13 kg/m2  22.5 kg/m2   BSA (m2)  1.46 m2   1.48 m2  1.49 m2   Visit Report       Report       Daily Weight  08/15/24 : 52.3 kg (115 lb 3.2 oz)  08/13/24 : 51.4 kg (113 lb 5.1 oz)  07/30/24 : 50.5 kg (111 lb 5.3 oz)  07/18/24 : 50 kg (110 lb 3.7 oz)  07/02/24 : 52.1 kg (114 lb 13.8 oz)         Physical Exam  Constitutional:       General: She is awake. She is not in acute distress.     Appearance: Normal appearance. She is normal weight.   HENT:      Head: Normocephalic and atraumatic.      Mouth/Throat:      Mouth: Mucous membranes are moist.      Pharynx: No oropharyngeal exudate or posterior oropharyngeal erythema.   Eyes:      General: No scleral icterus.     Extraocular Movements: Extraocular movements intact.      Conjunctiva/sclera: Conjunctivae normal.      Pupils: Pupils are equal, round, and reactive to light.   Cardiovascular:      Rate and Rhythm: Normal rate and regular rhythm.      Heart sounds: Normal heart sounds, S1 normal and S2 normal. No murmur heard.     No friction rub. No gallop.   Pulmonary:      Effort: Pulmonary effort is normal.      Breath sounds: Normal breath sounds. No wheezing, rhonchi or rales.   Abdominal:      General: Abdomen is flat. Bowel sounds are normal. There is no distension.      Palpations: Abdomen is soft. There is no mass.      Tenderness: There is no abdominal tenderness. There is no guarding or rebound.   Musculoskeletal:      Cervical back: Normal range of motion and neck supple. No rigidity.   Skin:     General: Skin is warm and dry.      Comments: intact   Neurological:      Mental Status: She is alert and oriented to person, place, and time.      GCS: GCS eye subscore is 4. GCS verbal  subscore is 5. GCS motor subscore is 6.      Sensory: Sensation is intact. No sensory deficit.      Motor: Motor function is intact.      Coordination: Romberg sign positive. Finger-Nose-Finger Test normal.      Gait: Gait abnormal and tandem walk abnormal.      Deep Tendon Reflexes:      Reflex Scores:       Tricep reflexes are 2+ on the right side and 2+ on the left side.       Bicep reflexes are 2+ on the right side and 2+ on the left side.       Brachioradialis reflexes are 2+ on the right side and 2+ on the left side.       Patellar reflexes are 2+ on the right side and 2+ on the left side.       Achilles reflexes are 2+ on the right side and 2+ on the left side.     Comments: Corrective saccade PRESENT in vestibular - ocular reflex   Psychiatric:         Attention and Perception: Attention normal.         Mood and Affect: Mood normal.         Speech: Speech normal.         Behavior: Behavior normal. Behavior is cooperative.         Cognition and Memory: Cognition normal.       Labs  Hospital Outpatient Visit on 08/13/2024   Component Date Value Ref Range Status    WBC 08/13/2024 6.5  4.4 - 11.3 x10*3/uL Final    nRBC 08/13/2024 0.0  0.0 - 0.0 /100 WBCs Final    RBC 08/13/2024 3.54 (L)  4.00 - 5.20 x10*6/uL Final    Hemoglobin 08/13/2024 10.9 (L)  12.0 - 16.0 g/dL Final    Hematocrit 08/13/2024 33.3 (L)  36.0 - 46.0 % Final    MCV 08/13/2024 94  80 - 100 fL Final    MCH 08/13/2024 30.8  26.0 - 34.0 pg Final    MCHC 08/13/2024 32.7  32.0 - 36.0 g/dL Final    RDW 08/13/2024 13.5  11.5 - 14.5 % Final    Platelets 08/13/2024 216  150 - 450 x10*3/uL Final    Neutrophils % 08/13/2024 61.9  40.0 - 80.0 % Final    Immature Granulocytes %, Automated 08/13/2024 0.5  0.0 - 0.9 % Final    Immature Granulocyte Count (IG) includes promyelocytes, myelocytes and metamyelocytes but does not include bands. Percent differential counts (%) should be interpreted in the context of the absolute cell counts (cells/UL).    Lymphocytes  % 08/13/2024 25.4  13.0 - 44.0 % Final    Monocytes % 08/13/2024 9.3  2.0 - 10.0 % Final    Eosinophils % 08/13/2024 2.6  0.0 - 6.0 % Final    Basophils % 08/13/2024 0.3  0.0 - 2.0 % Final    Neutrophils Absolute 08/13/2024 4.05  1.20 - 7.70 x10*3/uL Final    Percent differential counts (%) should be interpreted in the context of the absolute cell counts (cells/uL).    Immature Granulocytes Absolute, Au* 08/13/2024 0.03  0.00 - 0.70 x10*3/uL Final    Lymphocytes Absolute 08/13/2024 1.66  1.20 - 4.80 x10*3/uL Final    Monocytes Absolute 08/13/2024 0.61  0.10 - 1.00 x10*3/uL Final    Eosinophils Absolute 08/13/2024 0.17  0.00 - 0.70 x10*3/uL Final    Basophils Absolute 08/13/2024 0.02  0.00 - 0.10 x10*3/uL Final    Glucose 08/13/2024 88  74 - 99 mg/dL Final    Sodium 08/13/2024 138  136 - 145 mmol/L Final    Potassium 08/13/2024 3.7  3.5 - 5.3 mmol/L Final    Chloride 08/13/2024 103  98 - 107 mmol/L Final    Bicarbonate 08/13/2024 28  21 - 32 mmol/L Final    Anion Gap 08/13/2024 11  10 - 20 mmol/L Final    Urea Nitrogen 08/13/2024 21  6 - 23 mg/dL Final    Creatinine 08/13/2024 0.76  0.50 - 1.05 mg/dL Final    eGFR 08/13/2024 85  >60 mL/min/1.73m*2 Final    Calculations of estimated GFR are performed using the 2021 CKD-EPI Study Refit equation without the race variable for the IDMS-Traceable creatinine methods.  https://jasn.asnjournals.org/content/early/2021/09/22/ASN.5255174512    Calcium 08/13/2024 8.6  8.6 - 10.6 mg/dL Final    Albumin 08/13/2024 3.4  3.4 - 5.0 g/dL Final    Alkaline Phosphatase 08/13/2024 43  33 - 136 U/L Final    Total Protein 08/13/2024 5.7 (L)  6.4 - 8.2 g/dL Final    AST 08/13/2024 16  9 - 39 U/L Final    Bilirubin, Total 08/13/2024 0.4  0.0 - 1.2 mg/dL Final    ALT 08/13/2024 15  7 - 45 U/L Final    Patients treated with Sulfasalazine may generate falsely decreased results for ALT.    aPTT 08/13/2024 28  27 - 38 seconds Final    Bilirubin, Direct 08/13/2024 0.1  0.0 - 0.3 mg/dL Final     Creatine Kinase 08/13/2024 38  0 - 215 U/L Final    C-Reactive Protein 08/13/2024 0.36  <1.00 mg/dL Final    Ferritin 08/13/2024 40  8 - 150 ng/mL Final    Magnesium 08/13/2024 1.94  1.60 - 2.40 mg/dL Final    Phosphorus 08/13/2024 3.6  2.5 - 4.9 mg/dL Final    The performance characteristics of phosphorus testing in heparinized plasma have been validated by the individual  laboratory site where testing is performed. Testing on heparinized plasma is not approved by the FDA; however, such approval is not necessary.    Protime 08/13/2024 10.6  9.8 - 12.8 seconds Final    INR 08/13/2024 0.9  0.9 - 1.1 Final               Performance Status:    ECOG 1: Restricted in physically strenuous activity but ambulatory and able to carry out work of a light or sedentary nature, e.g., light house work, office work.         Assessment/Plan      Mrs. Branch is a 69 yo with COPD and GERD who presented with newly diagnosed SCLC completed BID radiation planned concurrently with cis/etoposide but had a reaction C2D1 to cisplatin. Completed 1 cycle carbo/etop D1 4/5/22 also complicated by facial flushing and erythematous rash and stopped further treatment. Now s/p PCI in 6/2022. Treatment has been complicated by worsening short-term memory worse for the day or two after treatment, delayed C2 by 1 week and dose-reduced. Confusion has largely resolved during C3 after stopping mirtazapine but she and her  recognize transient worsening for the day or two after treatment.      #SCLC  -Labs and AE's are within treatment parameters. Continue with treatment    #Double vision/confusion/gait instability  -5/20/24 MRI brain negative.   -Discussed in phase 1 meeting and seems consistent with WBR late effect.   -She was seen by her ophthalmologist and will continue to follow.  -Follow up with neurology 8/15/24.    CASEY Adams-CNP

## 2024-08-15 NOTE — PROGRESS NOTES
Subjective     Gladys Branch is a right handed  68 y.o. year old female who presents with Balance Problem. Patient is accompanied by: spouse  Visit type: new patient visit     She has SCLC and s/p cisplatin/etoposide 2022, and she is currently in a clinical trial. For the past year or so she has had balance difficulty. It has been slowly getting worse in the past 6 months. She has had 4 falls in the past year. She is not using a cane. She has had numbness in her distal feet ever since her chemotherapy.    Her vision has been worsening. She has had horizontal diplopia which has been going on for several months as well. She will have to close one eye to read things properly.    She has had cognitive decline ever since having prophylactic brain radiation. She has trouble remembering things she did the previous day, or forgetting appointments. She has stopped driving because of vision. She is otherwise independent in other IADLs including cooking, shopping, and cleaning.    Patient Active Problem List   Diagnosis    Chronic GERD    COPD (chronic obstructive pulmonary disease) (Multi)    Hyperlipidemia    Impaired fasting blood sugar    Problem drinking    Seasonal allergic rhinitis    Small cell lung cancer (Multi)    Vitamin D deficiency    Abnormal EKG    Agatston CAC score, <100    Frequent unifocal PVCs    Medicare annual wellness visit, subsequent    Peripheral neuropathy due to and not concurrent with chemotherapy (Multi)    [place] cardiac risk <10% in next 10 years    Anemia, chronic disease    History of diverticulitis    Chronic constipation    Double vision    New onset of headache in cancer patient    Cerebellar ataxia (Multi)      Past Medical History:   Diagnosis Date    Abdominal pain     Anemia     COPD (chronic obstructive pulmonary disease) (Multi)     Disease due to severe acute respiratory syndrome coronavirus 2 (SARS-CoV-2) 02/07/2023    Diverticulosis     GERD (gastroesophageal reflux  disease)     Hyperlipidemia     Neuropathy     Other specified abnormal findings of blood chemistry 2022    Elevated serum creatinine    Other specified abnormal findings of blood chemistry 2022    Elevated serum creatinine    Other specified abnormal findings of blood chemistry 2022    Elevated TSH    Other specified abnormal findings of blood chemistry 2022    Elevated TSH    Other specified abnormal findings of blood chemistry 2022    Elevated TSH    Other specified abnormal findings of blood chemistry 2022    Elevated TSH    Other specified abnormal findings of blood chemistry 09/15/2020    Elevated TSH    Personal history of other diseases of the respiratory system     History of chronic obstructive lung disease    Small cell lung cancer (Multi)       Past Surgical History:   Procedure Laterality Date    OTHER SURGICAL HISTORY  2020    Hysterectomy    OTHER SURGICAL HISTORY  2021    Tubal ligation    OTHER SURGICAL HISTORY  2021    Partial atrial septal defect repair    US GUIDED BIOPSY LYMPH NODE SUPERFICIAL  2023    US GUIDED BIOPSY LYMPH NODE SUPERFICIAL 2023 DOCTOR OFFICE [facility]GAC[facility]      Social History     Socioeconomic History    Marital status:      Spouse name: Not on file    Number of children: Not on file    Years of education: Not on file    Highest education level: Not on file   Occupational History    Not on file   Tobacco Use    Smoking status: Former     Current packs/day: 0.00     Average packs/day: 1 pack/day for 25.0 years (25.0 ttl pk-yrs)     Types: Cigarettes     Start date:      Quit date:      Years since quittin.6     Passive exposure: Past    Smokeless tobacco: Never   Vaping Use    Vaping status: Never Used   Substance and Sexual Activity    Alcohol use: Yes     Alcohol/week: 1.0 - 2.0 standard drink of alcohol     Types: 1 - 2 Glasses of wine per week     Comment: once  in a while    Drug use: Never     Sexual activity: Not on file   Other Topics Concern    Not on file   Social History Narrative    Not on file     Social Determinants of Health     Financial Resource Strain: Not on file   Food Insecurity: Not on file   Transportation Needs: Not on file   Physical Activity: Not on file   Stress: Not on file   Social Connections: Not on file   Intimate Partner Violence: Not on file   Housing Stability: Not on file      Family History   Problem Relation Name Age of Onset    Dementia Mother      Depression Mother      Other (varicose veins of lower extremity) Mother      Alcohol abuse Father      Breast cancer Father's Sister        Patient Health Questionnaire-2 Score: 0          Review of Systems  All other system have been reviewed and are negative for complaint.    Vitals:    08/15/24 0808   BP: 118/85   BP Location: Left arm   Patient Position: Sitting   BP Cuff Size: Adult   Pulse: 71   Weight: 52.3 kg (115 lb 3.2 oz)   Height: 1.524 m (5')     Objective   Neurological Exam  Mental Status  Awake, alert and oriented to person, place and time. Speech is normal. Language is fluent with no aphasia. Attention and concentration are normal.    Cranial Nerves  CN II: Visual fields full to confrontation.  CN III, IV, VI: Slight exophoria. Downbeat nystagmus is present. Hypermetric saccades. Normal smooth pursuit. Normal lids and orbits bilaterally. Pupils equal round and reactive to light bilaterally.  CN V:  Right: Facial sensation is normal.  Left: Facial sensation is normal on the left.  CN VII: Full and symmetric facial movement.  CN VIII: Hearing is normal.  CN IX, X: Palate elevates symmetrically  CN XI: Shoulder shrug strength is normal.  CN XII: Tongue midline without atrophy or fasciculations.    Motor  Normal muscle bulk throughout. No fasciculations present. Normal muscle tone. No abnormal involuntary movements.                                               Right                     Left   Shoulder abduction                5                          5  Elbow flexion                         5                          5  Elbow extension                    5                          5  Finger flexion                         5                          5  Finger extension                    5                          5  Finger abduction                    5                          5  Hip flexion                              5                          5  Knee flexion                           5                          5  Plantarflexion                         5                          5  Dorsiflexion                            5                          5    Sensory  Light touch is normal in upper and lower extremities. Intact distal pinprick. Normal vibration in distal lower extremities.     Reflexes                                            Right                      Left  Biceps                                 2+                         2+  Triceps                                2+                         2+  Patellar                                2+                         2+  Achilles                                2+                         2+  Right Plantar: downgoing  Left Plantar: downgoing    Right pathological reflexes: Pam's absent.  Left pathological reflexes: Pam's absent.    Coordination  Right: Heel-to-shin normal.Left: Heel-to-shin normal.  Slight dysmetria on finger vicki.    Gait   Romberg is present.  Wide-based ataxic gait, unable to tandem.                   BRIAN  GAIT Gait: 3 Stance Stance: 2 Sitting Sittin Speech Distrubance Speech Disturbance: 0, Nose Finger Test - Left: 0, Nose Finger Test - Right: 0, Alternating Hand Movement - Right: 1, Alternating Hand Movement - Left: 1, Heel-Shin - Right: 0, Heel-Shin - Left: 0 BRIAN Total Score Finger Vicki - Mean Score: 1, Nose Finger Test - Mean Score: 0, Alternating Hand Movement Mean Score: 1, Heel-Shin Meat Total: 0, BRIAN Total Score:  7    Hemoglobin A1C   Date Value Ref Range Status   05/07/2024 5.5 see below % Final     Estimated Average Glucose   Date Value Ref Range Status   05/07/2024 111 Not Established mg/dL Final     Thyroid Stimulating Hormone   Date Value Ref Range Status   07/30/2024 1.34 0.44 - 3.98 mIU/L Final     Imaging Results: MR brain w and wo IV contrast 05/21/2024    Narrative  Interpreted By:  Angelito Sterling,  STUDY:  MR BRAIN W AND WO IV CONTRAST;  5/21/2024 8:36 pm    INDICATION:  Signs/Symptoms:small cell lung cancer with new onset double vision  and a.m. headache.    COMPARISON:  MRI brain with contrast 03/01/2023    ACCESSION NUMBER(S):  [id number]    ORDERING CLINICIAN:  XIMENA ORTIZ    TECHNIQUE:  Multiplanar, multi-sequence images of the brain were obtained before  and after the intravenous administration of 10 mL Dotarem gadolinium.    FINDINGS:  Diffusion weighted images show no evidence of acute ischemic infarct.    Slight progression of periventricular T2/FLAIR white matter  hyperintensities, particularly in the peritrigonal white matter.    There is no acute intraparenchymal hemorrhage, mass, mass-effect, or  an extra-axial fluid collection. There is redemonstration of a linear  enhancing vessel extending from the left peritrigonal region to the  cortex likely representing a developmental venous anomaly.    The ventricular size and cerebral volume are age-concordant.    Normal morphology of midline structures. The craniovertebral junction  is normal.    The orbits and globes are unremarkable.    The paranasal sinuses show no air-fluid levels or hemorrhage.    The mastoid air cells are clear.    Impression  No acute infarct, acute hemorrhage, or intracranial mass effect.    No evidence of intracranial metastatic disease.    MACRO:  None.    Signed by: Angelito Sterling 5/21/2024 10:54 PM  Dictation workstation:   [id number]      Assessment/Plan   Problem List Items Addressed This Visit             ICD-10-CM     Cerebellar ataxia (Multi) - Primary G11.9    Relevant Orders    MANSOOR with Reflex to DANNIE    Anti-SSA    Anti-SSB    Glutamic Acid Decarboxylase AB    Paraneoplastic Autoantibodies Evaluation    Thyroid Peroxidase (TPO) Antibody    Vitamin E    TSH with reflex to Free T4 if abnormal    Syphilis Screen with Reflex    Methylmalonic Acid    HIV 1/2 Antigen/Antibody Screen with Reflex to Confirmation    Celiac Panel    Folate    Referral to Physical Therapy     Other Visit Diagnoses         Codes    Other general symptoms and signs     R68.89    Relevant Orders    TSH with reflex to Free T4 if abnormal    Urinalysis with Reflex Microscopic            Gladys Branch is a 68 y.o. year old female with COPD, small cell lung cancer currently in a clinical trial on tarlatamab, here for evaluation of declining balance and cognition for the past year. Her exam is notable for downbeat nystagmus and ataxic gait, and supported by cerebellar atrophy on MRI. 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 If bloodwork is unrevealing then I will proceed with LP.    We discussed the following plan today:   Bloodwork  Physical therapy  Return in 3 months

## 2024-08-17 LAB
GAD65 AB SER IA-ACNC: <5 IU/ML (ref 0–5)
GLIADIN PEPTIDE IGG SER IA-ACNC: <0.56 FLU (ref 0–4.99)
TTG IGG SER IA-ACNC: <0.82 FLU (ref 0–4.99)

## 2024-08-18 LAB
A-TOCOPHEROL VIT E SERPL-MCNC: 25.6 MG/L (ref 5.5–18)
BETA+GAMMA TOCOPHEROL SERPL-MCNC: 0.6 MG/L (ref 0–6)

## 2024-08-19 LAB
ANA PATTERN: ABNORMAL
ANA SER QL HEP2 SUBST: POSITIVE
ANA TITR SER IF: ABNORMAL {TITER}
CENTROMERE B AB SER-ACNC: <0.2 AI
CHROMATIN AB SERPL-ACNC: <0.2 AI
DSDNA AB SER-ACNC: <1 IU/ML
ENA JO1 AB SER QL IA: <0.2 AI
ENA RNP AB SER IA-ACNC: 0.3 AI
ENA SCL70 AB SER QL IA: <0.2 AI
ENA SM AB SER IA-ACNC: <0.2 AI
ENA SM+RNP AB SER QL IA: <0.2 AI
ENA SS-A AB SER IA-ACNC: <0.2 AI
ENA SS-B AB SER IA-ACNC: <0.2 AI
RIBOSOMAL P AB SER-ACNC: <0.2 AI

## 2024-08-21 ENCOUNTER — TELEPHONE (OUTPATIENT)
Dept: NEUROLOGY | Facility: CLINIC | Age: 69
End: 2024-08-21
Payer: MEDICARE

## 2024-08-21 NOTE — TELEPHONE ENCOUNTER
Patient called and asked about her test results that Dr. Mora ordered. She wanted to know if they came out okay and for the results.     Please advise.

## 2024-08-22 NOTE — TELEPHONE ENCOUNTER
Called and spoke to patient. Per Dr. Mora, the antibody panel is still not back yet, as it takes several weeks. He will let her know about those results. The others were unrevealing. Patient verbalized understanding.

## 2024-08-24 LAB — METHYLMALONATE SERPL-SCNC: 0.22 UMOL/L (ref 0–0.4)

## 2024-08-27 ENCOUNTER — HOSPITAL ENCOUNTER (OUTPATIENT)
Dept: RESEARCH | Facility: HOSPITAL | Age: 69
Discharge: HOME | End: 2024-08-27
Payer: MEDICARE

## 2024-08-27 ENCOUNTER — EDUCATION (OUTPATIENT)
Dept: HEMATOLOGY/ONCOLOGY | Facility: HOSPITAL | Age: 69
End: 2024-08-27

## 2024-08-27 ENCOUNTER — OFFICE VISIT (OUTPATIENT)
Dept: HEMATOLOGY/ONCOLOGY | Facility: HOSPITAL | Age: 69
End: 2024-08-27
Payer: MEDICARE

## 2024-08-27 VITALS
TEMPERATURE: 97 F | WEIGHT: 115.96 LBS | OXYGEN SATURATION: 97 % | RESPIRATION RATE: 16 BRPM | DIASTOLIC BLOOD PRESSURE: 72 MMHG | BODY MASS INDEX: 22.65 KG/M2 | SYSTOLIC BLOOD PRESSURE: 102 MMHG | HEART RATE: 74 BPM

## 2024-08-27 DIAGNOSIS — C34.90 SMALL CELL LUNG CANCER (MULTI): Primary | ICD-10-CM

## 2024-08-27 DIAGNOSIS — J30.2 SEASONAL ALLERGIC RHINITIS, UNSPECIFIED TRIGGER: ICD-10-CM

## 2024-08-27 LAB
ALBUMIN SERPL BCP-MCNC: 3.7 G/DL (ref 3.4–5)
ALP SERPL-CCNC: 49 U/L (ref 33–136)
ALT SERPL W P-5'-P-CCNC: 14 U/L (ref 7–45)
AMYLASE SERPL-CCNC: 37 U/L (ref 29–103)
ANION GAP SERPL CALC-SCNC: 13 MMOL/L (ref 10–20)
APPEARANCE UR: CLEAR
APTT PPP: 29 SECONDS (ref 27–38)
AST SERPL W P-5'-P-CCNC: 17 U/L (ref 9–39)
BASOPHILS # BLD AUTO: 0.03 X10*3/UL (ref 0–0.1)
BASOPHILS NFR BLD AUTO: 0.4 %
BILIRUB DIRECT SERPL-MCNC: 0.1 MG/DL (ref 0–0.3)
BILIRUB SERPL-MCNC: 0.5 MG/DL (ref 0–1.2)
BILIRUB UR STRIP.AUTO-MCNC: NEGATIVE MG/DL
BUN SERPL-MCNC: 20 MG/DL (ref 6–23)
CALCIUM SERPL-MCNC: 8.9 MG/DL (ref 8.6–10.6)
CHLORIDE SERPL-SCNC: 104 MMOL/L (ref 98–107)
CK SERPL-CCNC: 41 U/L (ref 0–215)
CO2 SERPL-SCNC: 25 MMOL/L (ref 21–32)
COLOR UR: ABNORMAL
CORTIS SERPL-MCNC: 0.7 UG/DL (ref 2.5–20)
CREAT SERPL-MCNC: 0.69 MG/DL (ref 0.5–1.05)
CRP SERPL-MCNC: 2.89 MG/DL
EGFRCR SERPLBLD CKD-EPI 2021: >90 ML/MIN/1.73M*2
EOSINOPHIL # BLD AUTO: 0.16 X10*3/UL (ref 0–0.7)
EOSINOPHIL NFR BLD AUTO: 1.9 %
ERYTHROCYTE [DISTWIDTH] IN BLOOD BY AUTOMATED COUNT: 13.6 % (ref 11.5–14.5)
ESTRADIOL SERPL-MCNC: 23 PG/ML
FERRITIN SERPL-MCNC: 64 NG/ML (ref 8–150)
FSH SERPL-ACNC: 25.9 IU/L
GLUCOSE SERPL-MCNC: 106 MG/DL (ref 74–99)
GLUCOSE UR STRIP.AUTO-MCNC: NORMAL MG/DL
HCT VFR BLD AUTO: 36 % (ref 36–46)
HGB BLD-MCNC: 12.1 G/DL (ref 12–16)
HOLD SPECIMEN: NORMAL
IMM GRANULOCYTES # BLD AUTO: 0.06 X10*3/UL (ref 0–0.7)
IMM GRANULOCYTES NFR BLD AUTO: 0.7 % (ref 0–0.9)
INR PPP: 1 (ref 0.9–1.1)
KETONES UR STRIP.AUTO-MCNC: NEGATIVE MG/DL
LEUKOCYTE ESTERASE UR QL STRIP.AUTO: NEGATIVE
LH SERPL-ACNC: 2.5 IU/L
LIPASE SERPL-CCNC: 37 U/L (ref 9–82)
LYMPHOCYTES # BLD AUTO: 1.47 X10*3/UL (ref 1.2–4.8)
LYMPHOCYTES NFR BLD AUTO: 17.9 %
MAGNESIUM SERPL-MCNC: 2.08 MG/DL (ref 1.6–2.4)
MCH RBC QN AUTO: 31.3 PG (ref 26–34)
MCHC RBC AUTO-ENTMCNC: 33.6 G/DL (ref 32–36)
MCV RBC AUTO: 93 FL (ref 80–100)
MONOCYTES # BLD AUTO: 0.74 X10*3/UL (ref 0.1–1)
MONOCYTES NFR BLD AUTO: 9 %
NEUTROPHILS # BLD AUTO: 5.75 X10*3/UL (ref 1.2–7.7)
NEUTROPHILS NFR BLD AUTO: 70.1 %
NITRITE UR QL STRIP.AUTO: NEGATIVE
NRBC BLD-RTO: 0 /100 WBCS (ref 0–0)
PH UR STRIP.AUTO: 6.5 [PH]
PHOSPHATE SERPL-MCNC: 3.6 MG/DL (ref 2.5–4.9)
PLATELET # BLD AUTO: 263 X10*3/UL (ref 150–450)
POTASSIUM SERPL-SCNC: 4.1 MMOL/L (ref 3.5–5.3)
PROT SERPL-MCNC: 6.5 G/DL (ref 6.4–8.2)
PROT UR STRIP.AUTO-MCNC: NEGATIVE MG/DL
PROTHROMBIN TIME: 11.2 SECONDS (ref 9.8–12.8)
RBC # BLD AUTO: 3.87 X10*6/UL (ref 4–5.2)
RBC # UR STRIP.AUTO: NEGATIVE /UL
SODIUM SERPL-SCNC: 138 MMOL/L (ref 136–145)
SP GR UR STRIP.AUTO: 1
T4 FREE SERPL-MCNC: 1.04 NG/DL (ref 0.78–1.48)
TSH SERPL-ACNC: 2.73 MIU/L (ref 0.44–3.98)
UROBILINOGEN UR STRIP.AUTO-MCNC: NORMAL MG/DL
WBC # BLD AUTO: 8.2 X10*3/UL (ref 4.4–11.3)

## 2024-08-27 PROCEDURE — 83001 ASSAY OF GONADOTROPIN (FSH): CPT

## 2024-08-27 PROCEDURE — 85730 THROMBOPLASTIN TIME PARTIAL: CPT

## 2024-08-27 PROCEDURE — 82150 ASSAY OF AMYLASE: CPT

## 2024-08-27 PROCEDURE — 82728 ASSAY OF FERRITIN: CPT

## 2024-08-27 PROCEDURE — 82024 ASSAY OF ACTH: CPT

## 2024-08-27 PROCEDURE — 82550 ASSAY OF CK (CPK): CPT

## 2024-08-27 PROCEDURE — 96413 CHEMO IV INFUSION 1 HR: CPT

## 2024-08-27 PROCEDURE — 85025 COMPLETE CBC W/AUTO DIFF WBC: CPT

## 2024-08-27 PROCEDURE — 82670 ASSAY OF TOTAL ESTRADIOL: CPT

## 2024-08-27 PROCEDURE — 83690 ASSAY OF LIPASE: CPT

## 2024-08-27 PROCEDURE — 84439 ASSAY OF FREE THYROXINE: CPT

## 2024-08-27 PROCEDURE — 82248 BILIRUBIN DIRECT: CPT

## 2024-08-27 PROCEDURE — 81003 URINALYSIS AUTO W/O SCOPE: CPT

## 2024-08-27 PROCEDURE — 84100 ASSAY OF PHOSPHORUS: CPT

## 2024-08-27 PROCEDURE — 2560000001 HC RX 256 EXPERIMENTAL DRUGS

## 2024-08-27 PROCEDURE — 84443 ASSAY THYROID STIM HORMONE: CPT

## 2024-08-27 PROCEDURE — 80053 COMPREHEN METABOLIC PANEL: CPT

## 2024-08-27 PROCEDURE — 85610 PROTHROMBIN TIME: CPT

## 2024-08-27 PROCEDURE — 82533 TOTAL CORTISOL: CPT

## 2024-08-27 PROCEDURE — 99215 OFFICE O/P EST HI 40 MIN: CPT | Performed by: STUDENT IN AN ORGANIZED HEALTH CARE EDUCATION/TRAINING PROGRAM

## 2024-08-27 PROCEDURE — 86140 C-REACTIVE PROTEIN: CPT

## 2024-08-27 PROCEDURE — 83735 ASSAY OF MAGNESIUM: CPT

## 2024-08-27 RX ORDER — DIPHENHYDRAMINE HYDROCHLORIDE 50 MG/ML
50 INJECTION INTRAMUSCULAR; INTRAVENOUS AS NEEDED
Status: DISCONTINUED | OUTPATIENT
Start: 2024-08-27 | End: 2024-08-28 | Stop reason: HOSPADM

## 2024-08-27 RX ORDER — CETIRIZINE HYDROCHLORIDE, PSEUDOEPHEDRINE HYDROCHLORIDE 5; 120 MG/1; MG/1
1 TABLET, FILM COATED, EXTENDED RELEASE ORAL DAILY
Qty: 30 TABLET | Refills: 11 | Status: SHIPPED | OUTPATIENT
Start: 2024-08-27 | End: 2025-08-27

## 2024-08-27 RX ORDER — FAMOTIDINE 10 MG/ML
20 INJECTION INTRAVENOUS ONCE AS NEEDED
Status: DISCONTINUED | OUTPATIENT
Start: 2024-08-27 | End: 2024-08-28 | Stop reason: HOSPADM

## 2024-08-27 RX ORDER — EPINEPHRINE 1 MG/ML
0.3 INJECTION INTRAMUSCULAR; INTRAVENOUS; SUBCUTANEOUS EVERY 5 MIN PRN
Status: DISCONTINUED | OUTPATIENT
Start: 2024-08-27 | End: 2024-08-28 | Stop reason: HOSPADM

## 2024-08-27 RX ORDER — ALBUTEROL SULFATE 0.83 MG/ML
3 SOLUTION RESPIRATORY (INHALATION) AS NEEDED
Status: DISCONTINUED | OUTPATIENT
Start: 2024-08-27 | End: 2024-08-28 | Stop reason: HOSPADM

## 2024-08-27 ASSESSMENT — PAIN SCALES - GENERAL: PAINLEVEL: 0-NO PAIN

## 2024-08-27 NOTE — PROGRESS NOTES
Patient ID: Gladys Branch is a 68 y.o. female.    DIAGNOSIS    Small Cell Lung Cancer    By immunostaining, the tumor cells are positive for CAM 5.2, INSM1, and TTF-1, and negative for p40 and LCA. The proliferation index by Ki-67 immunostaining is approximately 90%.  Synaptophysin, Chromogranin and INSM-1 are positive.  AE1/AE3 is negative. NEOGENOMICS, RB protein expression is lost in the tumor cells    STAGING    [id number], limited stage  Recurrence    CURRENT SITES OF DISEASE    RLL, supraclavicular    MOLECULAR GENOMICS      PRIOR THERAPY    Concurrent chemoradiation with Cisplatin/Etoposide every 3 weeks; C1D1 2/15/22, reaction to cisplatin C2D1 and did not receive D2 or D3 etoposide  Carbo/etoposide 4/5/2022 1 cycle c/b rash  PCI 5/21-6/13/22    CURRENT THERAPY    Phase 1 study [id number]  with study drug Taralatamab 1/24/23 - present.    CURRENT ONCOLOGICAL PROBLEMS      HISTORY OF PRESENT ILLNESS    Mrs. Branch is a 65 yo with PMH GERD and COPD who originally was round to have a RLL nodule measuring 11 mm in 4/28/2021 found as part of CT calcium score scan. An ensuing CT chest w/o contrast was done on 5/19/21 which revealed subsolid pulmonary nodules measuring 14 mm in the RLL. She had CT chest w/contrast on 11/29/21 which confirmed stable 14 mm GGO of the RLL and decreased RLL subpleural nodule. She met thoracic surgeon Dr. Farfan, who ordered PET/CT scan done on 12/8/21 which did not reveal any FDG-avid nodules within the lung parenchyma but R hilar nodes with max SUV 8.0. He referred her for bronch, which was done on 1/21/22 by Dr. Mcdaniels. 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 Brain MRI was negative.    She started concurrent chemoradiation with BID radiation with cis/etop 2/15/22, and unfortunately had a reaction to cisplatin on C2D1 with chest pain and hypotension. She was hospitalized with sepsis felt to be due to pneumonia. She trialed carboplatin/etoposide on 4/5/22 with a  resulting rash and opted to forego further chemotherapy as she had completed radiation. Restaging scan 11/3/22 unfortunately with progression with new R hilar lymph node, paratracheal nodes, and increase in size of R supraclavicular mass. She enrolled in [id number] and started C1D1 1/24/23. C1 complicated by anorexia and dysgeusia, and delayed C2 by a week. She has further struggled with fatigue and confusion, which may be related to mirtazapine. Dose reduced for C2 and mirtazapine stopped with improvement in symptoms. She continues to tolerate regular treatments.    PAST MEDICAL HISTORY    GERD  COPD    SOCIAL HISTORY    Retired - lighting . Lives at home with  and a kitten.  Tobacco: quit smoking 1999. 1 ppd x 25 yrs.  EtOH: wine 1 glass/day  Illicits: none    CURRENT MEDS    Please see med list    ALLERGIES    Codeine, Sulfa drugs, Cisplatin    FAMILY HISTORY    Paternal aunt - breast cancer dx 80s    Subjective    Today 8.27.2024. Patient in clinic with her  for C21D1 for [id number].  She states she is doing well overall, saw neurology and some bloodwork sent and LP maybe. He referred her to physical therapy. Appetite has gotten better, eating better, drinking water. Feels a little tired at the end of the day - went to [employer] and did get really tired mid-day.       Objective          7/30/2024     8:05 AM 7/30/2024    10:26 AM 7/30/2024    11:46 AM 8/13/2024     8:11 AM 8/13/2024    10:17 AM 8/15/2024     8:08 AM 8/27/2024     8:04 AM   Vitals   Systolic 106 96 91 121 98 118 111   Diastolic 77 64 61 78 66 85 72   Heart Rate 77 75 76 56 74 71 67   Temp 36.5 °C (97.7 °F) 36.1 °C (97 °F) 36.1 °C (97 °F) 36.4 °C (97.5 °F) 35.9 °C (96.6 °F)  35.8 °C (96.4 °F)   Resp 18 16 16 16 16  16   Height (in)      1.524 m (5')    Weight (lb) 111.33   113.32  115.2 115.96   BMI 21.74 kg/m2   22.13 kg/m2  22.5 kg/m2 22.65 kg/m2   BSA (m2) 1.46 m2   1.48 m2  1.49 m2 1.49 m2   Visit Report      Report  Report       Daily Weight  08/27/24 : 52.6 kg (115 lb 15.4 oz)  08/15/24 : 52.3 kg (115 lb 3.2 oz)  08/13/24 : 51.4 kg (113 lb 5.1 oz)  07/30/24 : 50.5 kg (111 lb 5.3 oz)  07/18/24 : 50 kg (110 lb 3.7 oz)         Physical Exam  Constitutional:       General: She is awake. She is not in acute distress.     Appearance: Normal appearance. She is normal weight.   HENT:      Head: Normocephalic and atraumatic.      Mouth/Throat:      Mouth: Mucous membranes are moist.      Pharynx: No oropharyngeal exudate or posterior oropharyngeal erythema.   Eyes:      General: No scleral icterus.     Extraocular Movements: Extraocular movements intact.      Conjunctiva/sclera: Conjunctivae normal.      Pupils: Pupils are equal, round, and reactive to light.   Cardiovascular:      Rate and Rhythm: Normal rate and regular rhythm.      Heart sounds: Normal heart sounds, S1 normal and S2 normal. No murmur heard.     No friction rub. No gallop.   Pulmonary:      Effort: Pulmonary effort is normal.      Breath sounds: Normal breath sounds. No wheezing, rhonchi or rales.   Abdominal:      General: Abdomen is flat. Bowel sounds are normal. There is no distension.      Palpations: Abdomen is soft. There is no mass.      Tenderness: There is no abdominal tenderness. There is no guarding or rebound.   Musculoskeletal:      Cervical back: Normal range of motion and neck supple. No rigidity.   Skin:     General: Skin is warm and dry.      Comments: intact   Neurological:      Mental Status: She is alert and oriented to person, place, and time.      GCS: GCS eye subscore is 4. GCS verbal subscore is 5. GCS motor subscore is 6.      Sensory: Sensation is intact. No sensory deficit.      Motor: Motor function is intact.      Coordination: Romberg sign positive. Finger-Nose-Finger Test normal.      Gait: Gait abnormal and tandem walk abnormal.      Deep Tendon Reflexes:      Reflex Scores:       Tricep reflexes are 2+ on the right side and 2+ on the  left side.       Bicep reflexes are 2+ on the right side and 2+ on the left side.       Brachioradialis reflexes are 2+ on the right side and 2+ on the left side.       Patellar reflexes are 2+ on the right side and 2+ on the left side.       Achilles reflexes are 2+ on the right side and 2+ on the left side.     Comments: Corrective saccade PRESENT in vestibular - ocular reflex   Psychiatric:         Attention and Perception: Attention normal.         Mood and Affect: Mood normal.         Speech: Speech normal.         Behavior: Behavior normal. Behavior is cooperative.         Cognition and Memory: Cognition normal.       Labs  Hospital Outpatient Visit on 08/27/2024   Component Date Value Ref Range Status    WBC 08/27/2024 8.2  4.4 - 11.3 x10*3/uL Final    nRBC 08/27/2024 0.0  0.0 - 0.0 /100 WBCs Final    RBC 08/27/2024 3.87 (L)  4.00 - 5.20 x10*6/uL Final    Hemoglobin 08/27/2024 12.1  12.0 - 16.0 g/dL Final    Hematocrit 08/27/2024 36.0  36.0 - 46.0 % Final    MCV 08/27/2024 93  80 - 100 fL Final    MCH 08/27/2024 31.3  26.0 - 34.0 pg Final    MCHC 08/27/2024 33.6  32.0 - 36.0 g/dL Final    RDW 08/27/2024 13.6  11.5 - 14.5 % Final    Platelets 08/27/2024 263  150 - 450 x10*3/uL Final    Neutrophils % 08/27/2024 70.1  40.0 - 80.0 % Final    Immature Granulocytes %, Automated 08/27/2024 0.7  0.0 - 0.9 % Final    Immature Granulocyte Count (IG) includes promyelocytes, myelocytes and metamyelocytes but does not include bands. Percent differential counts (%) should be interpreted in the context of the absolute cell counts (cells/UL).    Lymphocytes % 08/27/2024 17.9  13.0 - 44.0 % Final    Monocytes % 08/27/2024 9.0  2.0 - 10.0 % Final    Eosinophils % 08/27/2024 1.9  0.0 - 6.0 % Final    Basophils % 08/27/2024 0.4  0.0 - 2.0 % Final    Neutrophils Absolute 08/27/2024 5.75  1.20 - 7.70 x10*3/uL Final    Percent differential counts (%) should be interpreted in the context of the absolute cell counts (cells/uL).     Immature Granulocytes Absolute, Au* 08/27/2024 0.06  0.00 - 0.70 x10*3/uL Final    Lymphocytes Absolute 08/27/2024 1.47  1.20 - 4.80 x10*3/uL Final    Monocytes Absolute 08/27/2024 0.74  0.10 - 1.00 x10*3/uL Final    Eosinophils Absolute 08/27/2024 0.16  0.00 - 0.70 x10*3/uL Final    Basophils Absolute 08/27/2024 0.03  0.00 - 0.10 x10*3/uL Final    Glucose 08/27/2024 106 (H)  74 - 99 mg/dL Final    Sodium 08/27/2024 138  136 - 145 mmol/L Final    Potassium 08/27/2024 4.1  3.5 - 5.3 mmol/L Final    Chloride 08/27/2024 104  98 - 107 mmol/L Final    Bicarbonate 08/27/2024 25  21 - 32 mmol/L Final    Anion Gap 08/27/2024 13  10 - 20 mmol/L Final    Urea Nitrogen 08/27/2024 20  6 - 23 mg/dL Final    Creatinine 08/27/2024 0.69  0.50 - 1.05 mg/dL Final    eGFR 08/27/2024 >90  >60 mL/min/1.73m*2 Final    Calculations of estimated GFR are performed using the 2021 CKD-EPI Study Refit equation without the race variable for the IDMS-Traceable creatinine methods.  https://jasn.asnjournals.org/content/early/2021/09/22/ASN.0059296362    Calcium 08/27/2024 8.9  8.6 - 10.6 mg/dL Final    Albumin 08/27/2024 3.7  3.4 - 5.0 g/dL Final    Alkaline Phosphatase 08/27/2024 49  33 - 136 U/L Final    Total Protein 08/27/2024 6.5  6.4 - 8.2 g/dL Final    AST 08/27/2024 17  9 - 39 U/L Final    Bilirubin, Total 08/27/2024 0.5  0.0 - 1.2 mg/dL Final    ALT 08/27/2024 14  7 - 45 U/L Final    Patients treated with Sulfasalazine may generate falsely decreased results for ALT.    Amylase 08/27/2024 37  29 - 103 U/L Final    aPTT 08/27/2024 29  27 - 38 seconds Final    Bilirubin, Direct 08/27/2024 0.1  0.0 - 0.3 mg/dL Final    Creatine Kinase 08/27/2024 41  0 - 215 U/L Final    Cortisol 08/27/2024 0.7 (L)  2.5 - 20.0 ug/dL Final    C-Reactive Protein 08/27/2024 2.89 (H)  <1.00 mg/dL Final    Estradiol 08/27/2024 23  pg/mL Final    Ferritin 08/27/2024 64  8 - 150 ng/mL Final    Follicle Stimulating Hormone 08/27/2024 25.9  IU/L Final    FSH Ref  Values  Follicular   2.0-12.0  IU/L  Mid-Cycle        12.0-25.0  IU/L  Luteal Phase      2.0-12.0  IU/L  Menopause       30.0-150.0  IU/L  Pre-puberty     50% Adult IU/L  Adult Male        2.0-10.0  IU/L   Infants          0.0-1.0  IU/L    Luteinizing Hormone 08/27/2024 2.5  IU/L Final    LH Reference Values  Follicular Phase          1.9-12.5 IU/L  Mid-Cycle                 8.7-76.3 IU/L  Luteal Phase              0.5-16.9 IU/L  Post Menopause            5.0-55.2 IU/L  Children                    0- 6.0 IU/L  Adult Male 18-70 years    1.5- 9.3 IU/L  Adult Male >70 years      3.1-34.6 IU/L    Lipase 08/27/2024 37  9 - 82 U/L Final    Magnesium 08/27/2024 2.08  1.60 - 2.40 mg/dL Final    Phosphorus 08/27/2024 3.6  2.5 - 4.9 mg/dL Final    The performance characteristics of phosphorus testing in heparinized plasma have been validated by the individual  laboratory site where testing is performed. Testing on heparinized plasma is not approved by the FDA; however, such approval is not necessary.    Protime 08/27/2024 11.2  9.8 - 12.8 seconds Final    INR 08/27/2024 1.0  0.9 - 1.1 Final    Thyroid Stimulating Hormone 08/27/2024 2.73  0.44 - 3.98 mIU/L Final    Thyroxine, Free 08/27/2024 1.04  0.78 - 1.48 ng/dL Final    Color, Urine 08/27/2024 Light-Yellow  Light-Yellow, Yellow, Dark-Yellow Final    Appearance, Urine 08/27/2024 Clear  Clear Final    Specific Gravity, Urine 08/27/2024 1.004 (N)  1.005 - 1.035 Final    pH, Urine 08/27/2024 6.5  5.0, 5.5, 6.0, 6.5, 7.0, 7.5, 8.0 Final    Protein, Urine 08/27/2024 NEGATIVE  NEGATIVE, 10 (TRACE), 20 (TRACE) mg/dL Final    Glucose, Urine 08/27/2024 Normal  Normal mg/dL Final    Blood, Urine 08/27/2024 NEGATIVE  NEGATIVE Final    Ketones, Urine 08/27/2024 NEGATIVE  NEGATIVE mg/dL Final    Bilirubin, Urine 08/27/2024 NEGATIVE  NEGATIVE Final    Urobilinogen, Urine 08/27/2024 Normal  Normal mg/dL Final    Nitrite, Urine 08/27/2024 NEGATIVE  NEGATIVE Final    Leukocyte Esterase,  Urine 08/27/2024 NEGATIVE  NEGATIVE Final               Performance Status:    ECOG 1: Restricted in physically strenuous activity but ambulatory and able to carry out work of a light or sedentary nature, e.g., light house work, office work.       Assessment/Plan      Mrs. Branch is a 69 yo with COPD and GERD who presented with newly diagnosed SCLC completed BID radiation planned concurrently with cis/etoposide but had a reaction C2D1 to cisplatin. Completed 1 cycle carbo/etop D1 4/5/22 also complicated by facial flushing and erythematous rash and stopped further treatment. Now s/p PCI in 6/2022. Started clinical trial [id number] with tarlatamab 1/24/23. Treatment has been complicated by worsening short-term memory worse for the day or two after treatment, delayed C2 by 1 week and dose-reduced. Confusion has largely resolved during C3 after stopping mirtazapine but she and her  recognize transient worsening for the day or two after treatment.      #SCLC  -Labs and AE's are within treatment parameters. Continue with treatment on clinical trial on [id number] with tarlatamab per protocol.    #Double vision/confusion/gait instability  -5/20/24 MRI brain negative.   -Discussed in phase 1 meeting and seems consistent with WBR late effect.   -She was seen by her ophthalmologist and will continue to follow.  -saw neurology and undergoing work-up as well  - continues on dexamethasone, and note decreased cortisol and ACTH - will refer to endocrinology for consideration if this is clinically significant and impacting confusion/gait instability    Francy Archer MD

## 2024-08-27 NOTE — RESEARCH NOTES
Research Note Treatment Day    Gladys Branch is here today for treatment on [id number]. Today is C21D1. Procedures completed per protocol. AE's and con-meds reviewed with patient. Patient is aware of treatment plan.    [x]   Received treatment as planned   OR  []    Treatment delayed; patient calendar updated as required   Treatment delayed because:    []   AE    []   Physician Discretion    []   Clinical Deterioration or Progression     []   Other    Education Documentation  Memory Problems, taught by Vladimir Fernandez RN at 8/27/2024  2:16 PM.  Learner: Significant Other, Patient  Readiness: Eager  Method: Explanation  Response: Verbalizes Understanding    General Medication Information, taught by Vladimir Fernandez RN at 8/27/2024  2:16 PM.  Learner: Significant Other, Patient  Readiness: Eager  Method: Explanation  Response: Verbalizes Understanding    Treatment Plan and Schedule, taught by Vladimir Fernandez RN at 8/27/2024  2:16 PM.  Learner: Significant Other, Patient  Readiness: Eager  Method: Explanation  Response: Verbalizes Understanding    Supportive Medications, taught by Vladimir Fernandez RN at 8/27/2024  2:16 PM.  Learner: Significant Other, Patient  Readiness: Eager  Method: Explanation  Response: Verbalizes Understanding    Diagnostic Studies, taught by Vladimir Fernandez RN at 8/27/2024  2:16 PM.  Learner: Significant Other, Patient  Readiness: Eager  Method: Explanation  Response: Verbalizes Understanding    Education Comments  No comments found.            Patient requesting a referral for Dr. Salmon Aid / Neurology as soon as possible please. Please call patient. sisn

## 2024-08-27 NOTE — RESEARCH NOTES
RSH><[id number]><[id number]><PARTA>    DCRU NURSING VISIT NOTE  Study Name: ALEXANDRU 1518  IRB#: [id number]  DCRU#: [id number]  Protocol Version Dated: A9 3.22.23  PI: Roshan Kumar MD.    Time point: Cycle 5 and beyond - Day 1  CYCLE 21     Encounter Date: 08/27/2024  Encounter Time: 0801  Encounter Department: [facility] HEMATOLOGY AND ONCOLOGY     #1     Phone Pager   Alex REDDY    #2 Phone Pager        Other Phone Pager          Study Regimen and Dosing   Part A Dose Exploration/Expansion- Small Cell Lung Cancer Relapsed or Refractory patients who progressed or recurred following at least 1 platinum-based regimen.   Cycle = 28 days    administered IV Day 1, Day 8, and Day 15 of Cycle 1. Cycle 2 and beyond     administered on Day 1 and Day 15.        Dietary Guidelines   Regular diet:     Admission and Prior to Starting Study Activities   Notify  when patient arrives to unit.  Complete DCRU/[facility] intake form in EMR.  Insert one peripheral IV line for sample collection procedures (flush line with normal saline following each blood draw). Access mediport (if available) otherwise insert second peripheral line in opposite arm for Investigative drug administration (if peripheral line, flush line with normal saline before & after infusion)     Criteria to Treat   DCRU RN reviewed and meets eligibility to proceed with treatment plan   Time team notified: 1000   DCRU RN notifies study team to review eligibility and approval before dosing procedures  Time team approves: [id number]      Subjective   Gladys Branch is a 68 y.o. female and is here for a Research clinical visit.    Visit Provider: Aurora Chowdary RN     Allergies:   Allergies   Allergen Reactions    Cisplatin Other     CHEMO INDUCED (Moderate); Dyspepsia (Moderate); Headaches (Moderate); Hypotension (Moderate); Numbness (Moderate); Resp Distress (Moderate)    Codeine Nausea Only and Other      nausea    Sulfa (Sulfonamide Antibiotics) Rash       Objective     Vital Signs:    Blood pressure 102/72, pulse 74, temperature 36.1 °C (97 °F), temperature source Temporal, resp. rate 16, weight 52.6 kg (115 lb 15.4 oz), SpO2 97%.     Physical Exam     ASSESSMENT and PLAN:  Problem List Items Addressed This Visit          Hematology and Neoplasia    Small cell lung cancer (Multi) - Primary    Relevant Orders    Research collection: 4mL Lavender EDTA - Research Collect Immune Cells: Cd20, TIGIT, ; Pre-Dose; Within 15 minutes; Ambient; 8-10x (Completed)    Research collection: 4mL Blue/Black CPT Sodium Citrate - Clinical Collect PBMC; Pre-Dose; Within 15 minutes; Ambient; 8-10x (Completed)    Research collection: 2.5mL Red SST - Research Collect Anti-Tarlatamab Antibody; Pre-Dose; Other (Within 15 minutes); Ambient; 5x (Completed)    Research collection: 2.5mL Red SST - Research Collect Tarlatamab PK; Pre-Dose; Other (Within 15 minutes); Ambient; 5x (Completed)    Research collection: 2.5mL Red SST - Research Collect Serum Markers; Pre-Dose; Other (Within 15 minutes); Ambient; 5x (Completed)    CBC and Auto Differential (Completed)    Comprehensive metabolic panel (Completed)    Acth (Completed)    Amylase (Completed)    APTT (Completed)    Bilirubin, Direct (Completed)    CK (Completed)    Cortisol (Completed)    C-Reactive Protein (Completed)    Estradiol (Completed)    Ferritin (Completed)    FSH & LH (Completed)    Lipase (Completed)    Magnesium (Completed)    Phosphorus (Completed)    Protime-INR (Completed)    TSH (Completed)    T4, free (Completed)    Urinalysis with Reflex Microscopic (Completed)    CBC and Auto Differential (Completed)    Comprehensive metabolic panel (Completed)    Acth (Completed)    Amylase (Completed)    APTT (Completed)    Bilirubin, Direct (Completed)    CK (Completed)    Cortisol (Completed)    C-Reactive Protein (Completed)    Estradiol (Completed)    Ferritin (Completed)     FSH & LH (Completed)    Lipase (Completed)    Magnesium (Completed)    Phosphorus (Completed)    Protime-INR (Completed)    TSH (Completed)    T4, free (Completed)    Urinalysis with Reflex Microscopic (Completed)    Research Communication (Completed)    Treatment Conditions (Completed)    Provider Communication - [id number] (Completed)    Research Triplicate ECG (Completed)    Research Triplicate ECG (Completed)    Nursing Communication - Hypersensitivity Management, Moderate (Completed)    Nursing Communication - Hypersensitivity Management, Severe (Completed)    Nursing Communication - Respiratory Management (Completed)    Pulse oximetry, continuous (Completed)    Research collection: 4mL Lavender EDTA - Research Collect Immune Cells: Cd20, TIGIT, ; Pre-Dose; Within 15 minutes; Ambient; 8-10x (Completed)    Research collection: 4mL Blue/Black CPT Sodium Citrate - Clinical Collect PBMC; Pre-Dose; Within 15 minutes; Ambient; 8-10x (Completed)    Research collection: 2.5mL Red SST - Research Collect Anti-Tarlatamab Antibody; Pre-Dose; Other (Within 15 minutes); Ambient; 5x (Completed)    Research collection: 2.5mL Red SST - Research Collect Tarlatamab PK; Pre-Dose; Other (Within 15 minutes); Ambient; 5x (Completed)    Research collection: 2.5mL Red SST - Research Collect Serum Markers; Pre-Dose; Other (Within 15 minutes); Ambient; 5x (Completed)        Medications as of the completion of today's visit:        Administrations This Visit       Study [id number] Tarlatamab ([id number]) 3 mg in sodium chloride 0.9% 250 mL IV       Admin Date  08/27/2024 Action  New Bag Dose  3 mg Route  intravenous Documented By  Sharifa Steve, RN                    Orders placed during today's visit:  Orders Placed This Encounter   Procedures    Research collection: 4mL Lavender EDTA - Research Collect Immune Cells: Cd20, TIGIT, ; Pre-Dose; Within 15 minutes; Ambient; 8-10x     Standing Status:   Future     Number of Occurrences:    1     Standing Expiration Date:   8/27/2025     Order Specific Question:   Test     Answer:   Immune Cells: Cd20, TIGIT,      Order Specific Question:   Timepoint     Answer:   Pre-Dose     Order Specific Question:   Pre-Dose     Answer:   Within 15 minutes     Order Specific Question:   Temperature     Answer:   Ambient     Order Specific Question:   Invert     Answer:   8-10x     Order Specific Question:   Optional?     Answer:   No    Research collection: 4mL Blue/Black CPT Sodium Citrate - Clinical Collect PBMC; Pre-Dose; Within 15 minutes; Ambient; 8-10x     Standing Status:   Future     Number of Occurrences:   1     Standing Expiration Date:   8/27/2025     Order Specific Question:   Test     Answer:   [facility]     Order Specific Question:   Timepoint     Answer:   Pre-Dose     Order Specific Question:   Pre-Dose     Answer:   Within 15 minutes     Order Specific Question:   Temperature     Answer:   Ambient     Order Specific Question:   Invert     Answer:   8-10x     Order Specific Question:   Optional?     Answer:   No    Research collection: 2.5mL Red SST - Research Collect Anti-Tarlatamab Antibody; Pre-Dose; Other (Within 15 minutes); Ambient; 5x     Standing Status:   Future     Number of Occurrences:   1     Standing Expiration Date:   8/27/2025     Order Specific Question:   Test     Answer:   Anti-Tarlatamab Antibody     Order Specific Question:   Timepoint     Answer:   Pre-Dose     Order Specific Question:   Pre-Dose     Answer:   Other     Comments:   Within 15 minutes     Order Specific Question:   Temperature     Answer:   Ambient     Order Specific Question:   Invert     Answer:   5x     Order Specific Question:   Optional?     Answer:   No    Research collection: 2.5mL Red SST - Research Collect Tarlatamab PK; Pre-Dose; Other (Within 15 minutes); Ambient; 5x     Standing Status:   Future     Number of Occurrences:   1     Standing Expiration Date:   8/27/2025     Order Specific Question:    Test     Answer:   Tarlatamab PK     Order Specific Question:   Timepoint     Answer:   Pre-Dose     Order Specific Question:   Pre-Dose     Answer:   Other     Comments:   Within 15 minutes     Order Specific Question:   Temperature     Answer:   Ambient     Order Specific Question:   Invert     Answer:   5x     Order Specific Question:   Optional?     Answer:   No    Research collection: 2.5mL Red SST - Research Collect Serum Markers; Pre-Dose; Other (Within 15 minutes); Ambient; 5x     Standing Status:   Future     Number of Occurrences:   1     Standing Expiration Date:   8/27/2025     Order Specific Question:   Test     Answer:   Serum Markers     Order Specific Question:   Timepoint     Answer:   Pre-Dose     Order Specific Question:   Pre-Dose     Answer:   Other     Comments:   Within 15 minutes     Order Specific Question:   Temperature     Answer:   Ambient     Order Specific Question:   Invert     Answer:   5x     Order Specific Question:   Optional?     Answer:   No    CBC and Auto Differential     Standing Status:   Future     Number of Occurrences:   1     Standing Expiration Date:   8/27/2025     Order Specific Question:   Release result to MyChart     Answer:   Immediate [1]    Comprehensive metabolic panel     Standing Status:   Future     Number of Occurrences:   1     Standing Expiration Date:   8/27/2025     Order Specific Question:   Release result to MyChart     Answer:   Immediate [1]    Acth     Standing Status:   Future     Number of Occurrences:   1     Standing Expiration Date:   8/27/2025     Order Specific Question:   Release result to MyChart     Answer:   Immediate [1]    Amylase     Standing Status:   Future     Number of Occurrences:   1     Standing Expiration Date:   8/27/2025     Order Specific Question:   Release result to MyChart     Answer:   Immediate [1]    APTT     Standing Status:   Future     Number of Occurrences:   1     Standing Expiration Date:   8/27/2025     Order  Specific Question:   Release result to MyChart     Answer:   Immediate [1]    Bilirubin, Direct     Standing Status:   Future     Number of Occurrences:   1     Standing Expiration Date:   8/27/2025     Order Specific Question:   Release result to MyChart     Answer:   Immediate [1]    CK     Standing Status:   Future     Number of Occurrences:   1     Standing Expiration Date:   8/27/2025     Order Specific Question:   Release result to MyChart     Answer:   Immediate [1]    Cortisol     Standing Status:   Future     Number of Occurrences:   1     Standing Expiration Date:   8/27/2025     Order Specific Question:   Release result to MyChart     Answer:   Immediate [1]    C-Reactive Protein     Standing Status:   Future     Number of Occurrences:   1     Standing Expiration Date:   8/27/2025     Order Specific Question:   Release result to MyChart     Answer:   Immediate [1]    Estradiol     Standing Status:   Future     Number of Occurrences:   1     Standing Expiration Date:   8/27/2025     Order Specific Question:   Release result to MyChart     Answer:   Immediate [1]    Ferritin     Standing Status:   Future     Number of Occurrences:   1     Standing Expiration Date:   8/27/2025     Order Specific Question:   Release result to MyChart     Answer:   Immediate [1]    FSH & LH     Standing Status:   Future     Number of Occurrences:   1     Standing Expiration Date:   8/27/2025     Order Specific Question:   Release result to MyChart     Answer:   Immediate [1]    Lipase     Standing Status:   Future     Number of Occurrences:   1     Standing Expiration Date:   8/27/2025     Order Specific Question:   Release result to MyChart     Answer:   Immediate [1]    Magnesium     Standing Status:   Future     Number of Occurrences:   1     Standing Expiration Date:   8/27/2025     Order Specific Question:   Release result to MyChart     Answer:   Immediate [1]    Phosphorus     Standing Status:   Future     Number of  Occurrences:   1     Standing Expiration Date:   8/27/2025     Order Specific Question:   Release result to MyChart     Answer:   Immediate [1]    Protime-INR     Standing Status:   Future     Number of Occurrences:   1     Standing Expiration Date:   8/27/2025     Order Specific Question:   Release result to MyChart     Answer:   Immediate [1]    TSH     Standing Status:   Future     Number of Occurrences:   1     Standing Expiration Date:   8/27/2025     Order Specific Question:   Release result to MyChart     Answer:   Immediate [1]    T4, free     Standing Status:   Future     Number of Occurrences:   1     Standing Expiration Date:   8/27/2025     Order Specific Question:   Release result to MyChart     Answer:   Immediate [1]    Urinalysis with Reflex Microscopic     Standing Status:   Future     Number of Occurrences:   1     Standing Expiration Date:   8/27/2025     Order Specific Question:   Release result to MyChart     Answer:   Immediate [1]    CBC and Auto Differential     Standing Status:   Standing     Number of Occurrences:   1     Order Specific Question:   Release result to MyChart     Answer:   Immediate [1]    Comprehensive metabolic panel     Standing Status:   Standing     Number of Occurrences:   1     Order Specific Question:   Release result to MyChart     Answer:   Immediate [1]    Acth     Standing Status:   Standing     Number of Occurrences:   1     Order Specific Question:   Release result to MyChart     Answer:   Immediate [1]    Amylase     Standing Status:   Standing     Number of Occurrences:   1     Order Specific Question:   Release result to MyChart     Answer:   Immediate [1]    APTT     Standing Status:   Standing     Number of Occurrences:   1     Order Specific Question:   Release result to MyChart     Answer:   Immediate [1]    Bilirubin, Direct     Standing Status:   Standing     Number of Occurrences:   1     Order Specific Question:   Release result to MyChart     Answer:    Immediate [1]    CK     Standing Status:   Standing     Number of Occurrences:   1     Order Specific Question:   Release result to MyChart     Answer:   Immediate [1]    Cortisol     Standing Status:   Standing     Number of Occurrences:   1     Order Specific Question:   Release result to MyChart     Answer:   Immediate [1]    C-Reactive Protein     Standing Status:   Standing     Number of Occurrences:   1     Order Specific Question:   Release result to MyChart     Answer:   Immediate [1]    Estradiol     Standing Status:   Standing     Number of Occurrences:   1     Order Specific Question:   Release result to MyChart     Answer:   Immediate [1]    Ferritin     Standing Status:   Standing     Number of Occurrences:   1     Order Specific Question:   Release result to MyChart     Answer:   Immediate [1]    FSH & LH     Standing Status:   Standing     Number of Occurrences:   1     Order Specific Question:   Release result to MyChart     Answer:   Immediate [1]    Lipase     Standing Status:   Standing     Number of Occurrences:   1     Order Specific Question:   Release result to MyChart     Answer:   Immediate [1]    Magnesium     Standing Status:   Standing     Number of Occurrences:   1     Order Specific Question:   Release result to MyChart     Answer:   Immediate [1]    Phosphorus     Standing Status:   Standing     Number of Occurrences:   1     Order Specific Question:   Release result to MyChart     Answer:   Immediate [1]    Protime-INR     Standing Status:   Standing     Number of Occurrences:   1     Order Specific Question:   Release result to MyChart     Answer:   Immediate [1]    TSH     Standing Status:   Standing     Number of Occurrences:   1     Order Specific Question:   Release result to MyChart     Answer:   Immediate [1]    T4, free     Standing Status:   Standing     Number of Occurrences:   1     Order Specific Question:   Release result to MyChart     Answer:   Immediate [1]     Urinalysis with Reflex Microscopic     Standing Status:   Standing     Number of Occurrences:   1     Order Specific Question:   Release result to [facility]     Answer:   Immediate [1]    Research collection: 4mL Lavender EDTA - Research Collect Immune Cells: Cd20, TIGIT, ; Pre-Dose; Within 15 minutes; Ambient; 8-10x     Standing Status:   Standing     Number of Occurrences:   1     Order Specific Question:   Test     Answer:   Immune Cells: Cd20, TIGIT,      Order Specific Question:   Timepoint     Answer:   Pre-Dose     Order Specific Question:   Pre-Dose     Answer:   Within 15 minutes     Order Specific Question:   Temperature     Answer:   Ambient     Order Specific Question:   Invert     Answer:   8-10x     Order Specific Question:   Optional?     Answer:   No    Research collection: 4mL Blue/Black CPT Sodium Citrate - Clinical Collect PB[facility]; Pre-Dose; Within 15 minutes; Ambient; 8-10x     Standing Status:   Standing     Number of Occurrences:   1     Order Specific Question:   Test     Answer:   [facility]     Order Specific Question:   Timepoint     Answer:   Pre-Dose     Order Specific Question:   Pre-Dose     Answer:   Within 15 minutes     Order Specific Question:   Temperature     Answer:   Ambient     Order Specific Question:   Invert     Answer:   8-10x     Order Specific Question:   Optional?     Answer:   No    Research collection: 2.5mL Red SST - Research Collect Anti-Tarlatamab Antibody; Pre-Dose; Other (Within 15 minutes); Ambient; 5x     Standing Status:   Standing     Number of Occurrences:   1     Order Specific Question:   Test     Answer:   Anti-Tarlatamab Antibody     Order Specific Question:   Timepoint     Answer:   Pre-Dose     Order Specific Question:   Pre-Dose     Answer:   Other     Comments:   Within 15 minutes     Order Specific Question:   Temperature     Answer:   Ambient     Order Specific Question:   Invert     Answer:   5x     Order Specific Question:   Optional?     Answer:    No    Research collection: 2.5mL Red SST - Research Collect Tarlatamab PK; Pre-Dose; Other (Within 15 minutes); Ambient; 5x     Standing Status:   Standing     Number of Occurrences:   1     Order Specific Question:   Test     Answer:   Tarlatamab PK     Order Specific Question:   Timepoint     Answer:   Pre-Dose     Order Specific Question:   Pre-Dose     Answer:   Other     Comments:   Within 15 minutes     Order Specific Question:   Temperature     Answer:   Ambient     Order Specific Question:   Invert     Answer:   5x     Order Specific Question:   Optional?     Answer:   No    Research collection: 2.5mL Red SST - Research Collect Serum Markers; Pre-Dose; Other (Within 15 minutes); Ambient; 5x     Standing Status:   Standing     Number of Occurrences:   1     Order Specific Question:   Test     Answer:   Serum Markers     Order Specific Question:   Timepoint     Answer:   Pre-Dose     Order Specific Question:   Pre-Dose     Answer:   Other     Comments:   Within 15 minutes     Order Specific Question:   Temperature     Answer:   Ambient     Order Specific Question:   Invert     Answer:   5x     Order Specific Question:   Optional?     Answer:   No    Research Communication     Cohort: 8  Dose Level:  10 mg/IV     Standing Status:   Standing     Number of Occurrences:   1    Treatment Conditions     Hold treatment and notify provider if:   Adverse Event GREATER THAN OR EQUAL TO Grade 3     Standing Status:   Standing     Number of Occurrences:   1    Provider Communication - [id number]      Dose Level 4              Tarlatamab 0.1 mg   Dose Level 5              Tarlatamab 0.3 mg   Dose Level 6              Tarlatamab 1 mg   Dose Level 7              Tarlatamab 3 mg   Dose Level 8              Tarlatamab 10 mg   Dose Level 9              Tarlatamab 30 mg   Dose Level 10              Tarlatamab 100 mg     Standing Status:   Standing     Number of Occurrences:   1    Research Triplicate ECG     Refer to study  SmartPhrase for instructions and documentation of the research triplicate ECG.     Standing Status:   Standing     Number of Occurrences:   1    Research Triplicate ECG     Refer to study SmartPhrase for instructions and documentation of the research triplicate ECG.     Standing Status:   Standing     Number of Occurrences:   1    Nursing Communication - Hypersensitivity Management, Moderate     Flushing, rash, pruritus, dyspnea, chest discomfort, back pain, angioedema, SBP LESS THAN 90 mmHg, and/or change in mental status above or below patient's baseline. In the event of moderate or severe hypersensitivity reaction to any medication:   Stop infusion.  Assess vital signs, pulse oximetry, nursing assessment, and patient complaints.  Administer medications per Hypersensitivity Reaction Medication Protocol.   Notify physician.     Standing Status:   Standing     Number of Occurrences:   1    Nursing Communication - Hypersensitivity Management, Severe     Worsening of moderate reaction symptoms, SBP LESS THAN 80 mmHg, and/or respiratory distress (respirations GREATER THAN 40 breaths per minute, wheezing, life-threatening symptoms). In the event of moderate or severe hypersensitivity reaction to any medication:   Stop infusion.  Assess vital signs, pulse oximetry, nursing assessment, and patient complaints.  Administer medications per Hypersensitivity Reaction Medication Protocol.   Notify physician.     Standing Status:   Standing     Number of Occurrences:   1    Nursing Communication - Respiratory Management     Oxygen via nasal cannula at 4 L per minute for respiratory rate GREATER THAN OR EQUAL TO to 28 breaths/minute and/or wheezing. Pulse Oximetry continuous monitoring.     Standing Status:   Standing     Number of Occurrences:   1    Pulse oximetry, continuous     Continuous monitoring to maintain SPO2 GREATER THAN 90%     Standing Status:   Standing     Number of Occurrences:   1        [id number] -  in  Subjects With Small Cell Lung Cancer    Patient has no adverse events documented in the Research Adverse Events activity.       Study Specific Instructions and Documentation   WEIGHT  LABS  VITALS  CORREATIVES  STUDY DRUG  VITALS  DISCHARGE     WEIGHT    Obtain weight in kilograms - with shoes off & heavy items removed.   52.6kg     PRE-DOSE Safety Labs   Refer to [facility] Treatment Plan for orders  drawn at 0822 from 22g angiocath in right AC     Pre-dose Vital Signs   Temp, HR, Resp, BP, Pulse Oximetry: Seated or Semi-Fowlers x 5 minutes before obtaining  Position and temperature location: should be the same that is used throughout the study-record position     Pre-dose Research Correlatives  Deliver to [facility] lab for processing   Access Type: 22G Location: RAC   Refer to [facility] Treatment Plan for orders   Time point Specimen Test Volume Tube Handling Draw Time   Pre-dose (within 15 mins of  dosing)    draw in order [facility]      4 mL CPT Ambient, Invert 8-10X 1140    Anti- Antibody 2.5 mL SST Ambient, Invert 5X 1140    TARLATAMAB PK  (through cycle 12) 2.5 mL SST Ambient, Invert 5X N/A    Serum Markers 2.5 mL SST Ambient, Invert 5X 1140    Immune Cells 4 mL   K3 EDTA Ambient, Invert 8-10X 1140          Infusion-Related Reactions   [facility] Hypersensitivity Orders  Note: CRS table      Research Drug Administration Tarlatamab ()   Refer to [facility] Treatment Plan for orders   Administer dose over 60 minutes (+/- 10 mins) followed by a 3 - 5 minute Normal saline flush. (This will be the end time after the flush). Document start time & end of study medication; document start time and end time of the flush.  Participant to remain on Unit for 2 hour post dose observation.      Day 1 Post-Dose Vital Signs   Temp, HR, Resp, BP, Pulse Oximetry: Seated or Semi-Fowlers x 5 minutes before obtaining  Position and temperature location: should be the same that is used throughout the study  End of Infusion     Discharge  Instructions   Patient may be discharged to home after 2 hour observation.  Remind patient to return: Day 15 for treatment & labs  Discharge time: 1325     Sharifa Steve RN  09/30/24      Grading and Management of Cytokine Release Syndrome   CRS Grade Description of Severity Minimum Expected Intervention Instructions for Dose Modifications of    1 Symptoms are not life threatening and require symptomatic treatment only,  eg, fever, nausea, fatigue, headache, myalgia's, malaise Administer symptomatic treatment (contact provider for medications/doses). Monitor for CRS symptoms including vital signs and pulse oximetry at least Q2 hours for12 hours or until resolution, Whichever is earlier. N/A     (continued)  Grading and Management of Cytokine Release Syndrome   CRS Grade Description of Severity Minimum Expected Intervention Instructions for Dose Modifications of    2 Symptoms require and respond to moderate intervention  Oxygen requirement <40%,                      OR  Hypotension responsive to fluids or low dose of one vasopressor, OR                                                                       Grade 2 organ toxicity or grade 3 transaminitis per CTCAE criteria Administer:  Symptomatic treatment   (contact provider for medications/doses)  Supplemental oxygen when oxygen saturation is <90% on room air  Intravenous fluids or low dose vasopressor for hypotension is recommended when systolic blood pressure is <85 mmHg. (contact provider for medications/doses) Persistent tachycardia (eg >120 bpm) may also indicate the need for intervention for hypotension.   Monitor for CRS symptoms including vital signs and pulse oximetry at least Q2 hours for12 hours or until resolution to CRS grade <= 1, whichever is earlier. For subjects with extensive co-morbidities or poor performance status, manage per grade 3 CRS guidance below If CRS occurs during  treatment, immediately interrupt the infusion  and delay the next  dose until the event resolves to CRS grade <= 1 for no less than 72 hours. Permanently discontinue  if there is no improvement to CRS <= grade 1 within 7 days     3 Symptoms require and respond to aggressive intervention  Oxygen requirement >=40%, OR  Hypotension requiring high dose or multiple vasopressors, OR  Grade 3 organ toxicity or     Grade 4 transaminitis per  CTCAE criteria Admit to intensive care unit for close clinical and vital sign monitoring per institutional guidelines.   Refer to provider/protocol for medications and doses.     If CRS occurs during  treatment, immediately interrupt   and delay the next dose until event resolves to CRS grade <= 1 for no less than 72 hours.    Permanently discontinue  if there is no improvement to CRS                 <= grade 2 within 5 days or CRS <= grade 1 within 7 days.  Permanently discontinue   if CRS grade  3 occurs at the initial run in dose (i.e., at MTD1)  (Applicable only after MTD1 has been defined).     (continued)  Grading and Management of Cytokine Release Syndrome   CRS Grade Description of Severity Minimum Expected Intervention Instructions for Dose Modifications of    4 Life-threatening symptoms  Requirement for ventilator support OR  Grade 4 organ toxicity (excluding transaminitis) per CTCAE criteria Admit to intensive care unit for close clinical and vital sign monitoring per institutional guidelines.   Refer to provider/protocol for medications and doses.   If CRS occurs during  treatment, Immediately stop the infusion.  Permanently discontinue   therapy.

## 2024-08-28 LAB
ACTH PLAS-MCNC: <1.5 PG/ML (ref 7.2–63.3)
Lab: ABNORMAL TITER
Lab: NEGATIVE
Lab: POSITIVE

## 2024-08-29 LAB
AMPHIPHYSIN IGG SER QL IA: NEGATIVE
ANNOTATION COMMENT IMP: ABNORMAL
CV2 AB SERPL QL IF: NEGATIVE
GLIAL NUC TYPE 1 AB SER QL IF: NEGATIVE
HU1 AB SER QL: REACTIVE
HU2 AB SER QL IF: NEGATIVE
HU3 AB SER QL: NEGATIVE
PARANEOPLASTIC AB SER-IMP: ABNORMAL
PCA-1 AB SER QL IF: NEGATIVE
PCA-2 AB SER QL IF: NEGATIVE
PCA-TR AB SER QL IF: NEGATIVE
VGCC-P/Q BIND IGG+IGM SER IA-SCNC: 0 NMOL/L
VGKC IGG+IGM SER IA-SCNC: 0 NMOL/L

## 2024-08-30 ENCOUNTER — TELEPHONE (OUTPATIENT)
Dept: NEUROLOGY | Facility: CLINIC | Age: 69
End: 2024-08-30
Payer: MEDICARE

## 2024-08-30 NOTE — TELEPHONE ENCOUNTER
----- Message from Marquise Mora sent at 8/30/2024 10:43 AM EDT -----  Regarding: FUV  Please call her to arrange a visit soon to discuss test result - can offer 9/3 at 11am, or double book at 1pm. Virtual or in person is OK.

## 2024-09-03 ENCOUNTER — APPOINTMENT (OUTPATIENT)
Dept: HEMATOLOGY/ONCOLOGY | Facility: HOSPITAL | Age: 69
End: 2024-09-03
Payer: MEDICARE

## 2024-09-03 ENCOUNTER — TELEMEDICINE (OUTPATIENT)
Dept: HEMATOLOGY/ONCOLOGY | Facility: HOSPITAL | Age: 69
End: 2024-09-03
Payer: MEDICARE

## 2024-09-03 ENCOUNTER — SOCIAL WORK (OUTPATIENT)
Dept: CASE MANAGEMENT | Facility: HOSPITAL | Age: 69
End: 2024-09-03

## 2024-09-03 ENCOUNTER — DOCUMENTATION (OUTPATIENT)
Dept: INFUSION THERAPY | Facility: CLINIC | Age: 69
End: 2024-09-03

## 2024-09-03 ENCOUNTER — OFFICE VISIT (OUTPATIENT)
Dept: NEUROLOGY | Facility: CLINIC | Age: 69
End: 2024-09-03
Payer: MEDICARE

## 2024-09-03 DIAGNOSIS — G31.89 PARANEOPLASTIC CEREBELLAR DEGENERATION (CMS-HCC): Primary | ICD-10-CM

## 2024-09-03 PROCEDURE — G2211 COMPLEX E/M VISIT ADD ON: HCPCS | Performed by: STUDENT IN AN ORGANIZED HEALTH CARE EDUCATION/TRAINING PROGRAM

## 2024-09-03 PROCEDURE — 1159F MED LIST DOCD IN RCRD: CPT | Performed by: STUDENT IN AN ORGANIZED HEALTH CARE EDUCATION/TRAINING PROGRAM

## 2024-09-03 PROCEDURE — 99214 OFFICE O/P EST MOD 30 MIN: CPT | Performed by: STUDENT IN AN ORGANIZED HEALTH CARE EDUCATION/TRAINING PROGRAM

## 2024-09-03 PROCEDURE — 99442 PR PHYS/QHP TELEPHONE EVALUATION 11-20 MIN: CPT | Performed by: STUDENT IN AN ORGANIZED HEALTH CARE EDUCATION/TRAINING PROGRAM

## 2024-09-03 PROCEDURE — 1036F TOBACCO NON-USER: CPT | Performed by: STUDENT IN AN ORGANIZED HEALTH CARE EDUCATION/TRAINING PROGRAM

## 2024-09-03 PROCEDURE — 1160F RVW MEDS BY RX/DR IN RCRD: CPT | Performed by: STUDENT IN AN ORGANIZED HEALTH CARE EDUCATION/TRAINING PROGRAM

## 2024-09-03 RX ORDER — FAMOTIDINE 10 MG/ML
20 INJECTION INTRAVENOUS ONCE AS NEEDED
OUTPATIENT
Start: 2024-09-17

## 2024-09-03 RX ORDER — DIPHENHYDRAMINE HYDROCHLORIDE 50 MG/ML
50 INJECTION INTRAMUSCULAR; INTRAVENOUS AS NEEDED
OUTPATIENT
Start: 2024-09-17

## 2024-09-03 RX ORDER — DIPHENHYDRAMINE HYDROCHLORIDE 50 MG/ML
25 INJECTION INTRAMUSCULAR; INTRAVENOUS ONCE AS NEEDED
OUTPATIENT
Start: 2024-09-04

## 2024-09-03 RX ORDER — CYCLOPHOSPHAMIDE 500 MG/5ML
600 INJECTION, SOLUTION, CONCENTRATE INTRAVENOUS
Qty: 9.6 ML | Refills: 11 | OUTPATIENT
Start: 2024-09-03

## 2024-09-03 RX ORDER — ALBUTEROL SULFATE 0.83 MG/ML
2.5 SOLUTION RESPIRATORY (INHALATION) ONCE AS NEEDED
OUTPATIENT
Start: 2024-09-04

## 2024-09-03 RX ORDER — EPINEPHRINE 0.3 MG/.3ML
0.3 INJECTION SUBCUTANEOUS EVERY 5 MIN PRN
OUTPATIENT
Start: 2024-09-17

## 2024-09-03 RX ORDER — PREDNISONE 20 MG/1
TABLET ORAL
Qty: 60 TABLET | Refills: 4 | Status: SHIPPED | OUTPATIENT
Start: 2024-09-03

## 2024-09-03 RX ORDER — ALBUTEROL SULFATE 0.83 MG/ML
3 SOLUTION RESPIRATORY (INHALATION) AS NEEDED
OUTPATIENT
Start: 2024-09-17

## 2024-09-03 RX ORDER — PALONOSETRON 0.05 MG/ML
0.25 INJECTION, SOLUTION INTRAVENOUS ONCE
OUTPATIENT
Start: 2024-09-17

## 2024-09-03 NOTE — PROGRESS NOTES
Patient ID: Gladys Branch is a 68 y.o. female.    DIAGNOSIS    Small Cell Lung Cancer    By immunostaining, the tumor cells are positive for CAM 5.2, INSM1, and TTF-1, and negative for p40 and LCA. The proliferation index by Ki-67 immunostaining is approximately 90%.  Synaptophysin, Chromogranin and INSM-1 are positive.  AE1/AE3 is negative. NEOGENOMICS, RB protein expression is lost in the tumor cells    STAGING    [id number], limited stage  Recurrence    CURRENT SITES OF DISEASE    RLL, supraclavicular    MOLECULAR GENOMICS      PRIOR THERAPY    Concurrent chemoradiation with Cisplatin/Etoposide every 3 weeks; C1D1 2/15/22, reaction to cisplatin C2D1 and did not receive D2 or D3 etoposide  Carbo/etoposide 4/5/2022 1 cycle c/b rash  PCI 5/21-6/13/22    CURRENT THERAPY    Phase 1 study [id number]  with study drug Taralatamab 1/24/23 - present.    CURRENT ONCOLOGICAL PROBLEMS      HISTORY OF PRESENT ILLNESS    Mrs. Branch is a 67 yo with PMH GERD and COPD who originally was round to have a RLL nodule measuring 11 mm in 4/28/2021 found as part of CT calcium score scan. An ensuing CT chest w/o contrast was done on 5/19/21 which revealed subsolid pulmonary nodules measuring 14 mm in the RLL. She had CT chest w/contrast on 11/29/21 which confirmed stable 14 mm GGO of the RLL and decreased RLL subpleural nodule. She met thoracic surgeon Dr. Farfan, who ordered PET/CT scan done on 12/8/21 which did not reveal any FDG-avid nodules within the lung parenchyma but R hilar nodes with max SUV 8.0. He referred her for bronch, which was done on 1/21/22 by Dr. Mcdaniels. 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 Brain MRI was negative.    She started concurrent chemoradiation with BID radiation with cis/etop 2/15/22, and unfortunately had a reaction to cisplatin on C2D1 with chest pain and hypotension. She was hospitalized with sepsis felt to be due to pneumonia. She trialed carboplatin/etoposide on 4/5/22 with a  resulting rash and opted to forego further chemotherapy as she had completed radiation. Restaging scan 11/3/22 unfortunately with progression with new R hilar lymph node, paratracheal nodes, and increase in size of R supraclavicular mass. She enrolled in [id number] and started C1D1 1/24/23. C1 complicated by anorexia and dysgeusia, and delayed C2 by a week. She has further struggled with fatigue and confusion, which may be related to mirtazapine. Dose reduced for C2 and mirtazapine stopped with improvement in symptoms. 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    PAST MEDICAL HISTORY    GERD  COPD    SOCIAL HISTORY    Retired - lighting . Lives at home with  and a kitten.  Tobacco: quit smoking 1999. 1 ppd x 25 yrs.  EtOH: wine 1 glass/day  Illicits: none    CURRENT MEDS    Please see med list    ALLERGIES    Codeine, Sulfa drugs, Cisplatin    FAMILY HISTORY    Paternal aunt - breast cancer dx 80s    Subjective    Today, 9.3.2024, telephone visit as miscommunication and they thought their appointment was at [place]. She saw Dr. Mora earlier with plans for steroids followed by cyclophosphamide. She feels similarly without new symptoms and no falls.      Objective          7/30/2024    11:46 AM 8/13/2024     8:11 AM 8/13/2024    10:17 AM 8/15/2024     8:08 AM 8/27/2024     8:04 AM 8/27/2024    11:34 AM 8/27/2024    12:51 PM   Vitals   Systolic 91 121 98 118 111 109 102   Diastolic 61 78 66 85 72 77 72   Heart Rate 76 56 74 71 67 75 74   Temp 36.1 °C (97 °F) 36.4 °C (97.5 °F) 35.9 °C (96.6 °F)  35.8 °C (96.4 °F) 36.2 °C (97.2 °F) 36.1 °C (97 °F)   Resp 16 16 16  16 16 16   Height (in)    1.524 m (5')      Weight (lb)  113.32  115.2 115.96     BMI  22.13 kg/m2  22.5 kg/m2 22.65 kg/m2     BSA (m2)  1.48 m2  1.49 m2 1.49 m2     Visit Report    Report Report Report Report       Daily  Weight  08/27/24 : 52.6 kg (115 lb 15.4 oz)  08/15/24 : 52.3 kg (115 lb 3.2 oz)  08/13/24 : 51.4 kg (113 lb 5.1 oz)  07/30/24 : 50.5 kg (111 lb 5.3 oz)  07/18/24 : 50 kg (110 lb 3.7 oz)         Physical Exam  Labs  No visits with results within 1 Day(s) from this visit.   Latest known visit with results is:   Hospital Outpatient Visit on 08/27/2024   Component Date Value Ref Range Status    WBC 08/27/2024 8.2  4.4 - 11.3 x10*3/uL Final    nRBC 08/27/2024 0.0  0.0 - 0.0 /100 WBCs Final    RBC 08/27/2024 3.87 (L)  4.00 - 5.20 x10*6/uL Final    Hemoglobin 08/27/2024 12.1  12.0 - 16.0 g/dL Final    Hematocrit 08/27/2024 36.0  36.0 - 46.0 % Final    MCV 08/27/2024 93  80 - 100 fL Final    MCH 08/27/2024 31.3  26.0 - 34.0 pg Final    MCHC 08/27/2024 33.6  32.0 - 36.0 g/dL Final    RDW 08/27/2024 13.6  11.5 - 14.5 % Final    Platelets 08/27/2024 263  150 - 450 x10*3/uL Final    Neutrophils % 08/27/2024 70.1  40.0 - 80.0 % Final    Immature Granulocytes %, Automated 08/27/2024 0.7  0.0 - 0.9 % Final    Immature Granulocyte Count (IG) includes promyelocytes, myelocytes and metamyelocytes but does not include bands. Percent differential counts (%) should be interpreted in the context of the absolute cell counts (cells/UL).    Lymphocytes % 08/27/2024 17.9  13.0 - 44.0 % Final    Monocytes % 08/27/2024 9.0  2.0 - 10.0 % Final    Eosinophils % 08/27/2024 1.9  0.0 - 6.0 % Final    Basophils % 08/27/2024 0.4  0.0 - 2.0 % Final    Neutrophils Absolute 08/27/2024 5.75  1.20 - 7.70 x10*3/uL Final    Percent differential counts (%) should be interpreted in the context of the absolute cell counts (cells/uL).    Immature Granulocytes Absolute, Au* 08/27/2024 0.06  0.00 - 0.70 x10*3/uL Final    Lymphocytes Absolute 08/27/2024 1.47  1.20 - 4.80 x10*3/uL Final    Monocytes Absolute 08/27/2024 0.74  0.10 - 1.00 x10*3/uL Final    Eosinophils Absolute 08/27/2024 0.16  0.00 - 0.70 x10*3/uL Final    Basophils Absolute 08/27/2024 0.03  0.00 -  0.10 x10*3/uL Final    Glucose 08/27/2024 106 (H)  74 - 99 mg/dL Final    Sodium 08/27/2024 138  136 - 145 mmol/L Final    Potassium 08/27/2024 4.1  3.5 - 5.3 mmol/L Final    Chloride 08/27/2024 104  98 - 107 mmol/L Final    Bicarbonate 08/27/2024 25  21 - 32 mmol/L Final    Anion Gap 08/27/2024 13  10 - 20 mmol/L Final    Urea Nitrogen 08/27/2024 20  6 - 23 mg/dL Final    Creatinine 08/27/2024 0.69  0.50 - 1.05 mg/dL Final    eGFR 08/27/2024 >90  >60 mL/min/1.73m*2 Final    Calculations of estimated GFR are performed using the 2021 CKD-EPI Study Refit equation without the race variable for the IDMS-Traceable creatinine methods.  https://jasn.asnjournals.org/content/early/2021/09/22/ASN.7615412624    Calcium 08/27/2024 8.9  8.6 - 10.6 mg/dL Final    Albumin 08/27/2024 3.7  3.4 - 5.0 g/dL Final    Alkaline Phosphatase 08/27/2024 49  33 - 136 U/L Final    Total Protein 08/27/2024 6.5  6.4 - 8.2 g/dL Final    AST 08/27/2024 17  9 - 39 U/L Final    Bilirubin, Total 08/27/2024 0.5  0.0 - 1.2 mg/dL Final    ALT 08/27/2024 14  7 - 45 U/L Final    Patients treated with Sulfasalazine may generate falsely decreased results for ALT.    Adrenocorticotropic Hormone (ACTH) 08/27/2024 <1.5 (L)  7.2 - 63.3 pg/mL Final    INTERPRETIVE INFORMATION: Adrenocorticotropic Hormone    Reference interval based on samples collected between 7 a.m. and   10 a.m.  No reference intervals established for p.m. collections.    Pediatric reference values are the same as adults (Acta Paediatr   Scand 1981;70:341-345).  This assay measures intact ACTH 1-39;   some types of synthetic ACTH and ACTH fragments are not detected   by this assay.  Performed By: [facility]  [address] [place], [place] [id number]  : Darian Marrero MD, PhD  CLIA Number: [id number]    Amylase 08/27/2024 37  29 - 103 U/L Final    aPTT 08/27/2024 29  27 - 38 seconds Final    Bilirubin, Direct 08/27/2024 0.1  0.0 - 0.3 mg/dL Final    Creatine  Kinase 08/27/2024 41  0 - 215 U/L Final    Cortisol 08/27/2024 0.7 (L)  2.5 - 20.0 ug/dL Final    C-Reactive Protein 08/27/2024 2.89 (H)  <1.00 mg/dL Final    Estradiol 08/27/2024 23  pg/mL Final    Ferritin 08/27/2024 64  8 - 150 ng/mL Final    Follicle Stimulating Hormone 08/27/2024 25.9  IU/L Final    FSH Ref Values  Follicular   2.0-12.0  IU/L  Mid-Cycle        12.0-25.0  IU/L  Luteal Phase      2.0-12.0  IU/L  Menopause       30.0-150.0  IU/L  Pre-puberty     50% Adult IU/L  Adult Male        2.0-10.0  IU/L   Infants          0.0-1.0  IU/L    Luteinizing Hormone 08/27/2024 2.5  IU/L Final    LH Reference Values  Follicular Phase          1.9-12.5 IU/L  Mid-Cycle                 8.7-76.3 IU/L  Luteal Phase              0.5-16.9 IU/L  Post Menopause            5.0-55.2 IU/L  Children                    0- 6.0 IU/L  Adult Male 18-70 years    1.5- 9.3 IU/L  Adult Male >70 years      3.1-34.6 IU/L    Lipase 08/27/2024 37  9 - 82 U/L Final    Magnesium 08/27/2024 2.08  1.60 - 2.40 mg/dL Final    Phosphorus 08/27/2024 3.6  2.5 - 4.9 mg/dL Final    The performance characteristics of phosphorus testing in heparinized plasma have been validated by the individual  laboratory site where testing is performed. Testing on heparinized plasma is not approved by the FDA; however, such approval is not necessary.    Protime 08/27/2024 11.2  9.8 - 12.8 seconds Final    INR 08/27/2024 1.0  0.9 - 1.1 Final    Thyroid Stimulating Hormone 08/27/2024 2.73  0.44 - 3.98 mIU/L Final    Thyroxine, Free 08/27/2024 1.04  0.78 - 1.48 ng/dL Final    Color, Urine 08/27/2024 Light-Yellow  Light-Yellow, Yellow, Dark-Yellow Final    Appearance, Urine 08/27/2024 Clear  Clear Final    Specific Gravity, Urine 08/27/2024 1.004 (N)  1.005 - 1.035 Final    pH, Urine 08/27/2024 6.5  5.0, 5.5, 6.0, 6.5, 7.0, 7.5, 8.0 Final    Protein, Urine 08/27/2024 NEGATIVE  NEGATIVE, 10 (TRACE), 20 (TRACE) mg/dL Final    Glucose, Urine 08/27/2024 Normal  Normal mg/dL  Final    Blood, Urine 08/27/2024 NEGATIVE  NEGATIVE Final    Ketones, Urine 08/27/2024 NEGATIVE  NEGATIVE mg/dL Final    Bilirubin, Urine 08/27/2024 NEGATIVE  NEGATIVE Final    Urobilinogen, Urine 08/27/2024 Normal  Normal mg/dL Final    Nitrite, Urine 08/27/2024 NEGATIVE  NEGATIVE Final    Leukocyte Esterase, Urine 08/27/2024 NEGATIVE  NEGATIVE Final    Extra Tube 08/27/2024 Hold for add-ons.   Final    Auto resulted.    Extra Tube 08/27/2024 Hold for add-ons.   Final    Auto resulted.    Extra Tube 08/27/2024 Hold for add-ons.   Final    Auto resulted.    Extra Tube 08/27/2024 Hold for add-ons.   Final    Auto resulted.    Extra Tube 08/27/2024 Hold for add-ons.   Final    Auto resulted.               Performance Status:    ECOG 1: Restricted in physically strenuous activity but ambulatory and able to carry out work of a light or sedentary nature, e.g., light house work, office work.       Assessment/Plan      Mrs. Branch is a 67 yo with COPD and GERD who presented with newly diagnosed SCLC completed BID radiation planned concurrently with cis/etoposide but had a reaction C2D1 to cisplatin. Completed 1 cycle carbo/etop D1 4/5/22 also complicated by facial flushing and erythematous rash and stopped further treatment. Now s/p PCI in 6/2022. Started clinical trial [id number] with tarlatamab 1/24/23. Treatment has been complicated by worsening short-term memory worse for the day or two after treatment, delayed C2 by 1 week and dose-reduced. Confusion has largely resolved during C3 after stopping mirtazapine but she and her  recognize transient worsening for the day or two after treatment.      #SCLC  - originally diagnosed with limited stage SCLC and started concurrent chemoradiation cis/etop 2/15/22, with reaction to cisplatin on C2D1  - trialed carboplatin/etoposide with rash and opted to forego further chemo as she had completed radiation  - progression 11/2022, and enrolled on [id number] (tarlatamab) starting  C1D1 1/24/23. Tolerated well overall until she has now developed paraneoplastic cerebellar ataxia, anti-ANNA1 antibody positive, following with neurology  - will stop trial (last dose 8/27/24) and will monitor off drugs with surveillance scans for now    # Paraneplastic cerebellar ataxia  - anti-ANNA1 ab positive  - follows with neurology  - unclear etiology, occult progression vs tarlatamab induced  - referral to PT    #Double vision/confusion/gait instability  -5/20/24 MRI brain negative.   -Discussed in phase 1 meeting and seems consistent with WBR late effect.   -She was seen by her ophthalmologist and will continue to follow.  -saw neurology and undergoing work-up as well  - continues on dexamethasone, and note decreased cortisol and ACTH - will refer to endocrinology for consideration if this is clinically significant and impacting confusion/gait instability    Francy Archer MD

## 2024-09-03 NOTE — PATIENT INSTRUCTIONS
Thank you for your visit today. 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 If you have any questions or need to reach me, call my office at [phone].    We discussed the following plan today:   Will arrange IV steroid treatment for 3 days  After that, stop dexamethasone and start prednisone 40 mg daily  Continue omeprazole, calcium, vitamin D  Once you initiate cyclophosphamide infusion, we will wean prednisone: 20 mg daily for a week, then 10 mg daily for a week, then 5 mg daily for a week, then return to dexamethasone 1 mg daily  Return in 4 months

## 2024-09-03 NOTE — PROGRESS NOTES
Subjective     Gladys Branch is a right handed  68 y.o. year old female who presents with Results. Patient is accompanied by: spouse  Visit type: follow up visit     No changes since last visit. Here to discuss treatment with new diagnosis of anti-Hu cerebellar ataxia. She has been using a cane for ambulation.    Prior HX  She has SCLC and s/p cisplatin/etoposide 2022, and she is currently in a clinical trial. For the past year or so she has had balance difficulty. It has been slowly getting worse in the past 6 months. She has had 4 falls in the past year. She is not using a cane. She has had numbness in her distal feet ever since her chemotherapy.    Her vision has been worsening. She has had horizontal diplopia which has been going on for several months as well. She will have to close one eye to read things properly.    She has had cognitive decline ever since having prophylactic brain radiation. She has trouble remembering things she did the previous day, or forgetting appointments. She has stopped driving because of vision. She is otherwise independent in other IADLs including cooking, shopping, and cleaning.    Patient Active Problem List   Diagnosis    Chronic GERD    COPD (chronic obstructive pulmonary disease) (Multi)    Hyperlipidemia    Impaired fasting blood sugar    Problem drinking    Seasonal allergic rhinitis    Small cell lung cancer (Multi)    Vitamin D deficiency    Abnormal EKG    Agatston CAC score, <100    Frequent unifocal PVCs    Medicare annual wellness visit, subsequent    Peripheral neuropathy due to and not concurrent with chemotherapy (Multi)    [place] cardiac risk <10% in next 10 years    Anemia, chronic disease    History of diverticulitis    Chronic constipation    Double vision    New onset of headache in cancer patient    Cerebellar ataxia (Multi)      Past Medical History:   Diagnosis Date    Abdominal pain     Anemia     COPD (chronic obstructive pulmonary disease) (Multi)      Disease due to severe acute respiratory syndrome coronavirus 2 (SARS-CoV-2) 2023    Diverticulosis     GERD (gastroesophageal reflux disease)     Hyperlipidemia     Neuropathy     Other specified abnormal findings of blood chemistry 2022    Elevated serum creatinine    Other specified abnormal findings of blood chemistry 2022    Elevated serum creatinine    Other specified abnormal findings of blood chemistry 2022    Elevated TSH    Other specified abnormal findings of blood chemistry 2022    Elevated TSH    Other specified abnormal findings of blood chemistry 2022    Elevated TSH    Other specified abnormal findings of blood chemistry 2022    Elevated TSH    Other specified abnormal findings of blood chemistry 09/15/2020    Elevated TSH    Personal history of other diseases of the respiratory system     History of chronic obstructive lung disease    Small cell lung cancer (Multi)       Past Surgical History:   Procedure Laterality Date    OTHER SURGICAL HISTORY  2020    Hysterectomy    OTHER SURGICAL HISTORY  2021    Tubal ligation    OTHER SURGICAL HISTORY  2021    Partial atrial septal defect repair    US GUIDED BIOPSY LYMPH NODE SUPERFICIAL  2023    US GUIDED BIOPSY LYMPH NODE SUPERFICIAL 2023 DOCTOR OFFICE LEGACY      Social History     Socioeconomic History    Marital status:      Spouse name: Not on file    Number of children: Not on file    Years of education: Not on file    Highest education level: Not on file   Occupational History    Not on file   Tobacco Use    Smoking status: Former     Current packs/day: 0.00     Average packs/day: 1 pack/day for 25.0 years (25.0 ttl pk-yrs)     Types: Cigarettes     Start date:      Quit date:      Years since quittin.6     Passive exposure: Past    Smokeless tobacco: Never   Vaping Use    Vaping status: Never Used   Substance and Sexual Activity    Alcohol use: Yes      Alcohol/week: 1.0 - 2.0 standard drink of alcohol     Types: 1 - 2 Glasses of wine per week     Comment: once  in a while    Drug use: Never    Sexual activity: Not on file   Other Topics Concern    Not on file   Social History Narrative    Not on file     Social Determinants of Health     Financial Resource Strain: Not on file   Food Insecurity: Not on file   Transportation Needs: Not on file   Physical Activity: Not on file   Stress: Not on file   Social Connections: Not on file   Intimate Partner Violence: Not on file   Housing Stability: Not on file      Family History   Problem Relation Name Age of Onset    Dementia Mother      Depression Mother      Other (varicose veins of lower extremity) Mother      Alcohol abuse Father      Breast cancer Father's Sister                   Review of Systems  All other system have been reviewed and are negative for complaint.    There were no vitals filed for this visit.    Objective   (Previous)  Neurological Exam  Mental Status  Awake, alert and oriented to person, place and time. Speech is normal. Language is fluent with no aphasia. Attention and concentration are normal.    Cranial Nerves  CN II: Visual fields full to confrontation.  CN III, IV, VI: Slight exophoria. Downbeat nystagmus is present. Hypermetric saccades. Normal smooth pursuit. Normal lids and orbits bilaterally. Pupils equal round and reactive to light bilaterally.  CN V:  Right: Facial sensation is normal.  Left: Facial sensation is normal on the left.  CN VII: Full and symmetric facial movement.  CN VIII: Hearing is normal.  CN IX, X: Palate elevates symmetrically  CN XI: Shoulder shrug strength is normal.  CN XII: Tongue midline without atrophy or fasciculations.    Motor  Normal muscle bulk throughout. No fasciculations present. Normal muscle tone. No abnormal involuntary movements.                                               Right                     Left   Shoulder abduction               5                           5  Elbow flexion                         5                          5  Elbow extension                    5                          5  Finger flexion                         5                          5  Finger extension                    5                          5  Finger abduction                    5                          5  Hip flexion                              5                          5  Knee flexion                           5                          5  Plantarflexion                         5                          5  Dorsiflexion                            5                          5    Sensory  Light touch is normal in upper and lower extremities. Intact distal pinprick. Normal vibration in distal lower extremities.     Reflexes                                            Right                      Left  Biceps                                 2+                         2+  Triceps                                2+                         2+  Patellar                                2+                         2+  Achilles                                2+                         2+  Right Plantar: downgoing  Left Plantar: downgoing    Right pathological reflexes: Pam's absent.  Left pathological reflexes: Pam's absent.    Coordination  Right: Heel-to-shin normal.Left: Heel-to-shin normal.  Slight dysmetria on finger vicki.    Gait   Romberg is present.  Wide-based ataxic gait, unable to tandem.                   BRIAN       Hemoglobin A1C   Date Value Ref Range Status   05/07/2024 5.5 see below % Final     Estimated Average Glucose   Date Value Ref Range Status   05/07/2024 111 Not Established mg/dL Final     Thyroid Stimulating Hormone   Date Value Ref Range Status   08/27/2024 2.73 0.44 - 3.98 mIU/L Final     Folate, Serum   Date Value Ref Range Status   08/15/2024 15.7 >5.0 ng/mL Final     Imaging Results: MR brain w and wo IV contrast  05/21/2024    Narrative  Interpreted By:  Angelito Sterling,  STUDY:  MR BRAIN W AND WO IV CONTRAST;  5/21/2024 8:36 pm    INDICATION:  Signs/Symptoms:small cell lung cancer with new onset double vision  and a.m. headache.    COMPARISON:  MRI brain with contrast 03/01/2023    ACCESSION NUMBER(S):  [id number]    ORDERING CLINICIAN:  XIMENA ORTIZ    TECHNIQUE:  Multiplanar, multi-sequence images of the brain were obtained before  and after the intravenous administration of 10 mL Dotarem gadolinium.    FINDINGS:  Diffusion weighted images show no evidence of acute ischemic infarct.    Slight progression of periventricular T2/FLAIR white matter  hyperintensities, particularly in the peritrigonal white matter.    There is no acute intraparenchymal hemorrhage, mass, mass-effect, or  an extra-axial fluid collection. There is redemonstration of a linear  enhancing vessel extending from the left peritrigonal region to the  cortex likely representing a developmental venous anomaly.    The ventricular size and cerebral volume are age-concordant.    Normal morphology of midline structures. The craniovertebral junction  is normal.    The orbits and globes are unremarkable.    The paranasal sinuses show no air-fluid levels or hemorrhage.    The mastoid air cells are clear.    Impression  No acute infarct, acute hemorrhage, or intracranial mass effect.    No evidence of intracranial metastatic disease.    MACRO:  None.    Signed by: Angelito Sterling 5/21/2024 10:54 PM  Dictation workstation:   [id number]      Assessment/Plan   Problem List Items Addressed This Visit    None  Visit Diagnoses         Codes    Paraneoplastic cerebellar degeneration (CMS-HCC)    -  Primary G31.89    Relevant Medications    predniSONE (Deltasone) 20 mg tablet          Gladys Branch is a 68 y.o. year old female with COPD, small cell lung cancer currently in a clinical trial on tarlatamab, here for FUV for worsening balance for the past year. She  has a new diagnosis of paraneoplastic cerebellar degeneration. Her exam is notable for downbeat nystagmus and ataxic gait, and supported by cerebellar atrophy on MRI and positive serum anti-ANNA1 antibodies.    We discussed the following plan today:   Will arrange IV steroid treatment for 3 days  After that, stop dexamethasone and start prednisone 40 mg daily  Continue omeprazole, calcium, vitamin D  Once you initiate cyclophosphamide infusion, we will wean prednisone: 20 mg daily for a week, then 10 mg daily for a week, then 5 mg daily for a week, then return to dexamethasone 1 mg daily  Return in 4 months

## 2024-09-03 NOTE — PROGRESS NOTES
"CLINICAL CLEARANCE FOR OUTPATIENT INFUSION      Patient prescribed MethylPREDNISolone    For a diagnosis of:  Paraneoplastic cerebellar degeneration (CMS-HCC)     Baseline labs for review (as available):    LFT:   Lab Results   Component Value Date    ALT 14 08/27/2024    AST 17 08/27/2024    ALKPHOS 49 08/27/2024    BILITOT 0.5 08/27/2024        CMP/BMP: No results found for: \"CMPLAS\", \"CMPLASABS\"   Lab Results   Component Value Date    GLUCOSE 106 (H) 08/27/2024    CALCIUM 8.9 08/27/2024     08/27/2024    K 4.1 08/27/2024    CO2 25 08/27/2024     08/27/2024    BUN 20 08/27/2024    CREATININE 0.69 08/27/2024      Lab Results   Component Value Date    CREATININE 0.69 08/27/2024        Any history of seizures? No  (Use corticosteroids with caution in patients with a history of seizure disorder; seizures have been reported with adrenal crisis)    Okay to schedule for treatment as ordered by prescribing provider.    "

## 2024-09-03 NOTE — LETTER
September 3, 2024     Francy Archer MD  [address]    [facility]  [facility], [address] [place] [id number]    Patient: Gladys Branch   YOB: 1955   Date of Visit: 9/3/2024       Dear Dr. Francy Archer MD:    Thank you for referring Gladys Branch to me for evaluation. Below are my notes for this consultation.  If you have questions, please do not hesitate to call me. I look forward to following your patient along with you.       Sincerely,     Marquise Mora MD      CC: No Recipients  ______________________________________________________________________________________    Subjective    Gladys Branch is a right handed  68 y.o. year old female who presents with Results. Patient is accompanied by: spouse  Visit type: follow up visit     No changes since last visit. Here to discuss treatment with new diagnosis of anti-Hu cerebellar ataxia. She has been using a cane for ambulation.    Prior HX  She has SCLC and s/p cisplatin/etoposide 2022, and she is currently in a clinical trial. For the past year or so she has had balance difficulty. It has been slowly getting worse in the past 6 months. She has had 4 falls in the past year. She is not using a cane. She has had numbness in her distal feet ever since her chemotherapy.    Her vision has been worsening. She has had horizontal diplopia which has been going on for several months as well. She will have to close one eye to read things properly.    She has had cognitive decline ever since having prophylactic brain radiation. She has trouble remembering things she did the previous day, or forgetting appointments. She has stopped driving because of vision. She is otherwise independent in other IADLs including cooking, shopping, and cleaning.    Patient Active Problem List   Diagnosis   • Chronic GERD   • COPD (chronic obstructive pulmonary disease) (Multi)   • Hyperlipidemia   • Impaired fasting blood sugar   • Problem drinking    • Seasonal allergic rhinitis   • Small cell lung cancer (Multi)   • Vitamin D deficiency   • Abnormal EKG   • Agatston CAC score, <100   • Frequent unifocal PVCs   • Medicare annual wellness visit, subsequent   • Peripheral neuropathy due to and not concurrent with chemotherapy (Multi)   • [place] cardiac risk <10% in next 10 years   • Anemia, chronic disease   • History of diverticulitis   • Chronic constipation   • Double vision   • New onset of headache in cancer patient   • Cerebellar ataxia (Multi)      Past Medical History:   Diagnosis Date   • Abdominal pain    • Anemia    • COPD (chronic obstructive pulmonary disease) (Multi)    • Disease due to severe acute respiratory syndrome coronavirus 2 (SARS-CoV-2) 02/07/2023   • Diverticulosis    • GERD (gastroesophageal reflux disease)    • Hyperlipidemia    • Neuropathy    • Other specified abnormal findings of blood chemistry 07/26/2022    Elevated serum creatinine   • Other specified abnormal findings of blood chemistry 07/26/2022    Elevated serum creatinine   • Other specified abnormal findings of blood chemistry 07/26/2022    Elevated TSH   • Other specified abnormal findings of blood chemistry 07/26/2022    Elevated TSH   • Other specified abnormal findings of blood chemistry 07/26/2022    Elevated TSH   • Other specified abnormal findings of blood chemistry 07/26/2022    Elevated TSH   • Other specified abnormal findings of blood chemistry 09/15/2020    Elevated TSH   • Personal history of other diseases of the respiratory system     History of chronic obstructive lung disease   • Small cell lung cancer (Multi)       Past Surgical History:   Procedure Laterality Date   • OTHER SURGICAL HISTORY  03/09/2020    Hysterectomy   • OTHER SURGICAL HISTORY  03/16/2021    Tubal ligation   • OTHER SURGICAL HISTORY  03/16/2021    Partial atrial septal defect repair   • US GUIDED BIOPSY LYMPH NODE SUPERFICIAL  1/17/2023    US GUIDED BIOPSY LYMPH NODE SUPERFICIAL  2023 DOCTOR OFFICE LEGACY      Social History     Socioeconomic History   • Marital status:      Spouse name: Not on file   • Number of children: Not on file   • Years of education: Not on file   • Highest education level: Not on file   Occupational History   • Not on file   Tobacco Use   • Smoking status: Former     Current packs/day: 0.00     Average packs/day: 1 pack/day for 25.0 years (25.0 ttl pk-yrs)     Types: Cigarettes     Start date:      Quit date:      Years since quittin.6     Passive exposure: Past   • Smokeless tobacco: Never   Vaping Use   • Vaping status: Never Used   Substance and Sexual Activity   • Alcohol use: Yes     Alcohol/week: 1.0 - 2.0 standard drink of alcohol     Types: 1 - 2 Glasses of wine per week     Comment: once  in a while   • Drug use: Never   • Sexual activity: Not on file   Other Topics Concern   • Not on file   Social History Narrative   • Not on file     Social Determinants of Health     Financial Resource Strain: Not on file   Food Insecurity: Not on file   Transportation Needs: Not on file   Physical Activity: Not on file   Stress: Not on file   Social Connections: Not on file   Intimate Partner Violence: Not on file   Housing Stability: Not on file      Family History   Problem Relation Name Age of Onset   • Dementia Mother     • Depression Mother     • Other (varicose veins of lower extremity) Mother     • Alcohol abuse Father     • Breast cancer Father's Sister                   Review of Systems  All other system have been reviewed and are negative for complaint.    There were no vitals filed for this visit.    Objective  (Previous)  Neurological Exam  Mental Status  Awake, alert and oriented to person, place and time. Speech is normal. Language is fluent with no aphasia. Attention and concentration are normal.    Cranial Nerves  CN II: Visual fields full to confrontation.  CN III, IV, VI: Slight exophoria. Downbeat nystagmus is present.  Hypermetric saccades. Normal smooth pursuit. Normal lids and orbits bilaterally. Pupils equal round and reactive to light bilaterally.  CN V:  Right: Facial sensation is normal.  Left: Facial sensation is normal on the left.  CN VII: Full and symmetric facial movement.  CN VIII: Hearing is normal.  CN IX, X: Palate elevates symmetrically  CN XI: Shoulder shrug strength is normal.  CN XII: Tongue midline without atrophy or fasciculations.    Motor  Normal muscle bulk throughout. No fasciculations present. Normal muscle tone. No abnormal involuntary movements.                                               Right                     Left   Shoulder abduction               5                          5  Elbow flexion                         5                          5  Elbow extension                    5                          5  Finger flexion                         5                          5  Finger extension                    5                          5  Finger abduction                    5                          5  Hip flexion                              5                          5  Knee flexion                           5                          5  Plantarflexion                         5                          5  Dorsiflexion                            5                          5    Sensory  Light touch is normal in upper and lower extremities. Intact distal pinprick. Normal vibration in distal lower extremities.     Reflexes                                            Right                      Left  Biceps                                 2+                         2+  Triceps                                2+                         2+  Patellar                                2+                         2+  Achilles                                2+                         2+  Right Plantar: downgoing  Left Plantar: downgoing    Right pathological reflexes: Pam's absent.  Left pathological  reflexes: Pam's absent.    Coordination  Right: Heel-to-shin normal.Left: Heel-to-shin normal.  Slight dysmetria on finger vicki.    Gait   Romberg is present.  Wide-based ataxic gait, unable to tandem.                   BRIAN       Hemoglobin A1C   Date Value Ref Range Status   05/07/2024 5.5 see below % Final     Estimated Average Glucose   Date Value Ref Range Status   05/07/2024 111 Not Established mg/dL Final     Thyroid Stimulating Hormone   Date Value Ref Range Status   08/27/2024 2.73 0.44 - 3.98 mIU/L Final     Folate, Serum   Date Value Ref Range Status   08/15/2024 15.7 >5.0 ng/mL Final     Imaging Results: MR brain w and wo IV contrast 05/21/2024    Narrative  Interpreted By:  Angelito Sterling,  STUDY:  MR BRAIN W AND WO IV CONTRAST;  5/21/2024 8:36 pm    INDICATION:  Signs/Symptoms:small cell lung cancer with new onset double vision  and a.m. headache.    COMPARISON:  MRI brain with contrast 03/01/2023    ACCESSION NUMBER(S):  [id number]    ORDERING CLINICIAN:  XIMENA ORTIZ    TECHNIQUE:  Multiplanar, multi-sequence images of the brain were obtained before  and after the intravenous administration of 10 mL Dotarem gadolinium.    FINDINGS:  Diffusion weighted images show no evidence of acute ischemic infarct.    Slight progression of periventricular T2/FLAIR white matter  hyperintensities, particularly in the peritrigonal white matter.    There is no acute intraparenchymal hemorrhage, mass, mass-effect, or  an extra-axial fluid collection. There is redemonstration of a linear  enhancing vessel extending from the left peritrigonal region to the  cortex likely representing a developmental venous anomaly.    The ventricular size and cerebral volume are age-concordant.    Normal morphology of midline structures. The craniovertebral junction  is normal.    The orbits and globes are unremarkable.    The paranasal sinuses show no air-fluid levels or hemorrhage.    The mastoid air cells are  clear.    Impression  No acute infarct, acute hemorrhage, or intracranial mass effect.    No evidence of intracranial metastatic disease.    MACRO:  None.    Signed by: Angelito Sterling 5/21/2024 10:54 PM  Dictation workstation:   [id number]      Assessment/Plan  Problem List Items Addressed This Visit    None  Visit Diagnoses         Codes    Paraneoplastic cerebellar degeneration (CMS-HCC)    -  Primary G31.89    Relevant Medications    predniSONE (Deltasone) 20 mg tablet          Gladys Branch is a 68 y.o. year old female with COPD, small cell lung cancer currently in a clinical trial on tarlatamab, here for FUV for worsening balance for the past year. She has a new diagnosis of paraneoplastic cerebellar degeneration. Her exam is notable for downbeat nystagmus and ataxic gait, and supported by cerebellar atrophy on MRI and positive serum anti-ANNA1 antibodies.    We discussed the following plan today:   Will arrange IV steroid treatment for 3 days  After that, stop dexamethasone and start prednisone 40 mg daily  Continue omeprazole, calcium, vitamin D  Once you initiate cyclophosphamide infusion, we will wean prednisone: 20 mg daily for a week, then 10 mg daily for a week, then 5 mg daily for a week, then return to dexamethasone 1 mg daily  Return in 4 months

## 2024-09-03 NOTE — PROGRESS NOTES
Social Work Note  9/3/2024 LD received a referral from Dr. Archer for PT for patient for anti Hu cerebellar ataxia.  MD wrote an order for out-patient after this writer talked with patient today.  She was interested in having this completed at the [facility] office next to [facility].  SW contacted the office and they were to call patient/ for setting up an appointment.  Nothing needed to be emailed or faxed as they can see all the information needed. SW provided patient with this writer's contact information.  SW will remain available to assist patient.  Betsy Cruz, MSW, LSW

## 2024-09-04 ENCOUNTER — TELEPHONE (OUTPATIENT)
Dept: NEUROLOGY | Facility: CLINIC | Age: 69
End: 2024-09-04
Payer: MEDICARE

## 2024-09-04 NOTE — TELEPHONE ENCOUNTER
Dom's spouse called about the after visit summary. They were confused about the medication changes. He is okay with cutting the 20mg's in half to make 10's but wants to know if either 10mg or 5mg prednisone can be called into the pharmacy for when they get to that dosage.     Please advise.

## 2024-09-05 RX ORDER — PREDNISONE 5 MG/1
TABLET ORAL
Qty: 7 TABLET | Refills: 0 | Status: SHIPPED | OUTPATIENT
Start: 2024-09-05

## 2024-09-10 ENCOUNTER — EDUCATION (OUTPATIENT)
Dept: HEMATOLOGY/ONCOLOGY | Facility: HOSPITAL | Age: 69
End: 2024-09-10

## 2024-09-10 ENCOUNTER — HOSPITAL ENCOUNTER (OUTPATIENT)
Dept: RESEARCH | Facility: HOSPITAL | Age: 69
Discharge: HOME | End: 2024-09-10
Payer: MEDICARE

## 2024-09-10 VITALS
RESPIRATION RATE: 16 BRPM | SYSTOLIC BLOOD PRESSURE: 98 MMHG | WEIGHT: 115.52 LBS | BODY MASS INDEX: 22.56 KG/M2 | OXYGEN SATURATION: 94 % | DIASTOLIC BLOOD PRESSURE: 65 MMHG | HEART RATE: 72 BPM | TEMPERATURE: 97.7 F

## 2024-09-10 DIAGNOSIS — Z92.21 HISTORY OF ANTINEOPLASTIC THERAPY: ICD-10-CM

## 2024-09-10 DIAGNOSIS — Z00.6 EXAMINATION OF PARTICIPANT IN CLINICAL TRIAL: ICD-10-CM

## 2024-09-10 DIAGNOSIS — R35.0 URINARY FREQUENCY: Primary | ICD-10-CM

## 2024-09-10 LAB
ALBUMIN SERPL BCP-MCNC: 3.7 G/DL (ref 3.4–5)
ALP SERPL-CCNC: 49 U/L (ref 33–136)
ALT SERPL W P-5'-P-CCNC: 10 U/L (ref 7–45)
ANION GAP SERPL CALC-SCNC: 11 MMOL/L (ref 10–20)
APPEARANCE UR: CLEAR
APTT PPP: 32 SECONDS (ref 27–38)
AST SERPL W P-5'-P-CCNC: 15 U/L (ref 9–39)
BASOPHILS # BLD AUTO: 0.02 X10*3/UL (ref 0–0.1)
BASOPHILS NFR BLD AUTO: 0.4 %
BILIRUB DIRECT SERPL-MCNC: 0.1 MG/DL (ref 0–0.3)
BILIRUB SERPL-MCNC: 0.7 MG/DL (ref 0–1.2)
BILIRUB UR STRIP.AUTO-MCNC: NEGATIVE MG/DL
BUN SERPL-MCNC: 20 MG/DL (ref 6–23)
CALCIUM SERPL-MCNC: 9 MG/DL (ref 8.6–10.6)
CHLORIDE SERPL-SCNC: 102 MMOL/L (ref 98–107)
CO2 SERPL-SCNC: 30 MMOL/L (ref 21–32)
COLOR UR: YELLOW
CORTIS AM PEAK SERPL-MSCNC: 11.2 UG/DL (ref 5–20)
CREAT SERPL-MCNC: 0.99 MG/DL (ref 0.5–1.05)
CRP SERPL-MCNC: 1.17 MG/DL
EGFRCR SERPLBLD CKD-EPI 2021: 62 ML/MIN/1.73M*2
EOSINOPHIL # BLD AUTO: 0.12 X10*3/UL (ref 0–0.7)
EOSINOPHIL NFR BLD AUTO: 2.3 %
ERYTHROCYTE [DISTWIDTH] IN BLOOD BY AUTOMATED COUNT: 13.6 % (ref 11.5–14.5)
ESTRADIOL SERPL-MCNC: <19 PG/ML
FERRITIN SERPL-MCNC: 62 NG/ML (ref 8–150)
FSH SERPL-ACNC: 39 IU/L
GLUCOSE SERPL-MCNC: 92 MG/DL (ref 74–99)
GLUCOSE UR STRIP.AUTO-MCNC: NORMAL MG/DL
HCT VFR BLD AUTO: 38.8 % (ref 36–46)
HGB BLD-MCNC: 12.9 G/DL (ref 12–16)
HOLD SPECIMEN: NORMAL
HOLD SPECIMEN: NORMAL
IMM GRANULOCYTES # BLD AUTO: 0.04 X10*3/UL (ref 0–0.7)
IMM GRANULOCYTES NFR BLD AUTO: 0.8 % (ref 0–0.9)
INR PPP: 1 (ref 0.9–1.1)
KETONES UR STRIP.AUTO-MCNC: NEGATIVE MG/DL
LEUKOCYTE ESTERASE UR QL STRIP.AUTO: NEGATIVE
LH SERPL-ACNC: 7.7 IU/L
LYMPHOCYTES # BLD AUTO: 1.2 X10*3/UL (ref 1.2–4.8)
LYMPHOCYTES NFR BLD AUTO: 22.6 %
MAGNESIUM SERPL-MCNC: 2.06 MG/DL (ref 1.6–2.4)
MCH RBC QN AUTO: 31.5 PG (ref 26–34)
MCHC RBC AUTO-ENTMCNC: 33.2 G/DL (ref 32–36)
MCV RBC AUTO: 95 FL (ref 80–100)
MONOCYTES # BLD AUTO: 0.56 X10*3/UL (ref 0.1–1)
MONOCYTES NFR BLD AUTO: 10.6 %
NEUTROPHILS # BLD AUTO: 3.36 X10*3/UL (ref 1.2–7.7)
NEUTROPHILS NFR BLD AUTO: 63.3 %
NITRITE UR QL STRIP.AUTO: NEGATIVE
NRBC BLD-RTO: 0 /100 WBCS (ref 0–0)
PH UR STRIP.AUTO: 6 [PH]
PHOSPHATE SERPL-MCNC: 4.6 MG/DL (ref 2.5–4.9)
PLATELET # BLD AUTO: 232 X10*3/UL (ref 150–450)
POTASSIUM SERPL-SCNC: 3.8 MMOL/L (ref 3.5–5.3)
PROLACTIN SERPL-MCNC: 32.4 UG/L (ref 3–20)
PROT SERPL-MCNC: 6.1 G/DL (ref 6.4–8.2)
PROT UR STRIP.AUTO-MCNC: NEGATIVE MG/DL
PROTHROMBIN TIME: 11.2 SECONDS (ref 9.8–12.8)
RBC # BLD AUTO: 4.1 X10*6/UL (ref 4–5.2)
RBC # UR STRIP.AUTO: NEGATIVE /UL
SODIUM SERPL-SCNC: 139 MMOL/L (ref 136–145)
SP GR UR STRIP.AUTO: 1.02
T4 FREE SERPL-MCNC: 1.09 NG/DL (ref 0.78–1.48)
TESTOST SERPL-MCNC: <30 NG/DL (ref 0–70)
TSH SERPL-ACNC: 3.65 MIU/L (ref 0.44–3.98)
UROBILINOGEN UR STRIP.AUTO-MCNC: NORMAL MG/DL
WBC # BLD AUTO: 5.3 X10*3/UL (ref 4.4–11.3)

## 2024-09-10 PROCEDURE — 80053 COMPREHEN METABOLIC PANEL: CPT

## 2024-09-10 PROCEDURE — 83001 ASSAY OF GONADOTROPIN (FSH): CPT

## 2024-09-10 PROCEDURE — 84403 ASSAY OF TOTAL TESTOSTERONE: CPT

## 2024-09-10 PROCEDURE — 83002 ASSAY OF GONADOTROPIN (LH): CPT

## 2024-09-10 PROCEDURE — 84100 ASSAY OF PHOSPHORUS: CPT

## 2024-09-10 PROCEDURE — 84443 ASSAY THYROID STIM HORMONE: CPT

## 2024-09-10 PROCEDURE — 81003 URINALYSIS AUTO W/O SCOPE: CPT

## 2024-09-10 PROCEDURE — 86140 C-REACTIVE PROTEIN: CPT

## 2024-09-10 PROCEDURE — 84146 ASSAY OF PROLACTIN: CPT

## 2024-09-10 PROCEDURE — 82533 TOTAL CORTISOL: CPT

## 2024-09-10 PROCEDURE — 85025 COMPLETE CBC W/AUTO DIFF WBC: CPT

## 2024-09-10 PROCEDURE — 83735 ASSAY OF MAGNESIUM: CPT

## 2024-09-10 PROCEDURE — 84439 ASSAY OF FREE THYROXINE: CPT

## 2024-09-10 PROCEDURE — 82248 BILIRUBIN DIRECT: CPT

## 2024-09-10 PROCEDURE — 85730 THROMBOPLASTIN TIME PARTIAL: CPT

## 2024-09-10 PROCEDURE — 84305 ASSAY OF SOMATOMEDIN: CPT

## 2024-09-10 PROCEDURE — 82024 ASSAY OF ACTH: CPT

## 2024-09-10 PROCEDURE — 85610 PROTHROMBIN TIME: CPT

## 2024-09-10 PROCEDURE — 82670 ASSAY OF TOTAL ESTRADIOL: CPT

## 2024-09-10 PROCEDURE — 82728 ASSAY OF FERRITIN: CPT

## 2024-09-10 NOTE — ADDENDUM NOTE
Encounter addended by: CASEY Adams-CNP on: 9/10/2024 4:57 PM   Actions taken: Clinical Note Signed, SmartForm saved, Level of Service modified

## 2024-09-10 NOTE — PROGRESS NOTES
Patient ID: Gladys Branch is a 68 y.o. female.    DIAGNOSIS    Small Cell Lung Cancer    By immunostaining, the tumor cells are positive for CAM 5.2, INSM1, and TTF-1, and negative for p40 and LCA. The proliferation index by Ki-67 immunostaining is approximately 90%.  Synaptophysin, Chromogranin and INSM-1 are positive.  AE1/AE3 is negative. NEOGENOMICS, RB protein expression is lost in the tumor cells    STAGING    [id number], limited stage  Recurrence    CURRENT SITES OF DISEASE    RLL, supraclavicular    MOLECULAR GENOMICS      PRIOR THERAPY    Concurrent chemoradiation with Cisplatin/Etoposide every 3 weeks; C1D1 2/15/22, reaction to cisplatin C2D1 and did not receive D2 or D3 etoposide  Carbo/etoposide 4/5/2022 1 cycle c/b rash  PCI 5/21-6/13/22    CURRENT THERAPY    Phase 1 study [id number]  with study drug Taralatamab 1/24/23 - present.    CURRENT ONCOLOGICAL PROBLEMS      HISTORY OF PRESENT ILLNESS    Mrs. Branch is a 65 yo with PMH GERD and COPD who originally was round to have a RLL nodule measuring 11 mm in 4/28/2021 found as part of CT calcium score scan. An ensuing CT chest w/o contrast was done on 5/19/21 which revealed subsolid pulmonary nodules measuring 14 mm in the RLL. She had CT chest w/contrast on 11/29/21 which confirmed stable 14 mm GGO of the RLL and decreased RLL subpleural nodule. She met thoracic surgeon Dr. Farfan, who ordered PET/CT scan done on 12/8/21 which did not reveal any FDG-avid nodules within the lung parenchyma but R hilar nodes with max SUV 8.0. He referred her for bronch, which was done on 1/21/22 by Dr. Mcdaniels. 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 Brain MRI was negative.    She started concurrent chemoradiation with BID radiation with cis/etop 2/15/22, and unfortunately had a reaction to cisplatin on C2D1 with chest pain and hypotension. She was hospitalized with sepsis felt to be due to pneumonia. She trialed carboplatin/etoposide on 4/5/22 with a  "resulting rash and opted to forego further chemotherapy as she had completed radiation. Restaging scan 11/3/22 unfortunately with progression with new R hilar lymph node, paratracheal nodes, and increase in size of R supraclavicular mass. She enrolled in [id number] and started C1D1 1/24/23. C1 complicated by anorexia and dysgeusia, and delayed C2 by a week. She has further struggled with fatigue and confusion, which may be related to mirtazapine. Dose reduced for C2 and mirtazapine stopped with improvement in symptoms. She continues to tolerate regular treatments.    PAST MEDICAL HISTORY    GERD  COPD    SOCIAL HISTORY    Retired - lighting . Lives at home with  and a kitten.  Tobacco: quit smoking 1999. 1 ppd x 25 yrs.  EtOH: wine 1 glass/day  Illicits: none    CURRENT MEDS    Please see med list    ALLERGIES    Codeine, Sulfa drugs, Cisplatin    FAMILY HISTORY    Paternal aunt - breast cancer dx 80s    Subjective    Today 9.10.2024. Patient in clinic with her  for the end of treatment visit for [id number].  She reports that her appetite and energy are good although her  interjects that she tires/grows weak easily especially when she is hungry, she denies fever/chills, and although she doesn't have any N/V/D/C, she does continue to have difficulties with some regurgitation of food and is following with gastroenterology for \"mild gastroparesis\" per her latest gastric emptying study.  She does report some increased urinary frequency today without dysuria/hematuria/PUPD.  She continues to follow with ophthalmology to try and get a glasses prescription thatworks but reports no new visual changes/photopsia.  She denies hearing changes/tinnitus, numbness/tingling in the extremities, oral lesions/dysphagia/taste changes, cough/dyspnea, chest pain/palpitations, abdominal pain/bloating/bloody or tarry stool, swelling, rash or skin lesions.  She and her  both endorse cognitive changes " including unstable gate, word finding difficulties, confusion, and possibly even mention of seeing birds on an indoor house plant which were not there.  She is following with neurology for balance therapy as well as treatment of paraneoplastic syndrome.    Remainder of a 12 point review of systems is negative unless otherwise noted.         Objective          8/13/2024     8:11 AM 8/13/2024    10:17 AM 8/15/2024     8:08 AM 8/27/2024     8:04 AM 8/27/2024    11:34 AM 8/27/2024    12:51 PM 9/10/2024     9:45 AM   Vitals   Systolic 121 98 118 111 109 102 98   Diastolic 78 66 85 72 77 72 65   Heart Rate 56 74 71 67 75 74 72   Temp 36.4 °C (97.5 °F) 35.9 °C (96.6 °F)  35.8 °C (96.4 °F) 36.2 °C (97.2 °F) 36.1 °C (97 °F) 36.5 °C (97.7 °F)   Resp 16 16  16 16 16 16   Height (in)   1.524 m (5')       Weight (lb) 113.32  115.2 115.96   115.52   BMI 22.13 kg/m2  22.5 kg/m2 22.65 kg/m2   22.56 kg/m2   BSA (m2) 1.48 m2  1.49 m2 1.49 m2   1.49 m2   Visit Report   Report Report Report Report        Daily Weight  09/10/24 : 52.4 kg (115 lb 8.3 oz)  08/27/24 : 52.6 kg (115 lb 15.4 oz)  08/15/24 : 52.3 kg (115 lb 3.2 oz)  08/13/24 : 51.4 kg (113 lb 5.1 oz)  07/30/24 : 50.5 kg (111 lb 5.3 oz)         Physical Exam  Constitutional:       General: She is awake. She is not in acute distress.     Appearance: Normal appearance. She is normal weight.   HENT:      Head: Normocephalic and atraumatic.      Mouth/Throat:      Mouth: Mucous membranes are moist.      Pharynx: No oropharyngeal exudate or posterior oropharyngeal erythema.   Eyes:      General: No scleral icterus.     Extraocular Movements: Extraocular movements intact.      Conjunctiva/sclera: Conjunctivae normal.      Pupils: Pupils are equal, round, and reactive to light.   Cardiovascular:      Rate and Rhythm: Normal rate and regular rhythm.      Heart sounds: Normal heart sounds, S1 normal and S2 normal. No murmur heard.     No friction rub. No gallop.   Pulmonary:       Effort: Pulmonary effort is normal.      Breath sounds: Normal breath sounds. No wheezing, rhonchi or rales.   Abdominal:      General: Abdomen is flat. Bowel sounds are normal. There is no distension.      Palpations: Abdomen is soft. There is no mass.      Tenderness: There is no abdominal tenderness. There is no guarding or rebound.   Musculoskeletal:      Cervical back: Normal range of motion and neck supple. No rigidity.   Skin:     General: Skin is warm and dry.      Comments: intact   Neurological:      Mental Status: She is alert and oriented to person, place, and time.      GCS: GCS eye subscore is 4. GCS verbal subscore is 5. GCS motor subscore is 6.      Sensory: Sensation is intact. No sensory deficit.      Motor: Motor function is intact.      Coordination: Romberg sign positive. Finger-Nose-Finger Test normal.      Gait: Gait abnormal and tandem walk abnormal.      Deep Tendon Reflexes:      Reflex Scores:       Tricep reflexes are 2+ on the right side and 2+ on the left side.       Bicep reflexes are 2+ on the right side and 2+ on the left side.       Brachioradialis reflexes are 2+ on the right side and 2+ on the left side.       Patellar reflexes are 2+ on the right side and 2+ on the left side.       Achilles reflexes are 2+ on the right side and 2+ on the left side.     Comments: Corrective saccade PRESENT in vestibular - ocular reflex   Psychiatric:         Attention and Perception: Attention normal.         Mood and Affect: Mood normal.         Speech: Speech normal.         Behavior: Behavior normal. Behavior is cooperative.         Cognition and Memory: Cognition normal.       Labs  Hospital Outpatient Visit on 09/10/2024   Component Date Value Ref Range Status    aPTT 09/10/2024 32  27 - 38 seconds Final    Bilirubin, Direct 09/10/2024 0.1  0.0 - 0.3 mg/dL Final    WBC 09/10/2024 5.3  4.4 - 11.3 x10*3/uL Final    nRBC 09/10/2024 0.0  0.0 - 0.0 /100 WBCs Final    RBC 09/10/2024 4.10  4.00 -  5.20 x10*6/uL Final    Hemoglobin 09/10/2024 12.9  12.0 - 16.0 g/dL Final    Hematocrit 09/10/2024 38.8  36.0 - 46.0 % Final    MCV 09/10/2024 95  80 - 100 fL Final    MCH 09/10/2024 31.5  26.0 - 34.0 pg Final    MCHC 09/10/2024 33.2  32.0 - 36.0 g/dL Final    RDW 09/10/2024 13.6  11.5 - 14.5 % Final    Platelets 09/10/2024 232  150 - 450 x10*3/uL Final    Neutrophils % 09/10/2024 63.3  40.0 - 80.0 % Final    Immature Granulocytes %, Automated 09/10/2024 0.8  0.0 - 0.9 % Final    Immature Granulocyte Count (IG) includes promyelocytes, myelocytes and metamyelocytes but does not include bands. Percent differential counts (%) should be interpreted in the context of the absolute cell counts (cells/UL).    Lymphocytes % 09/10/2024 22.6  13.0 - 44.0 % Final    Monocytes % 09/10/2024 10.6  2.0 - 10.0 % Final    Eosinophils % 09/10/2024 2.3  0.0 - 6.0 % Final    Basophils % 09/10/2024 0.4  0.0 - 2.0 % Final    Neutrophils Absolute 09/10/2024 3.36  1.20 - 7.70 x10*3/uL Final    Percent differential counts (%) should be interpreted in the context of the absolute cell counts (cells/uL).    Immature Granulocytes Absolute, Au* 09/10/2024 0.04  0.00 - 0.70 x10*3/uL Final    Lymphocytes Absolute 09/10/2024 1.20  1.20 - 4.80 x10*3/uL Final    Monocytes Absolute 09/10/2024 0.56  0.10 - 1.00 x10*3/uL Final    Eosinophils Absolute 09/10/2024 0.12  0.00 - 0.70 x10*3/uL Final    Basophils Absolute 09/10/2024 0.02  0.00 - 0.10 x10*3/uL Final    Glucose 09/10/2024 92  74 - 99 mg/dL Final    Sodium 09/10/2024 139  136 - 145 mmol/L Final    Potassium 09/10/2024 3.8  3.5 - 5.3 mmol/L Final    Chloride 09/10/2024 102  98 - 107 mmol/L Final    Bicarbonate 09/10/2024 30  21 - 32 mmol/L Final    Anion Gap 09/10/2024 11  10 - 20 mmol/L Final    Urea Nitrogen 09/10/2024 20  6 - 23 mg/dL Final    Creatinine 09/10/2024 0.99  0.50 - 1.05 mg/dL Final    eGFR 09/10/2024 62  >60 mL/min/1.73m*2 Final    Calculations of estimated GFR are performed using  the 2021 CKD-EPI Study Refit equation without the race variable for the IDMS-Traceable creatinine methods.  https://jasn.asnjournals.org/content/early/2021/09/22/ASN.4696168529    Calcium 09/10/2024 9.0  8.6 - 10.6 mg/dL Final    Albumin 09/10/2024 3.7  3.4 - 5.0 g/dL Final    Alkaline Phosphatase 09/10/2024 49  33 - 136 U/L Final    Total Protein 09/10/2024 6.1 (L)  6.4 - 8.2 g/dL Final    AST 09/10/2024 15  9 - 39 U/L Final    Bilirubin, Total 09/10/2024 0.7  0.0 - 1.2 mg/dL Final    ALT 09/10/2024 10  7 - 45 U/L Final    Patients treated with Sulfasalazine may generate falsely decreased results for ALT.    C-Reactive Protein 09/10/2024 1.17 (H)  <1.00 mg/dL Final    Cortisol  A.M. 09/10/2024 11.2  5.0 - 20.0 ug/dL Final    Estradiol 09/10/2024 <19  pg/mL Final    Ferritin 09/10/2024 62  8 - 150 ng/mL Final    Follicle Stimulating Hormone 09/10/2024 39.0  IU/L Final    FSH Ref Values  Follicular   2.0-12.0  IU/L  Mid-Cycle        12.0-25.0  IU/L  Luteal Phase      2.0-12.0  IU/L  Menopause       30.0-150.0  IU/L  Pre-puberty     50% Adult IU/L  Adult Male        2.0-10.0  IU/L   Infants          0.0-1.0  IU/L    Luteinizing Hormone 09/10/2024 7.7  IU/L Final    LH Reference Values  Follicular Phase          1.9-12.5 IU/L  Mid-Cycle                 8.7-76.3 IU/L  Luteal Phase              0.5-16.9 IU/L  Post Menopause            5.0-55.2 IU/L  Children                    0- 6.0 IU/L  Adult Male 18-70 years    1.5- 9.3 IU/L  Adult Male >70 years      3.1-34.6 IU/L    Magnesium 09/10/2024 2.06  1.60 - 2.40 mg/dL Final    Phosphorus 09/10/2024 4.6  2.5 - 4.9 mg/dL Final    The performance characteristics of phosphorus testing in heparinized plasma have been validated by the individual  laboratory site where testing is performed. Testing on heparinized plasma is not approved by the FDA; however, such approval is not necessary.    Prolactin 09/10/2024 32.4 (H)  3.0 - 20.0 ug/L Final    Protime 09/10/2024 11.2  9.8 -  12.8 seconds Final    INR 09/10/2024 1.0  0.9 - 1.1 Final    Testosterone 09/10/2024 <30  0 - 70 ng/dL Final    Testing by a more sensitive method (Testosterone Total LC-MS/MS)is recommended for accurate quantification of Testosterone levels less than 60 ng/dL.    Thyroid Stimulating Hormone 09/10/2024 3.65  0.44 - 3.98 mIU/L Final    Thyroxine, Free 09/10/2024 1.09  0.78 - 1.48 ng/dL Final    Extra Tube 09/10/2024 Hold for add-ons.   Final    Auto resulted.    Extra Tube 09/10/2024 Hold for add-ons.   Final    Auto resulted.    Color, Urine 09/10/2024 Yellow  Light-Yellow, Yellow, Dark-Yellow Final    Appearance, Urine 09/10/2024 Clear  Clear Final    Specific Gravity, Urine 09/10/2024 1.020  1.005 - 1.035 Final    pH, Urine 09/10/2024 6.0  5.0, 5.5, 6.0, 6.5, 7.0, 7.5, 8.0 Final    Protein, Urine 09/10/2024 NEGATIVE  NEGATIVE, 10 (TRACE), 20 (TRACE) mg/dL Final    Glucose, Urine 09/10/2024 Normal  Normal mg/dL Final    Blood, Urine 09/10/2024 NEGATIVE  NEGATIVE Final    Ketones, Urine 09/10/2024 NEGATIVE  NEGATIVE mg/dL Final    Bilirubin, Urine 09/10/2024 NEGATIVE  NEGATIVE Final    Urobilinogen, Urine 09/10/2024 Normal  Normal mg/dL Final    Nitrite, Urine 09/10/2024 NEGATIVE  NEGATIVE Final    Leukocyte Esterase, Urine 09/10/2024 NEGATIVE  NEGATIVE Final               Performance Status:    ECOG 1: Restricted in physically strenuous activity but ambulatory and able to carry out work of a light or sedentary nature, e.g., light house work, office work.         Assessment/Plan      Mrs. Branch is a 67 yo with COPD and GERD who presented with newly diagnosed SCLC completed BID radiation planned concurrently with cis/etoposide but had a reaction C2D1 to cisplatin. Completed 1 cycle carbo/etop D1 4/5/22 also complicated by facial flushing and erythematous rash and stopped further treatment. Now s/p PCI in 6/2022. Treatment has been complicated by worsening short-term memory worse for the day or two after treatment,  delayed C2 by 1 week and dose-reduced. Confusion has largely resolved during C3 after stopping mirtazapine but she and her  recognize transient worsening for the day or two after treatment. Consistent balance issues and transient word finding difficulties led to an extensive assessment by neurology which showed positive JACKIE-1 S (anti Hu) antibodies.  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    Ashwin Epps MSN, APRN-CNP  Clinical Trials Unit  Phase I Oncology   [email or web address]   Ph. [phone]

## 2024-09-10 NOTE — RESEARCH NOTES
RS><[id number]><EOT ><Part AandF>    [facility] NURSING VISIT NOTE  Study Name: [id number]  IRB#: [id number]  DCRU#: [id number]  Protocol Version Dated: A9 3.22.23  PI: Roshan Kumar MD     Time point: EOT    Encounter Date: 09/10/2024  Encounter Time:  9:00 AM EDT  Encounter Department: [facility] HEMATOLOGY AND ONCOLOGY     #1     Phone Pager   Carmen Bondsile  [phone] [address]     #2 Phone Pager   Destiny Quijanocata  [phone] [address]   Other Phone Pager          Study Regimen and Dosing   Part F - Small Cell Lung Cancer Relapsed or Refractory patients who progressed or recurred following at least 1 platinum-based regimen.   Cycle = 28 days    administered IV Day 1, Day 8, and Day 15 of Cycle 1 and D1 and D15 of Cycle 2 and beyond     Admission and Prior to Starting Study Activities   1. Notify  when patient arrives.  2.  will obtain consent prior to other activities being completed.  3. Complete [facility] intake form in EMR.  4. Obtain vital signs including Pulse Oximetry Semi-Fowlers x 5 minutes. Record on flow sheet.  All vital signs and temperature must be in the same position throughout the study   5. Obtain weight in kilograms - with shoes off & heavy items removed.  6. Perform venipuncture or may Access mediport/Central Line for sample collection procedures.  7. Physical Exam.       Subjective   Gladys Branch is a 68 y.o. female and is here for a Research clinical visit.    Visit Provider: SELVIN FERNANDES RN     Allergies:   Allergies   Allergen Reactions    Cisplatin Other     CHEMO INDUCED (Moderate); Dyspepsia (Moderate); Headaches (Moderate); Hypotension (Moderate); Numbness (Moderate); Resp Distress (Moderate)    Codeine Nausea Only and Other     nausea    Sulfa (Sulfonamide Antibiotics) Rash       Objective     Vital Signs:    Vitals:    09/10/24 0945   BP: 98/65   Pulse: 72   Resp: 16   Temp: 36.5 °C (97.7 °F)   TempSrc: Oral    SpO2: 94%   Weight: 52.4 kg (115 lb 8.3 oz)       Physical Exam     ASSESSMENT and PLAN:  Problem List Items Addressed This Visit    None  Visit Diagnoses       Urinary frequency    -  Primary    Relevant Orders    Urinalysis with Reflex Microscopic (Completed)    Urinalysis with Reflex Microscopic (Completed)    Examination of participant in clinical trial        Relevant Orders    Adrenocorticotropic Hormone (ACTH)    aPTT (Completed)    Bilirubin, Direct (Completed)    CBC and Auto Differential (Completed)    Comprehensive Metabolic Panel (Completed)    C-Reactive Protein (Completed)    Cortisol AM (Completed)    Estradiol (Completed)    Ferritin (Completed)    Follicle Stimulating Hormone (Completed)    Insulin-Like Growth Factor 1    Luteinizing Hormone (Completed)    Magnesium (Completed)    Phosphorus (Completed)    Prolactin (Completed)    Protime-INR (Completed)    Testosterone (Completed)    Thyroid Stimulating Hormone (Completed)    Thyroxine, Free (Completed)    Research collection: 10mL Orange Rare[employer]te - Research Collect CTC; Ambient; 8x (Completed)    Research collection: 2.5mL Red SST - Research Collect Anti-Tarlatamab Antibody; Ambient; 5x (Completed)    Weigh patient    Adrenocorticotropic Hormone (ACTH)    aPTT (Completed)    Bilirubin, Direct (Completed)    CBC and Auto Differential (Completed)    Comprehensive Metabolic Panel (Completed)    C-Reactive Protein (Completed)    Cortisol AM (Completed)    Estradiol (Completed)    Ferritin (Completed)    Follicle Stimulating Hormone (Completed)    Insulin-Like Growth Factor 1    Luteinizing Hormone (Completed)    Magnesium (Completed)    Phosphorus (Completed)    Prolactin (Completed)    Protime-INR (Completed)    Testosterone (Completed)    Thyroid Stimulating Hormone (Completed)    Thyroxine, Free (Completed)    Research collection: 10mL Orange [employer] - Research Collect CTC; Ambient; 8x (Completed)    Research collection: 2.5mL Red SST - Research  Collect Anti-Tarlatamab Antibody; Ambient; 5x (Completed)    History of antineoplastic therapy        Relevant Orders    CBC and Auto Differential (Completed)    CBC and Auto Differential (Completed)             [id number] -  in Subjects With Small Cell Lung Cancer    Patient has no adverse events documented in the Research Adverse Events activity.      Study Specific Instructions and Documentation   WEIGHT   VITALS  LABS   DISCHARGE     Weight    52.4 Kg     Vital Signs   Temp, HR, Resp, BP, Pulse Oximetry: Seated or Semi-Fowlers x 5 minutes before obtaining   Position and temperature location: should be the same that is used throughout the study-record position     Safety Labs   Refer EPIC for orders      Research Correlatives:  Deliver to [facility] lab for processing   Refer to [facility] Treatment Plan for orders  Time point Specimen Test Draw Time   EOT CTC 1024    Anti-Tarlatamab Antibody 1024        Discharge Instructions    Patient to be discharged after all study activities are complete   Discharge time: 1045       SELVIN FERNANDES RN  09/10/24      V2, 10.06.23/reese nogueira

## 2024-09-11 ENCOUNTER — APPOINTMENT (OUTPATIENT)
Dept: INFUSION THERAPY | Facility: CLINIC | Age: 69
End: 2024-09-11
Payer: MEDICARE

## 2024-09-11 VITALS
OXYGEN SATURATION: 96 % | HEART RATE: 70 BPM | RESPIRATION RATE: 16 BRPM | DIASTOLIC BLOOD PRESSURE: 65 MMHG | TEMPERATURE: 97.2 F | SYSTOLIC BLOOD PRESSURE: 107 MMHG

## 2024-09-11 DIAGNOSIS — G31.89 PARANEOPLASTIC CEREBELLAR DEGENERATION (CMS-HCC): ICD-10-CM

## 2024-09-11 LAB
ACTH PLAS-MCNC: 13.6 PG/ML (ref 7.2–63.3)
IGF-I SERPL-MCNC: 250 NG/ML (ref 28–231)
IGF-I Z-SCORE SERPL: 1.8

## 2024-09-11 PROCEDURE — 96365 THER/PROPH/DIAG IV INF INIT: CPT | Performed by: REGISTERED NURSE

## 2024-09-11 RX ORDER — DIPHENHYDRAMINE HYDROCHLORIDE 50 MG/ML
25 INJECTION INTRAMUSCULAR; INTRAVENOUS ONCE AS NEEDED
Status: CANCELLED | OUTPATIENT
Start: 2024-09-12

## 2024-09-11 RX ORDER — ALBUTEROL SULFATE 0.83 MG/ML
2.5 SOLUTION RESPIRATORY (INHALATION) ONCE AS NEEDED
Status: CANCELLED | OUTPATIENT
Start: 2024-09-12

## 2024-09-11 NOTE — PATIENT INSTRUCTIONS
Today :Gladys Branch had no medications administered during this visit.     For:   1. Paraneoplastic cerebellar degeneration (CMS-HCC)         Your next appointment is due in:  09/12/2024        Please read the  Medication Guide that was given to you and reviewed during todays visit.     (Tell all doctors including dentists that you are taking this medication)     Go to the emergency room or call 911 if:  -You have signs of allergic reaction:   -Rash, hives, itching.   -Swollen, blistered, peeling skin.   -Swelling of face, lips, mouth, tongue or throat.   -Tightness of chest, trouble breathing, swallowing or talking     Call your doctor:  - If IV / injection site gets red, warm, swollen, itchy or leaks fluid or pus.     (Leave dressing on your IV site for at least 2 hours and keep area clean and dry  - If you get sick or have symptoms of infection or are not feeling well for any reason.    (Wash your hands often, stay away from people who are sick)  - If you have side effects from your medication that do not go away or are bothersome.     (Refer to the teaching your nurse gave you for side effects to call your doctor about)    - Common side effects may include:  stuffy nose, headache, feeling tired, muscle aches, upset stomach  - Before receiving any vaccines     - Call the [facility] at   If:  - You get sick, are on antibiotics, have had a recent vaccine, have surgery or dental work and your doctor wants your visit rescheduled.  - You need to cancel and reschedule your visit for any reason. Call at least 2 days before your visit if you need to cancel.   - Your insurance changes before your next visit.    (We will need to get approval from your new insurance. This can take up to two weeks.)     The [facility] is opened Monday thru Friday. We are closed on weekends and holidays.   Voice mail will take your call if the center is closed. If you leave a message please allow 24  hours for a call back during weekdays. If you leave a message on a weekend/holiday, we will call you back the next business day.

## 2024-09-11 NOTE — PROGRESS NOTES
[facility]   Infusion Clinic Note   Date: 2024   Name: Gladys Branch  : 1955   MRN: [id number]         Reason for Visit: OP Infusion (Pt here for 1/3 Solu-Medrol 1000 mg infusion)      Accompanied by:Self   Visit Type:: Infusion   Diagnosis: Paraneoplastic cerebellar degeneration (CMS-HCC)    Allergies:   Allergies as of 2024 - Reviewed 2024   Allergen Reaction Noted    Cisplatin Other 2023    Codeine Nausea Only and Other 2023    Sulfa (sulfonamide antibiotics) Rash 2023      Current Meds has a current medication list which includes the following prescription(s): cetirizine-pseudoephedrine, cholecalciferol, cyclophosphamide, dexamethasone, dextromethorphan polistirex er, dronabinol, estrogens (conjugated), mv-min/fa/vit k/lutein/zeaxant, omeprazole, ondansetron, polyethylene glycol, prednisone, prednisone, and prochlorperazine.        Vitals:  Vitals:    24 1344 24 1530   BP: 116/81 107/65   Pulse: 50 70   Resp: 16 16   Temp: 36.7 °C (98 °F) 36.2 °C (97.2 °F)   SpO2: 97% 96%      Infusion Pre-procedure Checklist   Allergies reviewed: yes   Medications reviewed: yes   Contraindications to treatment:No   Previous reaction to current treatment:No   Current Health Issues: None   Pain: '    Is the pain different from normal: No   Is the pain tolerable: n/a   Is your Doctor aware: n/a   Contraindications based on patient history: No   Provider notified: Not applicable   Labs: Labs reviewed   Fall Risk Screening: Pop Fall Risk  History of Falling, Immediate or Within 3 Months: No  Secondary Diagnosis: Yes  Ambulatory Aid: Crutches/cane/walker  Intravenous Therapy/Heparin Lock: Yes  Gait/Transferring: Normal/bedrest/immobile  Mental Status: Oriented to own ability  Pop Fall Risk Score: 50    Review of Systems - Oncology   Negative for complaint: [x] all other systems have been reviewed and are negative for complaint   Infusion  Readiness:   Assessment Concerns Related to Infusion: No  Provider notified: n/a  Assess patient for the concerns below. Document provider notification as appropriate:  - Does not meet criteria to treat No  - Has an active or recent infection with/without current antibiotic use No  - Has recent/planned dental work No  - Has recent/planned surgeries No  - Has recently received or plans to receive vaccinations No  - Has treatment related toxicities No  - Is pregnant (unless noted otherwise) N/A    Initiated By: Radha Rodriguez RN   Time: 2:36 PM     We administered methylPREDNISolone sodium succinate.    All questions and concerns were addressed at time of visit. Patient was not independently evaluated by the Nurse Practitioner on site at [facility].    09/11/24 at 4:33 PM - CASEY Sumner-CNP

## 2024-09-12 ENCOUNTER — APPOINTMENT (OUTPATIENT)
Dept: INFUSION THERAPY | Facility: CLINIC | Age: 69
End: 2024-09-12
Payer: MEDICARE

## 2024-09-12 VITALS
HEART RATE: 86 BPM | SYSTOLIC BLOOD PRESSURE: 120 MMHG | RESPIRATION RATE: 16 BRPM | DIASTOLIC BLOOD PRESSURE: 78 MMHG | OXYGEN SATURATION: 96 % | TEMPERATURE: 98.2 F

## 2024-09-12 DIAGNOSIS — G31.89 PARANEOPLASTIC CEREBELLAR DEGENERATION (CMS-HCC): ICD-10-CM

## 2024-09-12 PROCEDURE — 96365 THER/PROPH/DIAG IV INF INIT: CPT | Performed by: REGISTERED NURSE

## 2024-09-12 RX ORDER — ALBUTEROL SULFATE 0.83 MG/ML
2.5 SOLUTION RESPIRATORY (INHALATION) ONCE AS NEEDED
Status: CANCELLED | OUTPATIENT
Start: 2024-09-13

## 2024-09-12 RX ORDER — DIPHENHYDRAMINE HYDROCHLORIDE 50 MG/ML
25 INJECTION INTRAMUSCULAR; INTRAVENOUS ONCE AS NEEDED
Status: CANCELLED | OUTPATIENT
Start: 2024-09-13

## 2024-09-12 ASSESSMENT — ENCOUNTER SYMPTOMS
RESPIRATORY NEGATIVE: 1
CONSTIPATION: 1
CARDIOVASCULAR NEGATIVE: 1
NUMBNESS: 1
FATIGUE: 1
EXTREMITY WEAKNESS: 1

## 2024-09-12 NOTE — PROGRESS NOTES
[facility]   Infusion Clinic Note   Date: 2024   Name: Gladys Branch  : 1955   MRN: [id number]          Reason for Visit: OP Infusion (PATIENT HERE FOR DAY 2/3 SOLUMEDROL 1 GM INFUSION)         Today: We administered methylPREDNISolone sodium succinate.       Visit Type: INFUSION       Ordered By: Marquise Mora MD        Accompanied by:       Diagnosis: Paraneoplastic cerebellar degeneration (CMS-HCC)        Allergies:   Allergies as of 2024 - Reviewed 2024   Allergen Reaction Noted    Cisplatin Other 2023    Codeine Nausea Only and Other 2023    Sulfa (sulfonamide antibiotics) Rash 2023          Current Medications has a current medication list which includes the following prescription(s): cetirizine-pseudoephedrine, cholecalciferol, cyclophosphamide, dexamethasone, dextromethorphan polistirex er, dronabinol, estrogens (conjugated), mv-min/fa/vit k/lutein/zeaxant, omeprazole, ondansetron, polyethylene glycol, prednisone, prednisone, and prochlorperazine, and the following Facility-Administered Medications: methylprednisolone sodium succinate.       Vitals:   Vitals:    24 1438   BP: 120/78   Pulse: 86   Resp: 16   Temp: 36.8 °C (98.2 °F)   SpO2: 96%             Infusion Pre-procedure Checklist:   - Allergies reviewed: yes   - Medications reviewed: yes       - Previous reaction to current treatment: no      Assess patient for the concerns below. Document provider notification as appropriate.  - Active or recent infection with/without current antibiotic use: no  - Recent or planned invasive dental work: no  - Recent or planned surgeries: no  - Recently received or plans to receive vaccinations: no  - Has treatment related toxicities: no  - Is pregnant:  n/a      Pain: 0   - Is the pain different from normal: n/a   - Is your Doctor aware:  n/a       Labs:  REVIEWED          Fall Risk Screening: Kiesha Fall Risk  History of  Falling, Immediate or Within 3 Months: Yes  Secondary Diagnosis: Yes  Ambulatory Aid: Crutches/cane/walker  Intravenous Therapy/Heparin Lock: No  Gait/Transferring: Impaired   Mental Status: Oriented to own ability  Pop Fall Risk Score: 75       Review Of Systems:  Review of Systems   Constitutional:  Positive for fatigue.   HENT:  Negative.     Respiratory: Negative.     Cardiovascular: Negative.    Gastrointestinal:  Positive for constipation.   Musculoskeletal:  Positive for gait problem.   Skin: Negative.    Neurological:  Positive for extremity weakness, gait problem and numbness.         ROS completed? Yes      Infusion Readiness:  - Assessment Concerns Related to Infusion: Yes  - Provider notified: n/a      Document Below Only If Indicated:   New Patient Education:    N/A (returning patient for continuation of therapy. Ongoing education provided as needed.)        Treatment Conditions & Drug Specific Questions:    SOLUMEDROL 1 GM        Weight Based Drug Calculations:    WEIGHT BASED DRUGS: NOT APPLICABLE / FLAT DOSE          Initiated By: Breanne Silva RN      All questions and concerns were addressed at time of visit. Patient was not independently evaluated by the Nurse Practitioner on site at [facility].    09/12/24 at 4:35 PM - CASEY Sumner-CNP

## 2024-09-12 NOTE — PATIENT INSTRUCTIONS
Today :We administered methylPREDNISolone sodium succinate.     For:   1. Paraneoplastic cerebellar degeneration (CMS-HCC)         Your next appointment is due in:  9/13        Please read the  Medication Guide that was given to you and reviewed during todays visit.     (Tell all doctors including dentists that you are taking this medication)     Go to the emergency room or call 911 if:  -You have signs of allergic reaction:   -Rash, hives, itching.   -Swollen, blistered, peeling skin.   -Swelling of face, lips, mouth, tongue or throat.   -Tightness of chest, trouble breathing, swallowing or talking     Call your doctor:  - If IV / injection site gets red, warm, swollen, itchy or leaks fluid or pus.     (Leave dressing on your IV site for at least 2 hours and keep area clean and dry  - If you get sick or have symptoms of infection or are not feeling well for any reason.    (Wash your hands often, stay away from people who are sick)  - If you have side effects from your medication that do not go away or are bothersome.     (Refer to the teaching your nurse gave you for side effects to call your doctor about)    - Common side effects may include:  stuffy nose, headache, feeling tired, muscle aches, upset stomach  - Before receiving any vaccines     - Call [facility] at   If:  - You get sick, are on antibiotics, have had a recent vaccine, have surgery or dental work and your doctor wants your visit rescheduled.  - You need to cancel and reschedule your visit for any reason. Call at least 2 days before your visit if you need to cancel.   - Your insurance changes before your next visit.    (We will need to get approval from your new insurance. This can take up to two weeks.)     The [facility] is opened Monday thru Friday. We are closed on weekends and holidays.   Voice mail will take your call if the center is closed. If you leave a message please allow 24 hours for a call back  during weekdays. If you leave a message on a weekend/holiday, we will call you back the next business day.

## 2024-09-13 ENCOUNTER — APPOINTMENT (OUTPATIENT)
Dept: INFUSION THERAPY | Facility: CLINIC | Age: 69
End: 2024-09-13
Payer: MEDICARE

## 2024-09-13 VITALS
OXYGEN SATURATION: 98 % | TEMPERATURE: 97.2 F | HEART RATE: 74 BPM | DIASTOLIC BLOOD PRESSURE: 77 MMHG | SYSTOLIC BLOOD PRESSURE: 124 MMHG | RESPIRATION RATE: 16 BRPM

## 2024-09-13 DIAGNOSIS — G31.89 PARANEOPLASTIC CEREBELLAR DEGENERATION (CMS-HCC): ICD-10-CM

## 2024-09-13 RX ORDER — DIPHENHYDRAMINE HYDROCHLORIDE 50 MG/ML
25 INJECTION INTRAMUSCULAR; INTRAVENOUS ONCE AS NEEDED
Status: CANCELLED | OUTPATIENT
Start: 2024-09-13

## 2024-09-13 RX ORDER — ALBUTEROL SULFATE 0.83 MG/ML
2.5 SOLUTION RESPIRATORY (INHALATION) ONCE AS NEEDED
Status: CANCELLED | OUTPATIENT
Start: 2024-09-13

## 2024-09-13 ASSESSMENT — ENCOUNTER SYMPTOMS
FATIGUE: 1
CARDIOVASCULAR NEGATIVE: 1
EXTREMITY WEAKNESS: 1
CONSTIPATION: 1
RESPIRATORY NEGATIVE: 1
NUMBNESS: 1

## 2024-09-13 NOTE — PATIENT INSTRUCTIONS
Today :We administered methylPREDNISolone sodium succinate.     For:   1. Paraneoplastic cerebellar degeneration (CMS-HCC)         Your next appointment is due in:  FOLLOW UP WITH MD        Please read the  Medication Guide that was given to you and reviewed during todays visit.     (Tell all doctors including dentists that you are taking this medication)     Go to the emergency room or call 911 if:  -You have signs of allergic reaction:   -Rash, hives, itching.   -Swollen, blistered, peeling skin.   -Swelling of face, lips, mouth, tongue or throat.   -Tightness of chest, trouble breathing, swallowing or talking     Call your doctor:  - If IV / injection site gets red, warm, swollen, itchy or leaks fluid or pus.     (Leave dressing on your IV site for at least 2 hours and keep area clean and dry  - If you get sick or have symptoms of infection or are not feeling well for any reason.    (Wash your hands often, stay away from people who are sick)  - If you have side effects from your medication that do not go away or are bothersome.     (Refer to the teaching your nurse gave you for side effects to call your doctor about)    - Common side effects may include:  stuffy nose, headache, feeling tired, muscle aches, upset stomach  - Before receiving any vaccines     - Call the [facility] at   If:  - You get sick, are on antibiotics, have had a recent vaccine, have surgery or dental work and your doctor wants your visit rescheduled.  - You need to cancel and reschedule your visit for any reason. Call at least 2 days before your visit if you need to cancel.   - Your insurance changes before your next visit.    (We will need to get approval from your new insurance. This can take up to two weeks.)     [facility] is opened Monday thru Friday. We are closed on weekends and holidays.   Voice mail will take your call if the center is closed. If you leave a message please allow 24 hours for a  call back during weekdays. If you leave a message on a weekend/holiday, we will call you back the next business day.

## 2024-09-13 NOTE — PROGRESS NOTES
[facility]   Infusion Clinic Note   Date: 2024   Name: Gladys Branch  : 1955   MRN: [id number]          Reason for Visit: OP Infusion (PATIENT HERE FOR DAY 3/3 SOLUMEDROL 1 GM INFUSION)         Today: We administered methylPREDNISolone sodium succinate.       Visit Type: INFUSION       Ordered By: Marquise Mora MD        Accompanied by:       Diagnosis: Paraneoplastic cerebellar degeneration (CMS-HCC)        Allergies:   Allergies as of 2024 - Reviewed 2024   Allergen Reaction Noted    Cisplatin Other 2023    Codeine Nausea Only and Other 2023    Sulfa (sulfonamide antibiotics) Rash 2023          Current Medications has a current medication list which includes the following prescription(s): cetirizine-pseudoephedrine, cholecalciferol, cyclophosphamide, dexamethasone, dextromethorphan polistirex er, dronabinol, estrogens (conjugated), mv-min/fa/vit k/lutein/zeaxant, omeprazole, ondansetron, polyethylene glycol, prednisone, prednisone, and prochlorperazine, and the following Facility-Administered Medications: methylprednisolone sodium succinate.       Vitals:   Vitals:    24 1337   BP: 124/77   Pulse: 74   Resp: 16   Temp: 36.2 °C (97.2 °F)   SpO2: 98%             Infusion Pre-procedure Checklist:   - Allergies reviewed: yes   - Medications reviewed: yes       - Previous reaction to current treatment: no      Assess patient for the concerns below. Document provider notification as appropriate.  - Active or recent infection with/without current antibiotic use: no  - Recent or planned invasive dental work: no  - Recent or planned surgeries: no  - Recently received or plans to receive vaccinations: no  - Has treatment related toxicities: no  - Is pregnant:  n/a      Pain: 0   - Is the pain different from normal: n/a   - Is your Doctor aware:  n/a       Labs:  REVIEWED          Fall Risk Screening: Kiesha Fall Risk  History of  Falling, Immediate or Within 3 Months: Yes  Secondary Diagnosis: Yes  Ambulatory Aid: Crutches/cane/walker  Intravenous Therapy/Heparin Lock: No  Gait/Transferring: Impaired   Mental Status: Oriented to own ability  Pop Fall Risk Score: 75       Review Of Systems:  Review of Systems   Constitutional:  Positive for fatigue.   HENT:  Negative.     Respiratory: Negative.     Cardiovascular: Negative.    Gastrointestinal:  Positive for constipation.   Musculoskeletal:  Positive for gait problem.   Skin: Negative.    Neurological:  Positive for extremity weakness, gait problem and numbness.         ROS completed? Yes      Infusion Readiness:  - Assessment Concerns Related to Infusion: Yes  - Provider notified: n/a      Document Below Only If Indicated:   New Patient Education:    N/A (returning patient for continuation of therapy. Ongoing education provided as needed.)        Treatment Conditions & Drug Specific Questions:    SOLUMEDROL 1 GM        Weight Based Drug Calculations:    WEIGHT BASED DRUGS: NOT APPLICABLE / FLAT DOSE          Initiated By: Breanne Silva RN      All questions and concerns were addressed at time of visit. Patient was not independently evaluated by the Nurse Practitioner on site at [facility].    09/13/24 at 3:36 PM - CASEY Sumner-CNP

## 2024-09-17 ENCOUNTER — TELEPHONE (OUTPATIENT)
Dept: PHYSICAL THERAPY | Facility: HOSPITAL | Age: 69
End: 2024-09-17

## 2024-09-17 ENCOUNTER — APPOINTMENT (OUTPATIENT)
Dept: HEMATOLOGY/ONCOLOGY | Facility: CLINIC | Age: 69
End: 2024-09-17
Payer: MEDICARE

## 2024-09-17 ENCOUNTER — HOSPITAL ENCOUNTER (OUTPATIENT)
Dept: RADIOLOGY | Facility: HOSPITAL | Age: 69
Discharge: HOME | End: 2024-09-17
Payer: MEDICARE

## 2024-09-17 ENCOUNTER — LAB (OUTPATIENT)
Dept: LAB | Facility: LAB | Age: 69
End: 2024-09-17
Payer: MEDICARE

## 2024-09-17 ENCOUNTER — INFUSION (OUTPATIENT)
Dept: HEMATOLOGY/ONCOLOGY | Facility: CLINIC | Age: 69
End: 2024-09-17
Payer: MEDICARE

## 2024-09-17 VITALS
HEART RATE: 69 BPM | RESPIRATION RATE: 18 BRPM | WEIGHT: 114.64 LBS | OXYGEN SATURATION: 97 % | BODY MASS INDEX: 22.51 KG/M2 | TEMPERATURE: 97.7 F | HEIGHT: 60 IN | DIASTOLIC BLOOD PRESSURE: 81 MMHG | SYSTOLIC BLOOD PRESSURE: 119 MMHG

## 2024-09-17 DIAGNOSIS — C34.90 SMALL CELL LUNG CANCER: ICD-10-CM

## 2024-09-17 DIAGNOSIS — R73.01 IMPAIRED FASTING BLOOD SUGAR: ICD-10-CM

## 2024-09-17 DIAGNOSIS — Z00.6 EXAMINATION OF PARTICIPANT IN CLINICAL TRIAL: ICD-10-CM

## 2024-09-17 DIAGNOSIS — E55.9 VITAMIN D DEFICIENCY: ICD-10-CM

## 2024-09-17 DIAGNOSIS — D63.8 ANEMIA, CHRONIC DISEASE: ICD-10-CM

## 2024-09-17 DIAGNOSIS — G31.89 PARANEOPLASTIC CEREBELLAR DEGENERATION (CMS-HCC): ICD-10-CM

## 2024-09-17 DIAGNOSIS — E78.2 MIXED HYPERLIPIDEMIA: ICD-10-CM

## 2024-09-17 LAB
25(OH)D3 SERPL-MCNC: 37 NG/ML (ref 30–100)
ALBUMIN SERPL BCP-MCNC: 3.6 G/DL (ref 3.4–5)
ALP SERPL-CCNC: 40 U/L (ref 33–136)
ALT SERPL W P-5'-P-CCNC: 16 U/L (ref 7–45)
ANION GAP SERPL CALC-SCNC: 10 MMOL/L (ref 10–20)
AST SERPL W P-5'-P-CCNC: 13 U/L (ref 9–39)
BASOPHILS # BLD AUTO: 0.04 X10*3/UL (ref 0–0.1)
BASOPHILS NFR BLD AUTO: 0.5 %
BILIRUB SERPL-MCNC: 0.7 MG/DL (ref 0–1.2)
BUN SERPL-MCNC: 32 MG/DL (ref 6–23)
CALCIUM SERPL-MCNC: 8.7 MG/DL (ref 8.6–10.3)
CHLORIDE SERPL-SCNC: 103 MMOL/L (ref 98–107)
CHOLEST SERPL-MCNC: 274 MG/DL (ref 0–199)
CHOLESTEROL/HDL RATIO: 3.3
CO2 SERPL-SCNC: 30 MMOL/L (ref 21–32)
CREAT SERPL-MCNC: 0.81 MG/DL (ref 0.5–1.05)
EGFRCR SERPLBLD CKD-EPI 2021: 79 ML/MIN/1.73M*2
EOSINOPHIL # BLD AUTO: 0.11 X10*3/UL (ref 0–0.7)
EOSINOPHIL NFR BLD AUTO: 1.5 %
ERYTHROCYTE [DISTWIDTH] IN BLOOD BY AUTOMATED COUNT: 13.7 % (ref 11.5–14.5)
GLUCOSE SERPL-MCNC: 73 MG/DL (ref 74–99)
HCT VFR BLD AUTO: 39.3 % (ref 36–46)
HDLC SERPL-MCNC: 82.9 MG/DL
HGB BLD-MCNC: 13 G/DL (ref 12–16)
IMM GRANULOCYTES # BLD AUTO: 0.3 X10*3/UL (ref 0–0.7)
IMM GRANULOCYTES NFR BLD AUTO: 4.1 % (ref 0–0.9)
LDLC SERPL CALC-MCNC: 165 MG/DL
LDLC SERPL DIRECT ASSAY-MCNC: 183 MG/DL (ref 0–129)
LYMPHOCYTES # BLD AUTO: 1.97 X10*3/UL (ref 1.2–4.8)
LYMPHOCYTES NFR BLD AUTO: 27 %
MCH RBC QN AUTO: 30.8 PG (ref 26–34)
MCHC RBC AUTO-ENTMCNC: 33.1 G/DL (ref 32–36)
MCV RBC AUTO: 93 FL (ref 80–100)
MONOCYTES # BLD AUTO: 0.68 X10*3/UL (ref 0.1–1)
MONOCYTES NFR BLD AUTO: 9.3 %
NEUTROPHILS # BLD AUTO: 4.2 X10*3/UL (ref 1.2–7.7)
NEUTROPHILS NFR BLD AUTO: 57.6 %
NON HDL CHOLESTEROL: 191 MG/DL (ref 0–149)
NRBC BLD-RTO: 0 /100 WBCS (ref 0–0)
PLATELET # BLD AUTO: 263 X10*3/UL (ref 150–450)
POTASSIUM SERPL-SCNC: 3.8 MMOL/L (ref 3.5–5.3)
PROT SERPL-MCNC: 6 G/DL (ref 6.4–8.2)
RBC # BLD AUTO: 4.22 X10*6/UL (ref 4–5.2)
SODIUM SERPL-SCNC: 139 MMOL/L (ref 136–145)
TRIGL SERPL-MCNC: 131 MG/DL (ref 0–149)
TSH SERPL-ACNC: 2.13 MIU/L (ref 0.44–3.98)
VLDL: 26 MG/DL (ref 0–40)
WBC # BLD AUTO: 7.3 X10*3/UL (ref 4.4–11.3)

## 2024-09-17 PROCEDURE — 36415 COLL VENOUS BLD VENIPUNCTURE: CPT

## 2024-09-17 PROCEDURE — 83036 HEMOGLOBIN GLYCOSYLATED A1C: CPT

## 2024-09-17 PROCEDURE — 2500000004 HC RX 250 GENERAL PHARMACY W/ HCPCS (ALT 636 FOR OP/ED): Mod: JZ,JG | Performed by: STUDENT IN AN ORGANIZED HEALTH CARE EDUCATION/TRAINING PROGRAM

## 2024-09-17 PROCEDURE — 80053 COMPREHEN METABOLIC PANEL: CPT

## 2024-09-17 PROCEDURE — 74177 CT ABD & PELVIS W/CONTRAST: CPT | Performed by: RADIOLOGY

## 2024-09-17 PROCEDURE — 2550000001 HC RX 255 CONTRASTS: Performed by: STUDENT IN AN ORGANIZED HEALTH CARE EDUCATION/TRAINING PROGRAM

## 2024-09-17 PROCEDURE — 83721 ASSAY OF BLOOD LIPOPROTEIN: CPT

## 2024-09-17 PROCEDURE — 80061 LIPID PANEL: CPT

## 2024-09-17 PROCEDURE — 84443 ASSAY THYROID STIM HORMONE: CPT

## 2024-09-17 PROCEDURE — 2500000004 HC RX 250 GENERAL PHARMACY W/ HCPCS (ALT 636 FOR OP/ED): Performed by: STUDENT IN AN ORGANIZED HEALTH CARE EDUCATION/TRAINING PROGRAM

## 2024-09-17 PROCEDURE — 85025 COMPLETE CBC W/AUTO DIFF WBC: CPT

## 2024-09-17 PROCEDURE — 96413 CHEMO IV INFUSION 1 HR: CPT

## 2024-09-17 PROCEDURE — 96375 TX/PRO/DX INJ NEW DRUG ADDON: CPT | Mod: INF

## 2024-09-17 PROCEDURE — 82306 VITAMIN D 25 HYDROXY: CPT

## 2024-09-17 PROCEDURE — 74177 CT ABD & PELVIS W/CONTRAST: CPT

## 2024-09-17 PROCEDURE — 71260 CT THORAX DX C+: CPT | Performed by: RADIOLOGY

## 2024-09-17 RX ORDER — EPINEPHRINE 0.3 MG/.3ML
0.3 INJECTION SUBCUTANEOUS EVERY 5 MIN PRN
OUTPATIENT
Start: 2024-10-15

## 2024-09-17 RX ORDER — FAMOTIDINE 10 MG/ML
20 INJECTION INTRAVENOUS ONCE AS NEEDED
Status: DISCONTINUED | OUTPATIENT
Start: 2024-09-17 | End: 2024-09-17 | Stop reason: HOSPADM

## 2024-09-17 RX ORDER — EPINEPHRINE 0.3 MG/.3ML
0.3 INJECTION SUBCUTANEOUS EVERY 5 MIN PRN
Status: DISCONTINUED | OUTPATIENT
Start: 2024-09-17 | End: 2024-09-17 | Stop reason: HOSPADM

## 2024-09-17 RX ORDER — DIPHENHYDRAMINE HYDROCHLORIDE 50 MG/ML
50 INJECTION INTRAMUSCULAR; INTRAVENOUS AS NEEDED
Status: DISCONTINUED | OUTPATIENT
Start: 2024-09-17 | End: 2024-09-17 | Stop reason: HOSPADM

## 2024-09-17 RX ORDER — ALBUTEROL SULFATE 0.83 MG/ML
3 SOLUTION RESPIRATORY (INHALATION) AS NEEDED
OUTPATIENT
Start: 2024-10-15

## 2024-09-17 RX ORDER — ALBUTEROL SULFATE 0.83 MG/ML
3 SOLUTION RESPIRATORY (INHALATION) AS NEEDED
Status: DISCONTINUED | OUTPATIENT
Start: 2024-09-17 | End: 2024-09-17 | Stop reason: HOSPADM

## 2024-09-17 RX ORDER — PALONOSETRON 0.05 MG/ML
0.25 INJECTION, SOLUTION INTRAVENOUS ONCE
Status: COMPLETED | OUTPATIENT
Start: 2024-09-17 | End: 2024-09-17

## 2024-09-17 RX ORDER — PALONOSETRON 0.05 MG/ML
0.25 INJECTION, SOLUTION INTRAVENOUS ONCE
OUTPATIENT
Start: 2024-10-15

## 2024-09-17 RX ORDER — FAMOTIDINE 10 MG/ML
20 INJECTION INTRAVENOUS ONCE AS NEEDED
OUTPATIENT
Start: 2024-10-15

## 2024-09-17 RX ORDER — HEPARIN 100 UNIT/ML
500 SYRINGE INTRAVENOUS AS NEEDED
OUTPATIENT
Start: 2024-09-17

## 2024-09-17 RX ORDER — DIPHENHYDRAMINE HYDROCHLORIDE 50 MG/ML
50 INJECTION INTRAMUSCULAR; INTRAVENOUS AS NEEDED
OUTPATIENT
Start: 2024-10-15

## 2024-09-17 RX ORDER — HEPARIN SODIUM,PORCINE/PF 10 UNIT/ML
50 SYRINGE (ML) INTRAVENOUS AS NEEDED
OUTPATIENT
Start: 2024-09-17

## 2024-09-17 ASSESSMENT — PAIN SCALES - GENERAL: PAINLEVEL: 0-NO PAIN

## 2024-09-17 NOTE — TELEPHONE ENCOUNTER
[facility]VD VM FROM [facility] RE: NP PT REFERRAL. VM STATES TO CONTACT  TO DIGNA (P) [phone]. PC TO CHERELLE MARTINEZ) THIS DATE; LMOM TO CALL TO SCHED NO VISITS

## 2024-09-17 NOTE — PATIENT INSTRUCTIONS
You need get labs prior to each dose of Cytoxan. You may get your labs drawn up to 7 days in advance at any  lab or you can get them drawn here at our [facility] lab the day of treatment.     If you are experiencing side effects such as nausea, please call Dr Mora's office

## 2024-09-18 LAB
EST. AVERAGE GLUCOSE BLD GHB EST-MCNC: 111 MG/DL
HBA1C MFR BLD: 5.5 %

## 2024-09-18 NOTE — ADDENDUM NOTE
Encounter addended by: Betsy Hadley RN on: 9/18/2024 1:20 AM   Actions taken: Charge Capture section accepted

## 2024-09-24 ENCOUNTER — APPOINTMENT (OUTPATIENT)
Dept: HEMATOLOGY/ONCOLOGY | Facility: HOSPITAL | Age: 69
End: 2024-09-24
Payer: MEDICARE

## 2024-09-24 ENCOUNTER — OFFICE VISIT (OUTPATIENT)
Dept: HEMATOLOGY/ONCOLOGY | Facility: HOSPITAL | Age: 69
End: 2024-09-24
Payer: MEDICARE

## 2024-09-24 VITALS
SYSTOLIC BLOOD PRESSURE: 113 MMHG | DIASTOLIC BLOOD PRESSURE: 69 MMHG | RESPIRATION RATE: 18 BRPM | BODY MASS INDEX: 22.47 KG/M2 | TEMPERATURE: 97 F | WEIGHT: 115.5 LBS | OXYGEN SATURATION: 98 % | HEART RATE: 86 BPM

## 2024-09-24 DIAGNOSIS — C34.90 SMALL CELL LUNG CANCER: Primary | ICD-10-CM

## 2024-09-24 PROCEDURE — 99215 OFFICE O/P EST HI 40 MIN: CPT | Performed by: STUDENT IN AN ORGANIZED HEALTH CARE EDUCATION/TRAINING PROGRAM

## 2024-09-24 PROCEDURE — 1159F MED LIST DOCD IN RCRD: CPT | Performed by: STUDENT IN AN ORGANIZED HEALTH CARE EDUCATION/TRAINING PROGRAM

## 2024-09-24 PROCEDURE — 1126F AMNT PAIN NOTED NONE PRSNT: CPT | Performed by: STUDENT IN AN ORGANIZED HEALTH CARE EDUCATION/TRAINING PROGRAM

## 2024-09-24 ASSESSMENT — PAIN SCALES - GENERAL: PAINLEVEL: 0-NO PAIN

## 2024-09-24 NOTE — PROGRESS NOTES
Patient ID: Gladys Branch is a 68 y.o. female.    DIAGNOSIS    Small Cell Lung Cancer    By immunostaining, the tumor cells are positive for CAM 5.2, INSM1, and TTF-1, and negative for p40 and LCA. The proliferation index by Ki-67 immunostaining is approximately 90%.  Synaptophysin, Chromogranin and INSM-1 are positive.  AE1/AE3 is negative. NEOGENOMICS, RB protein expression is lost in the tumor cells    STAGING    [id number], limited stage  Recurrence    CURRENT SITES OF DISEASE    RLL, supraclavicular    MOLECULAR GENOMICS      PRIOR THERAPY    Concurrent chemoradiation with Cisplatin/Etoposide every 3 weeks; C1D1 2/15/22, reaction to cisplatin C2D1 and did not receive D2 or D3 etoposide  Carbo/etoposide 4/5/2022 1 cycle c/b rash  PCI 5/21-6/13/22    CURRENT THERAPY    Phase 1 study [id number]  with study drug Taralatamab 1/24/23 - present.    CURRENT ONCOLOGICAL PROBLEMS      HISTORY OF PRESENT ILLNESS    Mrs. Branch is a 65 yo with PMH GERD and COPD who originally was round to have a RLL nodule measuring 11 mm in 4/28/2021 found as part of CT calcium score scan. An ensuing CT chest w/o contrast was done on 5/19/21 which revealed subsolid pulmonary nodules measuring 14 mm in the RLL. She had CT chest w/contrast on 11/29/21 which confirmed stable 14 mm GGO of the RLL and decreased RLL subpleural nodule. She met thoracic surgeon Dr. Farfan, who ordered PET/CT scan done on 12/8/21 which did not reveal any FDG-avid nodules within the lung parenchyma but R hilar nodes with max SUV 8.0. He referred her for bronch, which was done on 1/21/22 by Dr. Mcdaniels. 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 Brain MRI was negative.    She started concurrent chemoradiation with BID radiation with cis/etop 2/15/22, and unfortunately had a reaction to cisplatin on C2D1 with chest pain and hypotension. She was hospitalized with sepsis felt to be due to pneumonia. She trialed carboplatin/etoposide on 4/5/22 with a  "resulting rash and opted to forego further chemotherapy as she had completed radiation. Restaging scan 11/3/22 unfortunately with progression with new R hilar lymph node, paratracheal nodes, and increase in size of R supraclavicular mass. She enrolled in [id number] and started C1D1 1/24/23. C1 complicated by anorexia and dysgeusia, and delayed C2 by a week. She has further struggled with fatigue and confusion, which may be related to mirtazapine. Dose reduced for C2 and mirtazapine stopped with improvement in symptoms. 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    PAST MEDICAL HISTORY    GERD  COPD    SOCIAL HISTORY    Retired - lighting . Lives at home with  and a kitten.  Tobacco: quit smoking 1999. 1 ppd x 25 yrs.  EtOH: wine 1 glass/day  Illicits: none    CURRENT MEDS    Please see med list    ALLERGIES    Codeine, Sulfa drugs, Cisplatin    FAMILY HISTORY    Paternal aunt - breast cancer dx 80s    Subjective    She is here today with her . She fell in the shower last week, said she didn't hurt herself. Got too close to the front of the shower and slipped on it. She is currently tapering her steroids; the steroids really messed with her head. She started the cytoxan on 9/17/24. She will start physical therapy next month. She's not burping anymore. The cane is helping her, she is still walking.       Objective          9/10/2024     9:45 AM 9/11/2024     1:44 PM 9/11/2024     3:30 PM 9/12/2024     2:38 PM 9/13/2024     1:37 PM 9/17/2024     8:07 AM 9/24/2024     2:39 PM   Vitals   Systolic 98 116 107 120 124 119 113   Diastolic 65 81 65 78 77 81 69   Heart Rate 72 50 70 86 74 69 86   Temp 36.5 °C (97.7 °F) 36.7 °C (98 °F) 36.2 °C (97.2 °F) 36.8 °C (98.2 °F) 36.2 °C (97.2 °F) 36.5 °C (97.7 °F) 36.1 °C (97 °F)   Resp 16 16 16 16 16 18 18   Height (in)      1.527 m (5' 0.12\")  "   Weight (lb) 115.52     114.64 115.5   BMI 22.56 kg/m2     22.3 kg/m2 22.47 kg/m2   BSA (m2) 1.49 m2     1.49 m2 1.49 m2   Visit Report       Report       Daily Weight  09/24/24 : 52.4 kg (115 lb 8 oz)  09/17/24 : 52 kg (114 lb 10.2 oz)  09/10/24 : 52.4 kg (115 lb 8.3 oz)  08/27/24 : 52.6 kg (115 lb 15.4 oz)  08/15/24 : 52.3 kg (115 lb 3.2 oz)         Physical Exam  Vitals reviewed.   Constitutional:       General: She is not in acute distress.  HENT:      Head: Normocephalic and atraumatic.      Mouth/Throat:      Mouth: Mucous membranes are moist.   Eyes:      Extraocular Movements: Extraocular movements intact.      Conjunctiva/sclera: Conjunctivae normal.      Pupils: Pupils are equal, round, and reactive to light.   Cardiovascular:      Rate and Rhythm: Normal rate and regular rhythm.      Heart sounds: Normal heart sounds. No murmur heard.  Pulmonary:      Effort: Pulmonary effort is normal. No respiratory distress.      Breath sounds: Normal breath sounds.   Abdominal:      General: Abdomen is flat. Bowel sounds are normal. There is no distension.      Palpations: Abdomen is soft.   Skin:     General: Skin is warm and dry.   Neurological:      Mental Status: She is alert and oriented to person, place, and time. Mental status is at baseline.   Psychiatric:         Mood and Affect: Mood normal.         Behavior: Behavior normal.         Thought Content: Thought content normal.       Labs  No visits with results within 1 Day(s) from this visit.   Latest known visit with results is:   Lab on 09/17/2024   Component Date Value Ref Range Status    Thyroid Stimulating Hormone 09/17/2024 2.13  0.44 - 3.98 mIU/L Final    Glucose 09/17/2024 73 (L)  74 - 99 mg/dL Final    Sodium 09/17/2024 139  136 - 145 mmol/L Final    Potassium 09/17/2024 3.8  3.5 - 5.3 mmol/L Final    Chloride 09/17/2024 103  98 - 107 mmol/L Final    Bicarbonate 09/17/2024 30  21 - 32 mmol/L Final    Anion Gap 09/17/2024 10  10 - 20 mmol/L Final     Urea Nitrogen 09/17/2024 32 (H)  6 - 23 mg/dL Final    Creatinine 09/17/2024 0.81  0.50 - 1.05 mg/dL Final    eGFR 09/17/2024 79  >60 mL/min/1.73m*2 Final    Calculations of estimated GFR are performed using the 2021 CKD-EPI Study Refit equation without the race variable for the IDMS-Traceable creatinine methods.  https://jasn.asnjournals.org/content/early/2021/09/22/ASN.1187005139    Calcium 09/17/2024 8.7  8.6 - 10.3 mg/dL Final    Albumin 09/17/2024 3.6  3.4 - 5.0 g/dL Final    Alkaline Phosphatase 09/17/2024 40  33 - 136 U/L Final    Total Protein 09/17/2024 6.0 (L)  6.4 - 8.2 g/dL Final    AST 09/17/2024 13  9 - 39 U/L Final    Bilirubin, Total 09/17/2024 0.7  0.0 - 1.2 mg/dL Final    ALT 09/17/2024 16  7 - 45 U/L Final    Patients treated with Sulfasalazine may generate falsely decreased results for ALT.    Cholesterol 09/17/2024 274 (H)  0 - 199 mg/dL Final          Age      Desirable   Borderline High   High     0-19 Y     0 - 169       170 - 199     >/= 200    20-24 Y     0 - 189       190 - 224     >/= 225         >24 Y     0 - 199       200 - 239     >/= 240   **All ranges are based on fasting samples. Specific   therapeutic targets will vary based on patient-specific   cardiac risk.    Pediatric guidelines reference:Pediatrics 2011, 128(S5).Adult guidelines reference: NCEP ATPIII Guidelines,JOSE DAVID 2001, 258:2486-97    Venipuncture immediately after or during the administration of Metamizole may lead to falsely low results. Testing should be performed immediately prior to Metamizole dosing.    HDL-Cholesterol 09/17/2024 82.9  mg/dL Final      Age       Very Low   Low     Normal    High    0-19 Y    < 35      < 40     40-45     ----  20-24 Y    ----     < 40      >45      ----        >24 Y      ----     < 40     40-60      >60      Cholesterol/HDL Ratio 09/17/2024 3.3   Final      Ref Values  Desirable  < 3.4  High Risk  > 5.0    LDL Calculated 09/17/2024 165 (H)  <=99 mg/dL Final                                 Near   Borderline      AGE      Desirable  Optimal    High     High     Very High     0-19 Y     0 - 109     ---    110-129   >/= 130     ----    20-24 Y     0 - 119     ---    120-159   >/= 160     ----      >24 Y     0 -  99   100-129  130-159   160-189     >/=190      VLDL 09/17/2024 26  0 - 40 mg/dL Final    Triglycerides 09/17/2024 131  0 - 149 mg/dL Final       Age         Desirable   Borderline High   High     Very High   0 D-90 D    19 - 174         ----         ----        ----  91 D- 9 Y     0 -  74        75 -  99     >/= 100      ----    10-19 Y     0 -  89        90 - 129     >/= 130      ----    20-24 Y     0 - 114       115 - 149     >/= 150      ----         >24 Y     0 - 149       150 - 199    200- 499    >/= 500    Venipuncture immediately after or during the administration of Metamizole may lead to falsely low results. Testing should be performed immediately prior to Metamizole dosing.    Non HDL Cholesterol 09/17/2024 191 (H)  0 - 149 mg/dL Final          Age       Desirable   Borderline High   High     Very High     0-19 Y     0 - 119       120 - 144     >/= 145    >/= 160    20-24 Y     0 - 149       150 - 189     >/= 190      ----         >24 Y    30 mg/dL above LDL Cholesterol goal      LDL, Direct 09/17/2024 183 (H)  0 - 129 mg/dL Final    Hemoglobin A1C 09/17/2024 5.5  See comment % Final    Estimated Average Glucose 09/17/2024 111  Not Established mg/dL Final    Vitamin D, 25-Hydroxy, Total 09/17/2024 37  30 - 100 ng/mL Final    WBC 09/17/2024 7.3  4.4 - 11.3 x10*3/uL Final    nRBC 09/17/2024 0.0  0.0 - 0.0 /100 WBCs Final    RBC 09/17/2024 4.22  4.00 - 5.20 x10*6/uL Final    Hemoglobin 09/17/2024 13.0  12.0 - 16.0 g/dL Final    Hematocrit 09/17/2024 39.3  36.0 - 46.0 % Final    MCV 09/17/2024 93  80 - 100 fL Final    MCH 09/17/2024 30.8  26.0 - 34.0 pg Final    MCHC 09/17/2024 33.1  32.0 - 36.0 g/dL Final    RDW 09/17/2024 13.7  11.5 - 14.5 % Final    Platelets 09/17/2024 263  150 -  450 x10*3/uL Final    Neutrophils % 09/17/2024 57.6  40.0 - 80.0 % Final    Immature Granulocytes %, Automated 09/17/2024 4.1 (H)  0.0 - 0.9 % Final    Immature Granulocyte Count (IG) includes promyelocytes, myelocytes and metamyelocytes but does not include bands. Percent differential counts (%) should be interpreted in the context of the absolute cell counts (cells/UL).    Lymphocytes % 09/17/2024 27.0  13.0 - 44.0 % Final    Monocytes % 09/17/2024 9.3  2.0 - 10.0 % Final    Eosinophils % 09/17/2024 1.5  0.0 - 6.0 % Final    Basophils % 09/17/2024 0.5  0.0 - 2.0 % Final    Neutrophils Absolute 09/17/2024 4.20  1.20 - 7.70 x10*3/uL Final    Percent differential counts (%) should be interpreted in the context of the absolute cell counts (cells/uL).    Immature Granulocytes Absolute, Au* 09/17/2024 0.30  0.00 - 0.70 x10*3/uL Final    Lymphocytes Absolute 09/17/2024 1.97  1.20 - 4.80 x10*3/uL Final    Monocytes Absolute 09/17/2024 0.68  0.10 - 1.00 x10*3/uL Final    Eosinophils Absolute 09/17/2024 0.11  0.00 - 0.70 x10*3/uL Final    Basophils Absolute 09/17/2024 0.04  0.00 - 0.10 x10*3/uL Final               Performance Status:    ECOG 1: Restricted in physically strenuous activity but ambulatory and able to carry out work of a light or sedentary nature, e.g., light house work, office work.           Imaging:  I have personally reviewed the below imaging and concur with the reported findings unless otherwise stated:    CT chest abdomen pelvis w IV contrast    Result Date: 9/18/2024  Impression: 1. No evidence of new metastatic disease in the chest abdomen or pelvis. 2. Stable 1.2 cm ground-glass nodule in the posterior right lower lobe.   I personally reviewed the images/study and I agree with the findings as stated by Resident Faye Jordan. This study was interpreted at [facility], [place], [place].   MACRO: None.   Signed by: Eddie Conroy 9/18/2024 11:28 PM Dictation workstation:    [id number]           Assessment/Plan      Mrs. Branch is a 69 yo with COPD and GERD who presented with newly diagnosed SCLC completed BID radiation planned concurrently with cis/etoposide but had a reaction C2D1 to cisplatin. Completed 1 cycle carbo/etop D1 4/5/22 also complicated by facial flushing and erythematous rash and stopped further treatment. Now s/p PCI in 6/2022. Started clinical trial [id number] with tarlatamab 1/24/23. Treatment has been complicated by worsening short-term memory worse for the day or two after treatment, delayed C2 by 1 week and dose-reduced. Confusion has largely resolved during C3 after stopping mirtazapine but she and her  recognize transient worsening for the day or two after treatment. Stopped tarlatamab after developing paraneoplastic cerebellar ataxia.      #SCLC  - originally diagnosed with limited stage SCLC and started concurrent chemoradiation cis/etop 2/15/22, with reaction to cisplatin on C2D1  - trialed carboplatin/etoposide with rash and opted to forego further chemo as she had completed radiation  - progression 11/2022, and enrolled on [id number] (tarlatamab) starting C1D1 1/24/23. Tolerated well overall until she has now developed paraneoplastic cerebellar ataxia, anti-ANNA1 antibody positive, following with neurology  - stopped trial (last dose 8/27/24) and will monitor off drugs with surveillance scans for now  - personally reviewed most recent scan without evidence of progression. Will continue CT C/A/P every 2 months, and obtain new brain MRI with next CT scan as well    # Paraneplastic cerebellar ataxia  - anti-ANNA1 ab positive  - follows with neurology  - unclear etiology, occult progression vs tarlatamab induced  - tapering steroids and started cytoxan  - referral to PT    #Double vision/confusion/gait instability  -5/20/24 MRI brain negative.   -Discussed in phase 1 meeting and seems consistent with WBR late effect.   -She was seen by her ophthalmologist  and will continue to follow.  -saw neurology and undergoing work-up as well  - continues on dexamethasone, and note decreased cortisol and ACTH - will refer to endocrinology for consideration if this is clinically significant and impacting confusion/gait instability    Francy Archer MD

## 2024-09-26 ENCOUNTER — TELEPHONE (OUTPATIENT)
Dept: HEMATOLOGY/ONCOLOGY | Facility: CLINIC | Age: 69
End: 2024-09-26

## 2024-09-30 NOTE — ADDENDUM NOTE
Encounter addended by: Sharifa Steve RN on: 9/30/2024 5:41 PM   Actions taken: Flowsheet accepted, Clinical Note Signed

## 2024-10-07 ENCOUNTER — TELEPHONE (OUTPATIENT)
Dept: PHYSICAL THERAPY | Facility: CLINIC | Age: 69
End: 2024-10-07
Payer: MEDICARE

## 2024-10-07 ENCOUNTER — APPOINTMENT (OUTPATIENT)
Dept: PHYSICAL THERAPY | Facility: HOSPITAL | Age: 69
End: 2024-10-07
Payer: MEDICARE

## 2024-10-07 ENCOUNTER — DOCUMENTATION (OUTPATIENT)
Dept: RESEARCH | Facility: HOSPITAL | Age: 69
End: 2024-10-07
Payer: MEDICARE

## 2024-10-07 NOTE — PROGRESS NOTES
Patient's  called in with concerns that Gladys is very weak.  He asked if they could come see Dr. Archer and maybe get fluids.  Dr. Archer is out.  [facility] is full.  I reached out to neuro team.  No concerns other than typical chemo related adverse events.  I called the patient back to let them know I can see in clinic to get labs and evaluate for fluids vs ED.  No answer.  Left a message.

## 2024-10-07 NOTE — PROGRESS NOTES
Pt spouse called and LMOV that pt not feeling well and would need to cancel appt, let  know, will call to reschedule

## 2024-10-09 NOTE — RESEARCH NOTES
Research Note EOT Visit    Gladys Branch is here today for an EOT visit on [id number].   EOT procedures completed per protocol. AE's and con-meds reviewed with patient.   Follow up plan discussed with patient.    Patient discontinued treatment due to:  []    Disease Progression  [x]   Worsening AEs  [x]   Physician Decision  []   Patient Decision  []  Consent Withdrawal  []   Completed per Protocol  []   Other: <please explain>    Name of drug discontinued: [id number]  Date of last dose: 8/27/24    Patient was seen by WILMER Epps for EOT visit. Patient to be followed by Neurology as discussed in Physical Examintaion Note for this day and reasons provided. Patient was discharged in good condition per WILMER Epps.      Education Documentation  General Medication Information, taught by Vladimir Fernandez RN at 10/9/2024  9:29 AM.  Learner: Significant Other, Patient  Readiness: Eager  Method: Explanation  Response: Verbalizes Understanding    Treatment Plan and Schedule, taught by Vladimir Fernandez RN at 10/9/2024  9:29 AM.  Learner: Significant Other, Patient  Readiness: Eager  Method: Explanation  Response: Verbalizes Understanding    Supportive Medications, taught by Vladimir Fernandez RN at 10/9/2024  9:29 AM.  Learner: Significant Other, Patient  Readiness: Eager  Method: Explanation  Response: Verbalizes Understanding    Diagnostic Studies, taught by Vladimir Fernandez RN at 10/9/2024  9:29 AM.  Learner: Significant Other, Patient  Readiness: Eager  Method: Explanation  Response: Verbalizes Understanding    Education Comments  No comments found.

## 2024-10-10 ENCOUNTER — TELEPHONE (OUTPATIENT)
Dept: NEUROLOGY | Facility: CLINIC | Age: 69
End: 2024-10-10
Payer: MEDICARE

## 2024-10-10 ENCOUNTER — NURSE TRIAGE (OUTPATIENT)
Dept: HEMATOLOGY/ONCOLOGY | Facility: HOSPITAL | Age: 69
End: 2024-10-10
Payer: MEDICARE

## 2024-10-10 ENCOUNTER — OFFICE VISIT (OUTPATIENT)
Dept: HEMATOLOGY/ONCOLOGY | Facility: HOSPITAL | Age: 69
End: 2024-10-10
Payer: MEDICARE

## 2024-10-10 VITALS
RESPIRATION RATE: 18 BRPM | BODY MASS INDEX: 22.98 KG/M2 | SYSTOLIC BLOOD PRESSURE: 111 MMHG | OXYGEN SATURATION: 99 % | WEIGHT: 117.06 LBS | HEIGHT: 60 IN | DIASTOLIC BLOOD PRESSURE: 75 MMHG | HEART RATE: 75 BPM | TEMPERATURE: 97.2 F

## 2024-10-10 DIAGNOSIS — R68.89 OTHER GENERAL SYMPTOMS AND SIGNS: ICD-10-CM

## 2024-10-10 DIAGNOSIS — R53.1 WEAKNESS: Primary | ICD-10-CM

## 2024-10-10 DIAGNOSIS — R68.89 OTHER GENERAL SYMPTOMS AND SIGNS: Primary | ICD-10-CM

## 2024-10-10 LAB
ALBUMIN SERPL BCP-MCNC: 3.7 G/DL (ref 3.4–5)
ALP SERPL-CCNC: 35 U/L (ref 33–136)
ALT SERPL W P-5'-P-CCNC: 11 U/L (ref 7–45)
ANION GAP SERPL CALC-SCNC: 11 MMOL/L (ref 10–20)
APPEARANCE UR: CLEAR
AST SERPL W P-5'-P-CCNC: 19 U/L (ref 9–39)
BACTERIA #/AREA URNS AUTO: ABNORMAL /HPF
BASOPHILS # BLD AUTO: 0.02 X10*3/UL (ref 0–0.1)
BASOPHILS NFR BLD AUTO: 0.3 %
BILIRUB SERPL-MCNC: 0.8 MG/DL (ref 0–1.2)
BILIRUB UR STRIP.AUTO-MCNC: NEGATIVE MG/DL
BUN SERPL-MCNC: 20 MG/DL (ref 6–23)
CALCIUM SERPL-MCNC: 8.6 MG/DL (ref 8.6–10.3)
CHLORIDE SERPL-SCNC: 104 MMOL/L (ref 98–107)
CO2 SERPL-SCNC: 28 MMOL/L (ref 21–32)
COLOR UR: YELLOW
CORTIS SERPL-MCNC: 8.4 UG/DL (ref 2.5–20)
CREAT SERPL-MCNC: 0.74 MG/DL (ref 0.5–1.05)
EGFRCR SERPLBLD CKD-EPI 2021: 88 ML/MIN/1.73M*2
EOSINOPHIL # BLD AUTO: 0.14 X10*3/UL (ref 0–0.7)
EOSINOPHIL NFR BLD AUTO: 2.3 %
ERYTHROCYTE [DISTWIDTH] IN BLOOD BY AUTOMATED COUNT: 13.5 % (ref 11.5–14.5)
GLUCOSE SERPL-MCNC: 86 MG/DL (ref 74–99)
GLUCOSE UR STRIP.AUTO-MCNC: NORMAL MG/DL
HCT VFR BLD AUTO: 37.3 % (ref 36–46)
HGB BLD-MCNC: 12.6 G/DL (ref 12–16)
HOLD SPECIMEN: NORMAL
IMM GRANULOCYTES # BLD AUTO: 0.06 X10*3/UL (ref 0–0.7)
IMM GRANULOCYTES NFR BLD AUTO: 1 % (ref 0–0.9)
KETONES UR STRIP.AUTO-MCNC: NEGATIVE MG/DL
LEUKOCYTE ESTERASE UR QL STRIP.AUTO: ABNORMAL
LYMPHOCYTES # BLD AUTO: 1.37 X10*3/UL (ref 1.2–4.8)
LYMPHOCYTES NFR BLD AUTO: 22.3 %
MCH RBC QN AUTO: 31.6 PG (ref 26–34)
MCHC RBC AUTO-ENTMCNC: 33.8 G/DL (ref 32–36)
MCV RBC AUTO: 94 FL (ref 80–100)
MONOCYTES # BLD AUTO: 0.5 X10*3/UL (ref 0.1–1)
MONOCYTES NFR BLD AUTO: 8.1 %
MUCOUS THREADS #/AREA URNS AUTO: ABNORMAL /LPF
NEUTROPHILS # BLD AUTO: 4.06 X10*3/UL (ref 1.2–7.7)
NEUTROPHILS NFR BLD AUTO: 66 %
NITRITE UR QL STRIP.AUTO: NEGATIVE
NRBC BLD-RTO: 0 /100 WBCS (ref 0–0)
PH UR STRIP.AUTO: 5 [PH]
PLATELET # BLD AUTO: 200 X10*3/UL (ref 150–450)
POTASSIUM SERPL-SCNC: 3.7 MMOL/L (ref 3.5–5.3)
PROT SERPL-MCNC: 6.6 G/DL (ref 6.4–8.2)
PROT UR STRIP.AUTO-MCNC: NEGATIVE MG/DL
RBC # BLD AUTO: 3.99 X10*6/UL (ref 4–5.2)
RBC # UR STRIP.AUTO: NEGATIVE /UL
RBC #/AREA URNS AUTO: ABNORMAL /HPF
SODIUM SERPL-SCNC: 139 MMOL/L (ref 136–145)
SP GR UR STRIP.AUTO: 1.02
SQUAMOUS #/AREA URNS AUTO: ABNORMAL /HPF
TSH SERPL-ACNC: 2.61 MIU/L (ref 0.44–3.98)
UROBILINOGEN UR STRIP.AUTO-MCNC: NORMAL MG/DL
WBC # BLD AUTO: 6.2 X10*3/UL (ref 4.4–11.3)
WBC #/AREA URNS AUTO: ABNORMAL /HPF
WBC CLUMPS #/AREA URNS AUTO: ABNORMAL /HPF

## 2024-10-10 PROCEDURE — 84443 ASSAY THYROID STIM HORMONE: CPT

## 2024-10-10 PROCEDURE — 87086 URINE CULTURE/COLONY COUNT: CPT

## 2024-10-10 PROCEDURE — 81001 URINALYSIS AUTO W/SCOPE: CPT

## 2024-10-10 PROCEDURE — 82024 ASSAY OF ACTH: CPT | Performed by: NURSE PRACTITIONER

## 2024-10-10 PROCEDURE — 82533 TOTAL CORTISOL: CPT

## 2024-10-10 PROCEDURE — 80053 COMPREHEN METABOLIC PANEL: CPT

## 2024-10-10 PROCEDURE — 99215 OFFICE O/P EST HI 40 MIN: CPT | Performed by: NURSE PRACTITIONER

## 2024-10-10 PROCEDURE — 85025 COMPLETE CBC W/AUTO DIFF WBC: CPT

## 2024-10-10 RX ORDER — CEPHALEXIN 500 MG/1
500 CAPSULE ORAL 2 TIMES DAILY
Qty: 10 CAPSULE | Refills: 0 | Status: SHIPPED | OUTPATIENT
Start: 2024-10-10 | End: 2024-10-15

## 2024-10-10 RX ORDER — PREDNISONE 5 MG/1
5 TABLET ORAL DAILY
Qty: 14 TABLET | Refills: 0 | Status: SHIPPED | OUTPATIENT
Start: 2024-10-10 | End: 2024-10-24

## 2024-10-10 NOTE — PROGRESS NOTES
"Patient ID: Gladys Branch is a 68 y.o. female.    The patient presents to [facility] for evaluation of generalized weakness x3 weeks. Her  reports that the patient has been getting progressively more weak over the past 3 weeks or so. She is losing balance but denies any falls. She is oriented but at times during the night mildly disoriented discussing things about her neighbors that are not accurate. She is eating and drinking well although her  reports she drinks too much coffee and is trying to get her to drink more water. She has been trying to eat smaller meals but was able to tolerate a large breakfast this morning. She was on reglan but instructed to stop for potential side effects and is scheduled for a scope tomorrow per the . Pt denies chest pain, cough, SOB, headaches, vision changes, fever or chills, n/v/d/abd pain, or urinary complaints.      Objective    BSA: 1.5 meters squared          9/13/2024     1:37 PM 9/17/2024     8:07 AM 9/24/2024     2:39 PM 10/10/2024    12:17 PM 10/10/2024    12:18 PM   Vitals   Systolic 124 119 113 115 111   Diastolic 77 81 69 79 75   Heart Rate 74 69 86 70 75   Temp 36.2 °C (97.2 °F) 36.5 °C (97.7 °F) 36.1 °C (97 °F)  36.2 °C (97.2 °F)   Resp 16 18 18 18 18   Height (in)  1.527 m (5' 0.12\")   1.527 m (5' 0.12\")   Weight (lb)  114.64 115.5 117.06 117.06   BMI  22.3 kg/m2 22.47 kg/m2 22.77 kg/m2 22.77 kg/m2   BSA (m2)  1.49 m2 1.49 m2 1.5 m2 1.5 m2   Visit Report   Report Report Report         Results from last 7 days   Lab Units 10/10/24  1227   WBC AUTO x10*3/uL 6.2   HEMOGLOBIN g/dL 12.6   HEMATOCRIT % 37.3   PLATELETS AUTO x10*3/uL 200   NEUTROS ABS x10*3/uL 4.06   LYMPHS ABS AUTO x10*3/uL 1.37   MONOS ABS AUTO x10*3/uL 0.50   EOS ABS AUTO x10*3/uL 0.14   NEUTROS PCT AUTO % 66.0   LYMPHS PCT AUTO % 22.3   MONOS PCT AUTO % 8.1   EOS PCT AUTO % 2.3        Results from last 7 days   Lab Units 10/10/24  1227   GLUCOSE mg/dL 86   SODIUM mmol/L 139   POTASSIUM " mmol/L 3.7   CHLORIDE mmol/L 104   CO2 mmol/L 28   BUN mg/dL 20   CREATININE mg/dL 0.74   EGFR mL/min/1.73m*2 88   CALCIUM mg/dL 8.6   ALBUMIN g/dL 3.7   PROTEIN TOTAL g/dL 6.6                      === 03/08/22 ===    XR CHEST 1 VIEW    - Impression -  Significant worsening of pulmonary edema and probable new small  bilateral pleural effusions.  === 03/08/22 ===    CT CHEST PULMONARY EMBOLISM W IV CONTRAST    - Impression -  There is component of artifact through the branch segmental vessels to  the upper lobes but there is no convincing evidence of pulmonary  embolism.  No evidence of pulmonary arterial hypertension or right  ventricular strain.    No aortic aneurysm or dissection.    Improvement in azygous adenopathy consistent with response to therapy.  Decrease in size of segmental bronchus based nodule in the right  upper lobe and decrease in size of a subpleural right lower lobe  nodule.  Decrease in size of additional right lower lobe nodule.  Tiny  right apical nodule seen previously are no longer demonstrated.  Findings indicate response to therapy.    Possible mild fluid overload/congestive heart failure.  Bibasilar  subsegmental atelectasis and/or pneumonia.  Subsegmental atelectasis  in the right middle lobe and lingula.    Small hiatal hernia.  Consider reflux esophagitis.      Signed by Moira Sprague MD  === 05/21/24 ===    MR BRAIN W AND WO CONTRAST    - Impression -  No acute infarct, acute hemorrhage, or intracranial mass effect.    No evidence of intracranial metastatic disease.    MACRO:  None.    Signed by: Angelito Sterling 5/21/2024 10:54 PM  Dictation workstation:   [id number]   No nuclear medicine results found for the past 12 months   No echocardiogram results found for the past 12 months         Physical Exam  Vitals reviewed.   Constitutional:       Appearance: Normal appearance.   HENT:      Head: Normocephalic and atraumatic.      Nose: Nose normal.      Mouth/Throat:      Mouth: Mucous  "membranes are moist.   Eyes:      Conjunctiva/sclera: Conjunctivae normal.      Pupils: Pupils are equal, round, and reactive to light.   Cardiovascular:      Rate and Rhythm: Normal rate and regular rhythm.   Pulmonary:      Effort: Pulmonary effort is normal.      Breath sounds: Normal breath sounds.   Abdominal:      General: Abdomen is flat. Bowel sounds are normal.      Palpations: Abdomen is soft.   Musculoskeletal:         General: Normal range of motion.      Cervical back: Normal range of motion.   Skin:     General: Skin is warm and dry.   Neurological:      General: No focal deficit present.      Mental Status: She is alert.   Psychiatric:         Mood and Affect: Mood normal.         Behavior: Behavior normal.         Cancer Staging   No matching staging information was found for the patient.      Oncology History   Small cell lung cancer   1/24/2023 - 8/27/2024 Research Study Participant    ([facility]) [id number] Part A - Tarlatamab, 28 Day Cycles  Plan Provider: Francy Archer MD  Treatment goal: [No plan goal]  Line of treatment: [No plan line of treatment]  Associated studies:  in Subjects With Small Cell Lung Cancer     2/7/2023 Initial Diagnosis    Small cell lung cancer (CMS/HCC)            67 yo F with SCLC presenting to [facility] for evaluation of weakness.     Plan:  - VSS  - labs unremarkable including tsh/cortisol   - UA for \"disorientation at night\" with leuks, will treat with keflex   - discussed case with Dr. Archer and will restart prednisone 5mg daily x14 days and Dr. Archer with review with neurologist    Dispo:  - pt discharged home with no needs after [facility] course complete  - return to clinic/ED instructions given to patient  - follow up oncology as scheduled           CASEY Campbell-CNP            "

## 2024-10-10 NOTE — TELEPHONE ENCOUNTER
Spouse, Bo, states wife is going down hill, very weak, sleeps a lot. He is taking her for lab work today and asking if there are any labs you would like to add-on, to see what is going on?

## 2024-10-10 NOTE — TELEPHONE ENCOUNTER
Additional Information   Commented on: Please tell me more about the problem you are having. the more detail you provide, the better.     Patient's spouse Emre calls to state that the patient has been very weak, her balance is poor and she seems more disoriented in the evenings. He states that she has not been eating or drinking well. They are going to [place] today for labs and spouse inquires if pt could have additional labs added on to see why she feels so poorly.    He states that she is sleeping a lot, balance is poor but denies falls. She is alert and oriented but her memory is poor. Denies fever/chills, SOB, chest pain, N/V or constipation/diarrhea.    Recently taken off trial. She neli Cytoxan for the paraneoplastic syndrome, managed through neuro.    Protocols used: Other    Discussed [facility] with pt, once I speak to Dr. Archer and Mary Arias CNP to see if pt is appropriate. Spouse states that they would be interested if Dr. Archer feels it is beneficial.    Message sent to Dr. Archer and Mary Lua CNP in [facility].

## 2024-10-10 NOTE — PROGRESS NOTES
I spoke to the patient on phone. Since infusion for 2 weeks having generalized weakness, drowsiness. Poor fluid intake per . Denies N/V/D, cough, fever, dysuria, or urine changes. She is on her way to get labs at [facility] and I also ordered UA which we will repeat monthly. Agree with IV hydration if she can get it here as well.

## 2024-10-10 NOTE — TELEPHONE ENCOUNTER
Discussed with team and [facility] is agreed-upon course. They have an noon appt available today.    Called spouse back. He accepted the appointment. Location and check in process reviewed with spouse. Understanding verbalized with teach back.    Team updated.

## 2024-10-11 LAB — BACTERIA UR CULT: NORMAL

## 2024-10-14 LAB — ACTH PLAS-MCNC: 7.9 PG/ML (ref 7.2–63.3)

## 2024-10-15 ENCOUNTER — INFUSION (OUTPATIENT)
Dept: HEMATOLOGY/ONCOLOGY | Facility: CLINIC | Age: 69
End: 2024-10-15
Payer: MEDICARE

## 2024-10-15 ENCOUNTER — LAB (OUTPATIENT)
Dept: LAB | Facility: CLINIC | Age: 69
End: 2024-10-15
Payer: MEDICARE

## 2024-10-15 VITALS — WEIGHT: 114.42 LBS | TEMPERATURE: 97.9 F | BODY MASS INDEX: 22.26 KG/M2

## 2024-10-15 DIAGNOSIS — G31.89 PARANEOPLASTIC CEREBELLAR DEGENERATION (CMS-HCC): ICD-10-CM

## 2024-10-15 LAB
ALBUMIN SERPL BCP-MCNC: 3.9 G/DL (ref 3.4–5)
ALP SERPL-CCNC: 38 U/L (ref 33–136)
ALT SERPL W P-5'-P-CCNC: 10 U/L (ref 7–45)
ANION GAP SERPL CALC-SCNC: 10 MMOL/L (ref 10–20)
AST SERPL W P-5'-P-CCNC: 13 U/L (ref 9–39)
BASOPHILS # BLD AUTO: 0.02 X10*3/UL (ref 0–0.1)
BASOPHILS NFR BLD AUTO: 0.3 %
BILIRUB SERPL-MCNC: 0.8 MG/DL (ref 0–1.2)
BUN SERPL-MCNC: 20 MG/DL (ref 6–23)
CALCIUM SERPL-MCNC: 9.6 MG/DL (ref 8.6–10.3)
CHLORIDE SERPL-SCNC: 104 MMOL/L (ref 98–107)
CO2 SERPL-SCNC: 30 MMOL/L (ref 21–32)
CREAT SERPL-MCNC: 0.99 MG/DL (ref 0.5–1.05)
EGFRCR SERPLBLD CKD-EPI 2021: 62 ML/MIN/1.73M*2
EOSINOPHIL # BLD AUTO: 0.1 X10*3/UL (ref 0–0.7)
EOSINOPHIL NFR BLD AUTO: 1.7 %
ERYTHROCYTE [DISTWIDTH] IN BLOOD BY AUTOMATED COUNT: 13.2 % (ref 11.5–14.5)
GLUCOSE SERPL-MCNC: 89 MG/DL (ref 74–99)
HCT VFR BLD AUTO: 39.3 % (ref 36–46)
HGB BLD-MCNC: 13.1 G/DL (ref 12–16)
IMM GRANULOCYTES # BLD AUTO: 0.06 X10*3/UL (ref 0–0.7)
IMM GRANULOCYTES NFR BLD AUTO: 1 % (ref 0–0.9)
LYMPHOCYTES # BLD AUTO: 1.41 X10*3/UL (ref 1.2–4.8)
LYMPHOCYTES NFR BLD AUTO: 24.1 %
MCH RBC QN AUTO: 31.3 PG (ref 26–34)
MCHC RBC AUTO-ENTMCNC: 33.3 G/DL (ref 32–36)
MCV RBC AUTO: 94 FL (ref 80–100)
MONOCYTES # BLD AUTO: 0.71 X10*3/UL (ref 0.1–1)
MONOCYTES NFR BLD AUTO: 12.2 %
NEUTROPHILS # BLD AUTO: 3.54 X10*3/UL (ref 1.2–7.7)
NEUTROPHILS NFR BLD AUTO: 60.7 %
NRBC BLD-RTO: ABNORMAL /100{WBCS}
PLATELET # BLD AUTO: 242 X10*3/UL (ref 150–450)
POTASSIUM SERPL-SCNC: 3.9 MMOL/L (ref 3.5–5.3)
PROT SERPL-MCNC: 6.8 G/DL (ref 6.4–8.2)
RBC # BLD AUTO: 4.18 X10*6/UL (ref 4–5.2)
SODIUM SERPL-SCNC: 140 MMOL/L (ref 136–145)
WBC # BLD AUTO: 5.8 X10*3/UL (ref 4.4–11.3)

## 2024-10-15 PROCEDURE — 85025 COMPLETE CBC W/AUTO DIFF WBC: CPT

## 2024-10-15 PROCEDURE — 2500000004 HC RX 250 GENERAL PHARMACY W/ HCPCS (ALT 636 FOR OP/ED): Mod: JG | Performed by: STUDENT IN AN ORGANIZED HEALTH CARE EDUCATION/TRAINING PROGRAM

## 2024-10-15 PROCEDURE — 96375 TX/PRO/DX INJ NEW DRUG ADDON: CPT | Mod: INF

## 2024-10-15 PROCEDURE — 36415 COLL VENOUS BLD VENIPUNCTURE: CPT

## 2024-10-15 PROCEDURE — 96368 THER/DIAG CONCURRENT INF: CPT

## 2024-10-15 PROCEDURE — 96413 CHEMO IV INFUSION 1 HR: CPT

## 2024-10-15 PROCEDURE — 2500000004 HC RX 250 GENERAL PHARMACY W/ HCPCS (ALT 636 FOR OP/ED): Performed by: STUDENT IN AN ORGANIZED HEALTH CARE EDUCATION/TRAINING PROGRAM

## 2024-10-15 PROCEDURE — 80053 COMPREHEN METABOLIC PANEL: CPT

## 2024-10-15 RX ORDER — DIPHENHYDRAMINE HYDROCHLORIDE 50 MG/ML
50 INJECTION INTRAMUSCULAR; INTRAVENOUS AS NEEDED
OUTPATIENT
Start: 2024-11-12

## 2024-10-15 RX ORDER — PALONOSETRON 0.05 MG/ML
0.25 INJECTION, SOLUTION INTRAVENOUS ONCE
OUTPATIENT
Start: 2024-11-12

## 2024-10-15 RX ORDER — ALBUTEROL SULFATE 0.83 MG/ML
3 SOLUTION RESPIRATORY (INHALATION) AS NEEDED
OUTPATIENT
Start: 2024-11-12

## 2024-10-15 RX ORDER — PALONOSETRON 0.05 MG/ML
0.25 INJECTION, SOLUTION INTRAVENOUS ONCE
Status: COMPLETED | OUTPATIENT
Start: 2024-10-15 | End: 2024-10-15

## 2024-10-15 RX ORDER — EPINEPHRINE 0.3 MG/.3ML
0.3 INJECTION SUBCUTANEOUS EVERY 5 MIN PRN
OUTPATIENT
Start: 2024-11-12

## 2024-10-15 RX ORDER — DIPHENHYDRAMINE HYDROCHLORIDE 50 MG/ML
50 INJECTION INTRAMUSCULAR; INTRAVENOUS AS NEEDED
Status: DISCONTINUED | OUTPATIENT
Start: 2024-10-15 | End: 2024-10-15 | Stop reason: HOSPADM

## 2024-10-15 RX ORDER — FAMOTIDINE 10 MG/ML
20 INJECTION INTRAVENOUS ONCE AS NEEDED
OUTPATIENT
Start: 2024-11-12

## 2024-10-15 RX ORDER — FAMOTIDINE 10 MG/ML
20 INJECTION INTRAVENOUS ONCE AS NEEDED
Status: DISCONTINUED | OUTPATIENT
Start: 2024-10-15 | End: 2024-10-15 | Stop reason: HOSPADM

## 2024-10-15 RX ORDER — ALBUTEROL SULFATE 0.83 MG/ML
3 SOLUTION RESPIRATORY (INHALATION) AS NEEDED
Status: DISCONTINUED | OUTPATIENT
Start: 2024-10-15 | End: 2024-10-15 | Stop reason: HOSPADM

## 2024-10-15 RX ORDER — EPINEPHRINE 0.3 MG/.3ML
0.3 INJECTION SUBCUTANEOUS EVERY 5 MIN PRN
Status: DISCONTINUED | OUTPATIENT
Start: 2024-10-15 | End: 2024-10-15 | Stop reason: HOSPADM

## 2024-10-21 ENCOUNTER — TELEPHONE (OUTPATIENT)
Dept: PHYSICAL THERAPY | Facility: HOSPITAL | Age: 69
End: 2024-10-21
Payer: COMMERCIAL

## 2024-10-21 NOTE — TELEPHONE ENCOUNTER
RCVD VM FROM -[facility] RE: INTEREST TO [facility]ED FOR IE CANCELLED 10/072/24. PC TO PT; LMOM TO CALL BACK

## 2024-10-22 ENCOUNTER — APPOINTMENT (OUTPATIENT)
Dept: HEMATOLOGY/ONCOLOGY | Facility: HOSPITAL | Age: 69
End: 2024-10-22
Payer: MEDICARE

## 2024-11-12 ENCOUNTER — APPOINTMENT (OUTPATIENT)
Dept: HEMATOLOGY/ONCOLOGY | Facility: CLINIC | Age: 69
End: 2024-11-12
Payer: MEDICARE

## 2024-11-12 ENCOUNTER — LAB (OUTPATIENT)
Dept: LAB | Facility: CLINIC | Age: 69
End: 2024-11-12
Payer: MEDICARE

## 2024-11-12 ENCOUNTER — INFUSION (OUTPATIENT)
Dept: HEMATOLOGY/ONCOLOGY | Facility: CLINIC | Age: 69
End: 2024-11-12
Payer: MEDICARE

## 2024-11-12 VITALS
HEART RATE: 68 BPM | DIASTOLIC BLOOD PRESSURE: 72 MMHG | BODY MASS INDEX: 23.54 KG/M2 | OXYGEN SATURATION: 100 % | WEIGHT: 121.03 LBS | TEMPERATURE: 97 F | SYSTOLIC BLOOD PRESSURE: 108 MMHG | RESPIRATION RATE: 18 BRPM

## 2024-11-12 DIAGNOSIS — G31.89 PARANEOPLASTIC CEREBELLAR DEGENERATION (CMS-HCC): ICD-10-CM

## 2024-11-12 DIAGNOSIS — C34.90 SMALL CELL LUNG CANCER: ICD-10-CM

## 2024-11-12 PROBLEM — R51.9 NEW ONSET OF HEADACHE IN CANCER PATIENT: Status: RESOLVED | Noted: 2024-05-17 | Resolved: 2024-11-12

## 2024-11-12 LAB
ALBUMIN SERPL BCP-MCNC: 3.8 G/DL (ref 3.4–5)
ALP SERPL-CCNC: 35 U/L (ref 33–136)
ALT SERPL W P-5'-P-CCNC: 14 U/L (ref 7–45)
ANION GAP SERPL CALC-SCNC: 10 MMOL/L (ref 10–20)
AST SERPL W P-5'-P-CCNC: 17 U/L (ref 9–39)
BASOPHILS # BLD AUTO: 0.03 X10*3/UL (ref 0–0.1)
BASOPHILS NFR BLD AUTO: 0.4 %
BILIRUB SERPL-MCNC: 0.6 MG/DL (ref 0–1.2)
BUN SERPL-MCNC: 21 MG/DL (ref 6–23)
CALCIUM SERPL-MCNC: 9.2 MG/DL (ref 8.6–10.3)
CHLORIDE SERPL-SCNC: 105 MMOL/L (ref 98–107)
CO2 SERPL-SCNC: 30 MMOL/L (ref 21–32)
CREAT SERPL-MCNC: 0.87 MG/DL (ref 0.5–1.05)
EGFRCR SERPLBLD CKD-EPI 2021: 73 ML/MIN/1.73M*2
EOSINOPHIL # BLD AUTO: 0.11 X10*3/UL (ref 0–0.7)
EOSINOPHIL NFR BLD AUTO: 1.5 %
ERYTHROCYTE [DISTWIDTH] IN BLOOD BY AUTOMATED COUNT: 13.7 % (ref 11.5–14.5)
GLUCOSE SERPL-MCNC: 83 MG/DL (ref 74–99)
HCT VFR BLD AUTO: 34.6 % (ref 36–46)
HGB BLD-MCNC: 11.4 G/DL (ref 12–16)
IMM GRANULOCYTES # BLD AUTO: 0.05 X10*3/UL (ref 0–0.7)
IMM GRANULOCYTES NFR BLD AUTO: 0.7 % (ref 0–0.9)
LDH SERPL L TO P-CCNC: 152 U/L (ref 84–246)
LYMPHOCYTES # BLD AUTO: 1.32 X10*3/UL (ref 1.2–4.8)
LYMPHOCYTES NFR BLD AUTO: 18.2 %
MCH RBC QN AUTO: 31.6 PG (ref 26–34)
MCHC RBC AUTO-ENTMCNC: 32.9 G/DL (ref 32–36)
MCV RBC AUTO: 96 FL (ref 80–100)
MONOCYTES # BLD AUTO: 0.66 X10*3/UL (ref 0.1–1)
MONOCYTES NFR BLD AUTO: 9.1 %
NEUTROPHILS # BLD AUTO: 5.1 X10*3/UL (ref 1.2–7.7)
NEUTROPHILS NFR BLD AUTO: 70.1 %
NRBC BLD-RTO: ABNORMAL /100{WBCS}
PLATELET # BLD AUTO: 185 X10*3/UL (ref 150–450)
POTASSIUM SERPL-SCNC: 3.5 MMOL/L (ref 3.5–5.3)
PROT SERPL-MCNC: 6.2 G/DL (ref 6.4–8.2)
RBC # BLD AUTO: 3.61 X10*6/UL (ref 4–5.2)
SODIUM SERPL-SCNC: 141 MMOL/L (ref 136–145)
WBC # BLD AUTO: 7.3 X10*3/UL (ref 4.4–11.3)

## 2024-11-12 PROCEDURE — 85025 COMPLETE CBC W/AUTO DIFF WBC: CPT

## 2024-11-12 PROCEDURE — 96361 HYDRATE IV INFUSION ADD-ON: CPT | Mod: INF

## 2024-11-12 PROCEDURE — 96413 CHEMO IV INFUSION 1 HR: CPT

## 2024-11-12 PROCEDURE — 2500000004 HC RX 250 GENERAL PHARMACY W/ HCPCS (ALT 636 FOR OP/ED): Performed by: STUDENT IN AN ORGANIZED HEALTH CARE EDUCATION/TRAINING PROGRAM

## 2024-11-12 PROCEDURE — 96367 TX/PROPH/DG ADDL SEQ IV INF: CPT

## 2024-11-12 PROCEDURE — 83615 LACTATE (LD) (LDH) ENZYME: CPT

## 2024-11-12 PROCEDURE — 36415 COLL VENOUS BLD VENIPUNCTURE: CPT

## 2024-11-12 PROCEDURE — 96375 TX/PRO/DX INJ NEW DRUG ADDON: CPT | Mod: INF

## 2024-11-12 PROCEDURE — 80053 COMPREHEN METABOLIC PANEL: CPT

## 2024-11-12 RX ORDER — EPINEPHRINE 0.3 MG/.3ML
0.3 INJECTION SUBCUTANEOUS EVERY 5 MIN PRN
Status: DISCONTINUED | OUTPATIENT
Start: 2024-11-12 | End: 2024-11-12 | Stop reason: HOSPADM

## 2024-11-12 RX ORDER — PALONOSETRON 0.05 MG/ML
0.25 INJECTION, SOLUTION INTRAVENOUS ONCE
Status: COMPLETED | OUTPATIENT
Start: 2024-11-12 | End: 2024-11-12

## 2024-11-12 RX ORDER — HEPARIN 100 UNIT/ML
500 SYRINGE INTRAVENOUS AS NEEDED
Status: DISCONTINUED | OUTPATIENT
Start: 2024-11-12 | End: 2024-11-12 | Stop reason: HOSPADM

## 2024-11-12 RX ORDER — DIPHENHYDRAMINE HYDROCHLORIDE 50 MG/ML
50 INJECTION INTRAMUSCULAR; INTRAVENOUS AS NEEDED
Status: DISCONTINUED | OUTPATIENT
Start: 2024-11-12 | End: 2024-11-12 | Stop reason: HOSPADM

## 2024-11-12 RX ORDER — EPINEPHRINE 0.3 MG/.3ML
0.3 INJECTION SUBCUTANEOUS EVERY 5 MIN PRN
OUTPATIENT
Start: 2024-12-10

## 2024-11-12 RX ORDER — DEXAMETHASONE 6 MG/1
12 TABLET ORAL ONCE
OUTPATIENT
Start: 2024-12-10 | End: 2024-12-10

## 2024-11-12 RX ORDER — HEPARIN SODIUM,PORCINE/PF 10 UNIT/ML
50 SYRINGE (ML) INTRAVENOUS AS NEEDED
OUTPATIENT
Start: 2024-11-12

## 2024-11-12 RX ORDER — FAMOTIDINE 10 MG/ML
20 INJECTION INTRAVENOUS ONCE AS NEEDED
Status: DISCONTINUED | OUTPATIENT
Start: 2024-11-12 | End: 2024-11-12 | Stop reason: HOSPADM

## 2024-11-12 RX ORDER — DIPHENHYDRAMINE HYDROCHLORIDE 50 MG/ML
50 INJECTION INTRAMUSCULAR; INTRAVENOUS AS NEEDED
OUTPATIENT
Start: 2024-12-10

## 2024-11-12 RX ORDER — HEPARIN 100 UNIT/ML
500 SYRINGE INTRAVENOUS AS NEEDED
OUTPATIENT
Start: 2024-11-12

## 2024-11-12 RX ORDER — LIDOCAINE 50 MG/G
PATCH TOPICAL
COMMUNITY
Start: 2024-11-04

## 2024-11-12 RX ORDER — FAMOTIDINE 10 MG/ML
20 INJECTION INTRAVENOUS ONCE AS NEEDED
OUTPATIENT
Start: 2024-12-10

## 2024-11-12 RX ORDER — ALBUTEROL SULFATE 0.83 MG/ML
3 SOLUTION RESPIRATORY (INHALATION) AS NEEDED
Status: DISCONTINUED | OUTPATIENT
Start: 2024-11-12 | End: 2024-11-12 | Stop reason: HOSPADM

## 2024-11-12 RX ORDER — HEPARIN SODIUM,PORCINE/PF 10 UNIT/ML
50 SYRINGE (ML) INTRAVENOUS AS NEEDED
Status: DISCONTINUED | OUTPATIENT
Start: 2024-11-12 | End: 2024-11-12 | Stop reason: HOSPADM

## 2024-11-12 RX ORDER — ALBUTEROL SULFATE 0.83 MG/ML
3 SOLUTION RESPIRATORY (INHALATION) AS NEEDED
OUTPATIENT
Start: 2024-12-10

## 2024-11-12 RX ORDER — DEXAMETHASONE 6 MG/1
12 TABLET ORAL ONCE
Status: COMPLETED | OUTPATIENT
Start: 2024-11-12 | End: 2024-11-12

## 2024-11-12 RX ORDER — PALONOSETRON 0.05 MG/ML
0.25 INJECTION, SOLUTION INTRAVENOUS ONCE
OUTPATIENT
Start: 2024-12-10

## 2024-11-12 ASSESSMENT — PAIN SCALES - GENERAL: PAINLEVEL_OUTOF10: 0-NO PAIN

## 2024-11-12 NOTE — ASSESSMENT & PLAN NOTE
LDL mildly elevated  triglycerides at goal  Coronary calcium score done 2021 was 2  Declines statin at this time - will work on diet  Continue to monitor

## 2024-11-12 NOTE — ASSESSMENT & PLAN NOTE
Medical Wellness exam done.  Mammogram ordered  Shingrix series complete.  Prevnar 13 5/21  Pneumovax 23 7/22  colonoscopy 7/24  DEXA 5/24  RSV 11/23  Flu and COVID booster 10/24  Boostrix 2/20  Current nonsmoker - quit 1999  Depression Screening  Depression screening completed using the PHQ-2 questions, with results documented in the chart/encounter (~5min).  (See Rooming Screening section for documentation, and/or progress note for additional information)   Not on opioids

## 2024-11-12 NOTE — ASSESSMENT & PLAN NOTE
Diagnosed per bronch 1/22  S/P radiation  Chemo currently held due to side effects  Has follow up oncology later this month

## 2024-11-12 NOTE — PROGRESS NOTES
Subjective :  Chief Complaint: Gladys Branch is an 68 y.o. female here for an annual Medicare Wellness visit.    In experimental protocol for lung cancer with brain mets.  Had chemo x2 then developed unsteadiness. Upcoming visits with oncology and neurology to determine next steps.    Patient otherwise feels well. No other complaints or concerns.    I have reviewed and reconciled the medication list with the patient today.    Current Outpatient Medications:     cetirizine-pseudoephedrine (ZyrTEC-D) 5-120 mg 12 hr tablet, Take 1 tablet by mouth once daily., Disp: 30 tablet, Rfl: 11    cholecalciferol (Vitamin D-3) 25 MCG (1000 UT) tablet, Take by mouth., Disp: , Rfl:     dexAMETHasone (Decadron) 1 mg tablet, Take 1 tablet by mouth each am with food., Disp: 30 tablet, Rfl: 2    lidocaine (Lidoderm) 5 % patch, APPLY 1 PATCH 12 HOURS ON 12 HOURS OFF PER DAY, Disp: , Rfl:     mv-min/FA/vit K/lutein/zeaxant (PRESERVISION AREDS 2 PLUS MV ORAL), Take 1 tablet by mouth once daily., Disp: , Rfl:     omeprazole (PriLOSEC) 40 mg DR capsule, Take 1 capsule (40 mg) by mouth once daily. Do not crush or chew., Disp: 90 each, Rfl: 3    ondansetron (Zofran) 8 mg tablet, Take 0.5 tablets (4 mg) by mouth every 8 hours if needed., Disp: , Rfl:     polyethylene glycol (Glycolax, Miralax) 17 gram packet, Take 17 g by mouth 2 times a day., Disp: 60 packet, Rfl: 2    prochlorperazine (Compazine) 10 mg tablet, Take 0.5 tablets (5 mg) by mouth every 6 hours if needed., Disp: , Rfl:     cycloPHOSphamide 100 mg/mL solution, Infuse 600 mg/m2 into a venous catheter every 28 (twenty-eight) days. (Patient not taking: Reported on 11/18/2024), Disp: 9.6 mL, Rfl: 11    dextromethorphan (Delsym) 30 mg/5 mL liquid, Take 5 mL (30 mg) by mouth once daily as needed. (Patient not taking: Reported on 11/18/2024), Disp: , Rfl:     dronabinol (Marinol) 2.5 mg capsule, once daily as needed. One hour before dinner (Patient not taking: Reported on 11/18/2024),  Disp: , Rfl:     estrogens, conjugated, (Premarin) 0.3 mg tablet, Take 0.5 tablets (0.15 mg) by mouth once daily. (Patient not taking: Reported on 11/18/2024), Disp: , Rfl:     predniSONE (Deltasone) 20 mg tablet, 40 mg daily, start after IV methylprednisolone (Patient not taking: Reported on 11/18/2024), Disp: 60 tablet, Rfl: 4    predniSONE (Deltasone) 5 mg tablet, 1 tab daily as last step of prednisone wean, for 1 week (Patient not taking: Reported on 11/18/2024), Disp: 7 tablet, Rfl: 0    The patient's relevant past medical, surgical, family and social history was reviewed in [facility].  All pertinent lab work and results for this visit were reviewed with patient.    Lab on 11/12/2024   Component Date Value Ref Range Status    WBC 11/12/2024 7.3  4.4 - 11.3 x10*3/uL Final    nRBC 11/12/2024    Final    Not Measured    RBC 11/12/2024 3.61 (L)  4.00 - 5.20 x10*6/uL Final    Hemoglobin 11/12/2024 11.4 (L)  12.0 - 16.0 g/dL Final    Hematocrit 11/12/2024 34.6 (L)  36.0 - 46.0 % Final    MCV 11/12/2024 96  80 - 100 fL Final    MCH 11/12/2024 31.6  26.0 - 34.0 pg Final    MCHC 11/12/2024 32.9  32.0 - 36.0 g/dL Final    RDW 11/12/2024 13.7  11.5 - 14.5 % Final    Platelets 11/12/2024 185  150 - 450 x10*3/uL Final    Neutrophils % 11/12/2024 70.1  40.0 - 80.0 % Final    Immature Granulocytes %, Automated 11/12/2024 0.7  0.0 - 0.9 % Final    Immature Granulocyte Count (IG) includes promyelocytes, myelocytes and metamyelocytes but does not include bands. Percent differential counts (%) should be interpreted in the context of the absolute cell counts (cells/UL).    Lymphocytes % 11/12/2024 18.2  13.0 - 44.0 % Final    Monocytes % 11/12/2024 9.1  2.0 - 10.0 % Final    Eosinophils % 11/12/2024 1.5  0.0 - 6.0 % Final    Basophils % 11/12/2024 0.4  0.0 - 2.0 % Final    Neutrophils Absolute 11/12/2024 5.10  1.20 - 7.70 x10*3/uL Final    Percent differential counts (%) should be interpreted in the context of the absolute cell counts  (cells/uL).    Immature Granulocytes Absolute, Au* 11/12/2024 0.05  0.00 - 0.70 x10*3/uL Final    Lymphocytes Absolute 11/12/2024 1.32  1.20 - 4.80 x10*3/uL Final    Monocytes Absolute 11/12/2024 0.66  0.10 - 1.00 x10*3/uL Final    Eosinophils Absolute 11/12/2024 0.11  0.00 - 0.70 x10*3/uL Final    Basophils Absolute 11/12/2024 0.03  0.00 - 0.10 x10*3/uL Final    Glucose 11/12/2024 83  74 - 99 mg/dL Final    Sodium 11/12/2024 141  136 - 145 mmol/L Final    Potassium 11/12/2024 3.5  3.5 - 5.3 mmol/L Final    Chloride 11/12/2024 105  98 - 107 mmol/L Final    Bicarbonate 11/12/2024 30  21 - 32 mmol/L Final    Anion Gap 11/12/2024 10  10 - 20 mmol/L Final    Urea Nitrogen 11/12/2024 21  6 - 23 mg/dL Final    Creatinine 11/12/2024 0.87  0.50 - 1.05 mg/dL Final    eGFR 11/12/2024 73  >60 mL/min/1.73m*2 Final    Calculations of estimated GFR are performed using the 2021 CKD-EPI Study Refit equation without the race variable for the IDMS-Traceable creatinine methods.  https://jasn.asnjournals.org/content/early/2021/09/22/ASN.3492381926    Calcium 11/12/2024 9.2  8.6 - 10.3 mg/dL Final    Albumin 11/12/2024 3.8  3.4 - 5.0 g/dL Final    Alkaline Phosphatase 11/12/2024 35  33 - 136 U/L Final    Total Protein 11/12/2024 6.2 (L)  6.4 - 8.2 g/dL Final    AST 11/12/2024 17  9 - 39 U/L Final    Bilirubin, Total 11/12/2024 0.6  0.0 - 1.2 mg/dL Final    ALT 11/12/2024 14  7 - 45 U/L Final    Patients treated with Sulfasalazine may generate falsely decreased results for ALT.    LDH 11/12/2024 152  84 - 246 U/L Final   Lab on 10/15/2024   Component Date Value Ref Range Status    WBC 10/15/2024 5.8  4.4 - 11.3 x10*3/uL Final    nRBC 10/15/2024    Final    Not Measured    RBC 10/15/2024 4.18  4.00 - 5.20 x10*6/uL Final    Hemoglobin 10/15/2024 13.1  12.0 - 16.0 g/dL Final    Hematocrit 10/15/2024 39.3  36.0 - 46.0 % Final    MCV 10/15/2024 94  80 - 100 fL Final    MCH 10/15/2024 31.3  26.0 - 34.0 pg Final    MCHC 10/15/2024 33.3  32.0 -  36.0 g/dL Final    RDW 10/15/2024 13.2  11.5 - 14.5 % Final    Platelets 10/15/2024 242  150 - 450 x10*3/uL Final    Neutrophils % 10/15/2024 60.7  40.0 - 80.0 % Final    Immature Granulocytes %, Automated 10/15/2024 1.0 (H)  0.0 - 0.9 % Final    Immature Granulocyte Count (IG) includes promyelocytes, myelocytes and metamyelocytes but does not include bands. Percent differential counts (%) should be interpreted in the context of the absolute cell counts (cells/UL).    Lymphocytes % 10/15/2024 24.1  13.0 - 44.0 % Final    Monocytes % 10/15/2024 12.2  2.0 - 10.0 % Final    Eosinophils % 10/15/2024 1.7  0.0 - 6.0 % Final    Basophils % 10/15/2024 0.3  0.0 - 2.0 % Final    Neutrophils Absolute 10/15/2024 3.54  1.20 - 7.70 x10*3/uL Final    Percent differential counts (%) should be interpreted in the context of the absolute cell counts (cells/uL).    Immature Granulocytes Absolute, Au* 10/15/2024 0.06  0.00 - 0.70 x10*3/uL Final    Lymphocytes Absolute 10/15/2024 1.41  1.20 - 4.80 x10*3/uL Final    Monocytes Absolute 10/15/2024 0.71  0.10 - 1.00 x10*3/uL Final    Eosinophils Absolute 10/15/2024 0.10  0.00 - 0.70 x10*3/uL Final    Basophils Absolute 10/15/2024 0.02  0.00 - 0.10 x10*3/uL Final    Glucose 10/15/2024 89  74 - 99 mg/dL Final    Sodium 10/15/2024 140  136 - 145 mmol/L Final    Potassium 10/15/2024 3.9  3.5 - 5.3 mmol/L Final    Chloride 10/15/2024 104  98 - 107 mmol/L Final    Bicarbonate 10/15/2024 30  21 - 32 mmol/L Final    Anion Gap 10/15/2024 10  10 - 20 mmol/L Final    Urea Nitrogen 10/15/2024 20  6 - 23 mg/dL Final    Creatinine 10/15/2024 0.99  0.50 - 1.05 mg/dL Final    eGFR 10/15/2024 62  >60 mL/min/1.73m*2 Final    Calculations of estimated GFR are performed using the 2021 CKD-EPI Study Refit equation without the race variable for the IDMS-Traceable creatinine methods.  https://jasn.asnjournals.org/content/early/2021/09/22/ASN.6529576032    Calcium 10/15/2024 9.6  8.6 - 10.3 mg/dL Final    Albumin  10/15/2024 3.9  3.4 - 5.0 g/dL Final    Alkaline Phosphatase 10/15/2024 38  33 - 136 U/L Final    Total Protein 10/15/2024 6.8  6.4 - 8.2 g/dL Final    AST 10/15/2024 13  9 - 39 U/L Final    Bilirubin, Total 10/15/2024 0.8  0.0 - 1.2 mg/dL Final    ALT 10/15/2024 10  7 - 45 U/L Final    Patients treated with Sulfasalazine may generate falsely decreased results for ALT.   Office Visit on 10/10/2024   Component Date Value Ref Range Status    Adrenocorticotropic Hormone (ACTH) 10/10/2024 7.9  7.2 - 63.3 pg/mL Final    INTERPRETIVE INFORMATION: Adrenocorticotropic Hormone    Reference interval based on samples collected between 7 a.m. and   10 a.m.  No reference intervals established for p.m. collections.    Pediatric reference values are the same as adults (Acta Paediatr   Scand 1981;70:341-345).  This assay measures intact ACTH 1-39;   some types of synthetic ACTH and ACTH fragments are not detected   by this assay.  Performed By: [facility]  [address] [place] [id number]  : Darian Marrero MD, PhD  CLIA Number: [id number]    Cortisol 10/10/2024 8.4  2.5 - 20.0 ug/dL Final    Thyroid Stimulating Hormone 10/10/2024 2.61  0.44 - 3.98 mIU/L Final    Glucose 10/10/2024 86  74 - 99 mg/dL Final    Sodium 10/10/2024 139  136 - 145 mmol/L Final    Potassium 10/10/2024 3.7  3.5 - 5.3 mmol/L Final    MILD HEMOLYSIS DETECTED. The result may be falsely elevated due to hemolysis or other interferents. Clinical correlation is recommended. Repeat testing may be considered.    Chloride 10/10/2024 104  98 - 107 mmol/L Final    Bicarbonate 10/10/2024 28  21 - 32 mmol/L Final    Anion Gap 10/10/2024 11  10 - 20 mmol/L Final    Urea Nitrogen 10/10/2024 20  6 - 23 mg/dL Final    Creatinine 10/10/2024 0.74  0.50 - 1.05 mg/dL Final    eGFR 10/10/2024 88  >60 mL/min/1.73m*2 Final    Calculations of estimated GFR are performed using the 2021 CKD-EPI Study Refit equation without the race variable for  the IDMS-Traceable creatinine methods.  https://jasn.asnjournals.org/content/early/2021/09/22/ASN.3929253331    Calcium 10/10/2024 8.6  8.6 - 10.3 mg/dL Final    Albumin 10/10/2024 3.7  3.4 - 5.0 g/dL Final    Alkaline Phosphatase 10/10/2024 35  33 - 136 U/L Final    Total Protein 10/10/2024 6.6  6.4 - 8.2 g/dL Final    AST 10/10/2024 19  9 - 39 U/L Final    MILD HEMOLYSIS DETECTED. The result may be falsely elevated due to hemolysis or other interferents. Clinical correlation is recommended. Repeat testing may be considered.    Bilirubin, Total 10/10/2024 0.8  0.0 - 1.2 mg/dL Final    ALT 10/10/2024 11  7 - 45 U/L Final    Patients treated with Sulfasalazine may generate falsely decreased results for ALT.    WBC 10/10/2024 6.2  4.4 - 11.3 x10*3/uL Final    nRBC 10/10/2024 0.0  0.0 - 0.0 /100 WBCs Final    RBC 10/10/2024 3.99 (L)  4.00 - 5.20 x10*6/uL Final    Hemoglobin 10/10/2024 12.6  12.0 - 16.0 g/dL Final    Hematocrit 10/10/2024 37.3  36.0 - 46.0 % Final    MCV 10/10/2024 94  80 - 100 fL Final    MCH 10/10/2024 31.6  26.0 - 34.0 pg Final    MCHC 10/10/2024 33.8  32.0 - 36.0 g/dL Final    RDW 10/10/2024 13.5  11.5 - 14.5 % Final    Platelets 10/10/2024 200  150 - 450 x10*3/uL Final    Neutrophils % 10/10/2024 66.0  40.0 - 80.0 % Final    Immature Granulocytes %, Automated 10/10/2024 1.0 (H)  0.0 - 0.9 % Final    Immature Granulocyte Count (IG) includes promyelocytes, myelocytes and metamyelocytes but does not include bands. Percent differential counts (%) should be interpreted in the context of the absolute cell counts (cells/UL).    Lymphocytes % 10/10/2024 22.3  13.0 - 44.0 % Final    Monocytes % 10/10/2024 8.1  2.0 - 10.0 % Final    Eosinophils % 10/10/2024 2.3  0.0 - 6.0 % Final    Basophils % 10/10/2024 0.3  0.0 - 2.0 % Final    Neutrophils Absolute 10/10/2024 4.06  1.20 - 7.70 x10*3/uL Final    Percent differential counts (%) should be interpreted in the context of the absolute cell counts (cells/uL).     Immature Granulocytes Absolute, Au* 10/10/2024 0.06  0.00 - 0.70 x10*3/uL Final    Lymphocytes Absolute 10/10/2024 1.37  1.20 - 4.80 x10*3/uL Final    Monocytes Absolute 10/10/2024 0.50  0.10 - 1.00 x10*3/uL Final    Eosinophils Absolute 10/10/2024 0.14  0.00 - 0.70 x10*3/uL Final    Basophils Absolute 10/10/2024 0.02  0.00 - 0.10 x10*3/uL Final    Color, Urine 10/10/2024 Yellow  Light-Yellow, Yellow, Dark-Yellow Final    Appearance, Urine 10/10/2024 Clear  Clear Final    Specific Gravity, Urine 10/10/2024 1.018  1.005 - 1.035 Final    pH, Urine 10/10/2024 5.0  5.0, 5.5, 6.0, 6.5, 7.0, 7.5, 8.0 Final    Protein, Urine 10/10/2024 NEGATIVE  NEGATIVE, 10 (TRACE), 20 (TRACE) mg/dL Final    Glucose, Urine 10/10/2024 Normal  Normal mg/dL Final    Blood, Urine 10/10/2024 NEGATIVE  NEGATIVE Final    Ketones, Urine 10/10/2024 NEGATIVE  NEGATIVE mg/dL Final    Bilirubin, Urine 10/10/2024 NEGATIVE  NEGATIVE Final    Urobilinogen, Urine 10/10/2024 Normal  Normal mg/dL Final    Nitrite, Urine 10/10/2024 NEGATIVE  NEGATIVE Final    Leukocyte Esterase, Urine 10/10/2024 25 Mary/µL (A)  NEGATIVE Final    Extra Tube 10/10/2024 Hold for add-ons.   Final    Auto resulted.    WBC, Urine 10/10/2024 11-20 (A)  1-5, NONE /HPF Final    WBC Clumps, Urine 10/10/2024 OCCASIONAL  Reference range not established. /HPF Final    RBC, Urine 10/10/2024 NONE  NONE, 1-2, 3-5 /HPF Final    Squamous Epithelial Cells, Urine 10/10/2024 1-9 (SPARSE)  Reference range not established. /HPF Final    Bacteria, Urine 10/10/2024 1+ (A)  NONE SEEN /HPF Final    Mucus, Urine 10/10/2024 FEW  Reference range not established. /LPF Final    Urine Culture 10/10/2024 No significant growth   Final         Review of Systems   A complete review of systems was performed and all systems were normal except what is noted in the HPI.      List of current healthcare providers:  Patient Care Team:  Nova Omer MD as PCP - General  [facility]  Attributed Provider  Francy Archer MD as Consulting Physician (Hematology and Oncology)  CASEY Leo-CNP as Nurse Practitioner (Colon and Rectal Surgery)  Vladimir Fernandez RN as Registered Nurse (Hematology and Oncology)        Over the past 2 weeks, how often have you been bothered by any of the following problems?  Little interest or pleasure in doing things: Not at all  Feeling down, depressed, or hopeless: Not at all  Patient Health Questionnaire-2 Score: 0             Advance Care Planning:    Living Will: Yes  POA: Yes    Objective :  /71 (BP Location: Left arm, Patient Position: Sitting, BP Cuff Size: Adult)   Pulse 83   Temp 36.3 °C (97.4 °F) (Temporal)   Ht 1.524 m (5')   Wt 54.9 kg (121 lb 1.6 oz)   LMP  (LMP Unknown)   SpO2 95%   BMI 23.65 kg/m²    No results found.  Physical Exam  Constitutional:       Appearance: Normal appearance.   HENT:      Head: Normocephalic and atraumatic.   Neck:      Vascular: No carotid bruit.   Cardiovascular:      Rate and Rhythm: Normal rate and regular rhythm.      Heart sounds: Normal heart sounds.   Pulmonary:      Effort: Pulmonary effort is normal.      Breath sounds: Normal breath sounds. No wheezing, rhonchi or rales.   Abdominal:      General: Abdomen is flat. Bowel sounds are normal.      Palpations: Abdomen is soft.      Tenderness: There is no abdominal tenderness. There is no guarding.   Musculoskeletal:         General: Normal range of motion.      Right lower leg: No edema.      Left lower leg: No edema.   Skin:     General: Skin is dry.   Neurological:      General: No focal deficit present.      Mental Status: She is alert and oriented to person, place, and time.   Psychiatric:         Mood and Affect: Mood normal.         Behavior: Behavior normal.         Thought Content: Thought content normal.         Assessment/Plan :  Problem List Items Addressed This Visit       COPD (chronic obstructive pulmonary disease) (Multi)     Stable and  asymptomatic   Not on medication  Reevaluate in 6 months.           Hyperlipidemia     LDL mildly elevated  triglycerides at goal  Coronary calcium score done 2021 was 2  Declines statin at this time - will work on diet  Continue to monitor          Relevant Orders    TSH with reflex to Free T4 if abnormal    Lipid Panel    Cholesterol, LDL Direct    Impaired fasting blood sugar     well controlled   Reevaluate in 6 months.           Relevant Orders    TSH with reflex to Free T4 if abnormal    Comprehensive Metabolic Panel    Hemoglobin A1C    Small cell lung cancer     Diagnosed per bronch 1/22  S/P radiation  Chemo currently held due to side effects  Has follow up oncology later this month         Vitamin D deficiency     Well controlled. Continue current medicine and recheck in 6 months.          Relevant Orders    Vitamin D 25-Hydroxy,Total (for eval of Vitamin D levels)    Medicare annual wellness visit, subsequent - Primary     Medical Wellness exam done.  Mammogram ordered  Shingrix series complete.  Prevnar 13 5/21  Pneumovax 23 7/22  colonoscopy 7/24  DEXA 5/24  RSV 11/23  Flu and COVID booster 10/24  Boostrix 2/20  Current nonsmoker - quit 1999  Depression Screening  Depression screening completed using the PHQ-2 questions, with results documented in the chart/encounter (~5min).  (See Rooming Screening section for documentation, and/or progress note for additional information)   Not on opioids         Anemia, chronic disease     H/H normal most recent labs  Followed by oncology         Relevant Orders    CBC    Cerebellar ataxia (Multi)     Followed by neuro - has follow up next month  Has follow up PT         Paraneoplastic cerebellar degeneration (CMS-HCC)     Followed by oncology and neuro  Sees oncology this month  Sees neuro 12/24          Other Visit Diagnoses       Breast screening        Relevant Orders    BI mammo bilateral screening tomosynthesis    Encounter for screening mammogram for malignant  neoplasm of breast        Relevant Orders    BI mammo bilateral screening tomosynthesis               The following health maintenance schedule was reviewed with the patient and provided in printed form in the after visit summary:  Health Maintenance   Topic Date Due    Derm Melanoma Skin Check  Never done    Medicare Annual Wellness Visit (AWV)  05/13/2024    Mammogram  10/18/2024    Bone Density Scan  05/20/2026    Colorectal Cancer Screening  07/17/2027    Lipid Panel  09/17/2029    DTaP/Tdap/Td Vaccines (2 - Td or Tdap) 02/18/2030    RSV High Risk: (Elderly (60+) or Pregnant Population)  Completed    Influenza Vaccine  Completed    Pneumococcal Vaccine: 65+ Years  Completed    Zoster Vaccines  Completed    Hepatitis C Screening  Completed    COVID-19 Vaccine  Completed    HIB Vaccines  Aged Out    Hepatitis B Vaccines  Aged Out    IPV Vaccines  Aged Out    Hepatitis A Vaccines  Aged Out    Meningococcal Vaccine  Aged Out    Rotavirus Vaccines  Aged Out    HPV Vaccines  Aged Out    Irritable Bowel Syndrome  Discontinued           Patient understands and agrees with treatment plan.          Nova Omer MD

## 2024-11-13 ENCOUNTER — APPOINTMENT (OUTPATIENT)
Dept: PHYSICAL THERAPY | Facility: HOSPITAL | Age: 69
End: 2024-11-13
Payer: MEDICARE

## 2024-11-15 ENCOUNTER — APPOINTMENT (OUTPATIENT)
Dept: PHYSICAL THERAPY | Facility: HOSPITAL | Age: 69
End: 2024-11-15
Payer: MEDICARE

## 2024-11-18 ENCOUNTER — APPOINTMENT (OUTPATIENT)
Dept: PRIMARY CARE | Facility: CLINIC | Age: 69
End: 2024-11-18
Payer: MEDICARE

## 2024-11-18 VITALS
WEIGHT: 121.1 LBS | DIASTOLIC BLOOD PRESSURE: 71 MMHG | OXYGEN SATURATION: 95 % | HEART RATE: 83 BPM | SYSTOLIC BLOOD PRESSURE: 100 MMHG | HEIGHT: 60 IN | TEMPERATURE: 97.4 F | BODY MASS INDEX: 23.78 KG/M2

## 2024-11-18 DIAGNOSIS — G11.9 CEREBELLAR ATAXIA (MULTI): ICD-10-CM

## 2024-11-18 DIAGNOSIS — Z12.31 ENCOUNTER FOR SCREENING MAMMOGRAM FOR MALIGNANT NEOPLASM OF BREAST: ICD-10-CM

## 2024-11-18 DIAGNOSIS — C34.90 SMALL CELL CARCINOMA OF LUNG, UNSPECIFIED LATERALITY, UNSPECIFIED PART OF LUNG: ICD-10-CM

## 2024-11-18 DIAGNOSIS — E78.2 MIXED HYPERLIPIDEMIA: ICD-10-CM

## 2024-11-18 DIAGNOSIS — J44.9 CHRONIC OBSTRUCTIVE PULMONARY DISEASE, UNSPECIFIED COPD TYPE (MULTI): ICD-10-CM

## 2024-11-18 DIAGNOSIS — R73.01 IMPAIRED FASTING BLOOD SUGAR: ICD-10-CM

## 2024-11-18 DIAGNOSIS — E55.9 VITAMIN D DEFICIENCY: ICD-10-CM

## 2024-11-18 DIAGNOSIS — D63.8 ANEMIA, CHRONIC DISEASE: ICD-10-CM

## 2024-11-18 DIAGNOSIS — G31.89 PARANEOPLASTIC CEREBELLAR DEGENERATION (CMS-HCC): ICD-10-CM

## 2024-11-18 DIAGNOSIS — Z00.00 MEDICARE ANNUAL WELLNESS VISIT, SUBSEQUENT: Primary | ICD-10-CM

## 2024-11-18 DIAGNOSIS — Z12.39 BREAST SCREENING: ICD-10-CM

## 2024-11-18 PROCEDURE — 99214 OFFICE O/P EST MOD 30 MIN: CPT | Performed by: FAMILY MEDICINE

## 2024-11-18 PROCEDURE — 1160F RVW MEDS BY RX/DR IN RCRD: CPT | Performed by: FAMILY MEDICINE

## 2024-11-18 PROCEDURE — G0444 DEPRESSION SCREEN ANNUAL: HCPCS | Performed by: FAMILY MEDICINE

## 2024-11-18 PROCEDURE — G0439 PPPS, SUBSEQ VISIT: HCPCS | Performed by: FAMILY MEDICINE

## 2024-11-18 PROCEDURE — 3008F BODY MASS INDEX DOCD: CPT | Performed by: FAMILY MEDICINE

## 2024-11-18 PROCEDURE — 1170F FXNL STATUS ASSESSED: CPT | Performed by: FAMILY MEDICINE

## 2024-11-18 PROCEDURE — 1159F MED LIST DOCD IN RCRD: CPT | Performed by: FAMILY MEDICINE

## 2024-11-18 PROCEDURE — 1036F TOBACCO NON-USER: CPT | Performed by: FAMILY MEDICINE

## 2024-11-18 ASSESSMENT — ACTIVITIES OF DAILY LIVING (ADL)
TAKING_MEDICATION: INDEPENDENT
DRESSING: INDEPENDENT
MANAGING_FINANCES: INDEPENDENT
DOING_HOUSEWORK: INDEPENDENT
BATHING: INDEPENDENT
GROCERY_SHOPPING: INDEPENDENT

## 2024-11-18 ASSESSMENT — ENCOUNTER SYMPTOMS
OCCASIONAL FEELINGS OF UNSTEADINESS: 1
DEPRESSION: 0
LOSS OF SENSATION IN FEET: 1

## 2024-11-19 DIAGNOSIS — Z79.899 ENCOUNTER FOR MEDICATION REVIEW: Primary | ICD-10-CM

## 2024-11-20 LAB
NON-UH HIE ALANINE AMINOTRANSFERASE:CCNC:PT:SER/PLAS:QN:NO ADDITION OF P-5': 13 INT._UNIT/L (ref 6–46)
NON-UH HIE ALBUMIN/GLOBULIN:MCRTO:PT:SER:QN:: 1.5 (ref 1.1–2.2)
NON-UH HIE ALBUMIN:MCNC:PT:SER/PLAS:QN:: 3.8 GM/DL (ref 3.3–5)
NON-UH HIE ALKALINE PHOSPHATASE:CCNC:PT:SER/PLAS:QN:: 40 INT._UNIT/L (ref 21–98)
NON-UH HIE ANION GAP:SCNC:PT:SER/PLAS:QN:: 9 MEQ/L (ref 6–16)
NON-UH HIE ASPARTATE AMINOTRANSFERASE:CCNC:PT:SER/PLAS:QN:: 14 INT._UNIT/L (ref 5–43)
NON-UH HIE BASOPHILS/LEUKOCYTES:NFR.DF:PT:BLD:QN:AUTOMATED COUNT: 0 E9/L (ref 0–0.2)
NON-UH HIE BASOPHILS:NCNC:PT:BLD:QN:AUTOMATED COUNT: 0.6 % (ref 0–2)
NON-UH HIE BILIRUBIN.GLUCURONIDATED+BILIRUBIN.ALBUMIN BOUND:MCNC:PT:SER/PLA: 0.1 MG/DL (ref 0–0.4)
NON-UH HIE BILIRUBIN.NON-GLUCURONIDATED:MSCNC:PT:SER/PLAS:QN:: 1.6 MG/DL (ref 0.1–0.9)
NON-UH HIE BILIRUBIN:MCNC:PT:SER/PLAS:QN:: 1.7 MG/DL (ref 0–1.1)
NON-UH HIE BILIRUBIN:PRTHR:PT:URINE:ORD:TEST STRIP.AUTOMATED: NEGATIVE MG/DL
NON-UH HIE CALCIUM:MCNC:PT:SER/PLAS:QN:: 8.6 MG/DL (ref 8.9–11.1)
NON-UH HIE CARBON DIOXIDE:SCNC:PT:SER/PLAS:QN:: 29 MMOL/L (ref 21–31)
NON-UH HIE CHLORIDE:SCNC:PT:SER/PLAS:QN:: 106 MMOL/L (ref 101–111)
NON-UH HIE CLARITY:TYPE:PT:URINE:NOM:: CLEAR
NON-UH HIE CLASS:TYPE:PT:URINE COLLECTION METHOD:NOM:*: NORMAL
NON-UH HIE COLOR:TYPE:PT:URINE:NOM:AUTO: NORMAL
NON-UH HIE CREATININE:MCNC:PT:SER/PLAS:QN:: 0.7 MG/DL (ref 0.5–1.3)
NON-UH HIE EGFR: 94 ML/MIN/1.73 M2
NON-UH HIE EOSINOPHILS/100 LEUKOCYTES:NFR:PT:BLD:QN:AUTOMATED COUNT: 3.2 % (ref 0–8)
NON-UH HIE EOSINOPHILS:NCNC:PT:BLD:QN:: 0.2 E9/L (ref 0–0.5)
NON-UH HIE ERYTHROCYTE DISTRIBUTION WIDTH:RATIO:PT:RBC:QN:AUTOMATED COUNT: 15.2 % (ref 10.9–14.2)
NON-UH HIE ERYTHROCYTE MEAN CORPUSCULAR HEMOGLOBIN CONCENTRATION:MCNC:PT:RB: 34.6 GM/DL (ref 31.4–36)
NON-UH HIE ERYTHROCYTE MEAN CORPUSCULAR HEMOGLOBIN:ENTMASS:PT:RBC:QN:AUTOMA: 32.2 PG (ref 27–34)
NON-UH HIE ERYTHROCYTE MEAN CORPUSCULAR VOLUME:ENTVOL:PT:RBC:QN:AUTOMATED C: 93 FL (ref 80–100)
NON-UH HIE ERYTHROCYTES:NCNC:PT:BLD:QN:AUTOMATED COUNT: 3.7 E12/L (ref 4.3–5.9)
NON-UH HIE GLOBULIN:MCNC:PT:SER:QN:CALCULATED: 2.5 GM/DL (ref 1.4–4)
NON-UH HIE GLUCOSE:MCNC:PT:SER/PLAS:QN:: 94 MG/DL (ref 55–199)
NON-UH HIE GLUCOSE:PRTHR:PT:URINE:ORD:TEST STRIP: NEGATIVE MG/DL
NON-UH HIE HEMATOCRIT:VFR:PT:BLD:QN:AUTOMATED COUNT: 34.6 % (ref 34–46)
NON-UH HIE HEMOGLOBIN:MCNC:PT:BLD:QN:: 12 GM/DL (ref 12–16)
NON-UH HIE HEMOGLOBIN:MCNC:PT:URINE:SEMIQN:TEST STRIP.AUTOMATED: NEGATIVE
NON-UH HIE KETONES:PRTHR:PT:URINE:ORD:TEST STRIP.AUTOMATED: NEGATIVE MG/DL
NON-UH HIE LEUKOCYTE ESTERASE:PRTHR:PT:URINE:ORD:TEST STRIP.AUTOMATED: NEGATIVE
NON-UH HIE LEUKOCYTES: 6.5 E9/L (ref 4–11)
NON-UH HIE LYMPHOCYTES:NCNC:PT:BLD:QN:: 0.9 E9/L (ref 1–4)
NON-UH HIE LYMPHOCYTES:NCNC:PT:BLD:QN:AUTOMATED COUNT: 13.9 % (ref 14–50)
NON-UH HIE MONOCYTES:NCNC:PT:BLD:QN:AUTOMATED COUNT: 0.6 E9/L (ref 0.2–1)
NON-UH HIE NEUTROPHILS/100 LEUKOCYTES:NFR:PT:BLD:QN:: 73.8 % (ref 36–75)
NON-UH HIE NEUTROPHILS:NCNC:PT:BLD:QN:AUTOMATED COUNT: 4.8 E9/L (ref 2–7.5)
NON-UH HIE NITRITE:PRTHR:PT:URINE:ORD:TEST STRIP.AUTOMATED: NEGATIVE MG/DL
NON-UH HIE PH:LSCNC:PT:URINE:QN:TEST STRIP: 6.5 (ref 5–9)
NON-UH HIE PLATELET MEAN VOLUME:ENTVOL:PT:BLD:QN:AUTOMATED COUNT: 7.1 FL (ref 6.4–10.8)
NON-UH HIE PLATELET: 253 E9/L (ref 150–500)
NON-UH HIE POTASSIUM:SCNC:PT:SER/PLAS:QN:: 3.6 MMOL/L (ref 3.5–5.3)
NON-UH HIE PROTEIN:MCNC:PT:SER/PLAS:QN:: 6.3 GM/DL (ref 6–7.8)
NON-UH HIE PROTEIN:PRTHR:PT:URINE:ORD:TEST STRIP: NEGATIVE MG/DL
NON-UH HIE SODIUM:SCNC:PT:SER/PLAS:QN:: 140 MMOL/L (ref 135–145)
NON-UH HIE SPECIFIC GRAVITY:RDEN:PT:URINE:QN:TEST STRIP: >1.05 (ref 1–1.03)
NON-UH HIE TRIACYLGLYCEROL LIPASE:CCNC:PT:SER/PLAS:QN:: 15 UNIT/L (ref 13–58)
NON-UH HIE TROPONIN: 3.1 PG/ML (ref 10.1–27.1)
NON-UH HIE TUBE COLLECTED PLASMA: YES
NON-UH HIE UREA NITROGEN/CREATININE:MRTO:PT:SER/PLAS:QN:: 37 NO UNITS (ref 10–20)
NON-UH HIE UREA NITROGEN:MCNC:PT:SER/PLAS:QN:: 26 MG/DL (ref 5–21)
NON-UH HIE UROBILINOGEN:MCNC:PT:URINE:SEMIQN:TEST STRIP: NEGATIVE MG/DL

## 2024-11-22 ENCOUNTER — HOSPITAL ENCOUNTER (OUTPATIENT)
Dept: RADIOLOGY | Facility: HOSPITAL | Age: 69
Discharge: HOME | End: 2024-11-22
Payer: MEDICARE

## 2024-11-22 DIAGNOSIS — C34.90 SMALL CELL LUNG CANCER: ICD-10-CM

## 2024-11-22 PROCEDURE — 70553 MRI BRAIN STEM W/O & W/DYE: CPT

## 2024-11-22 PROCEDURE — 74177 CT ABD & PELVIS W/CONTRAST: CPT

## 2024-11-22 PROCEDURE — A9575 INJ GADOTERATE MEGLUMI 0.1ML: HCPCS | Performed by: STUDENT IN AN ORGANIZED HEALTH CARE EDUCATION/TRAINING PROGRAM

## 2024-11-22 PROCEDURE — 2550000001 HC RX 255 CONTRASTS: Performed by: STUDENT IN AN ORGANIZED HEALTH CARE EDUCATION/TRAINING PROGRAM

## 2024-11-22 RX ORDER — GADOTERATE MEGLUMINE 376.9 MG/ML
10 INJECTION INTRAVENOUS
Status: COMPLETED | OUTPATIENT
Start: 2024-11-22 | End: 2024-11-22

## 2024-11-25 ENCOUNTER — OFFICE VISIT (OUTPATIENT)
Dept: HEMATOLOGY/ONCOLOGY | Facility: CLINIC | Age: 69
End: 2024-11-25
Payer: MEDICARE

## 2024-11-25 VITALS
RESPIRATION RATE: 16 BRPM | DIASTOLIC BLOOD PRESSURE: 83 MMHG | SYSTOLIC BLOOD PRESSURE: 119 MMHG | BODY MASS INDEX: 23.61 KG/M2 | OXYGEN SATURATION: 95 % | HEART RATE: 78 BPM | WEIGHT: 120.9 LBS | TEMPERATURE: 97.3 F

## 2024-11-25 DIAGNOSIS — C34.90 SMALL CELL LUNG CANCER: ICD-10-CM

## 2024-11-25 PROCEDURE — 99215 OFFICE O/P EST HI 40 MIN: CPT | Performed by: STUDENT IN AN ORGANIZED HEALTH CARE EDUCATION/TRAINING PROGRAM

## 2024-11-25 PROCEDURE — 1126F AMNT PAIN NOTED NONE PRSNT: CPT | Performed by: STUDENT IN AN ORGANIZED HEALTH CARE EDUCATION/TRAINING PROGRAM

## 2024-11-25 PROCEDURE — G2211 COMPLEX E/M VISIT ADD ON: HCPCS | Performed by: STUDENT IN AN ORGANIZED HEALTH CARE EDUCATION/TRAINING PROGRAM

## 2024-11-25 PROCEDURE — 1159F MED LIST DOCD IN RCRD: CPT | Performed by: STUDENT IN AN ORGANIZED HEALTH CARE EDUCATION/TRAINING PROGRAM

## 2024-11-25 RX ORDER — DEXAMETHASONE 1 MG/1
TABLET ORAL
Qty: 15 TABLET | Refills: 0 | Status: SHIPPED | OUTPATIENT
Start: 2024-11-25 | End: 2024-12-15

## 2024-11-25 ASSESSMENT — PAIN SCALES - GENERAL: PAINLEVEL_OUTOF10: 0-NO PAIN

## 2024-11-25 NOTE — PROGRESS NOTES
Patient ID: Gladys Branch is a 68 y.o. female.    DIAGNOSIS    Small Cell Lung Cancer    By immunostaining, the tumor cells are positive for CAM 5.2, INSM1, and TTF-1, and negative for p40 and LCA. The proliferation index by Ki-67 immunostaining is approximately 90%.  Synaptophysin, Chromogranin and INSM-1 are positive.  AE1/AE3 is negative. NEOGENOMICS, RB protein expression is lost in the tumor cells    STAGING    [id number], limited stage  Recurrence    CURRENT SITES OF DISEASE    RLL, supraclavicular    MOLECULAR GENOMICS      PRIOR THERAPY    Concurrent chemoradiation with Cisplatin/Etoposide every 3 weeks; C1D1 2/15/22, reaction to cisplatin C2D1 and did not receive D2 or D3 etoposide  Carbo/etoposide 4/5/2022 1 cycle c/b rash  PCI 5/21-6/13/22    CURRENT THERAPY    Phase 1 study [id number]  with study drug Taralatamab 1/24/23 - present.    CURRENT ONCOLOGICAL PROBLEMS      HISTORY OF PRESENT ILLNESS    Mrs. Branch is a 65 yo with PMH GERD and COPD who originally was round to have a RLL nodule measuring 11 mm in 4/28/2021 found as part of CT calcium score scan. An ensuing CT chest w/o contrast was done on 5/19/21 which revealed subsolid pulmonary nodules measuring 14 mm in the RLL. She had CT chest w/contrast on 11/29/21 which confirmed stable 14 mm GGO of the RLL and decreased RLL subpleural nodule. She met thoracic surgeon Dr. Farfan, who ordered PET/CT scan done on 12/8/21 which did not reveal any FDG-avid nodules within the lung parenchyma but R hilar nodes with max SUV 8.0. He referred her for bronch, which was done on 1/21/22 by Dr. Mcdaniels. 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 Brain MRI was negative.    She started concurrent chemoradiation with BID radiation with cis/etop 2/15/22, and unfortunately had a reaction to cisplatin on C2D1 with chest pain and hypotension. She was hospitalized with sepsis felt to be due to pneumonia. She trialed carboplatin/etoposide on 4/5/22 with a  "resulting rash and opted to forego further chemotherapy as she had completed radiation. Restaging scan 11/3/22 unfortunately with progression with new R hilar lymph node, paratracheal nodes, and increase in size of R supraclavicular mass. She enrolled in [id number] and started C1D1 1/24/23. C1 complicated by anorexia and dysgeusia, and delayed C2 by a week. She has further struggled with fatigue and confusion, which may be related to mirtazapine. Dose reduced for C2 and mirtazapine stopped with improvement in symptoms. 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    PAST MEDICAL HISTORY    GERD  COPD    SOCIAL HISTORY    Retired - lighting . Lives at home with  and a kitten.  Tobacco: quit smoking 1999. 1 ppd x 25 yrs.  EtOH: wine 1 glass/day  Illicits: none    CURRENT MEDS    Please see med list    ALLERGIES    Codeine, Sulfa drugs, Cisplatin    FAMILY HISTORY    Paternal aunt - breast cancer dx 80s    Subjective    She is here today with her . She feels like her balance is not getting better. She is trying to do PT but they keep cancelling. She will be going to PT somewhere else. The food in her stomach is also staying longer and she's seeing a new GI doctor. She wants to wean off the dexamethasone because it's making her heartburn worse. She got her eyeglasses lenses changed.       Objective          10/10/2024    12:17 PM 10/10/2024    12:18 PM 10/15/2024     8:43 AM 11/12/2024     8:27 AM 11/18/2024     3:18 PM 11/22/2024     9:26 AM 11/25/2024     9:57 AM   Vitals   Systolic 115 111  108 100  119   Diastolic 79 75  72 71  83   BP Location Right arm Right arm   Left arm  Left arm   Heart Rate 70 75  68 83  78   Temp  36.2 °C (97.2 °F) 36.6 °C (97.9 °F) 36.1 °C (97 °F) 36.3 °C (97.4 °F)  36.3 °C (97.3 °F)   Resp 18 18  18   16   Height  1.527 m (5' 0.12\")   1.524 m (5') " 1.524 m (5')    Weight (lb) 117.06 117.06 114.42 121.03 121.1 116 120.9   BMI 22.77 kg/m2 22.77 kg/m2 22.26 kg/m2 23.54 kg/m2 23.65 kg/m2 22.65 kg/m2 23.61 kg/m2   BSA (m2) 1.5 m2 1.5 m2 1.48 m2 1.53 m2 1.52 m2 1.49 m2 1.52 m2   Visit Report Report Report   Report  Report       Daily Weight  11/25/24 : 54.8 kg (120 lb 14.4 oz)  11/18/24 : 54.9 kg (121 lb 1.6 oz)  11/12/24 : 54.9 kg (121 lb 0.5 oz)  10/15/24 : 51.9 kg (114 lb 6.7 oz)  10/10/24 : 53.1 kg (117 lb 1 oz)         Physical Exam  Vitals reviewed.   Constitutional:       General: She is not in acute distress.  HENT:      Head: Normocephalic and atraumatic.      Mouth/Throat:      Mouth: Mucous membranes are moist.   Eyes:      Extraocular Movements: Extraocular movements intact.      Conjunctiva/sclera: Conjunctivae normal.      Pupils: Pupils are equal, round, and reactive to light.   Cardiovascular:      Rate and Rhythm: Normal rate and regular rhythm.      Heart sounds: Normal heart sounds. No murmur heard.  Pulmonary:      Effort: Pulmonary effort is normal. No respiratory distress.      Breath sounds: Normal breath sounds.   Abdominal:      General: Abdomen is flat. Bowel sounds are normal. There is no distension.      Palpations: Abdomen is soft.   Skin:     General: Skin is warm and dry.   Neurological:      Mental Status: She is alert and oriented to person, place, and time. Mental status is at baseline.   Psychiatric:         Mood and Affect: Mood normal.         Behavior: Behavior normal.         Thought Content: Thought content normal.       Labs  No visits with results within 1 Day(s) from this visit.   Latest known visit with results is:   Orders Only on 11/20/2024   Component Date Value Ref Range Status    [facility] LEUKOCYTES 11/20/2024 6.5  4.0 - 11.0 E9/L Final    [facility] ERYTHROCYTES:NCNC:PT:BL* 11/20/2024 3.7 (L)  4.3 - 5.9 E12/L Final    [facility] HEMOGLOBIN:MCNC:PT:BLD:* 11/20/2024 12.0  12.0 - 16.0 gm/dL Final    [facility]  HEMATOCRIT:VFR:PT:BLD:Q* 11/20/2024 34.6  34.0 - 46.0 % Final    [facility] ERYTHROCYTE DISTRIBUTIO* 11/20/2024 15.2 (H)  10.9 - 14.2 % Final    [facility] ERYTHROCYTE MEAN CORPUS* 11/20/2024 32.2  27.0 - 34.0 pg Final    [facility] ERYTHROCYTE MEAN CORPUS* 11/20/2024 34.6  31.4 - 36.0 gm/dL Final    [facility] ERYTHROCYTE MEAN CORPUS* 11/20/2024 93.0  80.0 - 100.0 fL Final    [facility] PLATELET MEAN VOLUME:EN* 11/20/2024 7.1  6.4 - 10.8 fL Final    [facility] PLATELET 11/20/2024 253.0  150.0 - 500.0 E9/L Final    [facility] NEUTROPHILS/100 LEUKOCY* 11/20/2024 73.8  36.0 - 75.0 % Final    [facility] LYMPHOCYTES:NCNC:PT:BLD* 11/20/2024 13.9 (L)  14.0 - 50.0 % Final    [facility] MONOCYTES:NCNC:PT:BLD:Q* 11/20/2024 0.6  0.2 - 1.0 E9/L Final    [facility] EOSINOPHILS/100 LEUKOCY* 11/20/2024 3.2  0.0 - 8.0 % Final    [facility] BASOPHILS:NCNC:PT:BLD:Q* 11/20/2024 0.6  0.0 - 2.0 % Final    [facility] NEUTROPHILS:NCNC:PT:BLD* 11/20/2024 4.8  2.0 - 7.5 E9/L Final    [facility] LYMPHOCYTES:NCNC:PT:BLD* 11/20/2024 0.9 (L)  1.0 - 4.0 E9/L Final    [facility] EOSINOPHILS:NCNC:PT:BLD* 11/20/2024 0.2  0.0 - 0.5 E9/L Final    [facility] BASOPHILS/LEUKOCYTES:NF* 11/20/2024 0.0  0.0 - 0.2 E9/L Final    [facility] GLUCOSE:MCNC:PT:SER/STACY* 11/20/2024 94  55 - 199 mg/dL Final    [facility] UREA NITROGEN:MCNC:PT:S* 11/20/2024 26 (H)  5 - 21 mg/dL Final    [facility] CREATININE:MCNC:PT:SER/* 11/20/2024 0.7  0.5 - 1.3 mg/dL Final    [facility] UREA NITROGEN/CREATININ* 11/20/2024 37 (H)  10 - 20 No Units Final    [facility] CALCIUM:MCNC:PT:SER/STACY* 11/20/2024 8.6 (L)  8.9 - 11.1 mg/dL Final    [facility] SODIUM:SCNC:PT:SER/PLAS* 11/20/2024 140  135 - 145 mmol/L Final    [facility] POTASSIUM:SCNC:PT:SER/P* 11/20/2024 3.6  3.5 - 5.3 mmol/L Final    [facility] CHLORIDE:SCNC:PT:SER/PL* 11/20/2024 106  101 - 111 mmol/L Final    [facility] CARBON DIOXIDE:SCNC:PT:* 11/20/2024 29  21 - 31 mmol/L Final    [facility] ANION GAP:SCNC:PT:SER/P*  11/20/2024 9  6 - 16 mEq/L Final    [facility] ALANINE AMINOTRANSFERAS* 11/20/2024 13  6 - 46 Int._Unit/L Final    [facility] ASPARTATE AMINOTRANSFER* 11/20/2024 14  5 - 43 Int._Unit/L Final    [facility] ALBUMIN:MCNC:PT:SER/STACY* 11/20/2024 3.8  3.3 - 5.0 gm/dL Final    [facility] GLOBULIN:MCNC:PT:SER:QN* 11/20/2024 2.5  1.4 - 4.0 gm/dL Final    [facility] ALBUMIN/GLOBULIN:MCRTO:* 11/20/2024 1.5  1.1 - 2.2 Final    [facility] ALKALINE PHOSPHATASE:CC* 11/20/2024 40  21 - 98 Int._Unit/L Final    [facility] BILIRUBIN.GLUCURONIDATE* 11/20/2024 0.1  0.0 - 0.4 mg/dL Final    [facility] BILIRUBIN.NON-GLUCURONI* 11/20/2024 1.6 (H)  0.1 - 0.9 mg/dL Final    [facility] BILIRUBIN:MCNC:PT:SER/P* 11/20/2024 1.7 (H)  0.0 - 1.1 mg/dL Final    [facility] PROTEIN:MCNC:PT:SER/STACY* 11/20/2024 6.3  6.0 - 7.8 gm/dL Final    [facility] TRIACYLGLYCEROL LIPASE:* 11/20/2024 15  13 - 58 unit/L Final    [facility] EGFR 11/20/2024 94  >=59 mL/min/1.73 m2 Final    [facility] TROPONIN 11/20/2024 3.10 (L)  10.10 - 27.10 pg/mL Final    The 95% CI (Confidence Interval) PPV (Positive Predictive Value) for myocardial infarction in females is 38 pg/mL, in males 51 pg/mL. The results should be used in conjunction with clinical conditions of myocardial infarction.(Access High Sensitivity Troponin I Instructions For Use, Florence Maria, August 2018)    [facility] CLASS:TYPE:PT:URINE COL* 11/20/2024 Clean Catch   Final    [facility] COLOR:TYPE:PT:URINE:NOM* 11/20/2024 Light-Yellow  Yellow Final    Microscopic readings are only performed on those samples that meet specific criteria set forth by [facility].    [facility] CLARITY:TYPE:PT:URINE:N* 11/20/2024 Clear  Clear Final    [facility] SPECIFIC GRAVITY:RDEN:P* 11/20/2024 >1.050  1.005 - 1.030 Final    [facility] PH:LSCNC:PT:URINE:QN:TE* 11/20/2024 6.5  5.0 - 9.0 Final    [facility] PROTEIN:PRTHR:PT:URINE:* 11/20/2024 Negative  Negative mg/dL Final    [facility]  GLUCOSE:PRTHR:PT:URINE:* 11/20/2024 Negative  Negative mg/dL Final    [facility] KETONES:PRTHR:PT:URINE:* 11/20/2024 Negative  Negative mg/dL Final    [facility] BILIRUBIN:PRTHR:PT:URIN* 11/20/2024 Negative  Negative mg/dL Final    [facility] HEMOGLOBIN:MCNC:PT:URIN* 11/20/2024 Negative  Negative Final    [facility] NITRITE:PRTHR:PT:URINE:* 11/20/2024 Negative  Negative mg/dL Final    [facility] UROBILINOGEN:MCNC:PT:UR* 11/20/2024 Negative  Negative mg/dL Final    [facility] LEUKOCYTE ESTERASE:PRTH* 11/20/2024 Negative  Negative Final    [facility] TUBE COLLECTED PLASMA 11/20/2024 Yes   Final               Performance Status:    ECOG 1: Restricted in physically strenuous activity but ambulatory and able to carry out work of a light or sedentary nature, e.g., light house work, office work.           Imaging:  I have personally reviewed the below imaging and concur with the reported findings unless otherwise stated:    MR brain w and wo IV contrast    Result Date: 11/23/2024  Impression: Postcontrast imaging degraded by motion artifact. No definite abnormal intracranial enhancement to suggest metastatic disease. No acute intracranial process.   Similar appearance of the brain with moderate atrophy and nonspecific white matter signal change that may reflect an element of posttreatment change or chronic small vessel ischemic disease.   MACRO: None   Signed by: Jorge Shannon 11/23/2024 6:21 PM Dictation workstation:   [id number]     CT A/P 11/20/24  IMPRESSION:     Nonspecific large segment of small bowel wall thickening within the pelvis.   Differential includes enteritis, infectious/inflammatory etiologies, etc. No   associated pneumatosis. No pneumoperitoneum. Small volume free fluid in the pelvis.       Assessment/Plan      Mrs. Branch is a 67 yo with COPD and GERD who presented with newly diagnosed SCLC completed BID radiation planned concurrently with cis/etoposide but had a reaction C2D1 to cisplatin.  Completed 1 cycle carbo/etop D1 4/5/22 also complicated by facial flushing and erythematous rash and stopped further treatment. Now s/p PCI in 6/2022. Started clinical trial [id number] with tarlatamab 1/24/23. Treatment has been complicated by worsening short-term memory worse for the day or two after treatment, delayed C2 by 1 week and dose-reduced. Confusion has largely resolved during C3 after stopping mirtazapine but she and her  recognize transient worsening for the day or two after treatment. Stopped tarlatamab after developing paraneoplastic cerebellar ataxia.      #SCLC  - originally diagnosed with limited stage SCLC and started concurrent chemoradiation cis/etop 2/15/22, with reaction to cisplatin on C2D1  - trialed carboplatin/etoposide with rash and opted to forego further chemo as she had completed radiation  - progression 11/2022, and enrolled on [id number] (tarlatamab) started C1D1 1/24/23. Tolerated well overall until she has now developed paraneoplastic cerebellar ataxia, anti-ANNA1 antibody positive, following with neurology  - stopped trial (last dose 8/27/24) and will monitor off drugs with surveillance scans for now  - personally reviewed most recent scan without overt evidence of progression. Will await official radiology read. Will continue CT C/A/P and CT neck for better visualization of supraclavicular node every 2 months, and brain MRI every 3 months    # Paraneplastic cerebellar ataxia  - anti-ANNA1 ab positive  - follows with neurology  - unclear etiology, occult progression vs tarlatamab induced  - tapering steroids and started cytoxan  - referral to PT    #Double vision/confusion/gait instability  -5/20/24 MRI brain negative.   -Discussed in phase 1 meeting and seems consistent with WBR late effect.   -She was seen by her ophthalmologist and will continue to follow.  -saw neurology and undergoing work-up as well  - continues on dexamethasone, and note decreased cortisol and ACTH -  will refer to endocrinology for consideration if this is clinically significant and impacting confusion/gait instability    Francy Archer MD

## 2024-11-26 ENCOUNTER — APPOINTMENT (OUTPATIENT)
Dept: PHARMACY | Facility: HOSPITAL | Age: 69
End: 2024-11-26
Payer: MEDICARE

## 2024-12-02 ENCOUNTER — EVALUATION (OUTPATIENT)
Dept: PHYSICAL THERAPY | Facility: HOSPITAL | Age: 69
End: 2024-12-02
Payer: MEDICARE

## 2024-12-02 DIAGNOSIS — C34.90 SMALL CELL LUNG CANCER: ICD-10-CM

## 2024-12-02 DIAGNOSIS — G31.89 PARANEOPLASTIC CEREBELLAR DEGENERATION (CMS-HCC): ICD-10-CM

## 2024-12-02 DIAGNOSIS — Z74.09 IMPAIRED FUNCTIONAL MOBILITY, BALANCE, GAIT, AND ENDURANCE: Primary | ICD-10-CM

## 2024-12-02 PROCEDURE — 97162 PT EVAL MOD COMPLEX 30 MIN: CPT | Mod: GP

## 2024-12-02 PROCEDURE — 97110 THERAPEUTIC EXERCISES: CPT | Mod: GP

## 2024-12-02 ASSESSMENT — ENCOUNTER SYMPTOMS
OCCASIONAL FEELINGS OF UNSTEADINESS: 1
LOSS OF SENSATION IN FEET: 1
DEPRESSION: 0

## 2024-12-02 ASSESSMENT — PAIN SCALES - GENERAL: PAINLEVEL_OUTOF10: 0 - NO PAIN

## 2024-12-02 ASSESSMENT — PAIN - FUNCTIONAL ASSESSMENT: PAIN_FUNCTIONAL_ASSESSMENT: 0-10

## 2024-12-02 NOTE — PROGRESS NOTES
Physical Therapy Evaluation and Treatment      Patient Name: Gladys Branch  MRN: [id number]  Today's Date: 12/2/2024    Time Entry:   Time Calculation  Start Time: 0717  Stop Time: 0800  Time Calculation (min): 43 min  PT Evaluation Time Entry  PT Evaluation (Moderate) Time Entry: 33  PT Therapeutic Procedures Time Entry  Therapeutic Exercise Time Entry: 8                 INS MEDICARE/ FRONT PATH 2230 ($0 USED ) COPAY 0 (MET)  COVERAGE 80/100 OOP 0 FRONT PATH TO FOLLOW MEDICARE  NO AUTH REQ [id number]   [facility] Cert Period: 12/2/24-3/2/24   Visit #1     Assessment:    Gladys Branch is a 68 y.o.  female presenting with c/o impaired balance, strength and functional mobility. This pt c/o recent decline in vision, balance, strength, sensation, dizziness and memory. Upon examination patient demonstrates impaired gait, txfers, stair negotiation, decreased strength, decreased balance and decreased sensation in lateral 3 toes bilaterally. Functional limitations and participations restrictions include Walking, curbs, rolling in bed, getting in/out of bed (has to use a stool), standing prolonged periods in shower.  The clinical presentation of this patient is evolving with changing characteristics and their history and examination findings are consistent with a moderate complexity evaluation with good rehab potential. Pt will benefit from skilled PT intervention 1-2 x/week for 10 weeks to address limitations listed above and achieve desired goals.      Plan:  OP PT Plan  Treatment/Interventions: Aquatic therapy, Biofeedback, Education/ Instruction, Electrical stimulation, Cryotherapy, Gait training, Hot pack, Manual therapy, Neuromuscular re-education, Self care/ home management, Taping techniques, Therapeutic activities, Therapeutic exercises, Vasopneumatic device  PT Plan: Skilled PT  PT Frequency: 1 time per week  Duration: 10 weeks  Onset Date: 01/01/24  Certification Period Start Date: 12/02/24  Certification  Period End Date: 03/02/25  Number of Treatments Authorized: MEDICARE/ FRONT PATH 2230 ($0 USED ) COPAY 0 (MET)  COVERAGE 80/100 OOP 0 FRONT PATH TO FOLLOW MEDICARE  NO AUTH REQ [id number]/ALL  Rehab Potential: Good  Plan of Care Agreement: Patient    Current Problem:   1. Impaired functional mobility, balance, gait, and endurance        2. Small cell lung cancer  Referral to Physical Therapy      3. Paraneoplastic cerebellar degeneration (CMS-HCC)  Referral to Physical Therapy          Subjective    General:  Pt developed paraneoplastic cerebellar ataxia with anti-JACKIE antibodies and therapy stopped, last dose 8/27/24. Pt has SCLC s/p cisplatin/etoposide 2022. Pt has had multiple falls in the past year.   Pt has had cognitive decline with memory issues and changes in vision since having prophylactic brain radiation.   Pt is currently on study drug Taralatamab since 1/24/23 - current.   General  Reason for Referral: G31.89 (ICD-10-CM) - Paraneoplastic cerebellar degeneration (CMS-HCC)  Referred By: Dr. Archer  Date of Onset: Balance issues recently gotten worse in Jan 2024.     Has fallen 5-6 in past year. Has been advised to use cane/walker but doesn't use it out in the community.     Changes in:   - balance: yes   - vision: yes -  has ophthalmologist   - sensation- numbness in lateral 3 toes on B feet 2/2 chemo   - strength: yes- decreased strength   - memory: yes - hard remembering things the following day or longer. Spoke to  Has not seen therapy for this.   - speech: denies   - swallowing: denies     Pain (0-10)         Location: Denies pain         How is this impacting participation in daily life? Walking, curbs, rolling in bed, getting in/out of bed (has to use a stool), showering    Numbness/Tingling: Yes- B lateral 3 toes.   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  Pain:  Pain Assessment  Pain Assessment: 0-10  0-10 (Numeric) Pain Score: 0 - No pain  Home Living:  Home Living  Home Living Comment: 1 level ranch, 2 BERNA[place]TE through garage without HRs, GBs in shower and near toilet  Prior Level of Function:  Prior Function Per Pt/Caregiver Report  Level of [place]: Independent with ADLs and functional transfers    Objective   Posture: relaxed, rounded shoulders  Transfers: B UE use, R>L ankle inv, dynamic valgus, min R sided lean   Bed Mobility: inc time needed  Gait: antalgic, wide DEANGELO, R>L LE hip IR and ankle inversion, decreased gait speed, mild shuffling gait.   Stairs: decreased eccentric control on R LE>RLE      Numbness/Tingling/Paresthesia: numbness in lateral 3 toes otherwise WNL throughout LEs     Dermatomes B LE: numbness in lateral 3 toes otherwise WNL throughout LEs     Myotomes/Strength (MMT in sitting):  Hip Flex (L2) R/L:  4+ B   Hip Add R/L: 4- in sitting   Hip Abd R/L: 4- in sitting   Hip Ext R/L: functionally poor with txfers   Knee Ext (L3) R/L:  4+ B   Knee Flex R/L: 4+  / 4     Ankle DF (L4) R/L: 4+ B   Ankle PF (S1) R/L: 4+  / 4   Abdominals (sitting unsupported): 4   Back Extensors (sitting unsupported):  5      Coordination: WNL heel to shin B     5xSTS: 14.73 sec with use of UE B      Mod-CTSIB (3 trials  each)   / 120 seconds  **feet together for all, I only do 1 trial of each and document if min/mod/max sway**   -EO, Firm Surface:    30sec min sway   -EC, Firm surface: 18sec mod sway   -EO, Foam surface: 30sec mod sway to R>L  -EC, Foam surface: 6sec, 5 sec, 7 sec max sway to the Right every time       Outcome Measures:  Other Measures  Activities - Specific Balance Confidence Scale: 35% self confidence / 65% impairment     Treatments:  Access Code: [email or web address]  URL: [email or web address]  Date: 2024  Prepared by: Olesya Arriaga    Exercises  - Sit to Stand with Armchair  - 1 x daily - 7 x weekly - 3 sets - 10 reps  - Seated March  - 1 x daily - 7 x weekly - 3 sets - 10 reps  - Standing Hip Extension with Counter Support  - 1 x daily - 7 x weekly - 3 sets - 10 reps    Pt educated on proper STS form.      Educated on FU with urologist, neurologist, ophthalmology for changes in bladder, vision and dizziness.     Discussed possible referral to speech therapy to assist with memory and cognition.     Pt educated on use of SPC in the community to reduce falls risk and improve stability.     Goals:   ST weeks   Gladys Branch will demonstrate independence and report compliance with HEP to facilitate independent rehab program upon discharge.     Gladys Branch will complete sit <> stand transfers using BUE, equal weight bearing on LEs, and no major LOB at completion of transfer during 3/3 trials in order to enhance functional mobility and safety.    LTGs: 10 weeks  Gladys Arnolder will perform 5x sit to  < 15 seconds without use of upper extremities to decrease fall risk. Baseline 14.73 sec with use of UE B    Gladys Arnolder will demonstrate increased strength by 1/2 MMT grade to improve ease of mobility getting in/out of bed.      Gladys Branch will complete all 4 conditions of ModCTSIB for 30 secs with min to no sway to increase safety, decrease fall risk, and improve  ability to complete functional activities. Baseline: 30 min sway/30 EO Land, 18sec mod sway /30 EC Land, 30 sec mod sway/30 EO Foam, 6sec, 5 sec, 7 sec max sway to the Right every time /30 EC Foam     Gladys Branch will ambulate with IND without AD on even surfaces for community distances without imbalance or major deviation to safely return to [facility] and enhance access to the community.     Gladys Branch will be able to ascend/descend > or equal to 8 steps with use of unilateral handrail reciprocally without difficulty in order for patient to be able to ambulate safely on all levels of home and in the community.    Gladys Branch will report an improvement in the Activities-specific Balance Confidence (ABC) Scale to >67% self confidence to reduce risk of falling. Baseline:  35% self confidence / 65% impairment

## 2024-12-03 ENCOUNTER — APPOINTMENT (OUTPATIENT)
Dept: NEUROLOGY | Facility: CLINIC | Age: 69
End: 2024-12-03
Payer: MEDICARE

## 2024-12-04 ENCOUNTER — TREATMENT (OUTPATIENT)
Dept: PHYSICAL THERAPY | Facility: HOSPITAL | Age: 69
End: 2024-12-04
Payer: MEDICARE

## 2024-12-04 DIAGNOSIS — Z74.09 IMPAIRED FUNCTIONAL MOBILITY, BALANCE, GAIT, AND ENDURANCE: Primary | ICD-10-CM

## 2024-12-04 PROCEDURE — 97110 THERAPEUTIC EXERCISES: CPT | Mod: GP

## 2024-12-04 NOTE — PROGRESS NOTES
Physical Therapy Evaluation and Treatment      Patient Name: Gladys Branch  MRN: [id number]  Today's Date: 12/4/2024    Time Entry:   Time Calculation  Start Time: 1145  Stop Time: 1225  Time Calculation (min): 40 min     PT Therapeutic Procedures Time Entry  Therapeutic Exercise Time Entry: 40                 INS MEDICARE/ FRONT PATH 2230 ($0 USED ) COPAY 0 (MET)  COVERAGE 80/100 OOP 0 FRONT PATH TO FOLLOW MEDICARE  NO AUTH REQ [id number]   MCR Cert Period: 12/2/24-3/2/24   Visit #2     Assessment:    Pt arrived with SPC today with intermittent use when walking into clinic with often forgetting SPC behind during session and requiring therapist reminders for use. Pt tolerates all strengthening exercises well with heavy reliance on momentum requiring VC to slow down movements. Denies pain/soreness at end of session.       Plan:   Balance and gait training   LE/core strength     Current Problem:   1. Impaired functional mobility, balance, gait, and endurance  Follow Up In Physical Therapy          Subjective    General:  Just started using SPC today. Denies falls since last visit. Exercises going okay.     Pt declines issues with showering, self care activities, toileting, etc. 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     Objective       Treatments:  TherEx: 40  NuStep level 3 (seat at 4, arms at 3) x5 min     In II Bars:   Mini squats x10   Standing hip ABD 3x10 B with TC for form   Standing hip Ext 2x10 B with VC for form   Toe tapping various colored cones with verbal cues for cognitive challenge- pt demos anticipatory tapping of wrong colored cones.     HL clamshells with RTB 3x10   HL march with RTB around thighs 2x20 alternating   Butterfly bridge with RTB around [id number]   SLR 2x10 B   HL ball press TA activation 3x10   SL clamshell x10  B     Discussed possible OT referral with focus on helping with self care activities as needed.     HEP:   Access Code: [email or web address]  URL: [email or web address]  Date: 12/02/2024  Prepared by: Olesya Arriaga    Exercises  - Sit to Stand with Armchair  - 1 x daily - 7 x weekly - 3 sets - 10 reps  - Seated March  - 1 x daily - 7 x weekly - 3 sets - 10 reps  - Standing Hip Extension with Counter Support  - 1 x daily - 7 x weekly - 3 sets - 10 reps

## 2024-12-05 ENCOUNTER — TELEPHONE (OUTPATIENT)
Dept: HEMATOLOGY/ONCOLOGY | Facility: HOSPITAL | Age: 69
End: 2024-12-05
Payer: MEDICARE

## 2024-12-05 NOTE — TELEPHONE ENCOUNTER
Spoke with patient and  Emre to determine if they were willing to continue with follow-up visits and protocol procedures for the [id number] trial since Gladys came off study. Due to her continued health issues and distance for traveling to our  facilty, they have decided to continue with survival follow-up only, and she is okay with survival follow-up via telephone and/or EMR review.  Their preference is to contact the spouse Emre first, and if she is feeling okay he will put her on the call. His information is listed in Gladys's demographic data.                         Vladimir Fernandez RN

## 2024-12-06 ENCOUNTER — APPOINTMENT (OUTPATIENT)
Dept: NEUROLOGY | Facility: CLINIC | Age: 69
End: 2024-12-06
Payer: MEDICARE

## 2024-12-06 VITALS
BODY MASS INDEX: 23.44 KG/M2 | DIASTOLIC BLOOD PRESSURE: 72 MMHG | HEART RATE: 76 BPM | HEIGHT: 60 IN | WEIGHT: 119.4 LBS | SYSTOLIC BLOOD PRESSURE: 106 MMHG

## 2024-12-06 DIAGNOSIS — C34.00 SMALL CELL CARCINOMA OF HILUM OF LUNG, UNSPECIFIED LATERALITY: ICD-10-CM

## 2024-12-06 DIAGNOSIS — G31.89 PARANEOPLASTIC CEREBELLAR DEGENERATION (CMS-HCC): Primary | ICD-10-CM

## 2024-12-06 DIAGNOSIS — C34.90 SMALL CELL CARCINOMA OF LUNG: ICD-10-CM

## 2024-12-06 ASSESSMENT — PATIENT HEALTH QUESTIONNAIRE - PHQ9
1. LITTLE INTEREST OR PLEASURE IN DOING THINGS: NOT AT ALL
SUM OF ALL RESPONSES TO PHQ9 QUESTIONS 1 & 2: 0
2. FEELING DOWN, DEPRESSED OR HOPELESS: NOT AT ALL

## 2024-12-06 NOTE — PATIENT INSTRUCTIONS
Thank you for your visit today. 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 If you have any questions or need to reach me, call my office at [phone].    We discussed the following plan today:   Please confirm whether you are off prednisone, let me know if you are still on it  Continue Cytoxan infusions and physical therapy  Return in 4 months

## 2024-12-06 NOTE — PROGRESS NOTES
Subjective     Gladys Branch is a right handed  68 y.o. year old female who presents with Paraneoplastic cerebellar degeneration  and Cerebellar ataxia. Patient is accompanied by: spouse  Visit type: follow up visit     She thinks she weaned off prednisone (though not 100% certain). She is in the process of weaning off dexamethasone which will be off by end of month. Previously she was having difficulty with fatigue and drowsiness. She is tolerating Cytoxan infusions. Her most recent CT chest showed stable small nodules.    She feels her balance difficulty is roughly stable over the past month or so. PT has been helping. She is mainly using a cane.    Prior HX  She has SCLC and s/p cisplatin/etoposide 2022, and she is currently in a clinical trial. For the past year or so she has had balance difficulty. It has been slowly getting worse in the past 6 months. She has had 4 falls in the past year. She is not using a cane. She has had numbness in her distal feet ever since her chemotherapy.    Her vision has been worsening. She has had horizontal diplopia which has been going on for several months as well. She will have to close one eye to read things properly.    She has had cognitive decline ever since having prophylactic brain radiation. She has trouble remembering things she did the previous day, or forgetting appointments. She has stopped driving because of vision. She is otherwise independent in other IADLs including cooking, shopping, and cleaning.    Patient Active Problem List   Diagnosis    Chronic GERD    COPD (chronic obstructive pulmonary disease) (Multi)    Hyperlipidemia    Impaired fasting blood sugar    Problem drinking    Seasonal allergic rhinitis    Small cell lung cancer    Vitamin D deficiency    Abnormal EKG    Agatston CAC score, <100    Frequent unifocal PVCs    Medicare annual wellness visit, subsequent    Peripheral neuropathy due to and not concurrent with chemotherapy (Multi)     Lutz cardiac risk <10% in next 10 years    Anemia, chronic disease    History of diverticulitis    Chronic constipation    Double vision    Cerebellar ataxia (Multi)    Paraneoplastic cerebellar degeneration (CMS-HCC)    Impaired functional mobility, balance, gait, and endurance      Past Medical History:   Diagnosis Date    Abdominal pain     Anemia     COPD (chronic obstructive pulmonary disease) (Multi)     Disease due to severe acute respiratory syndrome coronavirus 2 (SARS-CoV-2) 02/07/2023    Diverticulosis     GERD (gastroesophageal reflux disease)     Hyperlipidemia     Neuropathy     New onset of headache in cancer patient 05/17/2024    Other specified abnormal findings of blood chemistry 07/26/2022    Elevated serum creatinine    Other specified abnormal findings of blood chemistry 07/26/2022    Elevated serum creatinine    Other specified abnormal findings of blood chemistry 07/26/2022    Elevated TSH    Other specified abnormal findings of blood chemistry 07/26/2022    Elevated TSH    Other specified abnormal findings of blood chemistry 07/26/2022    Elevated TSH    Other specified abnormal findings of blood chemistry 07/26/2022    Elevated TSH    Other specified abnormal findings of blood chemistry 09/15/2020    Elevated TSH    Personal history of other diseases of the respiratory system     History of chronic obstructive lung disease    Small cell lung cancer       Past Surgical History:   Procedure Laterality Date    OTHER SURGICAL HISTORY  03/09/2020    Hysterectomy    OTHER SURGICAL HISTORY  03/16/2021    Tubal ligation    OTHER SURGICAL HISTORY  03/16/2021    Partial atrial septal defect repair    US GUIDED BIOPSY LYMPH NODE SUPERFICIAL  1/17/2023    US GUIDED BIOPSY LYMPH NODE SUPERFICIAL 1/17/2023 DOCTOR OFFICE LEGACY      Social History     Socioeconomic History    Marital status:      Spouse name: Not on file    Number of children: Not on file    Years of education: Not on file     Highest education level: Not on file   Occupational History    Not on file   Tobacco Use    Smoking status: Former     Current packs/day: 0.00     Average packs/day: 1 pack/day for 25.0 years (25.0 ttl pk-yrs)     Types: Cigarettes     Start date:      Quit date:      Years since quittin.9     Passive exposure: Past    Smokeless tobacco: Never   Vaping Use    Vaping status: Never Used   Substance and Sexual Activity    Alcohol use: Not Currently     Alcohol/week: 1.0 - 2.0 standard drink of alcohol     Types: 1 - 2 Glasses of wine per week     Comment: once  in a while    Drug use: Never    Sexual activity: Not on file   Other Topics Concern    Not on file   Social History Narrative    Not on file     Social Drivers of Health     Financial Resource Strain: Not on file   Food Insecurity: Not on file   Transportation Needs: Not on file   Physical Activity: Not on file   Stress: Not on file   Social Connections: Not on file   Intimate Partner Violence: Not on file   Housing Stability: Not on file      Family History   Problem Relation Name Age of Onset    Dementia Mother      Depression Mother      Other (varicose veins of lower extremity) Mother      Alcohol abuse Father      Breast cancer Father's Sister        Patient Health Questionnaire-2 Score: 0          Review of Systems  All other system have been reviewed and are negative for complaint.    Vitals:    24 1023   BP: 106/72   BP Location: Left arm   Patient Position: Sitting   BP Cuff Size: Adult   Pulse: 76   Weight: 54.2 kg (119 lb 6.4 oz)   Height: 1.524 m (5')       Objective   (Previous)  Neurological Exam  Mental Status  Awake, alert and oriented to person, place and time. Speech is normal. Language is fluent with no aphasia. Attention and concentration are normal.    Cranial Nerves  CN II: Visual fields full to confrontation.  CN III, IV, VI: Slight exophoria. Downbeat nystagmus is present. Hypermetric saccades. Normal smooth pursuit.  Normal lids and orbits bilaterally. Pupils equal round and reactive to light bilaterally.  CN V:  Right: Facial sensation is normal.  Left: Facial sensation is normal on the left.  CN VII: Full and symmetric facial movement.  CN VIII: Hearing is normal.  CN IX, X: Palate elevates symmetrically  CN XI: Shoulder shrug strength is normal.  CN XII: Tongue midline without atrophy or fasciculations.    Motor  Normal muscle bulk throughout. No fasciculations present. Normal muscle tone. No abnormal involuntary movements.                                               Right                     Left   Shoulder abduction               5                          5  Elbow flexion                         5                          5  Elbow extension                    5                          5  Finger flexion                         5                          5  Finger extension                    5                          5  Finger abduction                    5                          5  Hip flexion                              5                          5  Knee flexion                           5                          5  Plantarflexion                         5                          5  Dorsiflexion                            5                          5    Sensory  Light touch is normal in upper and lower extremities. Intact distal pinprick. Normal vibration in distal lower extremities.     Reflexes                                            Right                      Left  Biceps                                 2+                         2+  Triceps                                2+                         2+  Patellar                                2+                         2+  Achilles                                2+                         2+  Right Plantar: downgoing  Left Plantar: downgoing    Right pathological reflexes: Pam's absent.  Left pathological reflexes: Pam's  absent.    Coordination  Right: Heel-to-shin normal.Left: Heel-to-shin normal.  Slight dysmetria on finger vicki.    Gait   Romberg is present.  Wide-based ataxic gait, unable to tandem.                   BRIAN  GAIT Gait: 3 Stance Stance: 2 Sitting Sittin Speech Distrubance Speech Disturbance: 0, Nose Finger Test - Left: 0, Nose Finger Test - Right: 0, Alternating Hand Movement - Right: 2, Alternating Hand Movement - Left: 2, Heel-Shin - Right: 0, Heel-Shin - Left: 0 BRIAN Total Score Finger Vicki - Mean Score: 2, Nose Finger Test - Mean Score: 0, Alternating Hand Movement Mean Score: 2, Heel-Shin Meat Total: 0, BRIAN Total Score: 9    Hemoglobin A1C   Date Value Ref Range Status   2024 5.5 See comment % Final     Estimated Average Glucose   Date Value Ref Range Status   2024 111 Not Established mg/dL Final     Thyroid Stimulating Hormone   Date Value Ref Range Status   10/10/2024 2.61 0.44 - 3.98 mIU/L Final     Folate, Serum   Date Value Ref Range Status   08/15/2024 15.7 >5.0 ng/mL Final         Assessment/Plan   Problem List Items Addressed This Visit             ICD-10-CM    Small cell lung cancer C34.90    Paraneoplastic cerebellar degeneration (CMS-HCC) - Primary 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 year old female with COPD, small cell lung cancer (off treatment, on frequent surveillance), here for FUV for worsening balance for the past year. She has a new diagnosis of paraneoplastic cerebellar degeneration. Her exam is notable for downbeat nystagmus and ataxic gait, and supported by cerebellar atrophy on MRI and positive serum anti-ANNA1 antibodies. She seems to be stabilizing on Cytoxan infusions and weaning off steroids. If there is no evidence of cancer recurrence or progression of ataxia at one year then I may stop the infusions.    We discussed the following plan today:   Please confirm whether you  are off prednisone, let me know if you are still on it  Continue Cytoxan infusions and physical therapy  Return in 4 months

## 2024-12-09 ENCOUNTER — TELEPHONE (OUTPATIENT)
Dept: NEUROLOGY | Facility: CLINIC | Age: 69
End: 2024-12-09
Payer: MEDICARE

## 2024-12-09 NOTE — TELEPHONE ENCOUNTER
Spouse wanted to advise you that the pt has completely weaned off of Prednisone and is no longer taking it.

## 2024-12-10 ENCOUNTER — TELEPHONE (OUTPATIENT)
Dept: PHYSICAL THERAPY | Facility: HOSPITAL | Age: 69
End: 2024-12-10
Payer: MEDICARE

## 2024-12-10 ENCOUNTER — INFUSION (OUTPATIENT)
Dept: HEMATOLOGY/ONCOLOGY | Facility: CLINIC | Age: 69
End: 2024-12-10
Payer: MEDICARE

## 2024-12-10 ENCOUNTER — LAB (OUTPATIENT)
Dept: LAB | Facility: CLINIC | Age: 69
End: 2024-12-10
Payer: MEDICARE

## 2024-12-10 VITALS — BODY MASS INDEX: 23.63 KG/M2 | HEIGHT: 60 IN | WEIGHT: 120.37 LBS

## 2024-12-10 DIAGNOSIS — Z74.09 IMPAIRED FUNCTIONAL MOBILITY, BALANCE, GAIT, AND ENDURANCE: Primary | ICD-10-CM

## 2024-12-10 DIAGNOSIS — G31.89 PARANEOPLASTIC CEREBELLAR DEGENERATION (CMS-HCC): ICD-10-CM

## 2024-12-10 LAB
ALBUMIN SERPL BCP-MCNC: 3.9 G/DL (ref 3.4–5)
ALP SERPL-CCNC: 39 U/L (ref 33–136)
ALT SERPL W P-5'-P-CCNC: 11 U/L (ref 7–45)
ANION GAP SERPL CALC-SCNC: 11 MMOL/L (ref 10–20)
AST SERPL W P-5'-P-CCNC: 14 U/L (ref 9–39)
BASOPHILS # BLD AUTO: 0.02 X10*3/UL (ref 0–0.1)
BASOPHILS NFR BLD AUTO: 0.3 %
BILIRUB SERPL-MCNC: 0.6 MG/DL (ref 0–1.2)
BUN SERPL-MCNC: 23 MG/DL (ref 6–23)
CALCIUM SERPL-MCNC: 9.1 MG/DL (ref 8.6–10.3)
CHLORIDE SERPL-SCNC: 106 MMOL/L (ref 98–107)
CO2 SERPL-SCNC: 29 MMOL/L (ref 21–32)
CREAT SERPL-MCNC: 0.85 MG/DL (ref 0.5–1.05)
EGFRCR SERPLBLD CKD-EPI 2021: 74 ML/MIN/1.73M*2
EOSINOPHIL # BLD AUTO: 0.12 X10*3/UL (ref 0–0.7)
EOSINOPHIL NFR BLD AUTO: 1.8 %
ERYTHROCYTE [DISTWIDTH] IN BLOOD BY AUTOMATED COUNT: 13.2 % (ref 11.5–14.5)
GLUCOSE SERPL-MCNC: 96 MG/DL (ref 74–99)
HCT VFR BLD AUTO: 35.7 % (ref 36–46)
HGB BLD-MCNC: 12 G/DL (ref 12–16)
IMM GRANULOCYTES # BLD AUTO: 0.09 X10*3/UL (ref 0–0.7)
IMM GRANULOCYTES NFR BLD AUTO: 1.3 % (ref 0–0.9)
LYMPHOCYTES # BLD AUTO: 0.97 X10*3/UL (ref 1.2–4.8)
LYMPHOCYTES NFR BLD AUTO: 14.3 %
MCH RBC QN AUTO: 31.8 PG (ref 26–34)
MCHC RBC AUTO-ENTMCNC: 33.6 G/DL (ref 32–36)
MCV RBC AUTO: 95 FL (ref 80–100)
MONOCYTES # BLD AUTO: 0.64 X10*3/UL (ref 0.1–1)
MONOCYTES NFR BLD AUTO: 9.5 %
NEUTROPHILS # BLD AUTO: 4.92 X10*3/UL (ref 1.2–7.7)
NEUTROPHILS NFR BLD AUTO: 72.8 %
NRBC BLD-RTO: ABNORMAL /100{WBCS}
PLATELET # BLD AUTO: 196 X10*3/UL (ref 150–450)
POTASSIUM SERPL-SCNC: 3.6 MMOL/L (ref 3.5–5.3)
PROT SERPL-MCNC: 6.2 G/DL (ref 6.4–8.2)
RBC # BLD AUTO: 3.77 X10*6/UL (ref 4–5.2)
SODIUM SERPL-SCNC: 142 MMOL/L (ref 136–145)
WBC # BLD AUTO: 6.8 X10*3/UL (ref 4.4–11.3)

## 2024-12-10 PROCEDURE — 2500000004 HC RX 250 GENERAL PHARMACY W/ HCPCS (ALT 636 FOR OP/ED): Performed by: STUDENT IN AN ORGANIZED HEALTH CARE EDUCATION/TRAINING PROGRAM

## 2024-12-10 PROCEDURE — 2500000005 HC RX 250 GENERAL PHARMACY W/O HCPCS: Performed by: STUDENT IN AN ORGANIZED HEALTH CARE EDUCATION/TRAINING PROGRAM

## 2024-12-10 PROCEDURE — 96367 TX/PROPH/DG ADDL SEQ IV INF: CPT

## 2024-12-10 PROCEDURE — 96413 CHEMO IV INFUSION 1 HR: CPT

## 2024-12-10 PROCEDURE — 96375 TX/PRO/DX INJ NEW DRUG ADDON: CPT | Mod: INF

## 2024-12-10 PROCEDURE — 81003 URINALYSIS AUTO W/O SCOPE: CPT | Performed by: STUDENT IN AN ORGANIZED HEALTH CARE EDUCATION/TRAINING PROGRAM

## 2024-12-10 PROCEDURE — 85025 COMPLETE CBC W/AUTO DIFF WBC: CPT

## 2024-12-10 PROCEDURE — 36415 COLL VENOUS BLD VENIPUNCTURE: CPT

## 2024-12-10 PROCEDURE — 2500000004 HC RX 250 GENERAL PHARMACY W/ HCPCS (ALT 636 FOR OP/ED): Mod: JG | Performed by: STUDENT IN AN ORGANIZED HEALTH CARE EDUCATION/TRAINING PROGRAM

## 2024-12-10 PROCEDURE — 80053 COMPREHEN METABOLIC PANEL: CPT

## 2024-12-10 RX ORDER — PALONOSETRON 0.05 MG/ML
0.25 INJECTION, SOLUTION INTRAVENOUS ONCE
Status: COMPLETED | OUTPATIENT
Start: 2024-12-10 | End: 2024-12-10

## 2024-12-10 RX ORDER — DEXAMETHASONE 6 MG/1
12 TABLET ORAL ONCE
OUTPATIENT
Start: 2025-01-07 | End: 2025-01-07

## 2024-12-10 RX ORDER — FAMOTIDINE 10 MG/ML
20 INJECTION INTRAVENOUS ONCE AS NEEDED
OUTPATIENT
Start: 2025-01-07

## 2024-12-10 RX ORDER — PALONOSETRON 0.05 MG/ML
0.25 INJECTION, SOLUTION INTRAVENOUS ONCE
OUTPATIENT
Start: 2025-01-07

## 2024-12-10 RX ORDER — DEXAMETHASONE 6 MG/1
12 TABLET ORAL ONCE
Status: COMPLETED | OUTPATIENT
Start: 2024-12-10 | End: 2024-12-10

## 2024-12-10 RX ORDER — DIPHENHYDRAMINE HYDROCHLORIDE 50 MG/ML
50 INJECTION INTRAMUSCULAR; INTRAVENOUS AS NEEDED
OUTPATIENT
Start: 2025-01-07

## 2024-12-10 RX ORDER — ALBUTEROL SULFATE 0.83 MG/ML
3 SOLUTION RESPIRATORY (INHALATION) AS NEEDED
OUTPATIENT
Start: 2025-01-07

## 2024-12-10 RX ORDER — HEPARIN 100 UNIT/ML
500 SYRINGE INTRAVENOUS AS NEEDED
OUTPATIENT
Start: 2024-12-10

## 2024-12-10 RX ORDER — HEPARIN SODIUM,PORCINE/PF 10 UNIT/ML
50 SYRINGE (ML) INTRAVENOUS AS NEEDED
OUTPATIENT
Start: 2024-12-10

## 2024-12-10 RX ORDER — EPINEPHRINE 0.3 MG/.3ML
0.3 INJECTION SUBCUTANEOUS EVERY 5 MIN PRN
OUTPATIENT
Start: 2025-01-07

## 2024-12-10 NOTE — SIGNIFICANT EVENT
12/10/24 1230   Prechemo Checklist   Has the patient been in the hospital, ED, or urgent care since last date of service No   Chemo/Immuno Consent Completed and Signed Yes   Protocol/Indications Verified Yes   Confirmed to previous date/time of medication Yes   Compared to previous dose Yes   All medications are dated accurately Yes   Parameters Met Yes   BSA/Weight-Height Verified Yes   Dose Calculations Verified (current, total, cumulative) Yes

## 2024-12-11 ENCOUNTER — TELEPHONE (OUTPATIENT)
Dept: NEUROLOGY | Facility: CLINIC | Age: 69
End: 2024-12-11
Payer: MEDICARE

## 2024-12-11 ENCOUNTER — TELEPHONE (OUTPATIENT)
Dept: PHYSICAL THERAPY | Facility: HOSPITAL | Age: 69
End: 2024-12-11
Payer: MEDICARE

## 2024-12-11 DIAGNOSIS — Z74.09 IMPAIRED FUNCTIONAL MOBILITY, BALANCE, GAIT, AND ENDURANCE: Primary | ICD-10-CM

## 2024-12-11 LAB
APPEARANCE UR: CLEAR
BILIRUB UR STRIP.AUTO-MCNC: NEGATIVE MG/DL
COLOR UR: COLORLESS
GLUCOSE UR STRIP.AUTO-MCNC: NORMAL MG/DL
KETONES UR STRIP.AUTO-MCNC: NEGATIVE MG/DL
LEUKOCYTE ESTERASE UR QL STRIP.AUTO: NEGATIVE
NITRITE UR QL STRIP.AUTO: NEGATIVE
PH UR STRIP.AUTO: 7 [PH]
PROT UR STRIP.AUTO-MCNC: NEGATIVE MG/DL
RBC # UR STRIP.AUTO: NEGATIVE /UL
SP GR UR STRIP.AUTO: 1
UROBILINOGEN UR STRIP.AUTO-MCNC: NORMAL MG/DL

## 2024-12-11 NOTE — TELEPHONE ENCOUNTER
Pt's  called stating that she had an infusion done yesterday and they gave her some steroid pills to take before the infusion.  She did not tell him this until after the fact, but he was concerned because he said you had been trying to wean her off the steroids?  He was concerned and confused and wanted to get your thoughts on this.

## 2024-12-12 ENCOUNTER — TELEPHONE (OUTPATIENT)
Dept: PHYSICAL THERAPY | Facility: HOSPITAL | Age: 69
End: 2024-12-12
Payer: MEDICARE

## 2024-12-12 DIAGNOSIS — Z74.09 IMPAIRED FUNCTIONAL MOBILITY, BALANCE, GAIT, AND ENDURANCE: Primary | ICD-10-CM

## 2024-12-12 NOTE — TELEPHONE ENCOUNTER
PC TO PT ON WAIT LIST. OFFERED OPEN SLOTS AVAIL: TODAY (12/12) 9:15A, 1P.& 2P. ALSO FRI (12/13) 9AM, & M (12/16) 7:45AM; LMOM IN REFERENCE

## 2024-12-16 ENCOUNTER — TELEPHONE (OUTPATIENT)
Dept: PHYSICAL THERAPY | Facility: HOSPITAL | Age: 69
End: 2024-12-16
Payer: MEDICARE

## 2024-12-16 ENCOUNTER — TELEPHONE (OUTPATIENT)
Dept: ADMISSION | Facility: HOSPITAL | Age: 69
End: 2024-12-16
Payer: MEDICARE

## 2024-12-16 DIAGNOSIS — K21.9 GASTROESOPHAGEAL REFLUX DISEASE WITHOUT ESOPHAGITIS: ICD-10-CM

## 2024-12-16 DIAGNOSIS — Z74.09 IMPAIRED FUNCTIONAL MOBILITY, BALANCE, GAIT, AND ENDURANCE: Primary | ICD-10-CM

## 2024-12-16 RX ORDER — OMEPRAZOLE 20 MG/1
20 CAPSULE, DELAYED RELEASE ORAL DAILY
Qty: 90 CAPSULE | Refills: 3 | Status: SHIPPED | OUTPATIENT
Start: 2024-12-16 | End: 2025-12-16

## 2024-12-16 RX ORDER — OMEPRAZOLE 40 MG/1
40 CAPSULE, DELAYED RELEASE ORAL DAILY
Qty: 90 CAPSULE | Refills: 3 | Status: SHIPPED | OUTPATIENT
Start: 2024-12-16 | End: 2024-12-16

## 2024-12-16 NOTE — TELEPHONE ENCOUNTER
Pt's spouse is requesting prilosec 20mg not 40mg. Be sent to [facility] on file.   Pt was directed to decrease to 20mg.

## 2024-12-16 NOTE — TELEPHONE ENCOUNTER
RN called Gladys and let her know Dr. Archer placed a new order for Prilosec 20mg and it was sent to [facility] in [place]. RN spoke to Bo and he was asking if Dr. Archer would place a new referral in for PT and says that Gladys will benefit from more. RN will reach out to Dr. Archer.

## 2024-12-16 NOTE — TELEPHONE ENCOUNTER
Refill Request  Prilosec (omeprazole) 40mg daily    Preferred Pharmacy  [facility] #[id number] [place]    Proposed to p;provider to review and sign if appropriate

## 2024-12-16 NOTE — TELEPHONE ENCOUNTER
RCVD CALL FROM  (CHERELLE) W/CONCERNS ON BALANCE/FALL ISSUES. TRANSFERRED CALL TO PROVIDER & PROVIDER SPOKE TO DIRECT. FUTURE PT SESSION WILL REMAIN BUT IF THERE ARE ANY ADD'T CHANGES, THEY MAY BE PLACED ON HOLD W/REFERRAL TO SEE NEURO IN REFERENCE TO PRESENT SYMPTOMS.

## 2024-12-18 ENCOUNTER — TELEPHONE (OUTPATIENT)
Dept: ADMISSION | Facility: HOSPITAL | Age: 69
End: 2024-12-18
Payer: MEDICARE

## 2024-12-18 NOTE — TELEPHONE ENCOUNTER
Patients  - Emre called to follow up on appointments.  States he missed a call from Dr. Archer's office.  Reviewed all upcoming appointments and infusions with Emre.  Emre appreciative during call but thought he missed a call - I let him know I would notify the team and someone would call him back if needed.  No further needs at this time.

## 2024-12-20 ENCOUNTER — APPOINTMENT (OUTPATIENT)
Dept: RADIOLOGY | Facility: CLINIC | Age: 69
End: 2024-12-20
Payer: MEDICARE

## 2024-12-23 ENCOUNTER — TREATMENT (OUTPATIENT)
Dept: PHYSICAL THERAPY | Facility: HOSPITAL | Age: 69
End: 2024-12-23
Payer: MEDICARE

## 2024-12-23 DIAGNOSIS — Z74.09 IMPAIRED FUNCTIONAL MOBILITY, BALANCE, GAIT, AND ENDURANCE: Primary | ICD-10-CM

## 2024-12-23 PROCEDURE — 97112 NEUROMUSCULAR REEDUCATION: CPT | Mod: GP,CQ

## 2024-12-23 PROCEDURE — 97110 THERAPEUTIC EXERCISES: CPT | Mod: GP,CQ

## 2024-12-23 PROCEDURE — 97535 SELF CARE MNGMENT TRAINING: CPT | Mod: GP,CQ

## 2024-12-23 ASSESSMENT — PAIN SCALES - GENERAL: PAINLEVEL_OUTOF10: 0 - NO PAIN

## 2024-12-23 ASSESSMENT — PAIN - FUNCTIONAL ASSESSMENT: PAIN_FUNCTIONAL_ASSESSMENT: 0-10

## 2024-12-23 NOTE — PROGRESS NOTES
Physical Therapy Treatment    Patient Name: Gladys Branch  MRN: [id number]  Today's Date: 12/23/2024     PT Therapeutic Procedures Time Entry  Neuromuscular Re-Education Time Entry: 10  Therapeutic Exercise Time Entry: 18  Self-Care/Home Mgmt Training: 15                   Current Problem  1. 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      Subjective   Pt's  stating he thinks she is getting worse when it comes to her balance. Saw her neurologist who told them she seems the same.      Pain  Pain Assessment: 0-10  0-10 (Numeric) Pain Score: 0 - No pain      Objective     Treatments:  NuStep level 3 (seat at 4, arms at 6) x6 min   Hook lying hip AB RTB 2x15  Bridges with RTB 2x10    Neuro:  Sit to stands x10  Alt toe taps on 6in step x10    Assessment:  Pt ambulating with small based quad cane, very unsteady on her feet. Therapist advised pt to start using rolling walker instead of cane. Added sit to stands needing cues to bend her knees and shift her wt anteriorly. Also added toe taps which were very challenging catching on toes on her R knee 50% of the time causing her lose her balance needing modA from therapist to regain balance.     Plan:  Progress balance and strength

## 2024-12-30 ENCOUNTER — TELEPHONE (OUTPATIENT)
Dept: NEUROLOGY | Facility: CLINIC | Age: 69
End: 2024-12-30
Payer: MEDICARE

## 2024-12-30 NOTE — TELEPHONE ENCOUNTER
Patient's spouse called and wanted to know if monty can go to [facility] for physical therapy. He said that currently t he pt is fitter her in there and there, but they feel she should be doing PT 2-3 times per week. She has fallen a mona times again and they dont feel like she's getting any better. He is wondering if a referrel can be placed to [facility] in [place]  Fax: [phone]  So that she can get more intensive therapy

## 2024-12-31 ENCOUNTER — APPOINTMENT (OUTPATIENT)
Dept: PHYSICAL THERAPY | Facility: HOSPITAL | Age: 69
End: 2024-12-31
Payer: MEDICARE

## 2025-01-02 ENCOUNTER — APPOINTMENT (OUTPATIENT)
Dept: NEUROLOGY | Facility: CLINIC | Age: 70
End: 2025-01-02
Payer: MEDICARE

## 2025-01-06 ENCOUNTER — TELEPHONE (OUTPATIENT)
Dept: NEUROLOGY | Facility: CLINIC | Age: 70
End: 2025-01-06
Payer: MEDICARE

## 2025-01-06 NOTE — TELEPHONE ENCOUNTER
Spouse, Bo, wanted to let you know that pt is in [facility] room 324. She has the flu, is not able to walk and had to cx her 1/7 infusion.

## 2025-01-07 ENCOUNTER — TELEPHONE (OUTPATIENT)
Dept: HEMATOLOGY/ONCOLOGY | Facility: CLINIC | Age: 70
End: 2025-01-07
Payer: MEDICARE

## 2025-01-07 ENCOUNTER — APPOINTMENT (OUTPATIENT)
Dept: HEMATOLOGY/ONCOLOGY | Facility: CLINIC | Age: 70
End: 2025-01-07
Payer: MEDICARE

## 2025-01-07 ENCOUNTER — APPOINTMENT (OUTPATIENT)
Dept: PHYSICAL THERAPY | Facility: HOSPITAL | Age: 70
End: 2025-01-07
Payer: MEDICARE

## 2025-01-07 NOTE — TELEPHONE ENCOUNTER
RN called Bo back as requested and explained that he would have to request a transfer through the hospital where Gladys is at now. Bo asked if he could bring her to the hospital himself and this RN explained that he could, however he would have to go through the emergency department and see if she can be admitted that way. Bo asked if Dr. Archer would see Gladys and this RN let him know that she is not rounding on patient's that are admitted at this time so, if Gladys did transfer she would have a different group of oncologist rounding on her. Bo was appreciative of the call and had no further questions at this time.

## 2025-01-08 ENCOUNTER — APPOINTMENT (OUTPATIENT)
Dept: PHYSICAL THERAPY | Facility: HOSPITAL | Age: 70
End: 2025-01-08
Payer: MEDICARE

## 2025-01-08 ENCOUNTER — TELEPHONE (OUTPATIENT)
Dept: NEUROLOGY | Facility: CLINIC | Age: 70
End: 2025-01-08
Payer: MEDICARE

## 2025-01-08 DIAGNOSIS — Z74.09 IMPAIRED FUNCTIONAL MOBILITY, BALANCE, GAIT, AND ENDURANCE: Primary | ICD-10-CM

## 2025-01-08 NOTE — TELEPHONE ENCOUNTER
Patient's spouse called. Gladys is currently in the hospital at [facility]. They has questions about what kind of chemo she is on. Mr. Branch  said that she is very weak and wasn't sure if you could call the hospital and speak to her care team.

## 2025-01-09 NOTE — TELEPHONE ENCOUNTER
She is admitted with URI symptoms, and was having severe difficulty walking with generalized weakness. However today she has turned a corner and improved significantly with PT and much stronger. I suspect worsening of preexisting ataxia due to her infection. When she recovers she may continue Cytoxan infusions. I may consider pneumocystis prophylaxis in the future if she develops worsening lymphopenia.

## 2025-01-13 ENCOUNTER — TELEPHONE (OUTPATIENT)
Dept: NEUROLOGY | Facility: CLINIC | Age: 70
End: 2025-01-13
Payer: MEDICARE

## 2025-01-13 NOTE — TELEPHONE ENCOUNTER
Patient's spouse called. She is currently admitted to [facility] and he feels she is not getting the correct care. She  has black stool and cannot eat properly (vomiting)  and is not getting therapy in the hospital.   He is concerned that he is going to lose her if she stays there.   He asked me to pass this information along to you to see if you had any ideas on her care.     I did advise he speak to an ombudsman about her care if he has further concerns

## 2025-01-14 ENCOUNTER — APPOINTMENT (OUTPATIENT)
Dept: PHYSICAL THERAPY | Facility: HOSPITAL | Age: 70
End: 2025-01-14
Payer: MEDICARE

## 2025-01-15 ENCOUNTER — APPOINTMENT (OUTPATIENT)
Dept: PHYSICAL THERAPY | Facility: HOSPITAL | Age: 70
End: 2025-01-15
Payer: MEDICARE

## 2025-01-15 DIAGNOSIS — Z74.09 IMPAIRED FUNCTIONAL MOBILITY, BALANCE, GAIT, AND ENDURANCE: Primary | ICD-10-CM

## 2025-01-16 NOTE — TELEPHONE ENCOUNTER
Called and spoke to Mr. Branch. Gladys is doing a litte better, but still can not walk. He will not do a transfer and understands that Dr. Mora cannot view her records from [facility].     She has been on oxygen while in the hospital. He is planning to taking her to [place] when she is discharged to get further evaluated if she is still in a weakened condition.

## 2025-01-17 ENCOUNTER — TELEPHONE (OUTPATIENT)
Dept: ADMISSION | Facility: HOSPITAL | Age: 70
End: 2025-01-17
Payer: MEDICARE

## 2025-01-17 NOTE — TELEPHONE ENCOUNTER
Spouse called back, discussed that Dr. Archer is in agreement with pt being evaluated in the ED.  He indicates he will have her transported via ambulance from the rehab facility.

## 2025-01-17 NOTE — TELEPHONE ENCOUNTER
Bo called back and said he doesn't have an estimated time from [facility] when an ambulance can take Gladys to [facility] ER. He said if he brings her to ER himself do they need to sit in waiting room? I told him once he is at the ER we can't do much about waiting times or when she would get taken back in a room. He said is there any other alternative he has to get her to the hospital or just wait for the ambulance at [facility]? Messaged team.

## 2025-01-17 NOTE — TELEPHONE ENCOUNTER
Patient is at a Rehab Center and is experiencing hallucinations  is wondering if her cancer is spreading. She also has s/s of a sinus infection and they are not treating her for it he states. He would like to take pt to [facility] ED to be evaluated since she keeps deteriorating while there and is wondering if this is appropriate. Informed him that this is recommended via nursing and the message will be sent to the team.

## 2025-01-17 NOTE — TELEPHONE ENCOUNTER
I called Bo for an update about the ER. He said she is at [facility] ER getting evaluated. He said he is going to make a request if she can be transferred to [facility]. He asked if I can put a transfer in for him. I said the ER handles that with the transfer center. Nothing further needed. He said Thank you for all our help.

## 2025-01-21 ENCOUNTER — APPOINTMENT (OUTPATIENT)
Dept: PHYSICAL THERAPY | Facility: HOSPITAL | Age: 70
End: 2025-01-21
Payer: MEDICARE

## 2025-01-21 ENCOUNTER — TELEPHONE (OUTPATIENT)
Dept: NEUROLOGY | Facility: CLINIC | Age: 70
End: 2025-01-21
Payer: MEDICARE

## 2025-01-21 NOTE — TELEPHONE ENCOUNTER
Patient's spouse called.   Patient has been very weak, unable to walk. She has not had chemo due to hospital stays.   They told spouse that she needs aggressive therapy. He is wondering if there is any further recommendations for her. She has had MRI, EEG, and CT.     He is concerned because she has not had chemo and that she is so weak. That she has not been able to get up. He said she is currently using a diaper. He's just hoping that any recommendation can be made because he doesn't know how/why she can;t walk.    Mr Sarina is very worried and he just wanted to know if Dr. Mora had any additional information

## 2025-01-22 ENCOUNTER — TELEPHONE (OUTPATIENT)
Dept: ADMISSION | Facility: HOSPITAL | Age: 70
End: 2025-01-22
Payer: MEDICARE

## 2025-01-22 ENCOUNTER — APPOINTMENT (OUTPATIENT)
Dept: RADIOLOGY | Facility: HOSPITAL | Age: 70
End: 2025-01-22
Payer: MEDICARE

## 2025-01-22 NOTE — TELEPHONE ENCOUNTER
Spoke with spouse he just wanted to update the team that the patient received scans while inpatient at [facility] and that the rehab would like to give her something to increase her appetite but they will call the office for approval from MD. He was unsure of the name of the medication at this time.

## 2025-01-22 NOTE — TELEPHONE ENCOUNTER
Patients spouse, Emre called to follow up.  Only requesting to speak with Kristel.  Refused to give any additional information to this RN.

## 2025-01-26 ENCOUNTER — HOSPITAL ENCOUNTER (OUTPATIENT)
Dept: RADIOLOGY | Facility: EXTERNAL LOCATION | Age: 70
Discharge: HOME | End: 2025-01-26
Payer: MEDICARE

## 2025-01-27 ENCOUNTER — TELEPHONE (OUTPATIENT)
Dept: NEUROLOGY | Facility: CLINIC | Age: 70
End: 2025-01-27
Payer: MEDICARE

## 2025-01-27 NOTE — TELEPHONE ENCOUNTER
Patient's spouse called. Gladys is not doing well and is still unable to walk and they are not doing therapy aggressively enough. They have her on Mirtazapine and it is causing her to have hallucinations. He is hoping at the appointment tomorrow, Dr. Mora can suggest somewhere else to take her for therapy and additional treatment.     This message is a pre-curser to her appointment.

## 2025-01-28 ENCOUNTER — APPOINTMENT (OUTPATIENT)
Dept: PHYSICAL THERAPY | Facility: HOSPITAL | Age: 70
End: 2025-01-28
Payer: MEDICARE

## 2025-01-28 ENCOUNTER — APPOINTMENT (OUTPATIENT)
Dept: NEUROLOGY | Facility: CLINIC | Age: 70
End: 2025-01-28
Payer: MEDICARE

## 2025-01-28 VITALS
SYSTOLIC BLOOD PRESSURE: 102 MMHG | BODY MASS INDEX: 21.6 KG/M2 | WEIGHT: 110 LBS | HEIGHT: 60 IN | DIASTOLIC BLOOD PRESSURE: 60 MMHG | HEART RATE: 95 BPM

## 2025-01-28 DIAGNOSIS — G31.89 PARANEOPLASTIC CEREBELLAR DEGENERATION (CMS-HCC): Primary | ICD-10-CM

## 2025-01-28 PROCEDURE — 99215 OFFICE O/P EST HI 40 MIN: CPT | Performed by: STUDENT IN AN ORGANIZED HEALTH CARE EDUCATION/TRAINING PROGRAM

## 2025-01-28 PROCEDURE — G2211 COMPLEX E/M VISIT ADD ON: HCPCS | Performed by: STUDENT IN AN ORGANIZED HEALTH CARE EDUCATION/TRAINING PROGRAM

## 2025-01-28 PROCEDURE — 3008F BODY MASS INDEX DOCD: CPT | Performed by: STUDENT IN AN ORGANIZED HEALTH CARE EDUCATION/TRAINING PROGRAM

## 2025-01-28 PROCEDURE — 1036F TOBACCO NON-USER: CPT | Performed by: STUDENT IN AN ORGANIZED HEALTH CARE EDUCATION/TRAINING PROGRAM

## 2025-01-28 PROCEDURE — 1159F MED LIST DOCD IN RCRD: CPT | Performed by: STUDENT IN AN ORGANIZED HEALTH CARE EDUCATION/TRAINING PROGRAM

## 2025-01-28 RX ORDER — FERROUS SULFATE 325(65) MG
325 TABLET ORAL
COMMUNITY
Start: 2025-01-20

## 2025-01-28 ASSESSMENT — ENCOUNTER SYMPTOMS
OCCASIONAL FEELINGS OF UNSTEADINESS: 1
DEPRESSION: 0
LOSS OF SENSATION IN FEET: 1

## 2025-01-28 NOTE — PATIENT INSTRUCTIONS
Thank you for your visit today. 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 If you have any questions or need to reach me, call my office at [phone].    We discussed the following plan today:   If you can get back to degree of function of how you felt in December then we can cautiously resume the cyclophosphamide infusions. Risk of infections discussed  Rehab can consider dronabinol 2.5 mg for appetite as you were on this previously  Stop mirtazepine  Return in 2 months    
Statement Selected

## 2025-01-28 NOTE — PROGRESS NOTES
Physical Therapy Treatment    Patient Name: Gladys Branch  MRN: [id number]  Today's Date: 1/28/2025                         Current Problem  No diagnosis found.      Insurance   MEDICARE/ FRONT PATH 2230 ($0 USED ) COPAY 0 (MET)  COVERAGE 80/100 OOP 0 FRONT PATH TO FOLLOW MEDICARE  NO AUTH REQ [id number]   MCR Cert Period: 12/2/24-3/2/24     Visit #3    Precautions       Subjective   Pt's  stating he thinks she is getting worse when it comes to her balance. Saw her neurologist who told them she seems the same.      Pain       Objective     Treatments:  NuStep level 3 (seat at 4, arms at 6) x6 min   Hook lying hip AB RTB 2x15   Bridges with RTB 2x10     Neuro:  Sit to stands x10  Alt toe taps on 6in step x10    Assessment:  Pt ambulating with small based quad cane, very unsteady on her feet. Therapist advised pt to start using rolling walker instead of cane. Added sit to stands needing cues to bend her knees and shift her wt anteriorly. Also added toe taps which were very challenging catching on toes on her R knee 50% of the time causing her lose her balance needing modA from therapist to regain balance.     Plan:  Progress balance and strength

## 2025-01-30 ENCOUNTER — TELEPHONE (OUTPATIENT)
Dept: ADMISSION | Facility: HOSPITAL | Age: 70
End: 2025-01-30
Payer: MEDICARE

## 2025-01-30 ENCOUNTER — TELEPHONE (OUTPATIENT)
Dept: NEUROLOGY | Facility: CLINIC | Age: 70
End: 2025-01-30
Payer: MEDICARE

## 2025-01-30 DIAGNOSIS — G31.89 PARANEOPLASTIC CEREBELLAR DEGENERATION (CMS-HCC): Primary | ICD-10-CM

## 2025-01-30 NOTE — TELEPHONE ENCOUNTER
The patient  called in, would like for Gladys to see Dr. Archer prior to any treatment next week and beyond due to recent hospitalization and rehab. An open apt was provided, patient scheduled. He was very appreciative and is working with [facility] to get all her results/records and notes faxed to Dr. Archer.     Routing to team as an FYI.

## 2025-01-31 ENCOUNTER — APPOINTMENT (OUTPATIENT)
Dept: RADIOLOGY | Facility: HOSPITAL | Age: 70
End: 2025-01-31
Payer: MEDICARE

## 2025-02-03 ENCOUNTER — OFFICE VISIT (OUTPATIENT)
Dept: HEMATOLOGY/ONCOLOGY | Facility: CLINIC | Age: 70
End: 2025-02-03
Payer: MEDICARE

## 2025-02-03 VITALS
TEMPERATURE: 97.5 F | RESPIRATION RATE: 18 BRPM | BODY MASS INDEX: 21.4 KG/M2 | OXYGEN SATURATION: 95 % | DIASTOLIC BLOOD PRESSURE: 78 MMHG | WEIGHT: 109.6 LBS | HEART RATE: 108 BPM | SYSTOLIC BLOOD PRESSURE: 111 MMHG

## 2025-02-03 DIAGNOSIS — G11.9 CEREBELLAR ATAXIA (MULTI): Primary | ICD-10-CM

## 2025-02-03 PROCEDURE — 99215 OFFICE O/P EST HI 40 MIN: CPT | Performed by: STUDENT IN AN ORGANIZED HEALTH CARE EDUCATION/TRAINING PROGRAM

## 2025-02-03 PROCEDURE — G2211 COMPLEX E/M VISIT ADD ON: HCPCS | Performed by: STUDENT IN AN ORGANIZED HEALTH CARE EDUCATION/TRAINING PROGRAM

## 2025-02-03 PROCEDURE — 1159F MED LIST DOCD IN RCRD: CPT | Performed by: STUDENT IN AN ORGANIZED HEALTH CARE EDUCATION/TRAINING PROGRAM

## 2025-02-03 PROCEDURE — 1126F AMNT PAIN NOTED NONE PRSNT: CPT | Performed by: STUDENT IN AN ORGANIZED HEALTH CARE EDUCATION/TRAINING PROGRAM

## 2025-02-03 ASSESSMENT — PAIN SCALES - GENERAL: PAINLEVEL_OUTOF10: 0-NO PAIN

## 2025-02-03 NOTE — PROGRESS NOTES
Patient ID: Gladys Branch is a 69 y.o. female.    DIAGNOSIS    Small Cell Lung Cancer    By immunostaining, the tumor cells are positive for CAM 5.2, INSM1, and TTF-1, and negative for p40 and LCA. The proliferation index by Ki-67 immunostaining is approximately 90%.  Synaptophysin, Chromogranin and INSM-1 are positive.  AE1/AE3 is negative. NEOGENOMICS, RB protein expression is lost in the tumor cells    STAGING    [id number], limited stage  Recurrence    CURRENT SITES OF DISEASE    RLL, supraclavicular    MOLECULAR GENOMICS      PRIOR THERAPY    Concurrent chemoradiation with Cisplatin/Etoposide every 3 weeks; C1D1 2/15/22, reaction to cisplatin C2D1 and did not receive D2 or D3 etoposide  Carbo/etoposide 4/5/2022 1 cycle c/b rash  PCI 5/21-6/13/22    CURRENT THERAPY    Phase 1 study [id number]  with study drug Taralatamab 1/24/23 - present.    CURRENT ONCOLOGICAL PROBLEMS      HISTORY OF PRESENT ILLNESS    Mrs. Branch is a 67 yo with PMH GERD and COPD who originally was round to have a RLL nodule measuring 11 mm in 4/28/2021 found as part of CT calcium score scan. An ensuing CT chest w/o contrast was done on 5/19/21 which revealed subsolid pulmonary nodules measuring 14 mm in the RLL. She had CT chest w/contrast on 11/29/21 which confirmed stable 14 mm GGO of the RLL and decreased RLL subpleural nodule. She met thoracic surgeon Dr. Farfan, who ordered PET/CT scan done on 12/8/21 which did not reveal any FDG-avid nodules within the lung parenchyma but R hilar nodes with max SUV 8.0. He referred her for bronch, which was done on 1/21/22 by Dr. Mcdaniels. 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 Brain MRI was negative.    She started concurrent chemoradiation with BID radiation with cis/etop 2/15/22, and unfortunately had a reaction to cisplatin on C2D1 with chest pain and hypotension. She was hospitalized with sepsis felt to be due to pneumonia. She trialed carboplatin/etoposide on 4/5/22 with a  "resulting rash and opted to forego further chemotherapy as she had completed radiation. Restaging scan 11/3/22 unfortunately with progression with new R hilar lymph node, paratracheal nodes, and increase in size of R supraclavicular mass. She enrolled in [id number] and started C1D1 1/24/23. C1 complicated by anorexia and dysgeusia, and delayed C2 by a week. She has further struggled with fatigue and confusion, which may be related to mirtazapine. Dose reduced for C2 and mirtazapine stopped with improvement in symptoms. 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    PAST MEDICAL HISTORY    GERD  COPD    SOCIAL HISTORY    Retired - lighting . Lives at home with  and a kitten.  Tobacco: quit smoking 1999. 1 ppd x 25 yrs.  EtOH: wine 1 glass/day  Illicits: none    CURRENT MEDS    Please see med list    ALLERGIES    Codeine, Sulfa drugs, Cisplatin    FAMILY HISTORY    Paternal aunt - breast cancer dx 80s    Subjective    She is here today with her . She spent almost a month at [facility]. She was in and out of the SNF and hospital, got IV antibiotics for pneumonia, is overall improved and planned to go home Wednesday. She still has episodes of confusion, and is still unsteady on her feet. She is here today in a wheelchair. She lost weight and is regaining some back. Eating ok overall.      Objective          11/18/2024     3:18 PM 11/22/2024     9:26 AM 11/25/2024     9:57 AM 12/6/2024    10:23 AM 12/10/2024    12:05 PM 1/28/2025     2:44 PM 2/3/2025    12:16 PM   Vitals   Systolic 100  119 106  102 111   Diastolic 71  83 72  60 78   BP Location Left arm  Left arm Left arm  Right arm Left arm   Heart Rate 83  78 76  95 108   Temp 36.3 °C (97.4 °F)  36.3 °C (97.3 °F)    36.4 °C (97.5 °F)   Resp   16    18   Height 1.524 m (5') 1.524 m (5')  1.524 m (5') 1.527 m (5' 0.12\") 1.524 m " (5')    Weight (lb) 121.1 116 120.9 119.4 120.37 110 109.6   BMI 23.65 kg/m2 22.65 kg/m2 23.61 kg/m2 23.32 kg/m2 23.42 kg/m2 21.48 kg/m2 21.4 kg/m2   BSA (m2) 1.52 m2 1.49 m2 1.52 m2 1.51 m2 1.52 m2 1.45 m2 1.45 m2   Visit Report Report  Report Report  Report Report       Daily Weight  02/03/25 : 49.7 kg (109 lb 9.6 oz)  01/28/25 : 49.9 kg (110 lb)  12/10/24 : 54.6 kg (120 lb 5.9 oz)  12/06/24 : 54.2 kg (119 lb 6.4 oz)  11/25/24 : 54.8 kg (120 lb 14.4 oz)         Physical Exam  Vitals reviewed.   Constitutional:       General: She is not in acute distress.  HENT:      Head: Normocephalic and atraumatic.      Mouth/Throat:      Mouth: Mucous membranes are moist.   Eyes:      Extraocular Movements: Extraocular movements intact.      Conjunctiva/sclera: Conjunctivae normal.      Pupils: Pupils are equal, round, and reactive to light.   Cardiovascular:      Rate and Rhythm: Normal rate and regular rhythm.      Heart sounds: Normal heart sounds. No murmur heard.  Pulmonary:      Effort: Pulmonary effort is normal. No respiratory distress.      Breath sounds: Normal breath sounds.   Abdominal:      General: Abdomen is flat. Bowel sounds are normal. There is no distension.      Palpations: Abdomen is soft.   Skin:     General: Skin is warm and dry.   Neurological:      Mental Status: She is alert and oriented to person, place, and time. Mental status is at baseline.   Psychiatric:         Mood and Affect: Mood normal.         Behavior: Behavior normal.         Thought Content: Thought content normal.     Labs  No visits with results within 1 Day(s) from this visit.   Latest known visit with results is:   Lab on 12/10/2024   Component Date Value Ref Range Status    WBC 12/10/2024 6.8  4.4 - 11.3 x10*3/uL Final    nRBC 12/10/2024    Final    Not Measured    RBC 12/10/2024 3.77 (L)  4.00 - 5.20 x10*6/uL Final    Hemoglobin 12/10/2024 12.0  12.0 - 16.0 g/dL Final    Hematocrit 12/10/2024 35.7 (L)  36.0 - 46.0 % Final    MCV  12/10/2024 95  80 - 100 fL Final    MCH 12/10/2024 31.8  26.0 - 34.0 pg Final    MCHC 12/10/2024 33.6  32.0 - 36.0 g/dL Final    RDW 12/10/2024 13.2  11.5 - 14.5 % Final    Platelets 12/10/2024 196  150 - 450 x10*3/uL Final    Neutrophils % 12/10/2024 72.8  40.0 - 80.0 % Final    Immature Granulocytes %, Automated 12/10/2024 1.3 (H)  0.0 - 0.9 % Final    Immature Granulocyte Count (IG) includes promyelocytes, myelocytes and metamyelocytes but does not include bands. Percent differential counts (%) should be interpreted in the context of the absolute cell counts (cells/UL).    Lymphocytes % 12/10/2024 14.3  13.0 - 44.0 % Final    Monocytes % 12/10/2024 9.5  2.0 - 10.0 % Final    Eosinophils % 12/10/2024 1.8  0.0 - 6.0 % Final    Basophils % 12/10/2024 0.3  0.0 - 2.0 % Final    Neutrophils Absolute 12/10/2024 4.92  1.20 - 7.70 x10*3/uL Final    Percent differential counts (%) should be interpreted in the context of the absolute cell counts (cells/uL).    Immature Granulocytes Absolute, Au* 12/10/2024 0.09  0.00 - 0.70 x10*3/uL Final    Lymphocytes Absolute 12/10/2024 0.97 (L)  1.20 - 4.80 x10*3/uL Final    Monocytes Absolute 12/10/2024 0.64  0.10 - 1.00 x10*3/uL Final    Eosinophils Absolute 12/10/2024 0.12  0.00 - 0.70 x10*3/uL Final    Basophils Absolute 12/10/2024 0.02  0.00 - 0.10 x10*3/uL Final    Glucose 12/10/2024 96  74 - 99 mg/dL Final    Sodium 12/10/2024 142  136 - 145 mmol/L Final    Potassium 12/10/2024 3.6  3.5 - 5.3 mmol/L Final    Chloride 12/10/2024 106  98 - 107 mmol/L Final    Bicarbonate 12/10/2024 29  21 - 32 mmol/L Final    Anion Gap 12/10/2024 11  10 - 20 mmol/L Final    Urea Nitrogen 12/10/2024 23  6 - 23 mg/dL Final    Creatinine 12/10/2024 0.85  0.50 - 1.05 mg/dL Final    eGFR 12/10/2024 74  >60 mL/min/1.73m*2 Final    Calculations of estimated GFR are performed using the 2021 CKD-EPI Study Refit equation without the race variable for the IDMS-Traceable creatinine  methods.  https://jasn.asnjournals.org/content/early/2021/09/22/ASN.7687868595    Calcium 12/10/2024 9.1  8.6 - 10.3 mg/dL Final    Albumin 12/10/2024 3.9  3.4 - 5.0 g/dL Final    Alkaline Phosphatase 12/10/2024 39  33 - 136 U/L Final    Total Protein 12/10/2024 6.2 (L)  6.4 - 8.2 g/dL Final    AST 12/10/2024 14  9 - 39 U/L Final    Bilirubin, Total 12/10/2024 0.6  0.0 - 1.2 mg/dL Final    ALT 12/10/2024 11  7 - 45 U/L Final    Patients treated with Sulfasalazine may generate falsely decreased results for ALT.               Performance Status:    ECOG 1: Restricted in physically strenuous activity but ambulatory and able to carry out work of a light or sedentary nature, e.g., light house work, office work.           Imaging:  I have personally reviewed the below imaging and concur with the reported findings unless otherwise stated:    MR brain w and wo IV contrast    Result Date: 11/23/2024  Impression: Postcontrast imaging degraded by motion artifact. No definite abnormal intracranial enhancement to suggest metastatic disease. No acute intracranial process.   Similar appearance of the brain with moderate atrophy and nonspecific white matter signal change that may reflect an element of posttreatment change or chronic small vessel ischemic disease.   MACRO: None   Signed by: Jorge Shannon 11/23/2024 6:21 PM Dictation workstation:   [id number]     CT A/P 11/20/24  IMPRESSION:     Nonspecific large segment of small bowel wall thickening within the pelvis.   Differential includes enteritis, infectious/inflammatory etiologies, etc. No   associated pneumatosis. No pneumoperitoneum. Small volume free fluid in the pelvis.       Assessment/Plan      Mrs. Branch is a 69 yo with COPD and GERD who presented with newly diagnosed SCLC completed BID radiation planned concurrently with cis/etoposide but had a reaction C2D1 to cisplatin. Completed 1 cycle carbo/etop D1 4/5/22 also complicated by facial flushing and erythematous  rash and stopped further treatment. Now s/p PCI in 6/2022. Started clinical trial [id number] with tarlatamab 1/24/23. Treatment has been complicated by worsening short-term memory worse for the day or two after treatment, delayed C2 by 1 week and dose-reduced. Confusion has largely resolved during C3 after stopping mirtazapine but she and her  recognize transient worsening for the day or two after treatment. Stopped tarlatamab after developing paraneoplastic cerebellar ataxia.      #SCLC  - originally diagnosed with limited stage SCLC and started concurrent chemoradiation cis/etop 2/15/22, with reaction to cisplatin on C2D1  - trialed carboplatin/etoposide with rash and opted to forego further chemo as she had completed radiation  - progression 11/2022, and enrolled on [id number] (tarlatamab) started C1D1 1/24/23. Tolerated well overall until she has now developed paraneoplastic cerebellar ataxia, anti-ANNA1 antibody positive, following with neurology  - stopped trial (last dose 8/27/24) and will monitor off drugs with surveillance scans for now  - Will continue CT C/A/P and CT neck for better visualization of supraclavicular node every 2 months, and brain MRI every 3 months - already scheduled for brain MRI later this month and will see her after that.     # Paraneplastic cerebellar ataxia  - anti-ANNA1 ab positive  - follows with neurology  - unclear etiology, occult progression vs tarlatamab induced  - tapering steroids and started cytoxan. Currently off cytoxan while she continues recovery.  - referral to PT  - needs a transport chair as she is unable to use cane or walker due to significant functional mobility deficits and inability to walk due to unsteady gait and poor balance. Transport chair will enable her to participate in ADL's and complete toileting, feeding, grooming, dressing, in the home. She is able to use the transport chair in her home.     #Double vision/confusion/gait instability  -5/20/24 MRI  brain negative.   -Discussed in phase 1 meeting and seems consistent with WBR late effect.   -She was seen by her ophthalmologist and will continue to follow.  -saw neurology and undergoing work-up as well  - continues on dexamethasone, and note decreased cortisol and ACTH - will refer to endocrinology for consideration if this is clinically significant and impacting confusion/gait instability    Francy Archer MD

## 2025-02-04 ENCOUNTER — APPOINTMENT (OUTPATIENT)
Dept: PHYSICAL THERAPY | Facility: HOSPITAL | Age: 70
End: 2025-02-04
Payer: MEDICARE

## 2025-02-04 ENCOUNTER — TELEPHONE (OUTPATIENT)
Dept: HEMATOLOGY/ONCOLOGY | Facility: HOSPITAL | Age: 70
End: 2025-02-04
Payer: MEDICARE

## 2025-02-04 NOTE — TELEPHONE ENCOUNTER
Family requesting transport chair. Order needs to be faxed to [phone] attn: Jaye. Also needs clinic note that states she can use transport chair safely, rule out a cane or walker and that transport chair will help significantly with functional mobility defecits that will help with her MRADL's such as feeding, dressing, grooming and will be used in the home.

## 2025-02-05 ENCOUNTER — TELEPHONE (OUTPATIENT)
Dept: HEMATOLOGY/ONCOLOGY | Facility: CLINIC | Age: 70
End: 2025-02-05
Payer: MEDICARE

## 2025-02-05 DIAGNOSIS — G11.9 CEREBELLAR ATAXIA (MULTI): Primary | ICD-10-CM

## 2025-02-05 NOTE — TELEPHONE ENCOUNTER
Order that was sent over needs to have transport wheelchair listed, not standard wheelchair. MD signature is needed as well as NPI.   Working needs to indication transport chair as requested yesterday  Order needs to be faxed to [phone] attn: Jaye. Also needs clinic note that states she can use transport chair safely, rule out a cane or walker and that transport chair will help significantly with functional mobility defecits that will help with her MRADL's such as feeding, dressing, grooming and will be used in the home.

## 2025-02-06 ENCOUNTER — PATIENT OUTREACH (OUTPATIENT)
Dept: PRIMARY CARE | Facility: CLINIC | Age: 70
End: 2025-02-06
Payer: MEDICARE

## 2025-02-06 ENCOUNTER — TELEPHONE (OUTPATIENT)
Dept: ADMISSION | Facility: HOSPITAL | Age: 70
End: 2025-02-06
Payer: MEDICARE

## 2025-02-06 NOTE — PROGRESS NOTES
Discharge Facility: [facility]  Discharge Diagnosis: Malignant neoplasm of unspecified part of unspecified bronchus or lung, Pneumonia, unspecified organism, muscle weakness  Admission Date: 1/20/25  Discharge Date: 2/5/25    PCP Appointment Date: no appointments, message to office  Specialist Appointment Date: 2/26 MRI Brain, 3/3 Heme Onc, 3/21 Neuro  Hospital Encounter and Summary Linked: No  See discharge assessment below for further details    Medications  Medications reviewed with patient/caregiver?: No (2/6/2025  9:52 AM)  Is the patient having any side effects they believe may be caused by any medication additions or changes?: No (2/6/2025  9:52 AM)  Does the patient have all medications ordered at discharge?: Yes (2/6/2025  9:52 AM)  Prescription Comments: limited hospital and SNF information available.  is not at home/able to provide medications. (2/6/2025  9:52 AM)  Medication Comments:  states patient needs refills on some of her medications but he is unsure which ones. Provided contact number,  to call back when home. (2/6/2025  9:52 AM)    Appointments  Does the patient have a primary care provider?: Yes (2/6/2025  9:52 AM)  Care Management Interventions: Advised patient to make appointment (2/6/2025  9:52 AM)  Care Management Interventions: Advised patient to keep appointment (2/6/2025  9:52 AM)    Self Management  What is the home health agency?: [facility] (2/6/2025  9:52 AM)  Has home health visited the patient within 72 hours of discharge?: Call prior to 72 hours (2/6/2025  9:52 AM)  What Durable Medical Equipment (DME) was ordered?: transport chair (2/6/2025  9:52 AM)  Has all Durable Medical Equipment (DME) been delivered?: Yes (2/6/2025  9:52 AM)    Patient Teaching  Does the patient have access to their discharge instructions?: Yes (2/6/2025  9:52 AM)  Care Management Interventions: Reviewed instructions with patient (2/6/2025  9:52 AM)  What is  the patient's perception of their health status since discharge?: Same (2/6/2025  9:52 AM)  Patient/Caregiver Education Comments: Spoke with patients /caregiver Bo. He states patient remains a little confused and is very weak. She is completing pivots with husbands assistance into transfer chair. He reports being eager for therapy, he is working with home care. Unable to fully reconcile medications. Encouraged follow up appointments.  is eager for patient to see PCP, message sent to office for scheduling. (2/6/2025  9:52 AM)

## 2025-02-06 NOTE — TELEPHONE ENCOUNTER
Bo - patients  called and left voicemail requesting Gladys to have labs checked and receive 1 unit fluids.  Urine is dark yellow.      RN called Bo back.  Bo states Gladys's urine is dark yellow, decreased drinking water/eating.  Concerned she maybe dehydrated.  Also, Gladys started taking iron pills and requesting iron checked.  She was discharged from [facility] yesterday, admitted 1/5/25 for pneumonia and discharged yesterday, 2/5/25.  Bo is requesting for Gladys to be seen for labs checked and possible fluids within next week.  Does not want to go to [facility], requesting to be seen at [facility].     Next FUV 3/3/25

## 2025-02-06 NOTE — TELEPHONE ENCOUNTER
RN called patient's  back.  Spoke with Bo.  Still refusing [facility], as he doesn't feel necessary at this time.  Appointment scheduled on 2/13 with MANUEL Broussard at 2:30pm.  Gladys will go to the lab prior to visit.  Encouraged to push fluids and if anything worsens, refuses fluids or new symptoms to call back.  Bo verbalized understanding and appreciative of appointment on 2/13.

## 2025-02-07 ENCOUNTER — OFFICE VISIT (OUTPATIENT)
Dept: HEMATOLOGY/ONCOLOGY | Facility: HOSPITAL | Age: 70
End: 2025-02-07
Payer: MEDICARE

## 2025-02-07 ENCOUNTER — NURSE TRIAGE (OUTPATIENT)
Dept: ADMISSION | Facility: HOSPITAL | Age: 70
End: 2025-02-07
Payer: MEDICARE

## 2025-02-07 VITALS
SYSTOLIC BLOOD PRESSURE: 110 MMHG | HEART RATE: 89 BPM | DIASTOLIC BLOOD PRESSURE: 75 MMHG | BODY MASS INDEX: 22.26 KG/M2 | OXYGEN SATURATION: 96 % | TEMPERATURE: 97.2 F | WEIGHT: 113.98 LBS | RESPIRATION RATE: 20 BRPM

## 2025-02-07 DIAGNOSIS — E86.0 DEHYDRATION: Primary | ICD-10-CM

## 2025-02-07 DIAGNOSIS — N34.3 DYSURIA-FREQUENCY SYNDROME: ICD-10-CM

## 2025-02-07 LAB
ALBUMIN SERPL BCP-MCNC: 3.5 G/DL (ref 3.4–5)
ALP SERPL-CCNC: 53 U/L (ref 33–136)
ALT SERPL W P-5'-P-CCNC: 8 U/L (ref 7–45)
ANION GAP SERPL CALC-SCNC: 10 MMOL/L (ref 10–20)
APPEARANCE UR: CLEAR
AST SERPL W P-5'-P-CCNC: 15 U/L (ref 9–39)
BASOPHILS # BLD AUTO: 0.03 X10*3/UL (ref 0–0.1)
BASOPHILS NFR BLD AUTO: 0.6 %
BILIRUB SERPL-MCNC: 0.7 MG/DL (ref 0–1.2)
BILIRUB UR STRIP.AUTO-MCNC: NEGATIVE MG/DL
BUN SERPL-MCNC: 17 MG/DL (ref 6–23)
CALCIUM SERPL-MCNC: 9.1 MG/DL (ref 8.6–10.3)
CHLORIDE SERPL-SCNC: 102 MMOL/L (ref 98–107)
CO2 SERPL-SCNC: 28 MMOL/L (ref 21–32)
COLOR UR: NORMAL
CREAT SERPL-MCNC: 0.75 MG/DL (ref 0.5–1.05)
EGFRCR SERPLBLD CKD-EPI 2021: 86 ML/MIN/1.73M*2
EOSINOPHIL # BLD AUTO: 0.45 X10*3/UL (ref 0–0.7)
EOSINOPHIL NFR BLD AUTO: 9.6 %
ERYTHROCYTE [DISTWIDTH] IN BLOOD BY AUTOMATED COUNT: 14.5 % (ref 11.5–14.5)
GLUCOSE SERPL-MCNC: 144 MG/DL (ref 74–99)
GLUCOSE UR STRIP.AUTO-MCNC: NORMAL MG/DL
HCT VFR BLD AUTO: 36.5 % (ref 36–46)
HGB BLD-MCNC: 12.1 G/DL (ref 12–16)
IMM GRANULOCYTES # BLD AUTO: 0.04 X10*3/UL (ref 0–0.7)
IMM GRANULOCYTES NFR BLD AUTO: 0.9 % (ref 0–0.9)
KETONES UR STRIP.AUTO-MCNC: NEGATIVE MG/DL
LEUKOCYTE ESTERASE UR QL STRIP.AUTO: NEGATIVE
LYMPHOCYTES # BLD AUTO: 1.04 X10*3/UL (ref 1.2–4.8)
LYMPHOCYTES NFR BLD AUTO: 22.1 %
MAGNESIUM SERPL-MCNC: 2.05 MG/DL (ref 1.6–2.4)
MCH RBC QN AUTO: 30.6 PG (ref 26–34)
MCHC RBC AUTO-ENTMCNC: 33.2 G/DL (ref 32–36)
MCV RBC AUTO: 92 FL (ref 80–100)
MONOCYTES # BLD AUTO: 0.53 X10*3/UL (ref 0.1–1)
MONOCYTES NFR BLD AUTO: 11.3 %
NEUTROPHILS # BLD AUTO: 2.61 X10*3/UL (ref 1.2–7.7)
NEUTROPHILS NFR BLD AUTO: 55.5 %
NITRITE UR QL STRIP.AUTO: NEGATIVE
NRBC BLD-RTO: 0 /100 WBCS (ref 0–0)
PH UR STRIP.AUTO: 5.5 [PH]
PLATELET # BLD AUTO: 273 X10*3/UL (ref 150–450)
POTASSIUM SERPL-SCNC: 3.4 MMOL/L (ref 3.5–5.3)
PROT SERPL-MCNC: 6.5 G/DL (ref 6.4–8.2)
PROT UR STRIP.AUTO-MCNC: NEGATIVE MG/DL
RBC # BLD AUTO: 3.96 X10*6/UL (ref 4–5.2)
RBC # UR STRIP.AUTO: NEGATIVE MG/DL
SODIUM SERPL-SCNC: 137 MMOL/L (ref 136–145)
SP GR UR STRIP.AUTO: 1.02
UROBILINOGEN UR STRIP.AUTO-MCNC: NORMAL MG/DL
WBC # BLD AUTO: 4.7 X10*3/UL (ref 4.4–11.3)

## 2025-02-07 PROCEDURE — 83735 ASSAY OF MAGNESIUM: CPT

## 2025-02-07 PROCEDURE — 81003 URINALYSIS AUTO W/O SCOPE: CPT | Performed by: NURSE PRACTITIONER

## 2025-02-07 PROCEDURE — 99215 OFFICE O/P EST HI 40 MIN: CPT | Performed by: NURSE PRACTITIONER

## 2025-02-07 PROCEDURE — 2500000004 HC RX 250 GENERAL PHARMACY W/ HCPCS (ALT 636 FOR OP/ED): Performed by: NURSE PRACTITIONER

## 2025-02-07 PROCEDURE — 96361 HYDRATE IV INFUSION ADD-ON: CPT | Mod: INF

## 2025-02-07 PROCEDURE — 85025 COMPLETE CBC W/AUTO DIFF WBC: CPT

## 2025-02-07 PROCEDURE — 1126F AMNT PAIN NOTED NONE PRSNT: CPT | Performed by: NURSE PRACTITIONER

## 2025-02-07 PROCEDURE — 2500000001 HC RX 250 WO HCPCS SELF ADMINISTERED DRUGS (ALT 637 FOR MEDICARE OP): Performed by: NURSE PRACTITIONER

## 2025-02-07 PROCEDURE — 80053 COMPREHEN METABOLIC PANEL: CPT

## 2025-02-07 PROCEDURE — 96360 HYDRATION IV INFUSION INIT: CPT | Mod: INF

## 2025-02-07 RX ORDER — POTASSIUM CHLORIDE 1.5 G/1.58G
40 POWDER, FOR SOLUTION ORAL ONCE
Status: COMPLETED | OUTPATIENT
Start: 2025-02-07 | End: 2025-02-07

## 2025-02-07 RX ADMIN — POTASSIUM CHLORIDE 40 MEQ: 1.5 POWDER, FOR SOLUTION ORAL at 12:56

## 2025-02-07 RX ADMIN — SODIUM CHLORIDE 1000 ML: 9 INJECTION, SOLUTION INTRAVENOUS at 13:29

## 2025-02-07 RX ADMIN — SODIUM CHLORIDE 1000 ML: 9 INJECTION, SOLUTION INTRAVENOUS at 11:51

## 2025-02-07 ASSESSMENT — PAIN SCALES - GENERAL
PAINLEVEL_OUTOF10: 0-NO PAIN
PAINLEVEL_OUTOF10: 0-NO PAIN

## 2025-02-07 NOTE — TELEPHONE ENCOUNTER
Bo would like to bring Gladys in to the acute care clinic today.   States Gladys has been experiencing difficulty urinating. Team is aware and set her up for labs and a FUV 2/13, however, he would like her seen today in [facility].    Urine is dark yellow. States she has only urinated once in the past 24 hours. States she only drank about 1/2 bottle of water yesterday despite Dr. Archer advising her to push fluids.   Slight burning with urination. No fever.     No N/V/ Last bowel movement two days ago; stool was normal.  States she ate an egg sandwich and toast this morning. Last night she ate steak and baked potato; she does have an appetite.     She does endorse dizziness this morning when she changes position; does not feel though the room is spinning. No tachycardia/dry mouth.     Secure Chat sent to providers.

## 2025-02-07 NOTE — PROGRESS NOTES
"Patient ID: Gladys Branch is a 69 y.o. female.    The patient presents to [facility] for evaluation of dehydration. She has trouble with her memory but her  is a good historian. She has not been voiding much over the last 24-48 hours. When she does void, it is dark with a slight burn. She thinks she had a loose stool this morning with a small amount of urine. She is not eating or drinking much but her  is trying to encourage fluids. Pt denies chest pain, cough, SOB, headaches, blurry vision, falls, fever or chills, n/v/d/abd pain.    Objective    BSA: 1.48 meters squared          12/10/2024    12:05 PM 1/28/2025     2:44 PM 2/3/2025    12:16 PM 2/7/2025    11:28 AM 2/7/2025    11:31 AM   Vitals   Systolic  102 111 81 88   Diastolic  60 78 55 54   BP Location  Right arm Left arm Right arm Right arm   Heart Rate  95 108 91 102   Temp   36.4 °C (97.5 °F) 36.2 °C (97.2 °F)    Resp   18 20 24   Height 1.527 m (5' 0.12\") 1.524 m (5')      Weight (lb) 120.37 110 109.6 113.98    BMI 23.42 kg/m2 21.48 kg/m2 21.4 kg/m2 22.26 kg/m2    BSA (m2) 1.52 m2 1.45 m2 1.45 m2 1.48 m2    Visit Report  Report Report Report Report                                     === 03/08/22 ===    XR CHEST 1 VIEW    - Impression -  Significant worsening of pulmonary edema and probable new small  bilateral pleural effusions.  === 03/08/22 ===    CT CHEST PULMONARY EMBOLISM W IV CONTRAST    - Impression -  There is component of artifact through the branch segmental vessels to  the upper lobes but there is no convincing evidence of pulmonary  embolism.  No evidence of pulmonary arterial hypertension or right  ventricular strain.    No aortic aneurysm or dissection.    Improvement in azygous adenopathy consistent with response to therapy.  Decrease in size of segmental bronchus based nodule in the right  upper lobe and decrease in size of a subpleural right lower lobe  nodule.  Decrease in size of additional right lower lobe nodule.  " Tiny  right apical nodule seen previously are no longer demonstrated.  Findings indicate response to therapy.    Possible mild fluid overload/congestive heart failure.  Bibasilar  subsegmental atelectasis and/or pneumonia.  Subsegmental atelectasis  in the right middle lobe and lingula.    Small hiatal hernia.  Consider reflux esophagitis.      Signed by Moira Sprague MD  === 11/22/24 ===    MR BRAIN W AND WO CONTRAST    - Impression -  Postcontrast imaging degraded by motion artifact. No definite  abnormal intracranial enhancement to suggest metastatic disease. No  acute intracranial process.    Similar appearance of the brain with moderate atrophy and nonspecific  white matter signal change that may reflect an element of  posttreatment change or chronic small vessel ischemic disease.    MACRO:  None    Signed by: Jorge Shannon 11/23/2024 6:21 PM  Dictation workstation:   [id number]   No nuclear medicine results found for the past 12 months   No echocardiogram results found for the past 12 months         Physical Exam  Vitals reviewed.   Constitutional:       Appearance: Normal appearance.   HENT:      Head: Normocephalic and atraumatic.      Nose: Nose normal.      Mouth/Throat:      Mouth: Mucous membranes are moist.   Eyes:      Conjunctiva/sclera: Conjunctivae normal.      Pupils: Pupils are equal, round, and reactive to light.   Cardiovascular:      Rate and Rhythm: Normal rate and regular rhythm.   Pulmonary:      Effort: Pulmonary effort is normal.      Breath sounds: Normal breath sounds.   Abdominal:      General: Abdomen is flat. Bowel sounds are normal.      Palpations: Abdomen is soft.   Musculoskeletal:         General: Normal range of motion.      Cervical back: Normal range of motion.   Skin:     General: Skin is warm and dry.   Neurological:      General: No focal deficit present.      Mental Status: She is alert.   Psychiatric:         Mood and Affect: Mood normal.         Behavior: Behavior  normal.       Cancer Staging   No matching staging information was found for the patient.      Oncology History   Small cell lung cancer   1/24/2023 - 8/27/2024 Research Study Participant    ([facility]) [id number] Part A - Tarlatamab, 28 Day Cycles  Plan Provider: Francy Archer MD  Treatment goal: [No plan goal]  Line of treatment: [No plan line of treatment]  Associated studies:  in Subjects With Small Cell Lung Cancer     2/7/2023 Initial Diagnosis    Small cell lung cancer (CMS/HCC)            70yo F with SCLC who presents to [facility] for evaluation of dehydration.     Plan:  - hypotensive on arrival, improved with IVF  - labs significant for mild hypokalemia  - 2L NS for dehydration and low urine output  - pt was able to void ~500ml clear, yellow urine without burning so low suspicion for UTI  - UA pending    Dispo:  - pt discharged home with no needs after ACC course complete  - return to clinic/ED instructions given to patient  - follow up oncology as scheduled           CASEY Campbell-CNP

## 2025-02-07 NOTE — TELEPHONE ENCOUNTER
Bo called regarding the wheelchair script. I see this was faxed on 2/5, however, the medical supply company is stating they did not receive it. Asking if team can resend.

## 2025-02-07 NOTE — TELEPHONE ENCOUNTER
Jaye called again today. She said they are still waiting for an addendum to the notes that were sent over. It needs to state transport chain and can not say wheelchair.  Fax- [phone]     Thank you

## 2025-02-08 LAB — HOLD SPECIMEN: NORMAL

## 2025-02-11 ENCOUNTER — APPOINTMENT (OUTPATIENT)
Dept: PHYSICAL THERAPY | Facility: HOSPITAL | Age: 70
End: 2025-02-11
Payer: MEDICARE

## 2025-02-12 ENCOUNTER — APPOINTMENT (OUTPATIENT)
Dept: ENDOCRINOLOGY | Facility: CLINIC | Age: 70
End: 2025-02-12
Payer: MEDICARE

## 2025-02-13 ENCOUNTER — TELEPHONE (OUTPATIENT)
Dept: NEUROLOGY | Facility: CLINIC | Age: 70
End: 2025-02-13
Payer: MEDICARE

## 2025-02-13 NOTE — TELEPHONE ENCOUNTER
Jerry Monroe sent form for physician certification to be signed. This is for rehab and medical necessity. Set on your desk for signature so we can fax back. Thank you.

## 2025-02-18 ENCOUNTER — TELEPHONE (OUTPATIENT)
Dept: NEUROLOGY | Facility: CLINIC | Age: 70
End: 2025-02-18
Payer: MEDICARE

## 2025-02-18 ENCOUNTER — PATIENT OUTREACH (OUTPATIENT)
Dept: PRIMARY CARE | Facility: CLINIC | Age: 70
End: 2025-02-18
Payer: MEDICARE

## 2025-02-18 ENCOUNTER — TELEPHONE (OUTPATIENT)
Dept: HEMATOLOGY/ONCOLOGY | Facility: HOSPITAL | Age: 70
End: 2025-02-18
Payer: MEDICARE

## 2025-02-18 NOTE — TELEPHONE ENCOUNTER
The other doctor wanted to see if you would recommend getting a neurocognitive evaluation to evaluate her memory?     Mr Sarina asked if she should be seen sooner than 3/21?

## 2025-02-18 NOTE — TELEPHONE ENCOUNTER
Spouse called. He wanted to tell you that her memory is getting worse and it's getting to be hard, but it's going in and out.     He is wondering if she should go back on the chemo?    Please advise. Thank you.

## 2025-02-18 NOTE — TELEPHONE ENCOUNTER
Bo calls to say patient is getting worse on memory.    He is asking when she can get the chemo again?    He said her memory is in and out.  They have called Dr. Simms.      He wants to make you aware what is going on.  He said he got a text  that the next infusion is 4/1 ( I see a 3/4 which he says is off).    He is asking if it can be sooner?      MRI 2/26    She does not see Dr. Archer until 3/3.    We can leave a message if he doesn't answer but he would like us to keep calling if possible.      Message sent

## 2025-02-18 NOTE — PROGRESS NOTES
Follow up call after hospitalization. Patient did not see PCP within 14 days of discharge.  At time of outreach call the patient/family feels as if their condition has (remained the same) since last visit.  Addressed any questions or concerns.     Spoke with patient's /caregiver Bo who reports patient is still having increased difficulty with her memory. He states he is in contact with her specialists Dr. Archer and Dr. Mora for neurocognitive evaluation and chemo plan. He states she also seen GI side effects for which they are awaiting results of a test. She is active with home care.

## 2025-02-18 NOTE — TELEPHONE ENCOUNTER
Per Dr. Archer:  Dr. Mora would be the one to decide whether she would go back on the cytoxan for her symptoms, but as far as treatment for the lung cancer goes, we will continue to watch on scans. She could potentially ask Dr. Mora if he would recommend getting a neurocognitive evaluation to evaluate her memory?     Call to Bo we reviewed the recommendations and he wrote done the type of neurocognitive  eval and will check in with him re starting treatment.

## 2025-02-26 ENCOUNTER — HOSPITAL ENCOUNTER (OUTPATIENT)
Dept: RADIOLOGY | Facility: HOSPITAL | Age: 70
Discharge: HOME | End: 2025-02-26
Payer: MEDICARE

## 2025-02-26 DIAGNOSIS — C34.90 SMALL CELL LUNG CANCER: ICD-10-CM

## 2025-02-26 PROCEDURE — 70553 MRI BRAIN STEM W/O & W/DYE: CPT

## 2025-02-26 PROCEDURE — 2550000001 HC RX 255 CONTRASTS: Performed by: STUDENT IN AN ORGANIZED HEALTH CARE EDUCATION/TRAINING PROGRAM

## 2025-02-26 PROCEDURE — A9575 INJ GADOTERATE MEGLUMI 0.1ML: HCPCS | Performed by: STUDENT IN AN ORGANIZED HEALTH CARE EDUCATION/TRAINING PROGRAM

## 2025-02-26 RX ORDER — GADOTERATE MEGLUMINE 376.9 MG/ML
10 INJECTION INTRAVENOUS
Status: COMPLETED | OUTPATIENT
Start: 2025-02-26 | End: 2025-02-26

## 2025-02-26 RX ADMIN — GADOTERATE MEGLUMINE 10 ML: 376.9 INJECTION INTRAVENOUS at 12:09

## 2025-02-27 ENCOUNTER — TELEPHONE (OUTPATIENT)
Dept: NEUROLOGY | Facility: CLINIC | Age: 70
End: 2025-02-27
Payer: MEDICARE

## 2025-02-27 NOTE — TELEPHONE ENCOUNTER
Mr. Branch called asking about how many times she had her infusions in [place]?  Apparently they can deduct the travel on their taxes.  Also he asked about some test she was supposed to get scheduled?  Possibly memory?  I told him we were busy this morning, but you may be able to call him later.  Thanks.

## 2025-03-01 ENCOUNTER — HOSPITAL ENCOUNTER (OUTPATIENT)
Dept: RADIOLOGY | Facility: EXTERNAL LOCATION | Age: 70
Discharge: HOME | End: 2025-03-01
Payer: MEDICARE

## 2025-03-03 ENCOUNTER — OFFICE VISIT (OUTPATIENT)
Dept: HEMATOLOGY/ONCOLOGY | Facility: CLINIC | Age: 70
End: 2025-03-03
Payer: MEDICARE

## 2025-03-03 ENCOUNTER — TELEPHONE (OUTPATIENT)
Dept: NEUROLOGY | Facility: CLINIC | Age: 70
End: 2025-03-03

## 2025-03-03 ENCOUNTER — LAB (OUTPATIENT)
Dept: LAB | Facility: CLINIC | Age: 70
End: 2025-03-03
Payer: MEDICARE

## 2025-03-03 ENCOUNTER — TELEPHONE (OUTPATIENT)
Dept: HEMATOLOGY/ONCOLOGY | Facility: CLINIC | Age: 70
End: 2025-03-03
Payer: MEDICARE

## 2025-03-03 VITALS
TEMPERATURE: 97.7 F | BODY MASS INDEX: 22.23 KG/M2 | OXYGEN SATURATION: 97 % | HEART RATE: 91 BPM | DIASTOLIC BLOOD PRESSURE: 82 MMHG | RESPIRATION RATE: 18 BRPM | SYSTOLIC BLOOD PRESSURE: 111 MMHG | WEIGHT: 113.8 LBS

## 2025-03-03 DIAGNOSIS — C34.90 SMALL CELL LUNG CANCER: ICD-10-CM

## 2025-03-03 DIAGNOSIS — G31.89 PARANEOPLASTIC CEREBELLAR DEGENERATION (CMS-HCC): ICD-10-CM

## 2025-03-03 DIAGNOSIS — N34.3 DYSURIA-FREQUENCY SYNDROME: ICD-10-CM

## 2025-03-03 LAB
ALBUMIN SERPL BCP-MCNC: 3.9 G/DL (ref 3.4–5)
ALP SERPL-CCNC: 56 U/L (ref 33–136)
ALT SERPL W P-5'-P-CCNC: 9 U/L (ref 7–45)
ANION GAP SERPL CALC-SCNC: 11 MMOL/L (ref 10–20)
AST SERPL W P-5'-P-CCNC: 16 U/L (ref 9–39)
BASOPHILS # BLD AUTO: 0 X10*3/UL (ref 0–0.1)
BASOPHILS NFR BLD AUTO: 0 %
BILIRUB SERPL-MCNC: 0.4 MG/DL (ref 0–1.2)
BUN SERPL-MCNC: 20 MG/DL (ref 6–23)
CALCIUM SERPL-MCNC: 9.3 MG/DL (ref 8.6–10.3)
CHLORIDE SERPL-SCNC: 104 MMOL/L (ref 98–107)
CO2 SERPL-SCNC: 29 MMOL/L (ref 21–32)
CREAT SERPL-MCNC: 0.7 MG/DL (ref 0.5–1.05)
EGFRCR SERPLBLD CKD-EPI 2021: >90 ML/MIN/1.73M*2
EOSINOPHIL # BLD AUTO: 0.23 X10*3/UL (ref 0–0.7)
EOSINOPHIL NFR BLD AUTO: 5.1 %
ERYTHROCYTE [DISTWIDTH] IN BLOOD BY AUTOMATED COUNT: 14.4 % (ref 11.5–14.5)
GLUCOSE SERPL-MCNC: 108 MG/DL (ref 74–99)
HCT VFR BLD AUTO: 38.8 % (ref 36–46)
HGB BLD-MCNC: 12.7 G/DL (ref 12–16)
HOLD SPECIMEN: NORMAL
IMM GRANULOCYTES # BLD AUTO: 0.01 X10*3/UL (ref 0–0.7)
IMM GRANULOCYTES NFR BLD AUTO: 0.2 % (ref 0–0.9)
LYMPHOCYTES # BLD AUTO: 1.18 X10*3/UL (ref 1.2–4.8)
LYMPHOCYTES NFR BLD AUTO: 26.2 %
MCH RBC QN AUTO: 30.5 PG (ref 26–34)
MCHC RBC AUTO-ENTMCNC: 32.7 G/DL (ref 32–36)
MCV RBC AUTO: 93 FL (ref 80–100)
MONOCYTES # BLD AUTO: 0.46 X10*3/UL (ref 0.1–1)
MONOCYTES NFR BLD AUTO: 10.2 %
NEUTROPHILS # BLD AUTO: 2.62 X10*3/UL (ref 1.2–7.7)
NEUTROPHILS NFR BLD AUTO: 58.3 %
NRBC BLD-RTO: ABNORMAL /100{WBCS}
PLATELET # BLD AUTO: 241 X10*3/UL (ref 150–450)
POTASSIUM SERPL-SCNC: 4.1 MMOL/L (ref 3.5–5.3)
PROT SERPL-MCNC: 6.7 G/DL (ref 6.4–8.2)
RBC # BLD AUTO: 4.17 X10*6/UL (ref 4–5.2)
SODIUM SERPL-SCNC: 140 MMOL/L (ref 136–145)
WBC # BLD AUTO: 4.5 X10*3/UL (ref 4.4–11.3)

## 2025-03-03 PROCEDURE — 87086 URINE CULTURE/COLONY COUNT: CPT

## 2025-03-03 PROCEDURE — 1036F TOBACCO NON-USER: CPT | Performed by: STUDENT IN AN ORGANIZED HEALTH CARE EDUCATION/TRAINING PROGRAM

## 2025-03-03 PROCEDURE — 99215 OFFICE O/P EST HI 40 MIN: CPT | Performed by: STUDENT IN AN ORGANIZED HEALTH CARE EDUCATION/TRAINING PROGRAM

## 2025-03-03 PROCEDURE — 80053 COMPREHEN METABOLIC PANEL: CPT

## 2025-03-03 PROCEDURE — G2211 COMPLEX E/M VISIT ADD ON: HCPCS | Performed by: STUDENT IN AN ORGANIZED HEALTH CARE EDUCATION/TRAINING PROGRAM

## 2025-03-03 PROCEDURE — 36415 COLL VENOUS BLD VENIPUNCTURE: CPT

## 2025-03-03 PROCEDURE — 1159F MED LIST DOCD IN RCRD: CPT | Performed by: STUDENT IN AN ORGANIZED HEALTH CARE EDUCATION/TRAINING PROGRAM

## 2025-03-03 PROCEDURE — 85025 COMPLETE CBC W/AUTO DIFF WBC: CPT

## 2025-03-03 PROCEDURE — 81001 URINALYSIS AUTO W/SCOPE: CPT

## 2025-03-03 PROCEDURE — 1126F AMNT PAIN NOTED NONE PRSNT: CPT | Performed by: STUDENT IN AN ORGANIZED HEALTH CARE EDUCATION/TRAINING PROGRAM

## 2025-03-03 ASSESSMENT — PAIN SCALES - GENERAL: PAINLEVEL_OUTOF10: 0-NO PAIN

## 2025-03-03 NOTE — PROGRESS NOTES
Patient ID: Gladys Branch is a 69 y.o. female.    DIAGNOSIS    Small Cell Lung Cancer    By immunostaining, the tumor cells are positive for CAM 5.2, INSM1, and TTF-1, and negative for p40 and LCA. The proliferation index by Ki-67 immunostaining is approximately 90%.  Synaptophysin, Chromogranin and INSM-1 are positive.  AE1/AE3 is negative. NEOGENOMICS, RB protein expression is lost in the tumor cells    STAGING    [id number], limited stage  Recurrence    CURRENT SITES OF DISEASE    RLL, supraclavicular    MOLECULAR GENOMICS      PRIOR THERAPY    Concurrent chemoradiation with Cisplatin/Etoposide every 3 weeks; C1D1 2/15/22, reaction to cisplatin C2D1 and did not receive D2 or D3 etoposide  Carbo/etoposide 4/5/2022 1 cycle c/b rash  PCI 5/21-6/13/22  Phase 1 study [id number]  with study drug Taralatamab 1/24/23 - 8/27/24    CURRENT THERAPY        CURRENT ONCOLOGICAL PROBLEMS      HISTORY OF PRESENT ILLNESS    Mrs. Branch is a 65 yo with PMH GERD and COPD who originally was round to have a RLL nodule measuring 11 mm in 4/28/2021 found as part of CT calcium score scan. An ensuing CT chest w/o contrast was done on 5/19/21 which revealed subsolid pulmonary nodules measuring 14 mm in the RLL. She had CT chest w/contrast on 11/29/21 which confirmed stable 14 mm GGO of the RLL and decreased RLL subpleural nodule. She met thoracic surgeon Dr. Farfan, who ordered PET/CT scan done on 12/8/21 which did not reveal any FDG-avid nodules within the lung parenchyma but R hilar nodes with max SUV 8.0. He referred her for bronch, which was done on 1/21/22 by Dr. Mcdaniels. 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 Brain MRI was negative.    She started concurrent chemoradiation with BID radiation with cis/etop 2/15/22, and unfortunately had a reaction to cisplatin on C2D1 with chest pain and hypotension. She was hospitalized with sepsis felt to be due to pneumonia. She trialed carboplatin/etoposide on 4/5/22 with a  resulting rash and opted to forego further chemotherapy as she had completed radiation. Restaging scan 11/3/22 unfortunately with progression with new R hilar lymph node, paratracheal nodes, and increase in size of R supraclavicular mass. She enrolled in [id number] and started C1D1 1/24/23. C1 complicated by anorexia and dysgeusia, and delayed C2 by a week. She has further struggled with fatigue and confusion, which may be related to mirtazapine. Dose reduced for C2 and mirtazapine stopped with improvement in symptoms. 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    PAST MEDICAL HISTORY    GERD  COPD    SOCIAL HISTORY    Retired - lighting . Lives at home with  and a kitten.  Tobacco: quit smoking 1999. 1 ppd x 25 yrs.  EtOH: wine 1 glass/day  Illicits: none    CURRENT MEDS    Please see med list    ALLERGIES    Codeine, Sulfa drugs, Cisplatin    FAMILY HISTORY    Paternal aunt - breast cancer dx 80s    Subjective    She is here today with her . She's walking with the walker now, really improving. 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 She continues to do outpatient physical therapy. She's been having burning with urination and urgency - wearing a diaper.       Objective          2/3/2025    12:16 PM 2/7/2025    11:28 AM 2/7/2025    11:31 AM 2/7/2025    12:50 PM 2/7/2025     2:30 PM 2/26/2025    11:26 AM 3/3/2025    10:05 AM   Vitals   Systolic 111 81 88 103 110  111   Diastolic 78 55 54 61 75  82   BP Location Left arm Right arm Right arm    Left arm   Heart Rate 108 91 102 89 89  91   Temp 36.4 °C (97.5 °F) 36.2 °C (97.2 °F)     36.5 °C (97.7 °F)   Resp 18 20 24 20   18   Height      1.524 m (5')    Weight (lb) 109.6 113.98    110 113.8   BMI 21.4 kg/m2 22.26 kg/m2    21.48 kg/m2 22.23 kg/m2   BSA (m2)  1.45 m2 1.48 m2    1.45 m2 1.48 m2   Visit Report Report Report Report Report Report  Report       Daily Weight  03/03/25 : 51.6 kg (113 lb 12.8 oz)  02/07/25 : 51.7 kg (113 lb 15.7 oz)  02/03/25 : 49.7 kg (109 lb 9.6 oz)  01/28/25 : 49.9 kg (110 lb)  12/10/24 : 54.6 kg (120 lb 5.9 oz)         Physical Exam  Vitals reviewed.   Constitutional:       General: She is not in acute distress.  HENT:      Head: Normocephalic and atraumatic.      Mouth/Throat:      Mouth: Mucous membranes are moist.   Eyes:      Extraocular Movements: Extraocular movements intact.      Conjunctiva/sclera: Conjunctivae normal.      Pupils: Pupils are equal, round, and reactive to light.   Cardiovascular:      Rate and Rhythm: Normal rate and regular rhythm.      Heart sounds: Normal heart sounds. No murmur heard.  Pulmonary:      Effort: Pulmonary effort is normal. No respiratory distress.      Breath sounds: Normal breath sounds.   Abdominal:      General: Abdomen is flat. Bowel sounds are normal. There is no distension.      Palpations: Abdomen is soft.   Skin:     General: Skin is warm and dry.   Neurological:      Mental Status: She is alert and oriented to person, place, and time. Mental status is at baseline.   Psychiatric:         Mood and Affect: Mood normal.         Behavior: Behavior normal.         Thought Content: Thought content normal.     Labs  No visits with results within 1 Day(s) from this visit.   Latest known visit with results is:   Office Visit on 02/07/2025   Component Date Value Ref Range Status    Magnesium 02/07/2025 2.05  1.60 - 2.40 mg/dL Final    Glucose 02/07/2025 144 (H)  74 - 99 mg/dL Final    Sodium 02/07/2025 137  136 - 145 mmol/L Final    Potassium 02/07/2025 3.4 (L)  3.5 - 5.3 mmol/L Final    Chloride 02/07/2025 102  98 - 107 mmol/L Final    Bicarbonate 02/07/2025 28  21 - 32 mmol/L Final    Anion Gap 02/07/2025 10  10 - 20 mmol/L Final    Urea Nitrogen 02/07/2025 17  6 - 23 mg/dL Final    Creatinine  02/07/2025 0.75  0.50 - 1.05 mg/dL Final    eGFR 02/07/2025 86  >60 mL/min/1.73m*2 Final    Calculations of estimated GFR are performed using the 2021 CKD-EPI Study Refit equation without the race variable for the IDMS-Traceable creatinine methods.  https://jasn.asnjournals.org/content/early/2021/09/22/ASN.0402160441    Calcium 02/07/2025 9.1  8.6 - 10.3 mg/dL Final    Albumin 02/07/2025 3.5  3.4 - 5.0 g/dL Final    Alkaline Phosphatase 02/07/2025 53  33 - 136 U/L Final    Total Protein 02/07/2025 6.5  6.4 - 8.2 g/dL Final    AST 02/07/2025 15  9 - 39 U/L Final    Bilirubin, Total 02/07/2025 0.7  0.0 - 1.2 mg/dL Final    ALT 02/07/2025 8  7 - 45 U/L Final    Patients treated with Sulfasalazine may generate falsely decreased results for ALT.    WBC 02/07/2025 4.7  4.4 - 11.3 x10*3/uL Final    nRBC 02/07/2025 0.0  0.0 - 0.0 /100 WBCs Final    RBC 02/07/2025 3.96 (L)  4.00 - 5.20 x10*6/uL Final    Hemoglobin 02/07/2025 12.1  12.0 - 16.0 g/dL Final    Hematocrit 02/07/2025 36.5  36.0 - 46.0 % Final    MCV 02/07/2025 92  80 - 100 fL Final    MCH 02/07/2025 30.6  26.0 - 34.0 pg Final    MCHC 02/07/2025 33.2  32.0 - 36.0 g/dL Final    RDW 02/07/2025 14.5  11.5 - 14.5 % Final    Platelets 02/07/2025 273  150 - 450 x10*3/uL Final    Neutrophils % 02/07/2025 55.5  40.0 - 80.0 % Final    Immature Granulocytes %, Automated 02/07/2025 0.9  0.0 - 0.9 % Final    Immature Granulocyte Count (IG) includes promyelocytes, myelocytes and metamyelocytes but does not include bands. Percent differential counts (%) should be interpreted in the context of the absolute cell counts (cells/UL).    Lymphocytes % 02/07/2025 22.1  13.0 - 44.0 % Final    Monocytes % 02/07/2025 11.3  2.0 - 10.0 % Final    Eosinophils % 02/07/2025 9.6  0.0 - 6.0 % Final    Basophils % 02/07/2025 0.6  0.0 - 2.0 % Final    Neutrophils Absolute 02/07/2025 2.61  1.20 - 7.70 x10*3/uL Final    Percent differential counts (%) should be interpreted in the context of the  absolute cell counts (cells/uL).    Immature Granulocytes Absolute, Au* 02/07/2025 0.04  0.00 - 0.70 x10*3/uL Final    Lymphocytes Absolute 02/07/2025 1.04 (L)  1.20 - 4.80 x10*3/uL Final    Monocytes Absolute 02/07/2025 0.53  0.10 - 1.00 x10*3/uL Final    Eosinophils Absolute 02/07/2025 0.45  0.00 - 0.70 x10*3/uL Final    Basophils Absolute 02/07/2025 0.03  0.00 - 0.10 x10*3/uL Final    Color, Urine 02/07/2025 Light-Yellow  Light-Yellow, Yellow, Dark-Yellow Final    Appearance, Urine 02/07/2025 Clear  Clear Final    Specific Gravity, Urine 02/07/2025 1.016  1.005 - 1.035 Final    pH, Urine 02/07/2025 5.5  5.0, 5.5, 6.0, 6.5, 7.0, 7.5, 8.0 Final    Protein, Urine 02/07/2025 NEGATIVE  NEGATIVE, 10 (TRACE), 20 (TRACE) mg/dL Final    Glucose, Urine 02/07/2025 Normal  Normal mg/dL Final    Blood, Urine 02/07/2025 NEGATIVE  NEGATIVE mg/dL Final    Ketones, Urine 02/07/2025 NEGATIVE  NEGATIVE mg/dL Final    Bilirubin, Urine 02/07/2025 NEGATIVE  NEGATIVE mg/dL Final    Urobilinogen, Urine 02/07/2025 Normal  Normal mg/dL Final    Nitrite, Urine 02/07/2025 NEGATIVE  NEGATIVE Final    Leukocyte Esterase, Urine 02/07/2025 NEGATIVE  NEGATIVE Final    Extra Tube 02/07/2025 Hold for add-ons.   Final    Auto resulted.               Performance Status:    ECOG 1: Restricted in physically strenuous activity but ambulatory and able to carry out work of a light or sedentary nature, e.g., light house work, office work.       Imaging:  I have personally reviewed the below imaging and concur with the reported findings unless otherwise stated:    MR brain w and wo IV contrast    Result Date: 2/26/2025  Impression: No evidence of acute infarct, intracranial mass effect or midline shift.   No evidence of intracranial metastatic disease.   Volume loss. Mild degree of nonspecific white matter signal compatible with microangiopathy.   MACRO: None   Signed by: Mallory Partida 2/26/2025 2:37 PM Dictation workstation:   [id number]        Assessment/Plan      Mrs. Branch is a 69 yo with COPD and GERD who presented with newly diagnosed SCLC completed BID radiation planned concurrently with cis/etoposide but had a reaction C2D1 to cisplatin. Completed 1 cycle carbo/etop D1 4/5/22 also complicated by facial flushing and erythematous rash and stopped further treatment. Now s/p PCI in 6/2022. Started clinical trial [id number] with tarlatamab 1/24/23. Treatment has been complicated by worsening short-term memory worse for the day or two after treatment, delayed C2 by 1 week and dose-reduced. Confusion has largely resolved during C3 after stopping mirtazapine but she and her  recognize transient worsening for the day or two after treatment. Stopped tarlatamab after developing paraneoplastic cerebellar ataxia.      #SCLC  - originally diagnosed with limited stage SCLC and started concurrent chemoradiation cis/etop 2/15/22, with reaction to cisplatin on C2D1  - trialed carboplatin/etoposide with rash and opted to forego further chemo as she had completed radiation  - progression 11/2022, and enrolled on [id number] (tarlatamab) started C1D1 1/24/23. Tolerated well overall until she has now developed paraneoplastic cerebellar ataxia, anti-ANNA1 antibody positive, following with neurology  - stopped trial (last dose 8/27/24) and will monitor off drugs with surveillance scans for now  - Will continue CT C/A/P and CT neck for better visualization of supraclavicular node every 2 months, and brain MRI every 3 months - brain MRI without evidence of progression. 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 Currently off cytoxan while she continues recovery.  - referral to PT  - needs a transport chair as she  is unable to use cane or walker due to significant functional mobility deficits and inability to walk due to unsteady gait and poor balance. Transport chair will enable her to participate in ADL's and complete toileting, feeding, grooming, dressing, in the home. She is able to use the transport chair in her home.     #Double vision/confusion/gait instability  -5/20/24 MRI brain negative.   -Discussed in phase 1 meeting and seems consistent with WBR late effect.   -She was seen by her ophthalmologist and will continue to follow.  -saw neurology and undergoing work-up as well  - continues on dexamethasone, and note decreased cortisol and ACTH - will refer to endocrinology for consideration if this is clinically significant and impacting confusion/gait instability    Francy Archer MD

## 2025-03-03 NOTE — TELEPHONE ENCOUNTER
Per Dr. Morgan Graham's infusion appointment is cancelled for tomorrow. Gladys is scheduled to see him on 3/21 and he will evaluate at that time if she is able to continue Cytoxan. Voicemail left for Gladys with these details and to call Dr. Mora's office for any further questions.    No

## 2025-03-04 ENCOUNTER — APPOINTMENT (OUTPATIENT)
Dept: HEMATOLOGY/ONCOLOGY | Facility: CLINIC | Age: 70
End: 2025-03-04
Payer: MEDICARE

## 2025-03-04 LAB
APPEARANCE UR: ABNORMAL
BACTERIA #/AREA URNS AUTO: ABNORMAL /HPF
BILIRUB UR STRIP.AUTO-MCNC: NEGATIVE MG/DL
CAOX CRY #/AREA UR COMP ASSIST: ABNORMAL /HPF
COLOR UR: ABNORMAL
GLUCOSE UR STRIP.AUTO-MCNC: NORMAL MG/DL
KETONES UR STRIP.AUTO-MCNC: NEGATIVE MG/DL
LEUKOCYTE ESTERASE UR QL STRIP.AUTO: ABNORMAL
MUCOUS THREADS #/AREA URNS AUTO: ABNORMAL /LPF
NITRITE UR QL STRIP.AUTO: NEGATIVE
PH UR STRIP.AUTO: 5.5 [PH]
PROT UR STRIP.AUTO-MCNC: ABNORMAL MG/DL
RBC # UR STRIP.AUTO: NEGATIVE MG/DL
RBC #/AREA URNS AUTO: ABNORMAL /HPF
SP GR UR STRIP.AUTO: 1.02
SQUAMOUS #/AREA URNS AUTO: ABNORMAL /HPF
UROBILINOGEN UR STRIP.AUTO-MCNC: NORMAL MG/DL
WBC #/AREA URNS AUTO: ABNORMAL /HPF

## 2025-03-04 NOTE — TELEPHONE ENCOUNTER
Cognitive and functional decline, we will hold the cytoxan infusions until she improves, will re-evaluate next visit.

## 2025-03-05 ENCOUNTER — TELEPHONE (OUTPATIENT)
Dept: HEMATOLOGY/ONCOLOGY | Facility: HOSPITAL | Age: 70
End: 2025-03-05
Payer: MEDICARE

## 2025-03-05 DIAGNOSIS — N30.00 ACUTE CYSTITIS WITHOUT HEMATURIA: Primary | ICD-10-CM

## 2025-03-05 DIAGNOSIS — D63.8 ANEMIA, CHRONIC DISEASE: Primary | ICD-10-CM

## 2025-03-05 RX ORDER — CIPROFLOXACIN 500 MG/1
500 TABLET ORAL 2 TIMES DAILY
Qty: 10 TABLET | Refills: 0 | Status: SHIPPED | OUTPATIENT
Start: 2025-03-05 | End: 2025-03-10

## 2025-03-05 NOTE — TELEPHONE ENCOUNTER
RN called and spoke to Bo in regards to Gladys's urine culture that came back positive for a UTI. RN let him know that Dr. Archer sent in an antibiotic for Gladys to take BID for 5 days. He mentioned how while Gladys was inpatient, the providers told her that her iron was low and Bo would like to have her lab work done again to see if that is accurate. RN informed provider of this.  Bo was appreciative of the call and had no further questions at this time.

## 2025-03-07 ENCOUNTER — HOSPITAL ENCOUNTER (OUTPATIENT)
Dept: RADIOLOGY | Facility: HOSPITAL | Age: 70
Discharge: HOME | End: 2025-03-07
Payer: MEDICARE

## 2025-03-07 VITALS — WEIGHT: 113 LBS | BODY MASS INDEX: 22.19 KG/M2 | HEIGHT: 60 IN

## 2025-03-07 DIAGNOSIS — Z12.31 ENCOUNTER FOR SCREENING MAMMOGRAM FOR MALIGNANT NEOPLASM OF BREAST: ICD-10-CM

## 2025-03-07 DIAGNOSIS — Z12.39 BREAST SCREENING: ICD-10-CM

## 2025-03-07 LAB
BACTERIA UR CULT: ABNORMAL
BACTERIA UR CULT: ABNORMAL

## 2025-03-07 PROCEDURE — 77067 SCR MAMMO BI INCL CAD: CPT

## 2025-03-10 LAB
BACTERIA UR CULT: ABNORMAL
BACTERIA UR CULT: ABNORMAL

## 2025-03-12 ENCOUNTER — APPOINTMENT (OUTPATIENT)
Dept: OBSTETRICS AND GYNECOLOGY | Facility: CLINIC | Age: 70
End: 2025-03-12
Payer: MEDICARE

## 2025-03-12 VITALS — HEIGHT: 60 IN | DIASTOLIC BLOOD PRESSURE: 66 MMHG | BODY MASS INDEX: 22.07 KG/M2 | SYSTOLIC BLOOD PRESSURE: 102 MMHG

## 2025-03-12 DIAGNOSIS — N39.41 URGE INCONTINENCE OF URINE: Primary | ICD-10-CM

## 2025-03-12 DIAGNOSIS — R92.8 ABNORMAL MAMMOGRAM OF RIGHT BREAST: Primary | ICD-10-CM

## 2025-03-12 DIAGNOSIS — Z78.0 MENOPAUSE: ICD-10-CM

## 2025-03-12 PROCEDURE — 99214 OFFICE O/P EST MOD 30 MIN: CPT | Performed by: NURSE PRACTITIONER

## 2025-03-12 PROCEDURE — 1159F MED LIST DOCD IN RCRD: CPT | Performed by: NURSE PRACTITIONER

## 2025-03-12 PROCEDURE — 1160F RVW MEDS BY RX/DR IN RCRD: CPT | Performed by: NURSE PRACTITIONER

## 2025-03-12 PROCEDURE — 1036F TOBACCO NON-USER: CPT | Performed by: NURSE PRACTITIONER

## 2025-03-12 ASSESSMENT — ENCOUNTER SYMPTOMS
CONSTITUTIONAL NEGATIVE: 1
PSYCHIATRIC NEGATIVE: 1
RESPIRATORY NEGATIVE: 1
NEUROLOGICAL NEGATIVE: 1
GASTROINTESTINAL NEGATIVE: 1

## 2025-03-12 NOTE — PROGRESS NOTES
Subjective   Patient ID: Gladys Branch is a 69 y.o. female who presents for New Patient Visit (Patient takes Premarin 0.3 mg prescribed by Dr. Perez,  is cutting them in half and states it is hard to do so. /Complains of a weak bladder for about a year, states she has no control in holding her bladder. Hysterectomy done 20+ years ago. /Treated for a UTI 3/3/2025. ).  69 year old here for desire for refill on premarin as well as urinary changes.  She had a hysterectomy in 2000 and notes that she has been using premarin 0.3mg daily since.  She started using the premarin for hot flashes and night sweats.  Partner notes that she still gets night sweats routinely.  He has been giving her 1/2 of a pill daily so she is getting 0.15mg/day.  He is unsure of any other reason she was provided the medication and she has difficulty with memory due to recent treatment for cancer as well as recent long stay in the hospital due to pneumonia.  Her  also mentions her having difficulty with holding her urine.  Previously she was able to go to the bathorom normally.  Within the last month she now has an urge to go to the bathroom and she has completely released her bladder before she can make it to the bathroom.  Prior to pneumonia she was able to only wear a panty liner and have a few drops.  Now she has to wear a full diaper and often is dry or has a full release.  She denies any change in this since she has healed from the pneumonia.  She also had a recent UTI which she was treated with antibiotics and her last pill was 3-4 days ago.  Patient denies any dysuria, urine odor but both her and partner express urine color to be dark.         Review of Systems   Constitutional: Negative.    Respiratory: Negative.     Gastrointestinal: Negative.    Genitourinary:  Positive for urgency.   Skin: Negative.    Neurological: Negative.    Psychiatric/Behavioral: Negative.         Objective   Physical Exam  Vitals reviewed.    Constitutional:       Appearance: Normal appearance. She is well-developed.   Pulmonary:      Effort: Pulmonary effort is normal. No respiratory distress.   Abdominal:      Palpations: Abdomen is soft.      Hernia: There is no hernia in the right inguinal area.   Genitourinary:     General: Normal vulva.      Exam position: Lithotomy position.      Labia:         Right: No rash, tenderness, lesion or injury.         Left: No rash, tenderness, lesion or injury.       Urethra: No prolapse, urethral pain, urethral swelling or urethral lesion.      Vagina: No signs of injury and foreign body. No vaginal discharge, erythema, tenderness, bleeding, lesions or prolapsed vaginal walls.      Uterus: Absent.       Rectum: Normal.      Comments: Vaginal atrophy present   Uterus, cervix and ovaries removed with hysterectomy 2000  Musculoskeletal:         General: Normal range of motion.   Lymphadenopathy:      Lower Body: No right inguinal adenopathy.   Skin:     General: Skin is warm and dry.   Neurological:      General: No focal deficit present.      Mental Status: She is alert and oriented to person, place, and time. Mental status is at baseline.   Psychiatric:         Attention and Perception: Attention and perception normal.         Mood and Affect: Mood and affect normal.         Speech: Speech normal.         Behavior: Behavior normal. Behavior is cooperative.         Thought Content: Thought content normal.         Judgment: Judgment normal.         Assessment/Plan   Problem List Items Addressed This Visit    None  Visit Diagnoses         Codes    Urge incontinence of urine    -  Primary N39.41    Relevant Orders    Referral to Urology    Urine culture    Menopause     Z78.0          UAC&S sent  Referral to urology for evaluation  Reviewed long term use of estrogen for night sweats, patient and  agree to stop use of premarin  Follow up as needed       RUPINDER Silva 03/12/25 10:44 AM

## 2025-03-14 LAB — BACTERIA UR CULT: NORMAL

## 2025-03-17 ENCOUNTER — TELEPHONE (OUTPATIENT)
Dept: HEMATOLOGY/ONCOLOGY | Facility: HOSPITAL | Age: 70
End: 2025-03-17

## 2025-03-17 ENCOUNTER — TELEPHONE (OUTPATIENT)
Dept: OBSTETRICS AND GYNECOLOGY | Facility: CLINIC | Age: 70
End: 2025-03-17
Payer: MEDICARE

## 2025-03-17 NOTE — TELEPHONE ENCOUNTER
----- Message from Betsy Stanley sent at 3/17/2025 12:37 PM EDT -----  Please inform patient/ that her culture returned normal without indication for infection impacting her incontinence.

## 2025-03-20 ENCOUNTER — PATIENT OUTREACH (OUTPATIENT)
Dept: PRIMARY CARE | Facility: CLINIC | Age: 70
End: 2025-03-20
Payer: MEDICARE

## 2025-03-21 ENCOUNTER — APPOINTMENT (OUTPATIENT)
Dept: NEUROLOGY | Facility: CLINIC | Age: 70
End: 2025-03-21
Payer: MEDICARE

## 2025-03-21 VITALS
SYSTOLIC BLOOD PRESSURE: 102 MMHG | DIASTOLIC BLOOD PRESSURE: 60 MMHG | BODY MASS INDEX: 22.58 KG/M2 | HEART RATE: 88 BPM | HEIGHT: 60 IN | WEIGHT: 115 LBS

## 2025-03-21 DIAGNOSIS — G11.9 CEREBELLAR ATAXIA (MULTI): Primary | ICD-10-CM

## 2025-03-21 DIAGNOSIS — F03.90 DEMENTIA WITHOUT BEHAVIORAL DISTURBANCE (MULTI): ICD-10-CM

## 2025-03-21 RX ORDER — APREPITANT 80 MG/1
80 CAPSULE ORAL DAILY
COMMUNITY

## 2025-03-21 RX ORDER — OMEPRAZOLE 40 MG/1
1 CAPSULE, DELAYED RELEASE ORAL
COMMUNITY
Start: 2025-02-12

## 2025-03-21 ASSESSMENT — MONTREAL COGNITIVE ASSESSMENT (MOCA)
12. MEMORY INDEX SCORE: 0
10. [FLUENCY] NAME WORDS STARTING WITH DESIGNATED LETTER: 0
4. NAME EACH OF THE THREE ANIMALS SHOWN: 3
6. READ LIST OF DIGITS [FORWARD/BACKWARD]: 1
WHAT IS THE TOTAL SCORE (OUT OF 30): 10
8. SERIAL SUBTRACTION OF 7S: 1
WHAT LEVEL OF EDUCATION WAS ATTAINED: 1
5. MEMORY TRIALS: 0
7. [VIGILENCE] TAP WHEN HEARING DESIGNATED LETTER: 0
9. REPEAT EACH SENTENCE: 2
VISUOSPATIAL/EXECUTIVE SUBSCORE: 2
11. FOR EACH PAIR OF WORDS, WHAT CATEGORY DO THEY BELONG TO (OUT OF 2): 0
13. ORIENTATION SUBSCORE: 0

## 2025-03-21 NOTE — PROGRESS NOTES
Subjective     Gladys Branch is a right handed  69 y.o. year old female who presents with Paraneoplastic cerebellar degeneration (CMS-HCC) (4 month follow up, pt's  does see improvement. Pt is still doing therapy and will be re-evaluated soon. ). Patient is accompanied by: spouse  Visit type: follow up visit     Things seem to be slowly improving with mobility, oral intake. She is working with PT at [facility]. She was recently treated for UTI for symptoms of dysuria. Her memory has been declining quite a bit overall - for instance yesterday she confused appointment times. Her  manages her appointments and medications but she tries to be as independent as possible. Her mood has been great as has been her sleep. There seems to be fluctuations in cognition.  She has an upcoming surveillance CT.     Prior HX  She has SCLC and s/p cisplatin/etoposide 2022, and she is currently in a clinical trial. For the past year or so she has had balance difficulty. It has been slowly getting worse in the past 6 months. She has had 4 falls in the past year. She is not using a cane. She has had numbness in her distal feet ever since her chemotherapy.    Her vision has been worsening. She has had horizontal diplopia which has been going on for several months as well. She will have to close one eye to read things properly.    She has had cognitive decline ever since having prophylactic brain radiation. She has trouble remembering things she did the previous day, or forgetting appointments. She has stopped driving because of vision. She is otherwise independent in other IADLs including cooking, shopping, and cleaning.    Jan 2025:  She was admitted to [facility] admitted with hypoxia, dyspnea, fever, and cough. She was treated for pneumonia and had breathing treatments. She was having severe difficulty walking with generalized weakness. She was admitted inpatient and rehab (but went back and forth due to feeling too sick)  "for the past month. Her MRI brain 1/20/25 did not show any evidence of metastasis nor new lesions. I reviewed her 2 most recent MRIs dated 1/20/25 and 2/26/25, both showing stable cerebellar > cerebral atrophy, moderate confluent periventricular hyperintensity, and no abnormal enhancement. EEG showed slowing without epileptiform discharges. \"CTA chest: CTA chest: No PE, ill-defined ground glass opacity RLL may be secondary to inflammatory versus infection, patchy consolidation of RLL, emphysema. \" Seen by neurology for generalized weakness and metabolic encephalopathy. Most recent bloodwork mild anemia 11-12 and mild lymphopenia in 900s    Last dose of Cytoxan Aug 2024    Patient Active Problem List   Diagnosis    Chronic GERD    COPD (chronic obstructive pulmonary disease) (Multi)    Hyperlipidemia    Impaired fasting blood sugar    Problem drinking    Seasonal allergic rhinitis    Small cell lung cancer    Vitamin D deficiency    Abnormal EKG    Agatston CAC score, <100    Frequent unifocal PVCs    Medicare annual wellness visit, subsequent    Peripheral neuropathy due to and not concurrent with chemotherapy (Multi)    [place] cardiac risk <10% in next 10 years    Anemia, chronic disease    History of diverticulitis    Chronic constipation    Double vision    Cerebellar ataxia (Multi)    Paraneoplastic cerebellar degeneration (CMS-HCC)    Impaired functional mobility, balance, gait, and endurance      Past Medical History:   Diagnosis Date    Abdominal pain     Anemia     COPD (chronic obstructive pulmonary disease) (Multi)     Disease due to severe acute respiratory syndrome coronavirus 2 (SARS-CoV-2) 02/07/2023    Diverticulosis     GERD (gastroesophageal reflux disease)     Hyperlipidemia     Neuropathy     New onset of headache in cancer patient 05/17/2024    Other specified abnormal findings of blood chemistry 07/26/2022    Elevated serum creatinine    Other specified abnormal findings of blood chemistry " 2022    Elevated serum creatinine    Other specified abnormal findings of blood chemistry 2022    Elevated TSH    Other specified abnormal findings of blood chemistry 2022    Elevated TSH    Other specified abnormal findings of blood chemistry 2022    Elevated TSH    Other specified abnormal findings of blood chemistry 2022    Elevated TSH    Other specified abnormal findings of blood chemistry 09/15/2020    Elevated TSH    Personal history of other diseases of the respiratory system     History of chronic obstructive lung disease    Small cell lung cancer       Past Surgical History:   Procedure Laterality Date    HYSTERECTOMY      OTHER SURGICAL HISTORY  2020    Hysterectomy    OTHER SURGICAL HISTORY  2021    Tubal ligation    OTHER SURGICAL HISTORY  2021    Partial atrial septal defect repair    US GUIDED BIOPSY LYMPH NODE SUPERFICIAL  2023    US GUIDED BIOPSY LYMPH NODE SUPERFICIAL 2023 DOCTOR OFFICE LEGACY      Social History     Socioeconomic History    Marital status:      Spouse name: Not on file    Number of children: Not on file    Years of education: Not on file    Highest education level: Not on file   Occupational History    Not on file   Tobacco Use    Smoking status: Former     Current packs/day: 0.00     Average packs/day: 1 pack/day for 25.0 years (25.0 ttl pk-yrs)     Types: Cigarettes     Start date:      Quit date:      Years since quittin.2     Passive exposure: Past    Smokeless tobacco: Never   Vaping Use    Vaping status: Never Used   Substance and Sexual Activity    Alcohol use: Not Currently     Alcohol/week: 1.0 - 2.0 standard drink of alcohol     Types: 1 - 2 Glasses of wine per week     Comment: once  in a while    Drug use: Never    Sexual activity: Not on file   Other Topics Concern    Not on file   Social History Narrative    Not on file     Social Drivers of Health     Financial Resource Strain: Not on  file   Food Insecurity: Not on file   Transportation Needs: Not on file   Physical Activity: Not on file   Stress: Not on file   Social Connections: Not on file   Intimate Partner Violence: Not on file   Housing Stability: Not on file      Family History   Problem Relation Name Age of Onset    Dementia Mother      Depression Mother      Other (varicose veins of lower extremity) Mother      Alcohol abuse Father      Breast cancer Father's Sister        Patient Health Questionnaire-2 Score: 0          Review of Systems  All other system have been reviewed and are negative for complaint.    Vitals:    03/21/25 1353   BP: 102/60   BP Location: Left arm   Patient Position: Sitting   BP Cuff Size: Adult   Pulse: 88   Weight: 52.2 kg (115 lb)   Height: 1.524 m (5')       Objective   (Previous)  Neurological Exam  Mental Status  Awake, alert and oriented to person, place and time. Speech is normal. Language is fluent with no aphasia. Attention and concentration are normal.    Cranial Nerves  CN II: Visual fields full to confrontation.  CN III, IV, VI: Slight exophoria. Downbeat nystagmus is present. Hypermetric saccades. Normal smooth pursuit. Normal lids and orbits bilaterally. Pupils equal round and reactive to light bilaterally.  CN V:  Right: Facial sensation is normal.  Left: Facial sensation is normal on the left.  CN VII: Full and symmetric facial movement.  CN VIII: Hearing is normal.  CN IX, X: Palate elevates symmetrically  CN XI: Shoulder shrug strength is normal.  CN XII: Tongue midline without atrophy or fasciculations.    Motor  Normal muscle bulk throughout. No fasciculations present. Normal muscle tone. No abnormal involuntary movements.                                               Right                     Left   Shoulder abduction               5                          5  Elbow flexion                         5                          5  Elbow extension                    5                           5  Finger flexion                         5                          5  Finger extension                    5                          5  Finger abduction                    5                          5  Hip flexion                              5                          5  Knee flexion                           5                          5  Plantarflexion                         5                          5  Dorsiflexion                            5                          5    Sensory  Light touch is normal in upper and lower extremities. Intact distal pinprick. Normal vibration in distal lower extremities.     Reflexes                                            Right                      Left  Biceps                                 2+                         2+  Triceps                                2+                         2+  Patellar                                2+                         2+  Achilles                                2+                         2+  Right Plantar: downgoing  Left Plantar: downgoing    Right pathological reflexes: Pam's absent.  Left pathological reflexes: Pam's absent.    Coordination  Right: Heel-to-shin normal.Left: Heel-to-shin normal.  Slight dysmetria on finger vicki.    Gait    Walks cautiously with a walker which is improvement.              MOCA  Abstraction: 0, Attention: Read List of Digits: 1, Attention: Read List of Letters: 0, Attention: Serial Sevens: 1, Delayed Recall: 0, Language: Fluency: 0, Memory (Score '0' as this is an Unscored Section): 0, Language: Repeat: 2, Naming: 3, Orientation: 0, Visuospatial/Executive: 2, MOCA Total Score: 10  BRIAN  GAIT Gait: 6 Stance Stance: 3 Sitting Sitting: 3 Speech Distrubance Speech Disturbance: 2, Nose Finger Test - Left: 1, Nose Finger Test - Right: 1, Alternating Hand Movement - Right: 2, Alternating Hand Movement - Left: 2, Heel-Shin - Right: 1, Heel-Shin - Left: 1 BRIAN Total Score Finger Vicki - Mean Score:  2, Nose Finger Test - Mean Score: 1, Alternating Hand Movement Mean Score: 2, Heel-Shin Meat Total: 1, BIRAN Total Score: 20  Edwards most recent labs:  Lab Results   WBC: 6.2 E9/L (01/20/25 06:05:00)   RBC: 3.7 E12/L Low (01/20/25 06:05:00)   HGB: 11.5 gm/dL Low (01/20/25 06:05:00)   Hct: 32.9 % Low (01/20/25 06:05:00)   MCV: 89.1 fL (01/20/25 06:05:00)   MCH: 31.2 pg (01/20/25 06:05:00)   MCHC: 35 gm/dL (01/20/25 06:05:00)   RDW: 13.7 % (01/20/25 06:05:00)   Platelet: 507 E9/L High (01/20/25 06:05:00)   MPV: 6.9 fL (01/20/25 06:05:00)   Neutro Auto: 68.4 % (01/20/25 06:05:00)   Lymph Auto: 14.6 % (01/20/25 06:05:00)   Mono Auto: 11.7 % (01/20/25 06:05:00)   Eos Auto: 4.7 % (01/20/25 06:05:00)   Basophil Auto: 0.6 % (01/20/25 06:05:00)   Neutro Absolute: 4.2 E9/L (01/20/25 06:05:00)   Lymph Absolute: 0.9 E9/L Low (01/20/25 06:05:00)   Mono Absolute: 0.7 E9/L (01/20/25 06:05:00)   Eos Absolute: 0.3 E9/L (01/20/25 06:05:00)   Basophil Absolute: 0 E9/L (01/20/25 06:05:00)   Glucose Lvl: 79 mg/dL (01/20/25 06:05:00)   BUN: 29 mg/dL High (01/20/25 06:05:00)   Creatinine: 0.6 mg/dL (01/20/25 06:05:00)   eGFR: 97 mL/min/1.73 m2 (01/20/25 06:05:00)   BUN/Creat Ratio: 48 High (01/20/25 06:05:00)   Sodium Lvl: 138 mmol/L (01/20/25 06:05:00)   Potassium Lvl: 3.7 mmol/L (01/20/25 06:05:00)   Chloride: 106 mmol/L (01/20/25 06:05:00)   CO2: 23 mmol/L (01/20/25 06:05:00)   AGAP: 13 mEq/L (01/20/25 06:05:00)   Calcium Lvl: 8.9 mg/dL (01/20/25 06:05:00)   Magnesium: 2 mg/dL (01/20/25 06:05:00)     Hemoglobin A1C   Date Value Ref Range Status   09/17/2024 5.5 See comment % Final     Estimated Average Glucose   Date Value Ref Range Status   09/17/2024 111 Not Established mg/dL Final     Thyroid Stimulating Hormone   Date Value Ref Range Status   10/10/2024 2.61 0.44 - 3.98 mIU/L Final     Folate, Serum   Date Value Ref Range Status   08/15/2024 15.7 >5.0 ng/mL Final         Assessment/Plan   Problem List Items Addressed This Visit              ICD-10-CM    Cerebellar ataxia (Multi) - Primary G11.9     Other Visit Diagnoses         Codes    Dementia without behavioral disturbance (Multi)     F03.90    Relevant Orders    Vitamin B12    TSH with reflex to Free T4 if abnormal    Methylmalonic Acid    Folate              Gladys Branch is a 69 y.o. year old female with COPD, small cell lung cancer (off treatment, on frequent surveillance), here for FUV for paraneoplastic cerebellar degeneration. Her exam is notable for downbeat nystagmus and ataxic gait, and supported by cerebellar atrophy on MRI and positive serum anti-ANNA1 antibodies.   She has had significant cognitive decline and her MOCA today is 10/30. She has been off Cytoxan since Aug 2024 due to prolonged hospitalization with pneumonia. Her deconditioning has improved but she also had a UTI since then. I will look into IVIG as an alternative option versus cautious retrial of Cytoxan, and I may confer with her other providers.

## 2025-03-21 NOTE — PATIENT INSTRUCTIONS
Thank you for your visit today. 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 The memory testing additionally suggests severe impairment. If you have any questions or need to reach me, call my office at [phone].    We discussed the following plan today:  I will look into IVIG or Cytoxan as an option  Return in 3 months

## 2025-03-24 ENCOUNTER — LAB (OUTPATIENT)
Dept: LAB | Facility: CLINIC | Age: 70
End: 2025-03-24
Payer: MEDICARE

## 2025-03-24 DIAGNOSIS — D63.8 ANEMIA, CHRONIC DISEASE: ICD-10-CM

## 2025-03-24 DIAGNOSIS — C34.90 SMALL CELL LUNG CANCER: ICD-10-CM

## 2025-03-24 DIAGNOSIS — G31.89 PARANEOPLASTIC CEREBELLAR DEGENERATION (CMS-HCC): ICD-10-CM

## 2025-03-24 LAB
ALBUMIN SERPL BCP-MCNC: 3.8 G/DL (ref 3.4–5)
ALP SERPL-CCNC: 53 U/L (ref 33–136)
ALT SERPL W P-5'-P-CCNC: 9 U/L (ref 7–45)
ANION GAP SERPL CALC-SCNC: 11 MMOL/L (ref 10–20)
AST SERPL W P-5'-P-CCNC: 15 U/L (ref 9–39)
BASOPHILS # BLD AUTO: 0.02 X10*3/UL (ref 0–0.1)
BASOPHILS NFR BLD AUTO: 0.4 %
BILIRUB SERPL-MCNC: 0.3 MG/DL (ref 0–1.2)
BUN SERPL-MCNC: 25 MG/DL (ref 6–23)
CALCIUM SERPL-MCNC: 9.1 MG/DL (ref 8.6–10.3)
CHLORIDE SERPL-SCNC: 105 MMOL/L (ref 98–107)
CO2 SERPL-SCNC: 28 MMOL/L (ref 21–32)
CREAT SERPL-MCNC: 0.7 MG/DL (ref 0.5–1.05)
EGFRCR SERPLBLD CKD-EPI 2021: >90 ML/MIN/1.73M*2
EOSINOPHIL # BLD AUTO: 0.26 X10*3/UL (ref 0–0.7)
EOSINOPHIL NFR BLD AUTO: 5.7 %
ERYTHROCYTE [DISTWIDTH] IN BLOOD BY AUTOMATED COUNT: 13.6 % (ref 11.5–14.5)
GLUCOSE SERPL-MCNC: 98 MG/DL (ref 74–99)
HCT VFR BLD AUTO: 37.9 % (ref 36–46)
HGB BLD-MCNC: 12.5 G/DL (ref 12–16)
IMM GRANULOCYTES # BLD AUTO: 0.01 X10*3/UL (ref 0–0.7)
IMM GRANULOCYTES NFR BLD AUTO: 0.2 % (ref 0–0.9)
LYMPHOCYTES # BLD AUTO: 1.15 X10*3/UL (ref 1.2–4.8)
LYMPHOCYTES NFR BLD AUTO: 25.3 %
MCH RBC QN AUTO: 30.7 PG (ref 26–34)
MCHC RBC AUTO-ENTMCNC: 33 G/DL (ref 32–36)
MCV RBC AUTO: 93 FL (ref 80–100)
MONOCYTES # BLD AUTO: 0.51 X10*3/UL (ref 0.1–1)
MONOCYTES NFR BLD AUTO: 11.2 %
NEUTROPHILS # BLD AUTO: 2.59 X10*3/UL (ref 1.2–7.7)
NEUTROPHILS NFR BLD AUTO: 57.2 %
NRBC BLD-RTO: ABNORMAL /100{WBCS}
PLATELET # BLD AUTO: 211 X10*3/UL (ref 150–450)
POTASSIUM SERPL-SCNC: 3.8 MMOL/L (ref 3.5–5.3)
PROT SERPL-MCNC: 6.5 G/DL (ref 6.4–8.2)
RBC # BLD AUTO: 4.07 X10*6/UL (ref 4–5.2)
SODIUM SERPL-SCNC: 140 MMOL/L (ref 136–145)
WBC # BLD AUTO: 4.5 X10*3/UL (ref 4.4–11.3)

## 2025-03-24 PROCEDURE — 85025 COMPLETE CBC W/AUTO DIFF WBC: CPT

## 2025-03-24 PROCEDURE — 83615 LACTATE (LD) (LDH) ENZYME: CPT

## 2025-03-24 PROCEDURE — 36415 COLL VENOUS BLD VENIPUNCTURE: CPT

## 2025-03-24 PROCEDURE — 83540 ASSAY OF IRON: CPT

## 2025-03-24 PROCEDURE — 80053 COMPREHEN METABOLIC PANEL: CPT

## 2025-03-25 ENCOUNTER — TELEPHONE (OUTPATIENT)
Dept: OBSTETRICS AND GYNECOLOGY | Facility: CLINIC | Age: 70
End: 2025-03-25
Payer: MEDICARE

## 2025-03-25 LAB
IRON SATN MFR SERPL: 29 % (ref 25–45)
IRON SERPL-MCNC: 79 UG/DL (ref 35–150)
LDH SERPL L TO P-CCNC: 156 U/L (ref 84–246)
TIBC SERPL-MCNC: 275 UG/DL (ref 240–445)
UIBC SERPL-MCNC: 196 UG/DL (ref 110–370)

## 2025-03-25 NOTE — TELEPHONE ENCOUNTER
Patients  called and states that since the patient has been off of her medication, she is starting to sweat pretty bad again. Was told to give Ernestina a call if that started happening. Please advise.

## 2025-03-26 ENCOUNTER — TELEPHONE (OUTPATIENT)
Dept: NEUROLOGY | Facility: CLINIC | Age: 70
End: 2025-03-26
Payer: MEDICARE

## 2025-03-26 ENCOUNTER — HOSPITAL ENCOUNTER (OUTPATIENT)
Dept: RADIOLOGY | Facility: HOSPITAL | Age: 70
Discharge: HOME | End: 2025-03-26
Payer: MEDICARE

## 2025-03-26 DIAGNOSIS — C34.90 SMALL CELL LUNG CANCER: ICD-10-CM

## 2025-03-26 PROCEDURE — 74177 CT ABD & PELVIS W/CONTRAST: CPT

## 2025-03-26 PROCEDURE — 70491 CT SOFT TISSUE NECK W/DYE: CPT

## 2025-03-26 PROCEDURE — 2550000001 HC RX 255 CONTRASTS: Performed by: STUDENT IN AN ORGANIZED HEALTH CARE EDUCATION/TRAINING PROGRAM

## 2025-03-26 RX ADMIN — IOHEXOL 65 ML: 350 INJECTION, SOLUTION INTRAVENOUS at 14:02

## 2025-03-26 NOTE — TELEPHONE ENCOUNTER
Patient's spouse called.   With the infusion that Dr. Shahid recommended, blood clots can be a side effects and Gladys does not want to go forward with the infusion.     They would like to know if there is anything additional that would be recommended.   Please advise. Thank you.

## 2025-03-31 ENCOUNTER — OFFICE VISIT (OUTPATIENT)
Dept: HEMATOLOGY/ONCOLOGY | Facility: CLINIC | Age: 70
End: 2025-03-31
Payer: MEDICARE

## 2025-03-31 ENCOUNTER — LAB (OUTPATIENT)
Dept: LAB | Facility: CLINIC | Age: 70
End: 2025-03-31
Payer: MEDICARE

## 2025-03-31 VITALS
BODY MASS INDEX: 22.46 KG/M2 | WEIGHT: 115 LBS | HEART RATE: 100 BPM | DIASTOLIC BLOOD PRESSURE: 75 MMHG | RESPIRATION RATE: 18 BRPM | TEMPERATURE: 97.7 F | OXYGEN SATURATION: 95 % | SYSTOLIC BLOOD PRESSURE: 108 MMHG

## 2025-03-31 DIAGNOSIS — R82.994 HYPERCALCIURIA: ICD-10-CM

## 2025-03-31 DIAGNOSIS — C34.90 SMALL CELL LUNG CANCER: ICD-10-CM

## 2025-03-31 PROCEDURE — 82306 VITAMIN D 25 HYDROXY: CPT

## 2025-03-31 PROCEDURE — 99215 OFFICE O/P EST HI 40 MIN: CPT | Performed by: STUDENT IN AN ORGANIZED HEALTH CARE EDUCATION/TRAINING PROGRAM

## 2025-03-31 PROCEDURE — 1126F AMNT PAIN NOTED NONE PRSNT: CPT | Performed by: STUDENT IN AN ORGANIZED HEALTH CARE EDUCATION/TRAINING PROGRAM

## 2025-03-31 PROCEDURE — 1159F MED LIST DOCD IN RCRD: CPT | Performed by: STUDENT IN AN ORGANIZED HEALTH CARE EDUCATION/TRAINING PROGRAM

## 2025-03-31 PROCEDURE — 36415 COLL VENOUS BLD VENIPUNCTURE: CPT

## 2025-03-31 PROCEDURE — G2211 COMPLEX E/M VISIT ADD ON: HCPCS | Performed by: STUDENT IN AN ORGANIZED HEALTH CARE EDUCATION/TRAINING PROGRAM

## 2025-03-31 ASSESSMENT — PAIN SCALES - GENERAL: PAINLEVEL_OUTOF10: 0-NO PAIN

## 2025-03-31 NOTE — TELEPHONE ENCOUNTER
Please inform patient/ that this can occur as her body is adjusting to being without the hormones.  We have the option to continue to monitor or we can try some non hormonal options to help with the hot flashes/night sweats. Those options are a clonidine pill (blood pressure) she can take a night only or in the antidepressant category (brisdelle). As we reviewed at the appt the hormones have risks with long term use that they did not want to continue.

## 2025-03-31 NOTE — PROGRESS NOTES
Patient ID: Gladys Branch is a 69 y.o. female.    DIAGNOSIS    Small Cell Lung Cancer    By immunostaining, the tumor cells are positive for CAM 5.2, INSM1, and TTF-1, and negative for p40 and LCA. The proliferation index by Ki-67 immunostaining is approximately 90%.  Synaptophysin, Chromogranin and INSM-1 are positive.  AE1/AE3 is negative. NEOGENOMICS, RB protein expression is lost in the tumor cells    STAGING    [id number], limited stage  Recurrence    CURRENT SITES OF DISEASE    RLL, supraclavicular    MOLECULAR GENOMICS      PRIOR THERAPY    Concurrent chemoradiation with Cisplatin/Etoposide every 3 weeks; C1D1 2/15/22, reaction to cisplatin C2D1 and did not receive D2 or D3 etoposide  Carbo/etoposide 4/5/2022 1 cycle c/b rash  PCI 5/21-6/13/22  Phase 1 study [id number]  with study drug Taralatamab 1/24/23 - 8/27/24    CURRENT THERAPY        CURRENT ONCOLOGICAL PROBLEMS      HISTORY OF PRESENT ILLNESS    Mrs. Branch is a 67 yo with PMH GERD and COPD who originally was round to have a RLL nodule measuring 11 mm in 4/28/2021 found as part of CT calcium score scan. An ensuing CT chest w/o contrast was done on 5/19/21 which revealed subsolid pulmonary nodules measuring 14 mm in the RLL. She had CT chest w/contrast on 11/29/21 which confirmed stable 14 mm GGO of the RLL and decreased RLL subpleural nodule. She met thoracic surgeon Dr. Farfan, who ordered PET/CT scan done on 12/8/21 which did not reveal any FDG-avid nodules within the lung parenchyma but R hilar nodes with max SUV 8.0. He referred her for bronch, which was done on 1/21/22 by Dr. Mcdaniels. 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 Brain MRI was negative.    She started concurrent chemoradiation with BID radiation with cis/etop 2/15/22, and unfortunately had a reaction to cisplatin on C2D1 with chest pain and hypotension. She was hospitalized with sepsis felt to be due to pneumonia. She trialed carboplatin/etoposide on 4/5/22 with a  resulting rash and opted to forego further chemotherapy as she had completed radiation. Restaging scan 11/3/22 unfortunately with progression with new R hilar lymph node, paratracheal nodes, and increase in size of R supraclavicular mass. She enrolled in [id number] and started C1D1 1/24/23. C1 complicated by anorexia and dysgeusia, and delayed C2 by a week. She has further struggled with fatigue and confusion, which may be related to mirtazapine. Dose reduced for C2 and mirtazapine stopped with improvement in symptoms. 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    PAST MEDICAL HISTORY    GERD  COPD    SOCIAL HISTORY    Retired - lighting . Lives at home with  and a kitten.  Tobacco: quit smoking 1999. 1 ppd x 25 yrs.  EtOH: wine 1 glass/day  Illicits: none    CURRENT MEDS    Please see med list    ALLERGIES    Codeine, Sulfa drugs, Cisplatin    FAMILY HISTORY    Paternal aunt - breast cancer dx 80s    Subjective    She is here today with her . She saw Dr. Mora recently - thought about IVIG, but due to concern for blood clots, they decided not to pursue it right now. Felt that she wasn't paying attention when she did the MOCA test in his office. The burping and hiccups are getting worse.       Objective          2/7/2025     2:30 PM 2/26/2025    11:26 AM 3/3/2025    10:05 AM 3/7/2025     3:11 PM 3/12/2025    10:21 AM 3/21/2025     1:53 PM 3/31/2025    12:26 PM   Vitals   Systolic 110  111  102 102 108   Diastolic 75  82  66 60 75   BP Location   Left arm   Left arm Left arm   Heart Rate 89  91   88 100   Temp   36.5 °C (97.7 °F)    36.5 °C (97.7 °F)   Resp   18    18   Height  1.524 m (5')  1.524 m (5') 1.524 m (5') 1.524 m (5')    Weight (lb)  110 113.8 113  115 115   BMI  21.48 kg/m2 22.23 kg/m2 22.07 kg/m2 22.07 kg/m2 22.46 kg/m2 22.46 kg/m2   BSA (m2)  1.45 m2 1.48 m2 1.47  m2 1.47 m2 1.49 m2 1.49 m2   Visit Report Report  Report  Report Report Report       Daily Weight  03/31/25 : 52.2 kg (115 lb)  03/21/25 : 52.2 kg (115 lb)  03/07/25 : 51.3 kg (113 lb)  03/03/25 : 51.6 kg (113 lb 12.8 oz)  02/07/25 : 51.7 kg (113 lb 15.7 oz)         Physical Exam  Vitals reviewed.   Constitutional:       General: She is not in acute distress.  HENT:      Head: Normocephalic and atraumatic.      Mouth/Throat:      Mouth: Mucous membranes are moist.   Eyes:      Extraocular Movements: Extraocular movements intact.      Conjunctiva/sclera: Conjunctivae normal.      Pupils: Pupils are equal, round, and reactive to light.   Cardiovascular:      Rate and Rhythm: Normal rate and regular rhythm.      Heart sounds: Normal heart sounds. No murmur heard.  Pulmonary:      Effort: Pulmonary effort is normal. No respiratory distress.      Breath sounds: Normal breath sounds.   Abdominal:      General: Abdomen is flat. Bowel sounds are normal. There is no distension.      Palpations: Abdomen is soft.   Skin:     General: Skin is warm and dry.   Neurological:      Mental Status: She is alert and oriented to person, place, and time. Mental status is at baseline.   Psychiatric:         Mood and Affect: Mood normal.         Behavior: Behavior normal.         Thought Content: Thought content normal.       Labs  No visits with results within 1 Day(s) from this visit.   Latest known visit with results is:   Lab on 03/24/2025   Component Date Value Ref Range Status    WBC 03/24/2025 4.5  4.4 - 11.3 x10*3/uL Final    nRBC 03/24/2025    Final    Not Measured    RBC 03/24/2025 4.07  4.00 - 5.20 x10*6/uL Final    Hemoglobin 03/24/2025 12.5  12.0 - 16.0 g/dL Final    Hematocrit 03/24/2025 37.9  36.0 - 46.0 % Final    MCV 03/24/2025 93  80 - 100 fL Final    MCH 03/24/2025 30.7  26.0 - 34.0 pg Final    MCHC 03/24/2025 33.0  32.0 - 36.0 g/dL Final    RDW 03/24/2025 13.6  11.5 - 14.5 % Final    Platelets 03/24/2025 211  150 - 450  x10*3/uL Final    Neutrophils % 03/24/2025 57.2  40.0 - 80.0 % Final    Immature Granulocytes %, Automated 03/24/2025 0.2  0.0 - 0.9 % Final    Immature Granulocyte Count (IG) includes promyelocytes, myelocytes and metamyelocytes but does not include bands. Percent differential counts (%) should be interpreted in the context of the absolute cell counts (cells/UL).    Lymphocytes % 03/24/2025 25.3  13.0 - 44.0 % Final    Monocytes % 03/24/2025 11.2  2.0 - 10.0 % Final    Eosinophils % 03/24/2025 5.7  0.0 - 6.0 % Final    Basophils % 03/24/2025 0.4  0.0 - 2.0 % Final    Neutrophils Absolute 03/24/2025 2.59  1.20 - 7.70 x10*3/uL Final    Percent differential counts (%) should be interpreted in the context of the absolute cell counts (cells/uL).    Immature Granulocytes Absolute, Au* 03/24/2025 0.01  0.00 - 0.70 x10*3/uL Final    Lymphocytes Absolute 03/24/2025 1.15 (L)  1.20 - 4.80 x10*3/uL Final    Monocytes Absolute 03/24/2025 0.51  0.10 - 1.00 x10*3/uL Final    Eosinophils Absolute 03/24/2025 0.26  0.00 - 0.70 x10*3/uL Final    Basophils Absolute 03/24/2025 0.02  0.00 - 0.10 x10*3/uL Final    Glucose 03/24/2025 98  74 - 99 mg/dL Final    Sodium 03/24/2025 140  136 - 145 mmol/L Final    Potassium 03/24/2025 3.8  3.5 - 5.3 mmol/L Final    Chloride 03/24/2025 105  98 - 107 mmol/L Final    Bicarbonate 03/24/2025 28  21 - 32 mmol/L Final    Anion Gap 03/24/2025 11  10 - 20 mmol/L Final    Urea Nitrogen 03/24/2025 25 (H)  6 - 23 mg/dL Final    Creatinine 03/24/2025 0.70  0.50 - 1.05 mg/dL Final    eGFR 03/24/2025 >90  >60 mL/min/1.73m*2 Final    Calculations of estimated GFR are performed using the 2021 CKD-EPI Study Refit equation without the race variable for the IDMS-Traceable creatinine methods.  https://jasn.asnjournals.org/content/early/2021/09/22/ASN.5871483869    Calcium 03/24/2025 9.1  8.6 - 10.3 mg/dL Final    Albumin 03/24/2025 3.8  3.4 - 5.0 g/dL Final    Alkaline Phosphatase 03/24/2025 53  33 - 136 U/L Final     Total Protein 03/24/2025 6.5  6.4 - 8.2 g/dL Final    AST 03/24/2025 15  9 - 39 U/L Final    Bilirubin, Total 03/24/2025 0.3  0.0 - 1.2 mg/dL Final    ALT 03/24/2025 9  7 - 45 U/L Final    Patients treated with Sulfasalazine may generate falsely decreased results for ALT.    LDH 03/24/2025 156  84 - 246 U/L Final    Iron 03/24/2025 79  35 - 150 ug/dL Final    UIBC 03/24/2025 196  110 - 370 ug/dL Final    TIBC 03/24/2025 275  240 - 445 ug/dL Final    % Saturation 03/24/2025 29  25 - 45 % Final               Performance Status:    ECOG 1: Restricted in physically strenuous activity but ambulatory and able to carry out work of a light or sedentary nature, e.g., light house work, office work.       Imaging:  I have personally reviewed the below imaging and concur with the reported findings unless otherwise stated:    CT chest abdomen pelvis w IV contrast    Result Date: 3/29/2025  Impression: Lung cancer restaging scan compared to CT chest dated 01/18/2025 and CT abdomen pelvis dated 01/17/2025. No evidence of new metastatic disease in the chest abdomen or pelvis. Stable post radiation changes within the right lung and sub solid right pulmonary nodules measuring up to 1.6 cm. No new or enlarging lymph nodes in the chest abdomen or pelvis. Stable and chronic findings as above including similar appearing hepatic steatosis, recommend correlation with LFTs.   I personally reviewed the images/study and I agree with the findings as stated by Resident Faye Jordan.   MACRO: None.   Signed by: Eddie Conroy 3/29/2025 9:03 PM Dictation workstation:   [id number]    CT soft tissue neck w IV contrast    Result Date: 3/27/2025  Impression: 1. Nonspecific cervical adenopathy without abnormal enhancement or necrosis to suggest metastases. 2. Moderate centrilobular emphysema in the upper lobes. Please see dedicated imaging of the chest for further evaluation.     I personally reviewed the image(s)/study and resident interpretation.  I agree with the findings as stated by resident Dillan Valentino. Data analyzed and images interpreted at [facility], [place], [place].   MACRO: None   Signed by: Ghassan Gayle 3/27/2025 3:41 PM Dictation workstation:   [id number]         Assessment/Plan      Mrs. Branch is a 67 yo with COPD and GERD who presented with newly diagnosed SCLC completed BID radiation planned concurrently with cis/etoposide but had a reaction C2D1 to cisplatin. Completed 1 cycle carbo/etop D1 4/5/22 also complicated by facial flushing and erythematous rash and stopped further treatment. Now s/p PCI in 6/2022. Started clinical trial [id number] with tarlatamab 1/24/23. Treatment has been complicated by worsening short-term memory worse for the day or two after treatment, delayed C2 by 1 week and dose-reduced. Confusion has largely resolved during C3 after stopping mirtazapine but she and her  recognize transient worsening for the day or two after treatment. Stopped tarlatamab after developing paraneoplastic cerebellar ataxia.      #SCLC  - originally diagnosed with limited stage SCLC and started concurrent chemoradiation cis/etop 2/15/22, with reaction to cisplatin on C2D1  - trialed carboplatin/etoposide with rash and opted to forego further chemo as she had completed radiation  - progression 11/2022, and enrolled on [id number] (tarlatamab) started C1D1 1/24/23. Tolerated well overall until she has now developed paraneoplastic cerebellar ataxia, anti-ANNA1 antibody positive, following with neurology  - stopped trial (last dose 8/27/24) and will monitor off drugs with surveillance scans for now  - personally reviewed most recent CT scans without evidence of progression  - Will continue CT C/A/P and CT neck for better visualization of supraclavicular node every 2 months, and brain MRI every 3 months   - RTC after next scans with labs      # Paraneplastic cerebellar ataxia  - anti-ANNA1 ab  positive  - follows with neurology  - unclear etiology, occult progression vs tarlatamab induced  - tapering steroids and started cytoxan. Currently off cytoxan while she continues recovery.  - referral to PT  - needs a transport chair as she is unable to use cane or walker due to significant functional mobility deficits and inability to walk due to unsteady gait and poor balance. Transport chair will enable her to participate in ADL's and complete toileting, feeding, grooming, dressing, in the home. She is able to use the transport chair in her home.     #Double vision/confusion/gait instability  -5/20/24 MRI brain negative.   -Discussed in phase 1 meeting and seems consistent with WBR late effect.   -She was seen by her ophthalmologist and will continue to follow.  -saw neurology and undergoing work-up as well  - continues on dexamethasone, and note decreased cortisol and ACTH - will refer to endocrinology for consideration if this is clinically significant and impacting confusion/gait instability    Francy Archer MD

## 2025-04-01 ENCOUNTER — APPOINTMENT (OUTPATIENT)
Dept: HEMATOLOGY/ONCOLOGY | Facility: CLINIC | Age: 70
End: 2025-04-01
Payer: MEDICARE

## 2025-04-01 ENCOUNTER — OFFICE VISIT (OUTPATIENT)
Facility: HOSPITAL | Age: 70
End: 2025-04-01
Payer: MEDICARE

## 2025-04-01 ENCOUNTER — TELEPHONE (OUTPATIENT)
Dept: HEMATOLOGY/ONCOLOGY | Facility: HOSPITAL | Age: 70
End: 2025-04-01
Payer: MEDICARE

## 2025-04-01 DIAGNOSIS — N32.81 OAB (OVERACTIVE BLADDER): Primary | ICD-10-CM

## 2025-04-01 LAB — 25(OH)D3 SERPL-MCNC: 41 NG/ML (ref 30–100)

## 2025-04-01 PROCEDURE — 1159F MED LIST DOCD IN RCRD: CPT | Performed by: STUDENT IN AN ORGANIZED HEALTH CARE EDUCATION/TRAINING PROGRAM

## 2025-04-01 PROCEDURE — 99214 OFFICE O/P EST MOD 30 MIN: CPT | Performed by: STUDENT IN AN ORGANIZED HEALTH CARE EDUCATION/TRAINING PROGRAM

## 2025-04-01 RX ORDER — MIRABEGRON 50 MG/1
50 TABLET, FILM COATED, EXTENDED RELEASE ORAL DAILY
Qty: 90 TABLET | Refills: 0 | Status: SHIPPED | OUTPATIENT
Start: 2025-04-01 | End: 2025-06-30

## 2025-04-01 SDOH — ECONOMIC STABILITY: FOOD INSECURITY: WITHIN THE PAST 12 MONTHS, YOU WORRIED THAT YOUR FOOD WOULD RUN OUT BEFORE YOU GOT MONEY TO BUY MORE.: NEVER TRUE

## 2025-04-01 SDOH — ECONOMIC STABILITY: FOOD INSECURITY: WITHIN THE PAST 12 MONTHS, THE FOOD YOU BOUGHT JUST DIDN'T LAST AND YOU DIDN'T HAVE MONEY TO GET MORE.: NEVER TRUE

## 2025-04-01 ASSESSMENT — ENCOUNTER SYMPTOMS
OCCASIONAL FEELINGS OF UNSTEADINESS: 1
LOSS OF SENSATION IN FEET: 0
DEPRESSION: 0

## 2025-04-01 ASSESSMENT — COLUMBIA-SUICIDE SEVERITY RATING SCALE - C-SSRS
1. IN THE PAST MONTH, HAVE YOU WISHED YOU WERE DEAD OR WISHED YOU COULD GO TO SLEEP AND NOT WAKE UP?: NO
2. HAVE YOU ACTUALLY HAD ANY THOUGHTS OF KILLING YOURSELF?: NO

## 2025-04-01 NOTE — TELEPHONE ENCOUNTER
RN called Bo and asked him what paperwork he needed mailed. He asked that the appointment schedule be printed out and mailed to them. RN will print and place in the outgoing mail. No further needs at this time.

## 2025-04-01 NOTE — LETTER
April 3, 2025     Nova Omer MD  [address]  [facility], [address] [place] [id number]    Patient: Gladys Branch   YOB: 1955   Date of Visit: 4/1/2025       Dear Dr. Nova Omer MD:    Thank you for referring Gladys Branch to me for evaluation. Below are my notes for this consultation.  If you have questions, please do not hesitate to call me. I look forward to following your patient along with you.       Sincerely,     Feng Carter MD      CC: No Recipients  ______________________________________________________________________________________    HISTORY OF PRESENT ILLNESS:  Gladys Branch is a 69 y.o. female who presents today as a new patient. She denies any feelings of prolapse. She had chemo for lung cancer and is in remission. She has history of smoking. She has seen Ernestina Stanley. Her urinary symptoms worsened after chemo.  Reports a lot of incontinence mostly with urgency but has occasional stress incontinence.  She does not complain of a vaginal bulge.  She has had only vaginal births.  No prior history of sling or bladder suspension.  Does have all the treatments received she does have some cognitive impairment and short-term memory loss.         Past Medical History  She has a past medical history of Abdominal pain, Anemia, COPD (chronic obstructive pulmonary disease) (Multi), Disease due to severe acute respiratory syndrome coronavirus 2 (SARS-CoV-2) (02/07/2023), Diverticulosis, GERD (gastroesophageal reflux disease), Hyperlipidemia, Neuropathy, New onset of headache in cancer patient (05/17/2024), Other specified abnormal findings of blood chemistry (07/26/2022), Other specified abnormal findings of blood chemistry (07/26/2022), Other specified abnormal findings of blood chemistry (07/26/2022), Other specified abnormal findings of blood chemistry (07/26/2022), Other specified abnormal findings of blood chemistry (07/26/2022), Other specified  abnormal findings of blood chemistry (07/26/2022), Other specified abnormal findings of blood chemistry (09/15/2020), Personal history of other diseases of the respiratory system, and Small cell lung cancer.    She has no past medical history of Personal history of irradiation.    Surgical History  She has a past surgical history that includes Other surgical history (03/09/2020); Other surgical history (03/16/2021); Other surgical history (03/16/2021); US guided biopsy lymph node superficial (01/17/2023); and Hysterectomy (1999).     Social History  She reports that she quit smoking about 26 years ago. Her smoking use included cigarettes. She started smoking about 51 years ago. She has a 25 pack-year smoking history. She has been exposed to tobacco smoke. She has never used smokeless tobacco. She reports that she does not currently use alcohol after a past usage of about 1.0 - 2.0 standard drink of alcohol per week. She reports that she does not use drugs.    Family History  Family History   Problem Relation Name Age of Onset   • Dementia Mother     • Depression Mother     • Other (varicose veins of lower extremity) Mother     • Alcohol abuse Father     • Breast cancer Father's Sister          Allergies  Contact metal agent, Cisplatin, Codeine, and Sulfa (sulfonamide antibiotics)      A comprehensive 10+ review of systems was negative except for: see hpi                          PHYSICAL EXAMINATION:  BP Readings from Last 3 Encounters:   03/31/25 108/75   03/21/25 102/60   03/12/25 102/66      Wt Readings from Last 3 Encounters:   03/31/25 52.2 kg (115 lb)   03/21/25 52.2 kg (115 lb)   03/07/25 51.3 kg (113 lb)      BMI: Estimated body mass index is 22.46 kg/m² as calculated from the following:    Height as of 3/21/25: 1.524 m (5').    Weight as of 3/31/25: 52.2 kg (115 lb).  BSA: Estimated body surface area is 1.49 meters squared as calculated from the following:    Height as of 3/21/25: 1.524 m (5').    Weight as  of 3/31/25: 52.2 kg (115 lb).  HEENT: Normocephalic, atraumatic, PER EOMI, nonicteric, trachea normal, thyroid normal, oropharynx normal.  CARDIAC: regular rate & rhythm, S1 & S2 normal.  No heaves, thrills, gallops or murmurs.  LUNGS: Clear to auscultation, no spinal or CV tenderness.  EXTREMITIES: No evidence of cyanosis, clubbing or edema.        Pelvic:  Chaperone for pelvic exam:   Genitourinary:  normal external genitalia, Bartholin's glands, Enola's glands negative,   Urethra normal meatus, non-tender, no periurethral mass  Vaginal mucosa  normal  Adnexae  negative nontender, no masses  Atrophy positive    CST negative  Pelvic floor muscle contraction  4/5    POP-Q (in supine position):       Aa -3     Ba -3     C -8              gh 3     pb 3     tvl 8              Ap -3     Bp -3     D     Rectal: no hemorrhoids, fissures or masses.          Assessment:  69 y.o. female presents as a new patient with OAB     Overactive bladder:   -We discussed strategies to reduce symptoms. We discussed reducing caffeine and carbonated drinks could help with symptoms.     -We discussed managing fluids.         -We discussed third line therapy: Botox vs Interstim: both have similar efficacy 80% patients reports >50% improvement, Botox associated with 5% risk of incomplete emptying, increase in UTI and will require re-injection in 6-9 months; and as early as 3 months. Interstim is a staged procedure, 2 weeks apart, consisting first of lead implantation then internalization of IPG if there is improvement. Interstim is associated with lead migration, explanation, infection and bleeding, though risks are all <5%. If she decides to go forward with this we will order urodynamics.         we discussed botox vs sacral neuromodulation: both have similar efficacy 80% patients reports >50% improvement, botox associated with 5% risk of incomplete emptying, increase in UTI and will require re-injection in 6-9 months; and as early as 3  months. SNM is a staged procedure, 2 weeks apart, consisting first of lead implantation then internalization of IPG if there is improvement. Interstim is associated with lead migration, explantation, infection and bleeding, though risks are all <5%. We also discussed PTNS which is associated with success rates comparable to medical therapy but without side-effects without significant major morbidity.       Trial of Myrbetriq, would avoid anticholinergics in her    She may be a good candidate for Botox or neuromodulation, there are [place],  she needs UDS we will have her do it in [place]    Follow up in 6 weeks       All questions and concerns were answered and addressed.  The patient expressed understanding and agrees with the plan.     Feng Carter MD    Scribe Attestation  By signing my name below, I, Amanda Medina, Scribandrea   attest that this documentation has been prepared under the direction and in the presence of Feng Carter MD.

## 2025-04-01 NOTE — PROGRESS NOTES
HISTORY OF PRESENT ILLNESS:  Gladys Branch is a 69 y.o. female who presents today as a new patient. She denies any feelings of prolapse. She had chemo for lung cancer and is in remission. She has history of smoking. She has seen Ernestina Stanley. Her urinary symptoms worsened after chemo.  Reports a lot of incontinence mostly with urgency but has occasional stress incontinence.  She does not complain of a vaginal bulge.  She has had only vaginal births.  No prior history of sling or bladder suspension.  Does have all the treatments received she does have some cognitive impairment and short-term memory loss.         Past Medical History  She has a past medical history of Abdominal pain, Anemia, COPD (chronic obstructive pulmonary disease) (Multi), Disease due to severe acute respiratory syndrome coronavirus 2 (SARS-CoV-2) (02/07/2023), Diverticulosis, GERD (gastroesophageal reflux disease), Hyperlipidemia, Neuropathy, New onset of headache in cancer patient (05/17/2024), Other specified abnormal findings of blood chemistry (07/26/2022), Other specified abnormal findings of blood chemistry (07/26/2022), Other specified abnormal findings of blood chemistry (07/26/2022), Other specified abnormal findings of blood chemistry (07/26/2022), Other specified abnormal findings of blood chemistry (07/26/2022), Other specified abnormal findings of blood chemistry (07/26/2022), Other specified abnormal findings of blood chemistry (09/15/2020), Personal history of other diseases of the respiratory system, and Small cell lung cancer.    She has no past medical history of Personal history of irradiation.    Surgical History  She has a past surgical history that includes Other surgical history (03/09/2020); Other surgical history (03/16/2021); Other surgical history (03/16/2021); US guided biopsy lymph node superficial (01/17/2023); and Hysterectomy (1999).     Social History  She reports that she quit smoking about 26 years ago. Her  smoking use included cigarettes. She started smoking about 51 years ago. She has a 25 pack-year smoking history. She has been exposed to tobacco smoke. She has never used smokeless tobacco. She reports that she does not currently use alcohol after a past usage of about 1.0 - 2.0 standard drink of alcohol per week. She reports that she does not use drugs.    Family History  Family History   Problem Relation Name Age of Onset    Dementia Mother      Depression Mother      Other (varicose veins of lower extremity) Mother      Alcohol abuse Father      Breast cancer Father's Sister          Allergies  Contact metal agent, Cisplatin, Codeine, and Sulfa (sulfonamide antibiotics)      A comprehensive 10+ review of systems was negative except for: see hpi                          PHYSICAL EXAMINATION:  BP Readings from Last 3 Encounters:   03/31/25 108/75   03/21/25 102/60   03/12/25 102/66      Wt Readings from Last 3 Encounters:   03/31/25 52.2 kg (115 lb)   03/21/25 52.2 kg (115 lb)   03/07/25 51.3 kg (113 lb)      BMI: Estimated body mass index is 22.46 kg/m² as calculated from the following:    Height as of 3/21/25: 1.524 m (5').    Weight as of 3/31/25: 52.2 kg (115 lb).  BSA: Estimated body surface area is 1.49 meters squared as calculated from the following:    Height as of 3/21/25: 1.524 m (5').    Weight as of 3/31/25: 52.2 kg (115 lb).  HEENT: Normocephalic, atraumatic, PER EOMI, nonicteric, trachea normal, thyroid normal, oropharynx normal.  CARDIAC: regular rate & rhythm, S1 & S2 normal.  No heaves, thrills, gallops or murmurs.  LUNGS: Clear to auscultation, no spinal or CV tenderness.  EXTREMITIES: No evidence of cyanosis, clubbing or edema.        Pelvic:  Chaperone for pelvic exam:   Genitourinary:  normal external genitalia, Bartholin's glands, Lemay's glands negative,   Urethra normal meatus, non-tender, no periurethral mass  Vaginal mucosa  normal  Adnexae  negative nontender, no masses  Atrophy  positive    CST negative  Pelvic floor muscle contraction  4/5    POP-Q (in supine position):       Aa -3     Ba -3     C -8              gh 3     pb 3     tvl 8              Ap -3     Bp -3     D     Rectal: no hemorrhoids, fissures or masses.          Assessment:  69 y.o. female presents as a new patient with OAB     Overactive bladder:   -We discussed strategies to reduce symptoms. We discussed reducing caffeine and carbonated drinks could help with symptoms.     -We discussed managing fluids.         -We discussed third line therapy: Botox vs Interstim: both have similar efficacy 80% patients reports >50% improvement, Botox associated with 5% risk of incomplete emptying, increase in UTI and will require re-injection in 6-9 months; and as early as 3 months. Interstim is a staged procedure, 2 weeks apart, consisting first of lead implantation then internalization of IPG if there is improvement. Interstim is associated with lead migration, explanation, infection and bleeding, though risks are all <5%. If she decides to go forward with this we will order urodynamics.         we discussed botox vs sacral neuromodulation: both have similar efficacy 80% patients reports >50% improvement, botox associated with 5% risk of incomplete emptying, increase in UTI and will require re-injection in 6-9 months; and as early as 3 months. SNM is a staged procedure, 2 weeks apart, consisting first of lead implantation then internalization of IPG if there is improvement. Interstim is associated with lead migration, explantation, infection and bleeding, though risks are all <5%. We also discussed PTNS which is associated with success rates comparable to medical therapy but without side-effects without significant major morbidity.       Trial of Myrbetriq, would avoid anticholinergics in her    She may be a good candidate for Botox or neuromodulation, there are [place],  she needs UDS we will have her do it in [place]    Follow up in 6 weeks       All questions and concerns were answered and addressed.  The patient expressed understanding and agrees with the plan.     Feng Carter MD    Scribe Attestation  By signing my name below, I, Amanda Medina Scribandrea   attest that this documentation has been prepared under the direction and in the presence of Feng Carter MD.

## 2025-04-04 ENCOUNTER — APPOINTMENT (OUTPATIENT)
Dept: LAB | Facility: CLINIC | Age: 70
End: 2025-04-04
Payer: MEDICARE

## 2025-04-04 PROCEDURE — 82607 VITAMIN B-12: CPT | Performed by: STUDENT IN AN ORGANIZED HEALTH CARE EDUCATION/TRAINING PROGRAM

## 2025-04-04 PROCEDURE — 82746 ASSAY OF FOLIC ACID SERUM: CPT | Performed by: STUDENT IN AN ORGANIZED HEALTH CARE EDUCATION/TRAINING PROGRAM

## 2025-04-04 PROCEDURE — 84443 ASSAY THYROID STIM HORMONE: CPT | Performed by: STUDENT IN AN ORGANIZED HEALTH CARE EDUCATION/TRAINING PROGRAM

## 2025-04-04 PROCEDURE — 83921 ORGANIC ACID SINGLE QUANT: CPT | Performed by: STUDENT IN AN ORGANIZED HEALTH CARE EDUCATION/TRAINING PROGRAM

## 2025-04-04 PROCEDURE — 84439 ASSAY OF FREE THYROXINE: CPT | Performed by: STUDENT IN AN ORGANIZED HEALTH CARE EDUCATION/TRAINING PROGRAM

## 2025-04-05 LAB
FOLATE SERPL-MCNC: 6.7 NG/ML
T4 FREE SERPL-MCNC: 0.84 NG/DL (ref 0.78–1.48)
TSH SERPL-ACNC: 5.25 MIU/L (ref 0.44–3.98)
VIT B12 SERPL-MCNC: 446 PG/ML (ref 211–911)

## 2025-04-08 ENCOUNTER — APPOINTMENT (OUTPATIENT)
Dept: NEUROLOGY | Facility: CLINIC | Age: 70
End: 2025-04-08
Payer: MEDICARE

## 2025-04-08 LAB — METHYLMALONATE SERPL-SCNC: 0.27 UMOL/L (ref 0–0.4)

## 2025-04-10 ENCOUNTER — HOSPITAL ENCOUNTER (OUTPATIENT)
Dept: RADIOLOGY | Facility: HOSPITAL | Age: 70
Discharge: HOME | End: 2025-04-10
Payer: MEDICARE

## 2025-04-10 ENCOUNTER — TELEPHONE (OUTPATIENT)
Dept: NEUROLOGY | Facility: CLINIC | Age: 70
End: 2025-04-10
Payer: MEDICARE

## 2025-04-10 DIAGNOSIS — R92.8 ABNORMAL MAMMOGRAM OF RIGHT BREAST: ICD-10-CM

## 2025-04-10 PROCEDURE — 77065 DX MAMMO INCL CAD UNI: CPT | Mod: RT

## 2025-04-23 ENCOUNTER — TELEPHONE (OUTPATIENT)
Dept: HEMATOLOGY/ONCOLOGY | Facility: HOSPITAL | Age: 70
End: 2025-04-23
Payer: MEDICARE

## 2025-04-25 ENCOUNTER — TELEPHONE (OUTPATIENT)
Dept: NEUROLOGY | Facility: CLINIC | Age: 70
End: 2025-04-25
Payer: MEDICARE

## 2025-04-25 NOTE — TELEPHONE ENCOUNTER
Patient's spouse called. Gladys has had some more balance issues and he didn't know if there was something else she could be taking to help with this.     He did want to mention that someone did bring up a med to help with dementia, but didn't know what it was.

## 2025-04-25 NOTE — TELEPHONE ENCOUNTER
RN called and spoke to Bo in regards to his phone call. He is concerned about Gladys's thyroid level and would like Dr. Archer to look more into that. Gladys is not scheduled until June to see provider. RN scheduled an appointment for Gladys on Monday 4/28 to go over lab work and other concerns that they are having. No further needs at this time.

## 2025-04-28 ENCOUNTER — OFFICE VISIT (OUTPATIENT)
Dept: HEMATOLOGY/ONCOLOGY | Facility: CLINIC | Age: 70
End: 2025-04-28
Payer: MEDICARE

## 2025-04-28 VITALS
TEMPERATURE: 97.7 F | DIASTOLIC BLOOD PRESSURE: 82 MMHG | RESPIRATION RATE: 18 BRPM | SYSTOLIC BLOOD PRESSURE: 124 MMHG | WEIGHT: 116.9 LBS | HEART RATE: 93 BPM | BODY MASS INDEX: 22.83 KG/M2 | OXYGEN SATURATION: 96 %

## 2025-04-28 DIAGNOSIS — R53.83 OTHER FATIGUE: Primary | ICD-10-CM

## 2025-04-28 PROCEDURE — 99214 OFFICE O/P EST MOD 30 MIN: CPT | Performed by: STUDENT IN AN ORGANIZED HEALTH CARE EDUCATION/TRAINING PROGRAM

## 2025-04-28 PROCEDURE — 1126F AMNT PAIN NOTED NONE PRSNT: CPT | Performed by: STUDENT IN AN ORGANIZED HEALTH CARE EDUCATION/TRAINING PROGRAM

## 2025-04-28 PROCEDURE — G2211 COMPLEX E/M VISIT ADD ON: HCPCS | Performed by: STUDENT IN AN ORGANIZED HEALTH CARE EDUCATION/TRAINING PROGRAM

## 2025-04-28 PROCEDURE — 1159F MED LIST DOCD IN RCRD: CPT | Performed by: STUDENT IN AN ORGANIZED HEALTH CARE EDUCATION/TRAINING PROGRAM

## 2025-04-28 ASSESSMENT — PAIN SCALES - GENERAL: PAINLEVEL_OUTOF10: 0-NO PAIN

## 2025-04-28 NOTE — PROGRESS NOTES
Patient ID: Gladys Branch is a 69 y.o. female.    DIAGNOSIS    Small Cell Lung Cancer    By immunostaining, the tumor cells are positive for CAM 5.2, INSM1, and TTF-1, and negative for p40 and LCA. The proliferation index by Ki-67 immunostaining is approximately 90%.  Synaptophysin, Chromogranin and INSM-1 are positive.  AE1/AE3 is negative. NEOGENOMICS, RB protein expression is lost in the tumor cells    STAGING    [id number], limited stage  Recurrence    CURRENT SITES OF DISEASE    RLL, supraclavicular    MOLECULAR GENOMICS      PRIOR THERAPY    Concurrent chemoradiation with Cisplatin/Etoposide every 3 weeks; C1D1 2/15/22, reaction to cisplatin C2D1 and did not receive D2 or D3 etoposide  Carbo/etoposide 4/5/2022 1 cycle c/b rash  PCI 5/21-6/13/22  Phase 1 study [id number]  with study drug Taralatamab 1/24/23 - 8/27/24    CURRENT THERAPY        CURRENT ONCOLOGICAL PROBLEMS      HISTORY OF PRESENT ILLNESS    Mrs. Branch is a 65 yo with PMH GERD and COPD who originally was round to have a RLL nodule measuring 11 mm in 4/28/2021 found as part of CT calcium score scan. An ensuing CT chest w/o contrast was done on 5/19/21 which revealed subsolid pulmonary nodules measuring 14 mm in the RLL. She had CT chest w/contrast on 11/29/21 which confirmed stable 14 mm GGO of the RLL and decreased RLL subpleural nodule. She met thoracic surgeon Dr. Farfan, who ordered PET/CT scan done on 12/8/21 which did not reveal any FDG-avid nodules within the lung parenchyma but R hilar nodes with max SUV 8.0. He referred her for bronch, which was done on 1/21/22 by Dr. Mcdaniels. 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 Brain MRI was negative.    She started concurrent chemoradiation with BID radiation with cis/etop 2/15/22, and unfortunately had a reaction to cisplatin on C2D1 with chest pain and hypotension. She was hospitalized with sepsis felt to be due to pneumonia. She trialed carboplatin/etoposide on 4/5/22 with a  resulting rash and opted to forego further chemotherapy as she had completed radiation. Restaging scan 11/3/22 unfortunately with progression with new R hilar lymph node, paratracheal nodes, and increase in size of R supraclavicular mass. She enrolled in [id number] and started C1D1 1/24/23. C1 complicated by anorexia and dysgeusia, and delayed C2 by a week. She has further struggled with fatigue and confusion, which may be related to mirtazapine. Dose reduced for C2 and mirtazapine stopped with improvement in symptoms. 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    PAST MEDICAL HISTORY    GERD  COPD    SOCIAL HISTORY    Retired - lighting . Lives at home with  and a kitten.  Tobacco: quit smoking 1999. 1 ppd x 25 yrs.  EtOH: wine 1 glass/day  Illicits: none    CURRENT MEDS    Please see med list    ALLERGIES    Codeine, Sulfa drugs, Cisplatin    FAMILY HISTORY    Paternal aunt - breast cancer dx 80s    Subjective    She is here today with her . She saw Dr. Shahid this morning who told them that the hiccups are from the lung cancer. Her  has concerns about her cognitive function, and is wondering if there is anything to delay further decline.       Objective          2/26/2025    11:26 AM 3/3/2025    10:05 AM 3/7/2025     3:11 PM 3/12/2025    10:21 AM 3/21/2025     1:53 PM 3/31/2025    12:26 PM 4/28/2025     2:07 PM   Vitals   Systolic  111  102 102 108 124   Diastolic  82  66 60 75 82   BP Location  Left arm   Left arm Left arm Left arm   Heart Rate  91   88 100 93   Temp  36.5 °C (97.7 °F)    36.5 °C (97.7 °F) 36.5 °C (97.7 °F)   Resp  18    18 18   Height 1.524 m (5')  1.524 m (5') 1.524 m (5') 1.524 m (5')     Weight (lb) 110 113.8 113  115 115 116.9   BMI 21.48 kg/m2 22.23 kg/m2 22.07 kg/m2 22.07 kg/m2 22.46 kg/m2 22.46 kg/m2 22.83 kg/m2   BSA (m2) 1.45 m2 1.48 m2  1.47 m2 1.47 m2 1.49 m2 1.49 m2 1.5 m2   Visit Report  Report  Report Report Report Report       Daily Weight  04/28/25 : 53 kg (116 lb 14.4 oz)  03/31/25 : 52.2 kg (115 lb)  03/21/25 : 52.2 kg (115 lb)  03/07/25 : 51.3 kg (113 lb)  03/03/25 : 51.6 kg (113 lb 12.8 oz)         Physical Exam  Vitals reviewed.   Constitutional:       General: She is not in acute distress.  HENT:      Head: Normocephalic and atraumatic.      Mouth/Throat:      Mouth: Mucous membranes are moist.   Eyes:      Extraocular Movements: Extraocular movements intact.      Conjunctiva/sclera: Conjunctivae normal.      Pupils: Pupils are equal, round, and reactive to light.   Cardiovascular:      Rate and Rhythm: Normal rate and regular rhythm.      Heart sounds: Normal heart sounds. No murmur heard.  Pulmonary:      Effort: Pulmonary effort is normal. No respiratory distress.      Breath sounds: Normal breath sounds.   Abdominal:      General: Abdomen is flat. Bowel sounds are normal. There is no distension.      Palpations: Abdomen is soft.   Skin:     General: Skin is warm and dry.   Neurological:      Mental Status: She is alert and oriented to person, place, and time. Mental status is at baseline.   Psychiatric:         Mood and Affect: Mood normal.         Behavior: Behavior normal.         Thought Content: Thought content normal.     Labs  No visits with results within 1 Day(s) from this visit.   Latest known visit with results is:   Lab on 03/31/2025   Component Date Value Ref Range Status    Vitamin D, 25-Hydroxy, Total 03/31/2025 41  30 - 100 ng/mL Final               Performance Status:    ECOG 1: Restricted in physically strenuous activity but ambulatory and able to carry out work of a light or sedentary nature, e.g., light house work, office work.       Imaging:  I have personally reviewed the below imaging and concur with the reported findings unless otherwise stated:      Assessment/Plan      Mrs. Branch is a 67 yo with COPD and  GERD who presented with newly diagnosed SCLC completed BID radiation planned concurrently with cis/etoposide but had a reaction C2D1 to cisplatin. Completed 1 cycle carbo/etop D1 4/5/22 also complicated by facial flushing and erythematous rash and stopped further treatment. Now s/p PCI in 6/2022. Started clinical trial [id number] with tarlatamab 1/24/23. Treatment has been complicated by worsening short-term memory worse for the day or two after treatment, delayed C2 by 1 week and dose-reduced. Confusion has largely resolved during C3 after stopping mirtazapine but she and her  recognize transient worsening for the day or two after treatment. Stopped tarlatamab after developing paraneoplastic cerebellar ataxia.      #SCLC  - originally diagnosed with limited stage SCLC and started concurrent chemoradiation cis/etop 2/15/22, with reaction to cisplatin on C2D1  - trialed carboplatin/etoposide with rash and opted to forego further chemo as she had completed radiation  - progression 11/2022, and enrolled on [id number] (tarlatamab) started C1D1 1/24/23. 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 Currently off cytoxan while she continues recovery.  - referral to PT  - needs a transport chair as she is unable to use cane or walker due to significant functional  mobility deficits and inability to walk due to unsteady gait and poor balance. Transport chair will enable her to participate in ADL's and complete toileting, feeding, grooming, dressing, in the home. She is able to use the transport chair in her home.     #Double vision/confusion/gait instability  -5/20/24 MRI brain negative.   -Discussed in phase 1 meeting and seems consistent with WBR late effect.   -She was seen by her ophthalmologist and will continue to follow.  -saw neurology and undergoing work-up as well  - continues on dexamethasone, and note decreased cortisol and ACTH - will refer to endocrinology for consideration if this is clinically significant and impacting confusion/gait instability    Francy Archer MD

## 2025-05-06 ENCOUNTER — PATIENT OUTREACH (OUTPATIENT)
Dept: PRIMARY CARE | Facility: CLINIC | Age: 70
End: 2025-05-06
Payer: MEDICARE

## 2025-05-12 ENCOUNTER — TELEPHONE (OUTPATIENT)
Dept: HEMATOLOGY/ONCOLOGY | Facility: CLINIC | Age: 70
End: 2025-05-12
Payer: MEDICARE

## 2025-05-13 NOTE — TELEPHONE ENCOUNTER
Pt's  called back regarding this- did not see the VM. Message below relayed to pt's . No further needs.

## 2025-05-14 ENCOUNTER — OFFICE VISIT (OUTPATIENT)
Dept: HEMATOLOGY/ONCOLOGY | Facility: HOSPITAL | Age: 70
End: 2025-05-14
Payer: MEDICARE

## 2025-05-14 ENCOUNTER — TELEPHONE (OUTPATIENT)
Dept: HEMATOLOGY/ONCOLOGY | Facility: HOSPITAL | Age: 70
End: 2025-05-14

## 2025-05-14 ENCOUNTER — NUTRITION (OUTPATIENT)
Dept: HEMATOLOGY/ONCOLOGY | Facility: HOSPITAL | Age: 70
End: 2025-05-14

## 2025-05-14 ENCOUNTER — TELEPHONE (OUTPATIENT)
Dept: ADMISSION | Facility: HOSPITAL | Age: 70
End: 2025-05-14
Payer: MEDICARE

## 2025-05-14 VITALS
RESPIRATION RATE: 16 BRPM | SYSTOLIC BLOOD PRESSURE: 123 MMHG | OXYGEN SATURATION: 96 % | WEIGHT: 114.5 LBS | BODY MASS INDEX: 22.36 KG/M2 | TEMPERATURE: 99 F | DIASTOLIC BLOOD PRESSURE: 62 MMHG | HEART RATE: 99 BPM

## 2025-05-14 DIAGNOSIS — Z74.09 IMPAIRED FUNCTIONAL MOBILITY, BALANCE, GAIT, AND ENDURANCE: ICD-10-CM

## 2025-05-14 DIAGNOSIS — R63.4 ABNORMAL WEIGHT LOSS: ICD-10-CM

## 2025-05-14 DIAGNOSIS — R79.89 ELEVATED TSH: ICD-10-CM

## 2025-05-14 DIAGNOSIS — R53.1 WEAKNESS: ICD-10-CM

## 2025-05-14 DIAGNOSIS — R41.0 CONFUSION: ICD-10-CM

## 2025-05-14 DIAGNOSIS — G31.89: ICD-10-CM

## 2025-05-14 DIAGNOSIS — C34.90 SMALL CELL CARCINOMA OF LUNG, UNSPECIFIED LATERALITY, UNSPECIFIED PART OF LUNG: Primary | ICD-10-CM

## 2025-05-14 LAB
ALBUMIN SERPL BCP-MCNC: 3.3 G/DL (ref 3.4–5)
ALP SERPL-CCNC: 49 U/L (ref 33–136)
ALT SERPL W P-5'-P-CCNC: 8 U/L (ref 7–45)
ANION GAP SERPL CALC-SCNC: 10 MMOL/L (ref 10–20)
APPEARANCE UR: ABNORMAL
AST SERPL W P-5'-P-CCNC: 11 U/L (ref 9–39)
BASOPHILS # BLD AUTO: 0.02 X10*3/UL (ref 0–0.1)
BASOPHILS NFR BLD AUTO: 0.5 %
BILIRUB SERPL-MCNC: 0.4 MG/DL (ref 0–1.2)
BILIRUB UR STRIP.AUTO-MCNC: NEGATIVE MG/DL
BUN SERPL-MCNC: 17 MG/DL (ref 6–23)
CALCIUM SERPL-MCNC: 8.2 MG/DL (ref 8.6–10.3)
CHLORIDE SERPL-SCNC: 106 MMOL/L (ref 98–107)
CO2 SERPL-SCNC: 26 MMOL/L (ref 21–32)
COLOR UR: YELLOW
CREAT SERPL-MCNC: 0.71 MG/DL (ref 0.5–1.05)
EGFRCR SERPLBLD CKD-EPI 2021: >90 ML/MIN/1.73M*2
EOSINOPHIL # BLD AUTO: 0.02 X10*3/UL (ref 0–0.7)
EOSINOPHIL NFR BLD AUTO: 0.5 %
ERYTHROCYTE [DISTWIDTH] IN BLOOD BY AUTOMATED COUNT: 13.1 % (ref 11.5–14.5)
GLUCOSE SERPL-MCNC: 99 MG/DL (ref 74–99)
GLUCOSE UR STRIP.AUTO-MCNC: NORMAL MG/DL
HCT VFR BLD AUTO: 36.1 % (ref 36–46)
HGB BLD-MCNC: 12.3 G/DL (ref 12–16)
IMM GRANULOCYTES # BLD AUTO: 0.01 X10*3/UL (ref 0–0.7)
IMM GRANULOCYTES NFR BLD AUTO: 0.2 % (ref 0–0.9)
KETONES UR STRIP.AUTO-MCNC: NEGATIVE MG/DL
LEUKOCYTE ESTERASE UR QL STRIP.AUTO: NEGATIVE
LYMPHOCYTES # BLD AUTO: 0.58 X10*3/UL (ref 1.2–4.8)
LYMPHOCYTES NFR BLD AUTO: 13.4 %
MAGNESIUM SERPL-MCNC: 1.93 MG/DL (ref 1.6–2.4)
MCH RBC QN AUTO: 30.7 PG (ref 26–34)
MCHC RBC AUTO-ENTMCNC: 34.1 G/DL (ref 32–36)
MCV RBC AUTO: 90 FL (ref 80–100)
MONOCYTES # BLD AUTO: 0.53 X10*3/UL (ref 0.1–1)
MONOCYTES NFR BLD AUTO: 12.2 %
MUCOUS THREADS #/AREA URNS AUTO: NORMAL /LPF
NEUTROPHILS # BLD AUTO: 3.18 X10*3/UL (ref 1.2–7.7)
NEUTROPHILS NFR BLD AUTO: 73.2 %
NITRITE UR QL STRIP.AUTO: NEGATIVE
NRBC BLD-RTO: 0 /100 WBCS (ref 0–0)
PH UR STRIP.AUTO: 7 [PH]
PLATELET # BLD AUTO: 180 X10*3/UL (ref 150–450)
POTASSIUM SERPL-SCNC: 3.4 MMOL/L (ref 3.5–5.3)
PROT SERPL-MCNC: 5.5 G/DL (ref 6.4–8.2)
PROT UR STRIP.AUTO-MCNC: ABNORMAL MG/DL
RBC # BLD AUTO: 4.01 X10*6/UL (ref 4–5.2)
RBC # UR STRIP.AUTO: NEGATIVE MG/DL
RBC #/AREA URNS AUTO: NORMAL /HPF
SODIUM SERPL-SCNC: 139 MMOL/L (ref 136–145)
SP GR UR STRIP.AUTO: 1.03
SQUAMOUS #/AREA URNS AUTO: NORMAL /HPF
UROBILINOGEN UR STRIP.AUTO-MCNC: NORMAL MG/DL
WBC # BLD AUTO: 4.3 X10*3/UL (ref 4.4–11.3)
WBC #/AREA URNS AUTO: NORMAL /HPF

## 2025-05-14 PROCEDURE — G2212 PROLONG OUTPT/OFFICE VIS: HCPCS | Performed by: STUDENT IN AN ORGANIZED HEALTH CARE EDUCATION/TRAINING PROGRAM

## 2025-05-14 PROCEDURE — 1036F TOBACCO NON-USER: CPT | Performed by: STUDENT IN AN ORGANIZED HEALTH CARE EDUCATION/TRAINING PROGRAM

## 2025-05-14 PROCEDURE — 1160F RVW MEDS BY RX/DR IN RCRD: CPT | Performed by: STUDENT IN AN ORGANIZED HEALTH CARE EDUCATION/TRAINING PROGRAM

## 2025-05-14 PROCEDURE — 1126F AMNT PAIN NOTED NONE PRSNT: CPT | Performed by: STUDENT IN AN ORGANIZED HEALTH CARE EDUCATION/TRAINING PROGRAM

## 2025-05-14 PROCEDURE — 2500000004 HC RX 250 GENERAL PHARMACY W/ HCPCS (ALT 636 FOR OP/ED): Mod: JZ | Performed by: STUDENT IN AN ORGANIZED HEALTH CARE EDUCATION/TRAINING PROGRAM

## 2025-05-14 PROCEDURE — 80053 COMPREHEN METABOLIC PANEL: CPT

## 2025-05-14 PROCEDURE — 99215 OFFICE O/P EST HI 40 MIN: CPT | Performed by: STUDENT IN AN ORGANIZED HEALTH CARE EDUCATION/TRAINING PROGRAM

## 2025-05-14 PROCEDURE — 85025 COMPLETE CBC W/AUTO DIFF WBC: CPT

## 2025-05-14 PROCEDURE — 83735 ASSAY OF MAGNESIUM: CPT

## 2025-05-14 PROCEDURE — 87086 URINE CULTURE/COLONY COUNT: CPT

## 2025-05-14 PROCEDURE — 87077 CULTURE AEROBIC IDENTIFY: CPT | Performed by: STUDENT IN AN ORGANIZED HEALTH CARE EDUCATION/TRAINING PROGRAM

## 2025-05-14 PROCEDURE — 1159F MED LIST DOCD IN RCRD: CPT | Performed by: STUDENT IN AN ORGANIZED HEALTH CARE EDUCATION/TRAINING PROGRAM

## 2025-05-14 PROCEDURE — 2500000002 HC RX 250 W HCPCS SELF ADMINISTERED DRUGS (ALT 637 FOR MEDICARE OP, ALT 636 FOR OP/ED): Performed by: STUDENT IN AN ORGANIZED HEALTH CARE EDUCATION/TRAINING PROGRAM

## 2025-05-14 PROCEDURE — 81001 URINALYSIS AUTO W/SCOPE: CPT

## 2025-05-14 RX ORDER — POTASSIUM CHLORIDE 750 MG/1
20 TABLET, FILM COATED, EXTENDED RELEASE ORAL ONCE
Status: COMPLETED | OUTPATIENT
Start: 2025-05-14 | End: 2025-05-14

## 2025-05-14 RX ADMIN — POTASSIUM CHLORIDE 20 MEQ: 750 TABLET, FILM COATED, EXTENDED RELEASE ORAL at 16:45

## 2025-05-14 RX ADMIN — SODIUM CHLORIDE 1000 ML: 0.9 INJECTION, SOLUTION INTRAVENOUS at 15:26

## 2025-05-14 ASSESSMENT — ENCOUNTER SYMPTOMS
NAUSEA: 0
ABDOMINAL PAIN: 0
HEADACHES: 0
FEVER: 0
EYES NEGATIVE: 1
MYALGIAS: 0
LEG SWELLING: 0
VOMITING: 0
SHORTNESS OF BREATH: 0
CONSTIPATION: 0
DIZZINESS: 0
COUGH: 0
DIARRHEA: 0
CHILLS: 0
PSYCHIATRIC NEGATIVE: 1
ARTHRALGIAS: 0
LIGHT-HEADEDNESS: 0
EXTREMITY WEAKNESS: 1

## 2025-05-14 ASSESSMENT — PAIN SCALES - GENERAL: PAINLEVEL_OUTOF10: 0-NO PAIN

## 2025-05-14 NOTE — TELEPHONE ENCOUNTER
Patients spouse - Emre called and states Gladys had COVID vaccine yesterday then had PT yesterday at [facility], did well at PT but, this morning, Gladys is complaining of weakness, unable to get out of bed - Emre had to assist her.  She is coughing up mucous this AM.  Emre does not feel like something is right with her.  He is requesting to bring her to [facility] today.  No fever. No difficulty breathing, no SOB.     Emre thinks the COVID vaccine has causes severe weakness, and requesting to have her be seen today.     Next FUV 6/10/25    Secure chat sent to team and [facility].     Per Dr. Suzi arellano for [facility] visit,  does not want to go to local ED. [facility] will take patient at 1:30pm.  Emre and Gladys agreeable.

## 2025-05-14 NOTE — PROGRESS NOTES
"NUTRITION Assessment NOTE    Nutrition Assessment     Reason for Visit:  Gladys Branch is a 69 y.o. female who presents for nutrition  assessment and education   Pt is in [facility] with her   I was asked to see her by the NP due to her decreased po intake     Pt did not speak much- pt with some memory issues and transient confusion    was main source of information  Pt's major issue with po intake appears to be Gastroparesis and with that \"spitting up: foods     4-- saw GI doc at [facility] for gastroparesis was looking at IVIG    Pt with small cell lung cancer  PRIOR THERAPY  Concurrent chemoradiation with Cisplatin/Etoposide every 3 weeks; C1D1 2/15/22, reaction to cisplatin C2D1 and did not receive D2 or D3 etoposide  Carbo/etoposide 4/5/2022 1 cycle c/b rash  PCI 5/21-6/13/22  Phase 1 study [id number]  with study drug Taralatamab 1/24/23 - 8/27/24    Problem List[1]    Nutrition Significant labs:  Lab Results   Component Value Date/Time    GLUCOSE 91 05/15/2025 1045     05/15/2025 1045    K 4.4 05/15/2025 1045     05/15/2025 1045    CO2 25 05/15/2025 1045    ANIONGAP 11 05/15/2025 1045    BUN 15 05/15/2025 1045    CREATININE 0.70 05/15/2025 1045    EGFR >90 05/15/2025 1045    CALCIUM 8.7 05/15/2025 1045    ALBUMIN 3.6 05/15/2025 1045    ALKPHOS 54 05/15/2025 1045    PROT 6.3 (L) 05/15/2025 1045    AST 15 05/15/2025 1045    BILITOT 0.4 05/15/2025 1045    ALT 8 05/15/2025 1045    MG 2.01 05/15/2025 1045    PHOS 4.6 09/10/2024 1024     Lab Results   Component Value Date/Time    VITD25 41 03/31/2025 1312         Anthropometrics:        .4 cm  IBW: 45.5  114% IBW  BMI: 22.89                    Weight History:   Daily Weight  05/15/25 : 53.2 kg (117 lb 3.2 oz)  05/14/25 : 51.9 kg (114 lb 8 oz)  04/28/25 : 53 kg (116 lb 14.4 oz)  03/31/25 : 52.2 kg (115 lb)  03/21/25 : 52.2 kg (115 lb)  03/07/25 : 51.3 kg (113 lb)  03/03/25 : 51.6 kg (113 lb 12.8 oz)  02/07/25 : 51.7 kg (113 lb 15.7 " oz)  02/03/25 : 49.7 kg (109 lb 9.6 oz)  01/28/25 : 49.9 kg (110 lb)    Weight Change %:       Nutrition History:  Issues with po intake:   Has lots of hiccups  Never hungry- no deisre to eat  Food not digesting all the way  Diarrhea at time- wearing depends - unsure if food choices are impacting this- in past had issues with dairy     Has a long list of foods she should not eat from GI office to help with gastroparesis  I was told they were treated with a spray form of reglan but this made her dizzy so she did not continue    She is having some food aversions  Fruit  Mashed potatoes     Drinks coffee in am    Had covid shot yesterday  PT yesterday  Feels week     Intake   Thomas egg cheese bicuit can't eat all- has 2/3 of it  Dinner  Spits some out- hard to chew some foods  Steak noodles and cabbage    False teeth on the bottom       Pt with multiple reasons for decreased po intake  Gastroparesis (GI issues) seems to be a big one.  Discussed a j-tube-  reports a feeding tube had been discussed before- I am not sure pt is there yet but they are considering options to oral intake- as it is getting herder for her to eat and be comfortable with eating.    For now pt and spouse were willing to add supplements back to diet plan- she had taken them in the past and is familiar with them   Goal would be 2-3 per day of a plus product                      Current Medications[2]     Nutrition Focused Physical Exam Findings:  DEFERRED            Physical Findings  Digestive System Findings: Anorexia, Diarrhea, Early satiety (hiccups, spitting)  Mouth Findings: Chewing difficulty  Teeth Findings: Impaired dentition    Edema  Edema: none    Estimated Needs:     Dosing weight: 53.2 kg  Calories per day: 1330 to 1600  determined by 25-30 kcal/kg  Protein (g) per day: 53 to 65 determined by 1-1.2 g/kg  Estimated fluid needs: 1330 +  determined by 1 kcal/mL                       Nutrition Diagnosis        Nutrition  Diagnosis  Patient has Nutrition Diagnosis: Yes  Diagnosis Status (1): New  Nutrition Diagnosis 1: Altered GI function  Related to (1): gatroparesis and subsequent side effects  As Evidenced by (1): weight loss and poor po intake       Nutrition Interventions/Recommendations   Nutrition Prescription: Individualized Nutrition Prescription Provided for : Oral nutrition     Recommendations: Individualized Nutrition Prescription Provided for : altered diet- small frquent meals and use of ONS    Nutrition Interventions:   Food and Nutrient Delivery: Food and Nutrition Delivery  Goals: 5-6 small frequent meals of soft moist foods with incorpoartaion of 2 ONS per day  Medical Food Supplement: Commercial beverage medical food supplement therapy  Goals: 2x/day- plus product     Coordination of Care:       Nutrition Education:   Nutrition Education Content:        Handouts provided/ reviewed:                  Nutrition Monitoring and Evaluation   Food and Nutrient Intake  Monitoring and Evaluation Plan: Energy intake, Fluid intake, Protein intake  Energy Intake: Estimated energy intake  Criteria: 75% of needs  Fluid Intake: Estimated fluid intake  Criteria: 75 % of needs  Estimated protein intake: Estimated protein intake  Criteria: 75% of needs    Anthropometric measurements  Monitoring and Evaluation Plan: Weight  Body Weight: Body weight  Criteria: maintain    Biochemical Data, Medical Tests and Procedures  Monitoring and Evaluation Plan: Electrolyte/renal panel, Glucose/endocrine profile  Electrolyte and Renal Panel: BUN, Chloride, Creatinine, Magnesium, Phosphorus, Potassium, Sodium  Criteria: WNL  Glucose/Endocrine Profile: Glucose, casual  Criteria: WNL              Follow Up: not planned at this time               [1]   Patient Active Problem List  Diagnosis    Chronic GERD    COPD (chronic obstructive pulmonary disease) (Multi)    Hyperlipidemia    Impaired fasting blood sugar    Problem drinking    Seasonal allergic  rhinitis    Small cell lung cancer    Vitamin D deficiency    Abnormal EKG    Agatston CAC score, <100    Frequent unifocal PVCs    Medicare annual wellness visit, subsequent    Peripheral neuropathy due to and not concurrent with chemotherapy (Multi)    [place] cardiac risk <10% in next 10 years    Anemia, chronic disease    History of diverticulitis    Chronic constipation    Double vision    Cerebellar ataxia (Multi)    Paraneoplastic cerebellar degeneration (CMS-HCC)    Impaired functional mobility, balance, gait, and endurance   [2]   Current Outpatient Medications:     aprepitant (Emend) 80 mg capsule, Take 1 capsule (80 mg) by mouth once daily., Disp: , Rfl:     cetirizine-pseudoephedrine (ZyrTEC-D) 5-120 mg 12 hr tablet, Take 1 tablet by mouth once daily., Disp: 30 tablet, Rfl: 11    cholecalciferol (Vitamin D-3) 25 MCG (1000 UT) tablet, Take by mouth., Disp: , Rfl:     mirabegron (Myrbetriq) 50 mg tablet extended release 24 hr 24 hr tablet, Take 1 tablet (50 mg) by mouth once daily., Disp: 90 tablet, Rfl: 0    omeprazole (PriLOSEC) 40 mg DR capsule, Take 1 capsule (40 mg) by mouth early in the morning.., Disp: , Rfl:

## 2025-05-14 NOTE — PROGRESS NOTES
[facility]  Acute Care Clinic    Patient ID: Gladys Branch is a 69 y.o. female  Diagnosis:  Small Cell Lung Cancer     By immunostaining, the tumor cells are positive for CAM 5.2, INSM1, and TTF-1, and negative for p40 and LCA. The proliferation index by Ki-67 immunostaining is approximately 90%.  Synaptophysin, Chromogranin and INSM-1 are positive.  AE1/AE3 is negative. NEOGENOMICS, RB protein expression is lost in the tumor cells     STAGING  [id number], limited stage  Recurrence     CURRENT SITES OF DISEASE  RLL, supraclavicular     MOLECULAR GENOMICS     PRIOR THERAPY  Concurrent chemoradiation with Cisplatin/Etoposide every 3 weeks; C1D1 2/15/22, reaction to cisplatin C2D1 and did not receive D2 or D3 etoposide  Carbo/etoposide 4/5/2022 1 cycle c/b rash  PCI 5/21-6/13/22  Phase 1 study [id number]  with study drug Taralatamab 1/24/23 - 8/27/24     CURRENT THERAPY      CURRENT ONCOLOGICAL PROBLEMS      HISTORY OF PRESENT ILLNESS     Mrs. Branch is a 65 yo with PMH GERD and COPD who originally was round to have a RLL nodule measuring 11 mm in 4/28/2021 found as part of CT calcium score scan. An ensuing CT chest w/o contrast was done on 5/19/21 which revealed subsolid pulmonary nodules measuring 14 mm in the RLL. She had CT chest w/contrast on 11/29/21 which confirmed stable 14 mm GGO of the RLL and decreased RLL subpleural nodule. She met thoracic surgeon Dr. Farfan, who ordered PET/CT scan done on 12/8/21 which did not reveal any FDG-avid nodules within the lung parenchyma but R hilar nodes with max SUV 8.0. He referred her for bronch, which was done on 1/21/22 by Dr. Mcdaniels. 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 Brain MRI was negative.     She started concurrent chemoradiation with BID radiation with cis/etop 2/15/22, and unfortunately had a reaction to cisplatin on C2D1 with chest pain and hypotension. She was hospitalized with sepsis felt to be due to  pneumonia. She trialed carboplatin/etoposide on 4/5/22 with a resulting rash and opted to forego further chemotherapy as she had completed radiation. Restaging scan 11/3/22 unfortunately with progression with new R hilar lymph node, paratracheal nodes, and increase in size of R supraclavicular mass. She enrolled in [id number] and started C1D1 1/24/23. C1 complicated by anorexia and dysgeusia, and delayed C2 by a week. She has further struggled with fatigue and confusion, which may be related to mirtazapine. Dose reduced for C2 and mirtazapine stopped with improvement in symptoms. 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     PAST MEDICAL HISTORY     GERD  COPD    Past Medical History:   Past Medical History:  No date: Abdominal pain  No date: Anemia  No date: COPD (chronic obstructive pulmonary disease) (Multi)  02/07/2023: Disease due to severe acute respiratory syndrome   coronavirus 2 (SARS-CoV-2)  No date: Diverticulosis  No date: GERD (gastroesophageal reflux disease)  No date: Hyperlipidemia  No date: Neuropathy  05/17/2024: New onset of headache in cancer patient  07/26/2022: Other specified abnormal findings of blood chemistry      Comment:  Elevated serum creatinine  07/26/2022: Other specified abnormal findings of blood chemistry      Comment:  Elevated serum creatinine  07/26/2022: Other specified abnormal findings of blood chemistry      Comment:  Elevated TSH  07/26/2022: Other specified abnormal findings of blood chemistry      Comment:  Elevated TSH  07/26/2022: Other specified abnormal findings of blood chemistry      Comment:  Elevated TSH  07/26/2022: Other specified abnormal findings of blood chemistry      Comment:  Elevated TSH  09/15/2020: Other specified abnormal findings of blood chemistry      Comment:  Elevated TSH  No date: Personal history of other diseases of the respiratory  system      Comment:  History of chronic obstructive lung disease  No date: Small cell lung cancer   Surgical History:    Surgical History[1]   Family History:    Family History[2]  Family Oncology History:    Cancer-related family history includes Breast cancer in her father's sister.  Social History:    Social History[3]       Subjective   Chief Complaint: Weakness    Patient presents to ACC today for evaluation of weakness and some mental status change. Patient's  Emre reports she had the COVID vaccine in the right deltoid yesterday, then had PT at [facility]. She did well with PT and was moving around the house slowly with a walker last night. However this morning she c/o weakness and unable to get out of bed without assistance. She was incontinent to stool this morning and seem to be more confused from her baseline. Emre states patient has a h/o gastroparesis, patient has decreased PO intake for the last several months with low appetite, and inability to tolerate some foods. Previously drinking some Ensure to supplement oral intake but has stopped this.        ROS  Review of Systems   Constitutional:  Negative for chills and fever.   HENT:  Negative.     Eyes: Negative.    Respiratory:  Negative for cough and shortness of breath.    Cardiovascular:  Negative for chest pain and leg swelling.   Gastrointestinal:  Negative for abdominal pain, constipation, diarrhea, nausea and vomiting.   Genitourinary: Negative.     Musculoskeletal:  Positive for gait problem. Negative for arthralgias and myalgias.   Skin:  Negative for rash.   Neurological:  Positive for extremity weakness and gait problem. Negative for dizziness, headaches and light-headedness.   Psychiatric/Behavioral: Negative.       Allergies  RX Allergies[4]     Medications  Current Outpatient Medications   Medication Instructions    aprepitant (EMEND) 80 mg, Daily    cetirizine-pseudoephedrine (ZyrTEC-D) 5-120 mg 12 hr tablet 1 tablet, oral,  Daily    cholecalciferol (Vitamin D-3) 25 MCG (1000 UT) tablet Take by mouth.    mirabegron (MYRBETRIQ) 50 mg, oral, Daily    omeprazole (PriLOSEC) 40 mg DR capsule 1 capsule, Daily (0630)      Objective   Vitals: /62 (BP Location: Left arm, Patient Position: Sitting, BP Cuff Size: Adult)   Pulse 99   Temp 37.2 °C (99 °F) (Temporal)   Resp 16   Wt 51.9 kg (114 lb 8 oz)   LMP  (LMP Unknown)   SpO2 96%   BMI 22.36 kg/m²   Weight:   Daily Weight  05/14/25 : 51.9 kg (114 lb 8 oz)  04/28/25 : 53 kg (116 lb 14.4 oz)  03/31/25 : 52.2 kg (115 lb)  03/21/25 : 52.2 kg (115 lb)  03/07/25 : 51.3 kg (113 lb)  03/03/25 : 51.6 kg (113 lb 12.8 oz)  02/07/25 : 51.7 kg (113 lb 15.7 oz)    Physical Exam  Vitals reviewed.   Constitutional:       Appearance: Normal appearance.   HENT:      Head: Normocephalic and atraumatic.      Nose: Nose normal.      Mouth/Throat:      Mouth: Mucous membranes are moist.   Eyes:      Extraocular Movements: Extraocular movements intact.      Conjunctiva/sclera: Conjunctivae normal.      Pupils: Pupils are equal, round, and reactive to light.   Cardiovascular:      Rate and Rhythm: Normal rate and regular rhythm.      Pulses: Normal pulses.      Heart sounds: Normal heart sounds.   Pulmonary:      Effort: Pulmonary effort is normal.      Breath sounds: Normal breath sounds.   Abdominal:      General: Abdomen is flat.      Palpations: Abdomen is soft.   Musculoskeletal:         General: Normal range of motion.      Right shoulder: Tenderness present.      Cervical back: Normal range of motion and neck supple.      Comments: Strength equal bilaterally in LE and UE   Skin:     General: Skin is warm.   Neurological:      General: No focal deficit present.      Mental Status: She is alert and oriented to person, place, and time.   Psychiatric:         Mood and Affect: Mood normal.         Behavior: Behavior normal.       Diagnostic Results     Labs  Lab Results   Component Value Date    WBC 4.5  "03/24/2025    HGB 12.5 03/24/2025    HCT 37.9 03/24/2025    MCV 93 03/24/2025     03/24/2025      Lab Results   Component Value Date    NEUTROABS 2.59 03/24/2025    BANDSABS 2.88 (H) 03/09/2022    LYMPHOABS 0.00 (L) 03/09/2022    MONOABS 0.25 03/09/2022    EOSABS 0.26 03/24/2025      Lab Results   Component Value Date    GLUCOSE 98 03/24/2025    CALCIUM 9.1 03/24/2025     03/24/2025    K 3.8 03/24/2025    CO2 28 03/24/2025     03/24/2025    BUN 25 (H) 03/24/2025    CREATININE 0.70 03/24/2025    MG 2.05 02/07/2025    PHOS 4.6 09/10/2024    ALBUMIN 3.8 03/24/2025    PROT 6.5 03/24/2025     Lab Results   Component Value Date    ALT 9 03/24/2025    AST 15 03/24/2025    ALKPHOS 53 03/24/2025    BILITOT 0.3 03/24/2025    BILIDIR 0.1 09/10/2024      Lab Results   Component Value Date    CORTISOL 8.4 10/10/2024    ACTH 7.9 10/10/2024    TSH 5.25 (H) 04/04/2025    T3FREE 2.7 02/14/2023    FREET4 0.84 04/04/2025    FSH 39.0 09/10/2024    LH 7.7 09/10/2024     No results found for: \"RETICCTPCT\", \"RETIC\", \"IMMRETICFR\", \"RETICHGB\"  Lab Results   Component Value Date    CRP 1.17 (H) 09/10/2024     03/24/2025    LACTATE 1.4 08/07/2023    PROCAL 1.85 (A) 03/08/2022    FERRITIN 62 09/10/2024    IRON 79 03/24/2025    UIBC 196 03/24/2025    TIBC 275 03/24/2025    IRONSAT 29 03/24/2025     Images       Assessment/Plan   ASSESSMENT  Gladys Branch is a 69 y.o. 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     Patient presents today with worsened confusion and weakness from baseline. Likely due to a combination of SE from recent Covid vaccine, low PO intake of " nutrition/fluids.    ACC Course  - BP at baseline, orthos not done due to weakness  - Labs s/f mild hypokalemia, hypocalcemia, and hypoalbuminemia. UA unremarkable  - 1L NS for dehydration, Kcl 20mEq PO  - Dietician consulted today. Patient to restart supplementing with Ensures  After IV fluids patient's alertness improved, was able to walk with walker to bathroom. Following conversations and commands, AOx 2-3, back to her baseline (per ).     Dispo:  - Discharged home with pre-scheduled ACC appointment tomorrow  - Return to clinic/ED instructions given to patient  - Follow up w/ Oncology as scheduled    Pino Herring PA-C          [1]   Past Surgical History:  Procedure Laterality Date    HYSTERECTOMY      OTHER SURGICAL HISTORY  2020    Hysterectomy    OTHER SURGICAL HISTORY  2021    Tubal ligation    OTHER SURGICAL HISTORY  2021    Partial atrial septal defect repair    US GUIDED BIOPSY LYMPH NODE SUPERFICIAL  2023    US GUIDED BIOPSY LYMPH NODE SUPERFICIAL 2023 DOCTOR OFFICE LEGACY   [2]   Family History  Problem Relation Name Age of Onset    Dementia Mother      Depression Mother      Other (varicose veins of lower extremity) Mother      Alcohol abuse Father      Breast cancer Father's Sister     [3]   Social History  Tobacco Use    Smoking status: Former     Current packs/day: 0.00     Average packs/day: 1 pack/day for 25.0 years (25.0 ttl pk-yrs)     Types: Cigarettes     Start date:      Quit date:      Years since quittin.3     Passive exposure: Past    Smokeless tobacco: Never   Vaping Use    Vaping status: Never Used   Substance Use Topics    Alcohol use: Not Currently     Alcohol/week: 1.0 - 2.0 standard drink of alcohol     Types: 1 - 2 Glasses of wine per week     Comment: once  in a while    Drug use: Never   [4]   Allergies  Allergen Reactions    Contact Metal Agent Unknown     Sore in mouth  Patient not able to explain what kind of metal     Cisplatin Other     CHEMO INDUCED (Moderate); Dyspepsia (Moderate); Headaches (Moderate); Hypotension (Moderate); Numbness (Moderate); Resp Distress (Moderate)    Codeine Nausea Only and Other     nausea    Sulfa (Sulfonamide Antibiotics) Rash

## 2025-05-15 ENCOUNTER — OFFICE VISIT (OUTPATIENT)
Dept: HEMATOLOGY/ONCOLOGY | Facility: HOSPITAL | Age: 70
End: 2025-05-15
Payer: MEDICARE

## 2025-05-15 VITALS
SYSTOLIC BLOOD PRESSURE: 96 MMHG | OXYGEN SATURATION: 99 % | RESPIRATION RATE: 16 BRPM | HEART RATE: 90 BPM | BODY MASS INDEX: 22.89 KG/M2 | TEMPERATURE: 97.5 F | WEIGHT: 117.2 LBS | DIASTOLIC BLOOD PRESSURE: 69 MMHG

## 2025-05-15 DIAGNOSIS — E86.0 DEHYDRATION: Primary | ICD-10-CM

## 2025-05-15 LAB
ALBUMIN SERPL BCP-MCNC: 3.6 G/DL (ref 3.4–5)
ALP SERPL-CCNC: 54 U/L (ref 33–136)
ALT SERPL W P-5'-P-CCNC: 8 U/L (ref 7–45)
ANION GAP SERPL CALC-SCNC: 11 MMOL/L (ref 10–20)
AST SERPL W P-5'-P-CCNC: 15 U/L (ref 9–39)
BASOPHILS # BLD AUTO: 0.02 X10*3/UL (ref 0–0.1)
BASOPHILS NFR BLD AUTO: 0.5 %
BILIRUB SERPL-MCNC: 0.4 MG/DL (ref 0–1.2)
BUN SERPL-MCNC: 15 MG/DL (ref 6–23)
CALCIUM SERPL-MCNC: 8.7 MG/DL (ref 8.6–10.3)
CHLORIDE SERPL-SCNC: 105 MMOL/L (ref 98–107)
CO2 SERPL-SCNC: 25 MMOL/L (ref 21–32)
CREAT SERPL-MCNC: 0.7 MG/DL (ref 0.5–1.05)
EGFRCR SERPLBLD CKD-EPI 2021: >90 ML/MIN/1.73M*2
EOSINOPHIL # BLD AUTO: 0.07 X10*3/UL (ref 0–0.7)
EOSINOPHIL NFR BLD AUTO: 1.9 %
ERYTHROCYTE [DISTWIDTH] IN BLOOD BY AUTOMATED COUNT: 13.2 % (ref 11.5–14.5)
GLUCOSE SERPL-MCNC: 91 MG/DL (ref 74–99)
HCT VFR BLD AUTO: 35.9 % (ref 36–46)
HGB BLD-MCNC: 12 G/DL (ref 12–16)
HOLD SPECIMEN: NORMAL
IMM GRANULOCYTES # BLD AUTO: 0.01 X10*3/UL (ref 0–0.7)
IMM GRANULOCYTES NFR BLD AUTO: 0.3 % (ref 0–0.9)
LYMPHOCYTES # BLD AUTO: 1.06 X10*3/UL (ref 1.2–4.8)
LYMPHOCYTES NFR BLD AUTO: 29 %
MAGNESIUM SERPL-MCNC: 2.01 MG/DL (ref 1.6–2.4)
MCH RBC QN AUTO: 30.5 PG (ref 26–34)
MCHC RBC AUTO-ENTMCNC: 33.4 G/DL (ref 32–36)
MCV RBC AUTO: 91 FL (ref 80–100)
MONOCYTES # BLD AUTO: 0.72 X10*3/UL (ref 0.1–1)
MONOCYTES NFR BLD AUTO: 19.7 %
NEUTROPHILS # BLD AUTO: 1.78 X10*3/UL (ref 1.2–7.7)
NEUTROPHILS NFR BLD AUTO: 48.6 %
NRBC BLD-RTO: 0 /100 WBCS (ref 0–0)
PLATELET # BLD AUTO: 170 X10*3/UL (ref 150–450)
POTASSIUM SERPL-SCNC: 4.4 MMOL/L (ref 3.5–5.3)
PROT SERPL-MCNC: 6.3 G/DL (ref 6.4–8.2)
RBC # BLD AUTO: 3.93 X10*6/UL (ref 4–5.2)
SODIUM SERPL-SCNC: 137 MMOL/L (ref 136–145)
WBC # BLD AUTO: 3.7 X10*3/UL (ref 4.4–11.3)

## 2025-05-15 PROCEDURE — 96360 HYDRATION IV INFUSION INIT: CPT | Mod: INF

## 2025-05-15 PROCEDURE — 99215 OFFICE O/P EST HI 40 MIN: CPT | Performed by: NURSE PRACTITIONER

## 2025-05-15 PROCEDURE — 85025 COMPLETE CBC W/AUTO DIFF WBC: CPT

## 2025-05-15 PROCEDURE — 80053 COMPREHEN METABOLIC PANEL: CPT

## 2025-05-15 PROCEDURE — 2500000004 HC RX 250 GENERAL PHARMACY W/ HCPCS (ALT 636 FOR OP/ED): Mod: JZ | Performed by: NURSE PRACTITIONER

## 2025-05-15 PROCEDURE — 83735 ASSAY OF MAGNESIUM: CPT

## 2025-05-15 PROCEDURE — 1159F MED LIST DOCD IN RCRD: CPT | Performed by: NURSE PRACTITIONER

## 2025-05-15 RX ORDER — HEPARIN 100 UNIT/ML
500 SYRINGE INTRAVENOUS AS NEEDED
OUTPATIENT
Start: 2025-05-15

## 2025-05-15 RX ORDER — HEPARIN SODIUM,PORCINE/PF 10 UNIT/ML
50 SYRINGE (ML) INTRAVENOUS AS NEEDED
OUTPATIENT
Start: 2025-05-15

## 2025-05-15 RX ADMIN — SODIUM CHLORIDE 1000 ML: 0.9 INJECTION, SOLUTION INTRAVENOUS at 10:50

## 2025-05-15 NOTE — PROGRESS NOTES
Patient tolerated IVF infusion well and has been educated with the overall therapy plan. Questions & concerns addressed by her provider during visit. AVS, lab results & future appointment provided. Patient discharged in stable condition via walker with her .    Reviewed and approved by JULIO CESAR VALDIVIA on 5/15/25 at 12:09 PM.

## 2025-05-15 NOTE — PROGRESS NOTES
[facility]  Acute Care Clinic    Patient ID: Gladys Branch is a 69 y.o. female  Diagnosis:  Small Cell Lung Cancer     By immunostaining, the tumor cells are positive for CAM 5.2, INSM1, and TTF-1, and negative for p40 and LCA. The proliferation index by Ki-67 immunostaining is approximately 90%.  Synaptophysin, Chromogranin and INSM-1 are positive.  AE1/AE3 is negative. NEOGENOMICS, RB protein expression is lost in the tumor cells     STAGING  [id number], limited stage  Recurrence     CURRENT SITES OF DISEASE  RLL, supraclavicular     MOLECULAR GENOMICS     PRIOR THERAPY  Concurrent chemoradiation with Cisplatin/Etoposide every 3 weeks; C1D1 2/15/22, reaction to cisplatin C2D1 and did not receive D2 or D3 etoposide  Carbo/etoposide 4/5/2022 1 cycle c/b rash  PCI 5/21-6/13/22  Phase 1 study [id number]  with study drug Taralatamab 1/24/23 - 8/27/24     CURRENT THERAPY      CURRENT ONCOLOGICAL PROBLEMS     Mrs. Branch is a 67 yo with PMH GERD and COPD who originally was round to have a RLL nodule measuring 11 mm in 4/28/2021 found as part of CT calcium score scan. An ensuing CT chest w/o contrast was done on 5/19/21 which revealed subsolid pulmonary nodules measuring 14 mm in the RLL. She had CT chest w/contrast on 11/29/21 which confirmed stable 14 mm GGO of the RLL and decreased RLL subpleural nodule. She met thoracic surgeon Dr. Farfan, who ordered PET/CT scan done on 12/8/21 which did not reveal any FDG-avid nodules within the lung parenchyma but R hilar nodes with max SUV 8.0. He referred her for bronch, which was done on 1/21/22 by Dr. Mcdaniels. 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 Brain MRI was negative.     She started concurrent chemoradiation with BID radiation with cis/etop 2/15/22, and unfortunately had a reaction to cisplatin on C2D1 with chest pain and hypotension. She was hospitalized with sepsis felt to be due to pneumonia. She trialed  carboplatin/etoposide on 4/5/22 with a resulting rash and opted to forego further chemotherapy as she had completed radiation. Restaging scan 11/3/22 unfortunately with progression with new R hilar lymph node, paratracheal nodes, and increase in size of R supraclavicular mass. She enrolled in [id number] and started C1D1 1/24/23. C1 complicated by anorexia and dysgeusia, and delayed C2 by a week. She has further struggled with fatigue and confusion, which may be related to mirtazapine. Dose reduced for C2 and mirtazapine stopped with improvement in symptoms. 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     PAST MEDICAL HISTORY     GERD  COPD    Past Medical History:   Past Medical History:  No date: Abdominal pain  No date: Anemia  No date: COPD (chronic obstructive pulmonary disease) (Multi)  02/07/2023: Disease due to severe acute respiratory syndrome   coronavirus 2 (SARS-CoV-2)  No date: Diverticulosis  No date: GERD (gastroesophageal reflux disease)  No date: Hyperlipidemia  No date: Neuropathy  05/17/2024: New onset of headache in cancer patient  07/26/2022: Other specified abnormal findings of blood chemistry      Comment:  Elevated serum creatinine  07/26/2022: Other specified abnormal findings of blood chemistry      Comment:  Elevated serum creatinine  07/26/2022: Other specified abnormal findings of blood chemistry      Comment:  Elevated TSH  07/26/2022: Other specified abnormal findings of blood chemistry      Comment:  Elevated TSH  07/26/2022: Other specified abnormal findings of blood chemistry      Comment:  Elevated TSH  07/26/2022: Other specified abnormal findings of blood chemistry      Comment:  Elevated TSH  09/15/2020: Other specified abnormal findings of blood chemistry      Comment:  Elevated TSH  No date: Personal history of other diseases of the respiratory system      Comment:   History of chronic obstructive lung disease  No date: Small cell lung cancer   Surgical History:    Surgical History[1]   Family History:    Family History[2]  Family Oncology History:    Cancer-related family history includes Breast cancer in her father's sister.  Social History:    Social History[3]       Subjective   Chief Complaint: Weakness  5/14 ACC visit  Patient presents to [facility] today for evaluation of weakness and some mental status change. Patient's  Emre reports she had the COVID vaccine in the right deltoid yesterday, then had PT at [facility]. She did well with PT and was moving around the house slowly with a walker last night. However this morning she c/o weakness and unable to get out of bed without assistance. She was incontinent to stool this morning and seem to be more confused from her baseline. Emre states patient has a h/o gastroparesis, patient has decreased PO intake for the last several months with low appetite, and inability to tolerate some foods. Previously drinking some Ensure to supplement oral intake but has stopped this.       5/15 ACC visit  Pt's  reports she is doing much better today but he still wanted to come in. She is walking today and yesterday was too weak to stand. Her memory is at baseline, pleasantly confused. She denies any pain. Pt denies chest pain, cough, SOB, headaches, blurry vision, falls, fever or chills, n/v/d/abd pain, or urinary complaints.    Her  is going to try to get her to drink more supplements for protein. He thinks she had bad side effects after the covid vaccine and attending physical therapy.   Allergies  RX Allergies[4]     Medications  Current Outpatient Medications   Medication Instructions    aprepitant (EMEND) 80 mg, Daily    cetirizine-pseudoephedrine (ZyrTEC-D) 5-120 mg 12 hr tablet 1 tablet, oral, Daily    cholecalciferol (Vitamin D-3) 25 MCG (1000 UT) tablet Take by mouth.    mirabegron (MYRBETRIQ) 50 mg, oral, Daily     omeprazole (PriLOSEC) 40 mg DR capsule 1 capsule, Daily (0630)      Objective   Vitals: BP 96/69 (BP Location: Right arm, Patient Position: Standing, BP Cuff Size: Adult)   Pulse 90   Temp 36.4 °C (97.5 °F) (Temporal)   Resp 16   Wt 53.2 kg (117 lb 3.2 oz)   LMP  (LMP Unknown)   SpO2 99%   BMI 22.89 kg/m²   Weight:   Daily Weight  05/15/25 : 53.2 kg (117 lb 3.2 oz)  05/14/25 : 51.9 kg (114 lb 8 oz)  04/28/25 : 53 kg (116 lb 14.4 oz)  03/31/25 : 52.2 kg (115 lb)  03/21/25 : 52.2 kg (115 lb)  03/07/25 : 51.3 kg (113 lb)  03/03/25 : 51.6 kg (113 lb 12.8 oz)    Physical Exam  Vitals reviewed.   Constitutional:       Appearance: Normal appearance.   HENT:      Head: Normocephalic and atraumatic.      Nose: Nose normal.      Mouth/Throat:      Mouth: Mucous membranes are moist.   Eyes:      Extraocular Movements: Extraocular movements intact.      Conjunctiva/sclera: Conjunctivae normal.      Pupils: Pupils are equal, round, and reactive to light.   Cardiovascular:      Rate and Rhythm: Normal rate and regular rhythm.      Pulses: Normal pulses.      Heart sounds: Normal heart sounds.   Pulmonary:      Effort: Pulmonary effort is normal.      Breath sounds: Normal breath sounds.   Abdominal:      General: Abdomen is flat.      Palpations: Abdomen is soft.   Musculoskeletal:         General: Normal range of motion.      Right shoulder: Tenderness present.      Cervical back: Normal range of motion and neck supple.      Comments: Strength equal bilaterally in LE and UE   Skin:     General: Skin is warm.   Neurological:      General: No focal deficit present.      Mental Status: She is alert. Mental status is at baseline.      Comments: Oritented to situation, place and person but not date which is baseline per    Psychiatric:         Mood and Affect: Mood normal.         Behavior: Behavior normal.       Diagnostic Results     Labs  Lab Results   Component Value Date    WBC 4.3 (L) 05/14/2025    HGB 12.3  "05/14/2025    HCT 36.1 05/14/2025    MCV 90 05/14/2025     05/14/2025      Lab Results   Component Value Date    NEUTROABS 3.18 05/14/2025    BANDSABS 2.88 (H) 03/09/2022    LYMPHOABS 0.00 (L) 03/09/2022    MONOABS 0.25 03/09/2022    EOSABS 0.02 05/14/2025      Lab Results   Component Value Date    GLUCOSE 99 05/14/2025    CALCIUM 8.2 (L) 05/14/2025     05/14/2025    K 3.4 (L) 05/14/2025    CO2 26 05/14/2025     05/14/2025    BUN 17 05/14/2025    CREATININE 0.71 05/14/2025    MG 1.93 05/14/2025    PHOS 4.6 09/10/2024    ALBUMIN 3.3 (L) 05/14/2025    PROT 5.5 (L) 05/14/2025     Lab Results   Component Value Date    ALT 8 05/14/2025    AST 11 05/14/2025    ALKPHOS 49 05/14/2025    BILITOT 0.4 05/14/2025    BILIDIR 0.1 09/10/2024      Lab Results   Component Value Date    CORTISOL 8.4 10/10/2024    ACTH 7.9 10/10/2024    TSH 5.25 (H) 04/04/2025    T3FREE 2.7 02/14/2023    FREET4 0.84 04/04/2025    FSH 39.0 09/10/2024    LH 7.7 09/10/2024     No results found for: \"RETICCTPCT\", \"RETIC\", \"IMMRETICFR\", \"RETICHGB\"  Lab Results   Component Value Date    CRP 1.17 (H) 09/10/2024     03/24/2025    LACTATE 1.4 08/07/2023    PROCAL 1.85 (A) 03/08/2022    FERRITIN 62 09/10/2024    IRON 79 03/24/2025    UIBC 196 03/24/2025    TIBC 275 03/24/2025    IRONSAT 29 03/24/2025     Images       Assessment/Plan   ASSESSMENT  Gladys Branch is a 69 y.o. 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       ACC Course  - orthos negative but asymptomatic hypotension on arrival, improvement from yesterday's visit as pt was not able to stand due to weakness  - Labs " unremarkable with stable leukopenia  - 1L NS for dehydration    Dispo:  - Discharged home after ACC visit complete  - Return to clinic/ED instructions given to patient  - Follow up w/ Oncology as scheduled    Mary Lua APRN-CNP, DNP          [1]   Past Surgical History:  Procedure Laterality Date    HYSTERECTOMY      OTHER SURGICAL HISTORY  2020    Hysterectomy    OTHER SURGICAL HISTORY  2021    Tubal ligation    OTHER SURGICAL HISTORY  2021    Partial atrial septal defect repair    US GUIDED BIOPSY LYMPH NODE SUPERFICIAL  2023    US GUIDED BIOPSY LYMPH NODE SUPERFICIAL 2023 DOCTOR OFFICE LEGACY   [2]   Family History  Problem Relation Name Age of Onset    Dementia Mother      Depression Mother      Other (varicose veins of lower extremity) Mother      Alcohol abuse Father      Breast cancer Father's Sister     [3]   Social History  Tobacco Use    Smoking status: Former     Current packs/day: 0.00     Average packs/day: 1 pack/day for 25.0 years (25.0 ttl pk-yrs)     Types: Cigarettes     Start date:      Quit date:      Years since quittin.3     Passive exposure: Past    Smokeless tobacco: Never   Vaping Use    Vaping status: Never Used   Substance Use Topics    Alcohol use: Not Currently     Alcohol/week: 1.0 - 2.0 standard drink of alcohol     Types: 1 - 2 Glasses of wine per week     Comment: once  in a while    Drug use: Never   [4]   Allergies  Allergen Reactions    Contact Metal Agent Unknown     Sore in mouth  Patient not able to explain what kind of metal    Cisplatin Other     CHEMO INDUCED (Moderate); Dyspepsia (Moderate); Headaches (Moderate); Hypotension (Moderate); Numbness (Moderate); Resp Distress (Moderate)    Codeine Nausea Only and Other     nausea    Sulfa (Sulfonamide Antibiotics) Rash

## 2025-05-17 LAB — BACTERIA UR CULT: ABNORMAL

## 2025-05-20 DIAGNOSIS — N39.0 UTI (URINARY TRACT INFECTION) DUE TO ENTEROCOCCUS: Primary | ICD-10-CM

## 2025-05-20 DIAGNOSIS — B95.2 UTI (URINARY TRACT INFECTION) DUE TO ENTEROCOCCUS: Primary | ICD-10-CM

## 2025-05-20 RX ORDER — NITROFURANTOIN 25; 75 MG/1; MG/1
100 CAPSULE ORAL 2 TIMES DAILY
Qty: 10 CAPSULE | Refills: 0 | Status: SHIPPED | OUTPATIENT
Start: 2025-05-20 | End: 2025-05-25

## 2025-05-21 ENCOUNTER — TELEPHONE (OUTPATIENT)
Dept: HEMATOLOGY/ONCOLOGY | Facility: HOSPITAL | Age: 70
End: 2025-05-21
Payer: MEDICARE

## 2025-05-21 NOTE — TELEPHONE ENCOUNTER
RN called and spoke to Gladys's  Bo. RN informed him that Gladys's UA came back positive for a UTI. RN told Bo that Macrobid was sent to [facility] and Gladys should take that BID for 5 days. Bo verbalized understanding and was appreciative of the call. No further needs at this time.

## 2025-05-27 ENCOUNTER — APPOINTMENT (OUTPATIENT)
Facility: HOSPITAL | Age: 70
End: 2025-05-27
Payer: MEDICARE

## 2025-06-02 ENCOUNTER — LAB (OUTPATIENT)
Dept: LAB | Facility: CLINIC | Age: 70
End: 2025-06-02
Payer: MEDICARE

## 2025-06-02 DIAGNOSIS — E55.9 VITAMIN D DEFICIENCY: ICD-10-CM

## 2025-06-02 DIAGNOSIS — D63.8 ANEMIA, CHRONIC DISEASE: ICD-10-CM

## 2025-06-02 DIAGNOSIS — R73.01 IMPAIRED FASTING BLOOD SUGAR: ICD-10-CM

## 2025-06-02 DIAGNOSIS — E78.2 MIXED HYPERLIPIDEMIA: ICD-10-CM

## 2025-06-02 DIAGNOSIS — R53.83 OTHER FATIGUE: ICD-10-CM

## 2025-06-02 DIAGNOSIS — C34.90 SMALL CELL LUNG CANCER: ICD-10-CM

## 2025-06-02 LAB
ALBUMIN SERPL BCP-MCNC: 4 G/DL (ref 3.4–5)
ALP SERPL-CCNC: 57 U/L (ref 33–136)
ALT SERPL W P-5'-P-CCNC: 11 U/L (ref 7–45)
ANION GAP SERPL CALC-SCNC: 10 MMOL/L (ref 10–20)
AST SERPL W P-5'-P-CCNC: 17 U/L (ref 9–39)
BASOPHILS # BLD AUTO: 0.04 X10*3/UL (ref 0–0.1)
BASOPHILS NFR BLD AUTO: 1 %
BILIRUB SERPL-MCNC: 0.5 MG/DL (ref 0–1.2)
BUN SERPL-MCNC: 21 MG/DL (ref 6–23)
CALCIUM SERPL-MCNC: 9.2 MG/DL (ref 8.6–10.3)
CHLORIDE SERPL-SCNC: 103 MMOL/L (ref 98–107)
CO2 SERPL-SCNC: 28 MMOL/L (ref 21–32)
CREAT SERPL-MCNC: 0.71 MG/DL (ref 0.5–1.05)
EGFRCR SERPLBLD CKD-EPI 2021: >90 ML/MIN/1.73M*2
EOSINOPHIL # BLD AUTO: 0.18 X10*3/UL (ref 0–0.7)
EOSINOPHIL NFR BLD AUTO: 4.4 %
ERYTHROCYTE [DISTWIDTH] IN BLOOD BY AUTOMATED COUNT: 12.9 % (ref 11.5–14.5)
GLUCOSE SERPL-MCNC: 91 MG/DL (ref 74–99)
HCT VFR BLD AUTO: 37.8 % (ref 36–46)
HGB BLD-MCNC: 12.7 G/DL (ref 12–16)
IMM GRANULOCYTES # BLD AUTO: 0.01 X10*3/UL (ref 0–0.7)
IMM GRANULOCYTES NFR BLD AUTO: 0.2 % (ref 0–0.9)
LYMPHOCYTES # BLD AUTO: 1.12 X10*3/UL (ref 1.2–4.8)
LYMPHOCYTES NFR BLD AUTO: 27.3 %
MCH RBC QN AUTO: 30.5 PG (ref 26–34)
MCHC RBC AUTO-ENTMCNC: 33.6 G/DL (ref 32–36)
MCV RBC AUTO: 91 FL (ref 80–100)
MONOCYTES # BLD AUTO: 0.41 X10*3/UL (ref 0.1–1)
MONOCYTES NFR BLD AUTO: 10 %
NEUTROPHILS # BLD AUTO: 2.35 X10*3/UL (ref 1.2–7.7)
NEUTROPHILS NFR BLD AUTO: 57.1 %
NRBC BLD-RTO: ABNORMAL /100{WBCS}
PLATELET # BLD AUTO: 206 X10*3/UL (ref 150–450)
POTASSIUM SERPL-SCNC: 4.1 MMOL/L (ref 3.5–5.3)
PROT SERPL-MCNC: 6.4 G/DL (ref 6.4–8.2)
RBC # BLD AUTO: 4.17 X10*6/UL (ref 4–5.2)
SODIUM SERPL-SCNC: 137 MMOL/L (ref 136–145)
WBC # BLD AUTO: 4.1 X10*3/UL (ref 4.4–11.3)

## 2025-06-02 PROCEDURE — 83721 ASSAY OF BLOOD LIPOPROTEIN: CPT

## 2025-06-02 PROCEDURE — 80061 LIPID PANEL: CPT

## 2025-06-02 PROCEDURE — 84075 ASSAY ALKALINE PHOSPHATASE: CPT

## 2025-06-02 PROCEDURE — 84443 ASSAY THYROID STIM HORMONE: CPT

## 2025-06-02 PROCEDURE — 36415 COLL VENOUS BLD VENIPUNCTURE: CPT

## 2025-06-02 PROCEDURE — 82306 VITAMIN D 25 HYDROXY: CPT

## 2025-06-02 PROCEDURE — 85025 COMPLETE CBC W/AUTO DIFF WBC: CPT

## 2025-06-02 PROCEDURE — 83615 LACTATE (LD) (LDH) ENZYME: CPT

## 2025-06-02 PROCEDURE — 83036 HEMOGLOBIN GLYCOSYLATED A1C: CPT

## 2025-06-03 LAB
25(OH)D3 SERPL-MCNC: 35 NG/ML (ref 30–100)
CHOLEST SERPL-MCNC: 293 MG/DL (ref 0–199)
CHOLESTEROL/HDL RATIO: 3.7
EST. AVERAGE GLUCOSE BLD GHB EST-MCNC: 105 MG/DL
HBA1C MFR BLD: 5.3 % (ref ?–5.7)
HDLC SERPL-MCNC: 78.9 MG/DL
LDH SERPL L TO P-CCNC: 151 U/L (ref 84–246)
LDLC SERPL CALC-MCNC: 195 MG/DL
LDLC SERPL DIRECT ASSAY-MCNC: 197 MG/DL (ref 0–129)
NON HDL CHOLESTEROL: 214 MG/DL (ref 0–149)
TRIGL SERPL-MCNC: 95 MG/DL (ref 0–149)
TSH SERPL-ACNC: 2.71 MIU/L (ref 0.44–3.98)
VLDL: 19 MG/DL (ref 0–40)

## 2025-06-04 ENCOUNTER — HOSPITAL ENCOUNTER (OUTPATIENT)
Dept: RADIOLOGY | Facility: HOSPITAL | Age: 70
Discharge: HOME | End: 2025-06-04
Payer: MEDICARE

## 2025-06-04 DIAGNOSIS — C34.90 SMALL CELL LUNG CANCER: ICD-10-CM

## 2025-06-04 PROCEDURE — 70553 MRI BRAIN STEM W/O & W/DYE: CPT

## 2025-06-04 PROCEDURE — 2550000001 HC RX 255 CONTRASTS: Performed by: STUDENT IN AN ORGANIZED HEALTH CARE EDUCATION/TRAINING PROGRAM

## 2025-06-04 PROCEDURE — 71260 CT THORAX DX C+: CPT

## 2025-06-04 PROCEDURE — 70491 CT SOFT TISSUE NECK W/DYE: CPT

## 2025-06-04 PROCEDURE — A9575 INJ GADOTERATE MEGLUMI 0.1ML: HCPCS | Performed by: STUDENT IN AN ORGANIZED HEALTH CARE EDUCATION/TRAINING PROGRAM

## 2025-06-04 RX ORDER — GADOTERATE MEGLUMINE 376.9 MG/ML
10 INJECTION INTRAVENOUS
Status: COMPLETED | OUTPATIENT
Start: 2025-06-04 | End: 2025-06-04

## 2025-06-04 RX ADMIN — GADOTERATE MEGLUMINE 10 ML: 376.9 INJECTION INTRAVENOUS at 12:27

## 2025-06-04 RX ADMIN — IOHEXOL 75 ML: 350 INJECTION, SOLUTION INTRAVENOUS at 11:31

## 2025-06-09 PROBLEM — H53.2 DOUBLE VISION: Status: RESOLVED | Noted: 2024-05-17 | Resolved: 2025-06-09

## 2025-06-09 NOTE — PROGRESS NOTES
Subjective   Patient ID: Gladys Branch is a 69 y.o. female who presents for Follow-up.  HPI  Patient presents today for follow up labs and chronic conditions.  Ongoing issues with balance and memory - has upcoming appointment neurology but would like another neurologist.   has noticed increase in urinary leaking last several weeks. No other UTI symptoms. Has alternating diarrhea and constipation.   Patient otherwise feels well. No other complaints or concerns.    The patient's relevant past medical, surgical, family, and social history was reviewed in [facility].  All pertinent lab work and results for this visit were reviewed with patient.    Office Visit on 06/16/2025   Component Date Value Ref Range Status    POC Color, Urine 06/16/2025 Dark Scarlett (A)  Straw, Yellow, Light-Yellow Final    POC Appearance, Urine 06/16/2025 Hazy (A)  Clear Final    POC Glucose, Urine 06/16/2025 NEGATIVE  NEGATIVE mg/dl Final    POC Bilirubin, Urine 06/16/2025 NEGATIVE  NEGATIVE Final    POC Ketones, Urine 06/16/2025 TRACE (A)  NEGATIVE mg/dl Final    POC Specific Gravity, Urine 06/16/2025 1.020  1.005 - 1.035 Final    POC Blood, Urine 06/16/2025 NEGATIVE  NEGATIVE Final    POC PH, Urine 06/16/2025 6.5  No Reference Range Established PH Final    POC Protein, Urine 06/16/2025 TRACE (A)  NEGATIVE mg/dl Final    POC Urobilinogen, Urine 06/16/2025 1.0  0.2, 1.0 EU/DL Final    Poc Nitrite, Urine 06/16/2025 NEGATIVE  NEGATIVE Final    POC Leukocytes, Urine 06/16/2025 NEGATIVE  NEGATIVE Final   Lab on 06/02/2025   Component Date Value Ref Range Status    Thyroid Stimulating Hormone 06/02/2025 2.71  0.44 - 3.98 mIU/L Final    Cholesterol 06/02/2025 293 (H)  0 - 199 mg/dL Final          Age      Desirable   Borderline High   High     0-19 Y     0 - 169       170 - 199     >/= 200    20-24 Y     0 - 189       190 - 224     >/= 225         >24 Y     0 - 199       200 - 239     >/= 240   **All ranges are based on fasting samples. Specific    therapeutic targets will vary based on patient-specific   cardiac risk.    Pediatric guidelines reference:Pediatrics 2011, 128(S5).Adult guidelines reference: NCEP ATPIII Guidelines,JOSE DAVID 2001, 258:2486-97    Venipuncture immediately after or during the administration of Metamizole may lead to falsely low results. Testing should be performed immediately prior to Metamizole dosing.    HDL-Cholesterol 06/02/2025 78.9  mg/dL Final      Age       Very Low   Low     Normal    High    0-19 Y    < 35      < 40     40-45     ----  20-24 Y    ----     < 40      >45      ----        >24 Y      ----     < 40     40-60      >60      Cholesterol/HDL Ratio 06/02/2025 3.7   Final      Ref Values  Desirable  < 3.4  High Risk  > 5.0    LDL Calculated 06/02/2025 195 (H)  <=99 mg/dL Final                                Near   Borderline      AGE      Desirable  Optimal    High     High     Very High     0-19 Y     0 - 109     ---    110-129   >/= 130     ----    20-24 Y     0 - 119     ---    120-159   >/= 160     ----      >24 Y     0 -  99   100-129  130-159   160-189     >/=190      VLDL 06/02/2025 19  0 - 40 mg/dL Final    Triglycerides 06/02/2025 95  0 - 149 mg/dL Final    Age              Desirable        Borderline         High        Very High  SEX:B           mg/dL             mg/dL               mg/dL      mg/dL  <=14D                       ----               ----        ----  15D-365D                    ----               ----        ----  1Y-9Y           0-74               75-99             >=100       ----  10Y-19Y        0-89                            >=130       ----  20Y-24Y        0-114             115-149             >=150      ----  >= 25Y         0-149             150-199             200-499    >=500      Venipuncture immediately after or during the administration of Metamizole may lead to falsely low results. Testing should be performed immediately prior to Metamizole dosing.    Non HDL  Cholesterol 06/02/2025 214 (H)  0 - 149 mg/dL Final          Age       Desirable   Borderline High   High     Very High     0-19 Y     0 - 119       120 - 144     >/= 145    >/= 160    20-24 Y     0 - 149       150 - 189     >/= 190      ----         >24 Y    30 mg/dL above LDL Cholesterol goal      LDL, Direct 06/02/2025 197 (H)  0 - 129 mg/dL Final    Glucose 06/02/2025 91  74 - 99 mg/dL Final    Sodium 06/02/2025 137  136 - 145 mmol/L Final    Potassium 06/02/2025 4.1  3.5 - 5.3 mmol/L Final    Chloride 06/02/2025 103  98 - 107 mmol/L Final    Bicarbonate 06/02/2025 28  21 - 32 mmol/L Final    Anion Gap 06/02/2025 10  10 - 20 mmol/L Final    Urea Nitrogen 06/02/2025 21  6 - 23 mg/dL Final    Creatinine 06/02/2025 0.71  0.50 - 1.05 mg/dL Final    eGFR 06/02/2025 >90  >60 mL/min/1.73m*2 Final    Calculations of estimated GFR are performed using the 2021 CKD-EPI Study Refit equation without the race variable for the IDMS-Traceable creatinine methods.  https://jasn.asnjournals.org/content/early/2021/09/22/ASN.2336557016    Calcium 06/02/2025 9.2  8.6 - 10.3 mg/dL Final    Albumin 06/02/2025 4.0  3.4 - 5.0 g/dL Final    Alkaline Phosphatase 06/02/2025 57  33 - 136 U/L Final    Total Protein 06/02/2025 6.4  6.4 - 8.2 g/dL Final    AST 06/02/2025 17  9 - 39 U/L Final    Bilirubin, Total 06/02/2025 0.5  0.0 - 1.2 mg/dL Final    ALT 06/02/2025 11  7 - 45 U/L Final    Patients treated with Sulfasalazine may generate falsely decreased results for ALT.    Vitamin D, 25-Hydroxy, Total 06/02/2025 35  30 - 100 ng/mL Final    Hemoglobin A1C 06/02/2025 5.3  See comment % Final    Estimated Average Glucose 06/02/2025 105  Not Established mg/dL Final    WBC 06/02/2025 4.1 (L)  4.4 - 11.3 x10*3/uL Final    nRBC 06/02/2025    Final    Not Measured    RBC 06/02/2025 4.17  4.00 - 5.20 x10*6/uL Final    Hemoglobin 06/02/2025 12.7  12.0 - 16.0 g/dL Final    Hematocrit 06/02/2025 37.8  36.0 - 46.0 % Final    MCV 06/02/2025 91  80 - 100 fL  Final    MCH 06/02/2025 30.5  26.0 - 34.0 pg Final    MCHC 06/02/2025 33.6  32.0 - 36.0 g/dL Final    RDW 06/02/2025 12.9  11.5 - 14.5 % Final    Platelets 06/02/2025 206  150 - 450 x10*3/uL Final    Neutrophils % 06/02/2025 57.1  40.0 - 80.0 % Final    Immature Granulocytes %, Automated 06/02/2025 0.2  0.0 - 0.9 % Final    Immature Granulocyte Count (IG) includes promyelocytes, myelocytes and metamyelocytes but does not include bands. Percent differential counts (%) should be interpreted in the context of the absolute cell counts (cells/UL).    Lymphocytes % 06/02/2025 27.3  13.0 - 44.0 % Final    Monocytes % 06/02/2025 10.0  2.0 - 10.0 % Final    Eosinophils % 06/02/2025 4.4  0.0 - 6.0 % Final    Basophils % 06/02/2025 1.0  0.0 - 2.0 % Final    Neutrophils Absolute 06/02/2025 2.35  1.20 - 7.70 x10*3/uL Final    Percent differential counts (%) should be interpreted in the context of the absolute cell counts (cells/uL).    Immature Granulocytes Absolute, Au* 06/02/2025 0.01  0.00 - 0.70 x10*3/uL Final    Lymphocytes Absolute 06/02/2025 1.12 (L)  1.20 - 4.80 x10*3/uL Final    Monocytes Absolute 06/02/2025 0.41  0.10 - 1.00 x10*3/uL Final    Eosinophils Absolute 06/02/2025 0.18  0.00 - 0.70 x10*3/uL Final    Basophils Absolute 06/02/2025 0.04  0.00 - 0.10 x10*3/uL Final    LDH 06/02/2025 151  84 - 246 U/L Final   Office Visit on 05/15/2025   Component Date Value Ref Range Status    WBC 05/15/2025 3.7 (L)  4.4 - 11.3 x10*3/uL Final    nRBC 05/15/2025 0.0  0.0 - 0.0 /100 WBCs Final    RBC 05/15/2025 3.93 (L)  4.00 - 5.20 x10*6/uL Final    Hemoglobin 05/15/2025 12.0  12.0 - 16.0 g/dL Final    Hematocrit 05/15/2025 35.9 (L)  36.0 - 46.0 % Final    MCV 05/15/2025 91  80 - 100 fL Final    MCH 05/15/2025 30.5  26.0 - 34.0 pg Final    MCHC 05/15/2025 33.4  32.0 - 36.0 g/dL Final    RDW 05/15/2025 13.2  11.5 - 14.5 % Final    Platelets 05/15/2025 170  150 - 450 x10*3/uL Final    Neutrophils % 05/15/2025 48.6  40.0 - 80.0 %  Final    Immature Granulocytes %, Automated 05/15/2025 0.3  0.0 - 0.9 % Final    Immature Granulocyte Count (IG) includes promyelocytes, myelocytes and metamyelocytes but does not include bands. Percent differential counts (%) should be interpreted in the context of the absolute cell counts (cells/UL).    Lymphocytes % 05/15/2025 29.0  13.0 - 44.0 % Final    Monocytes % 05/15/2025 19.7  2.0 - 10.0 % Final    Eosinophils % 05/15/2025 1.9  0.0 - 6.0 % Final    Basophils % 05/15/2025 0.5  0.0 - 2.0 % Final    Neutrophils Absolute 05/15/2025 1.78  1.20 - 7.70 x10*3/uL Final    Percent differential counts (%) should be interpreted in the context of the absolute cell counts (cells/uL).    Immature Granulocytes Absolute, Au* 05/15/2025 0.01  0.00 - 0.70 x10*3/uL Final    Lymphocytes Absolute 05/15/2025 1.06 (L)  1.20 - 4.80 x10*3/uL Final    Monocytes Absolute 05/15/2025 0.72  0.10 - 1.00 x10*3/uL Final    Eosinophils Absolute 05/15/2025 0.07  0.00 - 0.70 x10*3/uL Final    Basophils Absolute 05/15/2025 0.02  0.00 - 0.10 x10*3/uL Final    Glucose 05/15/2025 91  74 - 99 mg/dL Final    Sodium 05/15/2025 137  136 - 145 mmol/L Final    Potassium 05/15/2025 4.4  3.5 - 5.3 mmol/L Final    MILD HEMOLYSIS DETECTED. The result may be falsely elevated due to hemolysis or other interferents. Clinical correlation is recommended. Repeat testing may be considered.    Chloride 05/15/2025 105  98 - 107 mmol/L Final    Bicarbonate 05/15/2025 25  21 - 32 mmol/L Final    Anion Gap 05/15/2025 11  10 - 20 mmol/L Final    Urea Nitrogen 05/15/2025 15  6 - 23 mg/dL Final    Creatinine 05/15/2025 0.70  0.50 - 1.05 mg/dL Final    eGFR 05/15/2025 >90  >60 mL/min/1.73m*2 Final    Calculations of estimated GFR are performed using the 2021 CKD-EPI Study Refit equation without the race variable for the IDMS-Traceable creatinine methods.  https://jasn.asnjournals.org/content/early/2021/09/22/ASN.9066285731    Calcium 05/15/2025 8.7  8.6 - 10.3 mg/dL Final     Albumin 05/15/2025 3.6  3.4 - 5.0 g/dL Final    Alkaline Phosphatase 05/15/2025 54  33 - 136 U/L Final    Total Protein 05/15/2025 6.3 (L)  6.4 - 8.2 g/dL Final    AST 05/15/2025 15  9 - 39 U/L Final    MILD HEMOLYSIS DETECTED. The result may be falsely elevated due to hemolysis or other interferents. Clinical correlation is recommended. Repeat testing may be considered.    Bilirubin, Total 05/15/2025 0.4  0.0 - 1.2 mg/dL Final    ALT 05/15/2025 8  7 - 45 U/L Final    Patients treated with Sulfasalazine may generate falsely decreased results for ALT.    Magnesium 05/15/2025 2.01  1.60 - 2.40 mg/dL Final   Office Visit on 05/14/2025   Component Date Value Ref Range Status    WBC 05/14/2025 4.3 (L)  4.4 - 11.3 x10*3/uL Final    nRBC 05/14/2025 0.0  0.0 - 0.0 /100 WBCs Final    RBC 05/14/2025 4.01  4.00 - 5.20 x10*6/uL Final    Hemoglobin 05/14/2025 12.3  12.0 - 16.0 g/dL Final    Hematocrit 05/14/2025 36.1  36.0 - 46.0 % Final    MCV 05/14/2025 90  80 - 100 fL Final    MCH 05/14/2025 30.7  26.0 - 34.0 pg Final    MCHC 05/14/2025 34.1  32.0 - 36.0 g/dL Final    RDW 05/14/2025 13.1  11.5 - 14.5 % Final    Platelets 05/14/2025 180  150 - 450 x10*3/uL Final    Neutrophils % 05/14/2025 73.2  40.0 - 80.0 % Final    Immature Granulocytes %, Automated 05/14/2025 0.2  0.0 - 0.9 % Final    Immature Granulocyte Count (IG) includes promyelocytes, myelocytes and metamyelocytes but does not include bands. Percent differential counts (%) should be interpreted in the context of the absolute cell counts (cells/UL).    Lymphocytes % 05/14/2025 13.4  13.0 - 44.0 % Final    Monocytes % 05/14/2025 12.2  2.0 - 10.0 % Final    Eosinophils % 05/14/2025 0.5  0.0 - 6.0 % Final    Basophils % 05/14/2025 0.5  0.0 - 2.0 % Final    Neutrophils Absolute 05/14/2025 3.18  1.20 - 7.70 x10*3/uL Final    Percent differential counts (%) should be interpreted in the context of the absolute cell counts (cells/uL).    Immature Granulocytes Absolute, Au*  05/14/2025 0.01  0.00 - 0.70 x10*3/uL Final    Lymphocytes Absolute 05/14/2025 0.58 (L)  1.20 - 4.80 x10*3/uL Final    Monocytes Absolute 05/14/2025 0.53  0.10 - 1.00 x10*3/uL Final    Eosinophils Absolute 05/14/2025 0.02  0.00 - 0.70 x10*3/uL Final    Basophils Absolute 05/14/2025 0.02  0.00 - 0.10 x10*3/uL Final    Urine Culture 05/14/2025 >=100,000 CFU/mL Escherichia coli (A)   Final    Color, Urine 05/14/2025 Yellow  Light-Yellow, Yellow, Dark-Yellow Final    Appearance, Urine 05/14/2025 Turbid (N)  Clear Final    Specific Gravity, Urine 05/14/2025 1.026  1.005 - 1.035 Final    pH, Urine 05/14/2025 7.0  5.0, 5.5, 6.0, 6.5, 7.0, 7.5, 8.0 Final    Protein, Urine 05/14/2025 10 (TRACE)  NEGATIVE, 10 (TRACE), 20 (TRACE) mg/dL Final    Glucose, Urine 05/14/2025 Normal  Normal mg/dL Final    Blood, Urine 05/14/2025 NEGATIVE  NEGATIVE mg/dL Final    Ketones, Urine 05/14/2025 NEGATIVE  NEGATIVE mg/dL Final    Bilirubin, Urine 05/14/2025 NEGATIVE  NEGATIVE mg/dL Final    Urobilinogen, Urine 05/14/2025 Normal  Normal mg/dL Final    Nitrite, Urine 05/14/2025 NEGATIVE  NEGATIVE Final    Leukocyte Esterase, Urine 05/14/2025 NEGATIVE  NEGATIVE Final    Extra Tube 05/14/2025 Hold for add-ons.   Final    Auto resulted.    Glucose 05/14/2025 99  74 - 99 mg/dL Final    Sodium 05/14/2025 139  136 - 145 mmol/L Final    Potassium 05/14/2025 3.4 (L)  3.5 - 5.3 mmol/L Final    Chloride 05/14/2025 106  98 - 107 mmol/L Final    Bicarbonate 05/14/2025 26  21 - 32 mmol/L Final    Anion Gap 05/14/2025 10  10 - 20 mmol/L Final    Urea Nitrogen 05/14/2025 17  6 - 23 mg/dL Final    Creatinine 05/14/2025 0.71  0.50 - 1.05 mg/dL Final    eGFR 05/14/2025 >90  >60 mL/min/1.73m*2 Final    Calculations of estimated GFR are performed using the 2021 CKD-EPI Study Refit equation without the race variable for the IDMS-Traceable creatinine methods.  https://jasn.asnjournals.org/content/early/2021/09/22/ASN.2398835535    Calcium 05/14/2025 8.2 (L)  8.6 -  10.3 mg/dL Final    Albumin 05/14/2025 3.3 (L)  3.4 - 5.0 g/dL Final    Alkaline Phosphatase 05/14/2025 49  33 - 136 U/L Final    Total Protein 05/14/2025 5.5 (L)  6.4 - 8.2 g/dL Final    AST 05/14/2025 11  9 - 39 U/L Final    Bilirubin, Total 05/14/2025 0.4  0.0 - 1.2 mg/dL Final    ALT 05/14/2025 8  7 - 45 U/L Final    Patients treated with Sulfasalazine may generate falsely decreased results for ALT.    Magnesium 05/14/2025 1.93  1.60 - 2.40 mg/dL Final    WBC, Urine 05/14/2025 1-5  1-5, NONE /HPF Final    RBC, Urine 05/14/2025 1-2  NONE, 1-2, 3-5 /HPF Final    Squamous Epithelial Cells, Urine 05/14/2025 1-9 (SPARSE)  Reference range not established. /HPF Final    Mucus, Urine 05/14/2025 1+  Reference range not established. /LPF Final           Review of Systems   A complete review of systems was performed and all systems were normal except what is noted in the HPI.        Objective   /70   Pulse 76   Temp 36.3 °C (97.3 °F)   Ht 1.524 m (5')   Wt 52.7 kg (116 lb 3.2 oz)   LMP  (LMP Unknown)   SpO2 96%   BMI 22.69 kg/m²    Physical Exam  Constitutional:       Appearance: Normal appearance.   HENT:      Head: Normocephalic and atraumatic.   Neck:      Vascular: No carotid bruit.   Cardiovascular:      Rate and Rhythm: Normal rate and regular rhythm.      Heart sounds: Normal heart sounds.   Pulmonary:      Effort: Pulmonary effort is normal.      Breath sounds: Normal breath sounds. No wheezing, rhonchi or rales.   Abdominal:      General: Abdomen is flat. Bowel sounds are normal.      Palpations: Abdomen is soft.      Tenderness: There is no abdominal tenderness. There is no guarding.   Musculoskeletal:         General: Normal range of motion.      Right lower leg: No edema.      Left lower leg: No edema.   Skin:     General: Skin is dry.   Neurological:      General: No focal deficit present.      Mental Status: She is alert and oriented to person, place, and time.   Psychiatric:         Mood and  Affect: Mood normal.         Behavior: Behavior normal.         Thought Content: Thought content normal.         Health Maintenance Due   Topic Date Due    Derm Melanoma Skin Check  Never done    Hepatitis A Vaccines (1 of 2 - Risk 2-dose series) Never done    Hepatitis B Vaccines (1 of 3 - Risk 3-dose series) Never done        Assessment/Plan   Problem List Items Addressed This Visit       Hyperlipidemia - Primary    LDL up to 197  Coronary calcium score was 2 4/21  Work on diet reviewed with patient.   Recheck 6 months          Relevant Orders    Lipid Panel    Cholesterol, LDL Direct    Impaired fasting blood sugar    well controlled - A1c within normal limits at 5.3  Reevaluate in 6 months.           Relevant Orders    Comprehensive Metabolic Panel    Hemoglobin A1C    Small cell lung cancer    Diagnosed per bronch 1/22  S/P radiation  Chemo currently held due to side effects           Vitamin D deficiency    Well controlled. Continue current medicine and recheck in 6 months.          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)    Mild, stable   Recheck 6 months          Anemia, chronic disease    H/H currently within normal limits off chemo  Continue to monitor   Recheck 6 months          Relevant Orders    CBC    Cerebellar ataxia (Multi)    Followed by neuro - has appt next week           Paraneoplastic cerebellar degeneration    Followed by oncology and neuro  Saw oncology last week  Sees neuro next week         Urinary incontinence    May be due in part to constipation  Trial miralax  - call if worsening diarrhea  Culture sent - treat if positive         Relevant Orders    POCT UA Automated manually resulted (Completed)    Urine Culture         Patient understands and agrees with care plan.           Nova Omer MD

## 2025-06-09 NOTE — ASSESSMENT & PLAN NOTE
LDL up to 197  Coronary calcium score was 2 4/21  Work on diet reviewed with patient.   Recheck 6 months

## 2025-06-10 ENCOUNTER — OFFICE VISIT (OUTPATIENT)
Dept: HEMATOLOGY/ONCOLOGY | Facility: HOSPITAL | Age: 70
End: 2025-06-10
Payer: MEDICARE

## 2025-06-10 VITALS
RESPIRATION RATE: 14 BRPM | SYSTOLIC BLOOD PRESSURE: 107 MMHG | OXYGEN SATURATION: 98 % | TEMPERATURE: 96.8 F | HEART RATE: 75 BPM | WEIGHT: 107.3 LBS | BODY MASS INDEX: 20.96 KG/M2 | DIASTOLIC BLOOD PRESSURE: 68 MMHG

## 2025-06-10 DIAGNOSIS — C34.90 SMALL CELL LUNG CANCER: Primary | ICD-10-CM

## 2025-06-10 PROCEDURE — G2211 COMPLEX E/M VISIT ADD ON: HCPCS | Performed by: STUDENT IN AN ORGANIZED HEALTH CARE EDUCATION/TRAINING PROGRAM

## 2025-06-10 PROCEDURE — 99215 OFFICE O/P EST HI 40 MIN: CPT | Performed by: STUDENT IN AN ORGANIZED HEALTH CARE EDUCATION/TRAINING PROGRAM

## 2025-06-10 PROCEDURE — 1159F MED LIST DOCD IN RCRD: CPT | Performed by: STUDENT IN AN ORGANIZED HEALTH CARE EDUCATION/TRAINING PROGRAM

## 2025-06-10 PROCEDURE — 1126F AMNT PAIN NOTED NONE PRSNT: CPT | Performed by: STUDENT IN AN ORGANIZED HEALTH CARE EDUCATION/TRAINING PROGRAM

## 2025-06-10 ASSESSMENT — PAIN SCALES - GENERAL: PAINLEVEL_OUTOF10: 0-NO PAIN

## 2025-06-10 NOTE — PROGRESS NOTES
Patient ID: Gladys Branch is a 69 y.o. female.    DIAGNOSIS    Small Cell Lung Cancer    By immunostaining, the tumor cells are positive for CAM 5.2, INSM1, and TTF-1, and negative for p40 and LCA. The proliferation index by Ki-67 immunostaining is approximately 90%.  Synaptophysin, Chromogranin and INSM-1 are positive.  AE1/AE3 is negative. NEOGENOMICS, RB protein expression is lost in the tumor cells    STAGING    [id number], limited stage  Recurrence    CURRENT SITES OF DISEASE    RLL, supraclavicular    MOLECULAR GENOMICS      PRIOR THERAPY    Concurrent chemoradiation with Cisplatin/Etoposide every 3 weeks; C1D1 2/15/22, reaction to cisplatin C2D1 and did not receive D2 or D3 etoposide  Carbo/etoposide 4/5/2022 1 cycle c/b rash  PCI 5/21-6/13/22  Phase 1 study [id number]  with study drug Taralatamab 1/24/23 - 8/27/24    CURRENT THERAPY        CURRENT ONCOLOGICAL PROBLEMS      HISTORY OF PRESENT ILLNESS    Mrs. Branch is a 65 yo with PMH GERD and COPD who originally was round to have a RLL nodule measuring 11 mm in 4/28/2021 found as part of CT calcium score scan. An ensuing CT chest w/o contrast was done on 5/19/21 which revealed subsolid pulmonary nodules measuring 14 mm in the RLL. She had CT chest w/contrast on 11/29/21 which confirmed stable 14 mm GGO of the RLL and decreased RLL subpleural nodule. She met thoracic surgeon Dr. Farfan, who ordered PET/CT scan done on 12/8/21 which did not reveal any FDG-avid nodules within the lung parenchyma but R hilar nodes with max SUV 8.0. He referred her for bronch, which was done on 1/21/22 by Dr. Mcdaniels. 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 Brain MRI was negative.    She started concurrent chemoradiation with BID radiation with cis/etop 2/15/22, and unfortunately had a reaction to cisplatin on C2D1 with chest pain and hypotension. She was hospitalized with sepsis felt to be due to pneumonia. She trialed carboplatin/etoposide on 4/5/22 with a  resulting rash and opted to forego further chemotherapy as she had completed radiation. Restaging scan 11/3/22 unfortunately with progression with new R hilar lymph node, paratracheal nodes, and increase in size of R supraclavicular mass. She enrolled in [id number] and started C1D1 1/24/23. C1 complicated by anorexia and dysgeusia, and delayed C2 by a week. She has further struggled with fatigue and confusion, which may be related to mirtazapine. Dose reduced for C2 and mirtazapine stopped with improvement in symptoms. 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    PAST MEDICAL HISTORY    GERD  COPD    SOCIAL HISTORY    Retired - lighting . Lives at home with  and a kitten.  Tobacco: quit smoking 1999. 1 ppd x 25 yrs.  EtOH: wine 1 glass/day  Illicits: none    CURRENT MEDS    Please see med list    ALLERGIES    Codeine, Sulfa drugs, Cisplatin    FAMILY HISTORY    Paternal aunt - breast cancer dx 80s    Subjective    She is here today with her . She is having diarrhea from the prilosec - plan to wean off and try pepcid. She got treated for a UTI - completed antibiotics. Still has occasional burning with urination. Says her weight at home is 114 lbs, dissimilar from the 107 here. Has not lost any weight they think. Memory loss is stable.       Objective          3/31/2025    12:26 PM 4/28/2025     2:07 PM 5/14/2025     2:01 PM 5/15/2025    10:21 AM 5/15/2025    10:25 AM 6/4/2025    11:30 AM 6/10/2025     9:34 AM   Vitals   Systolic 108 124 123 97 96  107   Diastolic 75 82 62 68 69  68   BP Location Left arm Left arm Left arm Right arm Right arm  Left arm   Heart Rate 100 93 99 82 90  75   Temp 36.5 °C (97.7 °F) 36.5 °C (97.7 °F) 37.2 °C (99 °F) 36.1 °C (97 °F) 36.4 °C (97.5 °F)  36 °C (96.8 °F)   Resp 18 18 16 16 16  14   Height      1.524 m (5')    Weight (lb) 115 116.9 114.5  117.2  114 107.3   BMI 22.46 kg/m2 22.83 kg/m2 22.36 kg/m2 22.89 kg/m2  22.26 kg/m2 20.96 kg/m2   BSA (m2) 1.49 m2 1.5 m2 1.48 m2 1.5 m2  1.48 m2 1.44 m2   Visit Report Report Report Report Report Report  Report       Daily Weight  06/10/25 : 48.7 kg (107 lb 4.8 oz)  05/15/25 : 53.2 kg (117 lb 3.2 oz)  05/14/25 : 51.9 kg (114 lb 8 oz)  04/28/25 : 53 kg (116 lb 14.4 oz)  06/04/25 : 51.7 kg (114 lb)         Physical Exam  Vitals reviewed.   Constitutional:       General: She is not in acute distress.  HENT:      Head: Normocephalic and atraumatic.      Mouth/Throat:      Mouth: Mucous membranes are moist.   Eyes:      Extraocular Movements: Extraocular movements intact.      Conjunctiva/sclera: Conjunctivae normal.      Pupils: Pupils are equal, round, and reactive to light.   Cardiovascular:      Rate and Rhythm: Normal rate and regular rhythm.      Heart sounds: Normal heart sounds. No murmur heard.  Pulmonary:      Effort: Pulmonary effort is normal. No respiratory distress.      Breath sounds: Normal breath sounds.   Abdominal:      General: Abdomen is flat. Bowel sounds are normal. There is no distension.      Palpations: Abdomen is soft.   Skin:     General: Skin is warm and dry.   Neurological:      Mental Status: She is alert and oriented to person, place, and time. Mental status is at baseline.   Psychiatric:         Mood and Affect: Mood normal.         Behavior: Behavior normal.         Thought Content: Thought content normal.       Labs  No visits with results within 1 Day(s) from this visit.   Latest known visit with results is:   Lab on 06/02/2025   Component Date Value Ref Range Status    Thyroid Stimulating Hormone 06/02/2025 2.71  0.44 - 3.98 mIU/L Final    Cholesterol 06/02/2025 293 (H)  0 - 199 mg/dL Final          Age      Desirable   Borderline High   High     0-19 Y     0 - 169       170 - 199     >/= 200    20-24 Y     0 - 189       190 - 224     >/= 225         >24 Y     0 - 199       200 - 239      >/= 240   **All ranges are based on fasting samples. Specific   therapeutic targets will vary based on patient-specific   cardiac risk.    Pediatric guidelines reference:Pediatrics 2011, 128(S5).Adult guidelines reference: NCEP ATPIII Guidelines,JOSE DAVID 2001, 258:2486-97    Venipuncture immediately after or during the administration of Metamizole may lead to falsely low results. Testing should be performed immediately prior to Metamizole dosing.    HDL-Cholesterol 06/02/2025 78.9  mg/dL Final      Age       Very Low   Low     Normal    High    0-19 Y    < 35      < 40     40-45     ----  20-24 Y    ----     < 40      >45      ----        >24 Y      ----     < 40     40-60      >60      Cholesterol/HDL Ratio 06/02/2025 3.7   Final      Ref Values  Desirable  < 3.4  High Risk  > 5.0    LDL Calculated 06/02/2025 195 (H)  <=99 mg/dL Final                                Near   Borderline      AGE      Desirable  Optimal    High     High     Very High     0-19 Y     0 - 109     ---    110-129   >/= 130     ----    20-24 Y     0 - 119     ---    120-159   >/= 160     ----      >24 Y     0 -  99   100-129  130-159   160-189     >/=190      VLDL 06/02/2025 19  0 - 40 mg/dL Final    Triglycerides 06/02/2025 95  0 - 149 mg/dL Final    Age              Desirable        Borderline         High        Very High  SEX:B           mg/dL             mg/dL               mg/dL      mg/dL  <=14D                       ----               ----        ----  15D-365D                    ----               ----        ----  1Y-9Y           0-74               75-99             >=100       ----  10Y-19Y        0-89                            >=130       ----  20Y-24Y        0-114             115-149             >=150      ----  >= 25Y         0-149             150-199             200-499    >=500      Venipuncture immediately after or during the administration of Metamizole may lead to falsely low results. Testing should be  performed immediately prior to Metamizole dosing.    Non HDL Cholesterol 06/02/2025 214 (H)  0 - 149 mg/dL Final          Age       Desirable   Borderline High   High     Very High     0-19 Y     0 - 119       120 - 144     >/= 145    >/= 160    20-24 Y     0 - 149       150 - 189     >/= 190      ----         >24 Y    30 mg/dL above LDL Cholesterol goal      LDL, Direct 06/02/2025 197 (H)  0 - 129 mg/dL Final    Glucose 06/02/2025 91  74 - 99 mg/dL Final    Sodium 06/02/2025 137  136 - 145 mmol/L Final    Potassium 06/02/2025 4.1  3.5 - 5.3 mmol/L Final    Chloride 06/02/2025 103  98 - 107 mmol/L Final    Bicarbonate 06/02/2025 28  21 - 32 mmol/L Final    Anion Gap 06/02/2025 10  10 - 20 mmol/L Final    Urea Nitrogen 06/02/2025 21  6 - 23 mg/dL Final    Creatinine 06/02/2025 0.71  0.50 - 1.05 mg/dL Final    eGFR 06/02/2025 >90  >60 mL/min/1.73m*2 Final    Calculations of estimated GFR are performed using the 2021 CKD-EPI Study Refit equation without the race variable for the IDMS-Traceable creatinine methods.  https://jasn.asnjournals.org/content/early/2021/09/22/ASN.0931577214    Calcium 06/02/2025 9.2  8.6 - 10.3 mg/dL Final    Albumin 06/02/2025 4.0  3.4 - 5.0 g/dL Final    Alkaline Phosphatase 06/02/2025 57  33 - 136 U/L Final    Total Protein 06/02/2025 6.4  6.4 - 8.2 g/dL Final    AST 06/02/2025 17  9 - 39 U/L Final    Bilirubin, Total 06/02/2025 0.5  0.0 - 1.2 mg/dL Final    ALT 06/02/2025 11  7 - 45 U/L Final    Patients treated with Sulfasalazine may generate falsely decreased results for ALT.    Vitamin D, 25-Hydroxy, Total 06/02/2025 35  30 - 100 ng/mL Final    Hemoglobin A1C 06/02/2025 5.3  See comment % Final    Estimated Average Glucose 06/02/2025 105  Not Established mg/dL Final    WBC 06/02/2025 4.1 (L)  4.4 - 11.3 x10*3/uL Final    nRBC 06/02/2025    Final    Not Measured    RBC 06/02/2025 4.17  4.00 - 5.20 x10*6/uL Final    Hemoglobin 06/02/2025 12.7  12.0 - 16.0 g/dL Final    Hematocrit  06/02/2025 37.8  36.0 - 46.0 % Final    MCV 06/02/2025 91  80 - 100 fL Final    MCH 06/02/2025 30.5  26.0 - 34.0 pg Final    MCHC 06/02/2025 33.6  32.0 - 36.0 g/dL Final    RDW 06/02/2025 12.9  11.5 - 14.5 % Final    Platelets 06/02/2025 206  150 - 450 x10*3/uL Final    Neutrophils % 06/02/2025 57.1  40.0 - 80.0 % Final    Immature Granulocytes %, Automated 06/02/2025 0.2  0.0 - 0.9 % Final    Immature Granulocyte Count (IG) includes promyelocytes, myelocytes and metamyelocytes but does not include bands. Percent differential counts (%) should be interpreted in the context of the absolute cell counts (cells/UL).    Lymphocytes % 06/02/2025 27.3  13.0 - 44.0 % Final    Monocytes % 06/02/2025 10.0  2.0 - 10.0 % Final    Eosinophils % 06/02/2025 4.4  0.0 - 6.0 % Final    Basophils % 06/02/2025 1.0  0.0 - 2.0 % Final    Neutrophils Absolute 06/02/2025 2.35  1.20 - 7.70 x10*3/uL Final    Percent differential counts (%) should be interpreted in the context of the absolute cell counts (cells/uL).    Immature Granulocytes Absolute, Au* 06/02/2025 0.01  0.00 - 0.70 x10*3/uL Final    Lymphocytes Absolute 06/02/2025 1.12 (L)  1.20 - 4.80 x10*3/uL Final    Monocytes Absolute 06/02/2025 0.41  0.10 - 1.00 x10*3/uL Final    Eosinophils Absolute 06/02/2025 0.18  0.00 - 0.70 x10*3/uL Final    Basophils Absolute 06/02/2025 0.04  0.00 - 0.10 x10*3/uL Final    LDH 06/02/2025 151  84 - 246 U/L Final               Performance Status:    ECOG 1: Restricted in physically strenuous activity but ambulatory and able to carry out work of a light or sedentary nature, e.g., light house work, office work.       Imaging:  I have personally reviewed the below imaging and concur with the reported findings unless otherwise stated:    CT chest abdomen pelvis w IV contrast  Result Date: 6/6/2025  Impression: CHEST 1. Oral stable CT scan of the chest without significant interval change in the size of the right lung nodules as described.   ABDOMEN -  PELVIS No acute abnormalities within the abdomen or pelvis. No suspicious masses.   Diverticulosis without definite diverticulitis.   MACRO: None   Signed by: Micaela Luna 6/6/2025 3:25 PM Dictation workstation:   [id number]    MR brain w and wo IV contrast  Result Date: 6/6/2025  Impression: No evidence of acute infarct, intracranial mass effect or midline shift.   No evidence of intracranial metastatic disease.   Moderate volume loss. Mild-to-moderate nonspecific white matter signal compatible with microangiopathy.   MACRO: None   Signed by: Mallory Partida 6/6/2025 2:23 PM Dictation workstation:   [id number]    CT soft tissue neck w IV contrast  Result Date: 6/5/2025  Impression: No evidence of cervical lymphadenopathy or mass.   Moderate centrilobular emphysema again noted.   Signed by: Jovana Veloz 6/5/2025 1:34 PM Dictation workstation:   [id number]      Assessment/Plan      Mrs. Branch is a 69 yo with COPD and GERD who presented with newly diagnosed SCLC completed BID radiation planned concurrently with cis/etoposide but had a reaction C2D1 to cisplatin. Completed 1 cycle carbo/etop D1 4/5/22 also complicated by facial flushing and erythematous rash and stopped further treatment. Now s/p PCI in 6/2022. Started clinical trial [id number] with tarlatamab 1/24/23. Treatment has been complicated by worsening short-term memory worse for the day or two after treatment, delayed C2 by 1 week and dose-reduced. Confusion has largely resolved during C3 after stopping mirtazapine but she and her  recognize transient worsening for the day or two after treatment. Stopped tarlatamab after developing paraneoplastic cerebellar ataxia.      #SCLC  - originally diagnosed with limited stage SCLC and started concurrent chemoradiation cis/etop 2/15/22, with reaction to cisplatin on C2D1  - trialed carboplatin/etoposide with rash and opted to forego further chemo as she had completed radiation  - progression 11/2022, and  enrolled on [id number] (tarlatamab) started C1D1 1/24/23. 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 Currently off cytoxan while she continues recovery.  - referral to PT  - needs a transport chair as she is unable to use cane or walker due to significant functional mobility deficits and inability to walk due to unsteady gait and poor balance. Transport chair will enable her to participate in ADL's and complete toileting, feeding, grooming, dressing, in the home. She is able to use the transport chair in her home.     #Double vision/confusion/gait instability  -5/20/24 MRI brain negative.   -Discussed in phase 1 meeting and seems consistent with WBR late effect.   -She was seen by her ophthalmologist and will continue to follow.  -saw neurology and undergoing work-up as well  - continues on dexamethasone, and note decreased cortisol and ACTH - will refer to endocrinology for consideration if this is clinically significant and impacting confusion/gait instability    Francy Archer MD

## 2025-06-16 ENCOUNTER — APPOINTMENT (OUTPATIENT)
Dept: PRIMARY CARE | Facility: CLINIC | Age: 70
End: 2025-06-16
Payer: MEDICARE

## 2025-06-16 VITALS
TEMPERATURE: 97.3 F | HEIGHT: 60 IN | BODY MASS INDEX: 22.81 KG/M2 | HEART RATE: 76 BPM | DIASTOLIC BLOOD PRESSURE: 70 MMHG | SYSTOLIC BLOOD PRESSURE: 104 MMHG | WEIGHT: 116.2 LBS | OXYGEN SATURATION: 96 %

## 2025-06-16 DIAGNOSIS — G31.89: ICD-10-CM

## 2025-06-16 DIAGNOSIS — D63.8 ANEMIA, CHRONIC DISEASE: ICD-10-CM

## 2025-06-16 DIAGNOSIS — T45.1X5S PERIPHERAL NEUROPATHY DUE TO AND NOT CONCURRENT WITH CHEMOTHERAPY (MULTI): ICD-10-CM

## 2025-06-16 DIAGNOSIS — G11.9 CEREBELLAR ATAXIA (MULTI): ICD-10-CM

## 2025-06-16 DIAGNOSIS — C34.90 SMALL CELL CARCINOMA OF LUNG, UNSPECIFIED LATERALITY, UNSPECIFIED PART OF LUNG: ICD-10-CM

## 2025-06-16 DIAGNOSIS — E55.9 VITAMIN D DEFICIENCY: ICD-10-CM

## 2025-06-16 DIAGNOSIS — E78.2 MIXED HYPERLIPIDEMIA: Primary | ICD-10-CM

## 2025-06-16 DIAGNOSIS — R32 URINARY INCONTINENCE, UNSPECIFIED TYPE: ICD-10-CM

## 2025-06-16 DIAGNOSIS — R73.01 IMPAIRED FASTING BLOOD SUGAR: ICD-10-CM

## 2025-06-16 DIAGNOSIS — G62.0 PERIPHERAL NEUROPATHY DUE TO AND NOT CONCURRENT WITH CHEMOTHERAPY (MULTI): ICD-10-CM

## 2025-06-16 LAB
POC APPEARANCE, URINE: ABNORMAL
POC BILIRUBIN, URINE: NEGATIVE
POC BLOOD, URINE: NEGATIVE
POC COLOR, URINE: ABNORMAL
POC GLUCOSE, URINE: NEGATIVE MG/DL
POC KETONES, URINE: ABNORMAL MG/DL
POC LEUKOCYTES, URINE: NEGATIVE
POC NITRITE,URINE: NEGATIVE
POC PH, URINE: 6.5 PH
POC PROTEIN, URINE: ABNORMAL MG/DL
POC SPECIFIC GRAVITY, URINE: 1.02
POC UROBILINOGEN, URINE: 1 EU/DL

## 2025-06-16 PROCEDURE — 1160F RVW MEDS BY RX/DR IN RCRD: CPT | Performed by: FAMILY MEDICINE

## 2025-06-16 PROCEDURE — 99214 OFFICE O/P EST MOD 30 MIN: CPT | Performed by: FAMILY MEDICINE

## 2025-06-16 PROCEDURE — G2211 COMPLEX E/M VISIT ADD ON: HCPCS | Performed by: FAMILY MEDICINE

## 2025-06-16 PROCEDURE — 3008F BODY MASS INDEX DOCD: CPT | Performed by: FAMILY MEDICINE

## 2025-06-16 PROCEDURE — 81003 URINALYSIS AUTO W/O SCOPE: CPT | Performed by: FAMILY MEDICINE

## 2025-06-16 PROCEDURE — 1036F TOBACCO NON-USER: CPT | Performed by: FAMILY MEDICINE

## 2025-06-16 PROCEDURE — 1159F MED LIST DOCD IN RCRD: CPT | Performed by: FAMILY MEDICINE

## 2025-06-16 RX ORDER — VIT C/E/ZN/COPPR/LUTEIN/ZEAXAN 250MG-90MG
25 CAPSULE ORAL DAILY
COMMUNITY

## 2025-06-16 NOTE — ASSESSMENT & PLAN NOTE
May be due in part to constipation  Trial miralax  - call if worsening diarrhea  Culture sent - treat if positive

## 2025-06-18 DIAGNOSIS — N30.00 ACUTE CYSTITIS WITHOUT HEMATURIA: Primary | ICD-10-CM

## 2025-06-19 ENCOUNTER — APPOINTMENT (OUTPATIENT)
Dept: NEUROLOGY | Facility: CLINIC | Age: 70
End: 2025-06-19
Payer: MEDICARE

## 2025-06-19 VITALS
HEART RATE: 85 BPM | BODY MASS INDEX: 22.34 KG/M2 | WEIGHT: 113.8 LBS | SYSTOLIC BLOOD PRESSURE: 108 MMHG | DIASTOLIC BLOOD PRESSURE: 60 MMHG | HEIGHT: 60 IN

## 2025-06-19 DIAGNOSIS — G31.89: Primary | ICD-10-CM

## 2025-06-19 DIAGNOSIS — F03.90 DEMENTIA WITHOUT BEHAVIORAL DISTURBANCE (MULTI): ICD-10-CM

## 2025-06-19 LAB — BACTERIA UR CULT: ABNORMAL

## 2025-06-19 RX ORDER — AMOXICILLIN 500 MG/1
500 CAPSULE ORAL 2 TIMES DAILY
Qty: 14 CAPSULE | Refills: 0 | Status: SHIPPED | OUTPATIENT
Start: 2025-06-19 | End: 2025-06-26

## 2025-06-19 ASSESSMENT — ENCOUNTER SYMPTOMS
DEPRESSION: 0
OCCASIONAL FEELINGS OF UNSTEADINESS: 1
LOSS OF SENSATION IN FEET: 0

## 2025-06-19 NOTE — PROGRESS NOTES
"Subjective     Gladys Branch is a right handed  69 y.o. year old female who presents with Follow-up (3 month FU). Patient is accompanied by: spouse  Visit type: follow up visit     She has been using a walker which is new. She was not interested in IVIG given poor evidence and fear of DVTs. Her balance difficulty seems to be slowly worsening versus plateauing. Her numbness is unchanged. She seems to be eating and drinking OK. She was treated for a UTI recently.    Prior HX  She has SCLC and s/p cisplatin/etoposide 2022, and she is currently in a clinical trial. For the past year or so she has had balance difficulty. It has been slowly getting worse in the past 6 months. She has had 4 falls in the past year. She is not using a cane. She has had numbness in her distal feet ever since her chemotherapy.    Her vision has been worsening. She has had horizontal diplopia which has been going on for several months as well. She will have to close one eye to read things properly.    She has had cognitive decline ever since having prophylactic brain radiation. She has trouble remembering things she did the previous day, or forgetting appointments. She has stopped driving because of vision. She is otherwise independent in other IADLs including cooking, shopping, and cleaning.    Jan 2025:  She was admitted to [facility] admitted with hypoxia, dyspnea, fever, and cough. She was treated for pneumonia and had breathing treatments. She was having severe difficulty walking with generalized weakness. She was admitted inpatient and rehab (but went back and forth due to feeling too sick) for the past month. Her MRI brain 1/20/25 did not show any evidence of metastasis nor new lesions. I reviewed her 2 most recent MRIs dated 1/20/25 and 2/26/25, both showing stable cerebellar > cerebral atrophy, moderate confluent periventricular hyperintensity, and no abnormal enhancement. EEG showed slowing without epileptiform discharges. \"CTA " "chest: CTA chest: No PE, ill-defined ground glass opacity RLL may be secondary to inflammatory versus infection, patchy consolidation of RLL, emphysema. \" Seen by neurology for generalized weakness and metabolic encephalopathy. Most recent bloodwork mild anemia 11-12 and mild lymphopenia in 900s    Last dose of Cytoxan Aug 2024    Patient Active Problem List   Diagnosis    Chronic GERD    Hyperlipidemia    Impaired fasting blood sugar    Problem drinking    Seasonal allergic rhinitis    Small cell lung cancer    Vitamin D deficiency    Abnormal EKG    Agatston CAC score, <100    Frequent unifocal PVCs    Medicare annual wellness visit, subsequent    Peripheral neuropathy due to and not concurrent with chemotherapy (Multi)    [place] cardiac risk <10% in next 10 years    Anemia, chronic disease    History of diverticulitis    Chronic constipation    Cerebellar ataxia (Multi)    Paraneoplastic cerebellar degeneration    Impaired functional mobility, balance, gait, and endurance    Urinary incontinence      Past Medical History:   Diagnosis Date    Abdominal pain     Anemia     COPD (chronic obstructive pulmonary disease) (Multi)     Disease due to severe acute respiratory syndrome coronavirus 2 (SARS-CoV-2) 02/07/2023    Diverticulosis     Double vision 05/17/2024    GERD (gastroesophageal reflux disease)     Hyperlipidemia     Neuropathy     New onset of headache in cancer patient 05/17/2024    Other specified abnormal findings of blood chemistry 07/26/2022    Elevated serum creatinine    Other specified abnormal findings of blood chemistry 07/26/2022    Elevated serum creatinine    Other specified abnormal findings of blood chemistry 07/26/2022    Elevated TSH    Other specified abnormal findings of blood chemistry 07/26/2022    Elevated TSH    Other specified abnormal findings of blood chemistry 07/26/2022    Elevated TSH    Other specified abnormal findings of blood chemistry 07/26/2022    Elevated TSH    Other " specified abnormal findings of blood chemistry 09/15/2020    Elevated TSH    Personal history of other diseases of the respiratory system     History of chronic obstructive lung disease    Small cell lung cancer       Past Surgical History:   Procedure Laterality Date    HYSTERECTOMY      OTHER SURGICAL HISTORY  2020    Hysterectomy    OTHER SURGICAL HISTORY  2021    Tubal ligation    OTHER SURGICAL HISTORY  2021    Partial atrial septal defect repair    US GUIDED BIOPSY LYMPH NODE SUPERFICIAL  2023    US GUIDED BIOPSY LYMPH NODE SUPERFICIAL 2023 DOCTOR OFFICE LEGACY      Social History     Socioeconomic History    Marital status:      Spouse name: Not on file    Number of children: Not on file    Years of education: Not on file    Highest education level: Not on file   Occupational History    Not on file   Tobacco Use    Smoking status: Former     Current packs/day: 0.00     Average packs/day: 1 pack/day for 25.0 years (25.0 ttl pk-yrs)     Types: Cigarettes     Start date:      Quit date:      Years since quittin.4     Passive exposure: Past    Smokeless tobacco: Never   Vaping Use    Vaping status: Never Used   Substance and Sexual Activity    Alcohol use: Not Currently     Alcohol/week: 1.0 - 2.0 standard drink of alcohol     Types: 1 - 2 Glasses of wine per week     Comment: once  in a while    Drug use: Never    Sexual activity: Not on file   Other Topics Concern    Not on file   Social History Narrative    Not on file     Social Drivers of Health     Financial Resource Strain: Not on file   Food Insecurity: No Food Insecurity (2025)    Hunger Vital Sign     Worried About Running Out of Food in the Last Year: Never true     Ran Out of Food in the Last Year: Never true   Transportation Needs: Not on file   Physical Activity: Not on file   Stress: Not on file   Social Connections: Not on file   Intimate Partner Violence: Not on file   Housing Stability:  Not on file      Family History   Problem Relation Name Age of Onset    Dementia Mother      Depression Mother      Other (varicose veins of lower extremity) Mother      Alcohol abuse Father      Breast cancer Father's Sister        Patient Health Questionnaire-2 Score: 0          Review of Systems  All other system have been reviewed and are negative for complaint.    Vitals:    06/19/25 1348   BP: 108/60   BP Location: Left arm   Patient Position: Sitting   BP Cuff Size: Adult   Pulse: 85   Weight: 51.6 kg (113 lb 12.8 oz)   Height: 1.524 m (5')       Objective   (Previous)  Neurological Exam  Mental Status  Awake, alert and oriented to person, place and time. Speech is normal. Language is fluent with no aphasia. Attention and concentration are normal.    Cranial Nerves  CN II: Visual fields full to confrontation.  CN III, IV, VI: Slight exophoria. Downbeat nystagmus is present. Hypermetric saccades. Normal smooth pursuit. Normal lids and orbits bilaterally. Pupils equal round and reactive to light bilaterally.  CN V:  Right: Facial sensation is normal.  Left: Facial sensation is normal on the left.  CN VII: Full and symmetric facial movement.  CN VIII: Hearing is normal.  CN IX, X: Palate elevates symmetrically  CN XI: Shoulder shrug strength is normal.  CN XII: Tongue midline without atrophy or fasciculations.    Motor  Normal muscle bulk throughout. No fasciculations present. Normal muscle tone. No abnormal involuntary movements.                                               Right                     Left   Shoulder abduction               5                          5  Elbow flexion                         5                          5  Elbow extension                    5                          5  Finger flexion                         5                          5  Finger extension                    5                          5  Finger abduction                    5                          5  Hip flexion                               5                          5  Knee flexion                           5                          5  Plantarflexion                         5                          5  Dorsiflexion                            5                          5    Sensory  Light touch is normal in upper and lower extremities. Moderately reduced vibration in knees and ankles.     Reflexes                                            Right                      Left  Biceps                                 3+                         3+  Triceps                                3+                         3+  Patellar                                3+                         3+  Achilles                                1+                         1+    Right pathological reflexes: Apm's absent.  Left pathological reflexes: Pam's absent.    Coordination  Right: Heel-to-shin normal.Left: Heel-to-shin normal.  Slight dysmetria on finger vicki.    Gait    Walks cautiously with a walker.         BRIAN  GAIT Gait: 6 Stance Stance: 3 Sitting Sitting: 3 Speech Distrubance Speech Disturbance: 2, Nose Finger Test - Left: 1, Nose Finger Test - Right: 2, Alternating Hand Movement - Right: 2, Alternating Hand Movement - Left: 2, Heel-Shin - Right: 1, Heel-Shin - Left: 2 BRIAN Total Score Finger Vicki - Mean Score: 2, Nose Finger Test - Mean Score: 1.5, Alternating Hand Movement Mean Score: 2, Heel-Shin Meat Total: 1.5, BRIAN Total Score: 21  Hemoglobin A1C   Date Value Ref Range Status   06/02/2025 5.3 See comment % Final     Estimated Average Glucose   Date Value Ref Range Status   06/02/2025 105 Not Established mg/dL Final     Thyroid Stimulating Hormone   Date Value Ref Range Status   06/02/2025 2.71 0.44 - 3.98 mIU/L Final     Folate, Serum   Date Value Ref Range Status   04/04/2025 6.7 >5.0 ng/mL Final         Assessment/Plan   Problem List Items Addressed This Visit           ICD-10-CM    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 year old female with COPD, small cell lung cancer (off treatment, on frequent surveillance), here for FUV for paraneoplastic cerebellar degeneration. Her exam is notable for downbeat nystagmus and ataxic gait, and supported by cerebellar atrophy on MRI and positive serum anti-ANNA1 antibodies.   She has additionally had cognitive decline with MOCA 10/30, gastroparesis, and possible neuropathy. We stopped Cytoxan given frequent infections. I offered IVIG but she declined given poor evidence as well as concern of side effects. Her deficits seem to have stabilized since spring 2025.  We discussed the following plan today:  We will monitor the ataxia off treatment  The [employer] Caregiver Program, an evidence-based, scientifically validated psycho-educational program, is a free service to caregivers of dementia patients. This program is a six-week course of two-hour classes, designed to provide the caregiver with the tools they need to understand and manage the memory loss and behavioral symptoms common in dementia.  For more information or referrals, contact Diana Hightower RN, at [phone] or email [email or web address].   Here are more resources available for you :    a. Consultation with a  who specializes in cognitive decline in older adulthood. They can assist with counseling, educational resources, patient support groups, caregiver support groups, and preparation for future changes. Beth Wachter or Amada Willams at [facility] ([address] [place] [address] [address] in [place]; [phone]) could help with a referral, or they can contact the Alzheimer’s Association 24-hour Helpline (1-161.403.7443).   b. Diana Hightower at [facility] runs a caregiver training program that uses empirically based  techniques to prepare and equip caregivers for the demands of that role when caring for someone with dementia.    c. Helpful resources for addressing the day-to-day challenges of cognitive dysfunction are offered at the Alzheimer’s Association (now the Alzheimer’s Disease and Related Disorders Association) website (www.alz.org <http://www.alz.org>) or by calling their 24-hour Helpline (1-361.460.2033).    d. The Family Caregiver [place] website also has helpful information: <http://www.caregiver.org/caregiver/jsp/home.jsp>  Return in 4 months

## 2025-06-19 NOTE — PATIENT INSTRUCTIONS
Thank you for your visit today. 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 If you have any questions or need to reach me, call my office at [phone].    We discussed the following plan today:  We will monitor the ataxia off treatment  Here are more resources available for you :    a. Consultation with a  who specializes in cognitive decline in older adulthood. They can assist with counseling, educational resources, patient support groups, caregiver support groups, and preparation for future changes. Beth Wachter or Amada Willams at [facility] ([facility] [address] in [place]; [phone]) could help with a referral, or they can contact the Alzheimer’s Association 24-hour Helpline (1-640.397.5042).   b. Diana Hightower at [facility] runs a caregiver training program that uses empirically based techniques to prepare and equip caregivers for the demands of that role when caring for someone with dementia.    c. Helpful resources for addressing the day-to-day challenges of cognitive dysfunction are offered at the Alzheimer’s Association (now the Alzheimer’s Disease and Related Disorders Association) website (www.alz.org <http://www.alz.org>) or by calling their 24-hour Helpline (1-617.493.2849).    d. The Family Caregiver [place] website also has helpful information: <http://www.caregiver.org/caregiver/jsp/home.jsp>  Return in 4 months

## 2025-06-23 ENCOUNTER — APPOINTMENT (OUTPATIENT)
Dept: OBSTETRICS AND GYNECOLOGY | Facility: CLINIC | Age: 70
End: 2025-06-23
Payer: MEDICARE

## 2025-06-23 DIAGNOSIS — N39.9 URINARY DISORDER: Primary | ICD-10-CM

## 2025-06-23 NOTE — PROGRESS NOTES
Urogynecology  Provider:  Teresita Schwarz MD  [phone]              ASSESSMENT AND PLAN:     Problem List Items Addressed This Visit    None          I spent a total of *** minutes in face to face and non face to face time.      Teresita Schwarz MD              HISTORY OF PRESENT ILLNESS:     ***     Record Review:     Prolapse Symptoms :     Urinary Symptoms:     Bowel Symptoms:     Sexual Activity:          Past Medical History:       Medical History[1]       Past Surgical History:       Surgical History[2]      Medications:       Prior to Admission medications    Medication Sig Start Date End Date Taking? Authorizing Provider   amoxicillin (Amoxil) 500 mg capsule Take 1 capsule (500 mg) by mouth 2 times a day for 7 days. 6/19/25 6/26/25  Nova Omer MD   aprepitant (Emend) 80 mg capsule Take 1 capsule (80 mg) by mouth once daily.    Historical Provider, MD   B complex-vitamin C-folic acid (Nephro-Tracee) 0.8 mg tablet Take 1 tablet by mouth once daily.    Historical Provider, MD   cetirizine-pseudoephedrine (ZyrTEC-D) 5-120 mg 12 hr tablet Take 1 tablet by mouth once daily. 8/27/24 8/27/25  Francy Archer MD   cholecalciferol (Vitamin D-3) 25 mcg (1,000 units) capsule Take 1 capsule (25 mcg) by mouth once daily.    Historical Provider, MD   mirabegron (Myrbetriq) 50 mg tablet extended release 24 hr 24 hr tablet Take 1 tablet (50 mg) by mouth once daily. 4/1/25 6/30/25  Feng Carter MD   cholecalciferol (Vitamin D-3) 25 MCG (1000 UT) tablet Take by mouth.  Patient not taking: Reported on 6/10/2025  6/16/25  Historical Provider, MD   omeprazole (PriLOSEC) 40 mg DR capsule Take 1 capsule (40 mg) by mouth early in the morning..  Patient not taking: Reported on 6/10/2025 2/12/25 6/16/25  Historical ProviderMD REHMAN  Review of Systems       PHYSICAL EXAM:      LMP  (LMP Unknown)      No LMP recorded (lmp unknown). Patient has had a hysterectomy.      Declines chaperone for physical  exam.    PVR=     Well developed, well nourished, in no apparent distress.   Neurologic/Psychiatric:  Awake, Alert and Oriented times 3.  Affect normal. Normal cranial nerves  Pulm: breathing without effort  Sexual maturity: Robb stage V  Abd exam: soft, non-tender      GENITAL/URINARY:       External Genitalia:  The patient has normal appearing external genitalia, normal skenes and bartholins glands, and a normal hair distribution.  Her vulva is without lesions, erythema or discharge.  It is non-tender with appropriate sensation.     Urethral Meatus:  Size normal, Location normal, Lesions absent, Prolapse absent,      Urethra:  Fullness absent, Masses absent,      Bladder:  Fullness absent, Masses absent, Tenderness absent,      Vagina:  General appearance normal, Estrogen effect normal, Discharge absent, Lesions absent, ***     Cervix: Normal, no discharge.   Uterus:  {UTERUS, EXAM:82090}  Adnexa:  {exam; adnexa:93713}    Anus/Perineum:  Lesions absent and Masses absent {Exam; anus:45907}  {Exam; anus and perineum:42083}    Stress urinary incontinence *** demonstrable.       POP-Q  The patient has Stage *** Prolapse.    POP-Q:  Stage: {NUMBERS 0-4:08376}  Position: {SITTING STANDIN}    Aa: ***       Ba: *** C: ***   Gh: *** Pb: *** TVL: ***         Ap: *** Bp: *** D: ***             The patient has {0-5 natali:67531} out of 5 pelvic floor muscle strength.    She {DOES/ DOES NOT:63337} have myofascial tenderness on exam.   Her highest pain score on exam is {NUMBERS 1-10:78179}        She has {0-5 natali:55960} resting anal sphincter and *** squeeze tone.    Rectal exam: ***.     Assessment and Plan:        Teresita Schwarz MD         [1]   Past Medical History:  Diagnosis Date    Abdominal pain     Anemia     COPD (chronic obstructive pulmonary disease) (Multi)     Disease due to severe acute respiratory syndrome coronavirus 2 (SARS-CoV-2) 2023    Diverticulosis     Double vision 2024     GERD (gastroesophageal reflux disease)     Hyperlipidemia     Neuropathy     New onset of headache in cancer patient 05/17/2024    Other specified abnormal findings of blood chemistry 07/26/2022    Elevated serum creatinine    Other specified abnormal findings of blood chemistry 07/26/2022    Elevated serum creatinine    Other specified abnormal findings of blood chemistry 07/26/2022    Elevated TSH    Other specified abnormal findings of blood chemistry 07/26/2022    Elevated TSH    Other specified abnormal findings of blood chemistry 07/26/2022    Elevated TSH    Other specified abnormal findings of blood chemistry 07/26/2022    Elevated TSH    Other specified abnormal findings of blood chemistry 09/15/2020    Elevated TSH    Personal history of other diseases of the respiratory system     History of chronic obstructive lung disease    Small cell lung cancer    [2]   Past Surgical History:  Procedure Laterality Date    HYSTERECTOMY  1999    OTHER SURGICAL HISTORY  03/09/2020    Hysterectomy    OTHER SURGICAL HISTORY  03/16/2021    Tubal ligation    OTHER SURGICAL HISTORY  03/16/2021    Partial atrial septal defect repair    US GUIDED BIOPSY LYMPH NODE SUPERFICIAL  01/17/2023    US GUIDED BIOPSY LYMPH NODE SUPERFICIAL 1/17/2023 DOCTOR OFFICE LEGACY

## 2025-06-26 NOTE — PROGRESS NOTES
Referred by: Dr. Archer     PCP  Nova Omer MD         CHIEF COMPLAINT:  ***         HISTORY OF PRESENT ILLNESS:  This is a  69 y.o. y.o. female who presents with ***    The following were reviewed to gain additional history:  External notes: Jaden OGLESBY (OB/GYN), notes UUI, memory change srelated to recent cancer  Dr. Mora neurology note re: paraneoplastic cerebellar degeneration, cognitive decline (recent MOCA 10/30)  Test results:   Lab Results   Component Value Date    URINECULTURE SEE NOTE (A) 06/16/2025    URINECULTURE >=100,000 CFU/mL Escherichia coli (A) 05/14/2025    URINECULTURE SEE NOTE 03/12/2025    URINECULTURE (A) 03/03/2025     >=100,000 CFU/mL Klebsiella oxytoca/Raoultella species    URINECULTURE 20,000 - 80,000 CFU/mL Aerococcus urinae (A) 03/03/2025              Specifically, she describes the following pelvic floor symptoms:          Prolapse: {yes,no:21211}       - Splinting to urinate: {yes,no:21211}       - Splinting for bowel movement/stool trapping: {yes,no:21211}              Incontinence:  {yes,no:21211}             {types of incontinence:60055}              Urinary Symptoms:       - Frequency:  {yes,no:21211}             # Voids:         - Nocturia: {yes,no:21211}             # Voids:         - Urgency:  {yes,no:21211}       - Incomplete emptying:  {YES wildcard/NO:60}       - Hesitancy:  {YES wildcard/NO:60}       - Pain with voiding:  {YES wildcard/NO:60}       - Excessive fluid intake: {YES wildcard/NO:60}               History:       - Recurrent UTI:  {YES wildcard/NO:60}       - Hematuria:  {YES wildcard/NO:60}       - Stones:  {YES wildcard/NO:60}       - Kidney Disease:  {YES wildcard/NO:60}                  Bowel Symptoms:       - Regular: {yes,no:21211}       - Diarrhea:{yes,no:21211}       - Constipation: {yes,no:21211}       - Fecal Incontinence:  {yes,no:21211}       - Flatus Incontinence:  {yes,no:21211}       - Fecal urgency:   {yes,no:21211}    Past medical and  surgical hx reviewed - pertinent for   PMH: cognitive decline, paraneoplastic cerebellar degeneration, COPD, GERD, HLD, small cell lung cancer  PSH ***hysterectomy, tubal ligation, partial atrial septal defect repair  Smoking history: former, 25 pack year history, quick         Gyn History:  - Postmenopause: Yes           Postmenopausal bleeding: {YES wildcard/NO:60}  - HRT: {YES wildcard/NO:60}  - Pap up to date: {YES wildcard/NO:60}   History of abnormal pap: {YES wildcard/NO:60}  - Sexually active:  {yes,no:}  Dyspareunia: {yes,no:}   Other issues: ***  - Number of prior vaginal deliveries: ***   Number of prior operative deliveries: ***   Prior OASI? ***  - Number of prior c-sections: ***    - Mammogram up to date: Yes  - Colonoscopy up to date: Yes    OB History          2    Para   2    Term   2            AB        Living             SAB        IAB        Ectopic        Multiple        Live Births                           PHYSICAL EXAMINATION:  No LMP recorded (lmp unknown). Patient has had a hysterectomy.  There is no height or weight on file to calculate BMI.  LMP  (LMP Unknown)   General Appearance: well appearing  Neuro: Alert and oriented   HEENT: mucous membranes moist, neck supple  Resp: No respiratory distress, normal work of breathing  MSK: normal range of motion, gait appropriate    Pelvic:  Genitourinary: normal external genitalia, Bartholin's glands negative, [place]'s glands negative  Urethra: normal meatus, non-tender, no periurethral mass  Vaginal mucosa *** normal  Cervix surgically absent*** normal  Uterus surgically absent*** normal size, non-tender, mobile  Adnexae *** negative nontender, no masses  Atrophy {POSITIVE/NEGATIVE:17896}    CST {POSITIVE/NEGATIVE:95219}  Pelvic floor muscle contraction  ***/5    POP-Q (in supine position):       Aa ***     Ba ***     C ***              gh ***     pb ***     tvl ***              Ap ***     Bp ***     D ***    Rectal:  no hemorrhoids, fissures or masses***    PVR (by Ultrasound): ***         IMPRESSION AND PLAN:  Gladys Branch is a 69 y.o. who presents with ***    ***  Note: cognitive decline - would not be candidate for anticholinergics    All questions and concerns were answered and addressed.  The patient expressed understanding and agrees with the plan.     RTC ***    6/26/2025        antibiotic resistance, and inappropriate treatment if diagnosis is uncertain  - Urinalysis with nitrates, will follow up culture      All questions and concerns were answered and addressed.  The patient expressed understanding and agrees with the plan.     RTC 3 months    6/26/2025

## 2025-06-27 DIAGNOSIS — N32.81 OAB (OVERACTIVE BLADDER): ICD-10-CM

## 2025-06-27 RX ORDER — MIRABEGRON 50 MG/1
50 TABLET, FILM COATED, EXTENDED RELEASE ORAL DAILY
Qty: 90 TABLET | Refills: 0 | Status: SHIPPED | OUTPATIENT
Start: 2025-06-27

## 2025-06-30 ENCOUNTER — APPOINTMENT (OUTPATIENT)
Dept: OBSTETRICS AND GYNECOLOGY | Facility: CLINIC | Age: 70
End: 2025-06-30
Payer: MEDICARE

## 2025-06-30 VITALS
SYSTOLIC BLOOD PRESSURE: 112 MMHG | HEIGHT: 60 IN | DIASTOLIC BLOOD PRESSURE: 74 MMHG | WEIGHT: 110 LBS | BODY MASS INDEX: 21.6 KG/M2

## 2025-06-30 DIAGNOSIS — N95.2 VAGINAL ATROPHY: Primary | ICD-10-CM

## 2025-06-30 DIAGNOSIS — N39.41 URGE INCONTINENCE OF URINE: ICD-10-CM

## 2025-06-30 DIAGNOSIS — N39.0 RECURRENT UTI: ICD-10-CM

## 2025-06-30 DIAGNOSIS — N32.81 OAB (OVERACTIVE BLADDER): ICD-10-CM

## 2025-06-30 LAB
POC APPEARANCE, URINE: ABNORMAL
POC BILIRUBIN, URINE: NEGATIVE
POC BLOOD, URINE: ABNORMAL
POC COLOR, URINE: ABNORMAL
POC GLUCOSE, URINE: NEGATIVE MG/DL
POC KETONES, URINE: NEGATIVE MG/DL
POC LEUKOCYTES, URINE: ABNORMAL
POC NITRITE,URINE: POSITIVE
POC PH, URINE: 6 PH
POC PROTEIN, URINE: NEGATIVE MG/DL
POC SPECIFIC GRAVITY, URINE: >=1.03
POC UROBILINOGEN, URINE: 0.2 EU/DL

## 2025-06-30 PROCEDURE — 99214 OFFICE O/P EST MOD 30 MIN: CPT | Performed by: STUDENT IN AN ORGANIZED HEALTH CARE EDUCATION/TRAINING PROGRAM

## 2025-06-30 PROCEDURE — 1036F TOBACCO NON-USER: CPT | Performed by: STUDENT IN AN ORGANIZED HEALTH CARE EDUCATION/TRAINING PROGRAM

## 2025-06-30 PROCEDURE — 3008F BODY MASS INDEX DOCD: CPT | Performed by: STUDENT IN AN ORGANIZED HEALTH CARE EDUCATION/TRAINING PROGRAM

## 2025-06-30 PROCEDURE — G2211 COMPLEX E/M VISIT ADD ON: HCPCS | Performed by: STUDENT IN AN ORGANIZED HEALTH CARE EDUCATION/TRAINING PROGRAM

## 2025-06-30 PROCEDURE — 81003 URINALYSIS AUTO W/O SCOPE: CPT | Performed by: STUDENT IN AN ORGANIZED HEALTH CARE EDUCATION/TRAINING PROGRAM

## 2025-06-30 PROCEDURE — 1126F AMNT PAIN NOTED NONE PRSNT: CPT | Performed by: STUDENT IN AN ORGANIZED HEALTH CARE EDUCATION/TRAINING PROGRAM

## 2025-06-30 RX ORDER — OMEPRAZOLE 40 MG/1
CAPSULE, DELAYED RELEASE ORAL
COMMUNITY
Start: 2025-06-27

## 2025-06-30 RX ORDER — VIBEGRON 75 MG/1
75 TABLET, FILM COATED ORAL DAILY
Qty: 30 TABLET | Refills: 2 | Status: SHIPPED | OUTPATIENT
Start: 2025-06-30 | End: 2025-09-28

## 2025-06-30 RX ORDER — ALBUTEROL SULFATE 90 UG/1
INHALANT RESPIRATORY (INHALATION)
COMMUNITY
Start: 2025-02-04

## 2025-06-30 RX ORDER — ESTRADIOL 0.1 MG/G
CREAM VAGINAL
Qty: 42.5 G | Refills: 2 | Status: SHIPPED | OUTPATIENT
Start: 2025-06-30

## 2025-06-30 ASSESSMENT — PAIN SCALES - GENERAL: PAINLEVEL_OUTOF10: 0-NO PAIN

## 2025-06-30 NOTE — PATIENT INSTRUCTIONS
Vaginal estrogen is a treatment for genitourinary syndrome of menopause, also known as vaginal atrophy. After menopause, the vaginal lining can thin and become dry or irritated due to a lack of estrogen effect. Vaginal estrogen cream is a way to effectively manage this problem.    Instructions for use:  Do not use the applicator that comes with your vaginal estrogen cream. Use you finger to insert a pea-sized amount of estrogen cream into the vagina. It does not have to go far into the vagina (about an inch or so is plenty). Do this twice weekly at bedtime.    Vaginal estrogen is NOT considered hormone therapy. There is very very minimal absorption into the bloodstream from the vagina. The amount that is absorbed is so low that it does not increase risk of cancer or blood clots. This is a very safe treatment and can have a lot of benefits for you including: decreased frequency of UTIs (bladder infections), decreased pain with intercourse, and decreased sensation of dryness in the vagina.

## 2025-07-01 ENCOUNTER — TELEPHONE (OUTPATIENT)
Dept: PRIMARY CARE | Facility: CLINIC | Age: 70
End: 2025-07-01
Payer: MEDICARE

## 2025-07-01 DIAGNOSIS — R06.6 CHRONIC HICCUPS: Primary | ICD-10-CM

## 2025-07-01 RX ORDER — BACLOFEN 20 MG/1
20 TABLET ORAL 2 TIMES DAILY PRN
Qty: 60 TABLET | Refills: 2 | Status: SHIPPED | OUTPATIENT
Start: 2025-07-01 | End: 2025-12-28

## 2025-07-01 NOTE — TELEPHONE ENCOUNTER
Bo called in regards to his wife.     He states for quite some time now Gladys has had very loose bowels.     At night, when it happens it is uncontrollable.     Bo stated he has to clean up after her and at times the stool is like radha.     She also has hiccups all of the time.     Is it one of her medications or supplement she is taking?     Would stopping her stomach medication help?     She did see GYN yesterday, 6/30 in regards to frequent urination.    They are going to start her on two new medications but has not taken them yet.     She was also told to stop douching.     They forgot to judy this up at her last appointment.    Last office visit: 6/16/2025    Next office visit: 12/22/2025

## 2025-07-01 NOTE — TELEPHONE ENCOUNTER
Spoke with Bo he stated he will  the baclofen. He stated he has tried Pepto for the patient and it has not helped stated the loose watery stools are getting worse and hard on the patient as well as hisself. He was advised by oncology to reach out to PCP in regards to the bowel issues. Patient was prescribed aprepitant br Dr. Vladimir Riley at [facility] wanted to know if that medication could be causing the issue or the Omeprazole she is taking which they have cut down to 20 mg  did advise to reach out to Dr. Das office. He would just like to know if you can advise anything they can do to help with the loose stools?

## 2025-07-03 DIAGNOSIS — N39.0 ACUTE LOWER UTI: Primary | ICD-10-CM

## 2025-07-03 LAB — BACTERIA UR CULT: ABNORMAL

## 2025-07-03 RX ORDER — LEVOFLOXACIN 500 MG/1
500 TABLET, FILM COATED ORAL DAILY
Qty: 5 TABLET | Refills: 0 | Status: SHIPPED | OUTPATIENT
Start: 2025-07-03 | End: 2025-07-08

## 2025-07-03 NOTE — PROGRESS NOTES
+Uti with multiple allergies and resistance. Sending Levaquin.      informs me that she has been having significant diarrhea for some time. This is likely the cause of the UTI today.  Discussed having her follow up with Dr. Omer and also following with GI.  Encouraged him to have her take immodium as needed.    Will also follow up with Olivia about UTIs    Teresita Schwarz MD

## 2025-07-05 ENCOUNTER — TELEPHONE (OUTPATIENT)
Dept: ADMISSION | Facility: HOSPITAL | Age: 70
End: 2025-07-05
Payer: MEDICARE

## 2025-07-05 NOTE — TELEPHONE ENCOUNTER
Bo called to inform team patient was diagnosed with a UTI. They were on vacation, are currently on their way home to get prescribed antibiotics.  He states he believes she is in need of IV fluids for hydration due to some mild confusion she is having.  Advised the [facility] is currently closed and they will need to proceed to urgent care or ED.  He stated he will take her if she gets worse, at this time, he is going to get her home and started on ATBs  If he does not take her this weekend, he was advised to call office 1st thing Monday morning to check for [facility] availability

## 2025-07-07 ENCOUNTER — OFFICE VISIT (OUTPATIENT)
Dept: HEMATOLOGY/ONCOLOGY | Facility: HOSPITAL | Age: 70
End: 2025-07-07
Payer: MEDICARE

## 2025-07-07 ENCOUNTER — NURSE TRIAGE (OUTPATIENT)
Dept: ADMISSION | Facility: HOSPITAL | Age: 70
End: 2025-07-07
Payer: MEDICARE

## 2025-07-07 VITALS
DIASTOLIC BLOOD PRESSURE: 80 MMHG | HEART RATE: 94 BPM | BODY MASS INDEX: 22.38 KG/M2 | RESPIRATION RATE: 18 BRPM | SYSTOLIC BLOOD PRESSURE: 102 MMHG | OXYGEN SATURATION: 98 % | WEIGHT: 113.98 LBS | TEMPERATURE: 97.7 F | HEIGHT: 60 IN

## 2025-07-07 DIAGNOSIS — E86.0 DEHYDRATION: ICD-10-CM

## 2025-07-07 DIAGNOSIS — N39.46 MIXED INCONTINENCE: ICD-10-CM

## 2025-07-07 DIAGNOSIS — C34.30 SMALL CELL CARCINOMA OF LOWER LOBE OF LUNG, UNSPECIFIED LATERALITY: Primary | ICD-10-CM

## 2025-07-07 DIAGNOSIS — R41.0 CONFUSION: ICD-10-CM

## 2025-07-07 DIAGNOSIS — R53.83 OTHER FATIGUE: ICD-10-CM

## 2025-07-07 LAB
ALBUMIN SERPL BCP-MCNC: 4.1 G/DL (ref 3.4–5)
ALP SERPL-CCNC: 47 U/L (ref 33–136)
ALT SERPL W P-5'-P-CCNC: 15 U/L (ref 7–45)
ANION GAP SERPL CALC-SCNC: 12 MMOL/L (ref 10–20)
APPEARANCE UR: CLEAR
AST SERPL W P-5'-P-CCNC: 37 U/L (ref 9–39)
BASOPHILS # BLD AUTO: 0.01 X10*3/UL (ref 0–0.1)
BASOPHILS NFR BLD AUTO: 0.2 %
BILIRUB SERPL-MCNC: 0.5 MG/DL (ref 0–1.2)
BILIRUB UR STRIP.AUTO-MCNC: NEGATIVE MG/DL
BUN SERPL-MCNC: 31 MG/DL (ref 6–23)
CALCIUM SERPL-MCNC: 9.4 MG/DL (ref 8.6–10.3)
CAOX CRY #/AREA UR COMP ASSIST: ABNORMAL /HPF
CHLORIDE SERPL-SCNC: 104 MMOL/L (ref 98–107)
CO2 SERPL-SCNC: 25 MMOL/L (ref 21–32)
COLOR UR: NORMAL
CREAT SERPL-MCNC: 0.7 MG/DL (ref 0.5–1.05)
EGFRCR SERPLBLD CKD-EPI 2021: >90 ML/MIN/1.73M*2
EOSINOPHIL # BLD AUTO: 0.12 X10*3/UL (ref 0–0.7)
EOSINOPHIL NFR BLD AUTO: 2.6 %
ERYTHROCYTE [DISTWIDTH] IN BLOOD BY AUTOMATED COUNT: 13.6 % (ref 11.5–14.5)
GLUCOSE SERPL-MCNC: 97 MG/DL (ref 74–99)
GLUCOSE UR STRIP.AUTO-MCNC: NORMAL MG/DL
HCT VFR BLD AUTO: 37.5 % (ref 36–46)
HGB BLD-MCNC: 13 G/DL (ref 12–16)
IMM GRANULOCYTES # BLD AUTO: 0.02 X10*3/UL (ref 0–0.7)
IMM GRANULOCYTES NFR BLD AUTO: 0.4 % (ref 0–0.9)
KETONES UR STRIP.AUTO-MCNC: NEGATIVE MG/DL
LEUKOCYTE ESTERASE UR QL STRIP.AUTO: NEGATIVE
LYMPHOCYTES # BLD AUTO: 1.17 X10*3/UL (ref 1.2–4.8)
LYMPHOCYTES NFR BLD AUTO: 24.9 %
MAGNESIUM SERPL-MCNC: 2.19 MG/DL (ref 1.6–2.4)
MCH RBC QN AUTO: 31.5 PG (ref 26–34)
MCHC RBC AUTO-ENTMCNC: 34.7 G/DL (ref 32–36)
MCV RBC AUTO: 91 FL (ref 80–100)
MONOCYTES # BLD AUTO: 0.54 X10*3/UL (ref 0.1–1)
MONOCYTES NFR BLD AUTO: 11.5 %
MUCOUS THREADS #/AREA URNS AUTO: ABNORMAL /LPF
NEUTROPHILS # BLD AUTO: 2.84 X10*3/UL (ref 1.2–7.7)
NEUTROPHILS NFR BLD AUTO: 60.4 %
NITRITE UR QL STRIP.AUTO: NEGATIVE
NRBC BLD-RTO: 0 /100 WBCS (ref 0–0)
PH UR STRIP.AUTO: 5.5 [PH]
PLATELET # BLD AUTO: 208 X10*3/UL (ref 150–450)
POTASSIUM SERPL-SCNC: 5.3 MMOL/L (ref 3.5–5.3)
PROT SERPL-MCNC: 6.8 G/DL (ref 6.4–8.2)
PROT UR STRIP.AUTO-MCNC: NORMAL MG/DL
RBC # BLD AUTO: 4.13 X10*6/UL (ref 4–5.2)
RBC # UR STRIP.AUTO: NEGATIVE MG/DL
RBC #/AREA URNS AUTO: ABNORMAL /HPF
SODIUM SERPL-SCNC: 136 MMOL/L (ref 136–145)
SP GR UR STRIP.AUTO: 1.03
SQUAMOUS #/AREA URNS AUTO: ABNORMAL /HPF
UROBILINOGEN UR STRIP.AUTO-MCNC: NORMAL MG/DL
WBC # BLD AUTO: 4.7 X10*3/UL (ref 4.4–11.3)
WBC #/AREA URNS AUTO: ABNORMAL /HPF

## 2025-07-07 PROCEDURE — 1160F RVW MEDS BY RX/DR IN RCRD: CPT | Performed by: STUDENT IN AN ORGANIZED HEALTH CARE EDUCATION/TRAINING PROGRAM

## 2025-07-07 PROCEDURE — 96361 HYDRATE IV INFUSION ADD-ON: CPT | Mod: INF

## 2025-07-07 PROCEDURE — 2500000004 HC RX 250 GENERAL PHARMACY W/ HCPCS (ALT 636 FOR OP/ED): Performed by: STUDENT IN AN ORGANIZED HEALTH CARE EDUCATION/TRAINING PROGRAM

## 2025-07-07 PROCEDURE — 1159F MED LIST DOCD IN RCRD: CPT | Performed by: STUDENT IN AN ORGANIZED HEALTH CARE EDUCATION/TRAINING PROGRAM

## 2025-07-07 PROCEDURE — 96360 HYDRATION IV INFUSION INIT: CPT | Mod: INF

## 2025-07-07 PROCEDURE — 3008F BODY MASS INDEX DOCD: CPT | Performed by: STUDENT IN AN ORGANIZED HEALTH CARE EDUCATION/TRAINING PROGRAM

## 2025-07-07 PROCEDURE — 83735 ASSAY OF MAGNESIUM: CPT

## 2025-07-07 PROCEDURE — 81001 URINALYSIS AUTO W/SCOPE: CPT | Performed by: STUDENT IN AN ORGANIZED HEALTH CARE EDUCATION/TRAINING PROGRAM

## 2025-07-07 PROCEDURE — 84075 ASSAY ALKALINE PHOSPHATASE: CPT

## 2025-07-07 PROCEDURE — 85025 COMPLETE CBC W/AUTO DIFF WBC: CPT

## 2025-07-07 PROCEDURE — 99215 OFFICE O/P EST HI 40 MIN: CPT | Mod: 25 | Performed by: STUDENT IN AN ORGANIZED HEALTH CARE EDUCATION/TRAINING PROGRAM

## 2025-07-07 PROCEDURE — 99215 OFFICE O/P EST HI 40 MIN: CPT | Performed by: STUDENT IN AN ORGANIZED HEALTH CARE EDUCATION/TRAINING PROGRAM

## 2025-07-07 RX ORDER — SODIUM CHLORIDE 9 MG/ML
1000 INJECTION, SOLUTION INTRAVENOUS CONTINUOUS
Status: ACTIVE | OUTPATIENT
Start: 2025-07-07 | End: 2025-07-07

## 2025-07-07 RX ORDER — ONDANSETRON HYDROCHLORIDE 2 MG/ML
8 INJECTION, SOLUTION INTRAVENOUS ONCE
Status: DISCONTINUED | OUTPATIENT
Start: 2025-07-07 | End: 2025-07-07 | Stop reason: HOSPADM

## 2025-07-07 RX ADMIN — SODIUM CHLORIDE 1000 ML/HR: 0.9 INJECTION, SOLUTION INTRAVENOUS at 12:26

## 2025-07-07 RX ADMIN — SODIUM CHLORIDE 1000 ML: 0.9 INJECTION, SOLUTION INTRAVENOUS at 13:51

## 2025-07-07 NOTE — PROGRESS NOTES
[facility]  Acute Care Clinic    Patient ID: Gladys Branch is a 69 y.o. female  Diagnosis:  Small Cell Lung Cancer     By immunostaining, the tumor cells are positive for CAM 5.2, INSM1, and TTF-1, and negative for p40 and LCA. The proliferation index by Ki-67 immunostaining is approximately 90%.  Synaptophysin, Chromogranin and INSM-1 are positive.  AE1/AE3 is negative. NEOGENOMICS, RB protein expression is lost in the tumor cells     STAGING  [id number], limited stage  Recurrence     CURRENT SITES OF DISEASE  RLL, supraclavicular     MOLECULAR GENOMICS     PRIOR THERAPY  Concurrent chemoradiation with Cisplatin/Etoposide every 3 weeks; C1D1 2/15/22, reaction to cisplatin C2D1 and did not receive D2 or D3 etoposide  Carbo/etoposide 4/5/2022 1 cycle c/b rash  PCI 5/21-6/13/22  Phase 1 study [id number]  with study drug Taralatamab 1/24/23 - 8/27/24     CURRENT THERAPY      CURRENT ONCOLOGICAL PROBLEMS     Mrs. Branch is a 69 y.o. with PMH GERD and COPD who originally was round to have a RLL nodule measuring 11 mm in 4/28/2021 found as part of CT calcium score scan. An ensuing CT chest w/o contrast was done on 5/19/21 which revealed subsolid pulmonary nodules measuring 14 mm in the RLL. She had CT chest w/contrast on 11/29/21 which confirmed stable 14 mm GGO of the RLL and decreased RLL subpleural nodule. She met thoracic surgeon Dr. Farfan, who ordered PET/CT scan done on 12/8/21 which did not reveal any FDG-avid nodules within the lung parenchyma but R hilar nodes with max SUV 8.0. He referred her for bronch, which was done on 1/21/22 by Dr. Mcdaniels. 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 Brain MRI was negative.     She started concurrent chemoradiation with BID radiation with cis/etop 2/15/22, and unfortunately had a reaction to cisplatin on C2D1 with chest pain and hypotension. She was hospitalized with sepsis felt to be due to pneumonia. She trialed  carboplatin/etoposide on 4/5/22 with a resulting rash and opted to forego further chemotherapy as she had completed radiation. Restaging scan 11/3/22 unfortunately with progression with new R hilar lymph node, paratracheal nodes, and increase in size of R supraclavicular mass. She enrolled in [id number] and started C1D1 1/24/23. C1 complicated by anorexia and dysgeusia, and delayed C2 by a week. She has further struggled with fatigue and confusion, which may be related to mirtazapine. Dose reduced for C2 and mirtazapine stopped with improvement in symptoms. 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     PAST MEDICAL HISTORY     GERD  COPD    Past Medical History:   Past Medical History:  No date: Abdominal pain  No date: Anemia  No date: COPD (chronic obstructive pulmonary disease) (Multi)  02/07/2023: Disease due to severe acute respiratory syndrome   coronavirus 2 (SARS-CoV-2)  No date: Diverticulosis  05/17/2024: Double vision  No date: GERD (gastroesophageal reflux disease)  No date: Hyperlipidemia  No date: Neuropathy  05/17/2024: New onset of headache in cancer patient  07/26/2022: Other specified abnormal findings of blood chemistry      Comment:  Elevated serum creatinine  07/26/2022: Other specified abnormal findings of blood chemistry      Comment:  Elevated serum creatinine  07/26/2022: Other specified abnormal findings of blood chemistry      Comment:  Elevated TSH  07/26/2022: Other specified abnormal findings of blood chemistry      Comment:  Elevated TSH  07/26/2022: Other specified abnormal findings of blood chemistry      Comment:  Elevated TSH  07/26/2022: Other specified abnormal findings of blood chemistry      Comment:  Elevated TSH  09/15/2020: Other specified abnormal findings of blood chemistry      Comment:  Elevated TSH  No date: Personal history of other diseases of the  respiratory system      Comment:  History of chronic obstructive lung disease  No date: Small cell lung cancer   Surgical History:    Surgical History[1]   Family History:    Family History[2]  Family Oncology History:    Cancer-related family history includes Breast cancer in her father's sister.  Social History:    Social History[3]       Subjective   Chief Complaint: Weakness from UTI    Patient presents to [facility] today for evaluation of weakness due to recent UTI.  states her mental status has changed alittle with the recent UTI. She is not herself. She was placed on Levaquin and one dose she spit it up but the  didn't actually see it. At times she has urinary and fecal incontinence, mostly at night. She has been more fatigued and not wanting to get up. Poor po intake. Hasn't had a bowel movement x 3 days. Hardly drinking any fluids. Emre states patient has a h/o gastroparesis, patient has decreased PO intake for the last several months with low appetite, and inability to tolerate some foods. Previously drinking some Ensure to supplement oral intake but has stopped this. Seeing GI tomorrow to discuss having a colonoscopy.    Allergies  RX Allergies[4]     Medications  Current Outpatient Medications   Medication Instructions    albuterol 90 mcg/actuation inhaler INHALE 2 PUFFS BY MOUTH EVERY 6 HOURS AS NEEDED FOR SHORTNESS OF BREATH AND WHEEZING    aprepitant (EMEND) 80 mg, Daily    B complex-vitamin C-folic acid (Nephro-Tracee) 0.8 mg tablet 0.8 mg, Daily    baclofen (LIORESAL) 20 mg, oral, 2 times daily PRN    cetirizine-pseudoephedrine (ZyrTEC-D) 5-120 mg 12 hr tablet 1 tablet, oral, Daily    cholecalciferol (VITAMIN D-3) 25 mcg, Daily    estradiol (Estrace) 0.01 % (0.1 mg/gram) vaginal cream Use a pea sized amount in the vagina twice weekly at bedtime    Gemtesa 75 mg, oral, Daily    levoFLOXacin (LEVAQUIN) 500 mg, oral, Daily    mirabegron (MYRBETRIQ) 50 mg, oral, Daily    omeprazole (PriLOSEC) 40  "mg DR capsule       Objective   Vitals: LMP  (LMP Unknown)   Unable to stand for weight today      5/15/2025    10:25 AM 6/4/2025    11:30 AM 6/10/2025     9:34 AM 6/16/2025    12:03 PM 6/19/2025     1:48 PM 6/30/2025    11:05 AM 7/7/2025    11:57 AM   Vitals   Systolic 96  107 104 108 112 102   Diastolic 69  68 70 60 74 80   BP Location Right arm  Left arm  Left arm  Left arm   Heart Rate 90  75 76 85  94   Temp 36.4 °C (97.5 °F)  36 °C (96.8 °F) 36.3 °C (97.3 °F)   36.5 °C (97.7 °F)   Resp 16  14    18   Height  1.524 m (5')  1.524 m (5') 1.524 m (5') 1.524 m (5') 1.527 m (5' 0.12\")   Weight (lb)  114 107.3 116.2 113.8 110 113.98   BMI  22.26 kg/m2 20.96 kg/m2 22.69 kg/m2 22.23 kg/m2 21.48 kg/m2 22.17 kg/m2   BSA (m2)  1.48 m2 1.44 m2 1.49 m2 1.48 m2 1.45 m2 1.48 m2   Visit Report Report  Report Report Report Report Report      Weight:   Daily Weight  06/30/25 : 49.9 kg (110 lb)  06/19/25 : 51.6 kg (113 lb 12.8 oz)  06/16/25 : 52.7 kg (116 lb 3.2 oz)  06/10/25 : 48.7 kg (107 lb 4.8 oz)  05/15/25 : 53.2 kg (117 lb 3.2 oz)  05/14/25 : 51.9 kg (114 lb 8 oz)  04/28/25 : 53 kg (116 lb 14.4 oz)    Physical Exam  Vitals reviewed.   Constitutional:       Appearance: Normal appearance.      Comments: Fatigue appearing   HENT:      Head: Normocephalic and atraumatic.      Nose: Nose normal.      Mouth/Throat:      Mouth: Mucous membranes are moist.   Eyes:      Extraocular Movements: Extraocular movements intact.      Conjunctiva/sclera: Conjunctivae normal.      Pupils: Pupils are equal, round, and reactive to light.   Cardiovascular:      Rate and Rhythm: Normal rate and regular rhythm.      Heart sounds: Normal heart sounds. No murmur heard.  Pulmonary:      Effort: Pulmonary effort is normal.      Breath sounds: Normal breath sounds.   Abdominal:      General: Abdomen is flat. There is no distension.      Palpations: Abdomen is soft.      Tenderness: There is no abdominal tenderness.   Musculoskeletal:         General: " "Normal range of motion.      Right shoulder: Tenderness present.      Cervical back: Normal range of motion and neck supple.      Comments: Strength equal bilaterally in LE and UE   Skin:     General: Skin is warm.   Neurological:      General: No focal deficit present.      Mental Status: She is alert. Mental status is at baseline.      Comments: Oritented to situation, place and person but not date which is baseline per . Just not herself per    Psychiatric:         Mood and Affect: Mood normal.         Behavior: Behavior normal.       Diagnostic Results     Labs  Lab Results   Component Value Date    WBC 4.1 (L) 06/02/2025    HGB 12.7 06/02/2025    HCT 37.8 06/02/2025    MCV 91 06/02/2025     06/02/2025      Lab Results   Component Value Date    NEUTROABS 2.35 06/02/2025    BANDSABS 2.88 (H) 03/09/2022    LYMPHOABS 0.00 (L) 03/09/2022    MONOABS 0.25 03/09/2022    EOSABS 0.18 06/02/2025      Lab Results   Component Value Date    GLUCOSE 91 06/02/2025    CALCIUM 9.2 06/02/2025     06/02/2025    K 4.1 06/02/2025    CO2 28 06/02/2025     06/02/2025    BUN 21 06/02/2025    CREATININE 0.71 06/02/2025    MG 2.01 05/15/2025    PHOS 4.6 09/10/2024    ALBUMIN 4.0 06/02/2025    PROT 6.4 06/02/2025     Lab Results   Component Value Date    ALT 11 06/02/2025    AST 17 06/02/2025    ALKPHOS 57 06/02/2025    BILITOT 0.5 06/02/2025    BILIDIR 0.1 09/10/2024      Lab Results   Component Value Date    CORTISOL 8.4 10/10/2024    ACTH 7.9 10/10/2024    TSH 2.71 06/02/2025    T3FREE 2.7 02/14/2023    FREET4 0.84 04/04/2025    FSH 39.0 09/10/2024    LH 7.7 09/10/2024     No results found for: \"RETICCTPCT\", \"RETIC\", \"IMMRETICFR\", \"RETICHGB\"  Lab Results   Component Value Date    CRP 1.17 (H) 09/10/2024     06/02/2025    LACTATE 1.4 08/07/2023    PROCAL 1.85 (A) 03/08/2022    FERRITIN 62 09/10/2024    IRON 79 03/24/2025    UIBC 196 03/24/2025    TIBC 275 03/24/2025    IRONSAT 29 03/24/2025 "     Images       Assessment/Plan   ASSESSMENT  Gladys Branch is a 69 y.o. 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       [facility] Course  - orthos positive but asymptomatic hypotension on arrival  - Labs unremarkable besides GALINA with increasing BUN.   - waiting on repeat urine culture  - 2L NS for dehydration    Dispo:  - Discharged home after [facility] visit complete  - Return to clinic/ED instructions given to patient  - Follow up w/ Oncology as scheduled    CASEY Lake-CNP            [1]   Past Surgical History:  Procedure Laterality Date    HYSTERECTOMY      OTHER SURGICAL HISTORY  2020    Hysterectomy    OTHER SURGICAL HISTORY  2021    Tubal ligation    OTHER SURGICAL HISTORY  2021    Partial atrial septal defect repair    US GUIDED BIOPSY LYMPH NODE SUPERFICIAL  2023    US GUIDED BIOPSY LYMPH NODE SUPERFICIAL 2023 DOCTOR OFFICE LEGACY   [2]   Family History  Problem Relation Name Age of Onset    Dementia Mother      Depression Mother      Other (varicose veins of lower extremity) Mother      Alcohol abuse Father      Breast cancer Father's Sister     [3]   Social History  Tobacco Use    Smoking status: Former     Current packs/day: 0.00     Average packs/day: 1 pack/day for 25.0 years (25.0 ttl pk-yrs)     Types: Cigarettes     Start date:      Quit date:      Years since quittin.5     Passive exposure: Past    Smokeless tobacco: Never   Vaping Use    Vaping status: Never Used   Substance Use Topics    Alcohol use: Not Currently     Alcohol/week: 1.0 - 2.0 standard drink of alcohol     Types: 1 - 2 Glasses of wine  per week     Comment: once  in a while    Drug use: Never   [4]   Allergies  Allergen Reactions    Contact Metal Agent Unknown     Sore in mouth  Patient not able to explain what kind of metal    Cisplatin Other     CHEMO INDUCED (Moderate); Dyspepsia (Moderate); Headaches (Moderate); Hypotension (Moderate); Numbness (Moderate); Resp Distress (Moderate)    Codeine Nausea Only and Other     nausea    Sulfa (Sulfonamide Antibiotics) Rash

## 2025-07-07 NOTE — TELEPHONE ENCOUNTER
" calls to follow-up with previous call see 7/5.   He is requesting an appt with [facility] for his wife and possible fluids.  She is on an antibiotic for UTI (levofloxacin) he does not feel she is getting better. He reports she seems confused. She is Aox3 follows commands but \"not herself\"  She has poor PO intake, she is incontinent of urine and stool overnight frequency, she is denying pain, she was experiencing chills over weekend. He took her temp just now was 97.9. No difficulty breathing, He reports she \"spit up\" her antibiotic pill couldn't swallow it. No nausea or vomiting.  He would like to see if she can be seen today at [facility], they are in [place] would need some time to drive in, forwarding request to provider  "

## 2025-07-08 LAB — HOLD SPECIMEN: NORMAL

## 2025-07-16 ENCOUNTER — TELEPHONE (OUTPATIENT)
Dept: PRIMARY CARE | Facility: CLINIC | Age: 70
End: 2025-07-16
Payer: MEDICARE

## 2025-07-16 NOTE — TELEPHONE ENCOUNTER
The patient husbands called with concerns in regards to Gladys's recent visit with [facility].     Bo stated GI advised the patient that her food is not digesting and they recommend a feeding tube.     He states that his wife and himself are not sure that is something they want.    Bo states the night be fore the scopes Gladys did eat a little bit of food.   So he thinks that probably sat in her esophagus and that's why the doctor thinks she is not absorbing foods and nutriments.      He stated her blood work is good and she is staying hydrated.     They wanted an appointment to discuss this with you.     I advised Bo that there are no appointments available but the message would be sent to Dr DUNN.

## 2025-07-18 ENCOUNTER — OFFICE VISIT (OUTPATIENT)
Dept: PRIMARY CARE | Facility: CLINIC | Age: 70
End: 2025-07-18
Payer: MEDICARE

## 2025-07-18 VITALS
HEART RATE: 74 BPM | TEMPERATURE: 97.6 F | WEIGHT: 110 LBS | DIASTOLIC BLOOD PRESSURE: 73 MMHG | SYSTOLIC BLOOD PRESSURE: 103 MMHG | OXYGEN SATURATION: 96 % | BODY MASS INDEX: 21.4 KG/M2

## 2025-07-18 DIAGNOSIS — C34.90 SMALL CELL CARCINOMA OF LUNG, UNSPECIFIED LATERALITY, UNSPECIFIED PART OF LUNG: ICD-10-CM

## 2025-07-18 DIAGNOSIS — K31.84 GASTROPARESIS: Primary | ICD-10-CM

## 2025-07-18 DIAGNOSIS — R06.6 CHRONIC HICCOUGHS: ICD-10-CM

## 2025-07-18 NOTE — ASSESSMENT & PLAN NOTE
Long discussion about care goals. Discussed pros/cons proceeding with feeding tube. Also discussed palliative and hospice care. Not ready for palliative care at this time. Would like second opinion on gastroparesis. Dr. Whyte recommended Dr. Arceo - referral provided.

## 2025-07-18 NOTE — PROGRESS NOTES
Subjective   Patient ID: Gladys Branch is a 69 y.o. female who presents for GI Problem (Pt recently had upper GI endoscopy was told pt is not getting adequate nutrient. Pt was having diarrhea today was able to have a bowel movement ).  GI Problem      Patient presents today with complaint of gastroparesis. 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 Had significant complications with radiation and chemo. Currently no active disease, on surveillance only. Recently saw Gi - recommended feeding tube due to gastroparesis/aperistalsis, weight loss and aspiration risk. Patient and  unsure what to do.     Otherwise feels well. No other complaints or concerns.    The patient's relevant past medical, surgical, family and social history was reviewed in Epic.    Review of Systems  A complete review of systems was performed and all systems were normal except what is noted in the HPI.    Objective   Physical Exam  Constitutional:       General: She is not in acute distress.     Appearance: Normal appearance.   HENT:      Head: Normocephalic and atraumatic.     Cardiovascular:      Rate and Rhythm: Normal rate and regular rhythm.      Heart sounds: Normal heart sounds.   Pulmonary:      Effort: Pulmonary effort is normal.      Breath sounds: Normal breath sounds.   Abdominal:      General: Abdomen is flat. Bowel sounds are normal.      Palpations: Abdomen is soft.      Tenderness: There is no abdominal tenderness.     Musculoskeletal:      Right lower leg: No edema.      Left lower leg: No edema.     Neurological:      Mental Status: She is alert.     Psychiatric:         Mood and Affect: Mood normal.         Behavior: Behavior normal.         Thought Content: Thought content normal.             Assessment/Plan   Problem List Items Addressed This Visit       Small cell lung cancer    Long discussion about care goals. Discussed pros/cons proceeding with feeding tube. Also discussed palliative and hospice  care. Not ready for palliative care at this time. Would like second opinion on gastroparesis. Dr. Whyte recommended Dr. Arceo - referral provided.          Gastroparesis - Primary    Refer Dr. Arceo for second opinion.         Relevant Orders    Referral to General Surgery    Chronic hiccoughs    Much improved on baclofen             Patient and family understand and agree with treatment plan.         Nova Omer MD

## 2025-07-18 NOTE — PATIENT INSTRUCTIONS
I would like you to follow up in 4 months  Please have all labs that were ordered done at least 1 week prior to your visit.

## 2025-07-30 ENCOUNTER — TELEPHONE (OUTPATIENT)
Dept: OBSTETRICS AND GYNECOLOGY | Facility: CLINIC | Age: 70
End: 2025-07-30
Payer: MEDICARE

## 2025-07-30 DIAGNOSIS — R39.9 UTI SYMPTOMS: ICD-10-CM

## 2025-07-30 NOTE — TELEPHONE ENCOUNTER
Spoke with pt and .  Question of medication Gemtesa was answered.  Pt on medication since 06/30/25  Urine culture order placed r/t  recurrent UTI.

## 2025-07-31 ENCOUNTER — APPOINTMENT (OUTPATIENT)
Dept: SURGERY | Facility: CLINIC | Age: 70
End: 2025-07-31
Payer: MEDICARE

## 2025-07-31 VITALS
SYSTOLIC BLOOD PRESSURE: 105 MMHG | HEART RATE: 81 BPM | DIASTOLIC BLOOD PRESSURE: 73 MMHG | WEIGHT: 107 LBS | BODY MASS INDEX: 20.82 KG/M2 | RESPIRATION RATE: 20 BRPM

## 2025-07-31 DIAGNOSIS — K31.84 NONDIABETIC GASTROPARESIS: Primary | ICD-10-CM

## 2025-07-31 ASSESSMENT — ENCOUNTER SYMPTOMS
ABDOMINAL DISTENTION: 1
CHOKING: 0
UNEXPECTED WEIGHT CHANGE: 1
NAUSEA: 0
CHILLS: 0
VOMITING: 0
FEVER: 0
ABDOMINAL PAIN: 0
DIARRHEA: 1
APPETITE CHANGE: 1
CONSTIPATION: 0
TROUBLE SWALLOWING: 1

## 2025-07-31 ASSESSMENT — PAIN SCALES - GENERAL: PAINLEVEL_OUTOF10: 0-NO PAIN

## 2025-07-31 NOTE — PROGRESS NOTES
Subjective   Patient ID: Gladys Branch is a 69 y.o. 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     HPI  Patient is here today for a second opinion of gastroparesis. Her GI doctor recommended a feeding tube after concern for gastroparesis and subsequent malnutrition. For the past year, she has experienced occasional dysphagia and regurgitation of food. Also experiences frequent hiccupping (on baclofen), early satiety, and bloating/abdominal distention after drinking liquids or consuming a small amount of food. She has lost 7 lbs in the last few month.  Endorses diarrhea but no constipation. Past surgical history consists of hysterectomy and rhinoplasty for deviated septum.     Review of Systems   Constitutional:  Positive for appetite change and unexpected weight change. Negative for chills and fever.   HENT:  Positive for trouble swallowing.    Respiratory:  Negative for choking.    Cardiovascular:  Negative for chest pain.   Gastrointestinal:  Positive for abdominal distention and diarrhea. Negative for abdominal pain, constipation, nausea and vomiting.       Objective   Physical Exam  Constitutional:       Appearance: Normal appearance.   HENT:      Head: Normocephalic and atraumatic.      Mouth/Throat:      Mouth: Mucous membranes are moist.   Pulmonary:      Effort: Pulmonary effort is normal.   Abdominal:      General: Abdomen is flat. There is no distension.      Palpations: Abdomen is soft. There is no mass.      Tenderness: There is no abdominal tenderness.     Musculoskeletal:      Cervical back: Normal range of motion.     Skin:     General: Skin is warm.     Neurological:      Mental Status: She is alert and oriented to person, place, and time.     Psychiatric:         Mood and Affect: Mood normal.         Behavior: Behavior normal.       EGD (7/14/25):   - Z-line regular, 36 cm from the  incisors.  - LA Grade C reflux esophagitis with no bleeding.  - Abnormal esophageal motility, suspicious for aperistalsis.  - Gastroparesis, idiopathic etiology.  - Normal pylorus.  - Normal examined duodenum.  - No specimens collected.     Gastric emptying study (9/9/24): Solid study demonstrates 91% gastric retention at 1 hour (normal range, 37-90%), 56% retention at 2 hours (normal range, 30-60%), and 17% retention at 4 hours (normal range, 0-10%).     Assessment/Plan   Gladys Branch is a 69 y.o. 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 Given the patient's ongoing symptoms, the treatment options for gastroparesis were reviewed including botox injections, balloon dilation, G-POEM, laparoscopic pyloromyotomy, and pyloroplasty. Recommended that she try one of these routes before considering placing a feeding tube. We reviewed the risks, benefits, and outcomes of these procedures and discussed the G-POEM procedure in depth. Complications of this procedure can include infection, bleeding, ulceration, leakage, pneumoperitoneum, recurrence of symptoms, GERD, non-resolution or recurrence of symptoms. She demonstrated understanding and was agreeable to this plan. The office will call and schedule procedure. She will be schedule for elective gastric peroral endoscopic myotomy under general anesthesia after being seen in preadmission testing.       JOSE LUIS SAMPSON 07/31/25 2:48 PM

## 2025-08-01 DIAGNOSIS — K31.84 GASTROPARESIS: ICD-10-CM

## 2025-08-01 RX ORDER — FLUCONAZOLE 200 MG/1
200 TABLET ORAL ONCE
Qty: 1 TABLET | Refills: 0 | Status: SHIPPED | OUTPATIENT
Start: 2025-08-01 | End: 2025-08-01

## 2025-08-01 RX ORDER — NYSTATIN 100000 [USP'U]/ML
5 SUSPENSION ORAL 4 TIMES DAILY
Qty: 60 ML | Refills: 0 | Status: SHIPPED | OUTPATIENT
Start: 2025-08-01 | End: 2025-08-04

## 2025-08-03 LAB — BACTERIA UR CULT: ABNORMAL

## 2025-08-04 ENCOUNTER — TELEPHONE (OUTPATIENT)
Dept: PRIMARY CARE | Facility: CLINIC | Age: 70
End: 2025-08-04
Payer: MEDICARE

## 2025-08-04 ENCOUNTER — TELEPHONE (OUTPATIENT)
Dept: OBSTETRICS AND GYNECOLOGY | Facility: CLINIC | Age: 70
End: 2025-08-04
Payer: MEDICARE

## 2025-08-04 DIAGNOSIS — K21.9 CHRONIC GERD: ICD-10-CM

## 2025-08-04 NOTE — TELEPHONE ENCOUNTER
Attempted to call  back to further discuss his message.  Got a voice mail.  Message left to call office.

## 2025-08-04 NOTE — TELEPHONE ENCOUNTER
Patients  called stating that his wife has a UTI and stated that she have bowel movements in a brief and wanted to know if she can use wipes to prevent the UTI's from reoccurring. He would like a call back from the nurse.

## 2025-08-04 NOTE — TELEPHONE ENCOUNTER
Bo called because the script for Gladys for the Omeprazole.  A new script for Omeprazole 20 mg was suppose to be sent to the pharmacy.  Can this please be sent?  Gladys was seen on 7/18/2025.       Pharmacy: [facility] [place]

## 2025-08-05 RX ORDER — OMEPRAZOLE 20 MG/1
20 CAPSULE, DELAYED RELEASE ORAL DAILY
COMMUNITY
End: 2025-08-05 | Stop reason: SDUPTHER

## 2025-08-05 RX ORDER — OMEPRAZOLE 20 MG/1
20 CAPSULE, DELAYED RELEASE ORAL DAILY
Qty: 90 CAPSULE | Refills: 1 | Status: SHIPPED | OUTPATIENT
Start: 2025-08-05 | End: 2026-02-01

## 2025-08-12 ENCOUNTER — HOSPITAL ENCOUNTER (OUTPATIENT)
Facility: HOSPITAL | Age: 70
Setting detail: OUTPATIENT SURGERY
End: 2025-08-12
Attending: SURGERY | Admitting: SURGERY
Payer: MEDICARE

## 2025-08-12 ENCOUNTER — LAB (OUTPATIENT)
Dept: LAB | Facility: CLINIC | Age: 70
End: 2025-08-12
Payer: MEDICARE

## 2025-08-12 DIAGNOSIS — C34.90 SMALL CELL LUNG CANCER: ICD-10-CM

## 2025-08-12 LAB
ALBUMIN SERPL BCP-MCNC: 3.9 G/DL (ref 3.4–5)
ALP SERPL-CCNC: 57 U/L (ref 33–136)
ALT SERPL W P-5'-P-CCNC: 16 U/L (ref 7–45)
ANION GAP SERPL CALC-SCNC: 10 MMOL/L (ref 10–20)
AST SERPL W P-5'-P-CCNC: 18 U/L (ref 9–39)
BASOPHILS # BLD AUTO: 0.03 X10*3/UL (ref 0–0.1)
BASOPHILS NFR BLD AUTO: 0.6 %
BILIRUB SERPL-MCNC: 0.4 MG/DL (ref 0–1.2)
BUN SERPL-MCNC: 19 MG/DL (ref 6–23)
CALCIUM SERPL-MCNC: 9.6 MG/DL (ref 8.6–10.3)
CHLORIDE SERPL-SCNC: 103 MMOL/L (ref 98–107)
CO2 SERPL-SCNC: 30 MMOL/L (ref 21–32)
CREAT SERPL-MCNC: 0.7 MG/DL (ref 0.5–1.05)
EGFRCR SERPLBLD CKD-EPI 2021: >90 ML/MIN/1.73M*2
EOSINOPHIL # BLD AUTO: 0.21 X10*3/UL (ref 0–0.7)
EOSINOPHIL NFR BLD AUTO: 4.3 %
ERYTHROCYTE [DISTWIDTH] IN BLOOD BY AUTOMATED COUNT: 13 % (ref 11.5–14.5)
GLUCOSE SERPL-MCNC: 97 MG/DL (ref 74–99)
HCT VFR BLD AUTO: 37.8 % (ref 36–46)
HGB BLD-MCNC: 12.7 G/DL (ref 12–16)
IMM GRANULOCYTES # BLD AUTO: 0.01 X10*3/UL (ref 0–0.7)
IMM GRANULOCYTES NFR BLD AUTO: 0.2 % (ref 0–0.9)
LYMPHOCYTES # BLD AUTO: 1.42 X10*3/UL (ref 1.2–4.8)
LYMPHOCYTES NFR BLD AUTO: 28.9 %
MCH RBC QN AUTO: 31.3 PG (ref 26–34)
MCHC RBC AUTO-ENTMCNC: 33.6 G/DL (ref 32–36)
MCV RBC AUTO: 93 FL (ref 80–100)
MONOCYTES # BLD AUTO: 0.52 X10*3/UL (ref 0.1–1)
MONOCYTES NFR BLD AUTO: 10.6 %
NEUTROPHILS # BLD AUTO: 2.73 X10*3/UL (ref 1.2–7.7)
NEUTROPHILS NFR BLD AUTO: 55.4 %
NRBC BLD-RTO: NORMAL /100{WBCS}
PLATELET # BLD AUTO: 211 X10*3/UL (ref 150–450)
POTASSIUM SERPL-SCNC: 4 MMOL/L (ref 3.5–5.3)
PROT SERPL-MCNC: 6.4 G/DL (ref 6.4–8.2)
RBC # BLD AUTO: 4.06 X10*6/UL (ref 4–5.2)
SODIUM SERPL-SCNC: 139 MMOL/L (ref 136–145)
WBC # BLD AUTO: 4.9 X10*3/UL (ref 4.4–11.3)

## 2025-08-12 PROCEDURE — 85025 COMPLETE CBC W/AUTO DIFF WBC: CPT

## 2025-08-12 PROCEDURE — 36415 COLL VENOUS BLD VENIPUNCTURE: CPT

## 2025-08-12 PROCEDURE — 84075 ASSAY ALKALINE PHOSPHATASE: CPT

## 2025-08-12 PROCEDURE — 83615 LACTATE (LD) (LDH) ENZYME: CPT

## 2025-08-13 ENCOUNTER — APPOINTMENT (OUTPATIENT)
Dept: RADIOLOGY | Facility: CLINIC | Age: 70
End: 2025-08-13
Payer: MEDICARE

## 2025-08-13 ENCOUNTER — HOSPITAL ENCOUNTER (OUTPATIENT)
Dept: RADIOLOGY | Facility: HOSPITAL | Age: 70
Discharge: HOME | End: 2025-08-13
Payer: MEDICARE

## 2025-08-13 DIAGNOSIS — C34.90 SMALL CELL LUNG CANCER: ICD-10-CM

## 2025-08-13 LAB — LDH SERPL L TO P-CCNC: 149 U/L (ref 84–246)

## 2025-08-13 PROCEDURE — 74177 CT ABD & PELVIS W/CONTRAST: CPT

## 2025-08-13 PROCEDURE — 2550000001 HC RX 255 CONTRASTS: Performed by: STUDENT IN AN ORGANIZED HEALTH CARE EDUCATION/TRAINING PROGRAM

## 2025-08-13 RX ADMIN — IOHEXOL 65 ML: 350 INJECTION, SOLUTION INTRAVENOUS at 12:26

## 2025-08-18 ENCOUNTER — OFFICE VISIT (OUTPATIENT)
Dept: HEMATOLOGY/ONCOLOGY | Facility: CLINIC | Age: 70
End: 2025-08-18
Payer: MEDICARE

## 2025-08-18 VITALS
WEIGHT: 113.2 LBS | SYSTOLIC BLOOD PRESSURE: 115 MMHG | OXYGEN SATURATION: 97 % | BODY MASS INDEX: 22.02 KG/M2 | TEMPERATURE: 98.2 F | DIASTOLIC BLOOD PRESSURE: 80 MMHG | HEART RATE: 76 BPM | RESPIRATION RATE: 16 BRPM

## 2025-08-18 DIAGNOSIS — C34.90 SMALL CELL LUNG CANCER: ICD-10-CM

## 2025-08-18 PROCEDURE — 1157F ADVNC CARE PLAN IN RCRD: CPT | Performed by: STUDENT IN AN ORGANIZED HEALTH CARE EDUCATION/TRAINING PROGRAM

## 2025-08-18 PROCEDURE — G2211 COMPLEX E/M VISIT ADD ON: HCPCS | Performed by: STUDENT IN AN ORGANIZED HEALTH CARE EDUCATION/TRAINING PROGRAM

## 2025-08-18 PROCEDURE — 99215 OFFICE O/P EST HI 40 MIN: CPT | Performed by: STUDENT IN AN ORGANIZED HEALTH CARE EDUCATION/TRAINING PROGRAM

## 2025-08-18 PROCEDURE — 1159F MED LIST DOCD IN RCRD: CPT | Performed by: STUDENT IN AN ORGANIZED HEALTH CARE EDUCATION/TRAINING PROGRAM

## 2025-08-18 PROCEDURE — 1126F AMNT PAIN NOTED NONE PRSNT: CPT | Performed by: STUDENT IN AN ORGANIZED HEALTH CARE EDUCATION/TRAINING PROGRAM

## 2025-08-18 ASSESSMENT — PAIN SCALES - GENERAL: PAINLEVEL_OUTOF10: 0-NO PAIN

## 2025-08-31 DIAGNOSIS — N32.81 OAB (OVERACTIVE BLADDER): ICD-10-CM

## 2025-09-02 RX ORDER — VIBEGRON 75 MG/1
1 TABLET, FILM COATED ORAL DAILY
Qty: 30 TABLET | Refills: 0 | Status: SHIPPED | OUTPATIENT
Start: 2025-09-02

## 2025-09-10 ENCOUNTER — APPOINTMENT (OUTPATIENT)
Dept: RADIOLOGY | Facility: CLINIC | Age: 70
End: 2025-09-10
Payer: MEDICARE

## 2025-09-22 ENCOUNTER — APPOINTMENT (OUTPATIENT)
Dept: OBSTETRICS AND GYNECOLOGY | Facility: CLINIC | Age: 70
End: 2025-09-22
Payer: MEDICARE

## 2025-10-23 ENCOUNTER — APPOINTMENT (OUTPATIENT)
Dept: NEUROLOGY | Facility: CLINIC | Age: 70
End: 2025-10-23
Payer: MEDICARE

## 2025-12-22 ENCOUNTER — APPOINTMENT (OUTPATIENT)
Dept: PRIMARY CARE | Facility: CLINIC | Age: 70
End: 2025-12-22
Payer: MEDICARE